# Patient Record
Sex: MALE | Race: WHITE | NOT HISPANIC OR LATINO
[De-identification: names, ages, dates, MRNs, and addresses within clinical notes are randomized per-mention and may not be internally consistent; named-entity substitution may affect disease eponyms.]

---

## 2017-01-17 ENCOUNTER — MEDICATION RENEWAL (OUTPATIENT)
Age: 57
End: 2017-01-17

## 2017-02-09 ENCOUNTER — APPOINTMENT (OUTPATIENT)
Dept: PULMONOLOGY | Facility: CLINIC | Age: 57
End: 2017-02-09

## 2017-02-13 ENCOUNTER — APPOINTMENT (OUTPATIENT)
Dept: PULMONOLOGY | Facility: CLINIC | Age: 57
End: 2017-02-13

## 2017-02-13 VITALS
WEIGHT: 209 LBS | HEART RATE: 101 BPM | BODY MASS INDEX: 30.96 KG/M2 | HEIGHT: 69 IN | DIASTOLIC BLOOD PRESSURE: 75 MMHG | TEMPERATURE: 98.5 F | OXYGEN SATURATION: 96 % | SYSTOLIC BLOOD PRESSURE: 169 MMHG | RESPIRATION RATE: 12 BRPM

## 2017-03-16 ENCOUNTER — APPOINTMENT (OUTPATIENT)
Dept: PULMONOLOGY | Facility: CLINIC | Age: 57
End: 2017-03-16

## 2017-03-16 ENCOUNTER — APPOINTMENT (OUTPATIENT)
Dept: CARDIOLOGY | Facility: CLINIC | Age: 57
End: 2017-03-16

## 2017-03-16 ENCOUNTER — NON-APPOINTMENT (OUTPATIENT)
Age: 57
End: 2017-03-16

## 2017-03-16 VITALS
RESPIRATION RATE: 12 BRPM | HEIGHT: 69 IN | OXYGEN SATURATION: 97 % | WEIGHT: 214 LBS | HEART RATE: 91 BPM | TEMPERATURE: 98.5 F | BODY MASS INDEX: 31.7 KG/M2 | DIASTOLIC BLOOD PRESSURE: 80 MMHG | SYSTOLIC BLOOD PRESSURE: 171 MMHG

## 2017-03-16 VITALS — SYSTOLIC BLOOD PRESSURE: 142 MMHG | DIASTOLIC BLOOD PRESSURE: 78 MMHG

## 2017-03-16 DIAGNOSIS — Z00.00 ENCOUNTER FOR GENERAL ADULT MEDICAL EXAMINATION W/OUT ABNORMAL FINDINGS: ICD-10-CM

## 2017-05-25 ENCOUNTER — APPOINTMENT (OUTPATIENT)
Dept: CARDIOLOGY | Facility: CLINIC | Age: 57
End: 2017-05-25

## 2017-05-25 ENCOUNTER — APPOINTMENT (OUTPATIENT)
Dept: PULMONOLOGY | Facility: CLINIC | Age: 57
End: 2017-05-25

## 2017-05-25 ENCOUNTER — NON-APPOINTMENT (OUTPATIENT)
Age: 57
End: 2017-05-25

## 2017-05-25 VITALS
SYSTOLIC BLOOD PRESSURE: 140 MMHG | RESPIRATION RATE: 14 BRPM | HEART RATE: 99 BPM | OXYGEN SATURATION: 96 % | DIASTOLIC BLOOD PRESSURE: 70 MMHG | TEMPERATURE: 98.6 F | WEIGHT: 207 LBS | HEIGHT: 69 IN | BODY MASS INDEX: 30.66 KG/M2

## 2017-05-25 VITALS
TEMPERATURE: 98.6 F | HEART RATE: 99 BPM | OXYGEN SATURATION: 95 % | DIASTOLIC BLOOD PRESSURE: 71 MMHG | SYSTOLIC BLOOD PRESSURE: 156 MMHG | WEIGHT: 207 LBS | RESPIRATION RATE: 12 BRPM | BODY MASS INDEX: 30.66 KG/M2 | HEIGHT: 69 IN

## 2017-05-25 VITALS — DIASTOLIC BLOOD PRESSURE: 72 MMHG | SYSTOLIC BLOOD PRESSURE: 144 MMHG

## 2017-06-08 NOTE — ASU PATIENT PROFILE, ADULT - PSH
S/P amputation  right hallux 5/13  S/P carpal tunnel release  b/l  S/P hernia repair  at age 4 H/O cardiac catheterization    Retinal hemorrhage, left eye    S/P amputation  right hallux 5/13  S/P carpal tunnel release  b/l  S/P hernia repair  at age 4

## 2017-06-08 NOTE — ASU PATIENT PROFILE, ADULT - PMH
CKD (chronic kidney disease)    Diabetes    Diabetic neuropathy    Diastolic dysfunction  mild. stage 1. EF 65%  DKA (diabetic ketoacidoses)    DM (diabetes mellitus)    High cholesterol    Hyperlipidemia    Hypertension    Hypertension    Insulin pump status    Lower extremity edema    Osteomyelitis of ankle or foot  x2  Pneumonia  4/2013

## 2017-06-09 ENCOUNTER — TRANSCRIPTION ENCOUNTER (OUTPATIENT)
Age: 57
End: 2017-06-09

## 2017-06-09 ENCOUNTER — OUTPATIENT (OUTPATIENT)
Dept: OUTPATIENT SERVICES | Facility: HOSPITAL | Age: 57
LOS: 1 days | End: 2017-06-09
Payer: COMMERCIAL

## 2017-06-09 VITALS
RESPIRATION RATE: 19 BRPM | OXYGEN SATURATION: 96 % | DIASTOLIC BLOOD PRESSURE: 72 MMHG | HEART RATE: 88 BPM | SYSTOLIC BLOOD PRESSURE: 141 MMHG

## 2017-06-09 VITALS
WEIGHT: 200.62 LBS | HEART RATE: 93 BPM | OXYGEN SATURATION: 98 % | HEIGHT: 68 IN | TEMPERATURE: 99 F | DIASTOLIC BLOOD PRESSURE: 86 MMHG | SYSTOLIC BLOOD PRESSURE: 161 MMHG | RESPIRATION RATE: 11 BRPM

## 2017-06-09 DIAGNOSIS — H43.11 VITREOUS HEMORRHAGE, RIGHT EYE: ICD-10-CM

## 2017-06-09 DIAGNOSIS — Z98.890 OTHER SPECIFIED POSTPROCEDURAL STATES: Chronic | ICD-10-CM

## 2017-06-09 DIAGNOSIS — E11.3519 TYPE 2 DIABETES MELLITUS WITH PROLIFERATIVE DIABETIC RETINOPATHY WITH MACULAR EDEMA, UNSPECIFIED EYE: ICD-10-CM

## 2017-06-09 DIAGNOSIS — H35.62 RETINAL HEMORRHAGE, LEFT EYE: Chronic | ICD-10-CM

## 2017-06-09 PROCEDURE — 67041 VIT FOR MACULAR PUCKER: CPT | Mod: RT

## 2017-06-09 PROCEDURE — C1889: CPT

## 2017-06-12 ENCOUNTER — MEDICATION RENEWAL (OUTPATIENT)
Age: 57
End: 2017-06-12

## 2017-07-20 ENCOUNTER — APPOINTMENT (OUTPATIENT)
Dept: ORTHOPEDIC SURGERY | Facility: CLINIC | Age: 57
End: 2017-07-20

## 2017-08-31 ENCOUNTER — APPOINTMENT (OUTPATIENT)
Dept: CARDIOLOGY | Facility: CLINIC | Age: 57
End: 2017-08-31
Payer: COMMERCIAL

## 2017-08-31 ENCOUNTER — NON-APPOINTMENT (OUTPATIENT)
Age: 57
End: 2017-08-31

## 2017-08-31 ENCOUNTER — APPOINTMENT (OUTPATIENT)
Dept: PULMONOLOGY | Facility: CLINIC | Age: 57
End: 2017-08-31
Payer: COMMERCIAL

## 2017-08-31 VITALS
HEART RATE: 97 BPM | BODY MASS INDEX: 29.92 KG/M2 | HEIGHT: 69 IN | OXYGEN SATURATION: 96 % | RESPIRATION RATE: 12 BRPM | SYSTOLIC BLOOD PRESSURE: 135 MMHG | TEMPERATURE: 99.3 F | DIASTOLIC BLOOD PRESSURE: 77 MMHG | WEIGHT: 202 LBS

## 2017-08-31 PROCEDURE — 99214 OFFICE O/P EST MOD 30 MIN: CPT

## 2017-08-31 PROCEDURE — 99215 OFFICE O/P EST HI 40 MIN: CPT

## 2017-09-21 ENCOUNTER — APPOINTMENT (OUTPATIENT)
Dept: ORTHOPEDIC SURGERY | Facility: CLINIC | Age: 57
End: 2017-09-21
Payer: COMMERCIAL

## 2017-09-21 VITALS — HEIGHT: 69 IN | WEIGHT: 202 LBS | RESPIRATION RATE: 14 BRPM | BODY MASS INDEX: 29.92 KG/M2

## 2017-09-21 DIAGNOSIS — M76.891 OTHER SPECIFIED ENTHESOPATHIES OF RIGHT LOWER LIMB, EXCLUDING FOOT: ICD-10-CM

## 2017-09-21 PROCEDURE — 99204 OFFICE O/P NEW MOD 45 MIN: CPT | Mod: 25

## 2017-09-21 PROCEDURE — 73502 X-RAY EXAM HIP UNI 2-3 VIEWS: CPT | Mod: RT

## 2017-09-21 PROCEDURE — 20610 DRAIN/INJ JOINT/BURSA W/O US: CPT | Mod: RT

## 2017-12-07 ENCOUNTER — APPOINTMENT (OUTPATIENT)
Dept: CARDIOLOGY | Facility: CLINIC | Age: 57
End: 2017-12-07
Payer: COMMERCIAL

## 2017-12-07 ENCOUNTER — APPOINTMENT (OUTPATIENT)
Dept: PULMONOLOGY | Facility: CLINIC | Age: 57
End: 2017-12-07
Payer: COMMERCIAL

## 2017-12-07 ENCOUNTER — NON-APPOINTMENT (OUTPATIENT)
Age: 57
End: 2017-12-07

## 2017-12-07 VITALS
HEIGHT: 69 IN | DIASTOLIC BLOOD PRESSURE: 93 MMHG | SYSTOLIC BLOOD PRESSURE: 187 MMHG | TEMPERATURE: 99 F | OXYGEN SATURATION: 95 % | HEART RATE: 91 BPM | RESPIRATION RATE: 12 BRPM

## 2017-12-07 VITALS
DIASTOLIC BLOOD PRESSURE: 70 MMHG | SYSTOLIC BLOOD PRESSURE: 140 MMHG | WEIGHT: 202 LBS | HEIGHT: 69 IN | BODY MASS INDEX: 29.92 KG/M2

## 2017-12-07 VITALS — SYSTOLIC BLOOD PRESSURE: 160 MMHG | DIASTOLIC BLOOD PRESSURE: 78 MMHG

## 2017-12-07 PROCEDURE — 99214 OFFICE O/P EST MOD 30 MIN: CPT

## 2017-12-07 PROCEDURE — 93000 ELECTROCARDIOGRAM COMPLETE: CPT

## 2017-12-07 PROCEDURE — 99215 OFFICE O/P EST HI 40 MIN: CPT | Mod: 25

## 2017-12-08 ENCOUNTER — MEDICATION RENEWAL (OUTPATIENT)
Age: 57
End: 2017-12-08

## 2017-12-08 LAB
ALBUMIN SERPL ELPH-MCNC: 4.4 G/DL
ALP BLD-CCNC: 92 U/L
ALT SERPL-CCNC: 14 U/L
ANION GAP SERPL CALC-SCNC: 17 MMOL/L
AST SERPL-CCNC: 23 U/L
BASOPHILS # BLD AUTO: 0.05 K/UL
BASOPHILS NFR BLD AUTO: 0.5 %
BILIRUB SERPL-MCNC: 0.2 MG/DL
BUN SERPL-MCNC: 53 MG/DL
CALCIUM SERPL-MCNC: 8.9 MG/DL
CHLORIDE SERPL-SCNC: 98 MMOL/L
CHOLEST SERPL-MCNC: 126 MG/DL
CHOLEST/HDLC SERPL: 5 RATIO
CO2 SERPL-SCNC: 24 MMOL/L
CREAT SERPL-MCNC: 3.16 MG/DL
EOSINOPHIL # BLD AUTO: 0.16 K/UL
EOSINOPHIL NFR BLD AUTO: 1.6
GLUCOSE SERPL-MCNC: 276 MG/DL
HBA1C MFR BLD HPLC: 9.8 %
HCT VFR BLD CALC: 35.6 %
HDLC SERPL-MCNC: 25 MG/DL
HGB BLD-MCNC: 11.8 G/DL
IMM GRANULOCYTES NFR BLD AUTO: 0.8 %
LDLC SERPL CALC-MCNC: 48 MG/DL
LYMPHOCYTES # BLD AUTO: 2.25 K/UL
LYMPHOCYTES NFR BLD AUTO: 22.6 %
MAN DIFF?: NORMAL
MCHC RBC-ENTMCNC: 29.9 PG
MCHC RBC-ENTMCNC: 33.1 GM/DL
MCV RBC AUTO: 90.4 FL
MONOCYTES # BLD AUTO: 0.49 K/UL
MONOCYTES NFR BLD AUTO: 4.9 %
NEUTROPHILS # BLD AUTO: 6.94 K/UL
NEUTROPHILS NFR BLD AUTO: 69.6 %
PLATELET # BLD AUTO: 287 K/UL
POTASSIUM SERPL-SCNC: 3.8 MMOL/L
PROT SERPL-MCNC: 7.9 G/DL
PSA SERPL-MCNC: 0.73 NG/ML
RBC # BLD: 3.94 M/UL
RBC # FLD: 13.1 %
SODIUM SERPL-SCNC: 139 MMOL/L
T4 SERPL-MCNC: 5.9 UG/DL
TRIGL SERPL-MCNC: 263 MG/DL
TSH SERPL-ACNC: 4.23 UIU/ML
WBC # FLD AUTO: 9.97 K/UL

## 2018-03-15 ENCOUNTER — APPOINTMENT (OUTPATIENT)
Dept: CARDIOLOGY | Facility: CLINIC | Age: 58
End: 2018-03-15
Payer: COMMERCIAL

## 2018-03-15 ENCOUNTER — NON-APPOINTMENT (OUTPATIENT)
Age: 58
End: 2018-03-15

## 2018-03-15 ENCOUNTER — APPOINTMENT (OUTPATIENT)
Dept: PULMONOLOGY | Facility: CLINIC | Age: 58
End: 2018-03-15
Payer: COMMERCIAL

## 2018-03-15 VITALS
RESPIRATION RATE: 12 BRPM | DIASTOLIC BLOOD PRESSURE: 70 MMHG | SYSTOLIC BLOOD PRESSURE: 130 MMHG | OXYGEN SATURATION: 96 % | HEART RATE: 88 BPM

## 2018-03-15 VITALS — SYSTOLIC BLOOD PRESSURE: 136 MMHG | DIASTOLIC BLOOD PRESSURE: 70 MMHG

## 2018-03-15 VITALS
HEART RATE: 86 BPM | HEIGHT: 69 IN | BODY MASS INDEX: 28.14 KG/M2 | OXYGEN SATURATION: 95 % | TEMPERATURE: 98.2 F | DIASTOLIC BLOOD PRESSURE: 69 MMHG | SYSTOLIC BLOOD PRESSURE: 161 MMHG | WEIGHT: 190 LBS | RESPIRATION RATE: 12 BRPM

## 2018-03-15 PROCEDURE — 99215 OFFICE O/P EST HI 40 MIN: CPT

## 2018-03-15 PROCEDURE — 99214 OFFICE O/P EST MOD 30 MIN: CPT

## 2018-03-16 LAB
25(OH)D3 SERPL-MCNC: 40.9 NG/ML
ALBUMIN SERPL ELPH-MCNC: 4.1 G/DL
ALP BLD-CCNC: 82 U/L
ALT SERPL-CCNC: 12 U/L
ANION GAP SERPL CALC-SCNC: 17 MMOL/L
AST SERPL-CCNC: 24 U/L
BASOPHILS # BLD AUTO: 0.04 K/UL
BASOPHILS NFR BLD AUTO: 0.4 %
BILIRUB SERPL-MCNC: 0.2 MG/DL
BUN SERPL-MCNC: 72 MG/DL
CALCIUM SERPL-MCNC: 9.2 MG/DL
CHLORIDE SERPL-SCNC: 97 MMOL/L
CHOLEST SERPL-MCNC: 127 MG/DL
CHOLEST/HDLC SERPL: 4.4 RATIO
CO2 SERPL-SCNC: 23 MMOL/L
CREAT SERPL-MCNC: 4.19 MG/DL
EOSINOPHIL # BLD AUTO: 0.17 K/UL
EOSINOPHIL NFR BLD AUTO: 1.9 %
GLUCOSE SERPL-MCNC: 218 MG/DL
HBA1C MFR BLD HPLC: 8.7 %
HCT VFR BLD CALC: 34.1 %
HDLC SERPL-MCNC: 29 MG/DL
HGB BLD-MCNC: 11.1 G/DL
IMM GRANULOCYTES NFR BLD AUTO: 0.1 %
LDLC SERPL CALC-MCNC: 64 MG/DL
LYMPHOCYTES # BLD AUTO: 2.02 K/UL
LYMPHOCYTES NFR BLD AUTO: 22.5 %
MAN DIFF?: NORMAL
MCHC RBC-ENTMCNC: 29.6 PG
MCHC RBC-ENTMCNC: 32.6 GM/DL
MCV RBC AUTO: 90.9 FL
MONOCYTES # BLD AUTO: 0.45 K/UL
MONOCYTES NFR BLD AUTO: 5 %
NEUTROPHILS # BLD AUTO: 6.27 K/UL
NEUTROPHILS NFR BLD AUTO: 70.1 %
PLATELET # BLD AUTO: 254 K/UL
POTASSIUM SERPL-SCNC: 3.9 MMOL/L
PROT SERPL-MCNC: 8.3 G/DL
RBC # BLD: 3.75 M/UL
RBC # FLD: 13.8 %
SODIUM SERPL-SCNC: 137 MMOL/L
TRIGL SERPL-MCNC: 169 MG/DL
WBC # FLD AUTO: 8.96 K/UL

## 2018-04-30 ENCOUNTER — OUTPATIENT (OUTPATIENT)
Dept: OUTPATIENT SERVICES | Facility: HOSPITAL | Age: 58
LOS: 1 days | End: 2018-04-30

## 2018-04-30 VITALS
HEART RATE: 88 BPM | TEMPERATURE: 99 F | DIASTOLIC BLOOD PRESSURE: 60 MMHG | WEIGHT: 190.04 LBS | OXYGEN SATURATION: 95 % | HEIGHT: 68 IN | RESPIRATION RATE: 16 BRPM | SYSTOLIC BLOOD PRESSURE: 142 MMHG

## 2018-04-30 DIAGNOSIS — I10 ESSENTIAL (PRIMARY) HYPERTENSION: ICD-10-CM

## 2018-04-30 DIAGNOSIS — H35.62 RETINAL HEMORRHAGE, LEFT EYE: Chronic | ICD-10-CM

## 2018-04-30 DIAGNOSIS — K43.9 VENTRAL HERNIA WITHOUT OBSTRUCTION OR GANGRENE: ICD-10-CM

## 2018-04-30 DIAGNOSIS — Z98.890 OTHER SPECIFIED POSTPROCEDURAL STATES: Chronic | ICD-10-CM

## 2018-04-30 DIAGNOSIS — E11.9 TYPE 2 DIABETES MELLITUS WITHOUT COMPLICATIONS: ICD-10-CM

## 2018-04-30 DIAGNOSIS — R06.83 SNORING: ICD-10-CM

## 2018-04-30 DIAGNOSIS — Z98.84 BARIATRIC SURGERY STATUS: Chronic | ICD-10-CM

## 2018-04-30 DIAGNOSIS — Z98.49 CATARACT EXTRACTION STATUS, UNSPECIFIED EYE: Chronic | ICD-10-CM

## 2018-04-30 DIAGNOSIS — N18.9 CHRONIC KIDNEY DISEASE, UNSPECIFIED: ICD-10-CM

## 2018-04-30 LAB
BUN SERPL-MCNC: 78 MG/DL — HIGH (ref 7–23)
CALCIUM SERPL-MCNC: 9.5 MG/DL — SIGNIFICANT CHANGE UP (ref 8.4–10.5)
CHLORIDE SERPL-SCNC: 95 MMOL/L — LOW (ref 98–107)
CO2 SERPL-SCNC: 23 MMOL/L — SIGNIFICANT CHANGE UP (ref 22–31)
CREAT SERPL-MCNC: 4.35 MG/DL — HIGH (ref 0.5–1.3)
GLUCOSE SERPL-MCNC: 146 MG/DL — HIGH (ref 70–99)
POTASSIUM SERPL-MCNC: 3.8 MMOL/L — SIGNIFICANT CHANGE UP (ref 3.5–5.3)
POTASSIUM SERPL-SCNC: 3.8 MMOL/L — SIGNIFICANT CHANGE UP (ref 3.5–5.3)
SODIUM SERPL-SCNC: 137 MMOL/L — SIGNIFICANT CHANGE UP (ref 135–145)

## 2018-04-30 NOTE — H&P PST ADULT - NEGATIVE MUSCULOSKELETAL SYMPTOMS
no muscle cramps/no muscle weakness/no arthritis/no joint swelling/no myalgia/no neck pain/no back pain

## 2018-04-30 NOTE — H&P PST ADULT - NEGATIVE GENERAL GENITOURINARY SYMPTOMS
no renal colic/no flank pain R/no dysuria/normal urinary frequency/no bladder infections/no flank pain L

## 2018-04-30 NOTE — H&P PST ADULT - FAMILY HISTORY
Father  Still living? Unknown  Family history of heart attack, Age at diagnosis: Age Unknown     Grandparent  Still living? Unknown  Family history of heart attack, Age at diagnosis: Age Unknown

## 2018-04-30 NOTE — H&P PST ADULT - PROBLEM SELECTOR PLAN 1
Pt scheduled for Incision Hernia Repair With Mesh on 05/03/18  Preop instructions provided. Pt verbalized understanding.   Pepcid for GI prophylaxis provided   Chlorhexidine wash with instructions provided.

## 2018-04-30 NOTE — H&P PST ADULT - PROBLEM SELECTOR PLAN 3
Pt instructed to take Amlodipine, Micardis, Labetalol, hydralazine on the morning of the procedure with a sip of water Pt instructed to take Amlodipine, Micardis, Labetalol, hydralazine on the morning of the procedure with a sip of water  last cardiac note and EKG in chart; no cardiac contraindications

## 2018-04-30 NOTE — H&P PST ADULT - HISTORY OF PRESENT ILLNESS
57 y.o .male with hx of HTN, HLD, DM type 2, gastric sleeve 2015, reports 100 lbs weight loss, hx of ventral hernia ~1 year, states bulging getting progressively larger, and reports discomfort in periumbilical region. Pt presents to PST for evaluation for Incisional Hernia Repair with Mesh on 05/03/18 57 y.o .male with hx of HTN, HLD, DM type 2, CKD, gastric sleeve 2015, reports 100 lbs weight loss, hx of ventral hernia ~1 year, states bulging getting progressively worse, andc/os discomfort in periumbilical region. Pt presents to PST for evaluation for Incisional Hernia Repair with Mesh on 05/03/18

## 2018-04-30 NOTE — H&P PST ADULT - PMH
CKD (chronic kidney disease)    Diabetes    Diabetic neuropathy    Diastolic dysfunction  mild. stage 1. EF 65%  DKA (diabetic ketoacidoses)    DM (diabetes mellitus)    High cholesterol    Hyperlipidemia    Hypertension    Hypertension    Insulin pump status  denies  Lower extremity edema    Osteomyelitis of ankle or foot  x2  Pneumonia  4/2013  Ventral hernia

## 2018-04-30 NOTE — H&P PST ADULT - OTHER CARE PROVIDERS
Dr. Vogt/nephrology [phone]; Tracey Delarosa/endo [phone]; Dr. Topete/cardio- [phone]

## 2018-04-30 NOTE — H&P PST ADULT - NEGATIVE ENMT SYMPTOMS
no ear pain/no throat pain/no hearing difficulty/no nasal congestion/no nasal discharge/no post-nasal discharge/no sinus symptoms/no dysphagia

## 2018-04-30 NOTE — H&P PST ADULT - PROBLEM SELECTOR PLAN 2
endocrine eval in chart;  pt to decrease dose of tresiba to 18 units night before surgery as per endo instructions  OR booking notified

## 2018-04-30 NOTE — H&P PST ADULT - PSH
H/O cardiac catheterization  no stents  H/O elbow surgery  left  Retinal hemorrhage, left eye  s/p laser surgery  hx of right eye retinal hemorrahge, tx with laser surgery  S/P amputation  right hallux 5/13  S/P carpal tunnel release  b/l  S/P hernia repair  at age 4  S/P laparoscopic sleeve gastrectomy  2015  Status post cataract extraction

## 2018-04-30 NOTE — H&P PST ADULT - MUSCULOSKELETAL
details… detailed exam no joint swelling/no joint erythema/no joint warmth/no calf tenderness/ROM intact/normal strength

## 2018-04-30 NOTE — H&P PST ADULT - NSANTHOSAYNRD_GEN_A_CORE
No. ARIADNA screening performed.  STOP BANG Legend: 0-2 = LOW Risk; 3-4 = INTERMEDIATE Risk; 5-8 = HIGH Risk

## 2018-05-01 ENCOUNTER — TRANSCRIPTION ENCOUNTER (OUTPATIENT)
Age: 58
End: 2018-05-01

## 2018-05-02 ENCOUNTER — APPOINTMENT (OUTPATIENT)
Dept: TRANSPLANT | Facility: CLINIC | Age: 58
End: 2018-05-02

## 2018-05-02 ENCOUNTER — TRANSCRIPTION ENCOUNTER (OUTPATIENT)
Age: 58
End: 2018-05-02

## 2018-05-02 ENCOUNTER — APPOINTMENT (OUTPATIENT)
Dept: TRANSPLANT | Facility: CLINIC | Age: 58
End: 2018-05-02
Payer: COMMERCIAL

## 2018-05-02 ENCOUNTER — APPOINTMENT (OUTPATIENT)
Dept: NEPHROLOGY | Facility: CLINIC | Age: 58
End: 2018-05-02
Payer: COMMERCIAL

## 2018-05-02 VITALS
SYSTOLIC BLOOD PRESSURE: 145 MMHG | HEART RATE: 83 BPM | WEIGHT: 192 LBS | BODY MASS INDEX: 28.44 KG/M2 | DIASTOLIC BLOOD PRESSURE: 62 MMHG | TEMPERATURE: 98.4 F | RESPIRATION RATE: 12 BRPM | HEIGHT: 69 IN

## 2018-05-02 PROCEDURE — 99205 OFFICE O/P NEW HI 60 MIN: CPT

## 2018-05-02 PROCEDURE — 99204 OFFICE O/P NEW MOD 45 MIN: CPT

## 2018-05-03 ENCOUNTER — OUTPATIENT (OUTPATIENT)
Dept: OUTPATIENT SERVICES | Facility: HOSPITAL | Age: 58
LOS: 1 days | Discharge: HOME | End: 2018-05-03
Payer: COMMERCIAL

## 2018-05-03 ENCOUNTER — RESULT REVIEW (OUTPATIENT)
Age: 58
End: 2018-05-03

## 2018-05-03 VITALS
HEART RATE: 80 BPM | OXYGEN SATURATION: 97 % | TEMPERATURE: 98 F | HEIGHT: 67.72 IN | SYSTOLIC BLOOD PRESSURE: 140 MMHG | DIASTOLIC BLOOD PRESSURE: 61 MMHG | WEIGHT: 190.04 LBS | RESPIRATION RATE: 16 BRPM

## 2018-05-03 VITALS — OXYGEN SATURATION: 99 % | TEMPERATURE: 98 F | HEART RATE: 82 BPM | RESPIRATION RATE: 20 BRPM

## 2018-05-03 DIAGNOSIS — Z98.890 OTHER SPECIFIED POSTPROCEDURAL STATES: Chronic | ICD-10-CM

## 2018-05-03 DIAGNOSIS — H35.62 RETINAL HEMORRHAGE, LEFT EYE: Chronic | ICD-10-CM

## 2018-05-03 DIAGNOSIS — Z98.84 BARIATRIC SURGERY STATUS: Chronic | ICD-10-CM

## 2018-05-03 DIAGNOSIS — Z98.49 CATARACT EXTRACTION STATUS, UNSPECIFIED EYE: Chronic | ICD-10-CM

## 2018-05-03 DIAGNOSIS — K43.9 VENTRAL HERNIA WITHOUT OBSTRUCTION OR GANGRENE: ICD-10-CM

## 2018-05-03 PROCEDURE — 88302 TISSUE EXAM BY PATHOLOGIST: CPT | Mod: 26

## 2018-05-03 NOTE — BRIEF OPERATIVE NOTE - OPERATION/FINDINGS
3 cm chronically incarcerated incisional hernia closed primarily with 3 #1 PDS figure of eight sutures

## 2018-05-03 NOTE — BRIEF OPERATIVE NOTE - PROCEDURE
<<-----Click on this checkbox to enter Procedure Incisional hernia repair  05/03/2018    Active  DIONYNEY

## 2018-05-03 NOTE — ASU DISCHARGE PLAN (ADULT/PEDIATRIC). - NOTIFY
Fever greater than 101/Bleeding that does not stop/Increased Irritability or Sluggishness/Persistent Nausea and Vomiting/Excessive Diarrhea/Inability to Tolerate Liquids or Foods/Unable to Urinate

## 2018-05-03 NOTE — ASU DISCHARGE PLAN (ADULT/PEDIATRIC). - MEDICATION SUMMARY - MEDICATIONS TO TAKE
I will START or STAY ON the medications listed below when I get home from the hospital:    see medication reconciliation  -- Indication: For Home meds    oxyCODONE-acetaminophen 5 mg-325 mg oral tablet  -- 1 - 2 tab(s) by mouth every 4 hours, As Needed -for moderate pain - for severe pain MDD:8 tablets   -- Caution federal law prohibits the transfer of this drug to any person other  than the person for whom it was prescribed.  May cause drowsiness.  Alcohol may intensify this effect.  Use care when operating dangerous machinery.  This prescription cannot be refilled.  This product contains acetaminophen.  Do not use  with any other product containing acetaminophen to prevent possible liver damage.  Using more of this medication than prescribed may cause serious breathing problems.    -- Indication: For Post op pain med

## 2018-05-08 LAB — SURGICAL PATHOLOGY STUDY: SIGNIFICANT CHANGE UP

## 2018-05-17 ENCOUNTER — APPOINTMENT (OUTPATIENT)
Dept: CARDIOLOGY | Facility: CLINIC | Age: 58
End: 2018-05-17
Payer: COMMERCIAL

## 2018-05-17 ENCOUNTER — NON-APPOINTMENT (OUTPATIENT)
Age: 58
End: 2018-05-17

## 2018-05-17 VITALS
BODY MASS INDEX: 27.99 KG/M2 | WEIGHT: 189 LBS | HEART RATE: 88 BPM | HEIGHT: 69 IN | DIASTOLIC BLOOD PRESSURE: 64 MMHG | OXYGEN SATURATION: 96 % | RESPIRATION RATE: 12 BRPM | SYSTOLIC BLOOD PRESSURE: 142 MMHG

## 2018-05-17 VITALS — DIASTOLIC BLOOD PRESSURE: 60 MMHG | SYSTOLIC BLOOD PRESSURE: 130 MMHG

## 2018-05-17 PROCEDURE — 99215 OFFICE O/P EST HI 40 MIN: CPT

## 2018-05-18 ENCOUNTER — FORM ENCOUNTER (OUTPATIENT)
Age: 58
End: 2018-05-18

## 2018-05-19 ENCOUNTER — APPOINTMENT (OUTPATIENT)
Dept: RADIOLOGY | Facility: CLINIC | Age: 58
End: 2018-05-19
Payer: COMMERCIAL

## 2018-05-19 ENCOUNTER — APPOINTMENT (OUTPATIENT)
Dept: CT IMAGING | Facility: CLINIC | Age: 58
End: 2018-05-19
Payer: COMMERCIAL

## 2018-05-19 ENCOUNTER — OUTPATIENT (OUTPATIENT)
Dept: OUTPATIENT SERVICES | Facility: HOSPITAL | Age: 58
LOS: 1 days | End: 2018-05-19
Payer: COMMERCIAL

## 2018-05-19 DIAGNOSIS — Z01.818 ENCOUNTER FOR OTHER PREPROCEDURAL EXAMINATION: ICD-10-CM

## 2018-05-19 DIAGNOSIS — H35.62 RETINAL HEMORRHAGE, LEFT EYE: Chronic | ICD-10-CM

## 2018-05-19 DIAGNOSIS — Z98.890 OTHER SPECIFIED POSTPROCEDURAL STATES: Chronic | ICD-10-CM

## 2018-05-19 DIAGNOSIS — Z98.84 BARIATRIC SURGERY STATUS: Chronic | ICD-10-CM

## 2018-05-19 DIAGNOSIS — Z98.49 CATARACT EXTRACTION STATUS, UNSPECIFIED EYE: Chronic | ICD-10-CM

## 2018-05-19 PROCEDURE — 74176 CT ABD & PELVIS W/O CONTRAST: CPT | Mod: 26

## 2018-05-19 PROCEDURE — 71046 X-RAY EXAM CHEST 2 VIEWS: CPT

## 2018-05-19 PROCEDURE — 71046 X-RAY EXAM CHEST 2 VIEWS: CPT | Mod: 26

## 2018-05-19 PROCEDURE — 74176 CT ABD & PELVIS W/O CONTRAST: CPT

## 2018-05-31 ENCOUNTER — APPOINTMENT (OUTPATIENT)
Dept: INTERNAL MEDICINE | Facility: CLINIC | Age: 58
End: 2018-05-31
Payer: COMMERCIAL

## 2018-05-31 VITALS
DIASTOLIC BLOOD PRESSURE: 61 MMHG | SYSTOLIC BLOOD PRESSURE: 127 MMHG | HEART RATE: 97 BPM | OXYGEN SATURATION: 95 % | RESPIRATION RATE: 12 BRPM | WEIGHT: 183 LBS | TEMPERATURE: 100.4 F | BODY MASS INDEX: 27.11 KG/M2 | HEIGHT: 69 IN

## 2018-05-31 DIAGNOSIS — Z87.891 PERSONAL HISTORY OF NICOTINE DEPENDENCE: ICD-10-CM

## 2018-05-31 DIAGNOSIS — J01.90 ACUTE SINUSITIS, UNSPECIFIED: ICD-10-CM

## 2018-05-31 PROCEDURE — 99214 OFFICE O/P EST MOD 30 MIN: CPT | Mod: 25

## 2018-05-31 PROCEDURE — 94640 AIRWAY INHALATION TREATMENT: CPT

## 2018-05-31 RX ORDER — CYCLOBENZAPRINE HYDROCHLORIDE 10 MG/1
10 TABLET, FILM COATED ORAL
Qty: 90 | Refills: 0 | Status: COMPLETED | COMMUNITY
Start: 2018-03-15 | End: 2018-05-31

## 2018-05-31 RX ORDER — MUPIROCIN 20 MG/G
2 OINTMENT TOPICAL
Qty: 22 | Refills: 0 | Status: COMPLETED | COMMUNITY
Start: 2017-12-01

## 2018-05-31 RX ORDER — LATANOPROST 50 UG/ML
0.01 SOLUTION OPHTHALMIC DAILY
Refills: 0 | Status: DISCONTINUED | COMMUNITY
Start: 2018-03-15 | End: 2018-05-31

## 2018-05-31 RX ORDER — CIPROFLOXACIN HYDROCHLORIDE 250 MG/1
250 TABLET, FILM COATED ORAL
Qty: 20 | Refills: 0 | Status: COMPLETED | COMMUNITY
Start: 2017-12-01

## 2018-05-31 RX ORDER — LABETALOL HYDROCHLORIDE 100 MG/1
100 TABLET, FILM COATED ORAL
Refills: 0 | Status: COMPLETED | COMMUNITY
Start: 2018-05-02 | End: 2018-05-31

## 2018-05-31 RX ADMIN — ALBUTEROL SULFATE 1 0.083%: 2.5 SOLUTION RESPIRATORY (INHALATION) at 00:00

## 2018-06-04 RX ORDER — ALBUTEROL SULFATE 2.5 MG/3ML
(2.5 MG/3ML) SOLUTION RESPIRATORY (INHALATION)
Qty: 0 | Refills: 0 | Status: COMPLETED | OUTPATIENT
Start: 2018-05-31

## 2018-06-06 ENCOUNTER — APPOINTMENT (OUTPATIENT)
Dept: CARDIOLOGY | Facility: CLINIC | Age: 58
End: 2018-06-06

## 2018-06-06 ENCOUNTER — APPOINTMENT (OUTPATIENT)
Dept: CV DIAGNOSTICS | Facility: HOSPITAL | Age: 58
End: 2018-06-06

## 2018-06-06 ENCOUNTER — OUTPATIENT (OUTPATIENT)
Dept: OUTPATIENT SERVICES | Facility: HOSPITAL | Age: 58
LOS: 1 days | End: 2018-06-06
Payer: COMMERCIAL

## 2018-06-06 DIAGNOSIS — Z98.890 OTHER SPECIFIED POSTPROCEDURAL STATES: Chronic | ICD-10-CM

## 2018-06-06 DIAGNOSIS — Z01.818 ENCOUNTER FOR OTHER PREPROCEDURAL EXAMINATION: ICD-10-CM

## 2018-06-06 DIAGNOSIS — H35.62 RETINAL HEMORRHAGE, LEFT EYE: Chronic | ICD-10-CM

## 2018-06-06 DIAGNOSIS — Z98.49 CATARACT EXTRACTION STATUS, UNSPECIFIED EYE: Chronic | ICD-10-CM

## 2018-06-06 DIAGNOSIS — Z98.84 BARIATRIC SURGERY STATUS: Chronic | ICD-10-CM

## 2018-06-06 PROCEDURE — 93017 CV STRESS TEST TRACING ONLY: CPT

## 2018-06-06 PROCEDURE — 78452 HT MUSCLE IMAGE SPECT MULT: CPT

## 2018-06-06 PROCEDURE — 78452 HT MUSCLE IMAGE SPECT MULT: CPT | Mod: 26

## 2018-06-06 PROCEDURE — 93018 CV STRESS TEST I&R ONLY: CPT

## 2018-06-06 PROCEDURE — 93016 CV STRESS TEST SUPVJ ONLY: CPT

## 2018-06-06 PROCEDURE — A9500: CPT

## 2018-06-15 ENCOUNTER — APPOINTMENT (OUTPATIENT)
Dept: CARDIOLOGY | Facility: CLINIC | Age: 58
End: 2018-06-15
Payer: COMMERCIAL

## 2018-06-15 PROCEDURE — 93306 TTE W/DOPPLER COMPLETE: CPT

## 2018-06-17 PROBLEM — Z87.891 FORMER SMOKER: Noted: 2018-05-02

## 2018-06-21 ENCOUNTER — NON-APPOINTMENT (OUTPATIENT)
Age: 58
End: 2018-06-21

## 2018-06-21 ENCOUNTER — APPOINTMENT (OUTPATIENT)
Dept: PULMONOLOGY | Facility: CLINIC | Age: 58
End: 2018-06-21
Payer: COMMERCIAL

## 2018-06-21 ENCOUNTER — APPOINTMENT (OUTPATIENT)
Dept: CARDIOLOGY | Facility: CLINIC | Age: 58
End: 2018-06-21
Payer: COMMERCIAL

## 2018-06-21 VITALS
DIASTOLIC BLOOD PRESSURE: 61 MMHG | HEIGHT: 69 IN | SYSTOLIC BLOOD PRESSURE: 113 MMHG | TEMPERATURE: 98.6 F | WEIGHT: 184 LBS | OXYGEN SATURATION: 96 % | BODY MASS INDEX: 27.25 KG/M2 | HEART RATE: 81 BPM | RESPIRATION RATE: 12 BRPM

## 2018-06-21 VITALS — DIASTOLIC BLOOD PRESSURE: 60 MMHG | SYSTOLIC BLOOD PRESSURE: 130 MMHG

## 2018-06-21 PROCEDURE — 99215 OFFICE O/P EST HI 40 MIN: CPT

## 2018-06-21 PROCEDURE — 99214 OFFICE O/P EST MOD 30 MIN: CPT

## 2018-06-22 NOTE — PHYSICAL EXAM
[No Acute Distress] : no acute distress [Well Nourished] : well nourished [Well Developed] : well developed [Well-Appearing] : well-appearing [Ill-Appearing] : ill-appearing [Normal Sclera/Conjunctiva] : normal sclera/conjunctiva [EOMI] : extraocular movements intact [Normal Outer Ear/Nose] : the outer ears and nose were normal in appearance [Normal TMs] : both tympanic membranes were normal [No JVD] : no jugular venous distention [Supple] : supple [No Lymphadenopathy] : no lymphadenopathy [Thyroid Normal, No Nodules] : the thyroid was normal and there were no nodules present [No Respiratory Distress] : no respiratory distress  [Clear to Auscultation] : lungs were clear to auscultation bilaterally [No Accessory Muscle Use] : no accessory muscle use [Normal Rate] : normal rate  [Regular Rhythm] : with a regular rhythm [Normal S1, S2] : normal S1 and S2 [No Murmur] : no murmur heard [Soft] : abdomen soft [Non Tender] : non-tender [Non-distended] : non-distended [No Masses] : no abdominal mass palpated [No HSM] : no HSM [Normal Bowel Sounds] : normal bowel sounds [Normal Supraclavicular Nodes] : no supraclavicular lymphadenopathy [Normal Posterior Cervical Nodes] : no posterior cervical lymphadenopathy [Normal Anterior Cervical Nodes] : no anterior cervical lymphadenopathy [No Joint Swelling] : no joint swelling [Grossly Normal Strength/Tone] : grossly normal strength/tone [No Rash] : no rash [No Skin Lesions] : no skin lesions [Normal Gait] : normal gait [Coordination Grossly Intact] : coordination grossly intact [No Focal Deficits] : no focal deficits [Speech Grossly Normal] : speech grossly normal [Memory Grossly Normal] : memory grossly normal [Normal Affect] : the affect was normal [Alert and Oriented x3] : oriented to person, place, and time [Normal Mood] : the mood was normal [Normal Insight/Judgement] : insight and judgment were intact [de-identified] : nasal mucosal congestion with clear discharge; trace PND on oropharynx

## 2018-06-22 NOTE — ASSESSMENT
[FreeTextEntry1] : ______________________________________________________________ \par \par Procedure:   Pt given nebulizer w/albuterol.  \par Patient's lung exam after treatment: Improved aeration, more audible wheezes diffusely. \par Patient stated chest felt clearer and breathing was easier.  Patient denies any side effects.\par ______________________________________________________________ \par \par Assessment & Plan \par \par 57 yr old male w/HTN, DM, CAD presenting today w/ acute bronchitis w/bronchospasm and upper respiratory tract dz, w/cough, nasal congestion, and rhinorrhea.  Supportive care d/w pt:  rest, fluids, fever/pain mgmt, sx mgmt w/OTC remedies, rx Augmentin, Ventolin, simply saline and Flonase usage and sfx.  RTO if sx persist or worsen. Counseled patient for greater than 50% of this visit, including diagnoses information, medication usage and side effects, therapeutic goals and options, and followup. Patient's questions were answered. Patient expressed understanding of, and agreement with, assessment and plan.

## 2018-06-22 NOTE — HISTORY OF PRESENT ILLNESS
[FreeTextEntry8] : 57 yr old male here for acute; wife present.\par \par Feeling ill for past 5 days - nasal congestion and mild cough (went to his son's wedding in [place] 5 days ago).  Then began felt run down.  3 days ago, coming home from weekend, began feeling chills.  Yesterday, sweats, chills, fevers (103F at home, responding at home). Cough producing phlegm and with wheezing, copious PND, with green nasal congestion/discharge.

## 2018-06-24 ENCOUNTER — OUTPATIENT (OUTPATIENT)
Dept: OUTPATIENT SERVICES | Facility: HOSPITAL | Age: 58
LOS: 1 days | End: 2018-06-24
Payer: COMMERCIAL

## 2018-06-24 ENCOUNTER — APPOINTMENT (OUTPATIENT)
Dept: CT IMAGING | Facility: IMAGING CENTER | Age: 58
End: 2018-06-24
Payer: COMMERCIAL

## 2018-06-24 DIAGNOSIS — Z00.8 ENCOUNTER FOR OTHER GENERAL EXAMINATION: ICD-10-CM

## 2018-06-24 DIAGNOSIS — Z98.49 CATARACT EXTRACTION STATUS, UNSPECIFIED EYE: Chronic | ICD-10-CM

## 2018-06-24 DIAGNOSIS — H35.62 RETINAL HEMORRHAGE, LEFT EYE: Chronic | ICD-10-CM

## 2018-06-24 DIAGNOSIS — Z98.890 OTHER SPECIFIED POSTPROCEDURAL STATES: Chronic | ICD-10-CM

## 2018-06-24 DIAGNOSIS — Z98.84 BARIATRIC SURGERY STATUS: Chronic | ICD-10-CM

## 2018-06-24 PROCEDURE — 74261 CT COLONOGRAPHY DX: CPT

## 2018-06-24 PROCEDURE — 74261 CT COLONOGRAPHY DX: CPT | Mod: 26

## 2018-06-29 RX ORDER — AMOXICILLIN AND CLAVULANATE POTASSIUM 875; 125 MG/1; MG/1
875-125 TABLET, COATED ORAL TWICE DAILY
Qty: 20 | Refills: 0 | Status: DISCONTINUED | COMMUNITY
Start: 2018-05-31 | End: 2018-06-29

## 2018-07-11 ENCOUNTER — LABORATORY RESULT (OUTPATIENT)
Age: 58
End: 2018-07-11

## 2018-07-26 ENCOUNTER — CHART COPY (OUTPATIENT)
Age: 58
End: 2018-07-26

## 2018-07-31 ENCOUNTER — APPOINTMENT (OUTPATIENT)
Dept: TRANSPLANT | Facility: CLINIC | Age: 58
End: 2018-07-31

## 2018-08-06 ENCOUNTER — MEDICATION RENEWAL (OUTPATIENT)
Age: 58
End: 2018-08-06

## 2018-08-07 ENCOUNTER — APPOINTMENT (OUTPATIENT)
Dept: NEPHROLOGY | Facility: CLINIC | Age: 58
End: 2018-08-07

## 2018-08-13 ENCOUNTER — MEDICATION RENEWAL (OUTPATIENT)
Age: 58
End: 2018-08-13

## 2018-08-20 ENCOUNTER — APPOINTMENT (OUTPATIENT)
Dept: PULMONOLOGY | Facility: CLINIC | Age: 58
End: 2018-08-20
Payer: COMMERCIAL

## 2018-08-20 VITALS
HEART RATE: 88 BPM | OXYGEN SATURATION: 95 % | HEIGHT: 69 IN | SYSTOLIC BLOOD PRESSURE: 167 MMHG | RESPIRATION RATE: 12 BRPM | DIASTOLIC BLOOD PRESSURE: 77 MMHG | TEMPERATURE: 98.9 F

## 2018-08-20 PROCEDURE — 99214 OFFICE O/P EST MOD 30 MIN: CPT

## 2018-08-21 LAB
ALBUMIN SERPL ELPH-MCNC: 4.5 G/DL
ALP BLD-CCNC: 71 U/L
ALT SERPL-CCNC: 8 U/L
ANION GAP SERPL CALC-SCNC: 21 MMOL/L
AST SERPL-CCNC: 20 U/L
BASOPHILS # BLD AUTO: 0.03 K/UL
BASOPHILS NFR BLD AUTO: 0.4 %
BILIRUB SERPL-MCNC: 0.4 MG/DL
BUN SERPL-MCNC: 79 MG/DL
CALCIUM SERPL-MCNC: 9.7 MG/DL
CHLORIDE SERPL-SCNC: 93 MMOL/L
CO2 SERPL-SCNC: 22 MMOL/L
CREAT SERPL-MCNC: 4.62 MG/DL
EOSINOPHIL # BLD AUTO: 0.06 K/UL
EOSINOPHIL NFR BLD AUTO: 0.9 %
GLUCOSE SERPL-MCNC: 134 MG/DL
HCT VFR BLD CALC: 32.1 %
HGB BLD-MCNC: 10.1 G/DL
IMM GRANULOCYTES NFR BLD AUTO: 0.3 %
LYMPHOCYTES # BLD AUTO: 1.26 K/UL
LYMPHOCYTES NFR BLD AUTO: 18.6 %
MAN DIFF?: NORMAL
MCHC RBC-ENTMCNC: 28.8 PG
MCHC RBC-ENTMCNC: 31.5 GM/DL
MCV RBC AUTO: 91.5 FL
MONOCYTES # BLD AUTO: 0.71 K/UL
MONOCYTES NFR BLD AUTO: 10.5 %
NEUTROPHILS # BLD AUTO: 4.7 K/UL
NEUTROPHILS NFR BLD AUTO: 69.3 %
PLATELET # BLD AUTO: 228 K/UL
POTASSIUM SERPL-SCNC: 4 MMOL/L
PROT SERPL-MCNC: 8.4 G/DL
RBC # BLD: 3.51 M/UL
RBC # FLD: 14.2 %
SODIUM SERPL-SCNC: 136 MMOL/L
WBC # FLD AUTO: 6.78 K/UL

## 2018-08-27 LAB — BACTERIA BLD CULT: NORMAL

## 2018-08-29 ENCOUNTER — RX RENEWAL (OUTPATIENT)
Age: 58
End: 2018-08-29

## 2018-09-11 ENCOUNTER — APPOINTMENT (OUTPATIENT)
Dept: TRANSPLANT | Facility: CLINIC | Age: 58
End: 2018-09-11

## 2018-09-27 ENCOUNTER — APPOINTMENT (OUTPATIENT)
Dept: CARDIOLOGY | Facility: CLINIC | Age: 58
End: 2018-09-27
Payer: COMMERCIAL

## 2018-09-27 ENCOUNTER — APPOINTMENT (OUTPATIENT)
Dept: PULMONOLOGY | Facility: CLINIC | Age: 58
End: 2018-09-27
Payer: COMMERCIAL

## 2018-09-27 ENCOUNTER — NON-APPOINTMENT (OUTPATIENT)
Age: 58
End: 2018-09-27

## 2018-09-27 VITALS
SYSTOLIC BLOOD PRESSURE: 135 MMHG | TEMPERATURE: 98 F | BODY MASS INDEX: 27.25 KG/M2 | DIASTOLIC BLOOD PRESSURE: 69 MMHG | HEIGHT: 69 IN | OXYGEN SATURATION: 95 % | HEART RATE: 77 BPM | RESPIRATION RATE: 12 BRPM | WEIGHT: 184 LBS

## 2018-09-27 VITALS — SYSTOLIC BLOOD PRESSURE: 130 MMHG | DIASTOLIC BLOOD PRESSURE: 64 MMHG

## 2018-09-27 PROCEDURE — 90686 IIV4 VACC NO PRSV 0.5 ML IM: CPT

## 2018-09-27 PROCEDURE — G0008: CPT

## 2018-09-27 PROCEDURE — 99214 OFFICE O/P EST MOD 30 MIN: CPT

## 2018-09-27 PROCEDURE — 90471 IMMUNIZATION ADMIN: CPT

## 2018-09-27 PROCEDURE — 99215 OFFICE O/P EST HI 40 MIN: CPT | Mod: 25

## 2018-10-01 LAB
ALBUMIN SERPL ELPH-MCNC: 4.3 G/DL
ALP BLD-CCNC: 69 U/L
ALT SERPL-CCNC: 9 U/L
ANION GAP SERPL CALC-SCNC: 18 MMOL/L
AST SERPL-CCNC: 22 U/L
BASOPHILS # BLD AUTO: 0.03 K/UL
BASOPHILS NFR BLD AUTO: 0.4 %
BILIRUB SERPL-MCNC: 0.2 MG/DL
BUN SERPL-MCNC: 83 MG/DL
CALCIUM SERPL-MCNC: 9.5 MG/DL
CHLORIDE SERPL-SCNC: 101 MMOL/L
CHOLEST SERPL-MCNC: 106 MG/DL
CHOLEST/HDLC SERPL: 3.2 RATIO
CO2 SERPL-SCNC: 22 MMOL/L
CREAT SERPL-MCNC: 4.18 MG/DL
EOSINOPHIL # BLD AUTO: 0.18 K/UL
EOSINOPHIL NFR BLD AUTO: 2.5 %
GLUCOSE SERPL-MCNC: 96 MG/DL
HBA1C MFR BLD HPLC: 7.2 %
HCT VFR BLD CALC: 33.9 %
HDLC SERPL-MCNC: 33 MG/DL
HGB BLD-MCNC: 11 G/DL
IMM GRANULOCYTES NFR BLD AUTO: 0.3 %
LDLC SERPL CALC-MCNC: 44 MG/DL
LYMPHOCYTES # BLD AUTO: 1.57 K/UL
LYMPHOCYTES NFR BLD AUTO: 21.5 %
MAN DIFF?: NORMAL
MCHC RBC-ENTMCNC: 29.6 PG
MCHC RBC-ENTMCNC: 32.4 GM/DL
MCV RBC AUTO: 91.1 FL
MONOCYTES # BLD AUTO: 0.34 K/UL
MONOCYTES NFR BLD AUTO: 4.7 %
NEUTROPHILS # BLD AUTO: 5.17 K/UL
NEUTROPHILS NFR BLD AUTO: 70.6 %
PLATELET # BLD AUTO: 265 K/UL
POTASSIUM SERPL-SCNC: 4.3 MMOL/L
PROT SERPL-MCNC: 8.1 G/DL
RBC # BLD: 3.72 M/UL
RBC # FLD: 14.2 %
SODIUM SERPL-SCNC: 141 MMOL/L
T4 SERPL-MCNC: 5.5 UG/DL
TRIGL SERPL-MCNC: 147 MG/DL
TSH SERPL-ACNC: 4.32 UIU/ML
WBC # FLD AUTO: 7.31 K/UL

## 2018-11-06 ENCOUNTER — APPOINTMENT (OUTPATIENT)
Dept: TRANSPLANT | Facility: CLINIC | Age: 58
End: 2018-11-06

## 2018-11-20 NOTE — ASU PREOP CHECKLIST - BLOOD AVAILABLE
unable to fully assess due to reduced command following and reduced effort reduced overall ROM, suspect due partially to reduced effort reduced strength and rate of motion n/a

## 2018-12-12 ENCOUNTER — APPOINTMENT (OUTPATIENT)
Dept: TRANSPLANT | Facility: CLINIC | Age: 58
End: 2018-12-12

## 2019-01-03 ENCOUNTER — APPOINTMENT (OUTPATIENT)
Dept: CARDIOLOGY | Facility: CLINIC | Age: 59
End: 2019-01-03
Payer: COMMERCIAL

## 2019-01-03 ENCOUNTER — LABORATORY RESULT (OUTPATIENT)
Age: 59
End: 2019-01-03

## 2019-01-03 ENCOUNTER — APPOINTMENT (OUTPATIENT)
Dept: PULMONOLOGY | Facility: CLINIC | Age: 59
End: 2019-01-03
Payer: COMMERCIAL

## 2019-01-03 ENCOUNTER — NON-APPOINTMENT (OUTPATIENT)
Age: 59
End: 2019-01-03

## 2019-01-03 VITALS
OXYGEN SATURATION: 95 % | SYSTOLIC BLOOD PRESSURE: 178 MMHG | HEART RATE: 80 BPM | DIASTOLIC BLOOD PRESSURE: 77 MMHG | RESPIRATION RATE: 12 BRPM | HEIGHT: 69 IN | BODY MASS INDEX: 28.58 KG/M2 | WEIGHT: 193 LBS | TEMPERATURE: 98.1 F

## 2019-01-03 VITALS — DIASTOLIC BLOOD PRESSURE: 74 MMHG | SYSTOLIC BLOOD PRESSURE: 172 MMHG

## 2019-01-03 PROCEDURE — 99214 OFFICE O/P EST MOD 30 MIN: CPT

## 2019-01-03 PROCEDURE — 99215 OFFICE O/P EST HI 40 MIN: CPT | Mod: 25

## 2019-01-03 NOTE — CARDIOLOGY SUMMARY
[Normal] : normal [___] : [unfilled] [No Ischemia] : no Ischemia [No Exercise Ind Arr] : no exercise induced arrhythmias [No Symptoms] : no Symptoms

## 2019-01-03 NOTE — PHYSICAL EXAM
[No Deformities] : no deformities [Normal Conjunctiva] : the conjunctiva exhibited no abnormalities [Eyelids - No Xanthelasma] : the eyelids demonstrated no xanthelasmas [Normal Oral Mucosa] : normal oral mucosa [No Oral Pallor] : no oral pallor [No Oral Cyanosis] : no oral cyanosis [Normal Jugular Venous A Waves Present] : normal jugular venous A waves present [Normal Jugular Venous V Waves Present] : normal jugular venous V waves present [No Jugular Venous Marin A Waves] : no jugular venous marin A waves [Respiration, Rhythm And Depth] : normal respiratory rhythm and effort [Exaggerated Use Of Accessory Muscles For Inspiration] : no accessory muscle use [Auscultation Breath Sounds / Voice Sounds] : lungs were clear to auscultation bilaterally [Heart Rate And Rhythm] : heart rate and rhythm were normal [Heart Sounds] : normal S1 and S2 [Murmurs] : no murmurs present [Abdomen Soft] : soft [Abdomen Tenderness] : non-tender [Abdomen Mass (___ Cm)] : no abdominal mass palpated [Abnormal Walk] : normal gait [Gait - Sufficient For Exercise Testing] : the gait was sufficient for exercise testing [Nail Clubbing] : no clubbing of the fingernails [Cyanosis, Localized] : no localized cyanosis [Petechial Hemorrhages (___cm)] : no petechial hemorrhages [FreeTextEntry1] : ; bilateral ankle edema [Skin Color & Pigmentation] : normal skin color and pigmentation [] : no rash [No Venous Stasis] : no venous stasis [Skin Lesions] : no skin lesions [No Skin Ulcers] : no skin ulcer [No Xanthoma] : no  xanthoma was observed [Oriented To Time, Place, And Person] : oriented to person, place, and time [Affect] : the affect was normal [Mood] : the mood was normal [No Anxiety] : not feeling anxious

## 2019-01-03 NOTE — DISCUSSION/SUMMARY
[___ Month(s)] : [unfilled] month(s) [FreeTextEntry1] : The patient is a 58-year-old ex-smoker family history of coronary artery disease, diabetes mellitus on insulin, hypertension, hyperlipidemia, renal insufficiency, s/p gastric sleeve whose blood pressure is elevated with LE edema.\par #1 CV- No angina or heart failure, stress test normal with normal LV function\par #2 DM- on insulin, better control, continues to improve\par #3 Htn- on labetalol, minoxidil, amlodipine, hydralazine, elevated today secondary to edema and salt intake\par #4 Renal - lasix and addition metolazone daily until edema resolves, there are no cardiac contraindication to renal transplant surgery, daughter is match, labs today\par #5 Lipids- atorvastatin, optimal\par #6 General- continue weight loss, increase walking for exercise\par \par 4/24/18 Addendum: No interval change. There are no cardiac contraindications to proceeding with hernia surgery as planned.

## 2019-01-03 NOTE — REVIEW OF SYSTEMS
[see HPI] : see HPI [Recent Weight Gain (___ Lbs)] : recent [unfilled] ~Ulb weight gain [Recent Weight Loss (___ Lbs)] : no recent weight loss [Shortness Of Breath] : no shortness of breath [Dyspnea on exertion] : not dyspnea during exertion [Chest Pain] : no chest pain [Lower Ext Edema] : no extremity edema [Palpitations] : no palpitations [Negative] : Heme/Lymph

## 2019-01-03 NOTE — HISTORY OF PRESENT ILLNESS
[FreeTextEntry1] : Orion is scheduled for renal transplant in May. He is monitoring his glucose closely to get the HbA1c less than 7. He continues to work full-time. He denies any chest pain, palpitations or shortness of breath. Recently legs swollen and some dietary indiscretions over the holidays. Started metolazone daily last week with some improvement.

## 2019-01-04 LAB
25(OH)D3 SERPL-MCNC: 40.1 NG/ML
ALBUMIN SERPL ELPH-MCNC: 4.5 G/DL
ALP BLD-CCNC: 78 U/L
ALT SERPL-CCNC: 17 U/L
ANION GAP SERPL CALC-SCNC: 15 MMOL/L
AST SERPL-CCNC: 25 U/L
BASOPHILS # BLD AUTO: 0.02 K/UL
BASOPHILS NFR BLD AUTO: 0.3 %
BILIRUB SERPL-MCNC: 0.3 MG/DL
BUN SERPL-MCNC: 76 MG/DL
CALCIUM SERPL-MCNC: 9 MG/DL
CHLORIDE SERPL-SCNC: 107 MMOL/L
CHOLEST SERPL-MCNC: 109 MG/DL
CHOLEST/HDLC SERPL: 3.2 RATIO
CO2 SERPL-SCNC: 18 MMOL/L
CREAT SERPL-MCNC: 5.28 MG/DL
EOSINOPHIL # BLD AUTO: 0.16 K/UL
EOSINOPHIL NFR BLD AUTO: 2.6 %
GLUCOSE SERPL-MCNC: 180 MG/DL
HBA1C MFR BLD HPLC: 7.2 %
HCT VFR BLD CALC: 31.6 %
HDLC SERPL-MCNC: 34 MG/DL
HGB BLD-MCNC: 10.1 G/DL
IMM GRANULOCYTES NFR BLD AUTO: 0.3 %
IRON SATN MFR SERPL: 23 %
IRON SERPL-MCNC: 65 UG/DL
LDLC SERPL CALC-MCNC: 53 MG/DL
LYMPHOCYTES # BLD AUTO: 1.69 K/UL
LYMPHOCYTES NFR BLD AUTO: 27.4 %
MAN DIFF?: NORMAL
MCHC RBC-ENTMCNC: 29.5 PG
MCHC RBC-ENTMCNC: 32 GM/DL
MCV RBC AUTO: 92.4 FL
MONOCYTES # BLD AUTO: 0.32 K/UL
MONOCYTES NFR BLD AUTO: 5.2 %
NEUTROPHILS # BLD AUTO: 3.95 K/UL
NEUTROPHILS NFR BLD AUTO: 64.2 %
PLATELET # BLD AUTO: 230 K/UL
POTASSIUM SERPL-SCNC: 4.9 MMOL/L
PROT SERPL-MCNC: 7.9 G/DL
RBC # BLD: 3.42 M/UL
RBC # FLD: 13.9 %
SODIUM SERPL-SCNC: 140 MMOL/L
TIBC SERPL-MCNC: 278 UG/DL
TRIGL SERPL-MCNC: 108 MG/DL
TSH SERPL-ACNC: 4.8 UIU/ML
UIBC SERPL-MCNC: 213 UG/DL
VIT B12 SERPL-MCNC: 762 PG/ML
WBC # FLD AUTO: 6.16 K/UL

## 2019-01-14 ENCOUNTER — APPOINTMENT (OUTPATIENT)
Dept: TRANSPLANT | Facility: CLINIC | Age: 59
End: 2019-01-14

## 2019-02-19 ENCOUNTER — OTHER (OUTPATIENT)
Age: 59
End: 2019-02-19

## 2019-02-19 ENCOUNTER — APPOINTMENT (OUTPATIENT)
Dept: TRANSPLANT | Facility: CLINIC | Age: 59
End: 2019-02-19

## 2019-03-15 ENCOUNTER — APPOINTMENT (OUTPATIENT)
Dept: TRANSPLANT | Facility: CLINIC | Age: 59
End: 2019-03-15

## 2019-04-11 ENCOUNTER — APPOINTMENT (OUTPATIENT)
Dept: PULMONOLOGY | Facility: CLINIC | Age: 59
End: 2019-04-11
Payer: COMMERCIAL

## 2019-04-11 ENCOUNTER — APPOINTMENT (OUTPATIENT)
Dept: CARDIOLOGY | Facility: CLINIC | Age: 59
End: 2019-04-11
Payer: COMMERCIAL

## 2019-04-11 ENCOUNTER — NON-APPOINTMENT (OUTPATIENT)
Age: 59
End: 2019-04-11

## 2019-04-11 VITALS
SYSTOLIC BLOOD PRESSURE: 179 MMHG | DIASTOLIC BLOOD PRESSURE: 74 MMHG | HEART RATE: 83 BPM | HEIGHT: 69 IN | BODY MASS INDEX: 28.58 KG/M2 | WEIGHT: 193 LBS | TEMPERATURE: 98.3 F | RESPIRATION RATE: 12 BRPM | OXYGEN SATURATION: 95 %

## 2019-04-11 VITALS — SYSTOLIC BLOOD PRESSURE: 130 MMHG | DIASTOLIC BLOOD PRESSURE: 74 MMHG

## 2019-04-11 PROCEDURE — 99214 OFFICE O/P EST MOD 30 MIN: CPT

## 2019-04-11 PROCEDURE — 99214 OFFICE O/P EST MOD 30 MIN: CPT | Mod: 25

## 2019-04-11 NOTE — CARDIOLOGY SUMMARY
[Normal] : normal [No Ischemia] : no Ischemia [No Exercise Ind Arr] : no exercise induced arrhythmias [No Symptoms] : no Symptoms [___] : [unfilled]

## 2019-04-11 NOTE — REVIEW OF SYSTEMS
[see HPI] : see HPI [Negative] : Endocrine [Recent Weight Gain (___ Lbs)] : recent [unfilled] ~Ulb weight gain [Recent Weight Loss (___ Lbs)] : no recent weight loss [Shortness Of Breath] : no shortness of breath [Dyspnea on exertion] : not dyspnea during exertion [Lower Ext Edema] : no extremity edema [Chest Pain] : no chest pain [Palpitations] : no palpitations

## 2019-04-11 NOTE — PHYSICAL EXAM
[No Deformities] : no deformities [Normal Conjunctiva] : the conjunctiva exhibited no abnormalities [Eyelids - No Xanthelasma] : the eyelids demonstrated no xanthelasmas [Normal Oral Mucosa] : normal oral mucosa [No Oral Pallor] : no oral pallor [No Oral Cyanosis] : no oral cyanosis [Normal Jugular Venous A Waves Present] : normal jugular venous A waves present [Normal Jugular Venous V Waves Present] : normal jugular venous V waves present [No Jugular Venous Marin A Waves] : no jugular venous marin A waves [Respiration, Rhythm And Depth] : normal respiratory rhythm and effort [Exaggerated Use Of Accessory Muscles For Inspiration] : no accessory muscle use [Auscultation Breath Sounds / Voice Sounds] : lungs were clear to auscultation bilaterally [Heart Rate And Rhythm] : heart rate and rhythm were normal [Heart Sounds] : normal S1 and S2 [Murmurs] : no murmurs present [Abdomen Soft] : soft [Abdomen Tenderness] : non-tender [Abdomen Mass (___ Cm)] : no abdominal mass palpated [Abnormal Walk] : normal gait [Gait - Sufficient For Exercise Testing] : the gait was sufficient for exercise testing [Nail Clubbing] : no clubbing of the fingernails [Cyanosis, Localized] : no localized cyanosis [Petechial Hemorrhages (___cm)] : no petechial hemorrhages [Skin Color & Pigmentation] : normal skin color and pigmentation [] : no rash [No Venous Stasis] : no venous stasis [Skin Lesions] : no skin lesions [No Skin Ulcers] : no skin ulcer [No Xanthoma] : no  xanthoma was observed [Oriented To Time, Place, And Person] : oriented to person, place, and time [Mood] : the mood was normal [Affect] : the affect was normal [No Anxiety] : not feeling anxious [FreeTextEntry1] : ; bilateral ankle edema

## 2019-04-11 NOTE — HISTORY OF PRESENT ILLNESS
[FreeTextEntry1] : Orion had a bleed in his left eye this morning. Transplant for May on hold looking for a better match than his daughter. LE edema despite taking it 3x day. Labetalol added secondary to elevated BP.  He continues to work full-time. He denies any chest pain, palpitations or shortness of breath.

## 2019-04-11 NOTE — DISCUSSION/SUMMARY
[___ Month(s)] : [unfilled] month(s) [FreeTextEntry1] : The patient is a 58-year-old ex-smoker family history of coronary artery disease, diabetes mellitus on insulin, hypertension, hyperlipidemia, renal insufficiency, s/p gastric sleeve whose blood pressure is elevated with LE edema.\par #1 CV- No angina or heart failure, stress test normal with normal LV function\par #2 DM- on insulin, better control, continues to improve\par #3 Htn- on labetalol at higher dose 200mg bid, minoxidil, amlodipine, hydralazine, elevated today secondary to edema and salt intake\par #4 Renal - lasix and addition metolazone daily until edema resolves, labs today, stop tumeric\par #5 Lipids- atorvastatin, optimal\par #6 General- continue weight loss, increase walking for exercise\par \par 4/24/18 Addendum: No interval change. There are no cardiac contraindications to proceeding with hernia surgery as planned.

## 2019-04-12 LAB
25(OH)D3 SERPL-MCNC: 35.9 NG/ML
ALBUMIN SERPL ELPH-MCNC: 4.6 G/DL
ALP BLD-CCNC: 96 U/L
ALT SERPL-CCNC: 13 U/L
ANION GAP SERPL CALC-SCNC: 15 MMOL/L
AST SERPL-CCNC: 15 U/L
BASOPHILS # BLD AUTO: 0.06 K/UL
BASOPHILS NFR BLD AUTO: 0.8 %
BILIRUB SERPL-MCNC: 0.2 MG/DL
BUN SERPL-MCNC: 80 MG/DL
CALCIUM SERPL-MCNC: 8.5 MG/DL
CALCIUM SERPL-MCNC: 8.5 MG/DL
CHLORIDE SERPL-SCNC: 106 MMOL/L
CO2 SERPL-SCNC: 22 MMOL/L
CREAT SERPL-MCNC: 4.85 MG/DL
EOSINOPHIL # BLD AUTO: 0.23 K/UL
EOSINOPHIL NFR BLD AUTO: 3 %
ESTIMATED AVERAGE GLUCOSE: 151 MG/DL
FERRITIN SERPL-MCNC: 247 NG/ML
GLUCOSE SERPL-MCNC: 372 MG/DL
HBA1C MFR BLD HPLC: 6.9 %
HCT VFR BLD CALC: 28.1 %
HGB BLD-MCNC: 8.5 G/DL
IMM GRANULOCYTES NFR BLD AUTO: 0.8 %
IRON SATN MFR SERPL: 15 %
IRON SERPL-MCNC: 41 UG/DL
LYMPHOCYTES # BLD AUTO: 1.29 K/UL
LYMPHOCYTES NFR BLD AUTO: 16.7 %
MAN DIFF?: NORMAL
MCHC RBC-ENTMCNC: 30.1 PG
MCHC RBC-ENTMCNC: 30.2 GM/DL
MCV RBC AUTO: 99.6 FL
MONOCYTES # BLD AUTO: 0.4 K/UL
MONOCYTES NFR BLD AUTO: 5.2 %
NEUTROPHILS # BLD AUTO: 5.67 K/UL
NEUTROPHILS NFR BLD AUTO: 73.5 %
NT-PROBNP SERPL-MCNC: 1802 PG/ML
PARATHYROID HORMONE INTACT: 192 PG/ML
PHOSPHATE SERPL-MCNC: 5.4 MG/DL
PLATELET # BLD AUTO: 231 K/UL
POTASSIUM SERPL-SCNC: 5.1 MMOL/L
PROT SERPL-MCNC: 7.2 G/DL
RBC # BLD: 2.82 M/UL
RBC # FLD: 13.2 %
SODIUM SERPL-SCNC: 143 MMOL/L
TIBC SERPL-MCNC: 269 UG/DL
UIBC SERPL-MCNC: 228 UG/DL
WBC # FLD AUTO: 7.71 K/UL

## 2019-04-19 ENCOUNTER — APPOINTMENT (OUTPATIENT)
Dept: TRANSPLANT | Facility: CLINIC | Age: 59
End: 2019-04-19

## 2019-05-07 ENCOUNTER — APPOINTMENT (OUTPATIENT)
Dept: TRANSPLANT | Facility: CLINIC | Age: 59
End: 2019-05-07

## 2019-05-07 ENCOUNTER — APPOINTMENT (OUTPATIENT)
Dept: NEPHROLOGY | Facility: CLINIC | Age: 59
End: 2019-05-07

## 2019-05-16 ENCOUNTER — APPOINTMENT (OUTPATIENT)
Dept: CARDIOLOGY | Facility: CLINIC | Age: 59
End: 2019-05-16
Payer: COMMERCIAL

## 2019-05-16 ENCOUNTER — NON-APPOINTMENT (OUTPATIENT)
Age: 59
End: 2019-05-16

## 2019-05-16 VITALS
HEART RATE: 92 BPM | SYSTOLIC BLOOD PRESSURE: 168 MMHG | RESPIRATION RATE: 12 BRPM | OXYGEN SATURATION: 95 % | BODY MASS INDEX: 31.7 KG/M2 | WEIGHT: 214 LBS | HEIGHT: 69 IN | DIASTOLIC BLOOD PRESSURE: 66 MMHG

## 2019-05-16 PROCEDURE — 99214 OFFICE O/P EST MOD 30 MIN: CPT | Mod: 25

## 2019-05-16 NOTE — DISCUSSION/SUMMARY
[___ Month(s)] : [unfilled] month(s) [FreeTextEntry1] : The patient is a 58-year-old ex-smoker family history of coronary artery disease, diabetes mellitus on insulin, hypertension, hyperlipidemia, renal insufficiency, s/p gastric sleeve whose blood pressure is elevated with LE edema.\par #1 CV- No angina or heart failure, stress test normal with normal LV function, appt with Dr. Menon\par #2 DM- on insulin, better control, continues to improve\par #3 Htn- on labetalol, minoxidil, amlodipine, hydralazine,\par #4 Renal - changed to torsemide 20mg bid, labs today and consider increase metolazone, 20 pound weight gain\par #5 Lipids- atorvastatin, optimal\par #6 General- continue weight loss, increase walking for exercise\par \par 4/24/18 Addendum: No interval change. There are no cardiac contraindications to proceeding with hernia surgery as planned.

## 2019-05-16 NOTE — HISTORY OF PRESENT ILLNESS
[FreeTextEntry1] : Orion has been getting more edematous. Initially thought to be secondary to labetalol but decrease led to no change. Then tried decreasing hydralazine but got even more edematous. Discussed yesterday changing from lasix to torsemide.

## 2019-05-16 NOTE — REVIEW OF SYSTEMS
[Recent Weight Gain (___ Lbs)] : recent [unfilled] ~Ulb weight gain [see HPI] : see HPI [Negative] : Psychiatric [Recent Weight Loss (___ Lbs)] : no recent weight loss [Shortness Of Breath] : no shortness of breath [Dyspnea on exertion] : not dyspnea during exertion [Chest Pain] : no chest pain [Lower Ext Edema] : no extremity edema [Palpitations] : no palpitations

## 2019-05-16 NOTE — PHYSICAL EXAM
[No Deformities] : no deformities [Normal Conjunctiva] : the conjunctiva exhibited no abnormalities [Normal Oral Mucosa] : normal oral mucosa [Eyelids - No Xanthelasma] : the eyelids demonstrated no xanthelasmas [No Oral Pallor] : no oral pallor [Normal Jugular Venous A Waves Present] : normal jugular venous A waves present [No Oral Cyanosis] : no oral cyanosis [No Jugular Venous Marin A Waves] : no jugular venous marin A waves [Normal Jugular Venous V Waves Present] : normal jugular venous V waves present [Respiration, Rhythm And Depth] : normal respiratory rhythm and effort [Exaggerated Use Of Accessory Muscles For Inspiration] : no accessory muscle use [Heart Sounds] : normal S1 and S2 [Heart Rate And Rhythm] : heart rate and rhythm were normal [Auscultation Breath Sounds / Voice Sounds] : lungs were clear to auscultation bilaterally [Murmurs] : no murmurs present [Abdomen Soft] : soft [Abdomen Tenderness] : non-tender [Gait - Sufficient For Exercise Testing] : the gait was sufficient for exercise testing [Abnormal Walk] : normal gait [Abdomen Mass (___ Cm)] : no abdominal mass palpated [Cyanosis, Localized] : no localized cyanosis [Nail Clubbing] : no clubbing of the fingernails [Petechial Hemorrhages (___cm)] : no petechial hemorrhages [] : no rash [Skin Color & Pigmentation] : normal skin color and pigmentation [No Venous Stasis] : no venous stasis [Skin Lesions] : no skin lesions [No Skin Ulcers] : no skin ulcer [No Xanthoma] : no  xanthoma was observed [Oriented To Time, Place, And Person] : oriented to person, place, and time [Mood] : the mood was normal [Affect] : the affect was normal [No Anxiety] : not feeling anxious [FreeTextEntry1] : ; bilateral ankle edema

## 2019-05-17 LAB
ANION GAP SERPL CALC-SCNC: 16 MMOL/L
BUN SERPL-MCNC: 78 MG/DL
CALCIUM SERPL-MCNC: 8.7 MG/DL
CHLORIDE SERPL-SCNC: 105 MMOL/L
CO2 SERPL-SCNC: 21 MMOL/L
CREAT SERPL-MCNC: 4.98 MG/DL
GLUCOSE SERPL-MCNC: 130 MG/DL
NT-PROBNP SERPL-MCNC: 2491 PG/ML
POTASSIUM SERPL-SCNC: 4.6 MMOL/L
SODIUM SERPL-SCNC: 142 MMOL/L

## 2019-05-24 ENCOUNTER — APPOINTMENT (OUTPATIENT)
Dept: CARDIOLOGY | Facility: CLINIC | Age: 59
End: 2019-05-24
Payer: COMMERCIAL

## 2019-05-24 VITALS
HEART RATE: 91 BPM | WEIGHT: 214 LBS | SYSTOLIC BLOOD PRESSURE: 165 MMHG | OXYGEN SATURATION: 95 % | RESPIRATION RATE: 12 BRPM | BODY MASS INDEX: 31.7 KG/M2 | HEIGHT: 69 IN | DIASTOLIC BLOOD PRESSURE: 72 MMHG

## 2019-05-24 PROCEDURE — 99215 OFFICE O/P EST HI 40 MIN: CPT

## 2019-05-24 NOTE — PHYSICAL EXAM
[Normal Appearance] : normal appearance [General Appearance - Well Developed] : well developed [Well Groomed] : well groomed [General Appearance - Well Nourished] : well nourished [Normal Conjunctiva] : the conjunctiva exhibited no abnormalities [No Deformities] : no deformities [General Appearance - In No Acute Distress] : no acute distress [Normal Oral Mucosa] : normal oral mucosa [Eyelids - No Xanthelasma] : the eyelids demonstrated no xanthelasmas [No Oral Cyanosis] : no oral cyanosis [No Oral Pallor] : no oral pallor [Normal Jugular Venous A Waves Present] : normal jugular venous A waves present [Normal Jugular Venous V Waves Present] : normal jugular venous V waves present [No Jugular Venous Marin A Waves] : no jugular venous marin A waves [Heart Rate And Rhythm] : heart rate and rhythm were normal [Heart Sounds] : normal S1 and S2 [Murmurs] : no murmurs present [Exaggerated Use Of Accessory Muscles For Inspiration] : no accessory muscle use [Respiration, Rhythm And Depth] : normal respiratory rhythm and effort [Auscultation Breath Sounds / Voice Sounds] : lungs were clear to auscultation bilaterally [Affect] : the affect was normal [Oriented To Time, Place, And Person] : oriented to person, place, and time [Mood] : the mood was normal [No Anxiety] : not feeling anxious [FreeTextEntry1] : pitting edema to presacral edema.  No VV.  No lymphedema.  [Skin Color & Pigmentation] : normal skin color and pigmentation [] : no rash [No Venous Stasis] : no venous stasis [Skin Lesions] : no skin lesions [No Skin Ulcers] : no skin ulcer [No Xanthoma] : no  xanthoma was observed

## 2019-05-24 NOTE — REASON FOR VISIT
[FreeTextEntry1] : 58M history of heart failure episode many years ago- during that time he felt short of breath, admitted to hospital, had R 1st digit toe amp at that time, treated with IV lasix at that time.  No coronary stents.\par \par Noticed that recently noticed leg swelling.  States that his abdomen is swollen.  He is limited in his walking currently due to shortness of breath.  He uses 1 pillow at night time. \par Was seen by Dr. Topete 1 week ago.  At that time lasix was switched to torsemide 20mg BID. \par He is also on Metolazone 5mg every other day. \par \par Since then his legs are slightly better. He was 214 lbs 1 week ago, now 208.  Before this occurred he was 193.  \par \par He remains on amlodipine. \par His BP is elevated today.

## 2019-05-24 NOTE — REVIEW OF SYSTEMS
[Shortness Of Breath] : shortness of breath [Lower Ext Edema] : lower extremity edema [Negative] : Heme/Lymph [Chest  Pressure] : no chest pressure [Chest Pain] : no chest pain [Leg Claudication] : no intermittent leg claudication [Palpitations] : no palpitations

## 2019-05-24 NOTE — ASSESSMENT
[FreeTextEntry1] : Assessment:\par 1.  LE edema\par - pitting edema to presacral area\par - JVD\par - abdominal edema\par 2.  CKD\par 3.  HTN\par \par Plan:\par 1.  Doubt primary vascular cause of the edema\par 2.  He has gained weight recently and is responding to diuretics. \par 3.  D/W Dr. Topete, will increase torsemide to 40mg BID and continue metolazone 5mg every other day\par 4.  Return to see Dr. Topete in 1 week for re-assessment and labwork\par 5.  No vascular testing at this time\par \par \par [place] [id number]

## 2019-05-29 ENCOUNTER — APPOINTMENT (OUTPATIENT)
Dept: TRANSPLANT | Facility: CLINIC | Age: 59
End: 2019-05-29

## 2019-05-30 ENCOUNTER — APPOINTMENT (OUTPATIENT)
Dept: CARDIOLOGY | Facility: CLINIC | Age: 59
End: 2019-05-30
Payer: COMMERCIAL

## 2019-05-30 VITALS
WEIGHT: 204 LBS | BODY MASS INDEX: 30.21 KG/M2 | RESPIRATION RATE: 12 BRPM | SYSTOLIC BLOOD PRESSURE: 173 MMHG | HEART RATE: 75 BPM | OXYGEN SATURATION: 96 % | DIASTOLIC BLOOD PRESSURE: 77 MMHG | HEIGHT: 69 IN

## 2019-05-30 VITALS — DIASTOLIC BLOOD PRESSURE: 72 MMHG | SYSTOLIC BLOOD PRESSURE: 140 MMHG

## 2019-05-30 PROCEDURE — 99214 OFFICE O/P EST MOD 30 MIN: CPT | Mod: 25

## 2019-05-30 NOTE — REVIEW OF SYSTEMS
[see HPI] : see HPI [Recent Weight Gain (___ Lbs)] : no recent weight gain [Recent Weight Loss (___ Lbs)] : recent [unfilled] ~Ulb weight loss [Shortness Of Breath] : no shortness of breath [Dyspnea on exertion] : not dyspnea during exertion [Chest Pain] : no chest pain [Lower Ext Edema] : no extremity edema [Palpitations] : no palpitations [Negative] : Heme/Lymph

## 2019-05-30 NOTE — DISCUSSION/SUMMARY
[___ Month(s)] : [unfilled] month(s) [FreeTextEntry1] : The patient is a 58-year-old ex-smoker family history of coronary artery disease, diabetes mellitus on insulin, hypertension, hyperlipidemia, renal insufficiency, s/p gastric sleeve with borderline BP and improved edema\par #1 CV- No angina, continue bid torsemide and metolazone every other day, labs today\par #2 DM- on insulin, better control, continues to improve\par #3 Htn- on labetalol, minoxidil, amlodipine, hydralazine,\par #4 Renal - changed to torsemide 40mg bid, labs today and consider increase metolazone, has at least another 10 pounds to lose of water\par #5 Lipids- atorvastatin, optimal\par #6 General- continue weight loss, increase walking for exercise\par \par 4/24/18 Addendum: No interval change. There are no cardiac contraindications to proceeding with hernia surgery as planned.

## 2019-05-30 NOTE — HISTORY OF PRESENT ILLNESS
[FreeTextEntry1] : Orion pulled a groin muscle today. He cut his coffee and liquid intake. Losing over a pound a day since increasing medication.

## 2019-05-31 LAB
ANION GAP SERPL CALC-SCNC: 16 MMOL/L
BUN SERPL-MCNC: 78 MG/DL
CALCIUM SERPL-MCNC: 8.9 MG/DL
CHLORIDE SERPL-SCNC: 104 MMOL/L
CO2 SERPL-SCNC: 20 MMOL/L
CREAT SERPL-MCNC: 5.17 MG/DL
GLUCOSE SERPL-MCNC: 119 MG/DL
NT-PROBNP SERPL-MCNC: 2658 PG/ML
POTASSIUM SERPL-SCNC: 4.8 MMOL/L
SODIUM SERPL-SCNC: 140 MMOL/L

## 2019-06-04 VITALS — SYSTOLIC BLOOD PRESSURE: 140 MMHG | DIASTOLIC BLOOD PRESSURE: 70 MMHG

## 2019-06-11 ENCOUNTER — INPATIENT (INPATIENT)
Facility: HOSPITAL | Age: 59
LOS: 3 days | Discharge: HOME | DRG: 291 | End: 2019-06-15
Attending: HOSPITALIST | Admitting: HOSPITALIST
Payer: COMMERCIAL

## 2019-06-11 VITALS
WEIGHT: 203.93 LBS | HEIGHT: 69 IN | SYSTOLIC BLOOD PRESSURE: 163 MMHG | DIASTOLIC BLOOD PRESSURE: 67 MMHG | TEMPERATURE: 98 F | OXYGEN SATURATION: 91 % | RESPIRATION RATE: 22 BRPM | HEART RATE: 81 BPM

## 2019-06-11 DIAGNOSIS — N18.9 CHRONIC KIDNEY DISEASE, UNSPECIFIED: ICD-10-CM

## 2019-06-11 DIAGNOSIS — Z98.49 CATARACT EXTRACTION STATUS, UNSPECIFIED EYE: Chronic | ICD-10-CM

## 2019-06-11 DIAGNOSIS — E11.8 TYPE 2 DIABETES MELLITUS WITH UNSPECIFIED COMPLICATIONS: ICD-10-CM

## 2019-06-11 DIAGNOSIS — H35.62 RETINAL HEMORRHAGE, LEFT EYE: Chronic | ICD-10-CM

## 2019-06-11 DIAGNOSIS — R60.0 LOCALIZED EDEMA: ICD-10-CM

## 2019-06-11 DIAGNOSIS — N18.5 CHRONIC KIDNEY DISEASE, STAGE 5: ICD-10-CM

## 2019-06-11 DIAGNOSIS — Z98.890 OTHER SPECIFIED POSTPROCEDURAL STATES: Chronic | ICD-10-CM

## 2019-06-11 DIAGNOSIS — D64.9 ANEMIA, UNSPECIFIED: ICD-10-CM

## 2019-06-11 DIAGNOSIS — I50.9 HEART FAILURE, UNSPECIFIED: ICD-10-CM

## 2019-06-11 DIAGNOSIS — Z29.9 ENCOUNTER FOR PROPHYLACTIC MEASURES, UNSPECIFIED: ICD-10-CM

## 2019-06-11 DIAGNOSIS — Z98.84 BARIATRIC SURGERY STATUS: Chronic | ICD-10-CM

## 2019-06-11 DIAGNOSIS — I50.33 ACUTE ON CHRONIC DIASTOLIC (CONGESTIVE) HEART FAILURE: ICD-10-CM

## 2019-06-11 LAB
ALBUMIN SERPL ELPH-MCNC: 4.5 G/DL — SIGNIFICANT CHANGE UP (ref 3.3–5)
ALP SERPL-CCNC: 93 U/L — SIGNIFICANT CHANGE UP (ref 40–120)
ALT FLD-CCNC: 21 U/L — SIGNIFICANT CHANGE UP (ref 10–45)
ANION GAP SERPL CALC-SCNC: 17 MMOL/L — SIGNIFICANT CHANGE UP (ref 5–17)
ANION GAP SERPL CALC-SCNC: 19 MMOL/L — HIGH (ref 5–17)
APTT BLD: 38.3 SEC — HIGH (ref 27.5–36.3)
AST SERPL-CCNC: 18 U/L — SIGNIFICANT CHANGE UP (ref 10–40)
BILIRUB SERPL-MCNC: 0.3 MG/DL — SIGNIFICANT CHANGE UP (ref 0.2–1.2)
BUN SERPL-MCNC: 101 MG/DL — HIGH (ref 7–23)
BUN SERPL-MCNC: 102 MG/DL — HIGH (ref 7–23)
CALCIUM SERPL-MCNC: 8.7 MG/DL — SIGNIFICANT CHANGE UP (ref 8.4–10.5)
CALCIUM SERPL-MCNC: 8.9 MG/DL — SIGNIFICANT CHANGE UP (ref 8.4–10.5)
CHLORIDE SERPL-SCNC: 107 MMOL/L — SIGNIFICANT CHANGE UP (ref 96–108)
CHLORIDE SERPL-SCNC: 108 MMOL/L — SIGNIFICANT CHANGE UP (ref 96–108)
CO2 SERPL-SCNC: 15 MMOL/L — LOW (ref 22–31)
CO2 SERPL-SCNC: 17 MMOL/L — LOW (ref 22–31)
CREAT SERPL-MCNC: 5.85 MG/DL — HIGH (ref 0.5–1.3)
CREAT SERPL-MCNC: 5.9 MG/DL — HIGH (ref 0.5–1.3)
GAS PNL BLDV: SIGNIFICANT CHANGE UP
GLUCOSE SERPL-MCNC: 230 MG/DL — HIGH (ref 70–99)
GLUCOSE SERPL-MCNC: 255 MG/DL — HIGH (ref 70–99)
HCT VFR BLD CALC: 26.2 % — LOW (ref 39–50)
HGB BLD-MCNC: 8.7 G/DL — LOW (ref 13–17)
INR BLD: 1 RATIO — SIGNIFICANT CHANGE UP (ref 0.88–1.16)
MCHC RBC-ENTMCNC: 31 PG — SIGNIFICANT CHANGE UP (ref 27–34)
MCHC RBC-ENTMCNC: 33.3 GM/DL — SIGNIFICANT CHANGE UP (ref 32–36)
MCV RBC AUTO: 93.3 FL — SIGNIFICANT CHANGE UP (ref 80–100)
NT-PROBNP SERPL-SCNC: 5239 PG/ML — HIGH (ref 0–300)
PLATELET # BLD AUTO: 209 K/UL — SIGNIFICANT CHANGE UP (ref 150–400)
POTASSIUM SERPL-MCNC: 4.9 MMOL/L — SIGNIFICANT CHANGE UP (ref 3.5–5.3)
POTASSIUM SERPL-MCNC: 4.9 MMOL/L — SIGNIFICANT CHANGE UP (ref 3.5–5.3)
POTASSIUM SERPL-SCNC: 4.9 MMOL/L — SIGNIFICANT CHANGE UP (ref 3.5–5.3)
POTASSIUM SERPL-SCNC: 4.9 MMOL/L — SIGNIFICANT CHANGE UP (ref 3.5–5.3)
PROT SERPL-MCNC: 7.8 G/DL — SIGNIFICANT CHANGE UP (ref 6–8.3)
PROTHROM AB SERPL-ACNC: 11.5 SEC — SIGNIFICANT CHANGE UP (ref 10–12.9)
RBC # BLD: 2.81 M/UL — LOW (ref 4.2–5.8)
RBC # FLD: 13.1 % — SIGNIFICANT CHANGE UP (ref 10.3–14.5)
SODIUM SERPL-SCNC: 141 MMOL/L — SIGNIFICANT CHANGE UP (ref 135–145)
SODIUM SERPL-SCNC: 142 MMOL/L — SIGNIFICANT CHANGE UP (ref 135–145)
TROPONIN T, HIGH SENSITIVITY RESULT: 198 NG/L — HIGH (ref 0–51)
TROPONIN T, HIGH SENSITIVITY RESULT: 203 NG/L — HIGH (ref 0–51)
WBC # BLD: 7.3 K/UL — SIGNIFICANT CHANGE UP (ref 3.8–10.5)
WBC # FLD AUTO: 7.3 K/UL — SIGNIFICANT CHANGE UP (ref 3.8–10.5)

## 2019-06-11 PROCEDURE — 99223 1ST HOSP IP/OBS HIGH 75: CPT | Mod: GC

## 2019-06-11 PROCEDURE — 93010 ELECTROCARDIOGRAM REPORT: CPT

## 2019-06-11 PROCEDURE — 71045 X-RAY EXAM CHEST 1 VIEW: CPT | Mod: 26

## 2019-06-11 PROCEDURE — 99285 EMERGENCY DEPT VISIT HI MDM: CPT | Mod: 25

## 2019-06-11 RX ORDER — GLUCAGON INJECTION, SOLUTION 0.5 MG/.1ML
1 INJECTION, SOLUTION SUBCUTANEOUS ONCE
Refills: 0 | Status: DISCONTINUED | OUTPATIENT
Start: 2019-06-11 | End: 2019-06-15

## 2019-06-11 RX ORDER — FUROSEMIDE 40 MG
80 TABLET ORAL ONCE
Refills: 0 | Status: COMPLETED | OUTPATIENT
Start: 2019-06-11 | End: 2019-06-11

## 2019-06-11 RX ORDER — INSULIN LISPRO 100/ML
VIAL (ML) SUBCUTANEOUS
Refills: 0 | Status: DISCONTINUED | OUTPATIENT
Start: 2019-06-11 | End: 2019-06-15

## 2019-06-11 RX ORDER — DEXTROSE 50 % IN WATER 50 %
15 SYRINGE (ML) INTRAVENOUS ONCE
Refills: 0 | Status: DISCONTINUED | OUTPATIENT
Start: 2019-06-11 | End: 2019-06-15

## 2019-06-11 RX ORDER — ASPIRIN/CALCIUM CARB/MAGNESIUM 324 MG
81 TABLET ORAL DAILY
Refills: 0 | Status: DISCONTINUED | OUTPATIENT
Start: 2019-06-11 | End: 2019-06-15

## 2019-06-11 RX ORDER — SODIUM CHLORIDE 9 MG/ML
1000 INJECTION, SOLUTION INTRAVENOUS
Refills: 0 | Status: DISCONTINUED | OUTPATIENT
Start: 2019-06-11 | End: 2019-06-15

## 2019-06-11 RX ORDER — GABAPENTIN 400 MG/1
300 CAPSULE ORAL
Refills: 0 | Status: DISCONTINUED | OUTPATIENT
Start: 2019-06-11 | End: 2019-06-15

## 2019-06-11 RX ORDER — LABETALOL HCL 100 MG
100 TABLET ORAL
Refills: 0 | Status: DISCONTINUED | OUTPATIENT
Start: 2019-06-11 | End: 2019-06-15

## 2019-06-11 RX ORDER — ATORVASTATIN CALCIUM 80 MG/1
20 TABLET, FILM COATED ORAL AT BEDTIME
Refills: 0 | Status: DISCONTINUED | OUTPATIENT
Start: 2019-06-11 | End: 2019-06-15

## 2019-06-11 RX ORDER — DEXTROSE 50 % IN WATER 50 %
25 SYRINGE (ML) INTRAVENOUS ONCE
Refills: 0 | Status: DISCONTINUED | OUTPATIENT
Start: 2019-06-11 | End: 2019-06-15

## 2019-06-11 RX ORDER — HYDRALAZINE HCL 50 MG
50 TABLET ORAL
Refills: 0 | Status: DISCONTINUED | OUTPATIENT
Start: 2019-06-11 | End: 2019-06-14

## 2019-06-11 RX ORDER — LATANOPROST 0.05 MG/ML
1 SOLUTION/ DROPS OPHTHALMIC; TOPICAL AT BEDTIME
Refills: 0 | Status: DISCONTINUED | OUTPATIENT
Start: 2019-06-11 | End: 2019-06-15

## 2019-06-11 RX ORDER — SEVELAMER CARBONATE 2400 MG/1
1600 POWDER, FOR SUSPENSION ORAL EVERY 8 HOURS
Refills: 0 | Status: DISCONTINUED | OUTPATIENT
Start: 2019-06-11 | End: 2019-06-12

## 2019-06-11 RX ORDER — FUROSEMIDE 40 MG
80 TABLET ORAL DAILY
Refills: 0 | Status: DISCONTINUED | OUTPATIENT
Start: 2019-06-12 | End: 2019-06-12

## 2019-06-11 RX ORDER — DEXTROSE 50 % IN WATER 50 %
12.5 SYRINGE (ML) INTRAVENOUS ONCE
Refills: 0 | Status: DISCONTINUED | OUTPATIENT
Start: 2019-06-11 | End: 2019-06-15

## 2019-06-11 RX ORDER — INSULIN LISPRO 100/ML
VIAL (ML) SUBCUTANEOUS AT BEDTIME
Refills: 0 | Status: DISCONTINUED | OUTPATIENT
Start: 2019-06-11 | End: 2019-06-15

## 2019-06-11 RX ORDER — HEPARIN SODIUM 5000 [USP'U]/ML
5000 INJECTION INTRAVENOUS; SUBCUTANEOUS EVERY 8 HOURS
Refills: 0 | Status: DISCONTINUED | OUTPATIENT
Start: 2019-06-11 | End: 2019-06-15

## 2019-06-11 RX ORDER — MINOXIDIL 10 MG
10 TABLET ORAL DAILY
Refills: 0 | Status: DISCONTINUED | OUTPATIENT
Start: 2019-06-11 | End: 2019-06-15

## 2019-06-11 RX ORDER — INSULIN GLARGINE 100 [IU]/ML
15 INJECTION, SOLUTION SUBCUTANEOUS EVERY MORNING
Refills: 0 | Status: DISCONTINUED | OUTPATIENT
Start: 2019-06-11 | End: 2019-06-15

## 2019-06-11 RX ORDER — AMLODIPINE BESYLATE 2.5 MG/1
10 TABLET ORAL DAILY
Refills: 0 | Status: DISCONTINUED | OUTPATIENT
Start: 2019-06-11 | End: 2019-06-15

## 2019-06-11 RX ADMIN — GABAPENTIN 300 MILLIGRAM(S): 400 CAPSULE ORAL at 18:05

## 2019-06-11 RX ADMIN — LATANOPROST 1 DROP(S): 0.05 SOLUTION/ DROPS OPHTHALMIC; TOPICAL at 22:19

## 2019-06-11 RX ADMIN — Medication 1: at 12:33

## 2019-06-11 RX ADMIN — SEVELAMER CARBONATE 1600 MILLIGRAM(S): 2400 POWDER, FOR SUSPENSION ORAL at 21:28

## 2019-06-11 RX ADMIN — Medication 100 MILLIGRAM(S): at 18:05

## 2019-06-11 RX ADMIN — Medication 50 MILLIGRAM(S): at 18:05

## 2019-06-11 RX ADMIN — ATORVASTATIN CALCIUM 20 MILLIGRAM(S): 80 TABLET, FILM COATED ORAL at 21:28

## 2019-06-11 RX ADMIN — Medication 2: at 08:54

## 2019-06-11 RX ADMIN — Medication 80 MILLIGRAM(S): at 06:23

## 2019-06-11 RX ADMIN — Medication 2: at 17:08

## 2019-06-11 RX ADMIN — HEPARIN SODIUM 5000 UNIT(S): 5000 INJECTION INTRAVENOUS; SUBCUTANEOUS at 21:28

## 2019-06-11 RX ADMIN — SEVELAMER CARBONATE 1600 MILLIGRAM(S): 2400 POWDER, FOR SUSPENSION ORAL at 13:19

## 2019-06-11 NOTE — H&P ADULT - PROBLEM SELECTOR PLAN 2
- AS per renal, no indications for HD as of now - Likely 2/2 diabetes mellitus. CKD 5   - AS per renal, no indications for HD as of now  - PTH, Phos level in the am. - TTE JUN 15th 2018 EF 65-70% Mild mitral regurgitation, diastolic LV dysfunction  - Patient with significant LE edema p/w sob, cxr pulm edema and cardiomegaly  - Serum pro-BNP 5.2K  - C/w torsemide and metolazone  - Strict I/Os, fluid restriction.

## 2019-06-11 NOTE — H&P ADULT - NSICDXPASTSURGICALHX_GEN_ALL_CORE_FT
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

## 2019-06-11 NOTE — ED PROVIDER NOTE - OBJECTIVE STATEMENT
Randa Little, DO: 58M with hx of HTN, HLD, IDDM, CKD (last Cr 5.1 on 05/30/19) here for worsening SOB associated with pleuritic CP that started at 1:30A. Patient with worsening PAEZ, orthopnea and pitting edema despite recent change from furosemide to Torsemide 40 mg BID. Denies fevers, nausea/vomiting, cough, abdominal pain, dark or bloody stools, urinary symptoms. Randa Little, DO: 58M with hx of HTN, HLD, IDDM, CKD (last Cr 5.1 on 05/30/19) here for worsening SOB associated with pleuritic CP that started at 1:30A. Patient with worsening PAEZ, orthopnea and pitting edema despite recent change from furosemide to Torsemide 40 mg BID. Denies fevers, nausea/vomiting, cough, abdominal pain, dark or bloody stools, urinary symptoms. +recent iron transfusion for anemia.

## 2019-06-11 NOTE — H&P ADULT - ATTENDING COMMENTS
Patient was seen and examined by me at bedside. I agree with resident's note, subjective, objective physical exam, assessment and plan with following modifications/additions.     Etiology of chest pain unclear ACS vs primary lung, patient appears volume overloaded can benefit from IV diuresis, patient clinically not fitting picture of tamponade physiology.  Will repeat echo appreciate cardiology recs regarding need for ischemic workup

## 2019-06-11 NOTE — H&P ADULT - PROBLEM SELECTOR PLAN 3
- C/w torsemide and metolazone - TTE JUN 15th 2018 EF 65-70% Mild mitral regurgitation, diastolic LV dysfunction  - Patient with significant LE edema p/w sob, cxr pulm edema  - Continue with diuresis as below. - TTE JUN 15th 2018 EF 65-70% Mild mitral regurgitation, diastolic LV dysfunction  - Patient with significant LE edema p/w sob, cxr pulm edema and cardiomegaly  - Serum pro-BNP 5.2K  - Continue with diuresis as below. - TTE JUN 15th 2018 EF 65-70% Mild mitral regurgitation, diastolic LV dysfunction  - Patient with significant LE edema p/w sob, cxr pulm edema and cardiomegaly  - Serum pro-BNP 5.2K  - C/w torsemide and metolazone  - Strict I/Os, fluid restriction. - Likely 2/2 diabetes mellitus. CKD 5   - AS per renal, no indications for HD as of now  - PTH, Phos level in the am.

## 2019-06-11 NOTE — ED PROVIDER NOTE - PHYSICAL EXAMINATION
Randa Little, DO:  Gen: tachypneic while speaking in full sentences but in no acute distress  Head: NCAT  HEENT: PERRL, MMM, normal conjunctiva, anicteric, neck supple  Lung: diminished breath sounds at b/l bases  CV: RRR, no murmurs  Abd: soft, NTND, no rebound or guarding, ecchymotic patch in LLQ consistent with insulin injection site  MSK: 3+ pitting edema b/l, no visible deformities  Neuro: Moving all extremities equally  Skin: Warm and dry, no evidence of rash  Psych: normal mood and affect

## 2019-06-11 NOTE — H&P ADULT - HISTORY OF PRESENT ILLNESS
57 yo former smoker with pmhx  HTN, HLD, IDDM, CKD (last Cr 5.1 on 05/30/19) here for worsening SOB associated with pleuritic CP that started at 1:30A. Patient with worsening PAEZ, orthopnea and pitting edema despite recent change from furosemide to Torsemide 40 mg BID. Denies fevers, nausea/vomiting, cough, abdominal pain, dark or bloody stools, urinary symptoms. +recent iron transfusion for anemia 57 yo former smoker with pmhx  HTN, HLD, IDDM, CKD (last Cr 5.1 on 05/30/19), renal insuficiency, hx of gastric sleeve here for worsening SOB associated with pleuritic CP that started at 1:30A. Patient with worsening PAEZ, orthopnea and pitting edema despite recent change from furosemide to Torsemide 40 mg BID with metolazone 5mg daily. Denies fevers, nausea/vomiting, cough, abdominal pain, dark or bloody stools, urinary symptoms. +recent iron transfusion for anemia.    Hernia repair?     Meds: Labetalol, monixidil, amlodipine, hydralazine, atorvastatin,     TTE JUN 15th 2018 EF 65-70% Mild mitral regurgitation, diastolic LV dysfunction 57 y/o M w/ PMH of smoking, HTN, HLD, IDDM, CKD (last Cr 5.1 on 05/30/19), renal insufficiency, hx of gastric sleeve here for worsening SOB associated with pleuritic CP that started at 1:30A. Describes the pain as being in the middle of the chest, sharp, 5/10 in severity, no radiation. Tried ventolin, didn't help with the pain. Patient recently with worsening PAEZ, orthopnea and pitting edema --> medications changed from Furosemide 80mg BID to Torsemide 40 mg BID. Metolazone changed from 5mg every other day to 2.5mg every other day. Reports similar incident 7-8 years ago, when he was diagnosed with CHF exacerbation, and was intubated in the MICU for a week. Currently endorses pleuritic chest pain and mild SOB. No HA, n/v, f/c, cough, abdominal pain, dysuria, diarrhea, sick contacts, recent travelling, tick bites/rashes. Reports sleeping flat or with 1 pillow at baseline.

## 2019-06-11 NOTE — CONSULT NOTE ADULT - SUBJECTIVE AND OBJECTIVE BOX
Date of Admission: 19    Patient is a 58y old  Male who presents with a chief complaint of Chest pain (2019 10:06)      HISTORY OF PRESENT ILLNESS:   59 y/o M w/ HTN, HLD, IDDM, CKD (last Cr 5.1 on 19), renal insufficiency, hx of gastric sleeve here for worsening SOB associated with pleuritic CP that started at 1:30A. Describes the pain as being in the middle of the chest, sharp, 5/10 in severity, no radiation. Tried ventolin, didn't help with the pain. Patient recently with worsening PAEZ, orthopnea and pitting edema --> medications changed from Furosemide 80mg BID to Torsemide 40 mg BID. Metolazone changed from 5mg every other day to 2.5mg every other day. Reports similar incident 7-8 years ago, when he was diagnosed with CHF exacerbation, and was intubated in the MICU for a week. Currently endorses pleuritic chest pain and mild SOB. No HA, n/v, f/c, cough, abdominal pain, dysuria, diarrhea, sick contacts, recent travelling, tick bites/rashes. Reports sleeping flat or with 1 pillow at baseline. (2019 10:06)      Allergies    clonidine (Other)    Intolerances    	    MEDICATIONS:  amLODIPine   Tablet 10 milliGRAM(s) Oral daily  aspirin enteric coated 81 milliGRAM(s) Oral daily  heparin  Injectable 5000 Unit(s) SubCutaneous every 8 hours  hydrALAZINE 50 milliGRAM(s) Oral two times a day  labetalol 100 milliGRAM(s) Oral two times a day  metolazone 2.5 milliGRAM(s) Oral <User Schedule>  minoxidil 10 milliGRAM(s) Oral daily  torsemide 20 milliGRAM(s) Oral two times a day        gabapentin 300 milliGRAM(s) Oral two times a day      atorvastatin 20 milliGRAM(s) Oral at bedtime  dextrose 40% Gel 15 Gram(s) Oral once PRN  dextrose 50% Injectable 12.5 Gram(s) IV Push once  dextrose 50% Injectable 25 Gram(s) IV Push once  dextrose 50% Injectable 25 Gram(s) IV Push once  glucagon  Injectable 1 milliGRAM(s) IntraMuscular once PRN  insulin lispro (HumaLOG) corrective regimen sliding scale   SubCutaneous three times a day before meals  insulin lispro (HumaLOG) corrective regimen sliding scale   SubCutaneous at bedtime    dextrose 5%. 1000 milliLiter(s) IV Continuous <Continuous>      PAST MEDICAL & SURGICAL HISTORY:  Ventral hernia  High cholesterol  Diabetes  Hypertension  DKA (diabetic ketoacidoses)  Diastolic dysfunction: mild. stage 1. EF 65%  CKD (chronic kidney disease)  Insulin pump status: denies  Pneumonia: 2013  Lower extremity edema  Osteomyelitis of ankle or foot: x2  Diabetic neuropathy  Hyperlipidemia  DM (diabetes mellitus)  Hypertension  Status post cataract extraction  S/P laparoscopic sleeve gastrectomy:   H/O elbow surgery: left  H/O cardiac catheterization: no stents  Retinal hemorrhage, left eye: s/p laser surgery  hx of right eye retinal hemorrahge, tx with laser surgery  S/P amputation: right hallux   S/P hernia repair  S/P carpal tunnel release: b/l      FAMILY HISTORY:  FH: aortic stenosis: Son  Family history of bone cancer: Grandfather  Family history of heart attack (Grandparent): father  @54 y.o.  grandfather  @ 52 y.o.      SOCIAL HISTORY:          REVIEW OF SYSTEMS:    CONSTITUTIONAL: No weakness, fevers or chills  EYES/ENT: No visual changes;  No dysphagia  RESPIRATORY: No cough, wheezing, hemoptysis; No shortness of breath  CARDIOVASCULAR: No chest pain or palpitations; No lower extremity edema  GASTROINTESTINAL: No abdominal or epigastric pain. No nausea, vomiting, or hematemesis  GENITOURINARY: No dysuria, frequency or hematuria  NEUROLOGICAL: No numbness or weakness  SKIN: No itching, burning, rashes, or lesions  HEME: No bleeding or bruising  MSK: No joint pains or muscle pains  All other review of systems is negative unless indicated above.    PHYSICAL EXAM:  T(C): 36.7 (19 @ 07:46), Max: 36.8 (19 @ 05:14)  HR: 69 (19 @ 07:46) (69 - 82)  BP: 163/68 (19 @ 07:46) (161/83 - 168/85)  RR: 16 (19 @ 07:46) (16 - 22)  SpO2: 95% (19 @ 07:46) (91% - 96%)  Wt(kg): --  I&O's Summary    2019 07:01  -  2019 14:11  --------------------------------------------------------  IN: 0 mL / OUT: 1200 mL / NET: -1200 mL        Appearance: Normal	  HEENT:   Normal oral mucosa  Cardiovascular: Normal S1 S2, No JVD, No murmurs, No edema  Respiratory: Lungs clear to auscultation	  Psychiatry: A & O x 3, Mood & affect appropriate  Gastrointestinal:  Soft, Non-tender, + BS	  Skin: No rashes, No ecchymoses, No cyanosis	  Neurologic: Non-focal  Extremities: Normal range of motion, No clubbing, cyanosis or edema        LABS:	 	    CBC Full  -  ( 2019 05:36 )  WBC Count : 7.3 K/uL  Hemoglobin : 8.7 g/dL  Hematocrit : 26.2 %  Platelet Count - Automated : 209 K/uL  Mean Cell Volume : 93.3 fl  Mean Cell Hemoglobin : 31.0 pg  Mean Cell Hemoglobin Concentration : 33.3 gm/dL  Auto Neutrophil # : x  Auto Lymphocyte # : x  Auto Monocyte # : x  Auto Eosinophil # : x  Auto Basophil # : x  Auto Neutrophil % : x  Auto Lymphocyte % : x  Auto Monocyte % : x  Auto Eosinophil % : x  Auto Basophil % : x        141  |  107  |  102<H>  ----------------------------<  230<H>  4.9   |  17<L>  |  5.85<H>      142  |  108  |  101<H>  ----------------------------<  255<H>  4.9   |  15<L>  |  5.90<H>    Ca    8.7      2019 06:38  Ca    8.9      2019 05:36    TPro  7.8  /  Alb  4.5  /  TBili  0.3  /  DBili  x   /  AST  18  /  ALT  21  /  AlkPhos  93  11      proBNP: Serum Pro-Brain Natriuretic Peptide: 5239 pg/mL ( @ 05:36)    Lipid Profile:   HgA1c:   TSH:       CARDIAC MARKERS:            TELEMETRY: 	    ECG:  	  RADIOLOGY:  OTHER: 	    PREVIOUS DIAGNOSTIC TESTING:    [ ] Echocardiogram:  [ ]  Catheterization:  [ ] Stress Test:  	  	  ASSESSMENT/PLAN: 	      Darlyn Farfan MD  Cardiology Fellow   [phone], M-F 7:30A-5P    All Cardiology service information can be found on [email or web address], password: [email or web address]. Date of Admission: 19    Patient is a 58y old  Male who presents with a chief complaint of Chest pain (2019 10:06)      HISTORY OF PRESENT ILLNESS:   57 y/o M w/ HTN, HLD, IDDM, CKD, hx of gastric sleeve presented w/ SOB and CP. Describes the pain as being in the middle of the chest, sharp, 5/10 in severity, no radiation, worse with deep breaths. Tried ventolin, didn't help with the pain. Patient recently with worsening PAEZ, orthopnea and pitting edema --> medications changed from Furosemide 80mg BID to Torsemide 40 mg BID. Metolazone changed from 5mg every other day to 2.5mg every other day. Denies palp, dizziness, syncope.      Allergies    clonidine (Other)    Intolerances    	    MEDICATIONS:  amLODIPine   Tablet 10 milliGRAM(s) Oral daily  aspirin enteric coated 81 milliGRAM(s) Oral daily  heparin  Injectable 5000 Unit(s) SubCutaneous every 8 hours  hydrALAZINE 50 milliGRAM(s) Oral two times a day  labetalol 100 milliGRAM(s) Oral two times a day  metolazone 2.5 milliGRAM(s) Oral <User Schedule>  minoxidil 10 milliGRAM(s) Oral daily  torsemide 20 milliGRAM(s) Oral two times a day        gabapentin 300 milliGRAM(s) Oral two times a day      atorvastatin 20 milliGRAM(s) Oral at bedtime  dextrose 40% Gel 15 Gram(s) Oral once PRN  dextrose 50% Injectable 12.5 Gram(s) IV Push once  dextrose 50% Injectable 25 Gram(s) IV Push once  dextrose 50% Injectable 25 Gram(s) IV Push once  glucagon  Injectable 1 milliGRAM(s) IntraMuscular once PRN  insulin lispro (HumaLOG) corrective regimen sliding scale   SubCutaneous three times a day before meals  insulin lispro (HumaLOG) corrective regimen sliding scale   SubCutaneous at bedtime    dextrose 5%. 1000 milliLiter(s) IV Continuous <Continuous>      PAST MEDICAL & SURGICAL HISTORY:  Ventral hernia  High cholesterol  Diabetes  Hypertension  DKA (diabetic ketoacidoses)  Diastolic dysfunction: mild. stage 1. EF 65%  CKD (chronic kidney disease)  Insulin pump status: denies  Pneumonia: 2013  Lower extremity edema  Osteomyelitis of ankle or foot: x2  Diabetic neuropathy  Hyperlipidemia  DM (diabetes mellitus)  Hypertension  Status post cataract extraction  S/P laparoscopic sleeve gastrectomy:   H/O elbow surgery: left  H/O cardiac catheterization: no stents  Retinal hemorrhage, left eye: s/p laser surgery  hx of right eye retinal hemorrahge, tx with laser surgery  S/P amputation: right hallux   S/P hernia repair  S/P carpal tunnel release: b/l      FAMILY HISTORY:  FH: aortic stenosis: Son  Family history of bone cancer: Grandfather  Family history of heart attack (Grandparent): father  @54 y.o.  grandfather  @ 52 y.o.        REVIEW OF SYSTEMS:    CONSTITUTIONAL: No weakness, fevers or chills  EYES/ENT: No visual changes;  No dysphagia  RESPIRATORY: No cough, wheezing, hemoptysis; No shortness of breath  CARDIOVASCULAR: No chest pain or palpitations; No lower extremity edema  GASTROINTESTINAL: No abdominal or epigastric pain. No nausea, vomiting, or hematemesis  GENITOURINARY: No dysuria, frequency or hematuria  NEUROLOGICAL: No numbness or weakness  SKIN: No itching, burning, rashes, or lesions  HEME: No bleeding or bruising  MSK: No joint pains or muscle pains  All other review of systems is negative unless indicated above.    PHYSICAL EXAM:  T(C): 36.7 (19 @ 07:46), Max: 36.8 (19 @ 05:14)  HR: 69 (19 @ 07:46) (69 - 82)  BP: 163/68 (19 @ 07:46) (161/83 - 168/85)  RR: 16 (19 @ 07:46) (16 - 22)  SpO2: 95% (19 @ 07:46) (91% - 96%)  Wt(kg): --  I&O's Summary    2019 07:01  -  2019 14:11  --------------------------------------------------------  IN: 0 mL / OUT: 1200 mL / NET: -1200 mL        Appearance: Normal	  HEENT:   Normal oral mucosa  Cardiovascular: Normal S1 S2, No JVD, No murmurs, No edema  Respiratory: Lungs clear to auscultation	  Psychiatry: A & O x 3, Mood & affect appropriate  Gastrointestinal:  Soft, Non-tender, + BS	  Skin: No rashes, No ecchymoses, No cyanosis	  Neurologic: Non-focal  Extremities: Normal range of motion, No clubbing, cyanosis or edema        LABS:	 	    CBC Full  -  ( 2019 05:36 )  WBC Count : 7.3 K/uL  Hemoglobin : 8.7 g/dL  Hematocrit : 26.2 %  Platelet Count - Automated : 209 K/uL  Mean Cell Volume : 93.3 fl  Mean Cell Hemoglobin : 31.0 pg  Mean Cell Hemoglobin Concentration : 33.3 gm/dL  Auto Neutrophil # : x  Auto Lymphocyte # : x  Auto Monocyte # : x  Auto Eosinophil # : x  Auto Basophil # : x  Auto Neutrophil % : x  Auto Lymphocyte % : x  Auto Monocyte % : x  Auto Eosinophil % : x  Auto Basophil % : x        141  |  107  |  102<H>  ----------------------------<  230<H>  4.9   |  17<L>  |  5.85<H>      142  |  108  |  101<H>  ----------------------------<  255<H>  4.9   |  15<L>  |  5.90<H>    Ca    8.7      2019 06:38  Ca    8.9      2019 05:36    TPro  7.8  /  Alb  4.5  /  TBili  0.3  /  DBili  x   /  AST  18  /  ALT  21  /  AlkPhos  93        proBNP: Serum Pro-Brain Natriuretic Peptide: 5239 pg/mL ( @ 05:36)    CARDIAC MARKERS:  hs trop 203 -> 198    ECst deg AVB, NSR; no evidence of MD dep/ST elevation    PREVIOUS DIAGNOSTIC TESTING:    Echo   Conclusions:  1. Mitral annular calcification, otherwise normal mitral  valve. Mild mitral regurgitation.  2. Increased relative wall thickness with normal left  ventricular mass index, consistent with concentric left  ventricular remodeling.  3. Normal left ventricular systolic function. No segmental  wall motion abnormalities.    Nuclear stress test 2018  IMPRESSIONS:Probably Normal Study  * Chest Pain: No chest pain with administration of  Regadenoson.  * Symptom: Shortness of breath.  * HR Response: Appropriate.  * BP Response: Appropriate.  * Heart Rhythm: Sinus Rhythm - 96 BPM.  * Baseline ECG: ST-T wave abnormality in leads II, III,  aVF, and V6 at baseline.  * ECG Changes: No significant ischemic ST segment changes  beyond baseline abnormalities.  *Arrhythmia: None.  * Review of raw data shows: The study is of good technical  quality.  * The left ventricle was enlarged. There is a large  moderate fixed defect in the inferior wall with preserved  wall motion in this region suggestive of attenuation  artifact.  * Post-stress gated wall motion analysis was performed  (LVEF = 65 %;LVEDV = 174 ml.), revealing normal LV  function.    	  ASSESSMENT/PLAN: 	  57 y/o M w/ HTN, HLD, IDDM, CKD, hx of gastric sleeve presented w/ SOB and CP.    **in progress    Chest pain  - unlikely to be ACS; possible pericarditis given pleuritic nature, but no evidence on EKG  - hstrop 203 -> 198  - TTE pending      Darlyn Farfan MD  Cardiology Fellow   [phone], M-F 7:30A-5P    All Cardiology service information can be found on [email or web address], password: [email or web address]. Date of Admission: 19    Patient is a 58y old  Male who presents with a chief complaint of Chest pain (2019 10:06)      HISTORY OF PRESENT ILLNESS:   59 y/o M w/ HTN, HLD, IDDM, CKD, hx of gastric sleeve presented w/ SOB and CP. Describes the pain as being in the middle of the chest, sharp, 5/10 in severity, no radiation, worse with deep breaths. Tried ventolin, didn't help with the pain. Patient recently with worsening PAEZ, orthopnea and pitting edema --> medications changed from Furosemide 80mg BID to Torsemide 40 mg BID. Metolazone changed from 5mg every other day to 2.5mg every other day. Continues to have worsening EDWIN, SOB, orthopnea. Denies palp, dizziness, syncope.      Allergies    clonidine (Other)    Intolerances    	    MEDICATIONS:  amLODIPine   Tablet 10 milliGRAM(s) Oral daily  aspirin enteric coated 81 milliGRAM(s) Oral daily  heparin  Injectable 5000 Unit(s) SubCutaneous every 8 hours  hydrALAZINE 50 milliGRAM(s) Oral two times a day  labetalol 100 milliGRAM(s) Oral two times a day  metolazone 2.5 milliGRAM(s) Oral <User Schedule>  minoxidil 10 milliGRAM(s) Oral daily  torsemide 20 milliGRAM(s) Oral two times a day        gabapentin 300 milliGRAM(s) Oral two times a day      atorvastatin 20 milliGRAM(s) Oral at bedtime  dextrose 40% Gel 15 Gram(s) Oral once PRN  dextrose 50% Injectable 12.5 Gram(s) IV Push once  dextrose 50% Injectable 25 Gram(s) IV Push once  dextrose 50% Injectable 25 Gram(s) IV Push once  glucagon  Injectable 1 milliGRAM(s) IntraMuscular once PRN  insulin lispro (HumaLOG) corrective regimen sliding scale   SubCutaneous three times a day before meals  insulin lispro (HumaLOG) corrective regimen sliding scale   SubCutaneous at bedtime    dextrose 5%. 1000 milliLiter(s) IV Continuous <Continuous>      PAST MEDICAL & SURGICAL HISTORY:  Ventral hernia  High cholesterol  Diabetes  Hypertension  DKA (diabetic ketoacidoses)  Diastolic dysfunction: mild. stage 1. EF 65%  CKD (chronic kidney disease)  Insulin pump status: denies  Pneumonia: 2013  Lower extremity edema  Osteomyelitis of ankle or foot: x2  Diabetic neuropathy  Hyperlipidemia  DM (diabetes mellitus)  Hypertension  Status post cataract extraction  S/P laparoscopic sleeve gastrectomy:   H/O elbow surgery: left  H/O cardiac catheterization: no stents  Retinal hemorrhage, left eye: s/p laser surgery  hx of right eye retinal hemorrahge, tx with laser surgery  S/P amputation: right hallux   S/P hernia repair  S/P carpal tunnel release: b/l      FAMILY HISTORY:  FH: aortic stenosis: Son  Family history of bone cancer: Grandfather  Family history of heart attack (Grandparent): father  @54 y.o.  grandfather  @ 52 y.o.        REVIEW OF SYSTEMS:    CONSTITUTIONAL: No weakness, fevers or chills  EYES/ENT: No visual changes;  No dysphagia  RESPIRATORY: No cough, wheezing, hemoptysis; +shortness of breath  CARDIOVASCULAR: No chest pain or palpitations; +lower extremity edema  GASTROINTESTINAL: No abdominal or epigastric pain. No nausea, vomiting, or hematemesis  GENITOURINARY: No dysuria, frequency or hematuria  NEUROLOGICAL: No numbness or weakness  SKIN: No itching, burning, rashes, or lesions  HEME: No bleeding or bruising  MSK: No joint pains or muscle pains  All other review of systems is negative unless indicated above.    PHYSICAL EXAM:  T(C): 36.7 (19 @ 07:46), Max: 36.8 (19 @ 05:14)  HR: 69 (19 @ 07:46) (69 - 82)  BP: 163/68 (19 @ 07:46) (161/83 - 168/85)  RR: 16 (19 @ 07:46) (16 - 22)  SpO2: 95% (19 @ 07:46) (91% - 96%)  Wt(kg): --  I&O's Summary    2019 07:01  -  2019 14:11  --------------------------------------------------------  IN: 0 mL / OUT: 1200 mL / NET: -1200 mL        Appearance: Normal	  HEENT:   Normal oral mucosa  Cardiovascular: Normal S1 S2, +JVP to jaw line, No murmurs, 3+ EDWIN  Respiratory: Crackles to mid lungs bilaterally  Psychiatry: A & O x 3, Mood & affect appropriate  Gastrointestinal:  Soft, Non-tender, + BS	  Skin: No rashes, No ecchymoses, No cyanosis	  Neurologic: Non-focal  Extremities: Normal range of motion, No clubbing, cyanosis or edema        LABS:	 	    CBC Full  -  ( 2019 05:36 )  WBC Count : 7.3 K/uL  Hemoglobin : 8.7 g/dL  Hematocrit : 26.2 %  Platelet Count - Automated : 209 K/uL  Mean Cell Volume : 93.3 fl  Mean Cell Hemoglobin : 31.0 pg  Mean Cell Hemoglobin Concentration : 33.3 gm/dL  Auto Neutrophil # : x  Auto Lymphocyte # : x  Auto Monocyte # : x  Auto Eosinophil # : x  Auto Basophil # : x  Auto Neutrophil % : x  Auto Lymphocyte % : x  Auto Monocyte % : x  Auto Eosinophil % : x  Auto Basophil % : x        141  |  107  |  102<H>  ----------------------------<  230<H>  4.9   |  17<L>  |  5.85<H>      142  |  108  |  101<H>  ----------------------------<  255<H>  4.9   |  15<L>  |  5.90<H>    Ca    8.7      2019 06:38  Ca    8.9      2019 05:36    TPro  7.8  /  Alb  4.5  /  TBili  0.3  /  DBili  x   /  AST  18  /  ALT  21  /  AlkPhos  93        proBNP: Serum Pro-Brain Natriuretic Peptide: 5239 pg/mL ( @ 05:36)    CARDIAC MARKERS:  hs trop 203 -> 198    ECst deg AVB, NSR; no evidence of VT dep/ST elevation    PREVIOUS DIAGNOSTIC TESTING:    Echo   Conclusions:  1. Mitral annular calcification, otherwise normal mitral  valve. Mild mitral regurgitation.  2. Increased relative wall thickness with normal left  ventricular mass index, consistent with concentric left  ventricular remodeling.  3. Normal left ventricular systolic function. No segmental  wall motion abnormalities.    Nuclear stress test 2018  IMPRESSIONS:Probably Normal Study  * Chest Pain: No chest pain with administration of  Regadenoson.  * Symptom: Shortness of breath.  * HR Response: Appropriate.  * BP Response: Appropriate.  * Heart Rhythm: Sinus Rhythm - 96 BPM.  * Baseline ECG: ST-T wave abnormality in leads II, III,  aVF, and V6 at baseline.  * ECG Changes: No significant ischemic ST segment changes  beyond baseline abnormalities.  *Arrhythmia: None.  * Review of raw data shows: The study is of good technical  quality.  * The left ventricle was enlarged. There is a large  moderate fixed defect in the inferior wall with preserved  wall motion in this region suggestive of attenuation  artifact.  * Post-stress gated wall motion analysis was performed  (LVEF = 65 %;LVEDV = 174 ml.), revealing normal LV  function.    	  ASSESSMENT/PLAN: 	  59 y/o M w/ HTN, HLD, IDDM, CKD, hx of gastric sleeve presented w/ SOB and CP.    Acute decompensated heart failure  - CP and SOB likely 2/2 CHF exacerbation; significant volume overload on exam  - unlikely to be ACS; possible pericarditis given pleuritic nature, but no evidence on EKG, no rub on exam  - hstrop 203 -> 198, elevated in setting of volume overload/CKD, no need to trend  - s/p IV lasix 80mg -- assess UOP, goal net neg 1-2L in 24; may need to switch to IV bumex  - TTE pending  - strict I/Os, daily weights, 1L fluid restriction      Darlyn Farfan MD  Cardiology Fellow   [phone], M-F 7:30A-5P    All Cardiology service information can be found on [email or web address], password: [email or web address]. Date of Admission: 19    Patient is a 58y old  Male who presents with a chief complaint of Chest pain (2019 10:06)      HISTORY OF PRESENT ILLNESS:   59 y/o M w/ HTN, HLD, IDDM, CKD, hx of gastric sleeve presented w/ SOB and CP. Describes the pain as being in the middle of the chest, sharp, 5/10 in severity, no radiation, worse with deep breaths. Tried ventolin, didn't help with the pain. Patient recently with worsening PAEZ, orthopnea and pitting edema --> medications changed from Furosemide 80mg BID to Torsemide 40 mg BID. Metolazone changed from 5mg every other day to 2.5mg every other day. Continues to have worsening EDWIN, SOB, orthopnea. Denies palp, dizziness, syncope.      Allergies    clonidine (Other)    Intolerances    	    MEDICATIONS:  amLODIPine   Tablet 10 milliGRAM(s) Oral daily  aspirin enteric coated 81 milliGRAM(s) Oral daily  heparin  Injectable 5000 Unit(s) SubCutaneous every 8 hours  hydrALAZINE 50 milliGRAM(s) Oral two times a day  labetalol 100 milliGRAM(s) Oral two times a day  metolazone 2.5 milliGRAM(s) Oral <User Schedule>  minoxidil 10 milliGRAM(s) Oral daily  torsemide 20 milliGRAM(s) Oral two times a day        gabapentin 300 milliGRAM(s) Oral two times a day      atorvastatin 20 milliGRAM(s) Oral at bedtime  dextrose 40% Gel 15 Gram(s) Oral once PRN  dextrose 50% Injectable 12.5 Gram(s) IV Push once  dextrose 50% Injectable 25 Gram(s) IV Push once  dextrose 50% Injectable 25 Gram(s) IV Push once  glucagon  Injectable 1 milliGRAM(s) IntraMuscular once PRN  insulin lispro (HumaLOG) corrective regimen sliding scale   SubCutaneous three times a day before meals  insulin lispro (HumaLOG) corrective regimen sliding scale   SubCutaneous at bedtime    dextrose 5%. 1000 milliLiter(s) IV Continuous <Continuous>      PAST MEDICAL & SURGICAL HISTORY:  Ventral hernia  High cholesterol  Diabetes  Hypertension  DKA (diabetic ketoacidoses)  Diastolic dysfunction: mild. stage 1. EF 65%  CKD (chronic kidney disease)  Insulin pump status: denies  Pneumonia: 2013  Lower extremity edema  Osteomyelitis of ankle or foot: x2  Diabetic neuropathy  Hyperlipidemia  DM (diabetes mellitus)  Hypertension  Status post cataract extraction  S/P laparoscopic sleeve gastrectomy:   H/O elbow surgery: left  H/O cardiac catheterization: no stents  Retinal hemorrhage, left eye: s/p laser surgery  hx of right eye retinal hemorrahge, tx with laser surgery  S/P amputation: right hallux   S/P hernia repair  S/P carpal tunnel release: b/l      FAMILY HISTORY:  FH: aortic stenosis: Son  Family history of bone cancer: Grandfather  Family history of heart attack (Grandparent): father  @54 y.o.  grandfather  @ 52 y.o.        REVIEW OF SYSTEMS:    CONSTITUTIONAL: No weakness, fevers or chills  EYES/ENT: No visual changes;  No dysphagia  RESPIRATORY: No cough, wheezing, hemoptysis; +shortness of breath  CARDIOVASCULAR: No chest pain or palpitations; +lower extremity edema  GASTROINTESTINAL: No abdominal or epigastric pain. No nausea, vomiting, or hematemesis  GENITOURINARY: No dysuria, frequency or hematuria  NEUROLOGICAL: No numbness or weakness  SKIN: No itching, burning, rashes, or lesions  HEME: No bleeding or bruising  MSK: No joint pains or muscle pains  All other review of systems is negative unless indicated above.    PHYSICAL EXAM:  T(C): 36.7 (19 @ 07:46), Max: 36.8 (19 @ 05:14)  HR: 69 (19 @ 07:46) (69 - 82)  BP: 163/68 (19 @ 07:46) (161/83 - 168/85)  RR: 16 (19 @ 07:46) (16 - 22)  SpO2: 95% (19 @ 07:46) (91% - 96%)  Wt(kg): --  I&O's Summary    2019 07:01  -  2019 14:11  --------------------------------------------------------  IN: 0 mL / OUT: 1200 mL / NET: -1200 mL        Appearance: Normal	  HEENT:   Normal oral mucosa  Cardiovascular: Normal S1 S2, +JVP to jaw line, No murmurs, 3+ EDWIN  Respiratory: Crackles to mid lungs bilaterally  Psychiatry: A & O x 3, Mood & affect appropriate  Gastrointestinal:  Soft, Non-tender, + BS	  Skin: No rashes, No ecchymoses, No cyanosis	  Neurologic: Non-focal  Extremities: Normal range of motion, No clubbing, cyanosis or edema        LABS:	 	Labs Personally Reviewed 2019      CBC Full  -  ( 2019 05:36 )  WBC Count : 7.3 K/uL  Hemoglobin : 8.7 g/dL  Hematocrit : 26.2 %  Platelet Count - Automated : 209 K/uL  Mean Cell Volume : 93.3 fl  Mean Cell Hemoglobin : 31.0 pg  Mean Cell Hemoglobin Concentration : 33.3 gm/dL  Auto Neutrophil # : x  Auto Lymphocyte # : x  Auto Monocyte # : x  Auto Eosinophil # : x  Auto Basophil # : x  Auto Neutrophil % : x  Auto Lymphocyte % : x  Auto Monocyte % : x  Auto Eosinophil % : x  Auto Basophil % : x        141  |  107  |  102<H>  ----------------------------<  230<H>  4.9   |  17<L>  |  5.85<H>      142  |  108  |  101<H>  ----------------------------<  255<H>  4.9   |  15<L>  |  5.90<H>    Ca    8.7      2019 06:38  Ca    8.9      2019 05:36    TPro  7.8  /  Alb  4.5  /  TBili  0.3  /  DBili  x   /  AST  18  /  ALT  21  /  AlkPhos  93        proBNP: Serum Pro-Brain Natriuretic Peptide: 5239 pg/mL ( @ 05:36)    CARDIAC MARKERS:  hs trop 203 -> 198    ECst deg AVB, NSR; no evidence of ID dep/ST elevation    PREVIOUS DIAGNOSTIC TESTING:    Echo   Conclusions:  1. Mitral annular calcification, otherwise normal mitral  valve. Mild mitral regurgitation.  2. Increased relative wall thickness with normal left  ventricular mass index, consistent with concentric left  ventricular remodeling.  3. Normal left ventricular systolic function. No segmental  wall motion abnormalities.    Nuclear stress test 2018  IMPRESSIONS:Probably Normal Study  * Chest Pain: No chest pain with administration of  Regadenoson.  * Symptom: Shortness of breath.  * HR Response: Appropriate.  * BP Response: Appropriate.  * Heart Rhythm: Sinus Rhythm - 96 BPM.  * Baseline ECG: ST-T wave abnormality in leads II, III,  aVF, and V6 at baseline.  * ECG Changes: No significant ischemic ST segment changes  beyond baseline abnormalities.  *Arrhythmia: None.  * Review of raw data shows: The study is of good technical  quality.  * The left ventricle was enlarged. There is a large  moderate fixed defect in the inferior wall with preserved  wall motion in this region suggestive of attenuation  artifact.  * Post-stress gated wall motion analysis was performed  (LVEF = 65 %;LVEDV = 174 ml.), revealing normal LV  function.    	  ASSESSMENT/PLAN: 	  59 y/o M w/ HTN, HLD, IDDM, CKD, hx of gastric sleeve presented w/ SOB and CP.    Acute decompensated heart failure  - CP and SOB likely 2/2 CHF exacerbation; significant volume overload on exam  - unlikely to be ACS; possible pericarditis given pleuritic nature, but no evidence on EKG, no rub on exam  - hstrop 203 -> 198, elevated in setting of volume overload/CKD, no need to trend  - s/p IV lasix 80mg -- assess UOP, goal net neg 1-2L in 24; may need to switch to IV bumex  - TTE pending  - strict I/Os, daily weights, 1L fluid restriction      Darlyn Farfan MD  Cardiology Fellow   [phone], M-F 7:30A-5P    All Cardiology service information can be found on [email or web address], password: [email or web address].

## 2019-06-11 NOTE — CONSULT NOTE ADULT - SUBJECTIVE AND OBJECTIVE BOX
HPI: Mr. Grider is a 58 year-old man with history of multiple medical issues including hypertension, diabetes mellitus, obesity s/p gastric sleeve surgery, coronary artery disease, diastolic congestive heart failure, and stage 5 chronic kidney disease. He presented overnight to the [facility] ER with acute-onset pleuritic chest and shortness of breath since 1:30am.     PAST MEDICAL & SURGICAL HISTORY:  Diabetes type 2  Hypertension  Diastolic dysfunction: mild. stage 1. EF 65%  CKD (chronic kidney disease) stage 5  Osteomyelitis of ankle or foot: x2  Diabetic neuropathy  Hyperlipidemia  Status post cataract extraction  S/P laparoscopic sleeve gastrectomy:   H/O elbow surgery: left  Retinal hemorrhage, left eye: s/p laser surgery  S/P amputation: right hallux   S/P hernia repair: at age 4  S/P carpal tunnel release: b/l    Allergies  clonidine (Other)    SOCIAL HISTORY:  Denies ETOh,Smoking,     FAMILY HISTORY:  Family history of heart attack (Grandparent): father  @54 y.o.  grandfather  @ 52 y.o.    REVIEW OF SYSTEMS:  CONSTITUTIONAL: No weakness, fevers or chills  EYES/ENT: No visual changes;  No vertigo or throat pain   NECK: No pain or stiffness  RESPIRATORY: No cough, wheezing, hemoptysis; (+)shortness of breath  CARDIOVASCULAR: (+)chest pain  GASTROINTESTINAL: No abdominal or epigastric pain. No nausea, vomiting, or hematemesis; No diarrhea or constipation. No melena or hematochezia.  GENITOURINARY: No dysuria, frequency or hematuria  NEUROLOGICAL: No numbness or weakness  SKIN: No itching, burning, rashes, or lesions   All other review of systems is negative unless indicated above.    VITAL:  T(C): , Max: 36.8 (19 @ 05:14)  T(F): , Max: 98.2 (19 @ 05:14)  HR: 69 (19 @ 07:46)  BP: 163/68 (19 @ 07:46)  RR: 16 (19 @ 07:46)  SpO2: 95% (19 @ 07:46)    PHYSICAL EXAM:  Constitutional: NAD, Alert  HEENT: NCAT, MMM  Neck: Supple, No JVD  Respiratory: CTA-b/l  Cardiovascular: RRR s1s2, no m/r/g  Gastrointestinal: BS+, soft, NT/ND  Extremities: No peripheral edema b/l  Neurological: no focal deficits; strength grossly intact  Back: no CVAT b/l  Skin: No rashes, no nevi    LABS:                        8.7    7.3   )-----------( 209      ( 2019 05:36 )             26.2     Na(141)/K(4.9)/Cl(107)/HCO3(17)/BUN(102)/Cr(5.85)Glu(230)/Ca(8.7)/Mg(--)/PO4(--)     @ 06:38  Na(142)/K(4.9)/Cl(108)/HCO3(15)/BUN(101)/Cr(5.90)Glu(255)/Ca(8.9)/Mg(--)/PO4(--)     @ 05:36    IMAGING:  < from: Xray Chest 1 View- PORTABLE-Urgent (19 @ 05:39) >  INTERPRETATION:  Pulmonary edema and cardiomegaly.      ASSESSMENT:  (1)Renal - CKD5 - baseline creatinine ~5mg/dL. Now with pleuritic chest pain and shortness of breath - could this be from uremic pericarditis? If so, then he would be highly indicated for initiation of chronic HD. Otherwise, I would try to hold off with HD initiation. Hopefully to get a kidney transplant soon.    (2)Metabolic acidosis - renally mediated - acceptable for now    (3)Anemia - likely at least in part from CKD    (4)CV - hypervolemia on admission    RECOMMEND:  (1)No HD for now  (2)TTE  (3)Check iron studies  (4)Diuretics/Is<Os provided that there is no evidence of tamponade on TTE  (5)Dose new meds for GFR<15ml/min  (6)Check PO4, PTH    Thank you for involving [facility] in this patient's care.    With warm regards,    Lei Vogt MD   [employer]  [phone]

## 2019-06-11 NOTE — H&P ADULT - ASSESSMENT
TTE JUN 15th 2018 EF 65-70% Mild mitral regurgitation, diastolic LV dysfunction 57 y/o M w/ PMH of smoking, HTN, HLD, IDDM, CKD (last Cr 5.1 on 05/30/19), renal insufficiency, hx of gastric sleeve here for worsening SOB associated with pleuritic CP. TTE JUN 15th 2018 EF 65-70% Mild mitral regurgitation, diastolic LV dysfunction 57 y/o M w/ PMH of smoking, HTN, HLD, IDDM, CKD (last Cr 5.1 on 05/30/19), renal insufficiency, hx of gastric sleeve here for worsening SOB associated with pleuritic CP.

## 2019-06-11 NOTE — H&P ADULT - NSICDXFAMILYHX_GEN_ALL_CORE_FT
FAMILY HISTORY:  Grandparent  Still living? Unknown  Family history of heart attack, Age at diagnosis: Age Unknown FAMILY HISTORY:  Family history of bone cancer, Grandfather  FH: aortic stenosis, Son    Grandparent  Still living? Unknown  Family history of heart attack, Age at diagnosis: Age Unknown

## 2019-06-11 NOTE — H&P ADULT - PROBLEM SELECTOR PROBLEM 3
Lower extremity edema Acute on chronic diastolic heart failure CKD (chronic kidney disease), stage V

## 2019-06-11 NOTE — H&P ADULT - NSHPOUTPATIENTPROVIDERS_GEN_ALL_CORE
Cards Dr Marylou Menon  PCP Dr. Carlo Hassan Cards: Dr Marylou Topete  Nephro: Dr. Lei Vogt  Vasc: Dr. Kofi Menon  PCP: Dr. Carlo Hassan

## 2019-06-11 NOTE — H&P ADULT - PROBLEM SELECTOR PLAN 1
- Pt. with pleuritic chest pain, mostly 2/2 uremic pericarditis 2/2 CKD  - Obtain TTE  - Diuretics/Is<Os provided that there is no evidence of tamponade on TTE - Pt. 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 No ST-changes.   - Monitoring on telemetry- currently sinus rhythm.   - Obtain TTE  - Diuretics/Is<Os provided that there is no evidence of tamponade on TTE - Pt. 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 No ST-changes.   - Monitoring on telemetry- currently sinus rhythm.   - Obtain TTE  - Diuretics/Is<Os provided that there is no evidence of tamponade on TTE  - F/u Cards reccs

## 2019-06-11 NOTE — ED PROVIDER NOTE - ATTENDING CONTRIBUTION TO CARE
58M, pmh htn, hld, DM, CKD, presents with sob, pleuritic chest pain, onset overnight. Pain mid-sternal, non-radiating. ALso with worsening kinsey recently, with associated orthopnea, pitting edema. Denies f/c, n/v/d, abd pain.    PE: NAD, NCAT, MMM, Trachea midline, Normal conjunctiva, lungs with diminished breath sounds at bases, S1/S2 RRR, Normal perfusion, 2+ radial pulses bilat, Abdomen Soft, NTND, No rebound/guarding, 3+ pitting bilat LE edema, No deformity of extremities, No rashes,  No focal motor or sensory deficits.    Presentation most c/w CHF exacerbation. Pt did recently have diuretic changed. No unilateral LE swelling, no calf/popliteal ttp, no hx pe/dvt, low suspicion of PE. Also consider ACS, though ekg unremarkable at this point. To obtain labs, cxr, diurese, and plan to admit for further management and workup. - Juan Sauceda MD

## 2019-06-11 NOTE — H&P ADULT - NSHPLABSRESULTS_GEN_ALL_CORE
8.7    7.3   )-----------( 209      ( 11 Jun 2019 05:36 )             26.2     Hgb Trend: 8.7<--  06-11    141  |  107  |  102<H>  ----------------------------<  230<H>  4.9   |  17<L>  |  5.85<H>    Ca    8.7      11 Jun 2019 06:38    TPro  7.8  /  Alb  4.5  /  TBili  0.3  /  DBili  x   /  AST  18  /  ALT  21  /  AlkPhos  93  06-11    Creatinine Trend: 5.85<--, 5.90<--  PT/INR - ( 11 Jun 2019 05:36 )   PT: 11.5 sec;   INR: 1.00 ratio         PTT - ( 11 Jun 2019 05:36 )  PTT:38.3 sec     Xray Chest 1 View- PORTABLE-Urgent (06.11.19 @ 05:39) >  FINDINGS:  Cardiomegaly.  Pulmonary edema.  Osseous degenerative change.    IMPRESSION:  Pulmonary edema and cardiomegaly. 8.7    7.3   )-----------( 209      ( 11 Jun 2019 05:36 )             26.2     Hgb Trend: 8.7<--  06-11    141  |  107  |  102<H>  ----------------------------<  230<H>  4.9   |  17<L>  |  5.85<H>    Ca    8.7      11 Jun 2019 06:38    TPro  7.8  /  Alb  4.5  /  TBili  0.3  /  DBili  x   /  AST  18  /  ALT  21  /  AlkPhos  93  06-11    Creatinine Trend: 5.85<--, 5.90<--  PT/INR - ( 11 Jun 2019 05:36 )   PT: 11.5 sec;   INR: 1.00 ratio    PTT - ( 11 Jun 2019 05:36 )  PTT:38.3 sec     Xray Chest 1 View- PORTABLE-Urgent (06.11.19 @ 05:39) >  FINDINGS:  Cardiomegaly.  Pulmonary edema.  Osseous degenerative change.    IMPRESSION:  Pulmonary edema and cardiomegaly.

## 2019-06-11 NOTE — H&P ADULT - NSICDXPASTMEDICALHX_GEN_ALL_CORE_FT
PAST MEDICAL HISTORY:  CKD (chronic kidney disease)     Diabetes     Diabetic neuropathy     Diastolic dysfunction mild. stage 1. EF 65%    DKA (diabetic ketoacidoses)     DM (diabetes mellitus)     High cholesterol     Hyperlipidemia     Hypertension     Hypertension     Insulin pump status denies    Lower extremity edema     Osteomyelitis of ankle or foot x2    Pneumonia 4/2013    Ventral hernia

## 2019-06-11 NOTE — ED ADULT NURSE NOTE - OBJECTIVE STATEMENT
57 y/o male PMH CHF on lasix, past admissions to ICU presents to ED c/o SOB, chest pain, +2 pitting edema. Pt states he has been having SOB and chest pain for months, worsens with exertion. He states he woke up at 01:30am with generalized chest discomfort and SOB. Reports pain with deep inspiration. Denies n/v/d, diaphoresis. Pt follows with cardiologist Dr. Topete, who suggested he switch HTN medications recently. Pt usually takes lasix. Gross motor and neuro intact. NSR on cardiac monitor. 18G IV placed in R wrist. 96% on room air. Initially tachypneic but improved once brought back to CC room. Safety and comfort provided. Family at bedside. 59 y/o male PMH CHF on lasix requiring past admissions to ICU, HTN, HLD, CKD presents to ED c/o SOB, chest pain, +3 pitting edema. Pt states he has been having SOB and chest pain for months, worsens with exertion. He states he woke up at 01:30am with generalized chest discomfort and SOB. Reports pain with deep inspiration. Denies n/v/d, diaphoresis. Pt follows with cardiologist Dr. Topete, who suggested he switch medications from lasix to torsemide 40mg TID recently. Denies fevers, chills, n/v/d, abdominal pain, urinary symptoms. 96% on room air. Initially tachypneic but improved once brought back to CC room. Breath sounds diminished in b/l lung bases. Gross motor and neuro intact. NSR on cardiac monitor. 18G IV placed in R wrist. Safety and comfort provided. Family at bedside.

## 2019-06-11 NOTE — H&P ADULT - PROBLEM SELECTOR PROBLEM 5
Chronic heart failure, unspecified heart failure type Anemia, unspecified type Type 2 diabetes mellitus with complication, with long-term current use of insulin

## 2019-06-11 NOTE — H&P ADULT - PROBLEM SELECTOR PLAN 5
- TTE JUN 15th 2018 EF 65-70% Mild mitral regurgitation, diastolic LV dysfunction - Hb of 8.7, likely 2/2 CKD  - F/u iron studies - C/w ISS  - Monitor FSG - Reports taking Tresiba 21U at 6AM qd  - Will start with Lantus 15U in the morning and uptitrate as needed  - C/w ISS  - Monitor FSG - Reports taking Tresiba 21U at 6AM qd  - Will start with Lantus 15U and uptitrate as needed  - C/w ISS  - Monitor FSG

## 2019-06-11 NOTE — H&P ADULT - PROBLEM SELECTOR PLAN 4
- Hb of 8.7, likely 2/2 CKD  - F/u iron studies - C/w torsemide and metolazone  - Strict I/Os, fluid restriction.

## 2019-06-12 LAB
ANION GAP SERPL CALC-SCNC: 17 MMOL/L — SIGNIFICANT CHANGE UP (ref 5–17)
BUN SERPL-MCNC: 99 MG/DL — HIGH (ref 7–23)
CALCIUM SERPL-MCNC: 8.5 MG/DL — SIGNIFICANT CHANGE UP (ref 8.4–10.5)
CALCIUM SERPL-MCNC: 8.9 MG/DL — SIGNIFICANT CHANGE UP (ref 8.4–10.5)
CHLORIDE SERPL-SCNC: 109 MMOL/L — HIGH (ref 96–108)
CO2 SERPL-SCNC: 16 MMOL/L — LOW (ref 22–31)
CREAT SERPL-MCNC: 6.02 MG/DL — HIGH (ref 0.5–1.3)
FERRITIN SERPL-MCNC: 635 NG/ML — HIGH (ref 30–400)
GLUCOSE SERPL-MCNC: 129 MG/DL — HIGH (ref 70–99)
HBA1C BLD-MCNC: 6.2 % — HIGH (ref 4–5.6)
HCT VFR BLD CALC: 25.9 % — LOW (ref 39–50)
HCV AB S/CO SERPL IA: 0.12 S/CO — SIGNIFICANT CHANGE UP (ref 0–0.99)
HCV AB SERPL-IMP: SIGNIFICANT CHANGE UP
HGB BLD-MCNC: 7.9 G/DL — LOW (ref 13–17)
IRON SATN MFR SERPL: 19 % — SIGNIFICANT CHANGE UP (ref 16–55)
IRON SATN MFR SERPL: 51 UG/DL — SIGNIFICANT CHANGE UP (ref 45–165)
MAGNESIUM SERPL-MCNC: 3.4 MG/DL — HIGH (ref 1.6–2.6)
MCHC RBC-ENTMCNC: 30.4 PG — SIGNIFICANT CHANGE UP (ref 27–34)
MCHC RBC-ENTMCNC: 30.5 GM/DL — LOW (ref 32–36)
MCV RBC AUTO: 99.6 FL — SIGNIFICANT CHANGE UP (ref 80–100)
PHOSPHATE SERPL-MCNC: 6.4 MG/DL — HIGH (ref 2.5–4.5)
PLATELET # BLD AUTO: 201 K/UL — SIGNIFICANT CHANGE UP (ref 150–400)
POTASSIUM SERPL-MCNC: 4.6 MMOL/L — SIGNIFICANT CHANGE UP (ref 3.5–5.3)
POTASSIUM SERPL-SCNC: 4.6 MMOL/L — SIGNIFICANT CHANGE UP (ref 3.5–5.3)
PTH-INTACT FLD-MCNC: 184 PG/ML — HIGH (ref 15–65)
RBC # BLD: 2.6 M/UL — LOW (ref 4.2–5.8)
RBC # FLD: 13.7 % — SIGNIFICANT CHANGE UP (ref 10.3–14.5)
SODIUM SERPL-SCNC: 142 MMOL/L — SIGNIFICANT CHANGE UP (ref 135–145)
TIBC SERPL-MCNC: 273 UG/DL — SIGNIFICANT CHANGE UP (ref 220–430)
UIBC SERPL-MCNC: 222 UG/DL — SIGNIFICANT CHANGE UP (ref 110–370)
WBC # BLD: 5.75 K/UL — SIGNIFICANT CHANGE UP (ref 3.8–10.5)
WBC # FLD AUTO: 5.75 K/UL — SIGNIFICANT CHANGE UP (ref 3.8–10.5)

## 2019-06-12 PROCEDURE — 99233 SBSQ HOSP IP/OBS HIGH 50: CPT | Mod: GC

## 2019-06-12 RX ORDER — SENNA PLUS 8.6 MG/1
2 TABLET ORAL AT BEDTIME
Refills: 0 | Status: DISCONTINUED | OUTPATIENT
Start: 2019-06-12 | End: 2019-06-15

## 2019-06-12 RX ORDER — DOCUSATE SODIUM 100 MG
100 CAPSULE ORAL THREE TIMES A DAY
Refills: 0 | Status: DISCONTINUED | OUTPATIENT
Start: 2019-06-12 | End: 2019-06-15

## 2019-06-12 RX ORDER — FUROSEMIDE 40 MG
80 TABLET ORAL
Refills: 0 | Status: DISCONTINUED | OUTPATIENT
Start: 2019-06-12 | End: 2019-06-15

## 2019-06-12 RX ORDER — FERROUS SULFATE 325(65) MG
325 TABLET ORAL DAILY
Refills: 0 | Status: DISCONTINUED | OUTPATIENT
Start: 2019-06-12 | End: 2019-06-15

## 2019-06-12 RX ORDER — SEVELAMER CARBONATE 2400 MG/1
2400 POWDER, FOR SUSPENSION ORAL
Refills: 0 | Status: DISCONTINUED | OUTPATIENT
Start: 2019-06-12 | End: 2019-06-15

## 2019-06-12 RX ADMIN — Medication 100 MILLIGRAM(S): at 16:57

## 2019-06-12 RX ADMIN — SEVELAMER CARBONATE 2400 MILLIGRAM(S): 2400 POWDER, FOR SUSPENSION ORAL at 16:54

## 2019-06-12 RX ADMIN — Medication 80 MILLIGRAM(S): at 16:55

## 2019-06-12 RX ADMIN — Medication 50 MILLIGRAM(S): at 16:56

## 2019-06-12 RX ADMIN — Medication 100 MILLIGRAM(S): at 05:11

## 2019-06-12 RX ADMIN — Medication 2: at 16:54

## 2019-06-12 RX ADMIN — HEPARIN SODIUM 5000 UNIT(S): 5000 INJECTION INTRAVENOUS; SUBCUTANEOUS at 05:11

## 2019-06-12 RX ADMIN — Medication 1: at 12:26

## 2019-06-12 RX ADMIN — ATORVASTATIN CALCIUM 20 MILLIGRAM(S): 80 TABLET, FILM COATED ORAL at 21:14

## 2019-06-12 RX ADMIN — Medication 100 MILLIGRAM(S): at 16:55

## 2019-06-12 RX ADMIN — Medication 10 MILLIGRAM(S): at 05:11

## 2019-06-12 RX ADMIN — AMLODIPINE BESYLATE 10 MILLIGRAM(S): 2.5 TABLET ORAL at 05:11

## 2019-06-12 RX ADMIN — Medication 81 MILLIGRAM(S): at 12:27

## 2019-06-12 RX ADMIN — Medication 50 MILLIGRAM(S): at 05:11

## 2019-06-12 RX ADMIN — Medication 325 MILLIGRAM(S): at 12:27

## 2019-06-12 RX ADMIN — INSULIN GLARGINE 15 UNIT(S): 100 INJECTION, SOLUTION SUBCUTANEOUS at 08:31

## 2019-06-12 RX ADMIN — LATANOPROST 1 DROP(S): 0.05 SOLUTION/ DROPS OPHTHALMIC; TOPICAL at 21:14

## 2019-06-12 RX ADMIN — SEVELAMER CARBONATE 1600 MILLIGRAM(S): 2400 POWDER, FOR SUSPENSION ORAL at 05:11

## 2019-06-12 RX ADMIN — HEPARIN SODIUM 5000 UNIT(S): 5000 INJECTION INTRAVENOUS; SUBCUTANEOUS at 14:22

## 2019-06-12 RX ADMIN — GABAPENTIN 300 MILLIGRAM(S): 400 CAPSULE ORAL at 16:56

## 2019-06-12 RX ADMIN — Medication 80 MILLIGRAM(S): at 05:11

## 2019-06-12 RX ADMIN — SEVELAMER CARBONATE 2400 MILLIGRAM(S): 2400 POWDER, FOR SUSPENSION ORAL at 12:27

## 2019-06-12 RX ADMIN — HEPARIN SODIUM 5000 UNIT(S): 5000 INJECTION INTRAVENOUS; SUBCUTANEOUS at 21:14

## 2019-06-12 RX ADMIN — GABAPENTIN 300 MILLIGRAM(S): 400 CAPSULE ORAL at 05:11

## 2019-06-12 NOTE — PROGRESS NOTE ADULT - SUBJECTIVE AND OBJECTIVE BOX
CHIEF COMPLAINT:    Interval Events: No acute event overnight. Patient is seen and examined at the bedside this morning. No HA, n/v, f/c, cough, CP/SOB, abdominal pain, dysuria, diarrhea.    OBJECTIVE:  Vital Signs Last 24 Hrs  T(C): 36.9 (12 Jun 2019 04:36), Max: 37.2 (12 Jun 2019 00:01)  T(F): 98.5 (12 Jun 2019 04:36), Max: 98.9 (12 Jun 2019 00:01)  HR: 80 (12 Jun 2019 04:45) (78 - 84)  BP: 159/74 (12 Jun 2019 04:45) (138/57 - 182/70)  BP(mean): --  RR: 18 (12 Jun 2019 04:36) (17 - 18)  SpO2: 96% (12 Jun 2019 04:36) (95% - 100%)    06-11 @ 07:01  -  06-12 @ 07:00  --------------------------------------------------------  IN: 240 mL / OUT: 3000 mL / NET: -2760 mL    06-12 @ 07:01  -  06-12 @ 08:26  --------------------------------------------------------  IN: 0 mL / OUT: 300 mL / NET: -300 mL      CAPILLARY BLOOD GLUCOSE      POCT Blood Glucose.: 134 mg/dL (12 Jun 2019 07:53)      PHYSICAL EXAM:  Gen: NAD  Head: NCAT  HEENT: PERRL, MMM, normal conjunctiva, anicteric, neck supple  Lung: Clear to auscultation and percussion b/l  CV: RRR, no murmurs  Abd: soft, NTND, no rebound or guarding, ecchymotic patch in LLQ consistent with insulin injection site  MSK: 2+ pitting edema b/l, no visible deformities  Neuro: Moving all extremities equally  Skin: Warm and dry, no evidence of rash  Psych: normal mood and affect    LINES:    HOSPITAL MEDICATIONS:  aspirin enteric coated 81 milliGRAM(s) Oral daily  heparin  Injectable 5000 Unit(s) SubCutaneous every 8 hours      amLODIPine   Tablet 10 milliGRAM(s) Oral daily  furosemide   Injectable 80 milliGRAM(s) IV Push daily  hydrALAZINE 50 milliGRAM(s) Oral two times a day  labetalol 100 milliGRAM(s) Oral two times a day  minoxidil 10 milliGRAM(s) Oral daily    atorvastatin 20 milliGRAM(s) Oral at bedtime  dextrose 40% Gel 15 Gram(s) Oral once PRN  dextrose 50% Injectable 12.5 Gram(s) IV Push once  dextrose 50% Injectable 25 Gram(s) IV Push once  dextrose 50% Injectable 25 Gram(s) IV Push once  glucagon  Injectable 1 milliGRAM(s) IntraMuscular once PRN  insulin glargine Injectable (LANTUS) 15 Unit(s) SubCutaneous every morning  insulin lispro (HumaLOG) corrective regimen sliding scale   SubCutaneous three times a day before meals  insulin lispro (HumaLOG) corrective regimen sliding scale   SubCutaneous at bedtime      gabapentin 300 milliGRAM(s) Oral two times a day          dextrose 5%. 1000 milliLiter(s) IV Continuous <Continuous>      latanoprost 0.005% Ophthalmic Solution 1 Drop(s) Left EYE at bedtime    sevelamer carbonate 1600 milliGRAM(s) Oral every 8 hours      LABS:                        8.7    7.3   )-----------( 209      ( 11 Jun 2019 05:36 )             26.2     Hgb Trend: 8.7<--  06-12    142  |  109<H>  |  99<H>  ----------------------------<  129<H>  4.6   |  16<L>  |  6.02<H>    Ca    8.9      12 Jun 2019 05:38  Phos  6.4     06-12  Mg     3.4     06-12    TPro  7.8  /  Alb  4.5  /  TBili  0.3  /  DBili  x   /  AST  18  /  ALT  21  /  AlkPhos  93  06-11    Creatinine Trend: 6.02<--, 5.85<--, 5.90<--  PT/INR - ( 11 Jun 2019 05:36 )   PT: 11.5 sec;   INR: 1.00 ratio         PTT - ( 11 Jun 2019 05:36 )  PTT:38.3 sec      Venous Blood Gas:  06-11 @ 05:36  7.32/39/54/19/86  VBG Lactate: 0.7      MICROBIOLOGY:     RADIOLOGY:  [ ] Reviewed and interpreted by me    EKG:

## 2019-06-12 NOTE — PROGRESS NOTE ADULT - SUBJECTIVE AND OBJECTIVE BOX
No pain, no shortness of breath      VITAL:  T(C): , Max: 37.2 (06-12-19 @ 00:01)  T(F): , Max: 98.9 (06-12-19 @ 00:01)  HR: 80 (06-12-19 @ 04:45)  BP: 159/74 (06-12-19 @ 04:45)  RR: 18 (06-12-19 @ 04:36)  SpO2: 96% (06-12-19 @ 04:36)      PHYSICAL EXAM:    Constitutional: NAD; Alert  HEENT:  NCAT; DMM  Neck: No JVD; supple  Respiratory: CTA-b/l  Cardiac: RRR s1s2  Gastrointestinal: BS+, soft, NT/ND  Urologic: No rivera  Extremities: No peripheral edema  Back: No CVAT b/l    LABS:                        8.7    7.3   )-----------( 209      ( 11 Jun 2019 05:36 )             26.2     Na(142)/K(4.6)/Cl(109)/HCO3(16)/BUN(99)/Cr(6.02)Glu(129)/Ca(8.9)/Mg(3.4)/PO4(6.4)    06-12 @ 05:38  Na(141)/K(4.9)/Cl(107)/HCO3(17)/BUN(102)/Cr(5.85)Glu(230)/Ca(8.7)/Mg(--)/PO4(--)    06-11 @ 06:38  Na(142)/K(4.9)/Cl(108)/HCO3(15)/BUN(101)/Cr(5.90)Glu(255)/Ca(8.9)/Mg(--)/PO4(--)    06-11 @ 05:36      ASSESSMENT: 58M w/ HTN, DM2, obesity s/p gastric sleeve, CAD, HFpEF, and CKD5, 6/10/19 a/w CP/SOB    (1)Renal - CKD5 - GFR <10ml/min. No urgent need for HD now, but likely to need within next few months, provided he does not get a renal transplant in the interim. Uremic pericarditis seems highly unlikely at this point based on presentation. TTE is pending. Of note, we have held off with AV access as it has seemed that he would be getting a living donor transplant quite soon. Unfortunately we have not made much progress here, it appears, over the past few months. At this point we should get an AV access placed.    (2)Metabolic acidosis - renally mediated - although adding NaHCO3 tabs could exacerbate pulmonary edema, I would favor adding them (650bid to start) if we cannot get the HCO3 above 16meq/L with diuresis over the next couple days    (3)Anemia - likely at least in part from CKD. Iron study results pending.    (4)Hyperphosphatemia - suboptimal control on Renvela 1600tid - we should increase to 2400tid, and we should make sure it is given with meals (rather than q8h)    (5)CV - hypervolemia on admission. We must be aggressive with his diuretics at present, especially given that his renal impairment will blunt his response to diuretics. I would try Lasix 80iv bid + Metolazone 5qd for now. If his renal function worsens/if he develops uremic symptoms as a result, then we will simply have to initiate HD this admisison.      RECOMMEND:  (1)No HD just yet  (2)F/U TTE  (3)F/U iron studies  (4)Increase diuretic regimen: Lasix 80mg iv bid + Metolazone 5qd  (5)Dose new meds for GFR<10ml/min  (6)Increase Renvela to 2400 tid, and give with meals rather than q8h  (7)Vascular evaluation for AV access/No needlesticks to nondominant arm          Lei Vogt MD  [facility]  [phone] No pain, no shortness of breath      VITAL:  T(C): , Max: 37.2 (06-12-19 @ 00:01)  T(F): , Max: 98.9 (06-12-19 @ 00:01)  HR: 80 (06-12-19 @ 04:45)  BP: 159/74 (06-12-19 @ 04:45)  RR: 18 (06-12-19 @ 04:36)  SpO2: 96% (06-12-19 @ 04:36)      PHYSICAL EXAM:    Constitutional: NAD; Alert  HEENT:  NCAT; DMM  Neck: No JVD; supple  Respiratory: CTA-b/l  Cardiac: RRR s1s2  Gastrointestinal: BS+, soft, NT/ND  Urologic: No rivera  Extremities: No peripheral edema  Back: No CVAT b/l    LABS:                        8.7    7.3   )-----------( 209      ( 11 Jun 2019 05:36 )             26.2     Na(142)/K(4.6)/Cl(109)/HCO3(16)/BUN(99)/Cr(6.02)Glu(129)/Ca(8.9)/Mg(3.4)/PO4(6.4)    06-12 @ 05:38  Na(141)/K(4.9)/Cl(107)/HCO3(17)/BUN(102)/Cr(5.85)Glu(230)/Ca(8.7)/Mg(--)/PO4(--)    06-11 @ 06:38  Na(142)/K(4.9)/Cl(108)/HCO3(15)/BUN(101)/Cr(5.90)Glu(255)/Ca(8.9)/Mg(--)/PO4(--)    06-11 @ 05:36      ASSESSMENT: 58M w/ HTN, DM2, obesity s/p gastric sleeve, CAD, HFpEF, and CKD5, 6/10/19 a/w CP/SOB    (1)Renal - CKD5 - GFR <10ml/min. No urgent need for HD now, but likely to need within next few months, provided he does not get a renal transplant in the interim. Uremic pericarditis seems highly unlikely at this point based on presentation. TTE is pending. Of note, we have held off with AV access as it has seemed that he would be getting a living donor transplant quite soon. Unfortunately we have not made much progress here, it appears, over the past few months. At this point we should get an AV access placed.    (2)Metabolic acidosis - renally mediated - although adding NaHCO3 tabs could exacerbate pulmonary edema, I would favor adding them (650bid to start) if we cannot get the HCO3 above 16meq/L with diuresis over the next couple days    (3)Anemia - likely at least in part from CKD. Iron study results pending.    (4)Hyperphosphatemia - suboptimal control on Renvela 1600tid - we should increase to 2400tid, and we should make sure it is given with meals (rather than q8h)    (5)CV - hypervolemia on admission. We must be aggressive with his diuretics at present, especially given that his renal impairment will blunt his response to diuretics. I would try Lasix 80iv bid + Metolazone 5qd for now. If his renal function worsens/if he develops uremic symptoms as a result, then we will simply have to initiate HD this admisison.      RECOMMEND:  (1)No HD just yet  (2)F/U TTE  (3)F/U iron studies  (4)Increase diuretic regimen: Lasix 80mg iv bid + Metolazone 5qd  (5)Dose new meds for GFR<10ml/min  (6)Increase Renvela to 2400 tid, and give with meals rather than q8h  (7)Vasc eval, inpatient versus outpatient, for AV access        Lei Vogt MD  [employer]  [phone]

## 2019-06-12 NOTE — PROGRESS NOTE ADULT - PROBLEM SELECTOR PLAN 1
- TTE JUN 15th 2018 EF 65-70% Mild mitral regurgitation, diastolic LV dysfunction  - Patient with significant LE edema p/w sob, cxr pulm edema and cardiomegaly  - Serum pro-BNP 5.2K  - EKG - 1st degree AV block. No ST-changes.  - Monitoring on telemetry- currently sinus rhythm.  - Unlikely to be ACS; possible pericarditis given pleuritic nature, but no evidence on EKG, no rub on exam  - hstrop 203 -> 198, elevated in setting of volume overload/CKD, no need to trend  - C/w furosemide IV and metolazone  - Strict I/Os, fluid restriction.  - F/u TTE  - F/u Cards reccs

## 2019-06-12 NOTE — PROGRESS NOTE ADULT - PROBLEM SELECTOR PLAN 2
- Likely 2/2 diabetes mellitus. CKD 5   - AS per renal, no indications for HD as of now  - PTH, Phos level in the am. - Likely 2/2 diabetes mellitus. CKD 5   - AS per renal, no indications for HD as of now  - Phosphorous and PTH elevated  - C/w Renvela to 2400 tid

## 2019-06-12 NOTE — PROGRESS NOTE ADULT - SUBJECTIVE AND OBJECTIVE BOX
No events overnight. Denies CP. SOB significantly improved.     MEDICATIONS:  amLODIPine   Tablet 10 milliGRAM(s) Oral daily  aspirin enteric coated 81 milliGRAM(s) Oral daily  furosemide   Injectable 80 milliGRAM(s) IV Push daily  heparin  Injectable 5000 Unit(s) SubCutaneous every 8 hours  hydrALAZINE 50 milliGRAM(s) Oral two times a day  labetalol 100 milliGRAM(s) Oral two times a day  minoxidil 10 milliGRAM(s) Oral daily        gabapentin 300 milliGRAM(s) Oral two times a day      atorvastatin 20 milliGRAM(s) Oral at bedtime  dextrose 40% Gel 15 Gram(s) Oral once PRN  dextrose 50% Injectable 12.5 Gram(s) IV Push once  dextrose 50% Injectable 25 Gram(s) IV Push once  dextrose 50% Injectable 25 Gram(s) IV Push once  glucagon  Injectable 1 milliGRAM(s) IntraMuscular once PRN  insulin glargine Injectable (LANTUS) 15 Unit(s) SubCutaneous every morning  insulin lispro (HumaLOG) corrective regimen sliding scale   SubCutaneous three times a day before meals  insulin lispro (HumaLOG) corrective regimen sliding scale   SubCutaneous at bedtime    dextrose 5%. 1000 milliLiter(s) IV Continuous <Continuous>  latanoprost 0.005% Ophthalmic Solution 1 Drop(s) Left EYE at bedtime      REVIEW OF SYSTEMS:    CONSTITUTIONAL: No weakness, fevers or chills  EYES/ENT: No visual changes;  No dysphagia  RESPIRATORY: No cough, wheezing, hemoptysis; +shortness of breath  CARDIOVASCULAR: No chest pain or palpitations; +lower extremity edema  GASTROINTESTINAL: No abdominal or epigastric pain. No nausea, vomiting, or hematemesis  GENITOURINARY: No dysuria, frequency or hematuria  NEUROLOGICAL: No numbness or weakness  SKIN: No itching, burning, rashes, or lesions   HEME: No bleeding or bruising  MSK: No joint pains or muscle pains  All other review of systems is negative unless indicated above.    PHYSICAL EXAM:  T(C): 36.9 (06-12-19 @ 04:36), Max: 37.2 (06-12-19 @ 00:01)  HR: 80 (06-12-19 @ 04:45) (78 - 84)  BP: 159/74 (06-12-19 @ 04:45) (138/57 - 182/70)  RR: 18 (06-12-19 @ 04:36) (17 - 18)  SpO2: 96% (06-12-19 @ 04:36) (95% - 100%)  Wt(kg): --  I&O's Summary    11 Jun 2019 07:01  -  12 Jun 2019 07:00  --------------------------------------------------------  IN: 240 mL / OUT: 3000 mL / NET: -2760 mL    12 Jun 2019 07:01  -  12 Jun 2019 08:28  --------------------------------------------------------  IN: 0 mL / OUT: 300 mL / NET: -300 mL        Appearance: Normal	  HEENT:   Normal oral mucosa  Cardiovascular: Normal S1 S2, +JVP to jaw, No murmurs, 2+edema  Respiratory: Bibasilar crackles  Psychiatry: A & O x 3, Mood & affect appropriate  Gastrointestinal:  Soft, Non-tender, + BS	  Skin: No rashes, No ecchymoses, No cyanosis	  Neurologic: Non-focal  Extremities: Normal range of motion, No clubbing, cyanosis        LABS:	 	    CBC Full  -  ( 11 Jun 2019 05:36 )  WBC Count : 7.3 K/uL  Hemoglobin : 8.7 g/dL  Hematocrit : 26.2 %  Platelet Count - Automated : 209 K/uL  Mean Cell Volume : 93.3 fl  Mean Cell Hemoglobin : 31.0 pg  Mean Cell Hemoglobin Concentration : 33.3 gm/dL  Auto Neutrophil # : x  Auto Lymphocyte # : x  Auto Monocyte # : x  Auto Eosinophil # : x  Auto Basophil # : x  Auto Neutrophil % : x  Auto Lymphocyte % : x  Auto Monocyte % : x  Auto Eosinophil % : x  Auto Basophil % : x    06-12    142  |  109<H>  |  99<H>  ----------------------------<  129<H>  4.6   |  16<L>  |  6.02<H>  06-11    141  |  107  |  102<H>  ----------------------------<  230<H>  4.9   |  17<L>  |  5.85<H>    Ca    8.9      12 Jun 2019 05:38  Ca    8.7      11 Jun 2019 06:38  Phos  6.4     06-12  Mg     3.4     06-12    TPro  7.8  /  Alb  4.5  /  TBili  0.3  /  DBili  x   /  AST  18  /  ALT  21  /  AlkPhos  93  06-11      proBNP: Serum Pro-Brain Natriuretic Peptide: 5239 pg/mL (06-11-19 @ 05:36)    ASSESSMENT/PLAN: 	  57 y/o M w/ HTN, HLD, IDDM, CKD, hx of gastric sleeve presented w/ SOB and CP.    Acute decompensated heart failure  - CP and SOB likely 2/2 CHF exacerbation; significant volume overload on exam  - unlikely to be ACS; possible pericarditis given pleuritic nature, but no evidence on EKG, no rub on exam  - hstrop 203 -> 198, elevated in setting of volume overload/CKD, no need to trend  - c/w IV lasix 80mg daily, goal at least net neg 1-2L in24h  - TTE pending  - strict I/Os, daily weights, 1L fluid restriction      Darlyn Farfan MD  Cardiology Fellow, M-F 7:30A-5P  [phone]    All Cardiology service information can be found on [email or web address], password: [email or web address].

## 2019-06-12 NOTE — PROGRESS NOTE ADULT - PROBLEM SELECTOR PLAN 4
- Reports taking Tresiba 21U at 6AM qd  - Will start with Lantus 15U and uptitrate as needed  - C/w ISS  - Monitor FSG

## 2019-06-12 NOTE — PROGRESS NOTE ADULT - PROBLEM SELECTOR PLAN 3
- Hb of 8.7, likely 2/2 CKD  - F/u iron studies - Hb of 7.9, likely 2/2 CKD  - Iron studies consistent with AOCD

## 2019-06-13 DIAGNOSIS — E87.2 ACIDOSIS: ICD-10-CM

## 2019-06-13 DIAGNOSIS — E83.39 OTHER DISORDERS OF PHOSPHORUS METABOLISM: ICD-10-CM

## 2019-06-13 DIAGNOSIS — I10 ESSENTIAL (PRIMARY) HYPERTENSION: ICD-10-CM

## 2019-06-13 LAB
ANION GAP SERPL CALC-SCNC: 19 MMOL/L — HIGH (ref 5–17)
BUN SERPL-MCNC: 98 MG/DL — HIGH (ref 7–23)
CALCIUM SERPL-MCNC: 9.1 MG/DL — SIGNIFICANT CHANGE UP (ref 8.4–10.5)
CHLORIDE SERPL-SCNC: 110 MMOL/L — HIGH (ref 96–108)
CO2 SERPL-SCNC: 17 MMOL/L — LOW (ref 22–31)
CREAT SERPL-MCNC: 5.77 MG/DL — HIGH (ref 0.5–1.3)
GLUCOSE BLDC GLUCOMTR-MCNC: 162 MG/DL — HIGH (ref 70–99)
GLUCOSE BLDC GLUCOMTR-MCNC: 175 MG/DL — HIGH (ref 70–99)
GLUCOSE BLDC GLUCOMTR-MCNC: 207 MG/DL — HIGH (ref 70–99)
GLUCOSE SERPL-MCNC: 93 MG/DL — SIGNIFICANT CHANGE UP (ref 70–99)
HCT VFR BLD CALC: 27.2 % — LOW (ref 39–50)
HGB BLD-MCNC: 8.3 G/DL — LOW (ref 13–17)
MAGNESIUM SERPL-MCNC: 3.5 MG/DL — HIGH (ref 1.6–2.6)
MCHC RBC-ENTMCNC: 30.2 PG — SIGNIFICANT CHANGE UP (ref 27–34)
MCHC RBC-ENTMCNC: 30.5 GM/DL — LOW (ref 32–36)
MCV RBC AUTO: 98.9 FL — SIGNIFICANT CHANGE UP (ref 80–100)
PHOSPHATE SERPL-MCNC: 6.2 MG/DL — HIGH (ref 2.5–4.5)
PLATELET # BLD AUTO: 206 K/UL — SIGNIFICANT CHANGE UP (ref 150–400)
POTASSIUM SERPL-MCNC: 4.7 MMOL/L — SIGNIFICANT CHANGE UP (ref 3.5–5.3)
POTASSIUM SERPL-SCNC: 4.7 MMOL/L — SIGNIFICANT CHANGE UP (ref 3.5–5.3)
RBC # BLD: 2.75 M/UL — LOW (ref 4.2–5.8)
RBC # FLD: 13.6 % — SIGNIFICANT CHANGE UP (ref 10.3–14.5)
SODIUM SERPL-SCNC: 146 MMOL/L — HIGH (ref 135–145)
WBC # BLD: 5.11 K/UL — SIGNIFICANT CHANGE UP (ref 3.8–10.5)
WBC # FLD AUTO: 5.11 K/UL — SIGNIFICANT CHANGE UP (ref 3.8–10.5)

## 2019-06-13 PROCEDURE — 99223 1ST HOSP IP/OBS HIGH 75: CPT | Mod: GC

## 2019-06-13 PROCEDURE — 99233 SBSQ HOSP IP/OBS HIGH 50: CPT | Mod: GC

## 2019-06-13 RX ORDER — POLYETHYLENE GLYCOL 3350 17 G/17G
17 POWDER, FOR SOLUTION ORAL DAILY
Refills: 0 | Status: DISCONTINUED | OUTPATIENT
Start: 2019-06-13 | End: 2019-06-15

## 2019-06-13 RX ORDER — ACETAMINOPHEN 500 MG
650 TABLET ORAL ONCE
Refills: 0 | Status: COMPLETED | OUTPATIENT
Start: 2019-06-13 | End: 2019-06-13

## 2019-06-13 RX ORDER — SODIUM BICARBONATE 1 MEQ/ML
650 SYRINGE (ML) INTRAVENOUS THREE TIMES A DAY
Refills: 0 | Status: DISCONTINUED | OUTPATIENT
Start: 2019-06-13 | End: 2019-06-15

## 2019-06-13 RX ADMIN — Medication 81 MILLIGRAM(S): at 12:24

## 2019-06-13 RX ADMIN — Medication 1: at 17:37

## 2019-06-13 RX ADMIN — Medication 100 MILLIGRAM(S): at 18:37

## 2019-06-13 RX ADMIN — Medication 325 MILLIGRAM(S): at 12:24

## 2019-06-13 RX ADMIN — Medication 1: at 12:24

## 2019-06-13 RX ADMIN — Medication 50 MILLIGRAM(S): at 05:37

## 2019-06-13 RX ADMIN — ATORVASTATIN CALCIUM 20 MILLIGRAM(S): 80 TABLET, FILM COATED ORAL at 21:54

## 2019-06-13 RX ADMIN — HEPARIN SODIUM 5000 UNIT(S): 5000 INJECTION INTRAVENOUS; SUBCUTANEOUS at 21:54

## 2019-06-13 RX ADMIN — LATANOPROST 1 DROP(S): 0.05 SOLUTION/ DROPS OPHTHALMIC; TOPICAL at 21:54

## 2019-06-13 RX ADMIN — Medication 650 MILLIGRAM(S): at 21:10

## 2019-06-13 RX ADMIN — HEPARIN SODIUM 5000 UNIT(S): 5000 INJECTION INTRAVENOUS; SUBCUTANEOUS at 15:06

## 2019-06-13 RX ADMIN — GABAPENTIN 300 MILLIGRAM(S): 400 CAPSULE ORAL at 17:40

## 2019-06-13 RX ADMIN — SEVELAMER CARBONATE 2400 MILLIGRAM(S): 2400 POWDER, FOR SUSPENSION ORAL at 12:23

## 2019-06-13 RX ADMIN — INSULIN GLARGINE 15 UNIT(S): 100 INJECTION, SOLUTION SUBCUTANEOUS at 08:18

## 2019-06-13 RX ADMIN — Medication 80 MILLIGRAM(S): at 17:39

## 2019-06-13 RX ADMIN — SEVELAMER CARBONATE 2400 MILLIGRAM(S): 2400 POWDER, FOR SUSPENSION ORAL at 17:39

## 2019-06-13 RX ADMIN — Medication 80 MILLIGRAM(S): at 05:37

## 2019-06-13 RX ADMIN — SEVELAMER CARBONATE 2400 MILLIGRAM(S): 2400 POWDER, FOR SUSPENSION ORAL at 08:18

## 2019-06-13 RX ADMIN — Medication 650 MILLIGRAM(S): at 20:13

## 2019-06-13 RX ADMIN — Medication 10 MILLIGRAM(S): at 05:38

## 2019-06-13 RX ADMIN — Medication 100 MILLIGRAM(S): at 05:37

## 2019-06-13 RX ADMIN — GABAPENTIN 300 MILLIGRAM(S): 400 CAPSULE ORAL at 05:37

## 2019-06-13 RX ADMIN — AMLODIPINE BESYLATE 10 MILLIGRAM(S): 2.5 TABLET ORAL at 05:37

## 2019-06-13 RX ADMIN — Medication 650 MILLIGRAM(S): at 21:54

## 2019-06-13 RX ADMIN — HEPARIN SODIUM 5000 UNIT(S): 5000 INJECTION INTRAVENOUS; SUBCUTANEOUS at 05:37

## 2019-06-13 RX ADMIN — Medication 50 MILLIGRAM(S): at 18:37

## 2019-06-13 NOTE — PROGRESS NOTE ADULT - PROBLEM SELECTOR PLAN 2
- Likely 2/2 diabetes mellitus. CKD 5   - AS per renal, no indications for HD as of now  - Phosphorous and PTH elevated  - C/w Renvela to 2400 tid Will cont diuresis as per card/renal  - Likely 2/2 diabetes mellitus. CKD 5   - AS per renal, no indications for HD as of now  - Phosphorous and PTH elevated  - C/w Renvela to 2400 tid

## 2019-06-13 NOTE — CONSULT NOTE ADULT - SUBJECTIVE AND OBJECTIVE BOX
[facility] DIVISION OF KIDNEY DISEASES AND HYPERTENSION -- INITIAL CONSULT NOTE  --------------------------------------------------------------------------------  HPI:        PAST HISTORY  --------------------------------------------------------------------------------  PAST MEDICAL & SURGICAL HISTORY:  Ventral hernia  High cholesterol  Diabetes  Hypertension  DKA (diabetic ketoacidoses)  Diastolic dysfunction: mild. stage 1. EF 65%  CKD (chronic kidney disease)  Insulin pump status: denies  Pneumonia: 2013  Lower extremity edema  Osteomyelitis of ankle or foot: x2  Diabetic neuropathy  Hyperlipidemia  DM (diabetes mellitus)  Hypertension  Status post cataract extraction  S/P laparoscopic sleeve gastrectomy:   H/O elbow surgery: left  H/O cardiac catheterization: no stents  Retinal hemorrhage, left eye: s/p laser surgery  hx of right eye retinal hemorrahge, tx with laser surgery  S/P amputation: right hallux   S/P hernia repair  S/P carpal tunnel release: b/l    FAMILY HISTORY:  FH: aortic stenosis: Son  Family history of bone cancer: Grandfather  Family history of heart attack (Grandparent): father  @54 y.o.  grandfather  @ 52 y.o.    PAST SOCIAL HISTORY:    ALLERGIES & MEDICATIONS  --------------------------------------------------------------------------------  Allergies    clonidine (Other)    Intolerances      Standing Inpatient Medications  amLODIPine   Tablet 10 milliGRAM(s) Oral daily  aspirin enteric coated 81 milliGRAM(s) Oral daily  atorvastatin 20 milliGRAM(s) Oral at bedtime  dextrose 5%. 1000 milliLiter(s) IV Continuous <Continuous>  dextrose 50% Injectable 12.5 Gram(s) IV Push once  dextrose 50% Injectable 25 Gram(s) IV Push once  dextrose 50% Injectable 25 Gram(s) IV Push once  docusate sodium 100 milliGRAM(s) Oral three times a day  ferrous    sulfate 325 milliGRAM(s) Oral daily  furosemide   Injectable 80 milliGRAM(s) IV Push two times a day  gabapentin 300 milliGRAM(s) Oral two times a day  heparin  Injectable 5000 Unit(s) SubCutaneous every 8 hours  hydrALAZINE 50 milliGRAM(s) Oral two times a day  insulin glargine Injectable (LANTUS) 15 Unit(s) SubCutaneous every morning  insulin lispro (HumaLOG) corrective regimen sliding scale   SubCutaneous three times a day before meals  insulin lispro (HumaLOG) corrective regimen sliding scale   SubCutaneous at bedtime  labetalol 100 milliGRAM(s) Oral two times a day  latanoprost 0.005% Ophthalmic Solution 1 Drop(s) Left EYE at bedtime  metolazone 5 milliGRAM(s) Oral daily  minoxidil 10 milliGRAM(s) Oral daily  polyethylene glycol 3350 17 Gram(s) Oral daily  senna 2 Tablet(s) Oral at bedtime  sevelamer carbonate 2400 milliGRAM(s) Oral three times a day with meals    PRN Inpatient Medications  dextrose 40% Gel 15 Gram(s) Oral once PRN  glucagon  Injectable 1 milliGRAM(s) IntraMuscular once PRN      REVIEW OF SYSTEMS  --------------------------------------------------------------------------------  Gen: No weight changes, fatigue, fevers/chills, weakness  Skin: No rashes  Respiratory: No dyspnea, cough, wheezing, hemoptysis  CV: No chest pain, PND, orthopnea  GI: No abdominal pain, diarrhea, constipation, nausea, vomiting, melena, hematochezia  : No increased frequency, dysuria, hematuria, nocturia  MSK: No joint pain/swelling; no back pain; no edema  Neuro: No dizziness/lightheadedness, weakness  Heme: No easy bruising or bleeding      All other systems were reviewed and are negative, except as noted.    VITALS/PHYSICAL EXAM  --------------------------------------------------------------------------------  T(C): 36.5 (19 @ 11:10), Max: 37 (19 @ 20:52)  HR: 76 (19 @ 11:10) (73 - 80)  BP: 162/70 (19 @ 11:10) (136/66 - 170/68)  RR: 17 (19 @ 11:10) (17 - 18)  SpO2: 95% (19 @ 11:10) (94% - 97%)  Wt(kg): --        19 @ 07:01  -  19 @ 07:00  --------------------------------------------------------  IN: 1330 mL / OUT: 3580 mL / NET: -2250 mL    19 @ 07:01  -  19 @ 17:11  --------------------------------------------------------  IN: 660 mL / OUT: 1600 mL / NET: -940 mL      Physical Exam:  	Gen: NAD, well-appearing  	HEENT: PERRL, supple neck, clear oropharynx  	Pulm: CTA B/L  	CV: RRR, S1S2; no rub  	Back: No spinal or CVA tenderness; no sacral edema  	Abd: +BS, soft, nontender/nondistended  	: No suprapubic tenderness  	LE: Warm, FROM, no clubbing, intact strength; no edema  	Skin: Warm, without rashes  	Vascular access:    LABS/STUDIES  --------------------------------------------------------------------------------              8.3    5.11  >-----------<  206      [19 @ 09:04]              27.2     146  |  110  |  98  ----------------------------<  93      [19 @ 05:30]  4.7   |  17  |  5.77        Ca     9.1     [19 05:30]      Mg     3.5     [19 05:30]      Phos  6.2     [19 05:30]            Creatinine Trend:  SCr 5.77 [ 05:30]  SCr 6.02 [ 05:38]  SCr 5.85 [ 06:38]  SCr 5.90 [ 05:36]    Urinalysis - [14 @ 18:11]      Color Colorless / Appearance Clear / SG 1.010 / pH 6.0      Gluc 250 / Ketone Negative  / Bili Negative / Urobili Normal       Blood Moderate / Protein 100 / Leuk Est Negative / Nitrite Negative      RBC 5-10 / WBC 2-5 / Hyaline 2-5 / Gran  / Sq Epi  / Non Sq Epi Occasional / Bacteria Few      Iron 51, TIBC 273, %sat 19      [19 @ 10:11]  Ferritin 635      [19 10:08]  PTH -- (Ca 8.5)      [19 @ 10:08]   184  HbA1c 6.2      [19 09:15]    HCV 0.12, Nonreact      [19 @ 10:16] [facility] DIVISION OF KIDNEY DISEASES AND HYPERTENSION -- INITIAL CONSULT NOTE  --------------------------------------------------------------------------------  HPI: 59 yo M with advanced CKD V, DM (complicated by amputation, neuropathy, and retinopathy), HTN, and diastolic CHF was admitted to [facility] with SOB for 1 week, along with increased LE edema. Pt. was started on Lasix IV with improvement of symptoms. Upon admission (19), pt. had elevated Scr of 5.90, and remains stable today at 5.77. Nephrology is consulted for advanced CKD V.    Pt. seen and examined at bedside this afternoon. Pt. states that he has known about his kidney disease for about 7 years, when he established with his first nephrologist, Dr. Vogt. Pt. states that in the past, he was told to be evaluated for AVF creation for potential need for dialysis in the near future. Pt. has decided to have a second opinion in his care. Pt. states that his baseline Scr prior to admission is ~ 3.7-4.1. Additionally states that his typical body weight is ~190 lbs. Since being admitted, his SOB has resolved, and his LE edema has improved. Pt. denies uremic symptoms. Denies CP, SOB, N/V/F/C.    PAST HISTORY  --------------------------------------------------------------------------------  PAST MEDICAL & SURGICAL HISTORY:  Ventral hernia  High cholesterol  Diabetes  Hypertension  DKA (diabetic ketoacidoses)  Diastolic dysfunction: mild. stage 1. EF 65%  CKD (chronic kidney disease)  Insulin pump status: denies  Pneumonia: 2013  Lower extremity edema  Osteomyelitis of ankle or foot: x2  Diabetic neuropathy  Hyperlipidemia  DM (diabetes mellitus)  Hypertension  Status post cataract extraction  S/P laparoscopic sleeve gastrectomy:   H/O elbow surgery: left  H/O cardiac catheterization: no stents  Retinal hemorrhage, left eye: s/p laser surgery  hx of right eye retinal hemorrahge, tx with laser surgery  S/P amputation: right hallux   S/P hernia repair  S/P carpal tunnel release: b/l    FAMILY HISTORY:  FH: aortic stenosis: Son  Family history of bone cancer: Grandfather  Family history of heart attack (Grandparent): father  @54 y.o.  grandfather  @ 52 y.o.  Denies family history of kidney disease    PAST SOCIAL HISTORY:  Denies current use of alcohol/tobacco    ALLERGIES & MEDICATIONS  --------------------------------------------------------------------------------  Allergies    clonidine (Other)    Intolerances    Standing Inpatient Medications  amLODIPine   Tablet 10 milliGRAM(s) Oral daily  aspirin enteric coated 81 milliGRAM(s) Oral daily  atorvastatin 20 milliGRAM(s) Oral at bedtime  dextrose 5%. 1000 milliLiter(s) IV Continuous <Continuous>  dextrose 50% Injectable 12.5 Gram(s) IV Push once  dextrose 50% Injectable 25 Gram(s) IV Push once  dextrose 50% Injectable 25 Gram(s) IV Push once  docusate sodium 100 milliGRAM(s) Oral three times a day  ferrous    sulfate 325 milliGRAM(s) Oral daily  furosemide   Injectable 80 milliGRAM(s) IV Push two times a day  gabapentin 300 milliGRAM(s) Oral two times a day  heparin  Injectable 5000 Unit(s) SubCutaneous every 8 hours  hydrALAZINE 50 milliGRAM(s) Oral two times a day  insulin glargine Injectable (LANTUS) 15 Unit(s) SubCutaneous every morning  insulin lispro (HumaLOG) corrective regimen sliding scale   SubCutaneous three times a day before meals  insulin lispro (HumaLOG) corrective regimen sliding scale   SubCutaneous at bedtime  labetalol 100 milliGRAM(s) Oral two times a day  latanoprost 0.005% Ophthalmic Solution 1 Drop(s) Left EYE at bedtime  metolazone 5 milliGRAM(s) Oral daily  minoxidil 10 milliGRAM(s) Oral daily  polyethylene glycol 3350 17 Gram(s) Oral daily  senna 2 Tablet(s) Oral at bedtime  sevelamer carbonate 2400 milliGRAM(s) Oral three times a day with meals    REVIEW OF SYSTEMS  --------------------------------------------------------------------------------  Gen:  No lethargy  Respiratory: No dyspnea  CV: No chest pain  GI: No abdominal pain  MSK: + LE edema  Neuro: No dizziness  Heme: No bleeding  Psych: No depression  Skin: No rashes    All other systems were reviewed and are negative, except as noted.    VITALS/PHYSICAL EXAM  --------------------------------------------------------------------------------  T(C): 36.5 (19 @ 11:10), Max: 37 (19 @ 20:52)  HR: 76 (19 @ 11:10) (73 - 80)  BP: 162/70 (19 @ 11:10) (136/66 - 170/68)  RR: 17 (19 @ 11:10) (17 - 18)  SpO2: 95% (19 @ 11:10) (94% - 97%)  Wt(kg): --    19 @ 07:01  -  19 @ 07:00  --------------------------------------------------------  IN: 1330 mL / OUT: 3580 mL / NET: -2250 mL    06-13-19 @ 07:01  -  19 @ 17:11  --------------------------------------------------------  IN: 660 mL / OUT: 1600 mL / NET: -940 mL    Physical Exam:  	Gen: NAD, well-appearing  	HEENT: Supple neck  	Pulm: few scattered crackles b/l  	CV: RRR, S1S2; no rub  	Abd: +BS, soft, nontender/nondistended  	: No suprapubic tenderness  	UE: No asterixis   	LE: 1+ pitting edema b/l  	Skin: Warm, without rashes  	Psych: Normal affect    LABS/STUDIES  --------------------------------------------------------------------------------              8.3    5.11  >-----------<  206      [19 @ 09:04]              27.2     146  |  110  |  98  ----------------------------<  93      [19 @ 05:30]  4.7   |  17  |  5.77        Ca     9.1     [19 @ 05:30]      Mg     3.5     [19 @ 05:30]      Phos  6.2     [19 @ 05:30]    Creatinine Trend:  SCr 5.77 [ 05:30]  SCr 6.02 [ 05:38]  SCr 5.85 [ 06:38]  SCr 5.90 [ 05:36]    Iron 51, TIBC 273, %sat 19      [19 @ 10:11]  Ferritin 635      [19 @ 10:08]  PTH -- (Ca 8.5)      [19 @ 10:08]   184  HbA1c 6.2      [19 @ 09:15]

## 2019-06-13 NOTE — CONSULT NOTE ADULT - PROBLEM SELECTOR RECOMMENDATION 9
Pt. with advanced CKD V likely in the setting of long-standing history of DM and HTN. Per pt. with baseline Scr of 3.7-4.1 before arrival to the hospital. Scr on admission (6/11/19) was elevated to 5.90, and remains stable at 5.77 on 6/13/19. Pt. without uremic symptoms and stable electrolytes. Pt. interested in creation of AVF for future HD treatments.    - Recommend consulting vascular surgical team for establishing care with patient, vein mapping, and possible AVF creation as inpatient.  - Pt. remains with significant LE edema, recommend continuation of IV diuretics.  - Obtain UA.  - Monitor labs and UOP.  - Avoid nephrotoxins

## 2019-06-13 NOTE — CONSULT NOTE ADULT - PROBLEM SELECTOR RECOMMENDATION 3
BP above acceptable range. Uptitrate medications as needed. Continue diuresis. Monitor BP on current BP medications. Low salt diet advised

## 2019-06-13 NOTE — PROGRESS NOTE ADULT - SUBJECTIVE AND OBJECTIVE BOX
No events overnight. Notes SOB and EDWIN is improving.     MEDICATIONS:  amLODIPine   Tablet 10 milliGRAM(s) Oral daily  aspirin enteric coated 81 milliGRAM(s) Oral daily  furosemide   Injectable 80 milliGRAM(s) IV Push two times a day  heparin  Injectable 5000 Unit(s) SubCutaneous every 8 hours  hydrALAZINE 50 milliGRAM(s) Oral two times a day  labetalol 100 milliGRAM(s) Oral two times a day  metolazone 5 milliGRAM(s) Oral daily  minoxidil 10 milliGRAM(s) Oral daily  gabapentin 300 milliGRAM(s) Oral two times a day    docusate sodium 100 milliGRAM(s) Oral three times a day  senna 2 Tablet(s) Oral at bedtime    atorvastatin 20 milliGRAM(s) Oral at bedtime  dextrose 40% Gel 15 Gram(s) Oral once PRN  dextrose 50% Injectable 12.5 Gram(s) IV Push once  dextrose 50% Injectable 25 Gram(s) IV Push once  dextrose 50% Injectable 25 Gram(s) IV Push once  glucagon  Injectable 1 milliGRAM(s) IntraMuscular once PRN  insulin glargine Injectable (LANTUS) 15 Unit(s) SubCutaneous every morning  insulin lispro (HumaLOG) corrective regimen sliding scale   SubCutaneous three times a day before meals  insulin lispro (HumaLOG) corrective regimen sliding scale   SubCutaneous at bedtime    dextrose 5%. 1000 milliLiter(s) IV Continuous <Continuous>  ferrous    sulfate 325 milliGRAM(s) Oral daily  latanoprost 0.005% Ophthalmic Solution 1 Drop(s) Left EYE at bedtime      REVIEW OF SYSTEMS:    CONSTITUTIONAL: No weakness, fevers or chills  EYES/ENT: No visual changes;  No dysphagia  RESPIRATORY: No cough, wheezing, hemoptysis; +shortness of breath  CARDIOVASCULAR: No chest pain or palpitations; +lower extremity edema  GASTROINTESTINAL: No abdominal or epigastric pain. No nausea, vomiting, or hematemesis  GENITOURINARY: No dysuria, frequency or hematuria  NEUROLOGICAL: No numbness or weakness  SKIN: No itching, burning, rashes, or lesions   HEME: No bleeding or bruising  MSK: No joint pains or muscle pains  All other review of systems is negative unless indicated above.    PHYSICAL EXAM:  T(C): 36.5 (06-13-19 @ 04:25), Max: 37 (06-12-19 @ 20:52)  HR: 75 (06-13-19 @ 04:25) (73 - 83)  BP: 156/69 (06-13-19 @ 04:25) (136/66 - 183/84)  RR: 18 (06-13-19 @ 04:25) (18 - 18)  SpO2: 97% (06-13-19 @ 04:25) (94% - 97%)  Wt(kg): --  I&O's Summary    12 Jun 2019 07:01  -  13 Jun 2019 07:00  --------------------------------------------------------  IN: 1330 mL / OUT: 3580 mL / NET: -2250 mL        Appearance: Normal	  HEENT:   Normal oral mucosa  Cardiovascular: Normal S1 S2, No JVD, No murmurs, 1+edema  Respiratory: Lungs clear to auscultation	  Psychiatry: A & O x 3, Mood & affect appropriate  Gastrointestinal:  Soft, Non-tender, + BS	  Skin: No rashes, No ecchymoses, No cyanosis	  Neurologic: Non-focal  Extremities: Normal range of motion, No clubbing, cyanosis        LABS:	 	    CBC Full  -  ( 12 Jun 2019 09:15 )  WBC Count : 5.75 K/uL  Hemoglobin : 7.9 g/dL  Hematocrit : 25.9 %  Platelet Count - Automated : 201 K/uL  Mean Cell Volume : 99.6 fl  Mean Cell Hemoglobin : 30.4 pg  Mean Cell Hemoglobin Concentration : 30.5 gm/dL  Auto Neutrophil # : x  Auto Lymphocyte # : x  Auto Monocyte # : x  Auto Eosinophil # : x  Auto Basophil # : x  Auto Neutrophil % : x  Auto Lymphocyte % : x  Auto Monocyte % : x  Auto Eosinophil % : x  Auto Basophil % : x    06-13    146<H>  |  110<H>  |  98<H>  ----------------------------<  93  4.7   |  17<L>  |  5.77<H>  06-12    142  |  109<H>  |  99<H>  ----------------------------<  129<H>  4.6   |  16<L>  |  6.02<H>    Ca    9.1      13 Jun 2019 05:30  Ca    8.9      12 Jun 2019 05:38  Phos  6.2     06-13  Phos  6.4     06-12  Mg     3.5     06-13  Mg     3.4     06-12        proBNP: Serum Pro-Brain Natriuretic Peptide: 5239 pg/mL (06-11-19 @ 05:36)  	  ASSESSMENT/PLAN: 	  57 y/o M w/ HTN, HLD, IDDM, CKD, hx of gastric sleeve presented w/ SOB and CP 2/2 HF.    Acute decompensated heart failure  - improving, EDWIN still present  - c/w IV lasix 80mg for today; consider switching to home torsemide 40mg BID PO tomorrow  - TTE pending  - strict I/Os, daily weights, 1L fluid restriction    Darlyn Farfan MD  Cardiology Fellow, M-F 7:30A-5P  [phone]    All Cardiology service information can be found on [email or web address], password: [email or web address]. No events overnight. Notes SOB and EDWIN is improving.     MEDICATIONS:  amLODIPine   Tablet 10 milliGRAM(s) Oral daily  aspirin enteric coated 81 milliGRAM(s) Oral daily  furosemide   Injectable 80 milliGRAM(s) IV Push two times a day  heparin  Injectable 5000 Unit(s) SubCutaneous every 8 hours  hydrALAZINE 50 milliGRAM(s) Oral two times a day  labetalol 100 milliGRAM(s) Oral two times a day  metolazone 5 milliGRAM(s) Oral daily  minoxidil 10 milliGRAM(s) Oral daily  gabapentin 300 milliGRAM(s) Oral two times a day    docusate sodium 100 milliGRAM(s) Oral three times a day  senna 2 Tablet(s) Oral at bedtime    atorvastatin 20 milliGRAM(s) Oral at bedtime  dextrose 40% Gel 15 Gram(s) Oral once PRN  dextrose 50% Injectable 12.5 Gram(s) IV Push once  dextrose 50% Injectable 25 Gram(s) IV Push once  dextrose 50% Injectable 25 Gram(s) IV Push once  glucagon  Injectable 1 milliGRAM(s) IntraMuscular once PRN  insulin glargine Injectable (LANTUS) 15 Unit(s) SubCutaneous every morning  insulin lispro (HumaLOG) corrective regimen sliding scale   SubCutaneous three times a day before meals  insulin lispro (HumaLOG) corrective regimen sliding scale   SubCutaneous at bedtime    dextrose 5%. 1000 milliLiter(s) IV Continuous <Continuous>  ferrous    sulfate 325 milliGRAM(s) Oral daily  latanoprost 0.005% Ophthalmic Solution 1 Drop(s) Left EYE at bedtime      REVIEW OF SYSTEMS:    CONSTITUTIONAL: No weakness, fevers or chills  EYES/ENT: No visual changes;  No dysphagia  RESPIRATORY: No cough, wheezing, hemoptysis; +shortness of breath  CARDIOVASCULAR: No chest pain or palpitations; +lower extremity edema  GASTROINTESTINAL: No abdominal or epigastric pain. No nausea, vomiting, or hematemesis  GENITOURINARY: No dysuria, frequency or hematuria  NEUROLOGICAL: No numbness or weakness  SKIN: No itching, burning, rashes, or lesions   HEME: No bleeding or bruising  MSK: No joint pains or muscle pains  All other review of systems is negative unless indicated above.    PHYSICAL EXAM:  T(C): 36.5 (06-13-19 @ 04:25), Max: 37 (06-12-19 @ 20:52)  HR: 75 (06-13-19 @ 04:25) (73 - 83)  BP: 156/69 (06-13-19 @ 04:25) (136/66 - 183/84)  RR: 18 (06-13-19 @ 04:25) (18 - 18)  SpO2: 97% (06-13-19 @ 04:25) (94% - 97%)  Wt(kg): --  I&O's Summary    12 Jun 2019 07:01  -  13 Jun 2019 07:00  --------------------------------------------------------  IN: 1330 mL / OUT: 3580 mL / NET: -2250 mL        Appearance: Normal	  HEENT:   Normal oral mucosa  Cardiovascular: Normal S1 S2, No JVD, No murmurs, 1+edema  Respiratory: Lungs clear to auscultation	  Psychiatry: A & O x 3, Mood & affect appropriate  Gastrointestinal:  Soft, Non-tender, + BS	  Skin: No rashes, No ecchymoses, No cyanosis	  Neurologic: Non-focal  Extremities: Normal range of motion, No clubbing, cyanosis        LABS:	 	    CBC Full  -  ( 12 Jun 2019 09:15 )  WBC Count : 5.75 K/uL  Hemoglobin : 7.9 g/dL  Hematocrit : 25.9 %  Platelet Count - Automated : 201 K/uL  Mean Cell Volume : 99.6 fl  Mean Cell Hemoglobin : 30.4 pg  Mean Cell Hemoglobin Concentration : 30.5 gm/dL  Auto Neutrophil # : x  Auto Lymphocyte # : x  Auto Monocyte # : x  Auto Eosinophil # : x  Auto Basophil # : x  Auto Neutrophil % : x  Auto Lymphocyte % : x  Auto Monocyte % : x  Auto Eosinophil % : x  Auto Basophil % : x    06-13    146<H>  |  110<H>  |  98<H>  ----------------------------<  93  4.7   |  17<L>  |  5.77<H>  06-12    142  |  109<H>  |  99<H>  ----------------------------<  129<H>  4.6   |  16<L>  |  6.02<H>    Ca    9.1      13 Jun 2019 05:30  Ca    8.9      12 Jun 2019 05:38  Phos  6.2     06-13  Phos  6.4     06-12  Mg     3.5     06-13  Mg     3.4     06-12        proBNP: Serum Pro-Brain Natriuretic Peptide: 5239 pg/mL (06-11-19 @ 05:36)  	  ASSESSMENT/PLAN: 	  57 y/o M w/ HTN, HLD, IDDM, CKD, hx of gastric sleeve presented w/ SOB and CP 2/2 HF.    Acute decompensated heart failure  - improving, EDWIN still present  - c/w IV lasix 80mg BID + metolazone, diuresing well  - TTE pending  - strict I/Os, daily weights, 1L fluid restriction    Darlyn Farfan MD  Cardiology Fellow, M-F 7:30A-5P  [phone]    All Cardiology service information can be found on [email or web address], password: [email or web address].

## 2019-06-13 NOTE — PROGRESS NOTE ADULT - SUBJECTIVE AND OBJECTIVE BOX
CHIEF COMPLAINT:    Interval Events: No acute event overnight. Patient is seen and examined at the bedside this morning. No HA, n/v, f/c, cough, CP/SOB, abdominal pain, dysuria, diarrhea.    OBJECTIVE:  Vital Signs Last 24 Hrs  T(C): 36.5 (13 Jun 2019 04:25), Max: 37 (12 Jun 2019 20:52)  T(F): 97.7 (13 Jun 2019 04:25), Max: 98.6 (12 Jun 2019 20:52)  HR: 75 (13 Jun 2019 04:25) (73 - 83)  BP: 156/69 (13 Jun 2019 04:25) (136/66 - 183/84)  BP(mean): --  RR: 18 (13 Jun 2019 04:25) (18 - 18)  SpO2: 97% (13 Jun 2019 04:25) (94% - 97%)    06-12 @ 07:01  -  06-13 @ 07:00  --------------------------------------------------------  IN: 1330 mL / OUT: 3580 mL / NET: -2250 mL    06-13 @ 07:01  -  06-13 @ 08:09  --------------------------------------------------------  IN: 0 mL / OUT: 350 mL / NET: -350 mL      CAPILLARY BLOOD GLUCOSE      POCT Blood Glucose.: 94 mg/dL (13 Jun 2019 08:01)      PHYSICAL EXAM:  Gen: NAD  Head: NCAT  HEENT: PERRL, MMM, normal conjunctiva, anicteric, neck supple  Lung: Clear to auscultation and percussion b/l  CV: RRR, no murmurs  Abd: soft, NTND, no rebound or guarding, ecchymotic patch in LLQ consistent with insulin injection site  MSK: 2+ pitting edema b/l, no visible deformities  Neuro: Moving all extremities equally  Skin: Warm and dry, no evidence of rash  Psych: normal mood and affect    LINES:    HOSPITAL MEDICATIONS:  aspirin enteric coated 81 milliGRAM(s) Oral daily  heparin  Injectable 5000 Unit(s) SubCutaneous every 8 hours      amLODIPine   Tablet 10 milliGRAM(s) Oral daily  furosemide   Injectable 80 milliGRAM(s) IV Push two times a day  hydrALAZINE 50 milliGRAM(s) Oral two times a day  labetalol 100 milliGRAM(s) Oral two times a day  metolazone 5 milliGRAM(s) Oral daily  minoxidil 10 milliGRAM(s) Oral daily    atorvastatin 20 milliGRAM(s) Oral at bedtime  dextrose 40% Gel 15 Gram(s) Oral once PRN  dextrose 50% Injectable 12.5 Gram(s) IV Push once  dextrose 50% Injectable 25 Gram(s) IV Push once  dextrose 50% Injectable 25 Gram(s) IV Push once  glucagon  Injectable 1 milliGRAM(s) IntraMuscular once PRN  insulin glargine Injectable (LANTUS) 15 Unit(s) SubCutaneous every morning  insulin lispro (HumaLOG) corrective regimen sliding scale   SubCutaneous three times a day before meals  insulin lispro (HumaLOG) corrective regimen sliding scale   SubCutaneous at bedtime      gabapentin 300 milliGRAM(s) Oral two times a day    docusate sodium 100 milliGRAM(s) Oral three times a day  senna 2 Tablet(s) Oral at bedtime        dextrose 5%. 1000 milliLiter(s) IV Continuous <Continuous>  ferrous    sulfate 325 milliGRAM(s) Oral daily      latanoprost 0.005% Ophthalmic Solution 1 Drop(s) Left EYE at bedtime    sevelamer carbonate 2400 milliGRAM(s) Oral three times a day with meals      LABS:                        7.9    5.75  )-----------( 201      ( 12 Jun 2019 09:15 )             25.9     Hgb Trend: 7.9<--, 8.7<--  06-13    146<H>  |  110<H>  |  98<H>  ----------------------------<  93  4.7   |  17<L>  |  5.77<H>    Ca    9.1      13 Jun 2019 05:30  Phos  6.2     06-13  Mg     3.5     06-13      Creatinine Trend: 5.77<--, 6.02<--, 5.85<--, 5.90<--            MICROBIOLOGY:     RADIOLOGY:  [ ] Reviewed and interpreted by me    EKG: CHIEF COMPLAINT:    Interval Events: No acute event overnight. Patient is seen and examined at the bedside this morning. No HA, n/v, f/c, cough, CP/SOB, abdominal pain, dysuria, diarrhea.    OBJECTIVE:  Vital Signs Last 24 Hrs  T(C): 36.5 (13 Jun 2019 04:25), Max: 37 (12 Jun 2019 20:52)  T(F): 97.7 (13 Jun 2019 04:25), Max: 98.6 (12 Jun 2019 20:52)  HR: 75 (13 Jun 2019 04:25) (73 - 83)  BP: 156/69 (13 Jun 2019 04:25) (136/66 - 183/84)  BP(mean): --  RR: 18 (13 Jun 2019 04:25) (18 - 18)  SpO2: 97% (13 Jun 2019 04:25) (94% - 97%)    06-12 @ 07:01  -  06-13 @ 07:00  --------------------------------------------------------  IN: 1330 mL / OUT: 3580 mL / NET: -2250 mL    06-13 @ 07:01  -  06-13 @ 08:09  --------------------------------------------------------  IN: 0 mL / OUT: 350 mL / NET: -350 mL      CAPILLARY BLOOD GLUCOSE      POCT Blood Glucose.: 94 mg/dL (13 Jun 2019 08:01)      PHYSICAL EXAM:  Gen: NAD  Head: NCAT  HEENT: MMM, normal conjunctiva, anicteric, neck supple  Lung: Clear to auscultation and percussion b/l  CV: RRR, no murmurs  Abd: soft, NTND, no rebound or guarding, ecchymotic patch in LLQ consistent with insulin injection site  MSK: 2+ pitting edema b/l, no visible deformities  Neuro: Moving all extremities equally  Skin: Warm and dry, no evidence of rash  Psych: normal mood and affect    LINES:    HOSPITAL MEDICATIONS:  aspirin enteric coated 81 milliGRAM(s) Oral daily  heparin  Injectable 5000 Unit(s) SubCutaneous every 8 hours      amLODIPine   Tablet 10 milliGRAM(s) Oral daily  furosemide   Injectable 80 milliGRAM(s) IV Push two times a day  hydrALAZINE 50 milliGRAM(s) Oral two times a day  labetalol 100 milliGRAM(s) Oral two times a day  metolazone 5 milliGRAM(s) Oral daily  minoxidil 10 milliGRAM(s) Oral daily    atorvastatin 20 milliGRAM(s) Oral at bedtime  dextrose 40% Gel 15 Gram(s) Oral once PRN  dextrose 50% Injectable 12.5 Gram(s) IV Push once  dextrose 50% Injectable 25 Gram(s) IV Push once  dextrose 50% Injectable 25 Gram(s) IV Push once  glucagon  Injectable 1 milliGRAM(s) IntraMuscular once PRN  insulin glargine Injectable (LANTUS) 15 Unit(s) SubCutaneous every morning  insulin lispro (HumaLOG) corrective regimen sliding scale   SubCutaneous three times a day before meals  insulin lispro (HumaLOG) corrective regimen sliding scale   SubCutaneous at bedtime      gabapentin 300 milliGRAM(s) Oral two times a day    docusate sodium 100 milliGRAM(s) Oral three times a day  senna 2 Tablet(s) Oral at bedtime        dextrose 5%. 1000 milliLiter(s) IV Continuous <Continuous>  ferrous    sulfate 325 milliGRAM(s) Oral daily      latanoprost 0.005% Ophthalmic Solution 1 Drop(s) Left EYE at bedtime    sevelamer carbonate 2400 milliGRAM(s) Oral three times a day with meals      LABS:                        7.9    5.75  )-----------( 201      ( 12 Jun 2019 09:15 )             25.9     Hgb Trend: 7.9<--, 8.7<--  06-13    146<H>  |  110<H>  |  98<H>  ----------------------------<  93  4.7   |  17<L>  |  5.77<H>    Ca    9.1      13 Jun 2019 05:30  Phos  6.2     06-13  Mg     3.5     06-13      Creatinine Trend: 5.77<--, 6.02<--, 5.85<--, 5.90<--            MICROBIOLOGY:     RADIOLOGY:  [ ] Reviewed and interpreted by me    EKG:

## 2019-06-13 NOTE — CONSULT NOTE ADULT - PROBLEM SELECTOR RECOMMENDATION 2
Pt. with metabolic acidosis in the setting of advanced CKD. Serum CO2 noted to be low at 17 today (6/13/19).    - Recommend starting sodium bicarbonate 650 mg PO TID.  - Monitor serum CO2

## 2019-06-13 NOTE — PROGRESS NOTE ADULT - PROBLEM SELECTOR PLAN 3
- Hb of 7.9, likely 2/2 CKD  - Iron studies consistent with AOCD - Hb of 7.9, likely 2/2 CKD  - Iron studies consistent with AOCD  epogen as per renal

## 2019-06-14 DIAGNOSIS — I10 ESSENTIAL (PRIMARY) HYPERTENSION: ICD-10-CM

## 2019-06-14 LAB
ANION GAP SERPL CALC-SCNC: 15 MMOL/L — SIGNIFICANT CHANGE UP (ref 5–17)
APPEARANCE UR: CLEAR — SIGNIFICANT CHANGE UP
BILIRUB UR-MCNC: NEGATIVE — SIGNIFICANT CHANGE UP
BUN SERPL-MCNC: 100 MG/DL — HIGH (ref 7–23)
CALCIUM SERPL-MCNC: 8.7 MG/DL — SIGNIFICANT CHANGE UP (ref 8.4–10.5)
CHLORIDE SERPL-SCNC: 110 MMOL/L — HIGH (ref 96–108)
CO2 SERPL-SCNC: 20 MMOL/L — LOW (ref 22–31)
COLOR SPEC: COLORLESS — SIGNIFICANT CHANGE UP
CREAT SERPL-MCNC: 5.82 MG/DL — HIGH (ref 0.5–1.3)
DIFF PNL FLD: SIGNIFICANT CHANGE UP
GLUCOSE BLDC GLUCOMTR-MCNC: 103 MG/DL — HIGH (ref 70–99)
GLUCOSE BLDC GLUCOMTR-MCNC: 121 MG/DL — HIGH (ref 70–99)
GLUCOSE BLDC GLUCOMTR-MCNC: 151 MG/DL — HIGH (ref 70–99)
GLUCOSE BLDC GLUCOMTR-MCNC: 160 MG/DL — HIGH (ref 70–99)
GLUCOSE BLDC GLUCOMTR-MCNC: 186 MG/DL — HIGH (ref 70–99)
GLUCOSE SERPL-MCNC: 107 MG/DL — HIGH (ref 70–99)
GLUCOSE UR QL: ABNORMAL
HCT VFR BLD CALC: 24.4 % — LOW (ref 39–50)
HGB BLD-MCNC: 7.4 G/DL — LOW (ref 13–17)
KETONES UR-MCNC: NEGATIVE — SIGNIFICANT CHANGE UP
LEUKOCYTE ESTERASE UR-ACNC: NEGATIVE — SIGNIFICANT CHANGE UP
MAGNESIUM SERPL-MCNC: 3.2 MG/DL — HIGH (ref 1.6–2.6)
MCHC RBC-ENTMCNC: 29.5 PG — SIGNIFICANT CHANGE UP (ref 27–34)
MCHC RBC-ENTMCNC: 30.3 GM/DL — LOW (ref 32–36)
MCV RBC AUTO: 97.2 FL — SIGNIFICANT CHANGE UP (ref 80–100)
NITRITE UR-MCNC: NEGATIVE — SIGNIFICANT CHANGE UP
PH UR: 6 — SIGNIFICANT CHANGE UP (ref 5–8)
PHOSPHATE SERPL-MCNC: 5.8 MG/DL — HIGH (ref 2.5–4.5)
PLATELET # BLD AUTO: 209 K/UL — SIGNIFICANT CHANGE UP (ref 150–400)
POTASSIUM SERPL-MCNC: 4.5 MMOL/L — SIGNIFICANT CHANGE UP (ref 3.5–5.3)
POTASSIUM SERPL-SCNC: 4.5 MMOL/L — SIGNIFICANT CHANGE UP (ref 3.5–5.3)
PROT UR-MCNC: ABNORMAL
RBC # BLD: 2.51 M/UL — LOW (ref 4.2–5.8)
RBC # FLD: 13.4 % — SIGNIFICANT CHANGE UP (ref 10.3–14.5)
SODIUM SERPL-SCNC: 145 MMOL/L — SIGNIFICANT CHANGE UP (ref 135–145)
SP GR SPEC: 1.01 — SIGNIFICANT CHANGE UP (ref 1.01–1.02)
UROBILINOGEN FLD QL: SIGNIFICANT CHANGE UP
WBC # BLD: 5.24 K/UL — SIGNIFICANT CHANGE UP (ref 3.8–10.5)
WBC # FLD AUTO: 5.24 K/UL — SIGNIFICANT CHANGE UP (ref 3.8–10.5)

## 2019-06-14 PROCEDURE — 99233 SBSQ HOSP IP/OBS HIGH 50: CPT | Mod: GC

## 2019-06-14 PROCEDURE — 93970 EXTREMITY STUDY: CPT | Mod: 26

## 2019-06-14 PROCEDURE — 93306 TTE W/DOPPLER COMPLETE: CPT | Mod: 26

## 2019-06-14 RX ORDER — HYDRALAZINE HCL 50 MG
50 TABLET ORAL ONCE
Refills: 0 | Status: COMPLETED | OUTPATIENT
Start: 2019-06-14 | End: 2019-06-14

## 2019-06-14 RX ORDER — HYDRALAZINE HCL 50 MG
100 TABLET ORAL
Refills: 0 | Status: DISCONTINUED | OUTPATIENT
Start: 2019-06-14 | End: 2019-06-15

## 2019-06-14 RX ADMIN — Medication 1: at 12:25

## 2019-06-14 RX ADMIN — Medication 80 MILLIGRAM(S): at 18:24

## 2019-06-14 RX ADMIN — Medication 50 MILLIGRAM(S): at 06:08

## 2019-06-14 RX ADMIN — INSULIN GLARGINE 15 UNIT(S): 100 INJECTION, SOLUTION SUBCUTANEOUS at 10:43

## 2019-06-14 RX ADMIN — HEPARIN SODIUM 5000 UNIT(S): 5000 INJECTION INTRAVENOUS; SUBCUTANEOUS at 21:44

## 2019-06-14 RX ADMIN — Medication 100 MILLIGRAM(S): at 06:08

## 2019-06-14 RX ADMIN — SEVELAMER CARBONATE 2400 MILLIGRAM(S): 2400 POWDER, FOR SUSPENSION ORAL at 18:24

## 2019-06-14 RX ADMIN — HEPARIN SODIUM 5000 UNIT(S): 5000 INJECTION INTRAVENOUS; SUBCUTANEOUS at 06:09

## 2019-06-14 RX ADMIN — Medication 650 MILLIGRAM(S): at 06:08

## 2019-06-14 RX ADMIN — Medication 650 MILLIGRAM(S): at 21:44

## 2019-06-14 RX ADMIN — AMLODIPINE BESYLATE 10 MILLIGRAM(S): 2.5 TABLET ORAL at 06:08

## 2019-06-14 RX ADMIN — Medication 81 MILLIGRAM(S): at 11:42

## 2019-06-14 RX ADMIN — Medication 50 MILLIGRAM(S): at 18:40

## 2019-06-14 RX ADMIN — Medication 80 MILLIGRAM(S): at 06:09

## 2019-06-14 RX ADMIN — GABAPENTIN 300 MILLIGRAM(S): 400 CAPSULE ORAL at 06:09

## 2019-06-14 RX ADMIN — ATORVASTATIN CALCIUM 20 MILLIGRAM(S): 80 TABLET, FILM COATED ORAL at 21:43

## 2019-06-14 RX ADMIN — Medication 100 MILLIGRAM(S): at 18:25

## 2019-06-14 RX ADMIN — GABAPENTIN 300 MILLIGRAM(S): 400 CAPSULE ORAL at 18:24

## 2019-06-14 RX ADMIN — Medication 325 MILLIGRAM(S): at 11:42

## 2019-06-14 RX ADMIN — Medication 50 MILLIGRAM(S): at 19:56

## 2019-06-14 RX ADMIN — Medication 10 MILLIGRAM(S): at 06:08

## 2019-06-14 RX ADMIN — LATANOPROST 1 DROP(S): 0.05 SOLUTION/ DROPS OPHTHALMIC; TOPICAL at 21:43

## 2019-06-14 RX ADMIN — SEVELAMER CARBONATE 2400 MILLIGRAM(S): 2400 POWDER, FOR SUSPENSION ORAL at 11:42

## 2019-06-14 NOTE — DIETITIAN INITIAL EVALUATION ADULT. - PERTINENT LABORATORY DATA
HbA1c: 6.2% (6/12) POCT glucose: 6/14: 136mg/dL, 6/13: 94-207mg/dL, 6/12: 134-245mg/dL phosphorus 5.8 (6/14)
Fall Risk

## 2019-06-14 NOTE — DIETITIAN INITIAL EVALUATION ADULT. - PROBLEM SELECTOR PLAN 3
- Likely 2/2 diabetes mellitus. CKD 5   - AS per renal, no indications for HD as of now  - PTH, Phos level in the am.

## 2019-06-14 NOTE — DIETITIAN INITIAL EVALUATION ADULT. - ENERGY NEEDS
Ht: 68 inches , Wt: 205lbs, BMI: 31.2kg/m2, IBW: 154lbs +/- 10%, %IBW: 123%  Edema: +2 left/right foot, Skin: free of pressure injuries per nursing flow sheets   Pertinent Information: This is a " 59 y/o M w/ PMH of smoking, HTN, HLD, IDDM, CKD (last Cr 5.1 on 05/30/19), renal insufficiency, hx of gastric sleeve here for worsening SOB associated with pleuritic CP, now resolved" Discharge planning

## 2019-06-14 NOTE — PROGRESS NOTE ADULT - SUBJECTIVE AND OBJECTIVE BOX
[facility] DIVISION OF KIDNEY DISEASES AND HYPERTENSION -- FOLLOW UP NOTE  --------------------------------------------------------------------------------  HPI: 57 yo M with advanced CKD V, DM (complicated by amputation, neuropathy, and retinopathy), HTN, and diastolic CHF was admitted to [facility] with SOB for 1 week, along with increased LE edema. Pt. was started on Lasix IV with improvement of symptoms. Upon admission (6/11/19), pt. had elevated Scr of 5.90, and remains stable today at 5.8. Pt. states that his baseline Scr prior to admission is ~ 3.7-4.1. Additionally states that his typical body weight is ~190 lbs. Nephrology is following for advanced CKD V.    Pt. seen and examined at bedside this AM. Since being admitted, his SOB has resolved, and his LE edema has improved. Pt. denies uremic symptoms. Denies CP, SOB, N/V/F/C.    PAST HISTORY  --------------------------------------------------------------------------------  No significant changes to PMH, PSH, FHx, SHx, unless otherwise noted    ALLERGIES & MEDICATIONS  --------------------------------------------------------------------------------  Allergies    clonidine (Other)    Intolerances    Standing Inpatient Medications  amLODIPine   Tablet 10 milliGRAM(s) Oral daily  aspirin enteric coated 81 milliGRAM(s) Oral daily  atorvastatin 20 milliGRAM(s) Oral at bedtime  dextrose 5%. 1000 milliLiter(s) IV Continuous <Continuous>  dextrose 50% Injectable 12.5 Gram(s) IV Push once  dextrose 50% Injectable 25 Gram(s) IV Push once  dextrose 50% Injectable 25 Gram(s) IV Push once  docusate sodium 100 milliGRAM(s) Oral three times a day  ferrous    sulfate 325 milliGRAM(s) Oral daily  furosemide   Injectable 80 milliGRAM(s) IV Push two times a day  gabapentin 300 milliGRAM(s) Oral two times a day  heparin  Injectable 5000 Unit(s) SubCutaneous every 8 hours  hydrALAZINE 50 milliGRAM(s) Oral two times a day  insulin glargine Injectable (LANTUS) 15 Unit(s) SubCutaneous every morning  insulin lispro (HumaLOG) corrective regimen sliding scale   SubCutaneous three times a day before meals  insulin lispro (HumaLOG) corrective regimen sliding scale   SubCutaneous at bedtime  labetalol 100 milliGRAM(s) Oral two times a day  latanoprost 0.005% Ophthalmic Solution 1 Drop(s) Left EYE at bedtime  metolazone 5 milliGRAM(s) Oral daily  minoxidil 10 milliGRAM(s) Oral daily  polyethylene glycol 3350 17 Gram(s) Oral daily  senna 2 Tablet(s) Oral at bedtime  sevelamer carbonate 2400 milliGRAM(s) Oral three times a day with meals  sodium bicarbonate 650 milliGRAM(s) Oral three times a day    REVIEW OF SYSTEMS  --------------------------------------------------------------------------------  Gen:  No lethargy  Respiratory: No dyspnea  CV: No chest pain  GI: No abdominal pain  MSK: + LE edema    All other systems were reviewed and are negative, except as noted.    VITALS/PHYSICAL EXAM  --------------------------------------------------------------------------------  T(C): 36.7 (06-14-19 @ 04:10), Max: 36.8 (06-13-19 @ 21:15)  HR: 78 (06-14-19 @ 04:10) (76 - 85)  BP: 162/64 (06-14-19 @ 06:28) (162/64 - 171/77)  RR: 18 (06-14-19 @ 04:10) (17 - 18)  SpO2: 96% (06-14-19 @ 04:10) (85% - 97%)  Wt(kg): --    06-13-19 @ 07:01  -  06-14-19 @ 07:00  --------------------------------------------------------  IN: 1070 mL / OUT: 3550 mL / NET: -2480 mL    06-14-19 @ 07:01  -  06-14-19 @ 08:24  --------------------------------------------------------  IN: 0 mL / OUT: 200 mL / NET: -200 mL    Physical Exam:  	Gen: NAD, well-appearing  	HEENT: Supple neck  	Pulm: few scattered crackles b/l  	CV: RRR, S1S2; no rub  	Abd: +BS, soft, nontender/nondistended  	: No suprapubic tenderness  	UE: No asterixis   	LE: 1+ pitting edema b/l  	Skin: Warm, without rashes  	Psych: Normal affect    LABS/STUDIES  --------------------------------------------------------------------------------              8.3    5.11  >-----------<  206      [06-13-19 @ 09:04]              27.2     145  |  110  |  100  ----------------------------<  107      [06-14-19 @ 05:47]  4.5   |  20  |  5.82        Ca     8.7     [06-14-19 @ 05:47]      Mg     3.2     [06-14-19 @ 05:47]      Phos  5.8     [06-14-19 @ 05:47]    Creatinine Trend:  SCr 5.82 [06-14 @ 05:47]  SCr 5.77 [06-13 @ 05:30]  SCr 6.02 [06-12 @ 05:38]  SCr 5.85 [06-11 @ 06:38]  SCr 5.90 [06-11 @ 05:36]

## 2019-06-14 NOTE — PROGRESS NOTE ADULT - PROBLEM SELECTOR PLAN 4
iPTH done on 6/12/19 mildly elevated at 158, acceptable range for patient with advanced CKD. Pt. however with elevated serum phosphorus.    - Recommend low phosphorus diet  - Continue with phosphate binders with meals and snacks  - Monitor serum phosphorus.

## 2019-06-14 NOTE — PROGRESS NOTE ADULT - PROBLEM SELECTOR PLAN 2
- Will cont diuresis as per card/renal  - Likely 2/2 diabetes mellitus. CKD 5   - AS per renal, no indications for HD as of now  - Phosphorous and PTH elevated  - C/w Renvela to 2400 tid - Will cont diuresis as per card/renal  - Likely 2/2 diabetes mellitus. CKD 5   - Phosphorous and PTH elevated  - C/w Renvela to 2400 tid  - F/u vascular reccs on AV Fistula

## 2019-06-14 NOTE — PROGRESS NOTE ADULT - SUBJECTIVE AND OBJECTIVE BOX
CHIEF COMPLAINT:    Interval Events: No acute event overnight. Patient is seen and examined at the bedside this morning. No HA, n/v, f/c, cough, CP/SOB, abdominal pain, dysuria, diarrhea.    OBJECTIVE:  Vital Signs Last 24 Hrs  T(C): 36.7 (2019 04:10), Max: 36.8 (2019 21:15)  T(F): 98 (2019 04:10), Max: 98.3 (2019 21:15)  HR: 78 (2019 04:10) (76 - 85)  BP: 162/64 (2019 06:28) (162/64 - 171/77)  BP(mean): --  RR: 18 (2019 04:10) (17 - 18)  SpO2: 96% (2019 04:10) (85% - 97%)    13 @ 07:01  -   @ 07:00  --------------------------------------------------------  IN: 1070 mL / OUT: 3550 mL / NET: -2480 mL     @ 07:01  -   @ 08:00  --------------------------------------------------------  IN: 0 mL / OUT: 200 mL / NET: -200 mL      CAPILLARY BLOOD GLUCOSE      POCT Blood Glucose.: 103 mg/dL (2019 07:54)      PHYSICAL EXAM:  Gen: NAD  Head: NCAT  HEENT: MMM, normal conjunctiva, anicteric, neck supple  Lung: Clear to auscultation and percussion b/l  CV: RRR, no murmurs  Abd: soft, NTND, no rebound or guarding, ecchymotic patch in LLQ consistent with insulin injection site  MSK: 2+ pitting edema b/l, no visible deformities  Neuro: Moving all extremities equally  Skin: Warm and dry, no evidence of rash  Psych: normal mood and affect      LINES:    HOSPITAL MEDICATIONS:  aspirin enteric coated 81 milliGRAM(s) Oral daily  heparin  Injectable 5000 Unit(s) SubCutaneous every 8 hours      amLODIPine   Tablet 10 milliGRAM(s) Oral daily  furosemide   Injectable 80 milliGRAM(s) IV Push two times a day  hydrALAZINE 50 milliGRAM(s) Oral two times a day  labetalol 100 milliGRAM(s) Oral two times a day  metolazone 5 milliGRAM(s) Oral daily  minoxidil 10 milliGRAM(s) Oral daily    atorvastatin 20 milliGRAM(s) Oral at bedtime  dextrose 40% Gel 15 Gram(s) Oral once PRN  dextrose 50% Injectable 12.5 Gram(s) IV Push once  dextrose 50% Injectable 25 Gram(s) IV Push once  dextrose 50% Injectable 25 Gram(s) IV Push once  glucagon  Injectable 1 milliGRAM(s) IntraMuscular once PRN  insulin glargine Injectable (LANTUS) 15 Unit(s) SubCutaneous every morning  insulin lispro (HumaLOG) corrective regimen sliding scale   SubCutaneous three times a day before meals  insulin lispro (HumaLOG) corrective regimen sliding scale   SubCutaneous at bedtime      gabapentin 300 milliGRAM(s) Oral two times a day    docusate sodium 100 milliGRAM(s) Oral three times a day  polyethylene glycol 3350 17 Gram(s) Oral daily  senna 2 Tablet(s) Oral at bedtime        dextrose 5%. 1000 milliLiter(s) IV Continuous <Continuous>  ferrous    sulfate 325 milliGRAM(s) Oral daily  sodium bicarbonate 650 milliGRAM(s) Oral three times a day      latanoprost 0.005% Ophthalmic Solution 1 Drop(s) Left EYE at bedtime    sevelamer carbonate 2400 milliGRAM(s) Oral three times a day with meals      LABS:                        8.3    5.11  )-----------( 206      ( 2019 09:04 )             27.2     Hgb Trend: 8.3<--, 7.9<--, 8.7<--  06-14    145  |  110<H>  |  100<H>  ----------------------------<  107<H>  4.5   |  20<L>  |  5.82<H>    Ca    8.7      2019 05:47  Phos  5.8     06-14  Mg     3.2     06-14      Creatinine Trend: 5.82<--, 5.77<--, 6.02<--, 5.85<--, 5.90<--    Urinalysis Basic - ( 2019 02:55 )    Color: Colorless / Appearance: Clear / S.010 / pH: x  Gluc: x / Ketone: Negative  / Bili: Negative / Urobili: <2 mg/dL   Blood: x / Protein: 100 mg/dL / Nitrite: Negative   Leuk Esterase: Negative / RBC: 3 /HPF / WBC 1 /HPF   Sq Epi: x / Non Sq Epi: 0 /HPF / Bacteria: Negative            MICROBIOLOGY:     RADIOLOGY:  [ ] Reviewed and interpreted by me    EKG: CHIEF COMPLAINT:    Interval Events: No acute event overnight. Patient is seen and examined at the bedside this morning. No HA, n/v, f/c, cough, CP/SOB, abdominal pain, dysuria, diarrhea. improved SOB and LE edema     OBJECTIVE:  Vital Signs Last 24 Hrs  T(C): 36.7 (2019 04:10), Max: 36.8 (2019 21:15)  T(F): 98 (2019 04:10), Max: 98.3 (2019 21:15)  HR: 78 (2019 04:10) (76 - 85)  BP: 162/64 (2019 06:28) (162/64 - 171/77)  BP(mean): --  RR: 18 (2019 04:10) (17 - 18)  SpO2: 96% (2019 04:10) (85% - 97%)    13 @ 07:01  -  14 @ 07:00  --------------------------------------------------------  IN: 1070 mL / OUT: 3550 mL / NET: -2480 mL     @ 07:01   @ 08:00  --------------------------------------------------------  IN: 0 mL / OUT: 200 mL / NET: -200 mL      CAPILLARY BLOOD GLUCOSE      POCT Blood Glucose.: 103 mg/dL (2019 07:54)      PHYSICAL EXAM:  Gen: NAD  Head: NCAT  HEENT: MMM, normal conjunctiva, anicteric, neck supple  Lung: Clear to auscultation and percussion b/l  CV: RRR, no murmurs  Abd: soft, NTND, no rebound or guarding, ecchymotic patch in LLQ consistent with insulin injection site  MSK: 2+ pitting edema b/l, no visible deformities  Neuro: Moving all extremities equally  Skin: Warm and dry, no evidence of rash  Psych: normal mood and affect      LINES:    HOSPITAL MEDICATIONS:  aspirin enteric coated 81 milliGRAM(s) Oral daily  heparin  Injectable 5000 Unit(s) SubCutaneous every 8 hours      amLODIPine   Tablet 10 milliGRAM(s) Oral daily  furosemide   Injectable 80 milliGRAM(s) IV Push two times a day  hydrALAZINE 50 milliGRAM(s) Oral two times a day  labetalol 100 milliGRAM(s) Oral two times a day  metolazone 5 milliGRAM(s) Oral daily  minoxidil 10 milliGRAM(s) Oral daily    atorvastatin 20 milliGRAM(s) Oral at bedtime  dextrose 40% Gel 15 Gram(s) Oral once PRN  dextrose 50% Injectable 12.5 Gram(s) IV Push once  dextrose 50% Injectable 25 Gram(s) IV Push once  dextrose 50% Injectable 25 Gram(s) IV Push once  glucagon  Injectable 1 milliGRAM(s) IntraMuscular once PRN  insulin glargine Injectable (LANTUS) 15 Unit(s) SubCutaneous every morning  insulin lispro (HumaLOG) corrective regimen sliding scale   SubCutaneous three times a day before meals  insulin lispro (HumaLOG) corrective regimen sliding scale   SubCutaneous at bedtime      gabapentin 300 milliGRAM(s) Oral two times a day    docusate sodium 100 milliGRAM(s) Oral three times a day  polyethylene glycol 3350 17 Gram(s) Oral daily  senna 2 Tablet(s) Oral at bedtime        dextrose 5%. 1000 milliLiter(s) IV Continuous <Continuous>  ferrous    sulfate 325 milliGRAM(s) Oral daily  sodium bicarbonate 650 milliGRAM(s) Oral three times a day      latanoprost 0.005% Ophthalmic Solution 1 Drop(s) Left EYE at bedtime    sevelamer carbonate 2400 milliGRAM(s) Oral three times a day with meals      LABS:                        8.3    5.11  )-----------( 206      ( 2019 09:04 )             27.2     Hgb Trend: 8.3<--, 7.9<--, 8.7<--  06-14    145  |  110<H>  |  100<H>  ----------------------------<  107<H>  4.5   |  20<L>  |  5.82<H>    Ca    8.7      2019 05:47  Phos  5.8     06-14  Mg     3.2     06-14      Creatinine Trend: 5.82<--, 5.77<--, 6.02<--, 5.85<--, 5.90<--    Urinalysis Basic - ( 2019 02:55 )    Color: Colorless / Appearance: Clear / S.010 / pH: x  Gluc: x / Ketone: Negative  / Bili: Negative / Urobili: <2 mg/dL   Blood: x / Protein: 100 mg/dL / Nitrite: Negative   Leuk Esterase: Negative / RBC: 3 /HPF / WBC 1 /HPF   Sq Epi: x / Non Sq Epi: 0 /HPF / Bacteria: Negative            MICROBIOLOGY:     RADIOLOGY:  [ ] Reviewed and interpreted by me    EKG:

## 2019-06-14 NOTE — PROGRESS NOTE ADULT - PROBLEM SELECTOR PLAN 4
- Reports taking Tresiba 21U at 6AM qd  - Will start with Lantus 15U and uptitrate as needed  - C/w ISS  - Monitor FSG increased hydralazine to 100mg BID  cont with rest of meds'  monitor and titrate further.

## 2019-06-14 NOTE — DIETITIAN INITIAL EVALUATION ADULT. - NS AS NUTRI INTERV ED CONTENT
Purpose of the nutrition education/provided diet education for heart failure nutrition therapy. discussed importance of avoiding salt and high sodium containing foods. patient made aware of need to monitor and restrict fluid intake, and importance/purpose of daily weights. Provided education on Carbohydrate Consistent diet including sources of carbohydrates, portion sizes, pairing protein with carbohydrates, limiting sugar sweetened beverages in diet and the importance of consistent eating pattern to help optimize glycemic control. Provided list of phosphorus content in foods, discussed need to  reduce intake of high phosphorous containing foods.

## 2019-06-14 NOTE — PROGRESS NOTE ADULT - PROBLEM SELECTOR PLAN 5
- DVT ppx: HSQ - Reports taking Tresiba 21U at 6AM qd  - Will start with Lantus 15U and uptitrate as needed  - C/w ISS  - Monitor FSG

## 2019-06-14 NOTE — DIETITIAN INITIAL EVALUATION ADULT. - ADHERENCE
pt noted with T2DM, has continuous glucose monitor, per chart pt take Tresiba. HbA1c: 6.2% (6/12). Reports BG well controlled usually <200mg/dL

## 2019-06-14 NOTE — DIETITIAN INITIAL EVALUATION ADULT. - DIET TYPE
DASH/TLC (sodium and cholesterol restricted diet)/no concentrated phosphorus/consistent carbohydrate (no snacks)/1500ml

## 2019-06-14 NOTE — DIETITIAN INITIAL EVALUATION ADULT. - PROBLEM SELECTOR PLAN 1
- Pt. 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 No ST-changes.   - Monitoring on telemetry- currently sinus rhythm.   - Obtain TTE  - Diuretics/Is<Os provided that there is no evidence of tamponade on TTE  - F/u Cards reccs

## 2019-06-14 NOTE — DIETITIAN INITIAL EVALUATION ADULT. - ORAL INTAKE PTA
good/Pt reports good PO intake and appetite PTA, consumes 3 meals per day consisting of a variety of foods. Pt with history of gastric sleeve, (2015), reports trying to follow a Heart-Healthy diet, avoids high fat foods, carbonated beverages, sugar sweetened leverages. Typical diet recall: Breakfast: cottage cheese or yogurt with berries, Lunch: sandwich (turkey) or salad with mixed greens, Dinner: varies usually chicken or fish. Confirms NKFA. Pt denies chewing/swallowing difficulty, nausea, vomiting, diarrhea, constipation. pt unable to recall micornutrient supplementation, per chart pt takes sevelamer.

## 2019-06-14 NOTE — PROVIDER CONTACT NOTE (MEDICATION) - SITUATION
There are 2 hydralazine orders in the [facility] for a total of 150mg. The 100mg is a new order. Want to confirm dose.

## 2019-06-14 NOTE — PROGRESS NOTE ADULT - SUBJECTIVE AND OBJECTIVE BOX
No events overnight. Denies CP, SOB, palp. EDWIN improving.    MEDICATIONS:  amLODIPine   Tablet 10 milliGRAM(s) Oral daily  aspirin enteric coated 81 milliGRAM(s) Oral daily  furosemide   Injectable 80 milliGRAM(s) IV Push two times a day  heparin  Injectable 5000 Unit(s) SubCutaneous every 8 hours  hydrALAZINE 50 milliGRAM(s) Oral two times a day  labetalol 100 milliGRAM(s) Oral two times a day  metolazone 5 milliGRAM(s) Oral daily  minoxidil 10 milliGRAM(s) Oral daily  gabapentin 300 milliGRAM(s) Oral two times a day  docusate sodium 100 milliGRAM(s) Oral three times a day  polyethylene glycol 3350 17 Gram(s) Oral daily  senna 2 Tablet(s) Oral at bedtime  atorvastatin 20 milliGRAM(s) Oral at bedtime  dextrose 40% Gel 15 Gram(s) Oral once PRN  dextrose 50% Injectable 12.5 Gram(s) IV Push once  dextrose 50% Injectable 25 Gram(s) IV Push once  dextrose 50% Injectable 25 Gram(s) IV Push once  glucagon  Injectable 1 milliGRAM(s) IntraMuscular once PRN  insulin glargine Injectable (LANTUS) 15 Unit(s) SubCutaneous every morning  insulin lispro (HumaLOG) corrective regimen sliding scale   SubCutaneous three times a day before meals  insulin lispro (HumaLOG) corrective regimen sliding scale   SubCutaneous at bedtime  dextrose 5%. 1000 milliLiter(s) IV Continuous <Continuous>  ferrous    sulfate 325 milliGRAM(s) Oral daily  latanoprost 0.005% Ophthalmic Solution 1 Drop(s) Left EYE at bedtime  sodium bicarbonate 650 milliGRAM(s) Oral three times a day      REVIEW OF SYSTEMS:    CONSTITUTIONAL: No weakness, fevers or chills  EYES/ENT: No visual changes;  No dysphagia  RESPIRATORY: No cough, wheezing, hemoptysis; No shortness of breath  CARDIOVASCULAR: No chest pain or palpitations; +lower extremity edema  GASTROINTESTINAL: No abdominal or epigastric pain. No nausea, vomiting, or hematemesis  GENITOURINARY: No dysuria, frequency or hematuria  NEUROLOGICAL: No numbness or weakness  SKIN: No itching, burning, rashes, or lesions   HEME: No bleeding or bruising  MSK: No joint pains or muscle pains  All other review of systems is negative unless indicated above.    PHYSICAL EXAM:  T(C): 36.7 (06-14-19 @ 04:10), Max: 36.8 (06-13-19 @ 21:15)  HR: 78 (06-14-19 @ 04:10) (76 - 85)  BP: 162/64 (06-14-19 @ 06:28) (162/64 - 171/77)  RR: 18 (06-14-19 @ 04:10) (17 - 18)  SpO2: 96% (06-14-19 @ 04:10) (85% - 97%)  Wt(kg): --  I&O's Summary    13 Jun 2019 07:01  -  14 Jun 2019 07:00  --------------------------------------------------------  IN: 1070 mL / OUT: 3550 mL / NET: -2480 mL    14 Jun 2019 07:01  -  14 Jun 2019 08:13  --------------------------------------------------------  IN: 0 mL / OUT: 200 mL / NET: -200 mL        Appearance: Normal	  HEENT:   Normal oral mucosa  Cardiovascular: Normal S1 S2, +JVD, No murmurs, trace EDWIN  Respiratory: Lungs clear to auscultation	  Psychiatry: A & O x 3, Mood & affect appropriate  Gastrointestinal:  Soft, Non-tender, + BS	  Skin: No rashes, No ecchymoses, No cyanosis	  Neurologic: Non-focal  Extremities: Normal range of motion, No clubbing, cyanosis        LABS:	 	    CBC Full  -  ( 13 Jun 2019 09:04 )  WBC Count : 5.11 K/uL  Hemoglobin : 8.3 g/dL  Hematocrit : 27.2 %  Platelet Count - Automated : 206 K/uL  Mean Cell Volume : 98.9 fl  Mean Cell Hemoglobin : 30.2 pg  Mean Cell Hemoglobin Concentration : 30.5 gm/dL  Auto Neutrophil # : x  Auto Lymphocyte # : x  Auto Monocyte # : x  Auto Eosinophil # : x  Auto Basophil # : x  Auto Neutrophil % : x  Auto Lymphocyte % : x  Auto Monocyte % : x  Auto Eosinophil % : x  Auto Basophil % : x    06-14    145  |  110<H>  |  100<H>  ----------------------------<  107<H>  4.5   |  20<L>  |  5.82<H>  06-13    146<H>  |  110<H>  |  98<H>  ----------------------------<  93  4.7   |  17<L>  |  5.77<H>    Ca    8.7      14 Jun 2019 05:47  Ca    9.1      13 Jun 2019 05:30  Phos  5.8     06-14  Phos  6.2     06-13  Mg     3.2     06-14  Mg     3.5     06-13    proBNP: Serum Pro-Brain Natriuretic Peptide: 5239 pg/mL (06-11-19 @ 05:36)  	  ASSESSMENT/PLAN: 	  57 y/o M w/ HTN, HLD, IDDM, CKD, hx of gastric sleeve presented w/ SOB and CP 2/2 HF.    Acute decompensated heart failure  - improving, EDWIN still present  - c/w IV lasix 80mg BID + metolazone, diuresing well  - TTE pending  - strict I/Os, daily weights, 1L fluid restriction      Darlyn Farfan MD  Cardiology Fellow, M-F 7:30A-5P  [phone]    All Cardiology service information can be found on [email or web address], password: [email or web address].

## 2019-06-14 NOTE — DIETITIAN INITIAL EVALUATION ADULT. - OTHER INFO
Patient seen for verbal nutrition consult for diet education. Pt with history of gastric sleeve (2015), reports wt prior to bariatric surgery was ~275lbs. States following surgery he initially had a large wt loss, reports lowest wt was 158lbs x ~ 4 years ago. Reports gaining some wt back with weight ~ 175lbs for the last few years  however notes ~20lb weight gain over the last year due to increased PO intake as well as fluid retention. States UBW now is ~190lbs. Dosing weight this admission noted as 205.2 lbs, wt down trending with diuresis. Additional wts include 199.8lbs (6/13), 197.3lbs (6/14). Since admission pt reports good PO intake,, completing 100% of meals. Denies any acute GI distress. Pt receptive to diet education.

## 2019-06-14 NOTE — DIETITIAN INITIAL EVALUATION ADULT. - PROBLEM SELECTOR PLAN 2
- TTE JUN 15th 2018 EF 65-70% Mild mitral regurgitation, diastolic LV dysfunction  - Patient with significant LE edema p/w sob, cxr pulm edema and cardiomegaly  - Serum pro-BNP 5.2K  - C/w torsemide and metolazone  - Strict I/Os, fluid restriction.

## 2019-06-14 NOTE — PROVIDER CONTACT NOTE (MEDICATION) - ACTION/TREATMENT ORDERED:
Per provider, the dose is correct and to proceed to give the 150mg because it is his home dose. Confirmed with pt and pt stated he takes 150mg @home. So far, only 50 mg given due to med unavailable in [facility]. Night nurse Lavern ramirez.

## 2019-06-14 NOTE — PROGRESS NOTE ADULT - PROBLEM SELECTOR PLAN 1
Pt. with advanced CKD V likely in the setting of long-standing history of DM and HTN. Per pt. with baseline Scr of 3.7-4.1 before arrival to the hospital. Scr on admission (6/11/19) was elevated to 5.90, and remains stable at 5.77 on 6/13/19. Pt. without uremic symptoms and stable electrolytes. Pt. interested in creation of AVF for future HD treatments.    - Recommend consulting vascular surgical team for establishing care with patient, vein mapping, and possible AVF creation as inpatient.  - Pt. remains with significant LE edema, recommend continuation of IV diuretics.  - UA with 100 protein. Obtain spot urine TP/CR.  - Monitor labs and UOP.  - Avoid nephrotoxins. Pt. with advanced CKD V likely in the setting of long-standing history of DM and HTN. Per pt. with baseline Scr of 3.7-4.1 before arrival to the hospital. Scr on admission (6/11/19) was elevated to 5.90, and remains stable at 5.77 on 6/13/19.     Renal function overall stable  Pt. without uremic symptoms and stable electrolytes. Pt. interested in creation of AVF for future HD treatments.    - Consulted vascular surgical team ( Dr Vallejo) for establishing care with patient, vein mapping, and possible AVF creation appointment  - Pt. remains with significant LE edema, recommend continuation of IV diuretics. Hold metalozone  - UA with 100 protein. Obtain spot urine TP/CR.  - Monitor labs and UOP.  - Avoid nephrotoxins.  -Once stable for dischrage, Can be dc home on torsemide 40 BID and metolazone 2.5 3/week

## 2019-06-14 NOTE — PROGRESS NOTE ADULT - PROBLEM SELECTOR PLAN 2
Pt. with metabolic acidosis in the setting of advanced CKD. Serum CO2 noted to be low at 17 on 6/13/19.    - Started sodium bicarbonate 650 mg PO TID, serum CO2 improved to 20  - Monitor serum CO2.

## 2019-06-15 ENCOUNTER — TRANSCRIPTION ENCOUNTER (OUTPATIENT)
Age: 59
End: 2019-06-15

## 2019-06-15 VITALS
TEMPERATURE: 98 F | SYSTOLIC BLOOD PRESSURE: 158 MMHG | RESPIRATION RATE: 18 BRPM | HEART RATE: 71 BPM | DIASTOLIC BLOOD PRESSURE: 68 MMHG | OXYGEN SATURATION: 96 %

## 2019-06-15 LAB
ANION GAP SERPL CALC-SCNC: 14 MMOL/L — SIGNIFICANT CHANGE UP (ref 5–17)
BUN SERPL-MCNC: 100 MG/DL — HIGH (ref 7–23)
CALCIUM SERPL-MCNC: 8.9 MG/DL — SIGNIFICANT CHANGE UP (ref 8.4–10.5)
CHLORIDE SERPL-SCNC: 106 MMOL/L — SIGNIFICANT CHANGE UP (ref 96–108)
CO2 SERPL-SCNC: 22 MMOL/L — SIGNIFICANT CHANGE UP (ref 22–31)
CREAT SERPL-MCNC: 5.87 MG/DL — HIGH (ref 0.5–1.3)
GLUCOSE BLDC GLUCOMTR-MCNC: 147 MG/DL — HIGH (ref 70–99)
GLUCOSE BLDC GLUCOMTR-MCNC: 89 MG/DL — SIGNIFICANT CHANGE UP (ref 70–99)
GLUCOSE SERPL-MCNC: 109 MG/DL — HIGH (ref 70–99)
HCT VFR BLD CALC: 26.1 % — LOW (ref 39–50)
HGB BLD-MCNC: 8.2 G/DL — LOW (ref 13–17)
MAGNESIUM SERPL-MCNC: 3.1 MG/DL — HIGH (ref 1.6–2.6)
MCHC RBC-ENTMCNC: 30 PG — SIGNIFICANT CHANGE UP (ref 27–34)
MCHC RBC-ENTMCNC: 31.4 GM/DL — LOW (ref 32–36)
MCV RBC AUTO: 95.6 FL — SIGNIFICANT CHANGE UP (ref 80–100)
PHOSPHATE SERPL-MCNC: 5.8 MG/DL — HIGH (ref 2.5–4.5)
PLATELET # BLD AUTO: 206 K/UL — SIGNIFICANT CHANGE UP (ref 150–400)
POTASSIUM SERPL-MCNC: 4.4 MMOL/L — SIGNIFICANT CHANGE UP (ref 3.5–5.3)
POTASSIUM SERPL-SCNC: 4.4 MMOL/L — SIGNIFICANT CHANGE UP (ref 3.5–5.3)
RBC # BLD: 2.73 M/UL — LOW (ref 4.2–5.8)
RBC # FLD: 13.5 % — SIGNIFICANT CHANGE UP (ref 10.3–14.5)
SODIUM SERPL-SCNC: 142 MMOL/L — SIGNIFICANT CHANGE UP (ref 135–145)
WBC # BLD: 5.27 K/UL — SIGNIFICANT CHANGE UP (ref 3.8–10.5)
WBC # FLD AUTO: 5.27 K/UL — SIGNIFICANT CHANGE UP (ref 3.8–10.5)

## 2019-06-15 PROCEDURE — 82947 ASSAY GLUCOSE BLOOD QUANT: CPT

## 2019-06-15 PROCEDURE — 83880 ASSAY OF NATRIURETIC PEPTIDE: CPT

## 2019-06-15 PROCEDURE — 82803 BLOOD GASES ANY COMBINATION: CPT

## 2019-06-15 PROCEDURE — 93005 ELECTROCARDIOGRAM TRACING: CPT

## 2019-06-15 PROCEDURE — 82962 GLUCOSE BLOOD TEST: CPT

## 2019-06-15 PROCEDURE — 99285 EMERGENCY DEPT VISIT HI MDM: CPT | Mod: 25

## 2019-06-15 PROCEDURE — 81001 URINALYSIS AUTO W/SCOPE: CPT

## 2019-06-15 PROCEDURE — 82728 ASSAY OF FERRITIN: CPT

## 2019-06-15 PROCEDURE — 83540 ASSAY OF IRON: CPT

## 2019-06-15 PROCEDURE — 80053 COMPREHEN METABOLIC PANEL: CPT

## 2019-06-15 PROCEDURE — 84295 ASSAY OF SERUM SODIUM: CPT

## 2019-06-15 PROCEDURE — 83550 IRON BINDING TEST: CPT

## 2019-06-15 PROCEDURE — 80048 BASIC METABOLIC PNL TOTAL CA: CPT

## 2019-06-15 PROCEDURE — 85014 HEMATOCRIT: CPT

## 2019-06-15 PROCEDURE — 83970 ASSAY OF PARATHORMONE: CPT

## 2019-06-15 PROCEDURE — 84100 ASSAY OF PHOSPHORUS: CPT

## 2019-06-15 PROCEDURE — 82435 ASSAY OF BLOOD CHLORIDE: CPT

## 2019-06-15 PROCEDURE — 93970 EXTREMITY STUDY: CPT

## 2019-06-15 PROCEDURE — 96374 THER/PROPH/DIAG INJ IV PUSH: CPT

## 2019-06-15 PROCEDURE — 85730 THROMBOPLASTIN TIME PARTIAL: CPT

## 2019-06-15 PROCEDURE — 84484 ASSAY OF TROPONIN QUANT: CPT

## 2019-06-15 PROCEDURE — 85027 COMPLETE CBC AUTOMATED: CPT

## 2019-06-15 PROCEDURE — 84132 ASSAY OF SERUM POTASSIUM: CPT

## 2019-06-15 PROCEDURE — 93306 TTE W/DOPPLER COMPLETE: CPT

## 2019-06-15 PROCEDURE — 83735 ASSAY OF MAGNESIUM: CPT

## 2019-06-15 PROCEDURE — 83036 HEMOGLOBIN GLYCOSYLATED A1C: CPT

## 2019-06-15 PROCEDURE — 82310 ASSAY OF CALCIUM: CPT

## 2019-06-15 PROCEDURE — 86803 HEPATITIS C AB TEST: CPT

## 2019-06-15 PROCEDURE — 85610 PROTHROMBIN TIME: CPT

## 2019-06-15 PROCEDURE — 82330 ASSAY OF CALCIUM: CPT

## 2019-06-15 PROCEDURE — 71045 X-RAY EXAM CHEST 1 VIEW: CPT

## 2019-06-15 PROCEDURE — 83605 ASSAY OF LACTIC ACID: CPT

## 2019-06-15 PROCEDURE — 99239 HOSP IP/OBS DSCHRG MGMT >30: CPT | Mod: GC

## 2019-06-15 RX ORDER — SODIUM BICARBONATE 1 MEQ/ML
1 SYRINGE (ML) INTRAVENOUS
Qty: 90 | Refills: 0
Start: 2019-06-15 | End: 2019-07-14

## 2019-06-15 RX ORDER — HYDRALAZINE HCL 50 MG
1 TABLET ORAL
Qty: 0 | Refills: 0 | DISCHARGE

## 2019-06-15 RX ORDER — POLYETHYLENE GLYCOL 3350 17 G/17G
17 POWDER, FOR SOLUTION ORAL
Qty: 0 | Refills: 0 | DISCHARGE
Start: 2019-06-15

## 2019-06-15 RX ORDER — SEVELAMER CARBONATE 2400 MG/1
2 POWDER, FOR SUSPENSION ORAL
Qty: 0 | Refills: 0 | DISCHARGE
Start: 2019-06-15 | End: 2019-07-14

## 2019-06-15 RX ORDER — SEVELAMER CARBONATE 2400 MG/1
2 POWDER, FOR SUSPENSION ORAL
Qty: 0 | Refills: 0 | DISCHARGE

## 2019-06-15 RX ORDER — SENNA PLUS 8.6 MG/1
2 TABLET ORAL
Qty: 0 | Refills: 0 | DISCHARGE
Start: 2019-06-15

## 2019-06-15 RX ORDER — SODIUM BICARBONATE 1 MEQ/ML
1 SYRINGE (ML) INTRAVENOUS
Qty: 0 | Refills: 0 | DISCHARGE
Start: 2019-06-15

## 2019-06-15 RX ORDER — SENNA PLUS 8.6 MG/1
2 TABLET ORAL
Qty: 60 | Refills: 0
Start: 2019-06-15 | End: 2019-07-14

## 2019-06-15 RX ORDER — DOCUSATE SODIUM 100 MG
1 CAPSULE ORAL
Qty: 90 | Refills: 0
Start: 2019-06-15 | End: 2019-07-14

## 2019-06-15 RX ORDER — FERROUS SULFATE 325(65) MG
1 TABLET ORAL
Qty: 90 | Refills: 0
Start: 2019-06-15 | End: 2019-07-14

## 2019-06-15 RX ORDER — SEVELAMER CARBONATE 2400 MG/1
3 POWDER, FOR SUSPENSION ORAL
Qty: 270 | Refills: 0
Start: 2019-06-15 | End: 2019-07-14

## 2019-06-15 RX ORDER — DOCUSATE SODIUM 100 MG
1 CAPSULE ORAL
Qty: 0 | Refills: 0 | DISCHARGE
Start: 2019-06-15

## 2019-06-15 RX ADMIN — HEPARIN SODIUM 5000 UNIT(S): 5000 INJECTION INTRAVENOUS; SUBCUTANEOUS at 06:36

## 2019-06-15 RX ADMIN — Medication 325 MILLIGRAM(S): at 11:53

## 2019-06-15 RX ADMIN — AMLODIPINE BESYLATE 10 MILLIGRAM(S): 2.5 TABLET ORAL at 06:35

## 2019-06-15 RX ADMIN — Medication 81 MILLIGRAM(S): at 11:52

## 2019-06-15 RX ADMIN — Medication 10 MILLIGRAM(S): at 06:34

## 2019-06-15 RX ADMIN — Medication 650 MILLIGRAM(S): at 06:35

## 2019-06-15 RX ADMIN — SEVELAMER CARBONATE 2400 MILLIGRAM(S): 2400 POWDER, FOR SUSPENSION ORAL at 11:53

## 2019-06-15 RX ADMIN — Medication 100 MILLIGRAM(S): at 06:35

## 2019-06-15 RX ADMIN — GABAPENTIN 300 MILLIGRAM(S): 400 CAPSULE ORAL at 06:35

## 2019-06-15 RX ADMIN — Medication 100 MILLIGRAM(S): at 06:34

## 2019-06-15 RX ADMIN — SEVELAMER CARBONATE 2400 MILLIGRAM(S): 2400 POWDER, FOR SUSPENSION ORAL at 08:15

## 2019-06-15 RX ADMIN — INSULIN GLARGINE 15 UNIT(S): 100 INJECTION, SOLUTION SUBCUTANEOUS at 08:14

## 2019-06-15 RX ADMIN — Medication 80 MILLIGRAM(S): at 06:35

## 2019-06-15 NOTE — PROGRESS NOTE ADULT - PROBLEM SELECTOR PROBLEM 3
Anemia, unspecified type
HTN (hypertension)

## 2019-06-15 NOTE — PROGRESS NOTE ADULT - SUBJECTIVE AND OBJECTIVE BOX
CHIEF COMPLAINT:    Interval Events: No acute event overnight. Patient is seen and examined at the bedside this morning. Reports improved SOB and LE edema. No HA, n/v, f/c, cough, CP/SOB, abdominal pain, dysuria, diarrhea.    OBJECTIVE:  Vital Signs Last 24 Hrs  T(C): 36.7 (15 Lance 2019 03:54), Max: 36.8 (2019 11:37)  T(F): 98 (15 Lance 2019 03:54), Max: 98.2 (2019 11:37)  HR: 75 (15 Lance 2019 03:54) (74 - 77)  BP: 175/76 (15 Lance 2019 03:54) (162/72 - 176/83)  BP(mean): --  RR: 18 (15 Lance 2019 03:54) (17 - 18)  SpO2: 96% (15 Lnace 2019 03:54) (95% - 97%)    06-14 @ 07:01  -  06-15 @ 07:00  --------------------------------------------------------  IN: 540 mL / OUT: 3050 mL / NET: -2510 mL      CAPILLARY BLOOD GLUCOSE      POCT Blood Glucose.: 89 mg/dL (15 Lance 2019 08:02)    PHYSICAL EXAM:  Gen: NAD  Head: NCAT  HEENT: MMM, normal conjunctiva, anicteric, neck supple  Lung: Clear to auscultation and percussion b/l  CV: RRR, no murmurs  Abd: soft, NTND, no rebound or guarding, ecchymotic patch in LLQ consistent with insulin injection site  MSK: 1+ pitting edema b/l, no visible deformities  Neuro: Moving all extremities equally  Skin: Warm and dry, no evidence of rash  Psych: normal mood and affect    LINES:    HOSPITAL MEDICATIONS:  aspirin enteric coated 81 milliGRAM(s) Oral daily  heparin  Injectable 5000 Unit(s) SubCutaneous every 8 hours      amLODIPine   Tablet 10 milliGRAM(s) Oral daily  furosemide   Injectable 80 milliGRAM(s) IV Push two times a day  hydrALAZINE 100 milliGRAM(s) Oral two times a day  labetalol 100 milliGRAM(s) Oral two times a day  metolazone 5 milliGRAM(s) Oral daily  minoxidil 10 milliGRAM(s) Oral daily    atorvastatin 20 milliGRAM(s) Oral at bedtime  dextrose 40% Gel 15 Gram(s) Oral once PRN  dextrose 50% Injectable 12.5 Gram(s) IV Push once  dextrose 50% Injectable 25 Gram(s) IV Push once  dextrose 50% Injectable 25 Gram(s) IV Push once  glucagon  Injectable 1 milliGRAM(s) IntraMuscular once PRN  insulin glargine Injectable (LANTUS) 15 Unit(s) SubCutaneous every morning  insulin lispro (HumaLOG) corrective regimen sliding scale   SubCutaneous three times a day before meals  insulin lispro (HumaLOG) corrective regimen sliding scale   SubCutaneous at bedtime      gabapentin 300 milliGRAM(s) Oral two times a day    docusate sodium 100 milliGRAM(s) Oral three times a day  polyethylene glycol 3350 17 Gram(s) Oral daily  senna 2 Tablet(s) Oral at bedtime        dextrose 5%. 1000 milliLiter(s) IV Continuous <Continuous>  ferrous    sulfate 325 milliGRAM(s) Oral daily  sodium bicarbonate 650 milliGRAM(s) Oral three times a day      latanoprost 0.005% Ophthalmic Solution 1 Drop(s) Left EYE at bedtime    sevelamer carbonate 2400 milliGRAM(s) Oral three times a day with meals      LABS:                        7.4    5.24  )-----------( 209      ( 2019 09:13 )             24.4     Hgb Trend: 7.4<--, 8.3<--, 7.9<--, 8.7<--  06-15    142  |  106  |  100<H>  ----------------------------<  109<H>  4.4   |  22  |  5.87<H>    Ca    8.9      15 Lance 2019 04:55  Phos  5.8     06-15  Mg     3.1     06-15      Creatinine Trend: 5.87<--, 5.82<--, 5.77<--, 6.02<--, 5.85<--, 5.90<--    Urinalysis Basic - ( 2019 02:55 )    Color: Colorless / Appearance: Clear / S.010 / pH: x  Gluc: x / Ketone: Negative  / Bili: Negative / Urobili: <2 mg/dL   Blood: x / Protein: 100 mg/dL / Nitrite: Negative   Leuk Esterase: Negative / RBC: 3 /HPF / WBC 1 /HPF   Sq Epi: x / Non Sq Epi: 0 /HPF / Bacteria: Negative            MICROBIOLOGY:     RADIOLOGY:  [ ] Reviewed and interpreted by me    EKG:

## 2019-06-15 NOTE — PROGRESS NOTE ADULT - PROBLEM SELECTOR PROBLEM 2
CKD (chronic kidney disease), stage V
CKD (chronic kidney disease), stage V
Acidosis
CKD (chronic kidney disease), stage V
CKD (chronic kidney disease), stage V

## 2019-06-15 NOTE — DISCHARGE NOTE PROVIDER - CARE PROVIDER_API CALL
Donna Melvin)  Internal Medicine; Nephrology  [address], [address] [place] [address]  Phone: [phone]  Fax: [phone]  Follow Up Time:     Eagle Vallejo)  Vascular Surgery  [address], [address]  [place] [address]  Phone: [phone]  Fax: [phone]  Follow Up Time:     Carlo Hassan)  Critical Care Medicine  [address] [place] [address], [address] [place] [address]  Phone: [phone]  Fax: [phone]  Follow Up Time:

## 2019-06-15 NOTE — DISCHARGE NOTE PROVIDER - NSDCCPCAREPLAN_GEN_ALL_CORE_FT
PRINCIPAL DISCHARGE DIAGNOSIS  Diagnosis: CHF exacerbation  Assessment and Plan of Treatment: You came in with concerns of chest pain and shortness of breath. Your were found to be in acute heart failure exacerbation and fluid overload. We treated you with diuretics and you reported improved symptoms (chest pain, respiratory distress, and leg edema all improved). Please continue taking your medications upon discharge.      SECONDARY DISCHARGE DIAGNOSES  Diagnosis: Chronic kidney disease (CKD)  Assessment and Plan of Treatment: We contacted the kidney doctors for your chronic kidney disease and they recommended creation of an AV fistula in anticipation of dialysis. You agreed to this plan and expressed that you would like to undergo this procedure outpatient. Please follow-up with outpatient Nephrology and Vascular surgery for further management. PRINCIPAL DISCHARGE DIAGNOSIS  Diagnosis: CHF exacerbation  Assessment and Plan of Treatment: You came in with concerns of chest pain and shortness of breath. You were found to be in acute heart failure exacerbation and fluid overload. We treated you with diuretics and you reported improved symptoms (chest pain, respiratory distress, and leg edema all improved). Please continue taking your medications upon discharge.      SECONDARY DISCHARGE DIAGNOSES  Diagnosis: Chronic kidney disease (CKD)  Assessment and Plan of Treatment: We contacted the kidney doctors for your chronic kidney disease and they recommended creation of an AV fistula in anticipation of dialysis. You agreed with this plan and expressed that you would like to undergo this procedure outpatient. Please follow-up with outpatient Nephrology and Vascular surgery for further management.

## 2019-06-15 NOTE — DISCHARGE NOTE PROVIDER - CARE PROVIDERS DIRECT ADDRESSES
[email or web address]

## 2019-06-15 NOTE — DISCHARGE NOTE NURSING/CASE MANAGEMENT/SOCIAL WORK - NSDCDPATPORTLINK_GEN_ALL_CORE
You can access the [facility] Patient Portal, offered by [facility], by registering with the following website: [email or web address]

## 2019-06-15 NOTE — PROGRESS NOTE ADULT - PROBLEM SELECTOR PROBLEM 1
Acute on chronic diastolic heart failure
CKD (chronic kidney disease), stage V
Acute on chronic diastolic heart failure

## 2019-06-15 NOTE — DISCHARGE NOTE PROVIDER - HOSPITAL COURSE
57 y/o M w/ PMH of smoking, HTN, HLD, IDDM, CKD (last Cr 5.1 on 05/30/19), renal insufficiency, hx of gastric sleeve admitted for worsening SOB associated with pleuritic CP that started at 1:30A. Described the pain as being in the middle of the chest, sharp, 5/10 in severity, no radiation. Tried ventolin, didn't help with the pain. Patient recently with worsening PAEZ, orthopnea and pitting edema --> medications recently changed from Furosemide 80mg BID to Torsemide 40 mg BID. Metolazone changed from 5mg every other day to 2.5mg every other day. Reported similar incident 7-8 years ago, when he was diagnosed with CHF exacerbation, and was intubated in the MICU for a week. Endorsed pleuritic chest pain and mild SOB. No HA, n/v, f/c, cough, abdominal pain, dysuria, diarrhea, sick contacts, recent travelling, tick bites/rashes.        Unlikely to be ACS given the normal EKG and non-concerning EKG. Concern for pericarditis given pleuritic nature, however, no evidence on EKG, no rub on exam. Pt. was found to have significant LE edema. Additionally, CXR was concerning for pulmonary edema and cardiomegaly. Serum pro-BNP 5.2K. Pt. was treated for acute CHF exacerbation with furosemide IV and metolazone. Pt. reported improvement in the bilateral edema and the sxs of CP/SOB with this regimen. He was discharged on 6/15/19 with instructions to continue taking the home regimen of diuretics and follow-up with outpatient PCP.        Of note: Pt. was seen by house nephrology for the CKD (Stage 5). As per nephro and the pt., plan was made for outpatient AV Fistula creation for dialysis in the near future. Pt. underwent vein mapping in the hospital (normal results). Pt. to follow-up with outpatient nephrology and vascular surgery for further management.

## 2019-06-15 NOTE — PROGRESS NOTE ADULT - PROBLEM SELECTOR PLAN 1
- TTE JUN 15th 2018 EF 65-70% Mild mitral regurgitation, diastolic LV dysfunction  - Patient with significant LE edema p/w sob, cxr pulm edema and cardiomegaly, now improving  - Serum pro-BNP 5.2K  - EKG - 1st degree AV block. No ST-changes.  - Monitoring on telemetry- currently sinus rhythm.  - hstrop 203 -> 198, elevated in setting of volume overload/CKD, no need to trend  - C/w furosemide IV and metolazone  - Strict I/Os, fluid restriction.  - TTE: EF 70%. Normal left ventricular systolic function. No segmental wall motion abnormalities. Moderate pulmonary hypertension  - F/u Cards reccs

## 2019-06-15 NOTE — PROGRESS NOTE ADULT - PROBLEM SELECTOR PLAN 5
- Reports taking Tresiba 21U at 6AM qd  - Will c/w Lantus 15U and uptitrate as needed  - C/w ISS  - Monitor FSG

## 2019-06-15 NOTE — PROGRESS NOTE ADULT - PROBLEM SELECTOR PLAN 2
- Will cont diuresis as per card/renal  - Likely 2/2 diabetes mellitus. CKD 5   - Phosphorous and PTH elevated  - C/w Renvela to 2400 tid  - F/u vascular reccs on AV Fistula

## 2019-06-15 NOTE — PROGRESS NOTE ADULT - ASSESSMENT
57 y/o M w/ PMH of smoking, HTN, HLD, IDDM, CKD (last Cr 5.1 on 05/30/19), renal insufficiency, hx of gastric sleeve here for worsening SOB associated with pleuritic CP, now resolved.
59 y/o M w/ PMH of smoking, HTN, HLD, IDDM, CKD (last Cr 5.1 on 05/30/19), renal insufficiency, hx of gastric sleeve here for worsening SOB associated with pleuritic CP, now resolved.
59 y/o M w/ PMH of smoking, HTN, HLD, IDDM, CKD (last Cr 5.1 on 05/30/19), renal insufficiency, hx of gastric sleeve here for worsening SOB associated with pleuritic CP.
Pt. with advanced CKD V without uremic symptoms or urgent need to start HD.
59 y/o M w/ PMH of smoking, HTN, HLD, IDDM, CKD (last Cr 5.1 on 05/30/19), renal insufficiency, hx of gastric sleeve here for worsening SOB associated with pleuritic CP, now resolved.

## 2019-06-15 NOTE — PROGRESS NOTE ADULT - PROVIDER SPECIALTY LIST ADULT
Cardiology
Cardiology
Internal Medicine
Internal Medicine
Nephrology
Nephrology
Cardiology
Internal Medicine
Internal Medicine

## 2019-06-15 NOTE — PROGRESS NOTE ADULT - ATTENDING COMMENTS
Patient was seen and examined by me at bedside. I agree with resident's note, subjective, objective physical exam, assessment and plan with following modifications/additions.
d/c home today  D/c time 40 mins

## 2019-06-15 NOTE — DISCHARGE NOTE NURSING/CASE MANAGEMENT/SOCIAL WORK - NSDCPEPT PROEDHF_GEN_ALL_CORE
Monitor weight daily/Activities as tolerated/Low salt diet/Call primary care provider for follow up after discharge/Report signs and symptoms to primary care provider

## 2019-06-17 ENCOUNTER — APPOINTMENT (OUTPATIENT)
Dept: VASCULAR SURGERY | Facility: CLINIC | Age: 59
End: 2019-06-17
Payer: COMMERCIAL

## 2019-06-17 VITALS
SYSTOLIC BLOOD PRESSURE: 167 MMHG | TEMPERATURE: 98.1 F | DIASTOLIC BLOOD PRESSURE: 73 MMHG | WEIGHT: 189 LBS | BODY MASS INDEX: 27.99 KG/M2 | HEART RATE: 75 BPM | HEIGHT: 69 IN

## 2019-06-17 PROCEDURE — 99204 OFFICE O/P NEW MOD 45 MIN: CPT

## 2019-06-17 PROCEDURE — 93923 UPR/LXTR ART STDY 3+ LVLS: CPT

## 2019-06-17 RX ORDER — AMOXICILLIN AND CLAVULANATE POTASSIUM 500; 125 MG/1; MG/1
500-125 TABLET, FILM COATED ORAL
Qty: 14 | Refills: 0 | Status: COMPLETED | COMMUNITY
Start: 2018-08-20 | End: 2019-06-17

## 2019-06-19 ENCOUNTER — NON-APPOINTMENT (OUTPATIENT)
Age: 59
End: 2019-06-19

## 2019-06-19 ENCOUNTER — APPOINTMENT (OUTPATIENT)
Dept: CARDIOLOGY | Facility: CLINIC | Age: 59
End: 2019-06-19
Payer: COMMERCIAL

## 2019-06-19 VITALS — DIASTOLIC BLOOD PRESSURE: 68 MMHG | SYSTOLIC BLOOD PRESSURE: 140 MMHG

## 2019-06-19 VITALS
HEIGHT: 69 IN | DIASTOLIC BLOOD PRESSURE: 67 MMHG | OXYGEN SATURATION: 97 % | SYSTOLIC BLOOD PRESSURE: 165 MMHG | BODY MASS INDEX: 28.58 KG/M2 | HEART RATE: 75 BPM | TEMPERATURE: 98.1 F | WEIGHT: 193 LBS | RESPIRATION RATE: 12 BRPM

## 2019-06-19 PROCEDURE — 99215 OFFICE O/P EST HI 40 MIN: CPT | Mod: 25

## 2019-06-19 NOTE — CARDIOLOGY SUMMARY
[___] : [unfilled] [Normal] : normal [No Symptoms] : no Symptoms [No Ischemia] : no Ischemia [No Exercise Ind Arr] : no exercise induced arrhythmias

## 2019-06-19 NOTE — REASON FOR VISIT
[Heart Failure] : congestive heart failure [Follow-Up - From Hospitalization] : follow-up of a recent hospitalization for

## 2019-06-19 NOTE — HISTORY OF PRESENT ILLNESS
[FreeTextEntry1] : Orion is s/p hospitalization for acute heart failure. He diuresed well on IV lasix. ECHO unchanged. SInce discharge his weight has been within a pound. He feels markedly better and is ready for AV fistula after July 19, 2019.

## 2019-06-19 NOTE — DISCUSSION/SUMMARY
[FreeTextEntry1] : The patient is a 58-year-old ex-smoker family history of coronary artery disease, diabetes mellitus on insulin, hypertension, hyperlipidemia, renal insufficiency, s/p gastric sleeve s/p hospitalization for heart failure exacerbation who is euvolemic\par #1 CV- No angina, LV EF normal, continue bid torsemide and metolazone every other day\par #2 DM- on insulin, better control, continues to improve\par #3 Htn- on labetalol, minoxidil, amlodipine, hydralazine,\par #4 Renal -  torsemide 40mg bid and metolazone, CKD progressing\par #5 Lipids- atorvastatin, optimal\par #6 Vascular- THere are no cardiac contraindications to AV fistula as planned July 19, 2019. \par \par 4/24/18 Addendum: No interval change. There are no cardiac contraindications to proceeding with hernia surgery as planned.

## 2019-06-19 NOTE — PHYSICAL EXAM
[Eyelids - No Xanthelasma] : the eyelids demonstrated no xanthelasmas [No Deformities] : no deformities [Normal Conjunctiva] : the conjunctiva exhibited no abnormalities [Normal Oral Mucosa] : normal oral mucosa [No Oral Pallor] : no oral pallor [No Oral Cyanosis] : no oral cyanosis [Normal Jugular Venous A Waves Present] : normal jugular venous A waves present [Normal Jugular Venous V Waves Present] : normal jugular venous V waves present [No Jugular Venous Marin A Waves] : no jugular venous marin A waves [Respiration, Rhythm And Depth] : normal respiratory rhythm and effort [Exaggerated Use Of Accessory Muscles For Inspiration] : no accessory muscle use [Auscultation Breath Sounds / Voice Sounds] : lungs were clear to auscultation bilaterally [Heart Rate And Rhythm] : heart rate and rhythm were normal [Abdomen Soft] : soft [Heart Sounds] : normal S1 and S2 [Murmurs] : no murmurs present [Abdomen Tenderness] : non-tender [Abdomen Mass (___ Cm)] : no abdominal mass palpated [Abnormal Walk] : normal gait [Nail Clubbing] : no clubbing of the fingernails [Gait - Sufficient For Exercise Testing] : the gait was sufficient for exercise testing [Cyanosis, Localized] : no localized cyanosis [Petechial Hemorrhages (___cm)] : no petechial hemorrhages [Skin Color & Pigmentation] : normal skin color and pigmentation [FreeTextEntry1] : ; bilateral ankle edema [] : no rash [Skin Lesions] : no skin lesions [No Venous Stasis] : no venous stasis [No Xanthoma] : no  xanthoma was observed [No Skin Ulcers] : no skin ulcer [Mood] : the mood was normal [Affect] : the affect was normal [Oriented To Time, Place, And Person] : oriented to person, place, and time [No Anxiety] : not feeling anxious

## 2019-06-24 NOTE — CONSULT LETTER
[Dear  ___] : Dear  [unfilled], [Consult Letter:] : I had the pleasure of evaluating your patient, [unfilled]. [Please see my note below.] : Please see my note below. [Consult Closing:] : Thank you very much for allowing me to participate in the care of this patient.  If you have any questions, please do not hesitate to contact me. [Sincerely,] : Sincerely, [FreeTextEntry3] : Eagle Vallejo M.D., F.AD.S., R.P.V.I.\par  of Vascular Surgery\par Chief, Vascular Surgery at [facility]\par Chief, Endovascular Surgery at [facility]\par Medical Director of Endovascular Program\par [facility]\par

## 2019-06-24 NOTE — PHYSICAL EXAM
[JVD] : no jugular venous distention  [Normal Heart Sounds] : normal heart sounds [Normal Breath Sounds] : Normal breath sounds [2+] : left 2+ [Ankle Swelling (On Exam)] : not present [Varicose Veins Of Lower Extremities] : not present [] : not present [Abdomen Masses] : No abdominal masses [Skin Ulcer] : no ulcer [Oriented to Place] : oriented to place

## 2019-06-24 NOTE — HISTORY OF PRESENT ILLNESS
[FreeTextEntry1] : Patient is a 58-year-old male with past medical history significant for hypertension, diabetes, coronary artery disease with worsening renal function requiring hemodialysis. Patient is right-handed. No complaints of claudication.

## 2019-06-26 ENCOUNTER — APPOINTMENT (OUTPATIENT)
Dept: TRANSPLANT | Facility: CLINIC | Age: 59
End: 2019-06-26

## 2019-06-26 ENCOUNTER — APPOINTMENT (OUTPATIENT)
Dept: NEPHROLOGY | Facility: CLINIC | Age: 59
End: 2019-06-26
Payer: COMMERCIAL

## 2019-06-26 ENCOUNTER — OUTPATIENT (OUTPATIENT)
Dept: OUTPATIENT SERVICES | Facility: HOSPITAL | Age: 59
LOS: 1 days | End: 2019-06-26
Payer: COMMERCIAL

## 2019-06-26 VITALS
SYSTOLIC BLOOD PRESSURE: 174 MMHG | HEIGHT: 69 IN | HEART RATE: 78 BPM | TEMPERATURE: 97 F | DIASTOLIC BLOOD PRESSURE: 71 MMHG | RESPIRATION RATE: 16 BRPM | OXYGEN SATURATION: 97 % | WEIGHT: 194.01 LBS

## 2019-06-26 VITALS
HEART RATE: 73 BPM | SYSTOLIC BLOOD PRESSURE: 158 MMHG | BODY MASS INDEX: 29.06 KG/M2 | OXYGEN SATURATION: 96 % | WEIGHT: 196.21 LBS | HEIGHT: 69 IN | DIASTOLIC BLOOD PRESSURE: 68 MMHG

## 2019-06-26 VITALS — SYSTOLIC BLOOD PRESSURE: 146 MMHG | DIASTOLIC BLOOD PRESSURE: 76 MMHG

## 2019-06-26 DIAGNOSIS — Z01.818 ENCOUNTER FOR OTHER PREPROCEDURAL EXAMINATION: ICD-10-CM

## 2019-06-26 DIAGNOSIS — H35.62 RETINAL HEMORRHAGE, LEFT EYE: Chronic | ICD-10-CM

## 2019-06-26 DIAGNOSIS — Z98.890 OTHER SPECIFIED POSTPROCEDURAL STATES: Chronic | ICD-10-CM

## 2019-06-26 DIAGNOSIS — Z98.84 BARIATRIC SURGERY STATUS: Chronic | ICD-10-CM

## 2019-06-26 DIAGNOSIS — N18.6 END STAGE RENAL DISEASE: ICD-10-CM

## 2019-06-26 DIAGNOSIS — Z87.438 PERSONAL HISTORY OF OTHER DISEASES OF MALE GENITAL ORGANS: ICD-10-CM

## 2019-06-26 DIAGNOSIS — I10 ESSENTIAL (PRIMARY) HYPERTENSION: ICD-10-CM

## 2019-06-26 DIAGNOSIS — Z98.49 CATARACT EXTRACTION STATUS, UNSPECIFIED EYE: Chronic | ICD-10-CM

## 2019-06-26 LAB
HCT VFR BLD CALC: 26.2 % — LOW (ref 39–50)
HGB BLD-MCNC: 8.5 G/DL — LOW (ref 13–17)
MCHC RBC-ENTMCNC: 31.4 PG — SIGNIFICANT CHANGE UP (ref 27–34)
MCHC RBC-ENTMCNC: 32.4 GM/DL — SIGNIFICANT CHANGE UP (ref 32–36)
MCV RBC AUTO: 96.7 FL — SIGNIFICANT CHANGE UP (ref 80–100)
NRBC # BLD: 0 /100 WBCS — SIGNIFICANT CHANGE UP (ref 0–0)
PLATELET # BLD AUTO: 190 K/UL — SIGNIFICANT CHANGE UP (ref 150–400)
RBC # BLD: 2.71 M/UL — LOW (ref 4.2–5.8)
RBC # FLD: 13.4 % — SIGNIFICANT CHANGE UP (ref 10.3–14.5)
WBC # BLD: 7.15 K/UL — SIGNIFICANT CHANGE UP (ref 3.8–10.5)
WBC # FLD AUTO: 7.15 K/UL — SIGNIFICANT CHANGE UP (ref 3.8–10.5)

## 2019-06-26 PROCEDURE — 99215 OFFICE O/P EST HI 40 MIN: CPT

## 2019-06-26 PROCEDURE — 85027 COMPLETE CBC AUTOMATED: CPT

## 2019-06-26 PROCEDURE — G0463: CPT

## 2019-06-26 PROCEDURE — 36415 COLL VENOUS BLD VENIPUNCTURE: CPT

## 2019-06-26 RX ORDER — SODIUM CHLORIDE 9 MG/ML
3 INJECTION INTRAMUSCULAR; INTRAVENOUS; SUBCUTANEOUS EVERY 8 HOURS
Refills: 0 | Status: DISCONTINUED | OUTPATIENT
Start: 2019-07-18 | End: 2019-07-26

## 2019-06-26 NOTE — H&P PST ADULT - NEGATIVE GENERAL SYMPTOMS
no chills/no sweating/no anorexia/no polyuria/no polydipsia/no fever/no malaise/no fatigue/no polyphagia/no weight loss

## 2019-06-26 NOTE — H&P PST ADULT - RS GEN PE MLT RESP DETAILS PC
clear to auscultation bilaterally/good air movement/normal/breath sounds equal/no rales/no intercostal retractions/no subcutaneous emphysema/airway patent/respirations non-labored/no chest wall tenderness/no rhonchi/no wheezes

## 2019-06-26 NOTE — H&P PST ADULT - NSICDXFAMILYHX_GEN_ALL_CORE_FT
FAMILY HISTORY:  Family history of bone cancer, Grandfather  FH: aortic stenosis, Son    Grandparent  Still living? Unknown  Family history of heart attack, Age at diagnosis: Age Unknown

## 2019-06-26 NOTE — H&P PST ADULT - IS PATIENT PREGNANT?
Having an Evoked Potential Test  Evoked potential tests measure the electrical activity in the brain as nerve pathways are stimulated.  Testing is done in a quiet room. The technologist will explain how your test or tests will be done.  For your safety and for the success of your test, tell the technologist:  · If you are allergic to any medicines  · What medicines you take  You may also be asked questions about your overall health.   Before your test  Prepare for your test as instructed. Dont use lotion, oil, powder, or hairstyling products. Wear loose clothes. You may be asked to change into a hospital gown. Your testing may last from 30 minutes to 4 hours depending on how many tests youre having. Be sure to allow extra time to check in.  Visual evoked potential (VEP)  You will sit a few feet from a screen. The technologist will attach small metal disks (electrodes) to your scalp. You will be asked to gaze at a dot at the center of the screen. With a patch over one eye, you will watch a checkerboard pattern reversing on the screen. This test will then be repeated with your other eye.  Brainstem auditory evoked potential (BAEP)  You will be seated for this test. The technologist will attach electrodes to your head and on or behind your ears with gel or paste. You will then wear headphones. You will hear clicking sounds or tones first in one ear, then in the other. You will also hear a constant noise through the headphones.  Somatosensory evoked potential (SSEP)  You will lie back in a reclining chair or lie down on an exam table. Electrodes will be placed on parts of your body, such as your head, spine, arms, or legs. One test is of the arms, and one is of the legs. The technologist will apply mild electrical currents to the nerves in your wrists or ankles. You may feel your muscles twitch. But know that the test wont harm you.  After your test  Before you leave, all electrodes will be removed. You can then  return to your normal routine. If you took medication to help you relax for any test, arrange to have someone drive you home. Your doctor will let you know when your test results are ready.  Date Last Reviewed: 2015 © 2000-2017 [employer]. [address] [place] [id number]. All rights reserved. This information is not intended as a substitute for professional medical care. Always follow your healthcare professional's instructions.      Recovery After Procedural Sedation (Child)  Your child was given medicine to get ready for a procedure. This may have included both a pain medicine and a sleeping medicine. Most of the effects will wear off before your child goes home. But drowsiness may continue for the first 6 to 8 hours after the procedure.  Home care  Follow these guidelines after your child returns home:  · Watch your child closely for the first 12 to 24 hours after the procedure. Dont leave your child alone in the bath or near water. Don't let your child skateboard, skate, or ride a bicycle until he or she is fully alert and has normal balance. This is to help prevent injuries.  · Its OK to let your child sleep. But always ask your child's healthcare provider how often you should wake your child. When you wake your child, check for the signs in When to seek medical advice (below).  · Dont give your child any medicine during the first 4 hours after the procedure unless your child's healthcare provider tells you to. Certain medicines such as those for pain or cold relief might react with the medicines your child was given in the hospital. This can cause a much stronger response than usual.  · If your child is old enough to drive, don't allow him or her to drive for at least 24 hours. Your child should also not make any important business or personal decisions during this time.  Follow-up care  Follow up with your child's healthcare provider, or as advised. Call your child's  healthcare provider if you have any concerns about how your child is breathing. Also call your child's healthcare provider if you are concerned about your child's reaction to the procedure or medicine.  When to seek medical advice  Call your child's healthcare provider right away if any of these occur:  · Drowsiness that gets worse  · Unable to wake your child as usual  · Weakness or dizziness  · Cough  · Fast breathing. One breath is counted each time your child breathes in and out.  ¨ For  to 6 weeks old, more than 60 breaths per minute  ¨ For a child 6 weeks to 2 years, more than 45 breaths per minute  ¨ For a child 3 to 6 years old, more than 35 breaths per minute  ¨ For a child 7 to 10 years old, more than 30 breaths per minute  ¨ For a child older than 10, more than 25 breaths per minute  · Slow breathing:  ¨ For  to 6 weeks old, fewer than 25 breaths per minute  ¨ For a child 6 weeks to 1 year, fewer than 20 breaths per minute  ¨ For a child 1 to 3 years old, fewer than 18 breaths per minute  ¨ For a child 4 to 6 years old, fewer than 16 breaths per minute  ¨ For a child 7 to 9 years old, fewer than 14 breaths per minute  ¨ For a child 10 to 14 years old, fewer than 12 breaths per minute  ¨ For a child older than 14, fewer than 10 breaths per minute  Date Last Reviewed: 10/1/2016  © 8046-9735 [employer]. [address] [place] [address], [place] [address]. All rights reserved. This information is not intended as a substitute for professional medical care. Always follow your healthcare professional's instructions.           not applicable (Male)

## 2019-06-26 NOTE — H&P PST ADULT - HISTORY OF PRESENT ILLNESS
59 y/o male with PMH of HTN, HLD, T2DM, sleeve gastrectomy (2015), CAD w/CHF, seasonal allergic rhinitis, mild persistent asthma, last exacerbation 5/2019, anemia of chronic diseases, is here for presurgical evaluation. He was diagnosed with ESRD and is scheduled for left radiocephalic arteriovenous fistula creation in preparation for hemodialysis, 7/18/2019

## 2019-06-26 NOTE — H&P PST ADULT - ASSESSMENT
57 y/o male with PMH of HTN, HLD, T2DM, sleeve gastrectomy (2015), CAD w/CHF, seasonal allergic rhinitis, mild persistent asthma, last exacerbation 5/2019, anemia of chronic diseases, is here for presurgical evaluation. He was diagnosed with ESRD and is scheduled for left radiocephalic arteriovenous fistula creation in preparation for hemodialysis, 7/18/2019 59 y/o male with PMH of HTN, HLD, T2DM w/retinopathy, neuropathy, renal complications, s/p sleeve gastrectomy (2015), CAD w/CHF, seasonal allergic rhinitis, mild persistent asthma, last exacerbation 5/2019, anemia of chronic diseases, is here for presurgical evaluation. He was diagnosed with ESRD and is scheduled for left radiocephalic arteriovenous fistula creation in preparation for hemodialysis, 7/18/2019 57 y/o male with PMH of HTN, HLD, T2DM w/retinopathy, neuropathy, renal complications, s/p sleeve gastrectomy (2015), CAD w/CHF, seasonal allergic rhinitis, mild persistent asthma, last exacerbation 5/2019, anemia of chronic diseases, diagnosed with ESRD scheduled for left radiocephalic arteriovenous fistula creation 7/18/2019

## 2019-06-26 NOTE — H&P PST ADULT - NSICDXPROBLEM_GEN_ALL_CORE_FT
PROBLEM DIAGNOSES  Problem: Hypertension  Assessment and Plan: Take antihypertensive medications as prescribed with a sip of water  the day of surgery    Problem: ESRD (end stage renal disease)  Assessment and Plan: Scheduled for left radiocephalic arteriovenous fistula creation 7/18/2019

## 2019-06-26 NOTE — H&P PST ADULT - NSICDXPASTSURGICALHX_GEN_ALL_CORE_FT
PAST SURGICAL HISTORY:  H/O cardiac catheterization no stents    H/O elbow surgery left    Retinal hemorrhage, left eye s/p laser surgery  hx of right eye retinal hemorrahge, tx with laser surgery    S/P amputation right hallux 5/13    S/P carpal tunnel release b/l    S/P hernia repair     S/P laparoscopic sleeve gastrectomy 2015    Status post cataract extraction

## 2019-06-26 NOTE — H&P PST ADULT - NSICDXPASTMEDICALHX_GEN_ALL_CORE_FT
PAST MEDICAL HISTORY:  CKD (chronic kidney disease)     Diabetes     Diabetic neuropathy     Diastolic dysfunction mild. stage 1. EF 65%    DKA (diabetic ketoacidoses)     DM (diabetes mellitus)     High cholesterol     History of glaucoma left eye    Hyperlipidemia     Hypertension     Hypertension     Insulin pump status denies    Lower extremity edema     Osteomyelitis of ankle or foot x2    Pneumonia 4/2013    Ventral hernia

## 2019-06-26 NOTE — H&P PST ADULT - NEGATIVE GASTROINTESTINAL SYMPTOMS
no diarrhea/no constipation/no vomiting/no change in bowel habits/no abdominal pain/no nausea/no melena

## 2019-06-27 LAB
25(OH)D3 SERPL-MCNC: 42.5 NG/ML
ALBUMIN SERPL ELPH-MCNC: 4.8 G/DL
ANION GAP SERPL CALC-SCNC: 17 MMOL/L
APPEARANCE: CLEAR
BACTERIA: NEGATIVE
BASOPHILS # BLD AUTO: 0.06 K/UL
BASOPHILS NFR BLD AUTO: 0.9 %
BILIRUBIN URINE: NEGATIVE
BLOOD URINE: ABNORMAL
BUN SERPL-MCNC: 114 MG/DL
CALCIUM SERPL-MCNC: 8.9 MG/DL
CALCIUM SERPL-MCNC: 8.9 MG/DL
CHLORIDE SERPL-SCNC: 104 MMOL/L
CO2 SERPL-SCNC: 19 MMOL/L
COLOR: NORMAL
CREAT SERPL-MCNC: 6.18 MG/DL
CREAT SPEC-SCNC: 59 MG/DL
CREAT/PROT UR: 4.7 RATIO
EOSINOPHIL # BLD AUTO: 0.2 K/UL
EOSINOPHIL NFR BLD AUTO: 3 %
ESTIMATED AVERAGE GLUCOSE: 128 MG/DL
FERRITIN SERPL-MCNC: 597 NG/ML
GLUCOSE QUALITATIVE U: ABNORMAL
GLUCOSE SERPL-MCNC: 287 MG/DL
HBA1C MFR BLD HPLC: 6.1 %
HCT VFR BLD CALC: 27.9 %
HGB BLD-MCNC: 8.6 G/DL
HYALINE CASTS: 2 /LPF
IMM GRANULOCYTES NFR BLD AUTO: 0.4 %
IRON SATN MFR SERPL: 22 %
IRON SERPL-MCNC: 58 UG/DL
KETONES URINE: NEGATIVE
LEUKOCYTE ESTERASE URINE: NEGATIVE
LYMPHOCYTES # BLD AUTO: 0.9 K/UL
LYMPHOCYTES NFR BLD AUTO: 13.4 %
MAGNESIUM SERPL-MCNC: 3.1 MG/DL
MAN DIFF?: NORMAL
MCHC RBC-ENTMCNC: 30.5 PG
MCHC RBC-ENTMCNC: 30.8 GM/DL
MCV RBC AUTO: 98.9 FL
MICROSCOPIC-UA: NORMAL
MONOCYTES # BLD AUTO: 0.43 K/UL
MONOCYTES NFR BLD AUTO: 6.4 %
NEUTROPHILS # BLD AUTO: 5.11 K/UL
NEUTROPHILS NFR BLD AUTO: 75.9 %
NITRITE URINE: NEGATIVE
NT-PROBNP SERPL-MCNC: 2312 PG/ML
PARATHYROID HORMONE INTACT: 219 PG/ML
PH URINE: 6
PHOSPHATE SERPL-MCNC: 6.4 MG/DL
PLATELET # BLD AUTO: 192 K/UL
POTASSIUM SERPL-SCNC: 5.4 MMOL/L
PROT UR-MCNC: 280 MG/DL
PROTEIN URINE: ABNORMAL
RBC # BLD: 2.82 M/UL
RBC # FLD: 13.2 %
RED BLOOD CELLS URINE: 5 /HPF
SODIUM SERPL-SCNC: 140 MMOL/L
SPECIFIC GRAVITY URINE: 1.01
SQUAMOUS EPITHELIAL CELLS: 1 /HPF
TIBC SERPL-MCNC: 264 UG/DL
UIBC SERPL-MCNC: 206 UG/DL
UROBILINOGEN URINE: NORMAL
WBC # FLD AUTO: 6.73 K/UL
WHITE BLOOD CELLS URINE: 2 /HPF

## 2019-06-27 NOTE — REVIEW OF SYSTEMS
[Chills] : chills [Eyesight Problems] : eyesight problems [Feeling Tired] : feeling tired [Lower Ext Edema] : lower extremity edema [Abdominal Pain] : abdominal pain [Joint Stiffness] : joint stiffness [Negative] : Psychiatric [Recent Weight Gain (___ Lbs)] : recent [unfilled] ~Ulb weight gain [Nocturia] : nocturia [Chest Pain] : no chest pain [Palpitations] : no palpitations [Cough] : no cough [SOB on Exertion] : no shortness of breath during exertion [Orthopnea] : no orthopnea [PND] : no PND [Constipation] : no constipation [Vomiting] : no vomiting [Diarrhea] : no diarrhea [Heartburn] : no heartburn [Melena] : no melena [Incontinence] : no incontinence [Dysuria] : no dysuria [Hesitancy] : no urinary hesitancy [Joint Pain] : no joint pain [Arthralgias] : no arthralgias [Easy Bleeding] : no tendency for easy bleeding [Joint Swelling] : no joint swelling [Easy Bruising] : no tendency for easy bruising [FreeTextEntry7] : +hernia  [FreeTextEntry3] : wears glasses

## 2019-06-27 NOTE — ASSESSMENT
[FreeTextEntry1] : 59 yo M with advanced CKD V, DM (complicated by amputation, neuropathy, and retinopathy), HTN, and diastolic CHF here for a follow up visit\par \par CKD stage 5 ( likely  secondary to progressive DM nephropathy) \par Currently with no e/o uremia and no urgent indication for dialysis\par Discharge labs: BUN/Cr: 100/5\par For access placement next month\par Also active on the transplant list , with no potential  donor at this time\par Monitor him closely for dialysis requirements\par \par HTN/Volume overload:\par BP slightly over goal and with edema ( improved since his recent hospitalization)\par Increase labetalol to 200 BID ( FROM 100) \par Maintain on the current dose of diuretics\par Low sodium diet \par Maintain other antihypertensive meds\par \par Anemia: \par Check CBC/Iron stores\par \par MBD: \par Check Ca/Phos/PTH/Vitamin D levels\par Maintain on binders\par \par ADDENDUM: \par Renal function slightly worse\par Will keep the same dose of diuretics as he remains volume overloaded\par Increase labetalol to 200 BID\par High potassium noted: reinforce low K diet. He admits to eating a high K diet as he had been hypokalemic in the past \par To take binders with meals, he has not been \par Metabolic acidosis: start low dose sodium bicarbonate 650 BID\par Iron stores are replete: keep on iron pills\par Schedule for [facility], needs authorization\par Told him that if at anytime he does not feel well, he should come to the hospital. He understands\par Discussed in detail with him\par

## 2019-06-27 NOTE — HISTORY OF PRESENT ILLNESS
[FreeTextEntry1] : 57 yo M with advanced CKD V, DM (complicated by amputation, neuropathy, and retinopathy), HTN, and diastolic CHF was admitted to [facility] in early June with SOB of 1 week, along with increased LE edema. Pt. was started on Lasix IV with improvement of symptoms. Upon admission (6/11/19), pt. had elevated Scr of 5.90 which   remained stable. Per patient his baseline creatinine prior to admission was  ~ 3.7-4.1.mg/dl, although records indicate it to be in the 5 range since early this year. Good weight for him is 190 lbs. He underwent a 2D echo which revealed normal systolic function,  No segmental wall motion abnormalities and  Moderate pulmonary hypertension. He was being managed by Dr Vogt for several years but decided to change nephrologists during his most recent admission . His discharge BUN and creatinine were 100/5.8 mg/dl \par  \par \par  He comes for a follow up visit today. Feels pretty good. Has been watching  his salt and fluid intake\par On discharge his weight was close to 189 lbs, today around 196 lbs. His breathing is good, Urinating well\par appetite is fair, eating is same, Denies  nausea or vomiting, taste is same , sleeping is good although he wakes up multiple times to urinate, still   snores at night. Was on cpap at night while in the hospital, not at home. Saw Dr Vallejo and is due for avf placement next month\par

## 2019-06-27 NOTE — PHYSICAL EXAM
[General Appearance - Alert] : alert [General Appearance - In No Acute Distress] : in no acute distress [Sclera] : the sclera and conjunctiva were normal [PERRL With Normal Accommodation] : pupils were equal in size, round, and reactive to light [Examination Of The Oral Cavity] : the lips and gums were normal [Oropharynx] : the oropharynx was normal [Neck Appearance] : the appearance of the neck was normal [Neck Cervical Mass (___cm)] : no neck mass was observed [Jugular Venous Distention Increased] : there was no jugular-venous distention [Respiration, Rhythm And Depth] : normal respiratory rhythm and effort [Exaggerated Use Of Accessory Muscles For Inspiration] : no accessory muscle use [Apical Impulse] : the apical impulse was normal [Heart Sounds] : normal S1 and S2 [Bowel Sounds] : normal bowel sounds [Abdomen Soft] : soft [Involuntary Movements] : no involuntary movements were seen [] : no rash [Oriented To Time, Place, And Person] : oriented to person, place, and time [Impaired Insight] : insight and judgment were intact [Affect] : the affect was normal [FreeTextEntry1] : +erythema /scabs over the right leg

## 2019-07-01 PROBLEM — Z86.69 PERSONAL HISTORY OF OTHER DISEASES OF THE NERVOUS SYSTEM AND SENSE ORGANS: Chronic | Status: ACTIVE | Noted: 2019-06-26

## 2019-07-08 ENCOUNTER — APPOINTMENT (OUTPATIENT)
Dept: PULMONOLOGY | Facility: CLINIC | Age: 59
End: 2019-07-08

## 2019-07-11 ENCOUNTER — APPOINTMENT (OUTPATIENT)
Dept: CARDIOLOGY | Facility: CLINIC | Age: 59
End: 2019-07-11
Payer: COMMERCIAL

## 2019-07-11 ENCOUNTER — NON-APPOINTMENT (OUTPATIENT)
Age: 59
End: 2019-07-11

## 2019-07-11 ENCOUNTER — TRANSCRIPTION ENCOUNTER (OUTPATIENT)
Age: 59
End: 2019-07-11

## 2019-07-11 ENCOUNTER — APPOINTMENT (OUTPATIENT)
Dept: PULMONOLOGY | Facility: CLINIC | Age: 59
End: 2019-07-11
Payer: COMMERCIAL

## 2019-07-11 VITALS
DIASTOLIC BLOOD PRESSURE: 56 MMHG | WEIGHT: 211 LBS | BODY MASS INDEX: 31.25 KG/M2 | HEART RATE: 69 BPM | SYSTOLIC BLOOD PRESSURE: 120 MMHG | OXYGEN SATURATION: 94 % | HEIGHT: 69 IN | RESPIRATION RATE: 12 BRPM

## 2019-07-11 DIAGNOSIS — Z86.79 PERSONAL HISTORY OF OTHER DISEASES OF THE CIRCULATORY SYSTEM: ICD-10-CM

## 2019-07-11 PROCEDURE — 99215 OFFICE O/P EST HI 40 MIN: CPT

## 2019-07-11 PROCEDURE — 99214 OFFICE O/P EST MOD 30 MIN: CPT

## 2019-07-11 NOTE — REVIEW OF SYSTEMS
[see HPI] : see HPI [Recent Weight Gain (___ Lbs)] : recent [unfilled] ~Ulb weight gain [Shortness Of Breath] : no shortness of breath [Recent Weight Loss (___ Lbs)] : no recent weight loss [Chest Pain] : no chest pain [Dyspnea on exertion] : not dyspnea during exertion [Palpitations] : no palpitations [Lower Ext Edema] : no extremity edema [Negative] : Heme/Lymph

## 2019-07-11 NOTE — PHYSICAL EXAM
[No Deformities] : no deformities [Normal Oral Mucosa] : normal oral mucosa [Eyelids - No Xanthelasma] : the eyelids demonstrated no xanthelasmas [Normal Conjunctiva] : the conjunctiva exhibited no abnormalities [No Oral Pallor] : no oral pallor [No Oral Cyanosis] : no oral cyanosis [Normal Jugular Venous A Waves Present] : normal jugular venous A waves present [Normal Jugular Venous V Waves Present] : normal jugular venous V waves present [Respiration, Rhythm And Depth] : normal respiratory rhythm and effort [Exaggerated Use Of Accessory Muscles For Inspiration] : no accessory muscle use [No Jugular Venous Marin A Waves] : no jugular venous marin A waves [Heart Rate And Rhythm] : heart rate and rhythm were normal [Auscultation Breath Sounds / Voice Sounds] : lungs were clear to auscultation bilaterally [Murmurs] : no murmurs present [Heart Sounds] : normal S1 and S2 [Abdomen Tenderness] : non-tender [Abdomen Soft] : soft [Abdomen Mass (___ Cm)] : no abdominal mass palpated [Abnormal Walk] : normal gait [Gait - Sufficient For Exercise Testing] : the gait was sufficient for exercise testing [Cyanosis, Localized] : no localized cyanosis [Nail Clubbing] : no clubbing of the fingernails [Petechial Hemorrhages (___cm)] : no petechial hemorrhages [Skin Color & Pigmentation] : normal skin color and pigmentation [FreeTextEntry1] : ; bilateral ankle edema [] : no rash [No Venous Stasis] : no venous stasis [Skin Lesions] : no skin lesions [No Skin Ulcers] : no skin ulcer [No Xanthoma] : no  xanthoma was observed [Affect] : the affect was normal [Mood] : the mood was normal [Oriented To Time, Place, And Person] : oriented to person, place, and time [No Anxiety] : not feeling anxious

## 2019-07-11 NOTE — HISTORY OF PRESENT ILLNESS
[FreeTextEntry1] : Orion is s/p hospitalization for acute heart failure. He diuresed well on IV lasix. ECHO unchanged. His weight has increased and he is very edematous. Labetalol was increased and bicarb added.

## 2019-07-11 NOTE — DISCUSSION/SUMMARY
[___ Week(s)] : [unfilled] week(s) [FreeTextEntry1] : The patient is a 58-year-old ex-smoker family history of coronary artery disease, diabetes mellitus on insulin, hypertension, hyperlipidemia, renal insufficiency, s/p gastric sleeve s/p hospitalization for heart failure exacerbation who is very fluid overloaded\par #1 CV- No angina, LV EF normal, increase metolazone to 5mg daily and increase torsemide to 80/40 for one week and reevaluate\par #2 DM- on insulin, better control, continues to improve\par #3 Htn- on labetalol increased to 400mg bid, minoxidil, amlodipine, hydralazine,\par #4 Renal -  fistula scheduled for 7/19/19\par #5 Lipids- atorvastatin, optimal\par #6 Vascular- THere are no cardiac contraindications to AV fistula as planned July 19, 2019. \par \par 4/24/18 Addendum: No interval change. There are no cardiac contraindications to proceeding with hernia surgery as planned.

## 2019-07-15 ENCOUNTER — APPOINTMENT (OUTPATIENT)
Dept: TRANSPLANT | Facility: CLINIC | Age: 59
End: 2019-07-15

## 2019-07-15 ENCOUNTER — APPOINTMENT (OUTPATIENT)
Dept: NEPHROLOGY | Facility: CLINIC | Age: 59
End: 2019-07-15
Payer: COMMERCIAL

## 2019-07-15 ENCOUNTER — MEDICATION RENEWAL (OUTPATIENT)
Age: 59
End: 2019-07-15

## 2019-07-15 VITALS
HEIGHT: 69 IN | SYSTOLIC BLOOD PRESSURE: 177 MMHG | HEART RATE: 76 BPM | OXYGEN SATURATION: 93 % | DIASTOLIC BLOOD PRESSURE: 78 MMHG

## 2019-07-15 PROCEDURE — 96372 THER/PROPH/DIAG INJ SC/IM: CPT

## 2019-07-15 RX ORDER — CHLORHEXIDINE GLUCONATE 213 G/1000ML
1 SOLUTION TOPICAL DAILY
Refills: 0 | Status: DISCONTINUED | OUTPATIENT
Start: 2019-07-18 | End: 2019-07-26

## 2019-07-17 ENCOUNTER — TRANSCRIPTION ENCOUNTER (OUTPATIENT)
Age: 59
End: 2019-07-17

## 2019-07-17 NOTE — ASSESSMENT
[FreeTextEntry1] : Mr. Grider is here for a procrit injection for anemia of chronic kidney disease \par His repeat BP in the office was 152/84 mmHg. \par \par received 20,000 Units of EPO s/c in RIGHT upper arm. \par Lot # [id number]\par Expiry 9/2019 \par \par He tolerated the procedure well with no complications. he has a follow up appointment with Dr. Melvin at the end of July. \par

## 2019-07-18 ENCOUNTER — OUTPATIENT (OUTPATIENT)
Dept: OUTPATIENT SERVICES | Facility: HOSPITAL | Age: 59
LOS: 1 days | End: 2019-07-18
Payer: COMMERCIAL

## 2019-07-18 ENCOUNTER — NON-APPOINTMENT (OUTPATIENT)
Age: 59
End: 2019-07-18

## 2019-07-18 ENCOUNTER — APPOINTMENT (OUTPATIENT)
Dept: CARDIOLOGY | Facility: CLINIC | Age: 59
End: 2019-07-18
Payer: COMMERCIAL

## 2019-07-18 ENCOUNTER — APPOINTMENT (OUTPATIENT)
Dept: VASCULAR SURGERY | Facility: HOSPITAL | Age: 59
End: 2019-07-18
Payer: COMMERCIAL

## 2019-07-18 VITALS — WEIGHT: 213 LBS | BODY MASS INDEX: 31.45 KG/M2

## 2019-07-18 VITALS
HEIGHT: 69 IN | WEIGHT: 194.01 LBS | TEMPERATURE: 97 F | HEART RATE: 68 BPM | DIASTOLIC BLOOD PRESSURE: 71 MMHG | SYSTOLIC BLOOD PRESSURE: 153 MMHG | OXYGEN SATURATION: 97 % | RESPIRATION RATE: 16 BRPM

## 2019-07-18 VITALS
WEIGHT: 211 LBS | HEART RATE: 78 BPM | OXYGEN SATURATION: 94 % | SYSTOLIC BLOOD PRESSURE: 161 MMHG | DIASTOLIC BLOOD PRESSURE: 62 MMHG | RESPIRATION RATE: 12 BRPM | TEMPERATURE: 98.5 F | BODY MASS INDEX: 31.25 KG/M2 | HEIGHT: 69 IN

## 2019-07-18 VITALS
SYSTOLIC BLOOD PRESSURE: 151 MMHG | OXYGEN SATURATION: 96 % | TEMPERATURE: 98 F | HEART RATE: 66 BPM | RESPIRATION RATE: 14 BRPM | DIASTOLIC BLOOD PRESSURE: 59 MMHG

## 2019-07-18 DIAGNOSIS — Z98.890 OTHER SPECIFIED POSTPROCEDURAL STATES: Chronic | ICD-10-CM

## 2019-07-18 DIAGNOSIS — Z98.84 BARIATRIC SURGERY STATUS: Chronic | ICD-10-CM

## 2019-07-18 DIAGNOSIS — Z98.49 CATARACT EXTRACTION STATUS, UNSPECIFIED EYE: Chronic | ICD-10-CM

## 2019-07-18 DIAGNOSIS — H35.62 RETINAL HEMORRHAGE, LEFT EYE: Chronic | ICD-10-CM

## 2019-07-18 DIAGNOSIS — N18.6 END STAGE RENAL DISEASE: ICD-10-CM

## 2019-07-18 LAB
ANION GAP SERPL CALC-SCNC: 11 MMOL/L — SIGNIFICANT CHANGE UP (ref 5–17)
BUN SERPL-MCNC: 134 MG/DL — HIGH (ref 7–18)
CALCIUM SERPL-MCNC: 8.1 MG/DL — LOW (ref 8.4–10.5)
CHLORIDE SERPL-SCNC: 107 MMOL/L — SIGNIFICANT CHANGE UP (ref 96–108)
CO2 SERPL-SCNC: 24 MMOL/L — SIGNIFICANT CHANGE UP (ref 22–31)
CREAT SERPL-MCNC: 7.43 MG/DL — HIGH (ref 0.5–1.3)
GLUCOSE BLDC GLUCOMTR-MCNC: 57 MG/DL — LOW (ref 70–99)
GLUCOSE BLDC GLUCOMTR-MCNC: 61 MG/DL — LOW (ref 70–99)
GLUCOSE BLDC GLUCOMTR-MCNC: 66 MG/DL — LOW (ref 70–99)
GLUCOSE BLDC GLUCOMTR-MCNC: 70 MG/DL — SIGNIFICANT CHANGE UP (ref 70–99)
GLUCOSE BLDC GLUCOMTR-MCNC: 76 MG/DL — SIGNIFICANT CHANGE UP (ref 70–99)
GLUCOSE SERPL-MCNC: 58 MG/DL — LOW (ref 70–99)
POTASSIUM SERPL-MCNC: 4.2 MMOL/L — SIGNIFICANT CHANGE UP (ref 3.5–5.3)
POTASSIUM SERPL-SCNC: 4.2 MMOL/L — SIGNIFICANT CHANGE UP (ref 3.5–5.3)
SODIUM SERPL-SCNC: 142 MMOL/L — SIGNIFICANT CHANGE UP (ref 135–145)

## 2019-07-18 PROCEDURE — 35321 RECHANNELING OF ARTERY: CPT | Mod: LT

## 2019-07-18 PROCEDURE — 36821 AV FUSION DIRECT ANY SITE: CPT | Mod: LT

## 2019-07-18 PROCEDURE — 36820 AV FUSION/FOREARM VEIN: CPT | Mod: LT

## 2019-07-18 PROCEDURE — 80048 BASIC METABOLIC PNL TOTAL CA: CPT

## 2019-07-18 PROCEDURE — 99215 OFFICE O/P EST HI 40 MIN: CPT

## 2019-07-18 PROCEDURE — 36415 COLL VENOUS BLD VENIPUNCTURE: CPT

## 2019-07-18 PROCEDURE — 82962 GLUCOSE BLOOD TEST: CPT

## 2019-07-18 PROCEDURE — C1889: CPT

## 2019-07-18 RX ORDER — ATORVASTATIN CALCIUM 80 MG/1
1 TABLET, FILM COATED ORAL
Qty: 0 | Refills: 0 | DISCHARGE

## 2019-07-18 RX ORDER — HYDROMORPHONE HYDROCHLORIDE 2 MG/ML
0.5 INJECTION INTRAMUSCULAR; INTRAVENOUS; SUBCUTANEOUS
Refills: 0 | Status: DISCONTINUED | OUTPATIENT
Start: 2019-07-18 | End: 2019-07-18

## 2019-07-18 RX ORDER — ACETAMINOPHEN 500 MG
2 TABLET ORAL
Qty: 32 | Refills: 0
Start: 2019-07-18 | End: 2019-07-21

## 2019-07-18 RX ORDER — ATORVASTATIN CALCIUM 80 MG/1
1 TABLET, FILM COATED ORAL
Qty: 0 | Refills: 0 | DISCHARGE
Start: 2019-07-18

## 2019-07-18 RX ORDER — SODIUM CHLORIDE 9 MG/ML
1000 INJECTION INTRAMUSCULAR; INTRAVENOUS; SUBCUTANEOUS
Refills: 0 | Status: DISCONTINUED | OUTPATIENT
Start: 2019-07-18 | End: 2019-07-18

## 2019-07-18 RX ORDER — ACETAMINOPHEN 500 MG
1000 TABLET ORAL ONCE
Refills: 0 | Status: DISCONTINUED | OUTPATIENT
Start: 2019-07-18 | End: 2019-07-18

## 2019-07-18 RX ORDER — ONDANSETRON 8 MG/1
4 TABLET, FILM COATED ORAL ONCE
Refills: 0 | Status: DISCONTINUED | OUTPATIENT
Start: 2019-07-18 | End: 2019-07-18

## 2019-07-18 RX ADMIN — CHLORHEXIDINE GLUCONATE 1 APPLICATION(S): 213 SOLUTION TOPICAL at 08:18

## 2019-07-18 RX ADMIN — SODIUM CHLORIDE 3 MILLILITER(S): 9 INJECTION INTRAMUSCULAR; INTRAVENOUS; SUBCUTANEOUS at 09:18

## 2019-07-18 NOTE — ASU PREOP CHECKLIST - SELECT TESTS ORDERED
PT/PTT/CMP/CXR/finger stick 66 Dr. Cerda notified.; no action to be taken/BMP/POCT Blood Glucose/EKG

## 2019-07-18 NOTE — ASU PATIENT PROFILE, ADULT - PRESSURE ULCER(S)
Anesthesia Evaluation     . Pt has had prior anesthetic.     No history of anesthetic complications          ROS/MED HX    ENT/Pulmonary:     (+)asthma (exercise induced. No recent inhaler use. ) , . .    Neurologic:  - neg neurologic ROS     Cardiovascular:  - neg cardiovascular ROS       METS/Exercise Tolerance:     Hematologic:  - neg hematologic  ROS       Musculoskeletal:  - neg musculoskeletal ROS       GI/Hepatic:     (+) GERD       Renal/Genitourinary:  - ROS Renal section negative       Endo:  - neg endo ROS       Psychiatric:  - neg psychiatric ROS       Infectious Disease:  - neg infectious disease ROS       Malignancy:         Other: Comment: Urine hcg neg                    Physical Exam  Normal systems: dental    Airway   Mallampati: II  TM distance: >3 FB  Neck ROM: full    Dental     Cardiovascular       Pulmonary                     Anesthesia Plan      History & Physical Review  History and physical reviewed and following examination; no interval change.    ASA Status:  2 .    NPO Status:  > 2 hours and > 6 hours    Plan for General and LMA with Intravenous induction. Maintenance will be TIVA.    PONV prophylaxis:  Ondansetron (or other 5HT-3) and Dexamethasone or Solumedrol  Discussed risks, benefits, and alternatives of general anesthesia with the patient. Risks discussed included nausea/vomiting, sore throat, dental damage, soft tissue damage, problems with heart, brain, lungs, and rare allergic reactions. Patient was given a chance to ask questions. Patient consents to procedure.     LMA vs. ETT depending on position of legs.       Postoperative Care      Consents  Anesthetic plan, risks, benefits and alternatives discussed with:  Patient..                          .  
no

## 2019-07-18 NOTE — ASU PATIENT PROFILE, ADULT - ANESTHESIA, PREVIOUS REACTION, PROFILE
[Conjunctiva Injected] : conjunctiva injected  [Increased Tearing] : increased tearing [Discharge] : discharge [Bilateral] : (bilateral) [Clear Rhinorrhea] : clear rhinorrhea [NL] : warm [FreeTextEntry5] : venous stasis under BILAT eyes, upper eyelid swelling mild none/nausea/vomiting

## 2019-07-18 NOTE — ASU DISCHARGE PLAN (ADULT/PEDIATRIC) - CARE PROVIDER_API CALL
[facility])  Vascular Surgery  [address] [facility], [place], [address]  [place] [address]  Phone: [phone]  Fax: [phone]  Follow Up Time: 2 weeks

## 2019-07-18 NOTE — ASU DISCHARGE PLAN (ADULT/PEDIATRIC) - CALL YOUR DOCTOR IF YOU HAVE ANY OF THE FOLLOWING:
Pain not relieved by Medications/Fever greater than (need to indicate Fahrenheit or Celsius)/Inability to tolerate liquids or foods/Numbness, tingling, color or temperature change to extremity/Swelling that gets worse/Bleeding that does not stop/Nausea and vomiting that does not stop/Wound/Surgical Site with redness, or foul smelling discharge or pus/Excessive diarrhea/Increased irritability or sluggishness

## 2019-07-18 NOTE — ASU PATIENT PROFILE, ADULT - NS PRO AD INFO GIVEN Y
I will SWITCH the dose or number of times a day I take the medications listed below when I get home from the hospital:  None yes

## 2019-07-18 NOTE — DISCUSSION/SUMMARY
[___ Week(s)] : [unfilled] week(s) [FreeTextEntry1] : The patient is a 58-year-old ex-smoker family history of coronary artery disease, diabetes mellitus on insulin, hypertension, hyperlipidemia, renal insufficiency, s/p gastric sleeve , HFpEF, s/p AV fistula who is very fluid overloaded\par #1 CV- No angina, LV EF normal\par #2 HFpEF- on metolazone 5mg and bumex, contact Dr. Melvin re: treatment/dialysis\par #2 DM- on insulin, better control, continues to improve\par #3 Htn- on labetalol increased to 400mg bid, minoxidil, amlodipine, hydralazine,\par #4 Renal -  fistula today\par #5 Lipids- atorvastatin, optimal\par \par \par 4/24/18 Addendum: No interval change. There are no cardiac contraindications to proceeding with hernia surgery as planned.

## 2019-07-18 NOTE — PHYSICAL EXAM
[No Deformities] : no deformities [Normal Conjunctiva] : the conjunctiva exhibited no abnormalities [Eyelids - No Xanthelasma] : the eyelids demonstrated no xanthelasmas [Normal Oral Mucosa] : normal oral mucosa [No Oral Pallor] : no oral pallor [No Oral Cyanosis] : no oral cyanosis [Normal Jugular Venous A Waves Present] : normal jugular venous A waves present [Normal Jugular Venous V Waves Present] : normal jugular venous V waves present [No Jugular Venous Marin A Waves] : no jugular venous marin A waves [Respiration, Rhythm And Depth] : normal respiratory rhythm and effort [Exaggerated Use Of Accessory Muscles For Inspiration] : no accessory muscle use [Auscultation Breath Sounds / Voice Sounds] : lungs were clear to auscultation bilaterally [Heart Rate And Rhythm] : heart rate and rhythm were normal [Heart Sounds] : normal S1 and S2 [Murmurs] : no murmurs present [Abdomen Soft] : soft [Abdomen Tenderness] : non-tender [Abdomen Mass (___ Cm)] : no abdominal mass palpated [Abnormal Walk] : normal gait [Gait - Sufficient For Exercise Testing] : the gait was sufficient for exercise testing [Nail Clubbing] : no clubbing of the fingernails [Cyanosis, Localized] : no localized cyanosis [Petechial Hemorrhages (___cm)] : no petechial hemorrhages [Skin Color & Pigmentation] : normal skin color and pigmentation [] : no rash [No Venous Stasis] : no venous stasis [Skin Lesions] : no skin lesions [No Skin Ulcers] : no skin ulcer [No Xanthoma] : no  xanthoma was observed [Oriented To Time, Place, And Person] : oriented to person, place, and time [Affect] : the affect was normal [Mood] : the mood was normal [No Anxiety] : not feeling anxious [FreeTextEntry1] : ; bilateral ankle edema

## 2019-07-18 NOTE — HISTORY OF PRESENT ILLNESS
[FreeTextEntry1] : Orion is s/p fistula today. He is in pain from it. BP was fine at hospital but received a lot of IV fluids secondary to low glucose. Responded better to bumex over the last two days.

## 2019-07-19 ENCOUNTER — INPATIENT (INPATIENT)
Facility: HOSPITAL | Age: 59
LOS: 4 days | Discharge: HOME | DRG: 291 | End: 2019-07-24
Attending: HOSPITALIST | Admitting: HOSPITALIST
Payer: COMMERCIAL

## 2019-07-19 VITALS
TEMPERATURE: 98 F | SYSTOLIC BLOOD PRESSURE: 173 MMHG | WEIGHT: 216.93 LBS | HEART RATE: 76 BPM | DIASTOLIC BLOOD PRESSURE: 68 MMHG | OXYGEN SATURATION: 98 % | RESPIRATION RATE: 18 BRPM | HEIGHT: 68.5 IN

## 2019-07-19 DIAGNOSIS — I10 ESSENTIAL (PRIMARY) HYPERTENSION: ICD-10-CM

## 2019-07-19 DIAGNOSIS — R33.9 RETENTION OF URINE, UNSPECIFIED: ICD-10-CM

## 2019-07-19 DIAGNOSIS — N25.0 RENAL OSTEODYSTROPHY: ICD-10-CM

## 2019-07-19 DIAGNOSIS — Z98.890 OTHER SPECIFIED POSTPROCEDURAL STATES: Chronic | ICD-10-CM

## 2019-07-19 DIAGNOSIS — E87.70 FLUID OVERLOAD, UNSPECIFIED: ICD-10-CM

## 2019-07-19 DIAGNOSIS — N18.5 CHRONIC KIDNEY DISEASE, STAGE 5: ICD-10-CM

## 2019-07-19 DIAGNOSIS — E11.9 TYPE 2 DIABETES MELLITUS WITHOUT COMPLICATIONS: ICD-10-CM

## 2019-07-19 DIAGNOSIS — D63.8 ANEMIA IN OTHER CHRONIC DISEASES CLASSIFIED ELSEWHERE: ICD-10-CM

## 2019-07-19 DIAGNOSIS — Z29.9 ENCOUNTER FOR PROPHYLACTIC MEASURES, UNSPECIFIED: ICD-10-CM

## 2019-07-19 DIAGNOSIS — E78.5 HYPERLIPIDEMIA, UNSPECIFIED: ICD-10-CM

## 2019-07-19 DIAGNOSIS — I50.9 HEART FAILURE, UNSPECIFIED: ICD-10-CM

## 2019-07-19 DIAGNOSIS — H35.62 RETINAL HEMORRHAGE, LEFT EYE: Chronic | ICD-10-CM

## 2019-07-19 DIAGNOSIS — N18.6 END STAGE RENAL DISEASE: ICD-10-CM

## 2019-07-19 DIAGNOSIS — Z98.49 CATARACT EXTRACTION STATUS, UNSPECIFIED EYE: Chronic | ICD-10-CM

## 2019-07-19 DIAGNOSIS — Z98.84 BARIATRIC SURGERY STATUS: Chronic | ICD-10-CM

## 2019-07-19 LAB
ALBUMIN SERPL ELPH-MCNC: 4.5 G/DL — SIGNIFICANT CHANGE UP (ref 3.3–5)
ALP SERPL-CCNC: 114 U/L — SIGNIFICANT CHANGE UP (ref 40–120)
ALT FLD-CCNC: 12 U/L — SIGNIFICANT CHANGE UP (ref 10–45)
ANION GAP SERPL CALC-SCNC: 18 MMOL/L — HIGH (ref 5–17)
APTT BLD: 38.1 SEC — HIGH (ref 27.5–36.3)
AST SERPL-CCNC: 16 U/L — SIGNIFICANT CHANGE UP (ref 10–40)
BILIRUB SERPL-MCNC: 0.3 MG/DL — SIGNIFICANT CHANGE UP (ref 0.2–1.2)
BLD GP AB SCN SERPL QL: NEGATIVE — SIGNIFICANT CHANGE UP
BUN SERPL-MCNC: 127 MG/DL — HIGH (ref 7–23)
CALCIUM SERPL-MCNC: 8 MG/DL — LOW (ref 8.4–10.5)
CHLORIDE SERPL-SCNC: 100 MMOL/L — SIGNIFICANT CHANGE UP (ref 96–108)
CO2 SERPL-SCNC: 22 MMOL/L — SIGNIFICANT CHANGE UP (ref 22–31)
CREAT SERPL-MCNC: 7.31 MG/DL — HIGH (ref 0.5–1.3)
GLUCOSE SERPL-MCNC: 260 MG/DL — HIGH (ref 70–99)
INR BLD: 1.14 RATIO — SIGNIFICANT CHANGE UP (ref 0.88–1.16)
POTASSIUM SERPL-MCNC: 4.7 MMOL/L — SIGNIFICANT CHANGE UP (ref 3.5–5.3)
POTASSIUM SERPL-SCNC: 4.7 MMOL/L — SIGNIFICANT CHANGE UP (ref 3.5–5.3)
PROT SERPL-MCNC: 7.7 G/DL — SIGNIFICANT CHANGE UP (ref 6–8.3)
PROTHROM AB SERPL-ACNC: 13 SEC — HIGH (ref 10–12.9)
RH IG SCN BLD-IMP: POSITIVE — SIGNIFICANT CHANGE UP
SODIUM SERPL-SCNC: 140 MMOL/L — SIGNIFICANT CHANGE UP (ref 135–145)

## 2019-07-19 PROCEDURE — 99223 1ST HOSP IP/OBS HIGH 75: CPT | Mod: GC

## 2019-07-19 PROCEDURE — 71046 X-RAY EXAM CHEST 2 VIEWS: CPT | Mod: 26,59

## 2019-07-19 PROCEDURE — 76937 US GUIDE VASCULAR ACCESS: CPT | Mod: 26

## 2019-07-19 PROCEDURE — 99285 EMERGENCY DEPT VISIT HI MDM: CPT

## 2019-07-19 PROCEDURE — 77001 FLUOROGUIDE FOR VEIN DEVICE: CPT | Mod: 26

## 2019-07-19 PROCEDURE — 36556 INSERT NON-TUNNEL CV CATH: CPT

## 2019-07-19 RX ORDER — SODIUM CHLORIDE 9 MG/ML
1000 INJECTION, SOLUTION INTRAVENOUS
Refills: 0 | Status: DISCONTINUED | OUTPATIENT
Start: 2019-07-19 | End: 2019-07-24

## 2019-07-19 RX ORDER — DESMOPRESSIN ACETATE 0.1 MG/1
20 TABLET ORAL ONCE
Refills: 0 | Status: DISCONTINUED | OUTPATIENT
Start: 2019-07-19 | End: 2019-07-19

## 2019-07-19 RX ORDER — CHOLECALCIFEROL (VITAMIN D3) 125 MCG
2000 CAPSULE ORAL DAILY
Refills: 0 | Status: DISCONTINUED | OUTPATIENT
Start: 2019-07-19 | End: 2019-07-24

## 2019-07-19 RX ORDER — ATORVASTATIN CALCIUM 80 MG/1
20 TABLET, FILM COATED ORAL AT BEDTIME
Refills: 0 | Status: DISCONTINUED | OUTPATIENT
Start: 2019-07-19 | End: 2019-07-24

## 2019-07-19 RX ORDER — DESMOPRESSIN ACETATE 0.1 MG/1
30 TABLET ORAL ONCE
Refills: 0 | Status: DISCONTINUED | OUTPATIENT
Start: 2019-07-19 | End: 2019-07-19

## 2019-07-19 RX ORDER — DEXTROSE 50 % IN WATER 50 %
25 SYRINGE (ML) INTRAVENOUS ONCE
Refills: 0 | Status: DISCONTINUED | OUTPATIENT
Start: 2019-07-19 | End: 2019-07-24

## 2019-07-19 RX ORDER — IRON SUCROSE 20 MG/ML
200 INJECTION, SOLUTION INTRAVENOUS ONCE
Refills: 0 | Status: COMPLETED | OUTPATIENT
Start: 2019-07-20 | End: 2019-07-20

## 2019-07-19 RX ORDER — AMLODIPINE BESYLATE 2.5 MG/1
10 TABLET ORAL DAILY
Refills: 0 | Status: DISCONTINUED | OUTPATIENT
Start: 2019-07-19 | End: 2019-07-24

## 2019-07-19 RX ORDER — CHLORHEXIDINE GLUCONATE 213 G/1000ML
1 SOLUTION TOPICAL
Refills: 0 | Status: DISCONTINUED | OUTPATIENT
Start: 2019-07-19 | End: 2019-07-21

## 2019-07-19 RX ORDER — BUMETANIDE 0.25 MG/ML
4 INJECTION INTRAMUSCULAR; INTRAVENOUS
Refills: 0 | Status: DISCONTINUED | OUTPATIENT
Start: 2019-07-19 | End: 2019-07-20

## 2019-07-19 RX ORDER — GABAPENTIN 400 MG/1
300 CAPSULE ORAL
Refills: 0 | Status: DISCONTINUED | OUTPATIENT
Start: 2019-07-19 | End: 2019-07-24

## 2019-07-19 RX ORDER — GLUCAGON INJECTION, SOLUTION 0.5 MG/.1ML
1 INJECTION, SOLUTION SUBCUTANEOUS ONCE
Refills: 0 | Status: DISCONTINUED | OUTPATIENT
Start: 2019-07-19 | End: 2019-07-24

## 2019-07-19 RX ORDER — ASPIRIN/CALCIUM CARB/MAGNESIUM 324 MG
81 TABLET ORAL DAILY
Refills: 0 | Status: DISCONTINUED | OUTPATIENT
Start: 2019-07-19 | End: 2019-07-24

## 2019-07-19 RX ORDER — DESMOPRESSIN ACETATE 0.1 MG/1
20 TABLET ORAL ONCE
Refills: 0 | Status: COMPLETED | OUTPATIENT
Start: 2019-07-19 | End: 2019-07-19

## 2019-07-19 RX ORDER — SODIUM CHLORIDE 9 MG/ML
10 INJECTION INTRAMUSCULAR; INTRAVENOUS; SUBCUTANEOUS
Refills: 0 | Status: DISCONTINUED | OUTPATIENT
Start: 2019-07-19 | End: 2019-07-24

## 2019-07-19 RX ORDER — HYDRALAZINE HCL 50 MG
150 TABLET ORAL
Refills: 0 | Status: DISCONTINUED | OUTPATIENT
Start: 2019-07-19 | End: 2019-07-24

## 2019-07-19 RX ORDER — HEPARIN SODIUM 5000 [USP'U]/ML
5000 INJECTION INTRAVENOUS; SUBCUTANEOUS EVERY 8 HOURS
Refills: 0 | Status: DISCONTINUED | OUTPATIENT
Start: 2019-07-19 | End: 2019-07-23

## 2019-07-19 RX ORDER — LATANOPROST 0.05 MG/ML
1 SOLUTION/ DROPS OPHTHALMIC; TOPICAL AT BEDTIME
Refills: 0 | Status: DISCONTINUED | OUTPATIENT
Start: 2019-07-19 | End: 2019-07-24

## 2019-07-19 RX ORDER — LABETALOL HCL 100 MG
1 TABLET ORAL
Qty: 0 | Refills: 0 | DISCHARGE

## 2019-07-19 RX ORDER — INSULIN GLARGINE 100 [IU]/ML
15 INJECTION, SOLUTION SUBCUTANEOUS EVERY MORNING
Refills: 0 | Status: DISCONTINUED | OUTPATIENT
Start: 2019-07-20 | End: 2019-07-20

## 2019-07-19 RX ORDER — MINOXIDIL 10 MG
10 TABLET ORAL DAILY
Refills: 0 | Status: DISCONTINUED | OUTPATIENT
Start: 2019-07-19 | End: 2019-07-23

## 2019-07-19 RX ORDER — INSULIN LISPRO 100/ML
VIAL (ML) SUBCUTANEOUS
Refills: 0 | Status: DISCONTINUED | OUTPATIENT
Start: 2019-07-19 | End: 2019-07-24

## 2019-07-19 RX ORDER — FUROSEMIDE 40 MG
80 TABLET ORAL ONCE
Refills: 0 | Status: COMPLETED | OUTPATIENT
Start: 2019-07-19 | End: 2019-07-19

## 2019-07-19 RX ORDER — LABETALOL HCL 100 MG
200 TABLET ORAL
Refills: 0 | Status: DISCONTINUED | OUTPATIENT
Start: 2019-07-19 | End: 2019-07-24

## 2019-07-19 RX ORDER — DEXTROSE 50 % IN WATER 50 %
12.5 SYRINGE (ML) INTRAVENOUS ONCE
Refills: 0 | Status: DISCONTINUED | OUTPATIENT
Start: 2019-07-19 | End: 2019-07-24

## 2019-07-19 RX ORDER — DEXTROSE 50 % IN WATER 50 %
15 SYRINGE (ML) INTRAVENOUS ONCE
Refills: 0 | Status: DISCONTINUED | OUTPATIENT
Start: 2019-07-19 | End: 2019-07-24

## 2019-07-19 RX ORDER — SEVELAMER CARBONATE 2400 MG/1
1600 POWDER, FOR SUSPENSION ORAL EVERY 8 HOURS
Refills: 0 | Status: DISCONTINUED | OUTPATIENT
Start: 2019-07-19 | End: 2019-07-24

## 2019-07-19 RX ADMIN — Medication 80 MILLIGRAM(S): at 17:55

## 2019-07-19 RX ADMIN — HEPARIN SODIUM 5000 UNIT(S): 5000 INJECTION INTRAVENOUS; SUBCUTANEOUS at 23:10

## 2019-07-19 RX ADMIN — AMLODIPINE BESYLATE 10 MILLIGRAM(S): 2.5 TABLET ORAL at 23:09

## 2019-07-19 RX ADMIN — GABAPENTIN 300 MILLIGRAM(S): 400 CAPSULE ORAL at 23:13

## 2019-07-19 RX ADMIN — ATORVASTATIN CALCIUM 20 MILLIGRAM(S): 80 TABLET, FILM COATED ORAL at 23:10

## 2019-07-19 RX ADMIN — DESMOPRESSIN ACETATE 220 MICROGRAM(S): 0.1 TABLET ORAL at 17:55

## 2019-07-19 NOTE — H&P ADULT - NSHPREVIEWOFSYSTEMS_GEN_ALL_CORE
REVIEW OF SYSTEMS:    CONSTITUTIONAL: No weakness, fevers or chills  EYES: No vertigo or throat pain  ENT: No visual changes, eye pain  MOUTH: moist elena mucosal, no mouth ulcers  NECK: No pain or stiffness  RESPIRATORY: No cough, wheezing, hemoptysis; No shortness of breath  CARDIOVASCULAR: No chest pain or palpitations  GASTROINTESTINAL: No abdominal or epigastric pain. No nausea, vomiting, or hematemesis; No diarrhea or constipation. No melena or hematochezia.  GENITOURINARY: No dysuria, frequency or hematuria  NEUROLOGICAL: No numbness or weakness  SKIN: No itching, rashes REVIEW OF SYSTEMS:    CONSTITUTIONAL: No weakness, fevers or chills. + lethargy and 20lb weight gain  EYES: No vertigo or throat pain  ENT: No visual changes, eye pain  MOUTH: moist elena mucosal, no mouth ulcers  NECK: No pain or stiffness  RESPIRATORY: No cough, wheezing, hemoptysis; + shortness of breath  CARDIOVASCULAR: No chest pain or palpitations. + LE edema  GASTROINTESTINAL: No abdominal or epigastric pain. No nausea, vomiting, or hematemesis; No diarrhea or constipation. No melena or hematochezia.  GENITOURINARY: No dysuria, frequency or hematuria  NEUROLOGICAL: + chronic LE numbness. No weakness  SKIN: No itching, rashes

## 2019-07-19 NOTE — H&P ADULT - ASSESSMENT
58M with PMHs of ESRDs, insulin dependent DM, HTN, CHF presents for need for urgent HD, s/p Shiley placement by IR. 58M with PMHs of ESRDs, insulin dependent DM, HTN, CHF presents for elevated BUN and worsening Cr-, admitted for need for urgent HD, s/p Shiley placement by IR.

## 2019-07-19 NOTE — H&P ADULT - NSHPLABSRESULTS_GEN_ALL_CORE
Labs and images reviewed:          07-19    140  |  100  |  127<H>  ----------------------------<  260<H>  4.7   |  22  |  7.31<H>    Ca    8.0<L>      19 Jul 2019 17:16    TPro  7.7  /  Alb  4.5  /  TBili  0.3  /  DBili  x   /  AST  16  /  ALT  12  /  AlkPhos  114  07-19          PT/INR - ( 19 Jul 2019 17:16 )   PT: 13.0 sec;   INR: 1.14 ratio         PTT - ( 19 Jul 2019 17:16 )  PTT:38.1 sec    Lactate Trend      CAPILLARY BLOOD GLUCOSE      POCT Blood Glucose.: 76 mg/dL (18 Jul 2019 10:44) Labs and images reviewed:    07-19    140  |  100  |  127<H>  ----------------------------<  260<H>  4.7   |  22  |  7.31<H>    Ca    8.0<L>      19 Jul 2019 17:16    TPro  7.7  /  Alb  4.5  /  TBili  0.3  /  DBili  x   /  AST  16  /  ALT  12  /  AlkPhos  114  07-19          PT/INR - ( 19 Jul 2019 17:16 )   PT: 13.0 sec;   INR: 1.14 ratio         PTT - ( 19 Jul 2019 17:16 )  PTT:38.1 sec    Lactate Trend      CAPILLARY BLOOD GLUCOSE      POCT Blood Glucose.: 76 mg/dL (18 Jul 2019 10:44) I personally reviewed all Labs, imaging and ekg    07-19    140  |  100  |  127<H>  ----------------------------<  260<H>  4.7   |  22  |  7.31<H>    Ca    8.0<L>      19 Jul 2019 17:16    TPro  7.7  /  Alb  4.5  /  TBili  0.3  /  DBili  x   /  AST  16  /  ALT  12  /  AlkPhos  114  07-19          PT/INR - ( 19 Jul 2019 17:16 )   PT: 13.0 sec;   INR: 1.14 ratio    PTT - ( 19 Jul 2019 17:16 )  PTT:38.1 sec      POCT Blood Glucose.: 76 mg/dL (18 Jul 2019 10:44)    TW chest xray pa/la: on my interpretation there appears to be mild b/l intersitial markings c/w pulmonary edema  EKG: sinus rhythm 80bpm with 1st degree av block , QTc 502 and no ST elevations consistent with STEMI

## 2019-07-19 NOTE — ED PROVIDER NOTE - CARE PLAN
Principal Discharge DX:	Urinary retention  Secondary Diagnosis:	Dyspnea on exertion  Secondary Diagnosis:	Renal failure

## 2019-07-19 NOTE — CONSULT NOTE ADULT - SUBJECTIVE AND OBJECTIVE BOX
[facility] DIVISION OF KIDNEY DISEASES AND HYPERTENSION -- INITIAL CONSULT NOTE  --------------------------------------------------------------------------------  HPI:        PAST HISTORY  --------------------------------------------------------------------------------  PAST MEDICAL & SURGICAL HISTORY:  History of glaucoma: left eye  Ventral hernia  High cholesterol  Diabetes  Hypertension  DKA (diabetic ketoacidoses)  Diastolic dysfunction: mild. stage 1. EF 65%  CKD (chronic kidney disease)  Insulin pump status: denies  Pneumonia: 2013  Lower extremity edema  Osteomyelitis of ankle or foot: x2  Diabetic neuropathy  Hyperlipidemia  DM (diabetes mellitus)  Hypertension  Status post cataract extraction  S/P laparoscopic sleeve gastrectomy:   H/O elbow surgery: left  H/O cardiac catheterization: no stents  Retinal hemorrhage, left eye: s/p laser surgery  hx of right eye retinal hemorrahge, tx with laser surgery  S/P amputation: right hallux   S/P hernia repair  S/P carpal tunnel release: b/l    FAMILY HISTORY:  FH: aortic stenosis: Son  Family history of bone cancer: Grandfather  Family history of heart attack (Grandparent): father  @54 y.o.  grandfather  @ 52 y.o.    PAST SOCIAL HISTORY:    ALLERGIES & MEDICATIONS  --------------------------------------------------------------------------------  Allergies    clonidine (Other)    Intolerances      Standing Inpatient Medications  desmopressin IVPB 20 MICROGram(s) IV Intermittent Once  desmopressin IVPB 30 MICROGram(s) IV Intermittent Once    PRN Inpatient Medications      REVIEW OF SYSTEMS  --------------------------------------------------------------------------------  Gen: No weight changes, fatigue, fevers/chills, weakness  Skin: No rashes  Head/Eyes/Ears/Mouth: No headache; Normal hearing; Normal vision w/o blurriness; No sinus pain/discomfort, sore throat  Respiratory: No dyspnea, cough, wheezing, hemoptysis  CV: No chest pain, PND, orthopnea  GI: No abdominal pain, diarrhea, constipation, nausea, vomiting, melena, hematochezia  : No increased frequency, dysuria, hematuria, nocturia  MSK: No joint pain/swelling; no back pain; no edema  Neuro: No dizziness/lightheadedness, weakness, seizures, numbness, tingling  Heme: No easy bruising or bleeding  Endo: No heat/cold intolerance  Psych: No significant nervousness, anxiety, stress, depression    All other systems were reviewed and are negative, except as noted.    VITALS/PHYSICAL EXAM  --------------------------------------------------------------------------------  T(C): 36.7 (19 @ 15:00), Max: 36.7 (19 @ 15:00)  HR: 76 (19 15:00) (76 - 76)  BP: 173/68 (19 @ 15:00) (173/68 - 173/68)  RR: 18 (19 @ 15:00) (18 - 18)  SpO2: 98% (19 15:00) (98% - 98%)  Wt(kg): --  Height (cm): 173.99 (19 15:00)  Weight (kg): 98.784350816 (19 @ 15:00)  BMI (kg/m2): 32.5 (19 @ 15:00)  BSA (m2): 2.13 (19 @ 15:00)      Physical Exam:  	Gen: NAD, well-appearing  	HEENT: PERRL, supple neck, clear oropharynx  	Pulm: CTA B/L  	CV: RRR, S1S2; no rub  	Back: No spinal or CVA tenderness; no sacral edema  	Abd: +BS, soft, nontender/nondistended  	: No suprapubic tenderness  	UE: Warm, FROM, no clubbing, intact strength; no edema; no asterixis  	LE: Warm, FROM, no clubbing, intact strength; no edema  	Neuro: No focal deficits, intact gait  	Psych: Normal affect and mood  	Skin: Warm, without rashes  	Vascular access:    LABS/STUDIES  --------------------------------------------------------------------------------    142  |  107  |  134  ----------------------------<  58      [19 @ 07:37]  4.2   |  24  |  7.43        Ca     8.1     [19 @ 07:37]            Creatinine Trend:  SCr 7.43 [ @ 07:37]    Urinalysis - [19 @ 02:55]      Color Colorless / Appearance Clear / SG 1.010 / pH 6.0      Gluc 100 mg/dL / Ketone Negative  / Bili Negative / Urobili <2 mg/dL       Blood Trace / Protein 100 mg/dL / Leuk Est Negative / Nitrite Negative      RBC 3 / WBC 1 / Hyaline 2 / Gran  / Sq Epi  / Non Sq Epi 0 / Bacteria Negative      Iron 51, TIBC 273, %sat 19      [19 @ 10:11]  Ferritin 635      [19 @ 10:08]  PTH -- (Ca 8.5)      [19 @ 10:08]   184  HbA1c 6.2      [19 @ 09:15]    HCV 0.12, Nonreact      [19 @ 10:16] [facility] DIVISION OF KIDNEY DISEASES AND HYPERTENSION -- INITIAL CONSULT NOTE  --------------------------------------------------------------------------------  HPI:    59 yo M with advanced CKD V, DM (complicated by amputation, neuropathy, and retinopathy), HTN, and diastolic CHF was admitted to [facility] in early  with SOB of 1 week, along with increased LE edema. He underwent a 2D echo which revealed normal systolic function, No segmental wall motion abnormalities and Moderate pulmonary hypertension. He was being managed by Dr Vogt for several years but decided to change nephrologists during his most recent admission . His discharge BUN and creatinine were 100/5.8 mg/dl       Pt. was started on Lasix IV with improvement of symptoms. Upon admission (19), pt. had elevated Scr of 5.90 which remained stable. Per patient his baseline creatinine prior to admission was ~ 3.7-4.1.mg/dl, although records indicate it to be in the 5 range since early this year. Good weight for him is 190 lbs. He underwent a 2D echo which revealed normal systolic function, No segmental wall motion abnormalities and Moderate pulmonary hypertension. He was being managed by Dr Vogt for several years but decided to change nephrologists during his most recent admission . His discharge BUN and creatinine were 100/5.8 mg/dl          PAST HISTORY  --------------------------------------------------------------------------------  PAST MEDICAL & SURGICAL HISTORY:  History of glaucoma: left eye  Ventral hernia  High cholesterol  Diabetes  Hypertension  DKA (diabetic ketoacidoses)  Diastolic dysfunction: mild. stage 1. EF 65%  CKD (chronic kidney disease)  Insulin pump status: denies  Pneumonia: 2013  Lower extremity edema  Osteomyelitis of ankle or foot: x2  Diabetic neuropathy  Hyperlipidemia  DM (diabetes mellitus)  Hypertension  Status post cataract extraction  S/P laparoscopic sleeve gastrectomy:   H/O elbow surgery: left  H/O cardiac catheterization: no stents  Retinal hemorrhage, left eye: s/p laser surgery  hx of right eye retinal hemorrahge, tx with laser surgery  S/P amputation: right hallux   S/P hernia repair  S/P carpal tunnel release: b/l    FAMILY HISTORY:  FH: aortic stenosis: Son  Family history of bone cancer: Grandfather  Family history of heart attack (Grandparent): father  @54 y.o.  grandfather  @ 52 y.o.    PAST SOCIAL HISTORY:    ALLERGIES & MEDICATIONS  --------------------------------------------------------------------------------  Allergies    clonidine (Other)    Intolerances      Standing Inpatient Medications  desmopressin IVPB 20 MICROGram(s) IV Intermittent Once  desmopressin IVPB 30 MICROGram(s) IV Intermittent Once    PRN Inpatient Medications      REVIEW OF SYSTEMS  --------------------------------------------------------------------------------  Gen: No weight changes, fatigue, fevers/chills, weakness  Skin: No rashes  Head/Eyes/Ears/Mouth: No headache; Normal hearing; Normal vision w/o blurriness; No sinus pain/discomfort, sore throat  Respiratory: No dyspnea, cough, wheezing, hemoptysis  CV: No chest pain, PND, orthopnea  GI: No abdominal pain, diarrhea, constipation, nausea, vomiting, melena, hematochezia  : No increased frequency, dysuria, hematuria, nocturia  MSK: No joint pain/swelling; no back pain; no edema  Neuro: No dizziness/lightheadedness, weakness, seizures, numbness, tingling  Heme: No easy bruising or bleeding  Endo: No heat/cold intolerance  Psych: No significant nervousness, anxiety, stress, depression    All other systems were reviewed and are negative, except as noted.    VITALS/PHYSICAL EXAM  --------------------------------------------------------------------------------  T(C): 36.7 (07-19-19 @ 15:00), Max: 36.7 (07-19-19 @ 15:)  HR: 76 (07-19-19 @ 15:) (76 - 76)  BP: 173/68 (19 15:00) (173/68 - 173/68)  RR: 18 (07-19-19 @ 15:) (18 - 18)  SpO2: 98% (07-19-19 @ 15:) (98% - 98%)  Wt(kg): --  Height (cm): 173.99 (07-19-19 @ 15:)  Weight (kg): 98.825562482 (07-19-19 @ 15:)  BMI (kg/m2): 32.5 (07-19-19 @ 15:)  BSA (m2): 2.13 (07-19-19 @ 15:)      Physical Exam:  	Gen: NAD, well-appearing  	HEENT: PERRL, supple neck, clear oropharynx  	Pulm: CTA B/L  	CV: RRR, S1S2; no rub  	Back: No spinal or CVA tenderness; no sacral edema  	Abd: +BS, soft, nontender/nondistended  	: No suprapubic tenderness  	UE: Warm, FROM, no clubbing, intact strength; no edema; no asterixis  	LE: Warm, FROM, no clubbing, intact strength; no edema  	Neuro: No focal deficits, intact gait  	Psych: Normal affect and mood  	Skin: Warm, without rashes  	Vascular access:    LABS/STUDIES  --------------------------------------------------------------------------------    142  |  107  |  134  ----------------------------<  58      [19 @ 07:37]  4.2   |  24  |  7.43        Ca     8.1     [19 @ 07:37]            Creatinine Trend:  SCr 7.43 [ @ 07:37]    Urinalysis - [19 @ 02:55]      Color Colorless / Appearance Clear / SG 1.010 / pH 6.0      Gluc 100 mg/dL / Ketone Negative  / Bili Negative / Urobili <2 mg/dL       Blood Trace / Protein 100 mg/dL / Leuk Est Negative / Nitrite Negative      RBC 3 / WBC 1 / Hyaline 2 / Gran  / Sq Epi  / Non Sq Epi 0 / Bacteria Negative      Iron 51, TIBC 273, %sat 19      [19 @ 10:11]  Ferritin 635      [19 @ 10:08]  PTH -- (Ca 8.5)      [19 @ 10:08]   184  HbA1c 6.2      [19 @ 09:15]    HCV 0.12, Nonreact      [19 @ 10:16] [facility] DIVISION OF KIDNEY DISEASES AND HYPERTENSION -- INITIAL CONSULT NOTE  --------------------------------------------------------------------------------  HPI:    57 yo M with advanced CKD V, DM (complicated by amputation, neuropathy, and retinopathy), HTN, and diastolic CHF. Presented to ED today, due to worsening symptoms of nausea, fatigue, edema and sob. Pt follows with Dr. Melvin, was advised to come to ED because will need to start HD. Pt had avf placed 2 days ago, he was taking metolazone 2.5 mg and torsemide 40 mg daily BID, however has being experiencing worsening edema associated with PAEZ. Pt was admitted to [facility] in early  with SOB of 1 week, along with increased LE edema. He underwent a 2D echo which revealed normal systolic function, No segmental wall motion abnormalities and Moderate pulmonary hypertension. He was being managed by Dr Vogt for several years but decided to change nephrologists during his most recent admission . His discharge BUN and creatinine were 100/5.8 mg/dl, pt was last evaluated by Dr. Patel 19 labs at that time showed scr of 6.8 and bun 114, and repeated oon 19 had BUN/Scr at 134/7.34, given worsening in scr, bun and uremic symptoms pt was instructed to come to ED.     Pt was awake, alert oriented x3, has edema, feeling tired, nauseous, and having PAEZ, he claims having metallic taste and weight gaining.        PAST HISTORY  --------------------------------------------------------------------------------  PAST MEDICAL & SURGICAL HISTORY:  History of glaucoma: left eye  Ventral hernia  High cholesterol  Diabetes  Hypertension  DKA (diabetic ketoacidoses)  Diastolic dysfunction: mild. stage 1. EF 65%  CKD (chronic kidney disease)  Insulin pump status: denies  Pneumonia: 2013  Lower extremity edema  Osteomyelitis of ankle or foot: x2  Diabetic neuropathy  Hyperlipidemia  DM (diabetes mellitus)  Hypertension  Status post cataract extraction  S/P laparoscopic sleeve gastrectomy:   H/O elbow surgery: left  H/O cardiac catheterization: no stents  Retinal hemorrhage, left eye: s/p laser surgery  hx of right eye retinal hemorrahge, tx with laser surgery  S/P amputation: right hallux   S/P hernia repair  S/P carpal tunnel release: b/l    FAMILY HISTORY:  FH: aortic stenosis: Son  Family history of bone cancer: Grandfather  Family history of heart attack (Grandparent): father  @54 y.o.  grandfather  @ 52 y.o.    PAST SOCIAL HISTORY:    ALLERGIES & MEDICATIONS  --------------------------------------------------------------------------------  Allergies    clonidine (Other)    Intolerances      Standing Inpatient Medications  desmopressin IVPB 20 MICROGram(s) IV Intermittent Once  desmopressin IVPB 30 MICROGram(s) IV Intermittent Once    PRN Inpatient Medications      REVIEW OF SYSTEMS  --------------------------------------------------------------------------------  Gen: No weight changes, fatigue, fevers/chills, weakness  Respiratory: +dyspnea, cough, wheezing, hemoptysis  CV: No chest pain, PND, orthopnea  GI: No abdominal pain, diarrhea, constipation, ++nausea, vomiting, melena, hematochezia  MSK+++ edema  Neuro: No dizziness/lightheadedness, weakness, seizures, numbness, tingling  Heme: No easy bruising or bleeding    All other systems were reviewed and are negative, except as noted.    VITALS/PHYSICAL EXAM  --------------------------------------------------------------------------------  T(C): 36.7 (19 15:00), Max: 36.7 (19 15:00)  HR: 76 (07-19-19 @ 15:00) (76 - 76)  BP: 173/68 (19 15:00) (173/68 - 173/68)  RR: 18 (07-19-19 @ 15:00) (18 - 18)  SpO2: 98% (07-19-19 @ 15:) (98% - 98%)  Wt(kg): --  Height (cm): 173.99 (07-19-19 @ 15:00)  Weight (kg): 98.964391614 (07-19-19 @ 15:00)  BMI (kg/m2): 32.5 (07-19-19 @ 15:00)  BSA (m2): 2.13 (07-19-19 @ 15:00)      Physical Exam:  	Gen: NAD,   	HEENT:supple neck, clear oropharynx  	Pulm: bibasilar crackles.   	CV: RRR, S1S2; no rub  	Abd: +BS, soft, nontender/nondistended  	UE: no edema; no asterixis  	LE:  no edema  	Neuro: No focal deficits,  	Vascular access: No access.     LABS/STUDIES  --------------------------------------------------------------------------------    142  |  107  |  134  ----------------------------<  58      [19 @ 07:37]  4.2   |  24  |  7.43        Ca     8.1     [19 @ 07:37]            Creatinine Trend:  SCr 7.43 [ @ 07:37]    Urinalysis - [19 @ 02:55]      Color Colorless / Appearance Clear / SG 1.010 / pH 6.0      Gluc 100 mg/dL / Ketone Negative  / Bili Negative / Urobili <2 mg/dL       Blood Trace / Protein 100 mg/dL / Leuk Est Negative / Nitrite Negative      RBC 3 / WBC 1 / Hyaline 2 / Gran  / Sq Epi  / Non Sq Epi 0 / Bacteria Negative      Iron 51, TIBC 273, %sat 19      [19 @ 10:11]  Ferritin 635      [19 @ 10:08]  PTH -- (Ca 8.5)      [19 @ 10:08]   184  HbA1c 6.2      [19 @ 09:15]    HCV 0.12, Nonreact      [19 @ 10:16]

## 2019-07-19 NOTE — ED PROVIDER NOTE - OBJECTIVE STATEMENT
PMD Dr. Carlo Hassan MD  Cards: Marylou Topete MD    Attending MD Collier: 58M with PMH/PSH including DM on insulin, HTN, CHF, renal failure presents to the ED with here for dialysis.  Reports difficulty urinating, decreased urination.  Reports urge but no urine when he goes to urinate.  Reports increased bilateral lower extremity edema.  Reports 20 lb of water weight.  Reports was discharged from [facility] two weeks ago after admission for diuresis in June.  Denies chest pain, reports shortness of breath.  Denies abdominal pain, nausea, vomiting, diarrhea, blood in stools. Denies hematuria.  Denies fevers, chills.  On exam, NAD, head NCAT, PERRL, FROM at neck, no tenderness to midline palpation, no stepoffs along length of spine, lungs scattered crackles, with good inspiratory effort, +S1S2, +murmur, no r/g, abdomen soft with +BS, umbilical hernia, reduces, NT, ND, no CVAT, moving all extremities with 5/5 strength bilateral upper and lower extremities, good and equal  strength bilaterally, +partial R hallux amputation; A/P: 58M here for IR placement of shiley for emergent dialysis. PMD Dr. Carlo Hassan MD  Cards: Marylou Topete MD    Attending MD Collier: 58M with PMH/PSH including DM on insulin, HTN, CHF, renal failure presents to the ED with here for dialysis.  Reports difficulty urinating, decreased urination.  Reports urge but no urine when he goes to urinate.  Reports increased bilateral lower extremity edema.  Reports 20 lb of water weight.  Reports was discharged from [facility] two weeks ago after admission for diuresis in June.  Denies chest pain, reports shortness of breath.  Denies abdominal pain, nausea, vomiting, diarrhea, blood in stools. Denies hematuria.  Denies fevers, chills.  Reports has had retention in the past. On exam, NAD, head NCAT, PERRL, FROM at neck, no tenderness to midline palpation, no stepoffs along length of spine, lungs scattered crackles, with good inspiratory effort, +S1S2, +murmur, no r/g, abdomen soft with +BS, umbilical hernia, reduces, NT, ND, no CVAT, moving all extremities with 5/5 strength bilateral upper and lower extremities, good and equal  strength bilaterally, +partial R hallux amputation; A/P: 58M here for IR placement of shiley for emergent dialysis, bedside US >300cc in bladder, will place rivera, labs as per nephro, going to IR, admit to hospitalist

## 2019-07-19 NOTE — ED ADULT NURSE REASSESSMENT NOTE - NS ED NURSE REASSESS COMMENT FT1
2040-pAtient came back from IR. Alert & oriented x4. Shiley cath in placed to right IJ with dressing dry & intact. No bleeding noted. Foleycatheter in placed draining clear med urine -400 ml. Dressing to left wrist intact. No active bleeding noted.

## 2019-07-19 NOTE — ED ADULT NURSE REASSESSMENT NOTE - NS ED NURSE REASSESS COMMENT FT1
1720: received report from break INGRID Pelaez, states that pharmacy was called for medication. pending delivery of meds prior to IR procedure.   1730: pt updated on plan of care, resting in stretcher, appears comfortable. family at bedside.

## 2019-07-19 NOTE — ED ADULT NURSE NOTE - FAMILY HISTORY
Family history of bone cancer, Grandfather     FH: aortic stenosis, Son     Grandparent  Still living? Unknown  Family history of heart attack, Age at diagnosis: Age Unknown

## 2019-07-19 NOTE — ED PROVIDER NOTE - PSH
H/O cardiac catheterization  no stents  H/O elbow surgery  left  Retinal hemorrhage, left eye  s/p laser surgery  hx of right eye retinal hemorrahge, tx with laser surgery  S/P amputation  right hallux 5/13  S/P carpal tunnel release  b/l  S/P hernia repair    S/P laparoscopic sleeve gastrectomy  2015  Status post cataract extraction

## 2019-07-19 NOTE — H&P ADULT - ATTENDING COMMENTS
patient evaluated at bedside full attending attestation to follow Patient assigned to me by night hospitalist in charge for management and care for patient for this evening only. Care to be resumed by day hospitalist in the morning and thereafter.

## 2019-07-19 NOTE — H&P ADULT - PROBLEM SELECTOR PLAN 5
- hypertensive in the ED + component of volume overload  - c/w home labetalol, minoxidil, amlodipine, hydralazine with hold parameters

## 2019-07-19 NOTE — H&P ADULT - HISTORY OF PRESENT ILLNESS
58M with PMH/PSH including DM on insulin, HTN, CHF, renal failure presents to the ED with here for dialysis.  Reports difficulty urinating, decreased urination.  Reports urge but no urine when he goes to urinate.  Reports increased bilateral lower extremity edema.  Reports 20 lb of water weight.  Reports was discharged from [facility] two weeks ago after admission for diuresis in June.  Denies chest pain, reports shortness of breath.  Denies abdominal pain, nausea, vomiting, diarrhea, blood in stools. Denies hematuria.  Denies fevers, chills.  Reports has had retention in the past. On exam, NAD, head NCAT, PERRL, FROM at neck, no tenderness to midline palpation, no stepoffs along length of spine, lungs scattered crackles, with good inspiratory effort, +S1S2, +murmur, no r/g, abdomen soft with +BS, umbilical hernia, reduces, NT, ND, no CVAT, moving all extremities with 5/5 strength bilateral upper and lower extremities, good and equal  strength bilaterally, +partial R hallux amputation; A/P: 58M here for IR placement of shiley for emergent dialysis, bedside US >300cc in bladder, will place rivera, labs as per nephro, going to IR, admit to hospitalist 58M with PMHs of ESRDs, insulin dependent DM, HTN, CHF presents for need for urgent HD.  Per patient, reports difficulty urinating, decreased urination.  Reports urge but no urine when he goes to urinate.  Reports increased bilateral lower extremity edema.  Reports 20 lb of water weight.  Reports was discharged from [facility] two weeks ago after admission for diuresis in June.  Denies chest pain, reports shortness of breath.  Denies abdominal pain, nausea, vomiting, diarrhea, blood in stools. Denies hematuria.  Denies fevers, chills.  Reports has had retention in the past. On exam, NAD, head NCAT, PERRL, FROM at neck, no tenderness to midline palpation, no stepoffs along length of spine, lungs scattered crackles, with good inspiratory effort, +S1S2, +murmur, no r/g, abdomen soft with +BS, umbilical hernia, reduces, NT, ND, no CVAT, moving all extremities with 5/5 strength bilateral upper and lower extremities, good and equal  strength bilaterally, +partial R hallux amputation; A/P: 58M here for IR placement of shiley for emergent dialysis, bedside US >300cc in bladder, will place rivera, labs as per nephro, going to IR, admit to hospitalist 58M with PMHs of ESRDs, insulin dependent DM, HTN, CHF presents for need for urgent HD.  Per patient, he was previously admitted in June for overload s/p 1 week of Lasix IV. Since then, Patient reports again increased in LE and abdomen edema since discharge, as well as decreased urination. Over the last week or so, reports increased dyspnea not related to position, and at this point, gets dyspneic just walking from chair to bathroom. Was contacted by his nephrologist today for worsening kidney function and uremia, prompted him to go to the ED for urgent dialysis. ROS positive for mild bifrontal headache, lethargy, chronic back pain and L numbness/tingling. Also gained about 20lbs since last admission. Denies fever, chill, CP, n/v/c/d, abd discomfort or dysuria, sick contact/recent travels    Of note, patient had fistula placed yesterday 7/18.     ED vitals significant for hypertension up to 173/68, on RA saturating 94%. Labs with  and Cr- 7.31 (5.8 in June), s/p Lasix 80mg IVP and Shiley placement in right IJ. 58M with PMHs of ESRDs, insulin dependent DM, HTN, CHF presents for need for urgent HD per outpatient nephrologist for fluid overload. Per patient, he was previously admitted in June for overload s/p 1 week of Lasix IV. Since then, Patient reports again increased in LE and abdomen edema since discharge, as well as decreased urination. Over the last week or so, reports increased dyspnea not related to position, and at this point, gets dyspneic just walking from chair to bathroom. Was contacted by his nephrologist today for worsening kidney function and uremia, prompted him to go to the ED for urgent dialysis. ROS positive for mild bifrontal headache, lethargy, chronic back pain and L numbness/tingling. Also gained about 20lbs since last admission. Denies fever, chill, CP, n/v/c/d, abd discomfort or dysuria, sick contact/recent travels    Of note, patient had fistula placed yesterday 7/18.     ED vitals significant for hypertension up to 173/68, on RA saturating 94%. Labs with  and Cr- 7.31 (5.8 in June), s/p Lasix 80mg IVP and Shiley placement in right IJ.

## 2019-07-19 NOTE — CONSULT NOTE ADULT - PROBLEM SELECTOR RECOMMENDATION 9
Pt with hx of CKD stage V from diabetic nephropathy, presented with worsening sob and uremic symptoms. Outpatient labs showing worsening scr of ~7 range and BUN 130s. Case discussed with IR will place non tunneled catheter.   Obtain CBC, CMP, Coags. CXR.   Hepatitis panel.   Give 30 mcg of DDAVP before catheter placement.   Monitor labs, daily weight, renal diet.  will start HD today. Pt with hx of CKD stage V from diabetic nephropathy, presented with worsening sob and uremic symptoms. Outpatient labs showing worsening scr of ~7 range and BUN 130s. Case discussed with IR will place non tunneled catheter.   Obtain CBC, CMP, Coags. CXR.   Hepatitis panel.   Give 20 mcg of DDAVP before catheter placement.   Monitor labs, daily weight, renal diet.  will start HD today.

## 2019-07-19 NOTE — ED ADULT NURSE REASSESSMENT NOTE - NS ED NURSE REASSESS COMMENT FT1
Hairston catheter inserted using sterile technique. Second RN present to confirm sterility. Bedside drainage to gravity. Stat lock in place. Hairston catheter inserted using sterile technique. Second RN present to confirm sterility. Bedside drainage to gravity. Stat lock in place. 350ml drained.

## 2019-07-19 NOTE — H&P ADULT - NSHPPHYSICALEXAM_GEN_ALL_CORE
Vital Signs Last 24 Hrs  T(C): 36.7 (19 Jul 2019 17:15), Max: 36.7 (19 Jul 2019 15:00)  T(F): 98 (19 Jul 2019 17:15), Max: 98 (19 Jul 2019 15:00)  HR: 75 (19 Jul 2019 17:56) (75 - 80)  BP: 167/71 (19 Jul 2019 17:56) (167/71 - 176/82)  BP(mean): --  RR: 18 (19 Jul 2019 17:56) (18 - 18)  SpO2: 94% (19 Jul 2019 17:56) (94% - 98%)    PHYSICAL EXAM:  GENERAL: NAD, well-developed  HEAD:  Atraumatic, Normocephalic  EYES: EOMI, PERRLA, conjunctiva and sclera clear  NECK: Supple, No JVD  CHEST/LUNG: Clear to auscultation bilaterally; No wheeze  HEART: Regular rate and rhythm; No murmurs, rubs, or gallops  ABDOMEN: Soft, Nontender, Nondistended; Bowel sounds present  EXTREMITIES:  2+ Peripheral Pulses, No clubbing, cyanosis, or edema  PSYCH: AAOx3  NEUROLOGY: non-focal  SKIN: No rashes or lesions Vital Signs Last 24 Hrs  T(C): 36.7 (19 Jul 2019 17:15), Max: 36.7 (19 Jul 2019 15:00)  T(F): 98 (19 Jul 2019 17:15), Max: 98 (19 Jul 2019 15:00)  HR: 75 (19 Jul 2019 17:56) (75 - 80)  BP: 167/71 (19 Jul 2019 17:56) (167/71 - 176/82)  BP(mean): --  RR: 18 (19 Jul 2019 17:56) (18 - 18)  SpO2: 94% (19 Jul 2019 17:56) (94% - 98%)    PHYSICAL EXAM:  GENERAL: NAD, well-developed  HEAD:  Atraumatic, Normocephalic  EYES: EOMI, PERRLA, conjunctiva and sclera clear  NECK: Supple, No JVD  CHEST/LUNG: Diffuse crackles, more in bibasilar bases; No wheeze  HEART: Regular rate and rhythm; 3/6 Systolic murmurs, rubs, or gallops  ABDOMEN: Soft, Nontender, Bowel sounds present. Distended, and dull to percussion. Reproducible umbilical hernia  EXTREMITIES:  2+ edema up to knee with dependent edema of the thighs and buttock. right toe amputated  PSYCH: AAOx4  NEUROLOGY: non-focal. Strength 5/5  SKIN: No rashes or lesions Vital Signs Last 24 Hrs  T(C): 36.7 (19 Jul 2019 17:15), Max: 36.7 (19 Jul 2019 15:00)  T(F): 98 (19 Jul 2019 17:15), Max: 98 (19 Jul 2019 15:00)  HR: 75 (19 Jul 2019 17:56) (75 - 80)  BP: 167/71 (19 Jul 2019 17:56) (167/71 - 176/82)  BP(mean): --  RR: 18 (19 Jul 2019 17:56) (18 - 18)  SpO2: 94% (19 Jul 2019 17:56) (94% - 98%) on room air    PHYSICAL EXAM:  GENERAL: NAD, well-developed  HEAD:  Atraumatic, Normocephalic  EYES: EOMI, PERRLA, conjunctiva and sclera clear  NECK: Supple, No JVD appreciated  CHEST/LUNG: Diffuse crackles, more in bibasilar bases; No wheeze  HEART: Regular rate and rhythm; 3/6 Systolic murmurs, rubs, or gallops  ABDOMEN: Soft, Nontender, Bowel sounds present. Distended, and dull to percussion. Reproducible umbilical hernia  EXTREMITIES:  2+ edema up to knee with dependent edema of the thighs and buttock. right toe amputated  NEUROLOGY: alert and appropriate to exam, no focal weakness in extremities  SKIN: No rashes or lesions Vital Signs Last 24 Hrs  T(C): 36.7 (19 Jul 2019 17:15), Max: 36.7 (19 Jul 2019 15:00)  T(F): 98 (19 Jul 2019 17:15), Max: 98 (19 Jul 2019 15:00)  HR: 75 (19 Jul 2019 17:56) (75 - 80)  BP: 167/71 (19 Jul 2019 17:56) (167/71 - 176/82)  BP(mean): --  RR: 18 (19 Jul 2019 17:56) (18 - 18)  SpO2: 94% (19 Jul 2019 17:56) (94% - 98%) on room air    PHYSICAL EXAM:  GENERAL: NAD, well-developed  HEAD:  Atraumatic, Normocephalic  EYES: EOMI, PERRLA, conjunctiva and sclera clear  NECK: Supple, No JVD appreciated, Shiley of [facility] c/d/i  CHEST/LUNG: Diffuse crackles, more in bibasilar bases; No wheeze  HEART: Regular rate and rhythm; 3/6 Systolic murmurs, rubs, or gallops  ABDOMEN: Soft, Nontender, Bowel sounds present. Distended, and dull to percussion. Reproducible umbilical hernia  EXTREMITIES:  2+ edema up to knee with dependent edema of the thighs and buttock. right toe amputated  NEUROLOGY: alert and appropriate to exam, no focal weakness in extremities  SKIN: No rashes or lesions Vital Signs Last 24 Hrs  T(C): 36.7 (19 Jul 2019 17:15), Max: 36.7 (19 Jul 2019 15:00)  T(F): 98 (19 Jul 2019 17:15), Max: 98 (19 Jul 2019 15:00)  HR: 75 (19 Jul 2019 17:56) (75 - 80)  BP: 167/71 (19 Jul 2019 17:56) (167/71 - 176/82)  BP(mean): --  RR: 18 (19 Jul 2019 17:56) (18 - 18)  SpO2: 94% (19 Jul 2019 17:56) (94% - 98%) on room air    PHYSICAL EXAM:  GENERAL: NAD, well-developed  HEAD:  Atraumatic, Normocephalic  EYES: EOMI, PERRLA, conjunctiva and sclera clear  NECK: Supple, No JVD appreciated, Shiley of [facility] c/d/i  CHEST/LUNG: Diffuse crackles, more in bibasilar bases; No wheeze  HEART: Regular rate and rhythm; 3/6 Systolic murmurs, rubs, or gallops  ABDOMEN: Soft, Nontender, Bowel sounds present. Distended, and dull to percussion. Reproducible umbilical hernia  EXTREMITIES:  2+ edema up to knee with dependent edema of the thighs and buttock. right toe amputated. Left arm immature fistula  NEUROLOGY: alert and appropriate to exam, no focal weakness in extremities  SKIN: No rashes or lesions

## 2019-07-19 NOTE — H&P ADULT - PROBLEM SELECTOR PLAN 3
volume overloaded in the setting of both ESRD and underlying HFpEF, last EF 70%  - management as above  - check chest x-ray per outpatient record, h/o HFpEF and recent admission for acute CHF  - Echo: 6/14/19: EF 70%, normal systolic function  - s/p IV Lasix in the ED  - s/p bertrand, pending HD in AM first session  - daily weights and strict i/o  - given low urine output, c/w bumex 4mg IV BID  - cardiology eval in AM - fluid overload syndrome suspected primarily renal at risk for HF exacerbation

## 2019-07-19 NOTE — H&P ADULT - PROBLEM SELECTOR PLAN 1
ESRD now with volume overload and uremia.   - s/p Sam by IR  - pend HD in AM   - nephro consulted, huber recs  - c/w home sevelamer  - start bumex 4mg IV BID  - f/u hepatitis panel  - f/u EKG ESRD now with volume overload and uremia.   - s/p Sam by IR  - pend HD in AM   - nephro consulted, huber recs  - c/w home sevelamer  - start Bumex 4mg IV BID  - f/u hepatitis panel  - f/u EKG volume overloaded in the setting of both ESRD and underlying HFpEF, last EF 70%  - management as below  - CXR ordered, appears to marnie pulmonary edema without large effusions volume overloaded in the setting  ESRD with hx of underlying HFpEF, last EF 70%  - management as below  - CXR ordered, appears to marnie pulmonary edema without large effusions

## 2019-07-19 NOTE — H&P ADULT - PROBLEM SELECTOR PLAN 4
likely of chronic disease  - c/w EPO 5000 TIW  - f/u CBC today and start IV iron and aranesp per nephro recs

## 2019-07-19 NOTE — ED PROVIDER NOTE - PROGRESS NOTE DETAILS
Attending MD Collier: Discussed with Dr Beltre of nephrology, patient is in the ED for admission for worsening cardio renal syndrome, expected to be taken by IR in the next hour, recommended labs, hepatitis labs, DDAVP 20 mcg 30 min prior to procedure, will expedite

## 2019-07-19 NOTE — CONSULT NOTE ADULT - PROBLEM SELECTOR RECOMMENDATION 4
In the setting of CKD stage V and HF.   Start Bumex 4 mg IV BID.   Daily weightl  fluid restriction to 1 lit  Low salt diet. In the setting of CKD stage V and CHF.   Start Bumex 4 mg IV BID.   Daily weight  fluid restriction to 1 lit  Low salt diet.

## 2019-07-19 NOTE — CONSULT NOTE ADULT - PROBLEM SELECTOR RECOMMENDATION 3
Check phos, pth and vit d  If phos >5.5 start binders. Check phos, PTH and vit d  If phos >5.5 start binders.

## 2019-07-19 NOTE — ED ADULT NURSE NOTE - OBJECTIVE STATEMENT
58 year old male with Hx of CHF, ESRD, HTN, DM2 presents to the ED complaining of SOB presents to the ED from [facility] for emergent dialysis. As per Pt he has a new AV fistula that was placed in the left arm last week at [facility] for eventual dialysis. As per Pt he had huis second episode of SOB ans was told to come into [facility] for IR for emergent dialysis today after his creatinine showed 7. Pt is A&O x 4, VSS, afebrile, ambulating independently. Pt denies fever, chills, NVD, or chest pain. IV placed, labs drawn, bed in low position, safety measures in place. 20G in right forearm, family at bedside. 58 year old male with Hx of CHF, ESRD, HTN, DM2 presents to the ED complaining of SOB presents to the ED from [facility] for emergent dialysis. As per Pt he has a new AV fistula that was placed in the left arm last week at [facility] for eventual dialysis. As per Pt he had huis second episode of SOB ans was told to come into [facility] for IR for emergent dialysis today after his creatinine showed 7. Pt is A&O x 4, VSS, afebrile, ambulating independently. Pt denies fever, chills, NVD, or chest pain. IV placed, labs drawn, bed in low position, safety measures in place. 20G in right forearm, family at bedside. DO NOT USE LEFT EXTREMITY.

## 2019-07-19 NOTE — H&P ADULT - PROBLEM SELECTOR PLAN 6
- on home insuline long acting 21 units AM and 1-2 units premeal  - AM A1C and 15 units Lantus AM + ISS while inpatient - on home insuline long acting 21 units AM and 1-2 units premeal  - AM A1C and 15 units Lantus AM + ISS while inpatient  - c/w home gabapentin - on home insuline long acting 21 units AM and 1-2 units premeal  - AM A1C and 15 units Lantus AM + ISS while inpatient and titrate and necessary  - c/w home gabapentin

## 2019-07-19 NOTE — H&P ADULT - PROBLEM SELECTOR PLAN 2
per outpatient record, h/o HFpEF and recent admission for acute CHF, now pending urgent HD, likely component of cardiorenal syndrome  - Echo: 6/14/19: EF 70%, normal systolic function  - s/p 80 IV Lasix in the ED  - s/p bertrand, pending HD in AM first session  - daily weights and strict i/o  - given low urine output, c/w bumex 4mg IV BID  - cardiology eval in AM - likely HF ESRD now with worsening volume overload and uremia.   - s/p Sam by IR  - pend HD in AM per nephrology  - nephro consulted, huber recs  - c/w home sevelamer  - start Bumex 4mg IV BID  - f/u hepatitis panel

## 2019-07-19 NOTE — ED PROVIDER NOTE - PMH
CKD (chronic kidney disease)    Diabetes    Diabetic neuropathy    Diastolic dysfunction  mild. stage 1. EF 65%  DKA (diabetic ketoacidoses)    DM (diabetes mellitus)    High cholesterol    History of glaucoma  left eye  Hyperlipidemia    Hypertension    Hypertension    Insulin pump status  denies  Lower extremity edema    Osteomyelitis of ankle or foot  x2  Pneumonia  4/2013  Ventral hernia

## 2019-07-19 NOTE — H&P ADULT - NSHPSOCIALHISTORY_GEN_ALL_CORE
former smoker, 2/5ppd for 30 years, quit in 2005. Disabled, lives with wife and child. former smoker, 2/5ppd for 30 years, quit in 2005. Disabled, lives with wife and child. Social alcohol. Used marijuana in teenage years, non since.

## 2019-07-19 NOTE — PROGRESS NOTE ADULT - SUBJECTIVE AND OBJECTIVE BOX
59y/o M with hx of CKD admitted with PAEZ and uremia presented to IR for central venous access for HD.   s/p DDAVP.     Allergies: clonidine (Other)      PAST MEDICAL & SURGICAL HISTORY:  History of glaucoma: left eye  Ventral hernia  High cholesterol  Diabetes  Hypertension  DKA (diabetic ketoacidoses)  Diastolic dysfunction: mild. stage 1. EF 65%  CKD (chronic kidney disease)  Insulin pump status: denies  Pneumonia: 4/2013  Lower extremity edema  Osteomyelitis of ankle or foot: x2  Diabetic neuropathy  Hyperlipidemia  DM (diabetes mellitus)  Hypertension  Status post cataract extraction  S/P laparoscopic sleeve gastrectomy: 2015  H/O elbow surgery: left  H/O cardiac catheterization: no stents  Retinal hemorrhage, left eye: s/p laser surgery  hx of right eye retinal hemorrahge, tx with laser surgery  S/P amputation: right hallux 5/13  S/P hernia repair  S/P carpal tunnel release: b/l        Pertinent labs:07-19    140  |  100  |  127<H>  ----------------------------<  260<H>  4.7   |  22  |  7.31<H>    Ca    8.0<L>      19 Jul 2019 17:16    TPro  7.7  /  Alb  4.5  /  TBili  0.3  /  DBili  x   /  AST  16  /  ALT  12  /  AlkPhos  114  07-19  PT/INR - ( 19 Jul 2019 17:16 )   PT: 13.0 sec;   INR: 1.14 ratio         PTT - ( 19 Jul 2019 17:16 )  PTT:38.1 sec    Consent: Procedure/risks/ Benefits explained. Informed consent obtained. Pt verbalizes understanding.

## 2019-07-19 NOTE — CONSULT NOTE ADULT - ATTENDING COMMENTS
I have seen this patient with the fellow and agree with their assessment and plan. In addition, NICKIE on CKD with diabetic nephropathy and worsening CHF here with worsening uremia and fluid overload.  Can start diuresis now with bumex 4mg or lasix 100mg IV now till dialysis catheter placed today  D/w with IR- plan for temp non tunnelled cath for today and then first HD session- 2 hours 1kg UF, heparin free  Check hep panel and coags now- d/w with ER team  cards eval- pt sees Dr Marylou Topete  AVF is new, but cannot be used as relatively not mature yet.  needs IV iron and aranesp    Sheryl Beltre MD  Cell   Pager   Office

## 2019-07-20 ENCOUNTER — TRANSCRIPTION ENCOUNTER (OUTPATIENT)
Age: 59
End: 2019-07-20

## 2019-07-20 LAB
ALBUMIN SERPL ELPH-MCNC: 4.3 G/DL — SIGNIFICANT CHANGE UP (ref 3.3–5)
ALP SERPL-CCNC: 95 U/L — SIGNIFICANT CHANGE UP (ref 40–120)
ALT FLD-CCNC: 6 U/L — LOW (ref 10–45)
ANION GAP SERPL CALC-SCNC: 18 MMOL/L — HIGH (ref 5–17)
APTT BLD: 41 SEC — HIGH (ref 27.5–36.3)
AST SERPL-CCNC: 13 U/L — SIGNIFICANT CHANGE UP (ref 10–40)
BASOPHILS # BLD AUTO: 0 K/UL — SIGNIFICANT CHANGE UP (ref 0–0.2)
BASOPHILS NFR BLD AUTO: 0.5 % — SIGNIFICANT CHANGE UP (ref 0–2)
BILIRUB SERPL-MCNC: 0.3 MG/DL — SIGNIFICANT CHANGE UP (ref 0.2–1.2)
BUN SERPL-MCNC: 126 MG/DL — HIGH (ref 7–23)
CALCIUM SERPL-MCNC: 8.4 MG/DL — SIGNIFICANT CHANGE UP (ref 8.4–10.5)
CHLORIDE SERPL-SCNC: 103 MMOL/L — SIGNIFICANT CHANGE UP (ref 96–108)
CO2 SERPL-SCNC: 21 MMOL/L — LOW (ref 22–31)
CREAT SERPL-MCNC: 7.54 MG/DL — HIGH (ref 0.5–1.3)
EOSINOPHIL # BLD AUTO: 0.2 K/UL — SIGNIFICANT CHANGE UP (ref 0–0.5)
EOSINOPHIL NFR BLD AUTO: 2.7 % — SIGNIFICANT CHANGE UP (ref 0–6)
GLUCOSE BLDC GLUCOMTR-MCNC: 160 MG/DL — HIGH (ref 70–99)
GLUCOSE BLDC GLUCOMTR-MCNC: 189 MG/DL — HIGH (ref 70–99)
GLUCOSE BLDC GLUCOMTR-MCNC: 202 MG/DL — HIGH (ref 70–99)
GLUCOSE BLDC GLUCOMTR-MCNC: 290 MG/DL — HIGH (ref 70–99)
GLUCOSE BLDC GLUCOMTR-MCNC: 306 MG/DL — HIGH (ref 70–99)
GLUCOSE SERPL-MCNC: 173 MG/DL — HIGH (ref 70–99)
HBA1C BLD-MCNC: 6.4 % — HIGH (ref 4–5.6)
HBV CORE AB SER-ACNC: SIGNIFICANT CHANGE UP
HBV SURFACE AB SER-ACNC: <3 MIU/ML — LOW
HBV SURFACE AB SER-ACNC: SIGNIFICANT CHANGE UP
HBV SURFACE AG SER-ACNC: SIGNIFICANT CHANGE UP
HCT VFR BLD CALC: 22 % — LOW (ref 39–50)
HCV AB S/CO SERPL IA: 0.11 S/CO — SIGNIFICANT CHANGE UP (ref 0–0.99)
HCV AB SERPL-IMP: SIGNIFICANT CHANGE UP
HGB BLD-MCNC: 7.6 G/DL — LOW (ref 13–17)
INR BLD: 1.09 RATIO — SIGNIFICANT CHANGE UP (ref 0.88–1.16)
LYMPHOCYTES # BLD AUTO: 0.8 K/UL — LOW (ref 1–3.3)
LYMPHOCYTES # BLD AUTO: 11.3 % — LOW (ref 13–44)
MAGNESIUM SERPL-MCNC: 3.2 MG/DL — HIGH (ref 1.6–2.6)
MCHC RBC-ENTMCNC: 32.7 PG — SIGNIFICANT CHANGE UP (ref 27–34)
MCHC RBC-ENTMCNC: 34.5 GM/DL — SIGNIFICANT CHANGE UP (ref 32–36)
MCV RBC AUTO: 94.7 FL — SIGNIFICANT CHANGE UP (ref 80–100)
MONOCYTES # BLD AUTO: 0.3 K/UL — SIGNIFICANT CHANGE UP (ref 0–0.9)
MONOCYTES NFR BLD AUTO: 4.9 % — SIGNIFICANT CHANGE UP (ref 2–14)
NEUTROPHILS # BLD AUTO: 5.7 K/UL — SIGNIFICANT CHANGE UP (ref 1.8–7.4)
NEUTROPHILS NFR BLD AUTO: 80.6 % — HIGH (ref 43–77)
NT-PROBNP SERPL-SCNC: 4819 PG/ML — HIGH (ref 0–300)
PHOSPHATE SERPL-MCNC: 5.8 MG/DL — HIGH (ref 2.5–4.5)
PLATELET # BLD AUTO: 191 K/UL — SIGNIFICANT CHANGE UP (ref 150–400)
POTASSIUM SERPL-MCNC: 4.2 MMOL/L — SIGNIFICANT CHANGE UP (ref 3.5–5.3)
POTASSIUM SERPL-SCNC: 4.2 MMOL/L — SIGNIFICANT CHANGE UP (ref 3.5–5.3)
PROT SERPL-MCNC: 7.3 G/DL — SIGNIFICANT CHANGE UP (ref 6–8.3)
PROTHROM AB SERPL-ACNC: 12.6 SEC — SIGNIFICANT CHANGE UP (ref 10–12.9)
RBC # BLD: 2.32 M/UL — LOW (ref 4.2–5.8)
RBC # FLD: 13.4 % — SIGNIFICANT CHANGE UP (ref 10.3–14.5)
SODIUM SERPL-SCNC: 142 MMOL/L — SIGNIFICANT CHANGE UP (ref 135–145)
WBC # BLD: 7.1 K/UL — SIGNIFICANT CHANGE UP (ref 3.8–10.5)
WBC # FLD AUTO: 7.1 K/UL — SIGNIFICANT CHANGE UP (ref 3.8–10.5)

## 2019-07-20 PROCEDURE — 99233 SBSQ HOSP IP/OBS HIGH 50: CPT | Mod: GC

## 2019-07-20 PROCEDURE — 90935 HEMODIALYSIS ONE EVALUATION: CPT | Mod: GC

## 2019-07-20 RX ORDER — BUMETANIDE 0.25 MG/ML
4 INJECTION INTRAMUSCULAR; INTRAVENOUS
Refills: 0 | Status: DISCONTINUED | OUTPATIENT
Start: 2019-07-20 | End: 2019-07-23

## 2019-07-20 RX ORDER — ERYTHROPOIETIN 10000 [IU]/ML
4000 INJECTION, SOLUTION INTRAVENOUS; SUBCUTANEOUS
Refills: 0 | Status: DISCONTINUED | OUTPATIENT
Start: 2019-07-20 | End: 2019-07-24

## 2019-07-20 RX ORDER — IRON SUCROSE 20 MG/ML
200 INJECTION, SOLUTION INTRAVENOUS ONCE
Refills: 0 | Status: COMPLETED | OUTPATIENT
Start: 2019-07-20 | End: 2019-07-20

## 2019-07-20 RX ORDER — IRON SUCROSE 20 MG/ML
200 INJECTION, SOLUTION INTRAVENOUS EVERY 24 HOURS
Refills: 0 | Status: DISCONTINUED | OUTPATIENT
Start: 2019-07-20 | End: 2019-07-24

## 2019-07-20 RX ORDER — INSULIN LISPRO 100/ML
3 VIAL (ML) SUBCUTANEOUS
Refills: 0 | Status: DISCONTINUED | OUTPATIENT
Start: 2019-07-20 | End: 2019-07-24

## 2019-07-20 RX ORDER — CHLORHEXIDINE GLUCONATE 213 G/1000ML
1 SOLUTION TOPICAL DAILY
Refills: 0 | Status: DISCONTINUED | OUTPATIENT
Start: 2019-07-20 | End: 2019-07-24

## 2019-07-20 RX ORDER — INSULIN GLARGINE 100 [IU]/ML
18 INJECTION, SOLUTION SUBCUTANEOUS AT BEDTIME
Refills: 0 | Status: DISCONTINUED | OUTPATIENT
Start: 2019-07-20 | End: 2019-07-21

## 2019-07-20 RX ADMIN — Medication 10 MILLIGRAM(S): at 12:27

## 2019-07-20 RX ADMIN — INSULIN GLARGINE 15 UNIT(S): 100 INJECTION, SOLUTION SUBCUTANEOUS at 07:04

## 2019-07-20 RX ADMIN — Medication 4: at 13:51

## 2019-07-20 RX ADMIN — CHLORHEXIDINE GLUCONATE 1 APPLICATION(S): 213 SOLUTION TOPICAL at 16:08

## 2019-07-20 RX ADMIN — LATANOPROST 1 DROP(S): 0.05 SOLUTION/ DROPS OPHTHALMIC; TOPICAL at 21:12

## 2019-07-20 RX ADMIN — Medication 81 MILLIGRAM(S): at 12:25

## 2019-07-20 RX ADMIN — Medication 1 TABLET(S): at 12:25

## 2019-07-20 RX ADMIN — Medication 2000 UNIT(S): at 12:24

## 2019-07-20 RX ADMIN — LATANOPROST 1 DROP(S): 0.05 SOLUTION/ DROPS OPHTHALMIC; TOPICAL at 00:11

## 2019-07-20 RX ADMIN — HEPARIN SODIUM 5000 UNIT(S): 5000 INJECTION INTRAVENOUS; SUBCUTANEOUS at 16:02

## 2019-07-20 RX ADMIN — IRON SUCROSE 220 MILLIGRAM(S): 20 INJECTION, SOLUTION INTRAVENOUS at 00:10

## 2019-07-20 RX ADMIN — BUMETANIDE 132 MILLIGRAM(S): 0.25 INJECTION INTRAMUSCULAR; INTRAVENOUS at 17:14

## 2019-07-20 RX ADMIN — BUMETANIDE 132 MILLIGRAM(S): 0.25 INJECTION INTRAMUSCULAR; INTRAVENOUS at 18:52

## 2019-07-20 RX ADMIN — SEVELAMER CARBONATE 1600 MILLIGRAM(S): 2400 POWDER, FOR SUSPENSION ORAL at 12:28

## 2019-07-20 RX ADMIN — Medication 200 MILLIGRAM(S): at 00:03

## 2019-07-20 RX ADMIN — GABAPENTIN 300 MILLIGRAM(S): 400 CAPSULE ORAL at 17:14

## 2019-07-20 RX ADMIN — ATORVASTATIN CALCIUM 20 MILLIGRAM(S): 80 TABLET, FILM COATED ORAL at 21:11

## 2019-07-20 RX ADMIN — HEPARIN SODIUM 5000 UNIT(S): 5000 INJECTION INTRAVENOUS; SUBCUTANEOUS at 21:11

## 2019-07-20 RX ADMIN — SEVELAMER CARBONATE 1600 MILLIGRAM(S): 2400 POWDER, FOR SUSPENSION ORAL at 21:11

## 2019-07-20 RX ADMIN — BUMETANIDE 132 MILLIGRAM(S): 0.25 INJECTION INTRAMUSCULAR; INTRAVENOUS at 00:10

## 2019-07-20 RX ADMIN — INSULIN GLARGINE 18 UNIT(S): 100 INJECTION, SOLUTION SUBCUTANEOUS at 22:15

## 2019-07-20 RX ADMIN — BUMETANIDE 132 MILLIGRAM(S): 0.25 INJECTION INTRAMUSCULAR; INTRAVENOUS at 07:08

## 2019-07-20 RX ADMIN — IRON SUCROSE 110 MILLIGRAM(S): 20 INJECTION, SOLUTION INTRAVENOUS at 21:11

## 2019-07-20 RX ADMIN — CHLORHEXIDINE GLUCONATE 1 APPLICATION(S): 213 SOLUTION TOPICAL at 07:02

## 2019-07-20 RX ADMIN — SEVELAMER CARBONATE 1600 MILLIGRAM(S): 2400 POWDER, FOR SUSPENSION ORAL at 06:58

## 2019-07-20 RX ADMIN — HEPARIN SODIUM 5000 UNIT(S): 5000 INJECTION INTRAVENOUS; SUBCUTANEOUS at 07:00

## 2019-07-20 RX ADMIN — Medication 200 MILLIGRAM(S): at 12:27

## 2019-07-20 RX ADMIN — Medication 150 MILLIGRAM(S): at 12:28

## 2019-07-20 RX ADMIN — Medication 150 MILLIGRAM(S): at 00:04

## 2019-07-20 RX ADMIN — AMLODIPINE BESYLATE 10 MILLIGRAM(S): 2.5 TABLET ORAL at 12:28

## 2019-07-20 RX ADMIN — GABAPENTIN 300 MILLIGRAM(S): 400 CAPSULE ORAL at 07:08

## 2019-07-20 RX ADMIN — Medication 1: at 07:09

## 2019-07-20 RX ADMIN — IRON SUCROSE 110 MILLIGRAM(S): 20 INJECTION, SOLUTION INTRAVENOUS at 12:40

## 2019-07-20 RX ADMIN — Medication 3: at 17:01

## 2019-07-20 RX ADMIN — Medication 200 MILLIGRAM(S): at 21:11

## 2019-07-20 RX ADMIN — ERYTHROPOIETIN 4000 UNIT(S): 10000 INJECTION, SOLUTION INTRAVENOUS; SUBCUTANEOUS at 11:10

## 2019-07-20 RX ADMIN — Medication 150 MILLIGRAM(S): at 21:11

## 2019-07-20 NOTE — PROGRESS NOTE ADULT - PROBLEM SELECTOR PLAN 1
volume overloaded in the setting  ESRD with hx of underlying HFpEF, last EF 70%  - management as below  - CXR ordered, appears to marnie pulmonary edema without large effusions ESRD now with worsening volume overload and uremia.   - s/p Shiley and HD x 1  - As per nephro, c/w Bumex 4mg, fluid restriction 1L, and HD on Monday  - c/w home sevelamer, phos 5.8 today  - hepatitis panel neg  - f/u PTH and Vd for renal bone disease as per nephro rec

## 2019-07-20 NOTE — DISCHARGE NOTE PROVIDER - PROVIDER TOKENS
PROVIDER:[TOKEN:[[id number]],FOLLOWUP:[2 weeks]],PROVIDER:[TOKEN:[[id number]],FOLLOWUP:[2 weeks]],PROVIDER:[TOKEN:[[id number]],FOLLOWUP:[1 week]],PROVIDER:[TOKEN:[[id number]],FOLLOWUP:[1 week]],PROVIDER:[TOKEN:[[id number]],FOLLOWUP:[1 month]]

## 2019-07-20 NOTE — PROGRESS NOTE ADULT - SUBJECTIVE AND OBJECTIVE BOX
Dr. Nayely Contreras  Internal Medicine PGY-1  Pager: [phone]      Patient is a 58y old  Male who presents with a chief complaint of 58M sent in by nephrologist for fluid overload and for urgent HD (19 Jul 2019 18:55)      SUBJECTIVE / OVERNIGHT EVENTS:  No acute events overnight.    Patient seen and examined in AM. Reported    Patient denied fevers, CP, SOB, lower extremity pain.     MEDICATIONS  (STANDING):  amLODIPine   Tablet 10 milliGRAM(s) Oral daily  aspirin enteric coated 81 milliGRAM(s) Oral daily  atorvastatin 20 milliGRAM(s) Oral at bedtime  buMETAnide IVPB 4 milliGRAM(s) IV Intermittent two times a day  chlorhexidine 4% Liquid 1 Application(s) Topical <User Schedule>  cholecalciferol 2000 Unit(s) Oral daily  dextrose 5%. 1000 milliLiter(s) (50 mL/Hr) IV Continuous <Continuous>  dextrose 50% Injectable 12.5 Gram(s) IV Push once  dextrose 50% Injectable 25 Gram(s) IV Push once  dextrose 50% Injectable 25 Gram(s) IV Push once  gabapentin 300 milliGRAM(s) Oral two times a day  heparin  Injectable 5000 Unit(s) SubCutaneous every 8 hours  hydrALAZINE 150 milliGRAM(s) Oral two times a day  insulin glargine Injectable (LANTUS) 15 Unit(s) SubCutaneous every morning  insulin lispro (HumaLOG) corrective regimen sliding scale   SubCutaneous three times a day before meals  labetalol 200 milliGRAM(s) Oral two times a day  latanoprost 0.005% Ophthalmic Solution 1 Drop(s) Left EYE at bedtime  minoxidil 10 milliGRAM(s) Oral daily  multivitamin 1 Tablet(s) Oral daily  sevelamer carbonate 1600 milliGRAM(s) Oral every 8 hours    MEDICATIONS  (PRN):  dextrose 40% Gel 15 Gram(s) Oral once PRN Blood Glucose LESS THAN 70 milliGRAM(s)/deciliter  glucagon  Injectable 1 milliGRAM(s) IntraMuscular once PRN Glucose LESS THAN 70 milligrams/deciliter  sodium chloride 0.9% lock flush 10 milliLiter(s) IV Push every 1 hour PRN Pre/post blood products, medications, blood draw, and to maintain line patency      I&O's Summary    19 Jul 2019 07:01  -  20 Jul 2019 07:00  --------------------------------------------------------  IN: 0 mL / OUT: 1250 mL / NET: -1250 mL        Vital Signs Last 24 Hrs  T(C): 37.1 (20 Jul 2019 04:05), Max: 37.2 (19 Jul 2019 22:30)  T(F): 98.7 (20 Jul 2019 04:05), Max: 98.9 (19 Jul 2019 22:30)  HR: 79 (20 Jul 2019 04:05) (75 - 80)  BP: 145/68 (20 Jul 2019 04:05) (145/68 - 176/82)  BP(mean): --  RR: 18 (20 Jul 2019 04:05) (18 - 20)  SpO2: 96% (20 Jul 2019 04:05) (90% - 98%)    PHYSICAL EXAM:  General: No acute distress, well-nourished  HEENT: EOMI, clear conjunctiva, PERRL; normal oropharynx  Neck: Supple, no JVD  Lungs: Normal respiratory effort on room air; bilateral CTA; no wheezes or crackles.  Heart: RRR; S1/S2 present; No murmurs, rubs or gallops appreciated; Capillary refill < 2 seconds  Abdomen: Soft, non-distended, non-tender, no masses; bowel sound present  Extremities: No BLE edema; no joint swelling; full ROM  Neurology: A + O x4. No focal deficits; strength and sensation grossly intact. Spontaneous movements of all 4 extremities.  Skin: Warm, dry and appropriate color for skin tone; no new rashes or lesions.       LABS:      07-19    140  |  100  |  127<H>  ----------------------------<  260<H>  4.7   |  22  |  7.31<H>    Ca    8.0<L>      19 Jul 2019 17:16    TPro  7.7  /  Alb  4.5  /  TBili  0.3  /  DBili  x   /  AST  16  /  ALT  12  /  AlkPhos  114  07-19      PT/INR - ( 19 Jul 2019 17:16 )   PT: 13.0 sec;   INR: 1.14 ratio         PTT - ( 19 Jul 2019 17:16 )  PTT:38.1 sec                  EKG:   CXR:       CAPILLARY BLOOD GLUCOSE      POCT Blood Glucose.: 189 mg/dL (20 Jul 2019 06:58)  POCT Blood Glucose.: 202 mg/dL (20 Jul 2019 00:12)      RADIOLOGY & ADDITIONAL TESTS: Dr. Nayely Contreras  Internal Medicine PGY-1  Pager: [phone]      Patient is a 58y old  Male who presents with a chief complaint of 58M sent in by nephrologist for fluid overload and for urgent HD (19 Jul 2019 18:55)      SUBJECTIVE / OVERNIGHT EVENTS:  No acute events overnight.    Patient seen and examined in AM. Reported having SOB, but no CP, no fevers; Weight gain > 20 lbs since last admission, and increased abdominal pressure, but not pain. Able to tolerate PO, no n/v. (+) BLE swelling x 2-3 weeks. 8:30 AM HD session.       MEDICATIONS  (STANDING):  amLODIPine   Tablet 10 milliGRAM(s) Oral daily  aspirin enteric coated 81 milliGRAM(s) Oral daily  atorvastatin 20 milliGRAM(s) Oral at bedtime  buMETAnide IVPB 4 milliGRAM(s) IV Intermittent two times a day  chlorhexidine 4% Liquid 1 Application(s) Topical <User Schedule>  cholecalciferol 2000 Unit(s) Oral daily  dextrose 5%. 1000 milliLiter(s) (50 mL/Hr) IV Continuous <Continuous>  dextrose 50% Injectable 12.5 Gram(s) IV Push once  dextrose 50% Injectable 25 Gram(s) IV Push once  dextrose 50% Injectable 25 Gram(s) IV Push once  gabapentin 300 milliGRAM(s) Oral two times a day  heparin  Injectable 5000 Unit(s) SubCutaneous every 8 hours  hydrALAZINE 150 milliGRAM(s) Oral two times a day  insulin glargine Injectable (LANTUS) 15 Unit(s) SubCutaneous every morning  insulin lispro (HumaLOG) corrective regimen sliding scale   SubCutaneous three times a day before meals  labetalol 200 milliGRAM(s) Oral two times a day  latanoprost 0.005% Ophthalmic Solution 1 Drop(s) Left EYE at bedtime  minoxidil 10 milliGRAM(s) Oral daily  multivitamin 1 Tablet(s) Oral daily  sevelamer carbonate 1600 milliGRAM(s) Oral every 8 hours    MEDICATIONS  (PRN):  dextrose 40% Gel 15 Gram(s) Oral once PRN Blood Glucose LESS THAN 70 milliGRAM(s)/deciliter  glucagon  Injectable 1 milliGRAM(s) IntraMuscular once PRN Glucose LESS THAN 70 milligrams/deciliter  sodium chloride 0.9% lock flush 10 milliLiter(s) IV Push every 1 hour PRN Pre/post blood products, medications, blood draw, and to maintain line patency      I&O's Summary    19 Jul 2019 07:01  -  20 Jul 2019 07:00  --------------------------------------------------------  IN: 0 mL / OUT: 1250 mL / NET: -1250 mL        Vital Signs Last 24 Hrs  T(C): 37.1 (20 Jul 2019 04:05), Max: 37.2 (19 Jul 2019 22:30)  T(F): 98.7 (20 Jul 2019 04:05), Max: 98.9 (19 Jul 2019 22:30)  HR: 79 (20 Jul 2019 04:05) (75 - 80)  BP: 145/68 (20 Jul 2019 04:05) (145/68 - 176/82)  BP(mean): --  RR: 18 (20 Jul 2019 04:05) (18 - 20)  SpO2: 96% (20 Jul 2019 04:05) (90% - 98%)    PHYSICAL EXAM:  General: No acute distress, well-nourished  HEENT: EOMI, clear conjunctiva, PERRL; normal oropharynx  Neck: Supple  Lungs: Full conversation on room air, slightly dyspneic; no wheezes, but crackles bibasilar (L>R) and mid-lung field left  Heart: RRR; S1/S2 present; No murmurs, rubs or gallops appreciated  Abdomen: Soft, moderately distended, non-tender, no masses; bowel sound present  Extremities: (+) pitting BLE edema; full ROM; new AVF on left wrist with dressing (minimal serous-sanguinous drainage), (+) radial pulse; missing DIP for right toe (past amputation)  Neurology: A + O x 4. No focal deficits; strength and sensation grossly intact. Spontaneous movements of all 4 extremities.  Skin: Warm, dry and appropriate color for skin tone; no new rashes or lesions. RIJ shiley in place, dressing intact, tender around it. Extensive tattoos BUE, back and upper chest.        LABS:                          7.6    7.1   )-----------( 191      ( 20 Jul 2019 09:58 )             22.0   07-20    142  |  103  |  126<H>  ----------------------------<  173<H>  4.2   |  21<L>  |  7.54<H>    Ca    8.4      20 Jul 2019 09:58  Phos  5.8     07-20  Mg     3.2     07-20    TPro  7.3  /  Alb  4.3  /  TBili  0.3  /  DBili  x   /  AST  13  /  ALT  6<L>  /  AlkPhos  95  07-20    07-19    140  |  100  |  127<H>  ----------------------------<  260<H>  4.7   |  22  |  7.31<H>    Ca    8.0<L>      19 Jul 2019 17:16    TPro  7.7  /  Alb  4.5  /  TBili  0.3  /  DBili  x   /  AST  16  /  ALT  12  /  AlkPhos  114  07-19      PT/INR - ( 19 Jul 2019 17:16 )   PT: 13.0 sec;   INR: 1.14 ratio         PTT - ( 19 Jul 2019 17:16 )  PTT:38.1 sec      CAPILLARY BLOOD GLUCOSE      POCT Blood Glucose.: 189 mg/dL (20 Jul 2019 06:58)  POCT Blood Glucose.: 202 mg/dL (20 Jul 2019 00:12)      RADIOLOGY & ADDITIONAL TESTS:    < from: Xray Chest 2 Views PA/Lat (07.19.19 @ 21:45) >    Impression: Mild pulmonary edema.    < end of copied text >

## 2019-07-20 NOTE — PROGRESS NOTE ADULT - PROBLEM SELECTOR PLAN 6
- on home insuline long acting 21 units AM and 1-2 units premeal  - AM A1C and 15 units Lantus AM + ISS while inpatient and titrate and necessary  - c/w home gabapentin - c/w home atorvastatin

## 2019-07-20 NOTE — DISCHARGE NOTE PROVIDER - CARE PROVIDER_API CALL
Marylou Topete)  Cardiovascular Disease; Interventional Cardiology  [address] [place] [address]  Phone: [phone]  Fax: [phone]  Follow Up Time: 2 weeks    Tracey Delarosa (DO)  EndocrinologyMetabDiabetes  [address] [facility] [address], [address]  [place] [address]  Phone: [phone]  Fax: [phone]  Follow Up Time: 2 weeks    Carlo Hassan)  Critical Care Medicine  [id number] [place] [address], [address]nd [place] [address]  Phone: [phone]  Fax: [phone]  Follow Up Time: 1 week    Donna Melvin)  Internal Medicine; Nephrology  [address], [address]nd [place] [address]  Phone: [phone]  Fax: [phone]  Follow Up Time: 1 week    Eagle Vallejo)  Vascular Surgery  [address] [facility], [address]  [place] [address]  Phone: [phone]  Fax: [phone]  Follow Up Time: 1 month

## 2019-07-20 NOTE — PROGRESS NOTE ADULT - PROBLEM SELECTOR PLAN 1
-Pt with hx of CKD stage V from diabetic nephropathy, presented with worsening sob and uremic symptoms. Outpatient labs showing worsening scr of ~7 range and BUN 130s.  - S/P non-tunneled HD catheter placed on 7/19 by IR in [facility]   -Monitor labs, daily weight, renal diet.  - will start HD today  - C/w bumex at this time  - Please reach out to vascular to assess AVF as it was recently placed and has first dressing still.

## 2019-07-20 NOTE — PROGRESS NOTE ADULT - PROBLEM SELECTOR PLAN 2
ESRD now with worsening volume overload and uremia.   - s/p Sam by IR  - pend HD in AM per nephrology  - nephro consulted, huber recs  - c/w home sevelamer  - start Bumex 4mg IV BID  - f/u hepatitis panel per outpatient record, h/o HFpEF and recent admission for acute CHF  - Echo: 6/14/19: EF 70%, normal systolic function  - s/p IV Lasix in the ED; daily weights and strict i/o  - given low urine output, c/w bumex 4mg IV BID  - Will consult cardiology- fluid overload syndrome suspected primarily renal at risk for HF exacerbation

## 2019-07-20 NOTE — PROGRESS NOTE ADULT - PROBLEM SELECTOR PLAN 7
- c/w home atorvastatin - DVT: heparin QD  - diet: renal, carb consistent and DASH/TLC diet  - Dispo - pending due to clinical status

## 2019-07-20 NOTE — DISCHARGE NOTE PROVIDER - NSDCCPCAREPLAN_GEN_ALL_CORE_FT
PRINCIPAL DISCHARGE DIAGNOSIS  Diagnosis: ESRD (end stage renal disease)  Assessment and Plan of Treatment: You have end stage renal disease, and had significant fluid overload due to decreased kidney excretion. You had shortness of breath, lower leg swelling and distention of the belly due to the fluid backup into your lungs, belly and legs. Your fistula was not matured to use, and a line was placed in your neck, then switched to be the one on your chest to faciliate urgent dialysis. Your symptoms significantly improved. Please follow up with your PCP, nephrologist and dialysis center to ensure continuation of care and dialysis.      SECONDARY DISCHARGE DIAGNOSES  Diagnosis: Renal failure  Assessment and Plan of Treatment:     Diagnosis: Dyspnea on exertion  Assessment and Plan of Treatment: PRINCIPAL DISCHARGE DIAGNOSIS  Diagnosis: ESRD (end stage renal disease)  Assessment and Plan of Treatment: You have end stage renal disease, and had significant fluid overload due to decreased kidney excretion. You had shortness of breath, lower leg swelling and distention of the belly due to the fluid backup into your lungs, belly and legs. Your fistula was not matured to use, and a line was placed in your neck, then switched to be the one on your chest to faciliate urgent dialysis. Your symptoms significantly improved. Please follow up with your PCP, nephrologist and dialysis center to ensure continuation of care and dialysis.      SECONDARY DISCHARGE DIAGNOSES  Diagnosis: CHF (congestive heart failure)  Assessment and Plan of Treatment: You have a history of congestive heart failure. Diuretics were given along side the dialysis to decrease the fluid overload. Your symptoms resolved and your heart tolerated well. Please follow up with your cardiologist outpatient to further management you heart failure.    Diagnosis: Diabetes  Assessment and Plan of Treatment: You have diabetes and is on home insulin. The dose of the insulin was initially decreased due to decreased food intake when first admitted to the hospital. Your insulin dose was increased according to your finger sticks, and re-adjusted back to your home schedule. Please follow up with your endocrinologist outpatient to adjust your insulin regimen. PRINCIPAL DISCHARGE DIAGNOSIS  Diagnosis: ESRD (end stage renal disease)  Assessment and Plan of Treatment: You have end stage renal disease, and had significant fluid overload due to decreased kidney excretion. You had shortness of breath, lower leg swelling and distention of the belly due to the fluid backup into your lungs, belly and legs. Your fistula was not matured to use, and a line was placed in your neck, then switched to be the one on your chest to faciliate urgent dialysis. Your symptoms significantly improved. You are being prescribed bumex 4 mg twice daily to be taken on non-dialysis days. Please follow up with your PCP, nephrologist and dialysis center to ensure continuation of care and dialysis.      SECONDARY DISCHARGE DIAGNOSES  Diagnosis: Diabetes  Assessment and Plan of Treatment: You have diabetes and is on home insulin. The dose of the insulin was initially decreased due to decreased food intake when first admitted to the hospital. Your insulin dose was increased according to your finger sticks, and re-adjusted back to your home schedule. Please follow up with your endocrinologist outpatient to adjust your insulin regimen.    Diagnosis: CHF (congestive heart failure)  Assessment and Plan of Treatment: You have a history of congestive heart failure. Diuretics were given along side the dialysis to decrease the fluid overload. Your symptoms resolved and your heart tolerated well. Please follow up with your cardiologist outpatient to further management you heart failure.

## 2019-07-20 NOTE — DISCHARGE NOTE PROVIDER - NSDCFUADDAPPT_GEN_ALL_CORE_FT
Please follow up with your nephrologist in 1 week and set up for outpatient dialysis.  Please follow up with your endocrinologist in 1-2 weeks to adjust your insulin regimen.  Please follow up with your cardiologist in 2-4 weeks to assess your heart function.  Please follow up with your vascular surgeon in 1 month to assess the fistula.

## 2019-07-20 NOTE — DISCHARGE NOTE PROVIDER - CARE PROVIDERS DIRECT ADDRESSES
[email or web address]

## 2019-07-20 NOTE — PROGRESS NOTE ADULT - PROBLEM SELECTOR PLAN 2
Anemic in the setting of CKD, check iron profile, iron sat and ferritin.   Will start EPO 5000 TIW. Anemic in the setting of CKD, started IV iron    Will start EPO 5000 TIW.

## 2019-07-20 NOTE — PROGRESS NOTE ADULT - ATTENDING COMMENTS
pt volume overloaded.  new to HD, has a left fistula but not matured yet.  start on Iron for anemia  HD per renal.

## 2019-07-20 NOTE — PROGRESS NOTE ADULT - SUBJECTIVE AND OBJECTIVE BOX
[facility] DIVISION OF KIDNEY DISEASES AND HYPERTENSION -- FOLLOW UP NOTE  --------------------------------------------------------------------------------  Chief Complaint: LE swelling/SOB    24 hour events/subjective: Patient received HEIDI thurston yesterday, diuresed 1.25L overnight on bumex. Seen and examined today at dialysis unit. Denies CP/abdominal pain. Endorses LE swelling and SOB.        PAST HISTORY  --------------------------------------------------------------------------------  No significant changes to PMH, PSH, FHx, SHx, unless otherwise noted    ALLERGIES & MEDICATIONS  --------------------------------------------------------------------------------  Allergies    clonidine (Other)    Intolerances      Standing Inpatient Medications  amLODIPine   Tablet 10 milliGRAM(s) Oral daily  aspirin enteric coated 81 milliGRAM(s) Oral daily  atorvastatin 20 milliGRAM(s) Oral at bedtime  buMETAnide IVPB 4 milliGRAM(s) IV Intermittent two times a day  chlorhexidine 4% Liquid 1 Application(s) Topical <User Schedule>  cholecalciferol 2000 Unit(s) Oral daily  dextrose 5%. 1000 milliLiter(s) IV Continuous <Continuous>  dextrose 50% Injectable 12.5 Gram(s) IV Push once  dextrose 50% Injectable 25 Gram(s) IV Push once  dextrose 50% Injectable 25 Gram(s) IV Push once  gabapentin 300 milliGRAM(s) Oral two times a day  heparin  Injectable 5000 Unit(s) SubCutaneous every 8 hours  hydrALAZINE 150 milliGRAM(s) Oral two times a day  insulin glargine Injectable (LANTUS) 15 Unit(s) SubCutaneous every morning  insulin lispro (HumaLOG) corrective regimen sliding scale   SubCutaneous three times a day before meals  labetalol 200 milliGRAM(s) Oral two times a day  latanoprost 0.005% Ophthalmic Solution 1 Drop(s) Left EYE at bedtime  minoxidil 10 milliGRAM(s) Oral daily  multivitamin 1 Tablet(s) Oral daily  sevelamer carbonate 1600 milliGRAM(s) Oral every 8 hours    PRN Inpatient Medications  dextrose 40% Gel 15 Gram(s) Oral once PRN  glucagon  Injectable 1 milliGRAM(s) IntraMuscular once PRN  sodium chloride 0.9% lock flush 10 milliLiter(s) IV Push every 1 hour PRN      REVIEW OF SYSTEMS  --------------------------------------------------------------------------------  Gen: No fever/chills  Skin: No rashes  Head/Eyes/Ears/Mouth: No headache; Normal hearing;   Respiratory: + Dyspnea  CV: No chest pain, PND, orthopnea  GI: No abdominal pain, diarrhea, constipation, nausea, vomiting  MSK: + edema  Neuro: No dizziness/lightheadedness,    All other systems were reviewed and are negative, except as noted.    VITALS/PHYSICAL EXAM  --------------------------------------------------------------------------------  T(C): 37.1 (07-20-19 @ 04:05), Max: 37.2 (07-19-19 @ 22:30)  HR: 79 (07-20-19 @ 04:05) (75 - 80)  BP: 145/68 (07-20-19 @ 04:05) (145/68 - 176/82)  RR: 18 (07-20-19 @ 04:05) (18 - 20)  SpO2: 96% (07-20-19 @ 04:05) (90% - 98%)  Wt(kg): --  Height (cm): 173.99 (07-19-19 @ 15:00)  Weight (kg): 98.856150118 (07-19-19 @ 15:00)  BMI (kg/m2): 32.5 (07-19-19 @ 15:00)  BSA (m2): 2.13 (07-19-19 @ 15:00)      07-19-19 @ 07:01  -  07-20-19 @ 07:00  --------------------------------------------------------  IN: 150 mL / OUT: 1250 mL / NET: -1100 mL      Physical Exam:  	Gen: NAD, well-appearing  	HEENT:  STEFANIA Monahan  	Pulm: CTA B/L  	CV: RRR, S1S2; 4/6 Aortic stenosis murmur appreciated  	Back: No spinal or CVA tenderness; no sacral edema  	Abd: +BS, soft, nontender. + distended  	: No suprapubic tenderness  	UE: Warm, FROM, +2 edema  	LE: Warm, FROM,+2 edema  	Neuro: No focal deficits  	Psych: Normal affect and mood  	Skin: Warm, without rashes  	Vascular access: ZACH Monahan site    LABS/STUDIES  --------------------------------------------------------------------------------    140  |  100  |  127  ----------------------------<  260      [07-19-19 @ 17:16]  4.7   |  22  |  7.31        Ca     8.0     [07-19-19 @ 17:16]    TPro  7.7  /  Alb  4.5  /  TBili  0.3  /  DBili  x   /  AST  16  /  ALT  12  /  AlkPhos  114  [07-19-19 @ 17:16]    PT/INR: PT 13.0 , INR 1.14       [07-19-19 @ 17:16]  PTT: 38.1       [07-19-19 @ 17:16]      Creatinine Trend:  SCr 7.31 [07-19 @ 17:16]  SCr 7.43 [07-18 @ 07:37]        Iron 51, TIBC 273, %sat 19      [06-12-19 @ 10:11]  Ferritin 635      [06-12-19 @ 10:08]  PTH -- (Ca 8.5)      [06-12-19 @ 10:08]   184  HbA1c 6.2      [06-12-19 @ 09:15]    HBsAb <3.0      [07-19-19 @ 20:34]  HBsAb Nonreact      [07-19-19 @ 20:34]  HBsAg Nonreact      [07-19-19 @ 20:34]  HBcAb Nonreact      [07-19-19 @ 20:34]  HCV 0.11, Nonreact      [07-19-19 @ 20:34]

## 2019-07-20 NOTE — PROGRESS NOTE ADULT - ATTENDING COMMENTS
I have seen this patient with the fellow and agree with their assessment and plan. In addition, NICKIE on CKD now ESRD for uremic and fluid overloaded  First HD done today, couldn't get it done on friday  Seen on HD, tolerating well, 2hours 2k bath  Next HD monday  UF 1kg  Cont bumex for now to keep UO and diuresis ongoing  Anemia, start iron sucrose 200mg IV for 5 days total  Start epogen as well with HD  AVF not mature yet    Sheryl Beltre MD  Cell   Pager   Office

## 2019-07-20 NOTE — PROGRESS NOTE ADULT - PROBLEM SELECTOR PLAN 3
per outpatient record, h/o HFpEF and recent admission for acute CHF  - Echo: 6/14/19: EF 70%, normal systolic function  - s/p IV Lasix in the ED  - s/p bertrand, pending HD in AM first session  - daily weights and strict i/o  - given low urine output, c/w bumex 4mg IV BID  - cardiology eval in AM - fluid overload syndrome suspected primarily renal at risk for HF exacerbation likely of chronic disease  - c/w EPO 5000 TIW, IV iron sucrose 200mg x 5 days as per nephro rec

## 2019-07-20 NOTE — PROGRESS NOTE ADULT - PROBLEM SELECTOR PLAN 4
likely of chronic disease  - c/w EPO 5000 TIW  - f/u CBC today and start IV iron and aranesp per nephro recs - hypertensive in the ED + component of volume overload  - c/w home labetalol, minoxidil, amlodipine, hydralazine with hold parameters

## 2019-07-20 NOTE — PROGRESS NOTE ADULT - ASSESSMENT
58M with PMHs of ESRDs, insulin dependent DM, HTN, CHF presents for elevated BUN and worsening Cr-, admitted for need for urgent HD, s/p Shiley placement by IR. 58M with PMHs of ESRDs, insulin dependent DM, HTN, CHF presents for elevated BUN and worsening Cr-, admitted for urgent HD, s/p HD x 1.

## 2019-07-20 NOTE — PROGRESS NOTE ADULT - PROBLEM SELECTOR PLAN 4
Still overleaded  c/w Bumex 4 mg IV BID.   Daily weight  fluid restriction to 1 lit  Low salt diet. Still overloaded  c/w Bumex 4 mg IV BID.   Daily weight  fluid restriction to 1 lit  Low salt diet.

## 2019-07-20 NOTE — DISCHARGE NOTE PROVIDER - HOSPITAL COURSE
58M with PMHs of ESRDs, insulin dependent DM, HTN, CHF presents for elevated BUN and worsening Cr-, admitted for urgent HD 2/2 fluid overload. Patient had a similar episode in 06/2019 s/p aggressive diuresis and resolved symptoms. SOB and BLE edema progressively worsened after last admission. Patient had [facility] place for HD x 2. Shiley was switched to Permacath because patient's new AVF was done on 7/18, and not mature to use. Significant symptom improvement was achieved with _____ rounds of HD, and patient is now afebrile, VSS, H&H and lytes stable, with improved BUN and Cr, and medically ready for discharge. Patient will return home without skilled needs, and continue HD outpatient. 58M with PMHs of ESRDs, insulin dependent DM, HTN, CHF presents for elevated BUN and worsening Cr-, admitted for urgent HD 2/2 fluid overload. Patient had a similar episode in 06/2019 s/p aggressive diuresis and resolved symptoms. SOB and BLE edema progressively worsened after last admission. Patient had [facility] place for HD x 2. Shiley was switched to Permacath because patient's new AVF was done on 7/18, and not mature to use. Patient is now afebrile, VSS, H&H and lytes stable, with improved BUN and Cr, and medically ready for discharge. Patient will return home without skilled needs, and continue HD outpatient. 58M with PMHs of ESRDs, insulin dependent DM, HTN, CHF presents for elevated BUN and worsening Cr-, admitted for urgent HD 2/2 fluid overload. Patient had a similar episode in 06/2019 s/p aggressive diuresis and resolved symptoms. SOB and BLE edema progressively worsened after last admission. Patient had [facility] place for HD x 2 with significant improvement in SOB. Shiley was switched to Permacath because patient's new AVF was done on 7/18, and not mature to use. Patient is now afebrile, VSS, H&H and lytes stable, with improved BUN and Cr, and medically ready for discharge. Patient will return home without skilled needs, and continue HD outpatient. 58M with PMHs of ESRDs, insulin dependent DM, HTN, CHF presents for elevated BUN and worsening Cr-, admitted for urgent HD 2/2 fluid overload. Patient had a similar episode in 06/2019 s/p aggressive diuresis and resolved symptoms. SOB and BLE edema progressively worsened after last admission. Patient had [facility] place for HD x 2 with significant improvement in SOB. Shiley was switched to Permacath because patient's new AVF was done on 7/18, and not mature to use. Patient is now afebrile, VSS, H&H and lytes stable, with improved BUN and Cr, and medically ready for discharge. Patient will return home without skilled needs, and continue HD outpatient on TThSa schedule. 58M with PMHs of ESRDs, insulin dependent DM, HTN, CHF presents for elevated BUN and worsening Cr-, admitted for urgent HD 2/2 fluid overload. Patient had a similar episode in 06/2019 s/p aggressive diuresis and resolved symptoms. SOB and BLE edema progressively worsened after last admission. Patient had [facility] place for HD x 2 with significant improvement in SOB. Shiley was switched to Permacath because patient's new AVF was done on 7/18, and not mature to use. Patient is now afebrile, VSS, H&H and lytes stable, with improved BUN and Cr, and medically ready for discharge. Patient will return home without skilled needs, and continue HD outpatient on TThSa schedule with 4mg of Bumex BID on non-HD days. 58M with PMHs of ESRDs, insulin dependent DM, HTN, CHF presents for elevated BUN and worsening Cr-, admitted for urgent HD 2/2 fluid overload. Patient had a similar episode in 06/2019 s/p aggressive diuresis and resolved symptoms. SOB and BLE edema progressively worsened after last admission. Patient had [facility] place for HD x 2 with significant improvement in SOB. Shiley was switched to Permacath because patient's new AVF was done on 7/18, and not mature to use. Patient is now afebrile, VSS, H&H and lytes stable, with improved BUN and Cr, and medically ready for discharge. Patient will return home without skilled needs, and continue HD outpatient on TThSa schedule with 4mg of Bumex  4 mg BID on non-HD days.

## 2019-07-20 NOTE — PROGRESS NOTE ADULT - PROBLEM SELECTOR PLAN 5
- hypertensive in the ED + component of volume overload  - c/w home labetalol, minoxidil, amlodipine, hydralazine with hold parameters - on home insuline long acting 21 units AM and 1-2 units premeal  - AM A1C and 15 units Lantus AM + ISS while inpatient and titrate and necessary; currently  - 200's, with 1U required over 24 hours.   - c/w home gabapentin

## 2019-07-21 DIAGNOSIS — R11.0 NAUSEA: ICD-10-CM

## 2019-07-21 LAB
ANION GAP SERPL CALC-SCNC: 18 MMOL/L — HIGH (ref 5–17)
BUN SERPL-MCNC: 102 MG/DL — HIGH (ref 7–23)
CALCIUM SERPL-MCNC: 8 MG/DL — LOW (ref 8.4–10.5)
CALCIUM SERPL-MCNC: 8.6 MG/DL — SIGNIFICANT CHANGE UP (ref 8.4–10.5)
CHLORIDE SERPL-SCNC: 99 MMOL/L — SIGNIFICANT CHANGE UP (ref 96–108)
CO2 SERPL-SCNC: 23 MMOL/L — SIGNIFICANT CHANGE UP (ref 22–31)
CREAT SERPL-MCNC: 6.4 MG/DL — HIGH (ref 0.5–1.3)
GLUCOSE BLDC GLUCOMTR-MCNC: 191 MG/DL — HIGH (ref 70–99)
GLUCOSE BLDC GLUCOMTR-MCNC: 214 MG/DL — HIGH (ref 70–99)
GLUCOSE BLDC GLUCOMTR-MCNC: 235 MG/DL — HIGH (ref 70–99)
GLUCOSE BLDC GLUCOMTR-MCNC: 85 MG/DL — SIGNIFICANT CHANGE UP (ref 70–99)
GLUCOSE SERPL-MCNC: 69 MG/DL — LOW (ref 70–99)
HCT VFR BLD CALC: 23 % — LOW (ref 39–50)
HGB BLD-MCNC: 7.5 G/DL — LOW (ref 13–17)
MAGNESIUM SERPL-MCNC: 3 MG/DL — HIGH (ref 1.6–2.6)
MCHC RBC-ENTMCNC: 31.2 PG — SIGNIFICANT CHANGE UP (ref 27–34)
MCHC RBC-ENTMCNC: 32.6 GM/DL — SIGNIFICANT CHANGE UP (ref 32–36)
MCV RBC AUTO: 95.7 FL — SIGNIFICANT CHANGE UP (ref 80–100)
PHOSPHATE SERPL-MCNC: 5.5 MG/DL — HIGH (ref 2.5–4.5)
PLATELET # BLD AUTO: 189 K/UL — SIGNIFICANT CHANGE UP (ref 150–400)
POTASSIUM SERPL-MCNC: 3.9 MMOL/L — SIGNIFICANT CHANGE UP (ref 3.5–5.3)
POTASSIUM SERPL-SCNC: 3.9 MMOL/L — SIGNIFICANT CHANGE UP (ref 3.5–5.3)
PTH-INTACT FLD-MCNC: 227 PG/ML — HIGH (ref 15–65)
RBC # BLD: 2.4 M/UL — LOW (ref 4.2–5.8)
RBC # FLD: 13.5 % — SIGNIFICANT CHANGE UP (ref 10.3–14.5)
SODIUM SERPL-SCNC: 140 MMOL/L — SIGNIFICANT CHANGE UP (ref 135–145)
WBC # BLD: 6.1 K/UL — SIGNIFICANT CHANGE UP (ref 3.8–10.5)
WBC # FLD AUTO: 6.1 K/UL — SIGNIFICANT CHANGE UP (ref 3.8–10.5)

## 2019-07-21 PROCEDURE — 99232 SBSQ HOSP IP/OBS MODERATE 35: CPT | Mod: GC

## 2019-07-21 PROCEDURE — 99233 SBSQ HOSP IP/OBS HIGH 50: CPT | Mod: GC

## 2019-07-21 RX ORDER — INSULIN GLARGINE 100 [IU]/ML
16 INJECTION, SOLUTION SUBCUTANEOUS AT BEDTIME
Refills: 0 | Status: DISCONTINUED | OUTPATIENT
Start: 2019-07-21 | End: 2019-07-21

## 2019-07-21 RX ORDER — INSULIN GLARGINE 100 [IU]/ML
16 INJECTION, SOLUTION SUBCUTANEOUS EVERY MORNING
Refills: 0 | Status: DISCONTINUED | OUTPATIENT
Start: 2019-07-21 | End: 2019-07-22

## 2019-07-21 RX ORDER — ONDANSETRON 8 MG/1
4 TABLET, FILM COATED ORAL ONCE
Refills: 0 | Status: COMPLETED | OUTPATIENT
Start: 2019-07-21 | End: 2019-07-21

## 2019-07-21 RX ADMIN — HEPARIN SODIUM 5000 UNIT(S): 5000 INJECTION INTRAVENOUS; SUBCUTANEOUS at 05:25

## 2019-07-21 RX ADMIN — Medication 81 MILLIGRAM(S): at 11:49

## 2019-07-21 RX ADMIN — BUMETANIDE 132 MILLIGRAM(S): 0.25 INJECTION INTRAMUSCULAR; INTRAVENOUS at 17:25

## 2019-07-21 RX ADMIN — HEPARIN SODIUM 5000 UNIT(S): 5000 INJECTION INTRAVENOUS; SUBCUTANEOUS at 13:03

## 2019-07-21 RX ADMIN — SEVELAMER CARBONATE 1600 MILLIGRAM(S): 2400 POWDER, FOR SUSPENSION ORAL at 05:32

## 2019-07-21 RX ADMIN — Medication 2: at 17:34

## 2019-07-21 RX ADMIN — Medication 10 MILLIGRAM(S): at 05:32

## 2019-07-21 RX ADMIN — SEVELAMER CARBONATE 1600 MILLIGRAM(S): 2400 POWDER, FOR SUSPENSION ORAL at 12:19

## 2019-07-21 RX ADMIN — CHLORHEXIDINE GLUCONATE 1 APPLICATION(S): 213 SOLUTION TOPICAL at 12:19

## 2019-07-21 RX ADMIN — IRON SUCROSE 110 MILLIGRAM(S): 20 INJECTION, SOLUTION INTRAVENOUS at 21:25

## 2019-07-21 RX ADMIN — Medication 150 MILLIGRAM(S): at 05:32

## 2019-07-21 RX ADMIN — Medication 200 MILLIGRAM(S): at 05:32

## 2019-07-21 RX ADMIN — ONDANSETRON 4 MILLIGRAM(S): 8 TABLET, FILM COATED ORAL at 05:32

## 2019-07-21 RX ADMIN — Medication 1 TABLET(S): at 11:49

## 2019-07-21 RX ADMIN — AMLODIPINE BESYLATE 10 MILLIGRAM(S): 2.5 TABLET ORAL at 05:25

## 2019-07-21 RX ADMIN — GABAPENTIN 300 MILLIGRAM(S): 400 CAPSULE ORAL at 17:25

## 2019-07-21 RX ADMIN — BUMETANIDE 132 MILLIGRAM(S): 0.25 INJECTION INTRAMUSCULAR; INTRAVENOUS at 05:25

## 2019-07-21 RX ADMIN — Medication 2000 UNIT(S): at 11:49

## 2019-07-21 RX ADMIN — CHLORHEXIDINE GLUCONATE 1 APPLICATION(S): 213 SOLUTION TOPICAL at 05:33

## 2019-07-21 RX ADMIN — Medication 3 UNIT(S): at 12:19

## 2019-07-21 RX ADMIN — Medication 200 MILLIGRAM(S): at 17:24

## 2019-07-21 RX ADMIN — LATANOPROST 1 DROP(S): 0.05 SOLUTION/ DROPS OPHTHALMIC; TOPICAL at 21:25

## 2019-07-21 RX ADMIN — Medication 2: at 12:19

## 2019-07-21 RX ADMIN — ATORVASTATIN CALCIUM 20 MILLIGRAM(S): 80 TABLET, FILM COATED ORAL at 21:24

## 2019-07-21 RX ADMIN — SEVELAMER CARBONATE 1600 MILLIGRAM(S): 2400 POWDER, FOR SUSPENSION ORAL at 21:25

## 2019-07-21 RX ADMIN — Medication 3 UNIT(S): at 17:33

## 2019-07-21 RX ADMIN — GABAPENTIN 300 MILLIGRAM(S): 400 CAPSULE ORAL at 05:25

## 2019-07-21 RX ADMIN — Medication 150 MILLIGRAM(S): at 17:25

## 2019-07-21 RX ADMIN — HEPARIN SODIUM 5000 UNIT(S): 5000 INJECTION INTRAVENOUS; SUBCUTANEOUS at 21:24

## 2019-07-21 NOTE — PROGRESS NOTE ADULT - PROBLEM SELECTOR PLAN 2
per outpatient record, h/o HFpEF and recent admission for acute CHF  - Echo: 6/14/19: EF 70%, normal systolic function  - s/p IV Lasix in the ED; daily weights and strict i/o  - c/w bumex 4mg IV BID

## 2019-07-21 NOTE — PROGRESS NOTE ADULT - PROBLEM SELECTOR PLAN 1
-Pt with hx of CKD stage V from diabetic nephropathy, presented with worsening sob and uremic symptoms. Outpatient labs showing worsening scr of ~7 range and BUN 130s.  - S/P non-tunneled HD catheter placed on 7/19 by IR in [facility]   -Monitor labs, daily weight, renal diet.  - s/p first session of HD yesterday, no urgent need for UF or HD today  - C/w bumex at this time  - Please reach out to vascular to assess AVF as it was recently placed and has first dressing still.  - next Hd tomorrow

## 2019-07-21 NOTE — PROVIDER CONTACT NOTE (MEDICATION) - ASSESSMENT
A/O4; VS WDL. No lethargy or dizziness noted. PT requested to hold premeal insulin at this time as pt becomes hypoglycemic at times. PT up in bed; eating breakfast.

## 2019-07-21 NOTE — PROGRESS NOTE ADULT - ATTENDING COMMENTS
Pt needs dialysis placement  he prefers [facility] Dialysis unit under Dr Melvin's care    Sheryl Beltre MD  Cell   Pager   Office

## 2019-07-21 NOTE — PROVIDER CONTACT NOTE (MEDICATION) - ACTION/TREATMENT ORDERED:
MD aware. MD stated to hold premeal insulin for breakfast. Will monitor blood sugar closely. Will monitor pt closely.

## 2019-07-21 NOTE — PROGRESS NOTE ADULT - SUBJECTIVE AND OBJECTIVE BOX
Dr. Nayely Contreras  Internal Medicine PGY-1  Pager: [phone]      Patient is a 58y old  Male who presents with a chief complaint of 58M sent in by nephrologist for fluid overload and for urgent HD (20 Jul 2019 22:46)      SUBJECTIVE / OVERNIGHT EVENTS:  Overnight, patient had 1 episode of nausea and was given Zofran.    Patient seen and examined in AM. Reported    Patient denied fevers, CP, SOB, lower extremity pain.     MEDICATIONS  (STANDING):  amLODIPine   Tablet 10 milliGRAM(s) Oral daily  aspirin enteric coated 81 milliGRAM(s) Oral daily  atorvastatin 20 milliGRAM(s) Oral at bedtime  buMETAnide IVPB 4 milliGRAM(s) IV Intermittent two times a day  chlorhexidine 2% Cloths 1 Application(s) Topical daily  chlorhexidine 4% Liquid 1 Application(s) Topical <User Schedule>  cholecalciferol 2000 Unit(s) Oral daily  dextrose 5%. 1000 milliLiter(s) (50 mL/Hr) IV Continuous <Continuous>  dextrose 50% Injectable 12.5 Gram(s) IV Push once  dextrose 50% Injectable 25 Gram(s) IV Push once  dextrose 50% Injectable 25 Gram(s) IV Push once  epoetin prema Injectable 4000 Unit(s) IV Push <User Schedule>  gabapentin 300 milliGRAM(s) Oral two times a day  heparin  Injectable 5000 Unit(s) SubCutaneous every 8 hours  hydrALAZINE 150 milliGRAM(s) Oral two times a day  insulin glargine Injectable (LANTUS) 18 Unit(s) SubCutaneous at bedtime  insulin lispro (HumaLOG) corrective regimen sliding scale   SubCutaneous three times a day before meals  insulin lispro Injectable (HumaLOG) 3 Unit(s) SubCutaneous three times a day before meals  iron sucrose IVPB 200 milliGRAM(s) IV Intermittent every 24 hours  labetalol 200 milliGRAM(s) Oral two times a day  latanoprost 0.005% Ophthalmic Solution 1 Drop(s) Left EYE at bedtime  minoxidil 10 milliGRAM(s) Oral daily  multivitamin 1 Tablet(s) Oral daily  sevelamer carbonate 1600 milliGRAM(s) Oral every 8 hours    MEDICATIONS  (PRN):  dextrose 40% Gel 15 Gram(s) Oral once PRN Blood Glucose LESS THAN 70 milliGRAM(s)/deciliter  glucagon  Injectable 1 milliGRAM(s) IntraMuscular once PRN Glucose LESS THAN 70 milligrams/deciliter  sodium chloride 0.9% lock flush 10 milliLiter(s) IV Push every 1 hour PRN Pre/post blood products, medications, blood draw, and to maintain line patency      I&O's Summary    19 Jul 2019 07:01  -  20 Jul 2019 07:00  --------------------------------------------------------  IN: 150 mL / OUT: 1250 mL / NET: -1100 mL    20 Jul 2019 07:01  -  21 Jul 2019 06:24  --------------------------------------------------------  IN: 600 mL / OUT: 2500 mL / NET: -1900 mL        Vital Signs Last 24 Hrs  T(C): 36.5 (21 Jul 2019 04:45), Max: 37.1 (20 Jul 2019 08:45)  T(F): 97.7 (21 Jul 2019 04:45), Max: 98.7 (20 Jul 2019 08:45)  HR: 76 (21 Jul 2019 04:45) (76 - 84)  BP: 166/69 (21 Jul 2019 04:45) (149/64 - 168/72)  BP(mean): --  RR: 18 (21 Jul 2019 04:45) (18 - 18)  SpO2: 90% (21 Jul 2019 04:45) (90% - 95%)    PHYSICAL EXAM:  General: No acute distress, well-nourished  HEENT: EOMI, clear conjunctiva, PERRL; normal oropharynx  Neck: Supple  Lungs: Full conversation on room air, slightly dyspneic; no wheezes, but crackles bibasilar (L>R) and mid-lung field left  Heart: RRR; S1/S2 present; No murmurs, rubs or gallops appreciated  Abdomen: Soft, moderately distended, non-tender, no masses; bowel sound present  Extremities: (+) pitting BLE edema; full ROM; new AVF on left wrist with dressing (minimal serous-sanguinous drainage), (+) radial pulse; missing DIP for right toe (past amputation)  Neurology: A + O x 4. No focal deficits; strength and sensation grossly intact. Spontaneous movements of all 4 extremities.  Skin: Warm, dry and appropriate color for skin tone; no new rashes or lesions. [facility] in place, c/d/i      LABS:                        7.6    7.1   )-----------( 191      ( 20 Jul 2019 09:58 )             22.0       07-20    142  |  103  |  126<H>  ----------------------------<  173<H>  4.2   |  21<L>  |  7.54<H>    Ca    8.4      20 Jul 2019 09:58  Phos  5.8     07-20  Mg     3.2     07-20    TPro  7.3  /  Alb  4.3  /  TBili  0.3  /  DBili  x   /  AST  13  /  ALT  6<L>  /  AlkPhos  95  07-20      PT/INR - ( 20 Jul 2019 09:58 )   PT: 12.6 sec;   INR: 1.09 ratio         PTT - ( 20 Jul 2019 09:58 )  PTT:41.0 sec                  EKG:   CXR:       CAPILLARY BLOOD GLUCOSE      POCT Blood Glucose.: 160 mg/dL (20 Jul 2019 22:12)  POCT Blood Glucose.: 290 mg/dL (20 Jul 2019 17:00)  POCT Blood Glucose.: 306 mg/dL (20 Jul 2019 13:47)  POCT Blood Glucose.: 189 mg/dL (20 Jul 2019 06:58)      RADIOLOGY & ADDITIONAL TESTS: Dr. Nayely Contreras  Internal Medicine PGY-1  Pager: [phone]      Patient is a 58y old  Male who presents with a chief complaint of 58M sent in by nephrologist for fluid overload and for urgent HD (20 Jul 2019 22:46)      SUBJECTIVE / OVERNIGHT EVENTS:  Overnight, patient had 1 episode of nausea and was given Zofran.    Patient seen and examined in AM. Reported passed a lot of gas after getting Zofran, and been feeling better and went back to sleep. Had SOB during the episode, but overall SOB is much improved compared to before HD.     Patient denied fevers, CP, abdominal or lower extremity pain.     MEDICATIONS  (STANDING):  amLODIPine   Tablet 10 milliGRAM(s) Oral daily  aspirin enteric coated 81 milliGRAM(s) Oral daily  atorvastatin 20 milliGRAM(s) Oral at bedtime  buMETAnide IVPB 4 milliGRAM(s) IV Intermittent two times a day  chlorhexidine 2% Cloths 1 Application(s) Topical daily  chlorhexidine 4% Liquid 1 Application(s) Topical <User Schedule>  cholecalciferol 2000 Unit(s) Oral daily  dextrose 5%. 1000 milliLiter(s) (50 mL/Hr) IV Continuous <Continuous>  dextrose 50% Injectable 12.5 Gram(s) IV Push once  dextrose 50% Injectable 25 Gram(s) IV Push once  dextrose 50% Injectable 25 Gram(s) IV Push once  epoetin prema Injectable 4000 Unit(s) IV Push <User Schedule>  gabapentin 300 milliGRAM(s) Oral two times a day  heparin  Injectable 5000 Unit(s) SubCutaneous every 8 hours  hydrALAZINE 150 milliGRAM(s) Oral two times a day  insulin glargine Injectable (LANTUS) 18 Unit(s) SubCutaneous at bedtime  insulin lispro (HumaLOG) corrective regimen sliding scale   SubCutaneous three times a day before meals  insulin lispro Injectable (HumaLOG) 3 Unit(s) SubCutaneous three times a day before meals  iron sucrose IVPB 200 milliGRAM(s) IV Intermittent every 24 hours  labetalol 200 milliGRAM(s) Oral two times a day  latanoprost 0.005% Ophthalmic Solution 1 Drop(s) Left EYE at bedtime  minoxidil 10 milliGRAM(s) Oral daily  multivitamin 1 Tablet(s) Oral daily  sevelamer carbonate 1600 milliGRAM(s) Oral every 8 hours    MEDICATIONS  (PRN):  dextrose 40% Gel 15 Gram(s) Oral once PRN Blood Glucose LESS THAN 70 milliGRAM(s)/deciliter  glucagon  Injectable 1 milliGRAM(s) IntraMuscular once PRN Glucose LESS THAN 70 milligrams/deciliter  sodium chloride 0.9% lock flush 10 milliLiter(s) IV Push every 1 hour PRN Pre/post blood products, medications, blood draw, and to maintain line patency      I&O's Summary    19 Jul 2019 07:01  -  20 Jul 2019 07:00  --------------------------------------------------------  IN: 150 mL / OUT: 1250 mL / NET: -1100 mL    20 Jul 2019 07:01  -  21 Jul 2019 06:24  --------------------------------------------------------  IN: 600 mL / OUT: 2500 mL / NET: -1900 mL        Vital Signs Last 24 Hrs  T(C): 36.5 (21 Jul 2019 04:45), Max: 37.1 (20 Jul 2019 08:45)  T(F): 97.7 (21 Jul 2019 04:45), Max: 98.7 (20 Jul 2019 08:45)  HR: 76 (21 Jul 2019 04:45) (76 - 84)  BP: 166/69 (21 Jul 2019 04:45) (149/64 - 168/72)  BP(mean): --  RR: 18 (21 Jul 2019 04:45) (18 - 18)  SpO2: 90% (21 Jul 2019 04:45) (90% - 95%)    PHYSICAL EXAM:  General: No acute distress, well-nourished  HEENT: EOMI, clear conjunctiva, PERRL; normal oropharynx  Neck: Supple  Lungs: Full conversation on room air; no wheezes, side-lying left with crackles on left lung fields in dependent position. Clear right lung fields.   Heart: RRR; S1/S2 present; No murmurs, rubs or gallops appreciated  Abdomen: Moderately distended, non-tender, no masses; bowel sound present  Extremities: (+) pitting BLE edema; full ROM; new AVF on left wrist with dressing (minimal serous-sanguinous drainage), (+) radial pulse; missing DIP for right toe (past amputation)  Neurology: A + O x 4. No focal deficits; strength and sensation grossly intact. Spontaneous movements of all 4 extremities.  Skin: Warm, dry and appropriate color for skin tone; no new rashes or lesions. [facility] [place] in place, c/d/i      LABS:                        7.5    6.1   )-----------( 189      ( 21 Jul 2019 07:23 )             23.0     07-21    140  |  99  |  102<H>  ----------------------------<  69<L>  3.9   |  23  |  6.40<H>    Ca    8.6      21 Jul 2019 07:23  Phos  5.5     07-21  Mg     3.0     07-21    TPro  7.3  /  Alb  4.3  /  TBili  0.3  /  DBili  x   /  AST  13  /  ALT  6<L>  /  AlkPhos  95  07-20                          7.6    7.1   )-----------( 191      ( 20 Jul 2019 09:58 )             22.0       07-20    142  |  103  |  126<H>  ----------------------------<  173<H>  4.2   |  21<L>  |  7.54<H>    Ca    8.4      20 Jul 2019 09:58  Phos  5.8     07-20  Mg     3.2     07-20    TPro  7.3  /  Alb  4.3  /  TBili  0.3  /  DBili  x   /  AST  13  /  ALT  6<L>  /  AlkPhos  95  07-20      PT/INR - ( 20 Jul 2019 09:58 )   PT: 12.6 sec;   INR: 1.09 ratio         PTT - ( 20 Jul 2019 09:58 )  PTT:41.0 sec        CAPILLARY BLOOD GLUCOSE      POCT Blood Glucose.: 160 mg/dL (20 Jul 2019 22:12)  POCT Blood Glucose.: 290 mg/dL (20 Jul 2019 17:00)  POCT Blood Glucose.: 306 mg/dL (20 Jul 2019 13:47)  POCT Blood Glucose.: 189 mg/dL (20 Jul 2019 06:58)      RADIOLOGY & ADDITIONAL TESTS:

## 2019-07-21 NOTE — PROGRESS NOTE ADULT - PROBLEM SELECTOR PLAN 4
- SBP in 150 - 160's  - c/w home labetalol, minoxidil, amlodipine, hydralazine with hold parameters - on home insuline long acting 21 units AM and 1-2 units premeal  - A1C 6.4;  - Increased Lantus to 18U and added 3U premeal + ISS for FS in 300's; PM 's  - AM FS 85, premeal held;  - c/w home gabapentin - on home insuline long acting 21 units AM and 1-2 units premeal  - A1C 6.4;  - Decrease Lantus to 16U and keep 3U premeal + ISS for AM FS 85  - AM FS 85, premeal held;  - c/w home gabapentin

## 2019-07-21 NOTE — PROGRESS NOTE ADULT - SUBJECTIVE AND OBJECTIVE BOX
[facility] DIVISION OF KIDNEY DISEASES AND HYPERTENSION -- FOLLOW UP NOTE  --------------------------------------------------------------------------------  Chief Complaint:  NICKIE  24 hour events/subjective:  s/p first hd treatment on sat      PAST HISTORY  --------------------------------------------------------------------------------  No significant changes to PMH, PSH, FHx, SHx, unless otherwise noted    ALLERGIES & MEDICATIONS  --------------------------------------------------------------------------------  Allergies    clonidine (Other)    Intolerances      Standing Inpatient Medications  amLODIPine   Tablet 10 milliGRAM(s) Oral daily  aspirin enteric coated 81 milliGRAM(s) Oral daily  atorvastatin 20 milliGRAM(s) Oral at bedtime  buMETAnide IVPB 4 milliGRAM(s) IV Intermittent two times a day  chlorhexidine 2% Cloths 1 Application(s) Topical daily  chlorhexidine 4% Liquid 1 Application(s) Topical <User Schedule>  cholecalciferol 2000 Unit(s) Oral daily  dextrose 5%. 1000 milliLiter(s) IV Continuous <Continuous>  dextrose 50% Injectable 12.5 Gram(s) IV Push once  dextrose 50% Injectable 25 Gram(s) IV Push once  dextrose 50% Injectable 25 Gram(s) IV Push once  epoetin prema Injectable 4000 Unit(s) IV Push <User Schedule>  gabapentin 300 milliGRAM(s) Oral two times a day  heparin  Injectable 5000 Unit(s) SubCutaneous every 8 hours  hydrALAZINE 150 milliGRAM(s) Oral two times a day  insulin glargine Injectable (LANTUS) 18 Unit(s) SubCutaneous at bedtime  insulin lispro (HumaLOG) corrective regimen sliding scale   SubCutaneous three times a day before meals  insulin lispro Injectable (HumaLOG) 3 Unit(s) SubCutaneous three times a day before meals  iron sucrose IVPB 200 milliGRAM(s) IV Intermittent every 24 hours  labetalol 200 milliGRAM(s) Oral two times a day  latanoprost 0.005% Ophthalmic Solution 1 Drop(s) Left EYE at bedtime  minoxidil 10 milliGRAM(s) Oral daily  multivitamin 1 Tablet(s) Oral daily  sevelamer carbonate 1600 milliGRAM(s) Oral every 8 hours    PRN Inpatient Medications  dextrose 40% Gel 15 Gram(s) Oral once PRN  glucagon  Injectable 1 milliGRAM(s) IntraMuscular once PRN  sodium chloride 0.9% lock flush 10 milliLiter(s) IV Push every 1 hour PRN      REVIEW OF SYSTEMS  --------------------------------------------------------------------------------  Gen: No weight changes, fatigue, fevers/chills, weakness  Skin: No rashes  Head/Eyes/Ears/Mouth: No headache; Normal hearing; Normal vision w/o blurriness; No sinus pain/discomfort, sore throat  Respiratory: No dyspnea, cough, wheezing, hemoptysis  CV: No chest pain, PND, orthopnea  GI: No abdominal pain, diarrhea, constipation, nausea, vomiting, melena, hematochezia  : No increased frequency, dysuria, hematuria, nocturia  MSK: No joint pain/swelling; no back pain; no edema  Neuro: No dizziness/lightheadedness, weakness, seizures, numbness, tingling  Heme: No easy bruising or bleeding  Endo: No heat/cold intolerance  Psych: No significant nervousness, anxiety, stress, depression    All other systems were reviewed and are negative, except as noted.    VITALS/PHYSICAL EXAM  --------------------------------------------------------------------------------  T(C): 36.5 (07-21-19 @ 04:45), Max: 37 (07-20-19 @ 13:41)  HR: 76 (07-21-19 @ 04:45) (76 - 82)  BP: 166/69 (07-21-19 @ 04:45) (149/64 - 166/69)  RR: 18 (07-21-19 @ 04:45) (18 - 18)  SpO2: 90% (07-21-19 @ 04:45) (90% - 95%)  Wt(kg): --  Height (cm): 173.99 (07-19-19 @ 15:00)  Weight (kg): 98.474607941 (07-19-19 @ 15:00)  BMI (kg/m2): 32.5 (07-19-19 @ 15:00)  BSA (m2): 2.13 (07-19-19 @ 15:00)      07-20-19 @ 07:01  -  07-21-19 @ 07:00  --------------------------------------------------------  IN: 600 mL / OUT: 2500 mL / NET: -1900 mL    07-21-19 @ 07:01  -  07-21-19 @ 13:18  --------------------------------------------------------  IN: 0 mL / OUT: 225 mL / NET: -225 mL      Physical Exam:  	Gen: NAD, well-appearing  	HEENT: PERRL, supple neck, clear oropharynx  	Pulm: CTA B/L  	CV: RRR, S1S2; no rub  	Back: No spinal or CVA tenderness; no sacral edema  	Abd: +BS, soft, nontender/nondistended  	LE: 2+ edema bl  	Neuro: No focal deficits, intact gait  	Psych: Normal affect and mood  	Skin: Warm, without rashes  	Vascular access: R ij non tunnelled cath    LABS/STUDIES  --------------------------------------------------------------------------------              7.5    6.1   >-----------<  189      [07-21-19 @ 07:23]              23.0     140  |  99  |  102  ----------------------------<  69      [07-21-19 @ 07:23]  3.9   |  23  |  6.40        Ca     8.6     [07-21-19 @ 07:23]      Mg     3.0     [07-21-19 @ 07:23]      Phos  5.5     [07-21-19 @ 07:23]    TPro  7.3  /  Alb  4.3  /  TBili  0.3  /  DBili  x   /  AST  13  /  ALT  6   /  AlkPhos  95  [07-20-19 @ 09:58]    PT/INR: PT 12.6 , INR 1.09       [07-20-19 @ 09:58]  PTT: 41.0       [07-20-19 @ 09:58]      Creatinine Trend:  SCr 6.40 [07-21 @ 07:23]  SCr 7.54 [07-20 @ 09:58]  SCr 7.31 [07-19 @ 17:16]  SCr 7.43 [07-18 @ 07:37]        Iron 51, TIBC 273, %sat 19      [06-12-19 @ 10:11]  Ferritin 635      [06-12-19 @ 10:08]  PTH -- (Ca 8.0)      [07-21-19 @ 10:21]   227  PTH -- (Ca 8.5)      [06-12-19 @ 10:08]   184  HbA1c 6.4      [07-20-19 @ 14:15]    HBsAb <3.0      [07-19-19 @ 20:34]  HBsAb Nonreact      [07-19-19 @ 20:34]  HBsAg Nonreact      [07-19-19 @ 20:34]  HBcAb Nonreact      [07-19-19 @ 20:34]  HCV 0.11, Nonreact      [07-19-19 @ 20:34]

## 2019-07-21 NOTE — PROGRESS NOTE ADULT - ASSESSMENT
58M with PMHs of ESRDs, insulin dependent DM, HTN, CHF presents for elevated BUN and worsening Cr-, admitted for urgent HD 2/2 fluid overload, s/p Shiley and HD x 1.

## 2019-07-21 NOTE — PROGRESS NOTE ADULT - PROBLEM SELECTOR PLAN 3
likely of chronic disease  - c/w EPO 5000 TIW, IV iron sucrose 200mg x 5 days as per nephro rec - SBP in 150 - 160's  - c/w home labetalol, minoxidil, amlodipine, hydralazine with hold parameters

## 2019-07-21 NOTE — PROGRESS NOTE ADULT - PROBLEM SELECTOR PLAN 5
- on home insuline long acting 21 units AM and 1-2 units premeal  - A1C 6.4;  - Increased Lantus to 18U and added 3U premeal + ISS for FS in 300's  - c/w home gabapentin - on home insuline long acting 21 units AM and 1-2 units premeal  - A1C 6.4;  - Increased Lantus to 18U and added 3U premeal + ISS for FS in 300's; PM 's  - c/w home gabapentin Given Zofran x 1 overnight  - AM EKG QTc 490's, will continue to monitor  - May give Ativan prn for nausea

## 2019-07-21 NOTE — PROGRESS NOTE ADULT - PROBLEM SELECTOR PLAN 1
ESRD now with worsening volume overload and uremia.   - s/p Shiley and HD x 1  - As per nephro, c/w Bumex 4mg, fluid restriction 1L  - HD on Monday, and consult IR Monday for Permacath on Tuesday  - c/w home sevelamer, phos 5.8 today  - f/u PTH and Vd for renal bone disease as per nephro rec ESRD now with worsening volume overload and uremia.   - s/p Shiley and HD x 1  - As per nephro, c/w Bumex 4mg, fluid restriction 1L  - HD on Monday, and consult IR Monday for Permacath on Tuesday  - c/w home sevelamer, phos 5.8 today  - f/u PTH and Vd for renal bone disease as per nephro rec  - Anemia: - c/w EPO 5000 TIW, IV iron sucrose 200mg x 5 days as per nephro rec ESRD now with worsening volume overload and uremia.   - s/p Shiley and HD x 1  - As per nephro, c/w Bumex 4mg, fluid restriction 1L  - HD on Monday  - Consulted IR for Permacath Monday PM, tentatively; NPO p MN and hold AM heparin; repeat labs after HD.  - c/w home sevelamer, phos 5.8 today  - f/u PTH and Vd for renal bone disease as per nephro rec  - Anemia: - c/w EPO 5000 TIW, IV iron sucrose 200mg x 5 days as per nephro rec

## 2019-07-21 NOTE — PROVIDER CONTACT NOTE (MEDICATION) - SITUATION
Pt blood sugar before breakfast is 85. No extra coverage needed according to sliding scale. PT ordered to get 3 units of premeal insulin.

## 2019-07-21 NOTE — PROGRESS NOTE ADULT - PROBLEM SELECTOR PLAN 7
- DVT: heparin QD  - diet: renal, carb consistent and DASH/TLC diet  - Dispo - pending due to clinical status; will need outpatient HD setup

## 2019-07-22 DIAGNOSIS — R05 COUGH: ICD-10-CM

## 2019-07-22 LAB
24R-OH-CALCIDIOL SERPL-MCNC: 41.5 NG/ML — SIGNIFICANT CHANGE UP (ref 30–80)
ALBUMIN SERPL ELPH-MCNC: 4.9 G/DL — SIGNIFICANT CHANGE UP (ref 3.3–5)
ALP SERPL-CCNC: 87 U/L — SIGNIFICANT CHANGE UP (ref 40–120)
ALT FLD-CCNC: <5 U/L — LOW (ref 10–45)
ANION GAP SERPL CALC-SCNC: 16 MMOL/L — SIGNIFICANT CHANGE UP (ref 5–17)
APTT BLD: 40.1 SEC — HIGH (ref 27.5–36.3)
AST SERPL-CCNC: 11 U/L — SIGNIFICANT CHANGE UP (ref 10–40)
BILIRUB SERPL-MCNC: 0.3 MG/DL — SIGNIFICANT CHANGE UP (ref 0.2–1.2)
BUN SERPL-MCNC: 55 MG/DL — HIGH (ref 7–23)
CALCIUM SERPL-MCNC: 8.2 MG/DL — LOW (ref 8.4–10.5)
CHLORIDE SERPL-SCNC: 100 MMOL/L — SIGNIFICANT CHANGE UP (ref 96–108)
CO2 SERPL-SCNC: 24 MMOL/L — SIGNIFICANT CHANGE UP (ref 22–31)
CREAT SERPL-MCNC: 3.84 MG/DL — HIGH (ref 0.5–1.3)
GLUCOSE BLDC GLUCOMTR-MCNC: 111 MG/DL — HIGH (ref 70–99)
GLUCOSE BLDC GLUCOMTR-MCNC: 115 MG/DL — HIGH (ref 70–99)
GLUCOSE BLDC GLUCOMTR-MCNC: 123 MG/DL — HIGH (ref 70–99)
GLUCOSE BLDC GLUCOMTR-MCNC: 131 MG/DL — HIGH (ref 70–99)
GLUCOSE BLDC GLUCOMTR-MCNC: 137 MG/DL — HIGH (ref 70–99)
GLUCOSE BLDC GLUCOMTR-MCNC: 244 MG/DL — HIGH (ref 70–99)
GLUCOSE SERPL-MCNC: 130 MG/DL — HIGH (ref 70–99)
HCT VFR BLD CALC: 22.2 % — LOW (ref 39–50)
HGB BLD-MCNC: 7.4 G/DL — LOW (ref 13–17)
INR BLD: 1.13 RATIO — SIGNIFICANT CHANGE UP (ref 0.88–1.16)
MAGNESIUM SERPL-MCNC: 3.1 MG/DL — HIGH (ref 1.6–2.6)
MCHC RBC-ENTMCNC: 32.2 PG — SIGNIFICANT CHANGE UP (ref 27–34)
MCHC RBC-ENTMCNC: 33.5 GM/DL — SIGNIFICANT CHANGE UP (ref 32–36)
MCV RBC AUTO: 96.2 FL — SIGNIFICANT CHANGE UP (ref 80–100)
PHOSPHATE SERPL-MCNC: 6.4 MG/DL — HIGH (ref 2.5–4.5)
PLATELET # BLD AUTO: 187 K/UL — SIGNIFICANT CHANGE UP (ref 150–400)
POTASSIUM SERPL-MCNC: 4.6 MMOL/L — SIGNIFICANT CHANGE UP (ref 3.5–5.3)
POTASSIUM SERPL-SCNC: 4.6 MMOL/L — SIGNIFICANT CHANGE UP (ref 3.5–5.3)
PROT SERPL-MCNC: 6.9 G/DL — SIGNIFICANT CHANGE UP (ref 6–8.3)
PROTHROM AB SERPL-ACNC: 12.9 SEC — SIGNIFICANT CHANGE UP (ref 10–12.9)
RBC # BLD: 2.3 M/UL — LOW (ref 4.2–5.8)
RBC # FLD: 13.9 % — SIGNIFICANT CHANGE UP (ref 10.3–14.5)
SODIUM SERPL-SCNC: 140 MMOL/L — SIGNIFICANT CHANGE UP (ref 135–145)
WBC # BLD: 7.2 K/UL — SIGNIFICANT CHANGE UP (ref 3.8–10.5)
WBC # FLD AUTO: 7.2 K/UL — SIGNIFICANT CHANGE UP (ref 3.8–10.5)

## 2019-07-22 PROCEDURE — 99233 SBSQ HOSP IP/OBS HIGH 50: CPT | Mod: GC

## 2019-07-22 PROCEDURE — 99232 SBSQ HOSP IP/OBS MODERATE 35: CPT | Mod: GC

## 2019-07-22 RX ORDER — INSULIN LISPRO 100/ML
VIAL (ML) SUBCUTANEOUS AT BEDTIME
Refills: 0 | Status: DISCONTINUED | OUTPATIENT
Start: 2019-07-22 | End: 2019-07-24

## 2019-07-22 RX ORDER — SODIUM CHLORIDE 0.65 %
1 AEROSOL, SPRAY (ML) NASAL AT BEDTIME
Refills: 0 | Status: DISCONTINUED | OUTPATIENT
Start: 2019-07-22 | End: 2019-07-24

## 2019-07-22 RX ORDER — ALBUTEROL 90 UG/1
2 AEROSOL, METERED ORAL EVERY 6 HOURS
Refills: 0 | Status: DISCONTINUED | OUTPATIENT
Start: 2019-07-22 | End: 2019-07-24

## 2019-07-22 RX ORDER — IPRATROPIUM/ALBUTEROL SULFATE 18-103MCG
3 AEROSOL WITH ADAPTER (GRAM) INHALATION ONCE
Refills: 0 | Status: COMPLETED | OUTPATIENT
Start: 2019-07-22 | End: 2019-07-22

## 2019-07-22 RX ORDER — FLUTICASONE PROPIONATE 50 MCG
1 SPRAY, SUSPENSION NASAL
Refills: 0 | Status: DISCONTINUED | OUTPATIENT
Start: 2019-07-22 | End: 2019-07-24

## 2019-07-22 RX ORDER — INSULIN GLARGINE 100 [IU]/ML
8 INJECTION, SOLUTION SUBCUTANEOUS EVERY MORNING
Refills: 0 | Status: DISCONTINUED | OUTPATIENT
Start: 2019-07-22 | End: 2019-07-23

## 2019-07-22 RX ADMIN — Medication 3 UNIT(S): at 14:46

## 2019-07-22 RX ADMIN — Medication 3 MILLILITER(S): at 00:12

## 2019-07-22 RX ADMIN — ERYTHROPOIETIN 4000 UNIT(S): 10000 INJECTION, SOLUTION INTRAVENOUS; SUBCUTANEOUS at 14:52

## 2019-07-22 RX ADMIN — Medication 2000 UNIT(S): at 17:53

## 2019-07-22 RX ADMIN — BUMETANIDE 132 MILLIGRAM(S): 0.25 INJECTION INTRAMUSCULAR; INTRAVENOUS at 17:53

## 2019-07-22 RX ADMIN — ATORVASTATIN CALCIUM 20 MILLIGRAM(S): 80 TABLET, FILM COATED ORAL at 22:02

## 2019-07-22 RX ADMIN — HEPARIN SODIUM 5000 UNIT(S): 5000 INJECTION INTRAVENOUS; SUBCUTANEOUS at 22:02

## 2019-07-22 RX ADMIN — CHLORHEXIDINE GLUCONATE 1 APPLICATION(S): 213 SOLUTION TOPICAL at 08:00

## 2019-07-22 RX ADMIN — ALBUTEROL 2 PUFF(S): 90 AEROSOL, METERED ORAL at 07:08

## 2019-07-22 RX ADMIN — IRON SUCROSE 110 MILLIGRAM(S): 20 INJECTION, SOLUTION INTRAVENOUS at 20:49

## 2019-07-22 RX ADMIN — Medication 150 MILLIGRAM(S): at 17:49

## 2019-07-22 RX ADMIN — BUMETANIDE 132 MILLIGRAM(S): 0.25 INJECTION INTRAMUSCULAR; INTRAVENOUS at 08:56

## 2019-07-22 RX ADMIN — GABAPENTIN 300 MILLIGRAM(S): 400 CAPSULE ORAL at 05:01

## 2019-07-22 RX ADMIN — Medication 150 MILLIGRAM(S): at 08:56

## 2019-07-22 RX ADMIN — Medication 10 MILLIGRAM(S): at 08:56

## 2019-07-22 RX ADMIN — Medication 1 TABLET(S): at 17:53

## 2019-07-22 RX ADMIN — Medication 200 MILLIGRAM(S): at 17:53

## 2019-07-22 RX ADMIN — LATANOPROST 1 DROP(S): 0.05 SOLUTION/ DROPS OPHTHALMIC; TOPICAL at 22:02

## 2019-07-22 RX ADMIN — AMLODIPINE BESYLATE 10 MILLIGRAM(S): 2.5 TABLET ORAL at 08:56

## 2019-07-22 RX ADMIN — Medication 200 MILLIGRAM(S): at 08:56

## 2019-07-22 RX ADMIN — Medication 3 UNIT(S): at 17:52

## 2019-07-22 RX ADMIN — Medication 1 SPRAY(S): at 22:02

## 2019-07-22 RX ADMIN — Medication 81 MILLIGRAM(S): at 17:52

## 2019-07-22 RX ADMIN — GABAPENTIN 300 MILLIGRAM(S): 400 CAPSULE ORAL at 17:53

## 2019-07-22 RX ADMIN — SEVELAMER CARBONATE 1600 MILLIGRAM(S): 2400 POWDER, FOR SUSPENSION ORAL at 22:01

## 2019-07-22 RX ADMIN — SEVELAMER CARBONATE 1600 MILLIGRAM(S): 2400 POWDER, FOR SUSPENSION ORAL at 17:49

## 2019-07-22 RX ADMIN — INSULIN GLARGINE 16 UNIT(S): 100 INJECTION, SOLUTION SUBCUTANEOUS at 12:59

## 2019-07-22 NOTE — DIETITIAN INITIAL EVALUATION ADULT. - OTHER INFO
Pt reports good appetite and po intakes, no GI distress +BM yesterday. Pt denies chewing/swallowing difficulties. NKFA. Pt with T2DM on insulin at home and CGM. Pt new to HD this admission, had questions regarding renal diet restriction.

## 2019-07-22 NOTE — PROVIDER CONTACT NOTE (MEDICATION) - SITUATION
PT was NPO for IR procedure. Pt was suppose to receive 16 units of Lantus at 8am. MD consulted with attending to decrease dose to half. MD notified pt is not on scheduled for IR today. NPO discontinued. Pt is to receive Full dose of Lantus at noon & continue with DASH diet.

## 2019-07-22 NOTE — PROGRESS NOTE ADULT - PROBLEM SELECTOR PLAN 7
- DVT: heparin QD  - diet: renal, carb consistent and DASH/TLC diet  - Dispo - pending due to clinical status; will need outpatient HD setup - DVT PPx: heparin QD  - diet: renal, carb consistent and DASH/TLC diet, fluid restricted  - Dispo - pending due to clinical status; will need outpatient HD setup

## 2019-07-22 NOTE — PROGRESS NOTE ADULT - PROBLEM SELECTOR PLAN 5
Given Zofran x 1 overnight  - AM EKG QTc 490's, will continue to monitor  - May give Ativan prn for nausea Given Zofran x 1 overnight  - EKG QTc 490's, will continue to monitor  - May give Ativan prn for nausea Has gotten zofran.   - EKG QTc 490's, will continue to monitor  - May give Ativan prn for nausea

## 2019-07-22 NOTE — DIETITIAN INITIAL EVALUATION ADULT. - PROBLEM SELECTOR PLAN 1
volume overloaded in the setting  ESRD with hx of underlying HFpEF, last EF 70%  - management as below  - CXR ordered, appears to marnie pulmonary edema without large effusions

## 2019-07-22 NOTE — PROGRESS NOTE ADULT - PROBLEM SELECTOR PLAN 1
ESRD now with worsening volume overload and uremia.   - s/p Shiley and HD x 1  - As per nephro, c/w Bumex 4mg, fluid restriction 1L  - HD on Monday  - Consulted IR for Permacath Monday PM, tentatively; NPO p MN and hold AM heparin; repeat labs after HD.  - c/w home sevelamer, phos 5.8 today  - f/u PTH and Vd for renal bone disease as per nephro rec  - Anemia: - c/w EPO 5000 TIW, IV iron sucrose 200mg x 5 days as per nephro rec ESRD now with worsening volume overload and uremia.   - s/p Shiley and HD x 1  - As per nephro, c/w Bumex 4mg, fluid restriction 1L  - HD on 07/22  - To IR for Permacath 07/23, tentatively; NPO at midnight and hold AM heparin; repeat labs after HD.  - c/w home sevelamer, wnl  - PTH elevated but at goal with renal bone disease per nephrology  - Anemia: - c/w EPO 5000 TIW, IV iron sucrose 200mg x 5 days as per nephro rec ESRD now with worsening volume overload and uremia.   - s/p Shiley and HD x 1  - As per nephro, c/w Bumex 4mg, fluid restriction 1L  - HD on 07/22  - To IR for Permacath 07/23; NPO at midnight and hold AM heparin; repeat labs after HD.  -Will touch abse with nephrology about transitioning to [facility] HD outpatient schedule.  - c/w home sevelamer, wnl  - PTH elevated but at goal with renal bone disease per nephrology  - Anemia: - c/w EPO 5000 TIW, IV iron sucrose 200mg x 5 days as per nephro rec ESRD now with worsening volume overload and uremia.   - s/p Shiley and HD x 1  - As per nephro, c/w Bumex 4mg IV BID transition to oral soon, fluid restriction 1L  - HD on 07/22  - To IR for Permacath 07/23; NPO at midnight and hold AM heparin; repeat labs after HD.  -Will touch base with nephrology about transitioning to [facility] HD outpatient schedule.  - c/w home sevelamer, wnl  - PTH elevated but at goal with renal bone disease per nephrology  - Anemia: - c/w EPO 5000 TIW, IV iron sucrose 200mg x 5 days as per nephro rec

## 2019-07-22 NOTE — PROGRESS NOTE ADULT - SUBJECTIVE AND OBJECTIVE BOX
Patient is a 58y old  Male who presents with a chief complaint of 58M sent in by nephrologist for fluid overload and for urgent HD (21 Jul 2019 13:17)      SUBJECTIVE / OVERNIGHT EVENTS:  Patient seen and examined at bedside.    Other Review of Systems Negative.    MEDICATIONS  (STANDING):  amLODIPine   Tablet 10 milliGRAM(s) Oral daily  aspirin enteric coated 81 milliGRAM(s) Oral daily  atorvastatin 20 milliGRAM(s) Oral at bedtime  buMETAnide IVPB 4 milliGRAM(s) IV Intermittent two times a day  chlorhexidine 2% Cloths 1 Application(s) Topical daily  cholecalciferol 2000 Unit(s) Oral daily  dextrose 5%. 1000 milliLiter(s) (50 mL/Hr) IV Continuous <Continuous>  dextrose 50% Injectable 12.5 Gram(s) IV Push once  dextrose 50% Injectable 25 Gram(s) IV Push once  dextrose 50% Injectable 25 Gram(s) IV Push once  epoetin prema Injectable 4000 Unit(s) IV Push <User Schedule>  gabapentin 300 milliGRAM(s) Oral two times a day  heparin  Injectable 5000 Unit(s) SubCutaneous every 8 hours  hydrALAZINE 150 milliGRAM(s) Oral two times a day  insulin glargine Injectable (LANTUS) 16 Unit(s) SubCutaneous every morning  insulin lispro (HumaLOG) corrective regimen sliding scale   SubCutaneous three times a day before meals  insulin lispro Injectable (HumaLOG) 3 Unit(s) SubCutaneous three times a day before meals  iron sucrose IVPB 200 milliGRAM(s) IV Intermittent every 24 hours  labetalol 200 milliGRAM(s) Oral two times a day  latanoprost 0.005% Ophthalmic Solution 1 Drop(s) Left EYE at bedtime  minoxidil 10 milliGRAM(s) Oral daily  multivitamin 1 Tablet(s) Oral daily  sevelamer carbonate 1600 milliGRAM(s) Oral every 8 hours    MEDICATIONS  (PRN):  ALBUTerol    90 MICROgram(s) HFA Inhaler 2 Puff(s) Inhalation every 6 hours PRN Shortness of Breath and/or Wheezing  dextrose 40% Gel 15 Gram(s) Oral once PRN Blood Glucose LESS THAN 70 milliGRAM(s)/deciliter  glucagon  Injectable 1 milliGRAM(s) IntraMuscular once PRN Glucose LESS THAN 70 milligrams/deciliter  sodium chloride 0.9% lock flush 10 milliLiter(s) IV Push every 1 hour PRN Pre/post blood products, medications, blood draw, and to maintain line patency      OBJECTIVE:    Vital Signs Last 24 Hrs  T(C): 36.9 (22 Jul 2019 05:06), Max: 36.9 (22 Jul 2019 05:06)  T(F): 98.4 (22 Jul 2019 05:06), Max: 98.4 (22 Jul 2019 05:06)  HR: 86 (22 Jul 2019 08:45) (78 - 86)  BP: 157/68 (22 Jul 2019 08:45) (152/66 - 157/68)  BP(mean): --  RR: 18 (22 Jul 2019 05:30) (18 - 18)  SpO2: 92% (22 Jul 2019 05:30) (88% - 99%)    CAPILLARY BLOOD GLUCOSE      POCT Blood Glucose.: 115 mg/dL (22 Jul 2019 09:04)  POCT Blood Glucose.: 191 mg/dL (21 Jul 2019 22:24)  POCT Blood Glucose.: 214 mg/dL (21 Jul 2019 17:29)  POCT Blood Glucose.: 235 mg/dL (21 Jul 2019 12:14)    I&O's Summary    21 Jul 2019 07:01  -  22 Jul 2019 07:00  --------------------------------------------------------  IN: 240 mL / OUT: 375 mL / NET: -135 mL        PHYSICAL EXAM:  GENERAL: NAD, well-developed  HEAD:  Atraumatic, Normocephalic  EYES: EOMI, PERRLA, conjunctiva and sclera clear  NECK: Supple, No JVD  CHEST/LUNG: Clear to auscultation bilaterally; No wheeze  HEART: Regular rate and rhythm; No murmurs, rubs, or gallops  ABDOMEN: Soft, Nontender, Nondistended; Bowel sounds present  EXTREMITIES:  2+ Peripheral Pulses, No clubbing, cyanosis, or edema  PSYCH: AAOx3  NEUROLOGY: non-focal  SKIN: No rashes or lesions    LABS:                        7.4    7.2   )-----------( 187      ( 22 Jul 2019 07:21 )             22.2     Auto Eosinophil # x     / Auto Eosinophil % x     / Auto Neutrophil # x     / Auto Neutrophil % x     / BANDS % x                            7.5    6.1   )-----------( 189      ( 21 Jul 2019 07:23 )             23.0     Auto Eosinophil # x     / Auto Eosinophil % x     / Auto Neutrophil # x     / Auto Neutrophil % x     / BANDS % x                            7.6    7.1   )-----------( 191      ( 20 Jul 2019 09:58 )             22.0     Auto Eosinophil # 0.2   / Auto Eosinophil % 2.7   / Auto Neutrophil # 5.7   / Auto Neutrophil % 80.6  / BANDS % x        07-22    140  |  100  |  55<H>  ----------------------------<  130<H>  4.6   |  24  |  3.84<H>  07-21    140  |  99  |  102<H>  ----------------------------<  69<L>  3.9   |  23  |  6.40<H>  07-20    142  |  103  |  126<H>  ----------------------------<  173<H>  4.2   |  21<L>  |  7.54<H>    Ca    8.2<L>      22 Jul 2019 07:21  Mg     3.1     07-22  Phos  6.4     07-22  TPro  6.9  /  Alb  4.9  /  TBili  0.3  /  DBili  x   /  AST  11  /  ALT  <5<L>  /  AlkPhos  87  07-22  TPro  7.3  /  Alb  4.3  /  TBili  0.3  /  DBili  x   /  AST  13  /  ALT  6<L>  /  AlkPhos  95  07-20    PT/INR - ( 22 Jul 2019 07:21 )   PT: 12.9 sec;   INR: 1.13 ratio         PTT - ( 22 Jul 2019 07:21 )  PTT:40.1 sec              ABG:     VBG:       Care Discussed with Consultants/Other Providers:    RADIOLOGY & ADDITIONAL TESTS:  (Imaging Personally Reviewed) Patient is a 58y old  Male sent in by nephrologist for fluid overload and for urgent HD (21 Jul 2019 13:17)    SUBJECTIVE / OVERNIGHT EVENTS:  Patient seen and examined at bedside. Required breathing treatment overnight, but doing well this AM. Still volume overloaded. No fevers or chills    Other Review of Systems Negative.    MEDICATIONS  (STANDING):  amLODIPine   Tablet 10 milliGRAM(s) Oral daily  aspirin enteric coated 81 milliGRAM(s) Oral daily  atorvastatin 20 milliGRAM(s) Oral at bedtime  buMETAnide IVPB 4 milliGRAM(s) IV Intermittent two times a day  chlorhexidine 2% Cloths 1 Application(s) Topical daily  cholecalciferol 2000 Unit(s) Oral daily  dextrose 5%. 1000 milliLiter(s) (50 mL/Hr) IV Continuous <Continuous>  dextrose 50% Injectable 12.5 Gram(s) IV Push once  dextrose 50% Injectable 25 Gram(s) IV Push once  dextrose 50% Injectable 25 Gram(s) IV Push once  epoetin prema Injectable 4000 Unit(s) IV Push <User Schedule>  gabapentin 300 milliGRAM(s) Oral two times a day  heparin  Injectable 5000 Unit(s) SubCutaneous every 8 hours  hydrALAZINE 150 milliGRAM(s) Oral two times a day  insulin glargine Injectable (LANTUS) 16 Unit(s) SubCutaneous every morning  insulin lispro (HumaLOG) corrective regimen sliding scale   SubCutaneous three times a day before meals  insulin lispro Injectable (HumaLOG) 3 Unit(s) SubCutaneous three times a day before meals  iron sucrose IVPB 200 milliGRAM(s) IV Intermittent every 24 hours  labetalol 200 milliGRAM(s) Oral two times a day  latanoprost 0.005% Ophthalmic Solution 1 Drop(s) Left EYE at bedtime  minoxidil 10 milliGRAM(s) Oral daily  multivitamin 1 Tablet(s) Oral daily  sevelamer carbonate 1600 milliGRAM(s) Oral every 8 hours    MEDICATIONS  (PRN):  ALBUTerol    90 MICROgram(s) HFA Inhaler 2 Puff(s) Inhalation every 6 hours PRN Shortness of Breath and/or Wheezing  dextrose 40% Gel 15 Gram(s) Oral once PRN Blood Glucose LESS THAN 70 milliGRAM(s)/deciliter  glucagon  Injectable 1 milliGRAM(s) IntraMuscular once PRN Glucose LESS THAN 70 milligrams/deciliter  sodium chloride 0.9% lock flush 10 milliLiter(s) IV Push every 1 hour PRN Pre/post blood products, medications, blood draw, and to maintain line patency    OBJECTIVE:  Vital Signs Last 24 Hrs  T(C): 36.9 (22 Jul 2019 05:06), Max: 36.9 (22 Jul 2019 05:06)  T(F): 98.4 (22 Jul 2019 05:06), Max: 98.4 (22 Jul 2019 05:06)  HR: 86 (22 Jul 2019 08:45) (78 - 86)  BP: 157/68 (22 Jul 2019 08:45) (152/66 - 157/68)  BP(mean): --  RR: 18 (22 Jul 2019 05:30) (18 - 18)  SpO2: 92% (22 Jul 2019 05:30) (88% - 99%)    CAPILLARY BLOOD GLUCOSE  POCT Blood Glucose.: 115 mg/dL (22 Jul 2019 09:04)  POCT Blood Glucose.: 191 mg/dL (21 Jul 2019 22:24)  POCT Blood Glucose.: 214 mg/dL (21 Jul 2019 17:29)  POCT Blood Glucose.: 235 mg/dL (21 Jul 2019 12:14)    I&O's Summary  21 Jul 2019 07:01  -  22 Jul 2019 07:00  --------------------------------------------------------  IN: 240 mL / OUT: 375 mL / NET: -135 mL    General: No acute distress, well-nourished  HEENT: EOMI, clear conjunctiva, PERRL; normal oropharynx  Neck: Supple  Lungs: Full conversation on room air; no wheezes, side-lying left with crackles on left lung fields in dependent position. Clear right lung fields.   Heart: RRR; S1/S2 present; No murmurs, rubs or gallops appreciated  Abdomen: Moderately distended, non-tender, no masses; bowel sound present  Extremities: (+) pitting BLE edema; full ROM; new AVF on left wrist with dressing (minimal serous-sanguinous drainage), (+) radial pulse; missing DIP for right toe (past amputation)  Neurology: A + O x 4. No focal deficits; strength and sensation grossly intact. Spontaneous movements of all 4 extremities.  Skin: Warm, dry and appropriate color for skin tone; no new rashes or lesions. HEIDI thurston in place, c/d/i  LABS:                        7.4    7.2   )-----------( 187      ( 22 Jul 2019 07:21 )             22.2     Auto Eosinophil # x     / Auto Eosinophil % x     / Auto Neutrophil # x     / Auto Neutrophil % x     / BANDS % x                            7.5    6.1   )-----------( 189      ( 21 Jul 2019 07:23 )             23.0     Auto Eosinophil # x     / Auto Eosinophil % x     / Auto Neutrophil # x     / Auto Neutrophil % x     / BANDS % x                            7.6    7.1   )-----------( 191      ( 20 Jul 2019 09:58 )             22.0     Auto Eosinophil # 0.2   / Auto Eosinophil % 2.7   / Auto Neutrophil # 5.7   / Auto Neutrophil % 80.6  / BANDS % x        07-22    140  |  100  |  55<H>  ----------------------------<  130<H>  4.6   |  24  |  3.84<H>  07-21    140  |  99  |  102<H>  ----------------------------<  69<L>  3.9   |  23  |  6.40<H>  07-20    142  |  103  |  126<H>  ----------------------------<  173<H>  4.2   |  21<L>  |  7.54<H>    Ca    8.2<L>      22 Jul 2019 07:21  Mg     3.1     07-22  Phos  6.4     07-22  TPro  6.9  /  Alb  4.9  /  TBili  0.3  /  DBili  x   /  AST  11  /  ALT  <5<L>  /  AlkPhos  87  07-22  TPro  7.3  /  Alb  4.3  /  TBili  0.3  /  DBili  x   /  AST  13  /  ALT  6<L>  /  AlkPhos  95  07-20    PT/INR - ( 22 Jul 2019 07:21 )   PT: 12.9 sec;   INR: 1.13 ratio         PTT - ( 22 Jul 2019 07:21 )  PTT:40.1 sec              ABG:     VBG:       Care Discussed with Consultants/Other Providers:    RADIOLOGY & ADDITIONAL TESTS:  (Imaging Personally Reviewed) Patient is a 58y old  Male sent in by nephrologist for fluid overload and for urgent HD (21 Jul 2019 13:17)    SUBJECTIVE / OVERNIGHT EVENTS:  Patient seen and examined at bedside. Required breathing treatment overnight, but doing well this AM. Still volume overloaded. No fevers or chills    Other Review of Systems Negative.    MEDICATIONS  (STANDING):  amLODIPine   Tablet 10 milliGRAM(s) Oral daily  aspirin enteric coated 81 milliGRAM(s) Oral daily  atorvastatin 20 milliGRAM(s) Oral at bedtime  buMETAnide IVPB 4 milliGRAM(s) IV Intermittent two times a day  chlorhexidine 2% Cloths 1 Application(s) Topical daily  cholecalciferol 2000 Unit(s) Oral daily  dextrose 5%. 1000 milliLiter(s) (50 mL/Hr) IV Continuous <Continuous>  dextrose 50% Injectable 12.5 Gram(s) IV Push once  dextrose 50% Injectable 25 Gram(s) IV Push once  dextrose 50% Injectable 25 Gram(s) IV Push once  epoetin prema Injectable 4000 Unit(s) IV Push <User Schedule>  gabapentin 300 milliGRAM(s) Oral two times a day  heparin  Injectable 5000 Unit(s) SubCutaneous every 8 hours  hydrALAZINE 150 milliGRAM(s) Oral two times a day  insulin glargine Injectable (LANTUS) 16 Unit(s) SubCutaneous every morning  insulin lispro (HumaLOG) corrective regimen sliding scale   SubCutaneous three times a day before meals  insulin lispro Injectable (HumaLOG) 3 Unit(s) SubCutaneous three times a day before meals  iron sucrose IVPB 200 milliGRAM(s) IV Intermittent every 24 hours  labetalol 200 milliGRAM(s) Oral two times a day  latanoprost 0.005% Ophthalmic Solution 1 Drop(s) Left EYE at bedtime  minoxidil 10 milliGRAM(s) Oral daily  multivitamin 1 Tablet(s) Oral daily  sevelamer carbonate 1600 milliGRAM(s) Oral every 8 hours    MEDICATIONS  (PRN):  ALBUTerol    90 MICROgram(s) HFA Inhaler 2 Puff(s) Inhalation every 6 hours PRN Shortness of Breath and/or Wheezing  dextrose 40% Gel 15 Gram(s) Oral once PRN Blood Glucose LESS THAN 70 milliGRAM(s)/deciliter  glucagon  Injectable 1 milliGRAM(s) IntraMuscular once PRN Glucose LESS THAN 70 milligrams/deciliter  sodium chloride 0.9% lock flush 10 milliLiter(s) IV Push every 1 hour PRN Pre/post blood products, medications, blood draw, and to maintain line patency    OBJECTIVE:  Vital Signs Last 24 Hrs  T(C): 36.9 (22 Jul 2019 05:06), Max: 36.9 (22 Jul 2019 05:06)  T(F): 98.4 (22 Jul 2019 05:06), Max: 98.4 (22 Jul 2019 05:06)  HR: 86 (22 Jul 2019 08:45) (78 - 86)  BP: 157/68 (22 Jul 2019 08:45) (152/66 - 157/68)  BP(mean): --  RR: 18 (22 Jul 2019 05:30) (18 - 18)  SpO2: 92% (22 Jul 2019 05:30) (88% - 99%)    CAPILLARY BLOOD GLUCOSE  POCT Blood Glucose.: 115 mg/dL (22 Jul 2019 09:04)  POCT Blood Glucose.: 191 mg/dL (21 Jul 2019 22:24)  POCT Blood Glucose.: 214 mg/dL (21 Jul 2019 17:29)  POCT Blood Glucose.: 235 mg/dL (21 Jul 2019 12:14)    I&O's Summary  21 Jul 2019 07:01  -  22 Jul 2019 07:00  --------------------------------------------------------  IN: 240 mL / OUT: 375 mL / NET: -135 mL    General: No acute distress, well-nourished  HEENT: EOMI, clear conjunctiva, PERRL; normal oropharynx  Neck: Supple  Lungs: Full conversation on room air; no wheezes, side-lying left with crackles on left lung fields in dependent position. Clear right lung fields.   Heart: RRR; S1/S2 present; No murmurs, rubs or gallops appreciated  Abdomen: Moderately distended, non-tender, no masses; bowel sound present  Extremities: (+) pitting BLE edema; full ROM; new AVF on left wrist with dressing (minimal serous-sanguinous drainage), (+) radial pulse; missing DIP for right toe (past amputation)  Neurology: A + O x 4. No focal deficits; strength and sensation grossly intact. Spontaneous movements of all 4 extremities.  Skin: Warm, dry and appropriate color for skin tone; no new rashes or lesions. HEIDI thurston in place, c/d/i  LABS:                        7.4    7.2   )-----------( 187      ( 22 Jul 2019 07:21 )             22.2     Auto Eosinophil # x     / Auto Eosinophil % x     / Auto Neutrophil # x     / Auto Neutrophil % x     / BANDS % x                            7.5    6.1   )-----------( 189      ( 21 Jul 2019 07:23 )             23.0     Auto Eosinophil # x     / Auto Eosinophil % x     / Auto Neutrophil # x     / Auto Neutrophil % x     / BANDS % x                            7.6    7.1   )-----------( 191      ( 20 Jul 2019 09:58 )             22.0     Auto Eosinophil # 0.2   / Auto Eosinophil % 2.7   / Auto Neutrophil # 5.7   / Auto Neutrophil % 80.6  / BANDS % x        07-22    140  |  100  |  55<H>  ----------------------------<  130<H>  4.6   |  24  |  3.84<H>  07-21    140  |  99  |  102<H>  ----------------------------<  69<L>  3.9   |  23  |  6.40<H>  07-20    142  |  103  |  126<H>  ----------------------------<  173<H>  4.2   |  21<L>  |  7.54<H>    Ca    8.2<L>      22 Jul 2019 07:21  Mg     3.1     07-22  Phos  6.4     07-22  TPro  6.9  /  Alb  4.9  /  TBili  0.3  /  DBili  x   /  AST  11  /  ALT  <5<L>  /  AlkPhos  87  07-22  TPro  7.3  /  Alb  4.3  /  TBili  0.3  /  DBili  x   /  AST  13  /  ALT  6<L>  /  AlkPhos  95  07-20    PT/INR - ( 22 Jul 2019 07:21 )   PT: 12.9 sec;   INR: 1.13 ratio    PTT - ( 22 Jul 2019 07:21 )  PTT:40.1 sec

## 2019-07-22 NOTE — PROVIDER CONTACT NOTE (MEDICATION) - ACTION/TREATMENT ORDERED:
MD aware, MD assessed patient at bedside. Will administer lantus at noon time. WIll monitor pt closely.

## 2019-07-22 NOTE — DIETITIAN INITIAL EVALUATION ADULT. - PERTINENT LABORATORY DATA
Na 140 [135 - 145], K+ 4.6 [3.5 - 5.3], BUN 55 [7 - 23], Cr 3.84 [0.50 - 1.30],  [70 - 99], Phos 6.4 [2.5 - 4.5], Alk Phos 87 [40 - 120], AST 11 [10 - 40], ALT <5 [10 - 45], Mg 3.1 [1.6 - 2.6], Ca 8.2 [8.4 - 10.5], HbA1c --; POCT blood glucose (7/20-22)

## 2019-07-22 NOTE — PROGRESS NOTE ADULT - ASSESSMENT
58M with PMHs of ESRDs, insulin dependent DM, HTN, CHF presents for elevated BUN and worsening Cr-, admitted for urgent HD 2/2 fluid overload, s/p Shiley and HD x 1. 58M with PMHs of ESRDs, insulin dependent DM, HTN, CHF presents for elevated BUN and worsening Cr-, admitted for urgent HD 2/2 fluid overload, s/p Dianeley and HD x 1.     Cuco Ruby MD  Internal Medicine  [phone]

## 2019-07-22 NOTE — PROGRESS NOTE ADULT - PROBLEM SELECTOR PLAN 4
- on home insuline long acting 21 units AM and 1-2 units premeal  - A1C 6.4;  - Decrease Lantus to 16U and keep 3U premeal + ISS for AM FS 85  - AM FS 85, premeal held;  - c/w home gabapentin - on home insulin long acting 21 units AM and 1-2 units premeal  - A1C 6.4;  - Lantus 16U and keep 3U premeal + ISS for AM FS 85  - AM FS 85, premeal held;  - c/w home gabapentin - on home insulin long acting 21 units AM and 1-2 units premeal  - A1C 6.4;  - Lantus 16U and keep 3U premeal + ISS for AM FS 85  - AM FS 85, premeal held;  - c/w home gabapentin for neuropathy.

## 2019-07-22 NOTE — DIETITIAN INITIAL EVALUATION ADULT. - PROBLEM SELECTOR PLAN 2
ESRD now with worsening volume overload and uremia.   - s/p Sam by IR  - pend HD in AM per nephrology  - nephro consulted, huber recs  - c/w home sevelamer  - start Bumex 4mg IV BID  - f/u hepatitis panel

## 2019-07-22 NOTE — DIETITIAN INITIAL EVALUATION ADULT. - ADD RECOMMEND
1) Continue to monitor weight, lab values, and diet tolerance. 2) Renal HD diet education provided with written material. 3) Recommend diet liberalized to Consistent Carbohydrate Renal and remove DASH restriction. Discussed with Team A

## 2019-07-22 NOTE — PROVIDER CONTACT NOTE (OTHER) - SITUATION
pt AM heparin held pending ir procedure, does bedtime heparin still have to be given and pt 02 sat when sleeping 84%, after being told to take deep breaths 02 sat went to 90%

## 2019-07-22 NOTE — DIETITIAN INITIAL EVALUATION ADULT. - ENERGY NEEDS
Height: 68 inches, Weight: 208.9 pounds (standing today)  BMI: 31.8 kg/m2 IBW: 154 pounds (+/-10%), %IBW: 136%  Pertinent Info: Per chart, 57 y/o Male with PMH of ESRD, insulin dependent DM, gastric sleeve 2015, HTN, CHF presents for elevated BUN and worsening Cr, admitted for need for urgent HD, s/p HEIDI Vargas and HD x 1. +2 bilateral feet and legs edema, no pressure ulcers noted at this time.

## 2019-07-22 NOTE — PROGRESS NOTE ADULT - PROBLEM SELECTOR PLAN 3
Check phos, PTH and vit d  Phos elevated start phoslo 667 mg TID with meals phos elevated, PTH at goal for ESRD and vit d replete  Maintain on sevalmer TID with meals

## 2019-07-22 NOTE — PROGRESS NOTE ADULT - PROBLEM SELECTOR PLAN 4
Still overloaded  c/w Bumex 4 mg IV BID, may change to bumex 2 mg PO bid by tomorrow.   Daily weight  fluid restriction to 1 lit  Low salt diet.

## 2019-07-22 NOTE — DIETITIAN INITIAL EVALUATION ADULT. - PROBLEM SELECTOR PLAN 6
- on home insuline long acting 21 units AM and 1-2 units premeal  - AM A1C and 15 units Lantus AM + ISS while inpatient and titrate and necessary  - c/w home gabapentin

## 2019-07-22 NOTE — PROVIDER CONTACT NOTE (OTHER) - REASON
pt AM heparin held pending ir procedure, does bedtime heparin still have to be given and pt 02 sat when sleeping 84%

## 2019-07-22 NOTE — PROGRESS NOTE ADULT - SUBJECTIVE AND OBJECTIVE BOX
[facility] DIVISION OF KIDNEY DISEASES AND HYPERTENSION -- FOLLOW UP NOTE  --------------------------------------------------------------------------------  Chief Complaint:  sob, edema, nausea, fatigue    24 hour events/subjective:  pt examined at bed side, feeling better, denies cp/sob/n/v/abdominal pain, better appetite.       PAST HISTORY  --------------------------------------------------------------------------------  No significant changes to PMH, PSH, FHx, SHx, unless otherwise noted    ALLERGIES & MEDICATIONS  --------------------------------------------------------------------------------  Allergies    clonidine (Other)    Intolerances      Standing Inpatient Medications  amLODIPine   Tablet 10 milliGRAM(s) Oral daily  aspirin enteric coated 81 milliGRAM(s) Oral daily  atorvastatin 20 milliGRAM(s) Oral at bedtime  buMETAnide IVPB 4 milliGRAM(s) IV Intermittent two times a day  chlorhexidine 2% Cloths 1 Application(s) Topical daily  cholecalciferol 2000 Unit(s) Oral daily  dextrose 5%. 1000 milliLiter(s) IV Continuous <Continuous>  dextrose 50% Injectable 12.5 Gram(s) IV Push once  dextrose 50% Injectable 25 Gram(s) IV Push once  dextrose 50% Injectable 25 Gram(s) IV Push once  epoetin prema Injectable 4000 Unit(s) IV Push <User Schedule>  gabapentin 300 milliGRAM(s) Oral two times a day  heparin  Injectable 5000 Unit(s) SubCutaneous every 8 hours  hydrALAZINE 150 milliGRAM(s) Oral two times a day  insulin glargine Injectable (LANTUS) 16 Unit(s) SubCutaneous every morning  insulin lispro (HumaLOG) corrective regimen sliding scale   SubCutaneous three times a day before meals  insulin lispro Injectable (HumaLOG) 3 Unit(s) SubCutaneous three times a day before meals  iron sucrose IVPB 200 milliGRAM(s) IV Intermittent every 24 hours  labetalol 200 milliGRAM(s) Oral two times a day  latanoprost 0.005% Ophthalmic Solution 1 Drop(s) Left EYE at bedtime  minoxidil 10 milliGRAM(s) Oral daily  multivitamin 1 Tablet(s) Oral daily  sevelamer carbonate 1600 milliGRAM(s) Oral every 8 hours    PRN Inpatient Medications  ALBUTerol    90 MICROgram(s) HFA Inhaler 2 Puff(s) Inhalation every 6 hours PRN  dextrose 40% Gel 15 Gram(s) Oral once PRN  glucagon  Injectable 1 milliGRAM(s) IntraMuscular once PRN  sodium chloride 0.9% lock flush 10 milliLiter(s) IV Push every 1 hour PRN      REVIEW OF SYSTEMS  --------------------------------------------------------------------------------  Gen: No  fatigue, fevers/chills, weakness  Respiratory: No dyspnea, cough, wheezing, hemoptysis  CV: No chest pain, PND, orthopnea  GI: No abdominal pain, diarrhea, constipation, nausea, vomiting, melena, hematochezia  MSK: No joint pain/swelling; no back pain; no edema  Neuro: No dizziness/lightheadedness,     All other systems were reviewed and are negative, except as noted.    VITALS/PHYSICAL EXAM  --------------------------------------------------------------------------------  T(C): 36.9 (07-22-19 @ 05:06), Max: 36.9 (07-22-19 @ 05:06)  HR: 86 (07-22-19 @ 08:45) (78 - 86)  BP: 157/68 (07-22-19 @ 08:45) (152/66 - 157/68)  RR: 18 (07-22-19 @ 05:30) (18 - 18)  SpO2: 92% (07-22-19 @ 05:30) (88% - 99%)  Wt(kg): --        07-21-19 @ 07:01  -  07-22-19 @ 07:00  --------------------------------------------------------  IN: 240 mL / OUT: 375 mL / NET: -135 mL      Physical Exam:	    	Gen: NAD,  	HEENT:, clear oropharynx  	Pulm: CTA B/L  	CV: RRR, S1S2; no rub  	Abd: +BS, soft, nontender/nondistended  	LE: 2+ edema bl  	Neuro: No focal deficits, intact gait  	Skin: Warm, without rashes  	Vascular access: R ij non tunnelled cath    LABS/STUDIES  --------------------------------------------------------------------------------              7.4    7.2   >-----------<  187      [07-22-19 @ 07:21]              22.2     140  |  100  |  55  ----------------------------<  130      [07-22-19 @ 07:21]  4.6   |  24  |  3.84        Ca     8.2     [07-22-19 @ 07:21]      Mg     3.1     [07-22-19 @ 07:21]      Phos  6.4     [07-22-19 @ 07:21]    TPro  6.9  /  Alb  4.9  /  TBili  0.3  /  DBili  x   /  AST  11  /  ALT  <5  /  AlkPhos  87  [07-22-19 @ 07:21]    PT/INR: PT 12.9 , INR 1.13       [07-22-19 @ 07:21]  PTT: 40.1       [07-22-19 @ 07:21]      Creatinine Trend:  SCr 3.84 [07-22 @ 07:21]  SCr 6.40 [07-21 @ 07:23]  SCr 7.54 [07-20 @ 09:58]  SCr 7.31 [07-19 @ 17:16]  SCr 7.43 [07-18 @ 07:37]        Iron 51, TIBC 273, %sat 19      [06-12-19 @ 10:11]  Ferritin 635      [06-12-19 @ 10:08]  PTH -- (Ca 8.0)      [07-21-19 @ 10:21]   227  PTH -- (Ca 8.5)      [06-12-19 @ 10:08]   184  Vitamin D (25OH) 41.5      [07-21-19 @ 09:54]  HbA1c 6.4      [07-20-19 @ 14:15]    HBsAb <3.0      [07-19-19 @ 20:34]  HBsAb Nonreact      [07-19-19 @ 20:34]  HBsAg Nonreact      [07-19-19 @ 20:34]  HBcAb Nonreact      [07-19-19 @ 20:34]  HCV 0.11, Nonreact      [07-19-19 @ 20:34]

## 2019-07-22 NOTE — PROGRESS NOTE ADULT - PROBLEM SELECTOR PLAN 1
-Pt with hx of CKD stage V from diabetic nephropathy, presented with worsening sob and uremic symptoms. Outpatient labs showing worsening scr of ~7 range and BUN 130s.  - S/P non-tunneled HD catheter placed on 7/19 by IR in [facility]   -Monitor labs, daily weight, renal diet.  - HD today and tomorrow.   - C/w bumex at this time  -Pending tunneled catheter.   -Will follow at [facility]  TTS schedule. Please arrange HD services. -Pt with hx of CKD stage V from diabetic nephropathy, presented with worsening sob and uremic symptoms. Outpatient labs showing worsening scr of ~7 range and BUN 130s.  -Now ESRD  - S/P non-tunneled HD catheter placed on 7/19 by IR in [facility]   -Monitor labs, daily weight, renal diet. Today's labs are likely an error  - HD today and tomorrow.   - C/w bumex at this time  -Pending tunneled catheter.   -Will follow at [facility]  TTS schedule. Please arrange HD services.

## 2019-07-22 NOTE — PROGRESS NOTE ADULT - ATTENDING COMMENTS
-Patient seen/examined on 7/22/19. Case/plan discussed with the intern and resident as reviewed/edited by me above and in any comments below.

## 2019-07-22 NOTE — DIETITIAN INITIAL EVALUATION ADULT. - PROBLEM SELECTOR PLAN 3
per outpatient record, h/o HFpEF and recent admission for acute CHF  - Echo: 6/14/19: EF 70%, normal systolic function  - s/p IV Lasix in the ED  - s/p bertrand, pending HD in AM first session  - daily weights and strict i/o  - given low urine output, c/w bumex 4mg IV BID  - cardiology eval in AM - fluid overload syndrome suspected primarily renal at risk for HF exacerbation

## 2019-07-22 NOTE — PROVIDER CONTACT NOTE (MEDICATION) - ASSESSMENT
A/O4; no lethargy noted. No dizziness. VS WDL. Up in bed. PT last FS was 115. Pt aware & educated about diabetes.

## 2019-07-22 NOTE — DIETITIAN INITIAL EVALUATION ADULT. - PERTINENT MEDS FT
MEDICATIONS  (STANDING):  amLODIPine   Tablet 10 milliGRAM(s) Oral daily  aspirin enteric coated 81 milliGRAM(s) Oral daily  atorvastatin 20 milliGRAM(s) Oral at bedtime  buMETAnide IVPB 4 milliGRAM(s) IV Intermittent two times a day  chlorhexidine 2% Cloths 1 Application(s) Topical daily  cholecalciferol 2000 Unit(s) Oral daily  dextrose 5%. 1000 milliLiter(s) (50 mL/Hr) IV Continuous <Continuous>  dextrose 50% Injectable 12.5 Gram(s) IV Push once  dextrose 50% Injectable 25 Gram(s) IV Push once  dextrose 50% Injectable 25 Gram(s) IV Push once  epoetin prema Injectable 4000 Unit(s) IV Push <User Schedule>  gabapentin 300 milliGRAM(s) Oral two times a day  heparin  Injectable 5000 Unit(s) SubCutaneous every 8 hours  hydrALAZINE 150 milliGRAM(s) Oral two times a day  insulin glargine Injectable (LANTUS) 16 Unit(s) SubCutaneous every morning  insulin lispro (HumaLOG) corrective regimen sliding scale   SubCutaneous three times a day before meals  insulin lispro Injectable (HumaLOG) 3 Unit(s) SubCutaneous three times a day before meals  iron sucrose IVPB 200 milliGRAM(s) IV Intermittent every 24 hours  labetalol 200 milliGRAM(s) Oral two times a day  latanoprost 0.005% Ophthalmic Solution 1 Drop(s) Left EYE at bedtime  minoxidil 10 milliGRAM(s) Oral daily  multivitamin 1 Tablet(s) Oral daily  sevelamer carbonate 1600 milliGRAM(s) Oral every 8 hours    MEDICATIONS  (PRN):  ALBUTerol    90 MICROgram(s) HFA Inhaler 2 Puff(s) Inhalation every 6 hours PRN Shortness of Breath and/or Wheezing  dextrose 40% Gel 15 Gram(s) Oral once PRN Blood Glucose LESS THAN 70 milliGRAM(s)/deciliter  glucagon  Injectable 1 milliGRAM(s) IntraMuscular once PRN Glucose LESS THAN 70 milligrams/deciliter  sodium chloride 0.9% lock flush 10 milliLiter(s) IV Push every 1 hour PRN Pre/post blood products, medications, blood draw, and to maintain line patency

## 2019-07-22 NOTE — PROVIDER CONTACT NOTE (OTHER) - SITUATION
Pt c/o feeling tight in the chest, verbalized that he has a history of asthma and is on an inhaler at home.

## 2019-07-23 LAB
ANION GAP SERPL CALC-SCNC: 17 MMOL/L — SIGNIFICANT CHANGE UP (ref 5–17)
APTT BLD: 39.9 SEC — HIGH (ref 27.5–36.3)
BUN SERPL-MCNC: 77 MG/DL — HIGH (ref 7–23)
CALCIUM SERPL-MCNC: 8.6 MG/DL — SIGNIFICANT CHANGE UP (ref 8.4–10.5)
CHLORIDE SERPL-SCNC: 100 MMOL/L — SIGNIFICANT CHANGE UP (ref 96–108)
CO2 SERPL-SCNC: 23 MMOL/L — SIGNIFICANT CHANGE UP (ref 22–31)
CREAT SERPL-MCNC: 5.95 MG/DL — HIGH (ref 0.5–1.3)
GLUCOSE BLDC GLUCOMTR-MCNC: 104 MG/DL — HIGH (ref 70–99)
GLUCOSE BLDC GLUCOMTR-MCNC: 104 MG/DL — HIGH (ref 70–99)
GLUCOSE BLDC GLUCOMTR-MCNC: 130 MG/DL — HIGH (ref 70–99)
GLUCOSE BLDC GLUCOMTR-MCNC: 97 MG/DL — SIGNIFICANT CHANGE UP (ref 70–99)
GLUCOSE BLDC GLUCOMTR-MCNC: 98 MG/DL — SIGNIFICANT CHANGE UP (ref 70–99)
GLUCOSE SERPL-MCNC: 97 MG/DL — SIGNIFICANT CHANGE UP (ref 70–99)
HCT VFR BLD CALC: 22.9 % — LOW (ref 39–50)
HGB BLD-MCNC: 7.8 G/DL — LOW (ref 13–17)
INR BLD: 1.08 RATIO — SIGNIFICANT CHANGE UP (ref 0.88–1.16)
MAGNESIUM SERPL-MCNC: 2.8 MG/DL — HIGH (ref 1.6–2.6)
MCHC RBC-ENTMCNC: 32.5 PG — SIGNIFICANT CHANGE UP (ref 27–34)
MCHC RBC-ENTMCNC: 33.9 GM/DL — SIGNIFICANT CHANGE UP (ref 32–36)
MCV RBC AUTO: 95.6 FL — SIGNIFICANT CHANGE UP (ref 80–100)
PHOSPHATE SERPL-MCNC: 5.7 MG/DL — HIGH (ref 2.5–4.5)
PLATELET # BLD AUTO: 193 K/UL — SIGNIFICANT CHANGE UP (ref 150–400)
POTASSIUM SERPL-MCNC: 4.3 MMOL/L — SIGNIFICANT CHANGE UP (ref 3.5–5.3)
POTASSIUM SERPL-SCNC: 4.3 MMOL/L — SIGNIFICANT CHANGE UP (ref 3.5–5.3)
PROTHROM AB SERPL-ACNC: 12.5 SEC — SIGNIFICANT CHANGE UP (ref 10–12.9)
RBC # BLD: 2.4 M/UL — LOW (ref 4.2–5.8)
RBC # FLD: 14.1 % — SIGNIFICANT CHANGE UP (ref 10.3–14.5)
SODIUM SERPL-SCNC: 140 MMOL/L — SIGNIFICANT CHANGE UP (ref 135–145)
WBC # BLD: 6.1 K/UL — SIGNIFICANT CHANGE UP (ref 3.8–10.5)
WBC # FLD AUTO: 6.1 K/UL — SIGNIFICANT CHANGE UP (ref 3.8–10.5)

## 2019-07-23 PROCEDURE — 99233 SBSQ HOSP IP/OBS HIGH 50: CPT | Mod: GC

## 2019-07-23 PROCEDURE — 77001 FLUOROGUIDE FOR VEIN DEVICE: CPT | Mod: 26

## 2019-07-23 PROCEDURE — 99232 SBSQ HOSP IP/OBS MODERATE 35: CPT | Mod: GC

## 2019-07-23 PROCEDURE — 36558 INSERT TUNNELED CV CATH: CPT | Mod: RT

## 2019-07-23 RX ORDER — BUMETANIDE 0.25 MG/ML
4 INJECTION INTRAMUSCULAR; INTRAVENOUS
Refills: 0 | Status: DISCONTINUED | OUTPATIENT
Start: 2019-07-23 | End: 2019-07-24

## 2019-07-23 RX ORDER — INSULIN GLARGINE 100 [IU]/ML
16 INJECTION, SOLUTION SUBCUTANEOUS EVERY MORNING
Refills: 0 | Status: DISCONTINUED | OUTPATIENT
Start: 2019-07-23 | End: 2019-07-24

## 2019-07-23 RX ORDER — HEPARIN SODIUM 5000 [USP'U]/ML
5000 INJECTION INTRAVENOUS; SUBCUTANEOUS EVERY 8 HOURS
Refills: 0 | Status: DISCONTINUED | OUTPATIENT
Start: 2019-07-23 | End: 2019-07-24

## 2019-07-23 RX ORDER — MINOXIDIL 10 MG
5 TABLET ORAL DAILY
Refills: 0 | Status: DISCONTINUED | OUTPATIENT
Start: 2019-07-23 | End: 2019-07-24

## 2019-07-23 RX ADMIN — AMLODIPINE BESYLATE 10 MILLIGRAM(S): 2.5 TABLET ORAL at 06:29

## 2019-07-23 RX ADMIN — LATANOPROST 1 DROP(S): 0.05 SOLUTION/ DROPS OPHTHALMIC; TOPICAL at 23:26

## 2019-07-23 RX ADMIN — ATORVASTATIN CALCIUM 20 MILLIGRAM(S): 80 TABLET, FILM COATED ORAL at 23:25

## 2019-07-23 RX ADMIN — INSULIN GLARGINE 8 UNIT(S): 100 INJECTION, SOLUTION SUBCUTANEOUS at 08:49

## 2019-07-23 RX ADMIN — Medication 3 UNIT(S): at 19:01

## 2019-07-23 RX ADMIN — IRON SUCROSE 110 MILLIGRAM(S): 20 INJECTION, SOLUTION INTRAVENOUS at 20:01

## 2019-07-23 RX ADMIN — Medication 150 MILLIGRAM(S): at 06:29

## 2019-07-23 RX ADMIN — Medication 81 MILLIGRAM(S): at 12:52

## 2019-07-23 RX ADMIN — CHLORHEXIDINE GLUCONATE 1 APPLICATION(S): 213 SOLUTION TOPICAL at 12:52

## 2019-07-23 RX ADMIN — Medication 200 MILLIGRAM(S): at 06:29

## 2019-07-23 RX ADMIN — Medication 1 TABLET(S): at 12:52

## 2019-07-23 RX ADMIN — BUMETANIDE 132 MILLIGRAM(S): 0.25 INJECTION INTRAMUSCULAR; INTRAVENOUS at 06:29

## 2019-07-23 RX ADMIN — HEPARIN SODIUM 5000 UNIT(S): 5000 INJECTION INTRAVENOUS; SUBCUTANEOUS at 23:26

## 2019-07-23 RX ADMIN — SEVELAMER CARBONATE 1600 MILLIGRAM(S): 2400 POWDER, FOR SUSPENSION ORAL at 06:31

## 2019-07-23 RX ADMIN — Medication 10 MILLIGRAM(S): at 06:29

## 2019-07-23 RX ADMIN — GABAPENTIN 300 MILLIGRAM(S): 400 CAPSULE ORAL at 23:25

## 2019-07-23 RX ADMIN — GABAPENTIN 300 MILLIGRAM(S): 400 CAPSULE ORAL at 06:30

## 2019-07-23 RX ADMIN — Medication 2000 UNIT(S): at 12:52

## 2019-07-23 RX ADMIN — SEVELAMER CARBONATE 1600 MILLIGRAM(S): 2400 POWDER, FOR SUSPENSION ORAL at 23:25

## 2019-07-23 NOTE — CHART NOTE - NSCHARTNOTEFT_GEN_A_CORE
Vascular & Interventional Radiology Post-Procedure Note    Pre-Procedure Diagnosis: ESRD  Post-Procedure Diagnosis: Same as pre.  Indications for Procedure: ESRD    : Cortney  Assistant(s): n/a    Procedure Details/Findings: right ij tunneled hd catheter tip at SVC  Access (if applicable): right ij    Complications: none  Estimated Blood Loss: Minimal  Specimen: none  Contrast: none  Sedation: Anesthesia  Patient Condition/Disposition: stable, 1 hour recovery, then floor    Plan: OK to use hd catheter

## 2019-07-23 NOTE — PROVIDER CONTACT NOTE (OTHER) - ACTION/TREATMENT ORDERED:
Duoneb ordered and given. Pt verbalized that he feels better.
 to put in bedtime sliding scale, will double check if am meds can be given
continue to monitor pt, notify  if 02 sat drops below 90%, no further interventions ordered
recheck 02 sat at 00:00, give bedtime dose of heparin

## 2019-07-23 NOTE — PROGRESS NOTE ADULT - PROBLEM SELECTOR PLAN 1
-Pt with hx of CKD stage V from diabetic nephropathy, presented with worsening sob and uremic symptoms. Outpatient labs showing worsening scr of ~7 range and BUN 130s.  -Now deemed ESRD  - S/P non-tunneled HD catheter placed on 7/19 by IR in [facility], for tunneled catheter today  -Monitor labs, daily weight, renal diet. Today's labs are likely an error  - HD today   - C/w bumex at this time  -Will follow at [facility] schedule. Please arrange HD services. -Pt with hx of CKD stage V from diabetic nephropathy, presented with worsening sob and uremic symptoms. Outpatient labs showing worsening scr of ~7 range and BUN 130s.  -Now deemed ESRD  - S/P non-tunneled HD catheter placed on 7/19 by IR in [facility], for tunneled catheter today  -Monitor labs, daily weight, renal diet.    - HD today   - C/w bumex at this time. Can change to PO on non HD days  -Will follow at [facility] schedule. Please arrange HD services.

## 2019-07-23 NOTE — PROGRESS NOTE ADULT - PROBLEM SELECTOR PLAN 1
ESRD with volume overload and uremia.   - s/p Shiley  - As per nephro, c/w Bumex 4mg IV BID transition to oral Bumex 7/23, fluid restriction 1L  - HD on 07/22 removed 1.5L  - To IR for Permacath 07/23; hemodialysis 7/23 in PM to transition to TThSa schedule outpatient  - c/w home sevelamer, wnl  - PTH elevated but at goal with renal bone disease per nephrology  - Anemia: - c/w EPO 5000 TIW, IV iron sucrose 200mg x 5 days as per nephro rec ESRD with volume overload and uremia.   - s/p Shiley  - As per nephro, c/w Bumex 4mg IV BID transition to oral Bumex 7/23, fluid restriction 1L. Oral Bumex on non-HD days.   - HD on 07/22 removed 1.5L  - To IR for Permacath 07/23; hemodialysis 7/23 in PM to transition to T/Th/Sat schedule outpatient  - c/w home sevelamer, wnl  - PTH elevated but at goal with renal bone disease per nephrology  - Anemia: - c/w EPO 5000 TIW, IV iron sucrose 200mg x 5 days as per nephro rec

## 2019-07-23 NOTE — PROGRESS NOTE ADULT - PROBLEM SELECTOR PLAN 7
- DVT PPx: heparin QD  - diet: renal, carb consistent and DASH/TLC diet, fluid restricted  - Dispo - pending due to clinical status; will need outpatient HD setup - DVT PPx: heparin TID  - diet: renal, carb consistent and low Na diet, fluid restricted  - Dispo - pending due to clinical status; will need outpatient HD setup

## 2019-07-23 NOTE — PROVIDER CONTACT NOTE (OTHER) - ASSESSMENT
No s/s of cardiac or respiratory distress noted.
denies any SOB, no s/s of distress, pt endorses shallow breathing while sleeping
no s/s of distress
pt sleeping, no s/s of distress, pt endorses he's a shallow breather when sleeping

## 2019-07-23 NOTE — PROGRESS NOTE ADULT - PROBLEM SELECTOR PLAN 4
- on home insulin long acting 21 units AM and 1-2 units premeal  - A1C 6.4;  - Lantus 16U and keep 3U premeal + ISS for AM FS 85  - AM FS 85, premeal held;  - c/w home gabapentin for neuropathy. - on home insulin long acting 21 units AM and 1-2 units premeal  - A1C 6.4;  - Lantus 16U and keep 3U premeal + ISS  - c/w home gabapentin for neuropathy.

## 2019-07-23 NOTE — PROGRESS NOTE ADULT - ATTENDING COMMENTS
-Patient seen/examined on 7/23/19. Case/plan discussed with the intern and resident as reviewed/edited by me above and in any comments below.  -Discussed with renal.   -DCP tomorrow with outpatient HD, next session Thursday.  -Updated patient and his wife at bedside.

## 2019-07-23 NOTE — PROGRESS NOTE ADULT - PROBLEM SELECTOR PLAN 4
Still overloaded but improving HD/UF today.   Change to bumex po 2 mg BID  fluid restriction to 1 lit  Low salt diet.

## 2019-07-23 NOTE — PROGRESS NOTE ADULT - PROBLEM SELECTOR PLAN 2
per outpatient record, h/o HFpEF and recent admission for acute CHF  - Echo: 6/14/19: EF 70%, normal systolic function  - s/p IV Lasix in the ED; daily weights and strict i/o  - Bumex 4 BID oral on non HD days i.e. [facility] per outpatient record, h/o HFpEF and recent admission for acute CHF  - Echo: 6/14/19: EF 70%, normal systolic function  - s/p IV Lasix in the ED; daily weights and strict i/o  - Bumex 4mg BID oral on non HD days i.e. [facility]

## 2019-07-23 NOTE — PROGRESS NOTE ADULT - ASSESSMENT
58M with PMHs of ESRDs, insulin dependent DM, HTN, CHF presents for elevated BUN and worsening Cr-, admitted for urgent HD 2/2 fluid overload, s/p Dianeley and HD x 1.     Cuco Ruby MD  Internal Medicine  [phone]

## 2019-07-23 NOTE — PROGRESS NOTE ADULT - PROBLEM SELECTOR PLAN 3
- SBP in 150 - 160's  - c/w home labetalol, minoxidil, amlodipine, hydralazine with hold parameters - SBP in 150 - 160's  - c/w home labetalol, minoxidil, amlodipine, hydralazine with hold parameters  -Will reduce minoxidil with goal to wean off.

## 2019-07-23 NOTE — PROGRESS NOTE ADULT - SUBJECTIVE AND OBJECTIVE BOX
Interventional Radiology     58y Male with PMH as below with ESRD on HD referred to IR for tunnelled HD catheter placement.  Pt previously had a right IJ temporary HD catheter placed by IR on 7/19/19.      PAST MEDICAL & SURGICAL HISTORY:  History of glaucoma: left eye  Ventral hernia  High cholesterol  Diabetes  Hypertension  DKA (diabetic ketoacidoses)  Diastolic dysfunction: mild. stage 1. EF 65%  CKD (chronic kidney disease)  Insulin pump status: denies  Pneumonia: 4/2013  Lower extremity edema  Osteomyelitis of ankle or foot: x2  Diabetic neuropathy  Hyperlipidemia  DM (diabetes mellitus)  Hypertension  Status post cataract extraction  S/P laparoscopic sleeve gastrectomy: 2015  H/O elbow surgery: left  H/O cardiac catheterization: no stents  Retinal hemorrhage, left eye: s/p laser surgery  hx of right eye retinal hemorrahge, tx with laser surgery  S/P amputation: right hallux 5/13  S/P hernia repair  S/P carpal tunnel release: b/l    Allergies  clonidine (Other)    Labs:                        7.8    6.1   )-----------( 193      ( 23 Jul 2019 07:07 )             22.9     07-23    140  |  100  |  77<H>  ----------------------------<  97  4.3   |  23  |  5.95<H>    Ca    8.6      23 Jul 2019 07:07  Phos  5.7     07-23  Mg     2.8     07-23    TPro  6.9  /  Alb  4.9  /  TBili  0.3  /  DBili  x   /  AST  11  /  ALT  <5<L>  /  AlkPhos  87  07-22    PT/INR - ( 23 Jul 2019 07:07 )   PT: 12.5 sec;   INR: 1.08 ratio    PTT - ( 23 Jul 2019 07:07 )  PTT:39.9 sec    After risks, benefits, alternatives discussion pt verbalized understanding and signed informed consent.  Will plan for tunnelled HD catheter placement.    HELEN Prince  [facility] [id number]  Ext [phone]

## 2019-07-23 NOTE — PROGRESS NOTE ADULT - SUBJECTIVE AND OBJECTIVE BOX
[facility] DIVISION OF KIDNEY DISEASES AND HYPERTENSION -- FOLLOW UP NOTE  --------------------------------------------------------------------------------  Chief Complaint:  sob, edema, nausea, fatigue    24 hour events/subjective:  pt examined at bed side, feeling better, denies cp/sob/n/v/abdominal pain, better appetite. Overnight had episodes of low sat but no sob, noted while pt sleeping.     PAST HISTORY  --------------------------------------------------------------------------------  No significant changes to PMH, PSH, FHx, SHx, unless otherwise noted    ALLERGIES & MEDICATIONS  --------------------------------------------------------------------------------  Allergies    clonidine (Other)    Intolerances      Standing Inpatient Medications  amLODIPine   Tablet 10 milliGRAM(s) Oral daily  aspirin enteric coated 81 milliGRAM(s) Oral daily  atorvastatin 20 milliGRAM(s) Oral at bedtime  buMETAnide IVPB 4 milliGRAM(s) IV Intermittent two times a day  chlorhexidine 2% Cloths 1 Application(s) Topical daily  cholecalciferol 2000 Unit(s) Oral daily  dextrose 5%. 1000 milliLiter(s) IV Continuous <Continuous>  dextrose 50% Injectable 12.5 Gram(s) IV Push once  dextrose 50% Injectable 25 Gram(s) IV Push once  dextrose 50% Injectable 25 Gram(s) IV Push once  epoetin prema Injectable 4000 Unit(s) IV Push <User Schedule>  fluticasone propionate 50 MICROgram(s)/spray Nasal Spray 1 Spray(s) Both Nostrils two times a day  gabapentin 300 milliGRAM(s) Oral two times a day  hydrALAZINE 150 milliGRAM(s) Oral two times a day  insulin glargine Injectable (LANTUS) 8 Unit(s) SubCutaneous every morning  insulin lispro (HumaLOG) corrective regimen sliding scale   SubCutaneous at bedtime  insulin lispro (HumaLOG) corrective regimen sliding scale   SubCutaneous three times a day before meals  insulin lispro Injectable (HumaLOG) 3 Unit(s) SubCutaneous three times a day before meals  iron sucrose IVPB 200 milliGRAM(s) IV Intermittent every 24 hours  labetalol 200 milliGRAM(s) Oral two times a day  latanoprost 0.005% Ophthalmic Solution 1 Drop(s) Left EYE at bedtime  minoxidil 10 milliGRAM(s) Oral daily  multivitamin 1 Tablet(s) Oral daily  sevelamer carbonate 1600 milliGRAM(s) Oral every 8 hours  sodium chloride 0.65% Nasal 1 Spray(s) Both Nostrils at bedtime    PRN Inpatient Medications  ALBUTerol    90 MICROgram(s) HFA Inhaler 2 Puff(s) Inhalation every 6 hours PRN  dextrose 40% Gel 15 Gram(s) Oral once PRN  glucagon  Injectable 1 milliGRAM(s) IntraMuscular once PRN  sodium chloride 0.9% lock flush 10 milliLiter(s) IV Push every 1 hour PRN      REVIEW OF SYSTEMS  --------------------------------------------------------------------------------  Gen: No  fatigue, fevers/chills, weakness  Respiratory: No dyspnea, cough, wheezing, hemoptysis  CV: No chest pain, PND, orthopnea  GI: No abdominal pain, diarrhea, constipation, nausea, vomiting, melena, hematochezia  MSK: No joint pain/swelling; no back pain; no edema  Neuro: No dizziness/lightheadedness,     All other systems were reviewed and are negative, except as noted.      VITALS/PHYSICAL EXAM  --------------------------------------------------------------------------------  T(C): 36.7 (07-23-19 @ 04:50), Max: 36.9 (07-22-19 @ 13:25)  HR: 79 (07-23-19 @ 06:24) (78 - 81)  BP: 147/68 (07-23-19 @ 06:24) (123/61 - 149/52)  RR: 18 (07-23-19 @ 04:50) (17 - 18)  SpO2: 90% (07-23-19 @ 04:50) (90% - 94%)  Wt(kg): --        07-22-19 @ 07:01  -  07-23-19 @ 07:00  --------------------------------------------------------  IN: 0 mL / OUT: 2150 mL / NET: -2150 mL    07-23-19 @ 07:01  -  07-23-19 @ 09:33  --------------------------------------------------------  IN: 0 mL / OUT: 425 mL / NET: -425 mL      Physical Exam:    	Gen: NAD,  	HEENT:, clear oropharynx  	Pulm: CTA B/L  	CV: RRR, S1S2; no rub  	Abd: +BS, soft, nontender/nondistended  	LE: 2+ edema bl  	Neuro: No focal deficits, intact gait  	Skin: Warm, without rashes  	Vascular access: R ij non tunnelled cath      LABS/STUDIES  --------------------------------------------------------------------------------              7.8    6.1   >-----------<  193      [07-23-19 @ 07:07]              22.9     140  |  100  |  77  ----------------------------<  97      [07-23-19 @ 07:07]  4.3   |  23  |  5.95        Ca     8.6     [07-23-19 @ 07:07]      Mg     2.8     [07-23-19 @ 07:07]      Phos  5.7     [07-23-19 @ 07:07]    TPro  6.9  /  Alb  4.9  /  TBili  0.3  /  DBili  x   /  AST  11  /  ALT  <5  /  AlkPhos  87  [07-22-19 @ 07:21]    PT/INR: PT 12.5 , INR 1.08       [07-23-19 @ 07:07]  PTT: 39.9       [07-23-19 @ 07:07]      Creatinine Trend:  SCr 5.95 [07-23 @ 07:07]  SCr 3.84 [07-22 @ 07:21]  SCr 6.40 [07-21 @ 07:23]  SCr 7.54 [07-20 @ 09:58]  SCr 7.31 [07-19 @ 17:16]        Iron 51, TIBC 273, %sat 19      [06-12-19 @ 10:11]  Ferritin 635      [06-12-19 @ 10:08]  PTH -- (Ca 8.0)      [07-21-19 @ 10:21]   227  PTH -- (Ca 8.5)      [06-12-19 @ 10:08]   184  Vitamin D (25OH) 41.5      [07-21-19 @ 09:54]  HbA1c 6.4      [07-20-19 @ 14:15]    HBsAb <3.0      [07-19-19 @ 20:34]  HBsAb Nonreact      [07-19-19 @ 20:34]  HBsAg Nonreact      [07-19-19 @ 20:34]  HBcAb Nonreact      [07-19-19 @ 20:34]  HCV 0.11, Nonreact      [07-19-19 @ 20:34]

## 2019-07-23 NOTE — PROGRESS NOTE ADULT - SUBJECTIVE AND OBJECTIVE BOX
Cuco Ruby MD  Internal Medicine Resident  [phone]    PATIENT:  JITENDRA FRENCH  [id number]    CHIEF COMPLAINT:  Patient is a 58y old  Male who presents with a chief complaint of 58M sent in by nephrologist for fluid overload and for urgent HD (2019 09:33)      INTERVAL HISTORY/OVERNIGHT EVENTS:      REVIEW OF SYSTEMS:    Constitutional:     [ ] negative [ ] fevers [ ] chills [ ] weight loss [ ] weight gain  HEENT:                  [ ] negative [ ] dry eyes [ ] eye irritation [ ] postnasal drip [ ] nasal congestion  CV:                         [ ] negative  [ ] chest pain [ ] orthopnea [ ] palpitations [ ] murmur  Resp:                     [ ] negative [ ] cough [ ] shortness of breath [ ] dyspnea [ ] wheezing [ ] sputum [ ] hemoptysis  GI:                          [ ] negative [ ] nausea [ ] vomiting [ ] diarrhea [ ] constipation [ ] abd pain [ ] dysphagia   :                        [ ] negative [ ] dysuria [ ] nocturia [ ] hematuria [ ] increased urinary frequency  Musculoskeletal: [ ] negative [ ] back pain [ ] myalgias [ ] arthralgias [ ] fracture  Skin:                       [ ] negative [ ] rash [ ] itch  Neurological:        [ ] negative [ ] headache [ ] dizziness [ ] syncope [ ] weakness [ ] numbness  Psychiatric:           [ ] negative [ ] anxiety [ ] depression  Endocrine:            [ ] negative [ ] diabetes [ ] thyroid problem  Heme/Lymph:      [ ] negative [ ] anemia [ ] bleeding problem  Allergic/Immune: [ ] negative [ ] itchy eyes [ ] nasal discharge [ ] hives [ ] angioedema    [ ] All other systems negative  [ ] Unable to assess ROS because ________.    MEDICATIONS:  MEDICATIONS  (STANDING):  amLODIPine   Tablet 10 milliGRAM(s) Oral daily  aspirin enteric coated 81 milliGRAM(s) Oral daily  atorvastatin 20 milliGRAM(s) Oral at bedtime  buMETAnide 4 milliGRAM(s) Oral <User Schedule>  chlorhexidine 2% Cloths 1 Application(s) Topical daily  cholecalciferol 2000 Unit(s) Oral daily  dextrose 5%. 1000 milliLiter(s) (50 mL/Hr) IV Continuous <Continuous>  dextrose 50% Injectable 12.5 Gram(s) IV Push once  dextrose 50% Injectable 25 Gram(s) IV Push once  dextrose 50% Injectable 25 Gram(s) IV Push once  epoetin prema Injectable 4000 Unit(s) IV Push <User Schedule>  fluticasone propionate 50 MICROgram(s)/spray Nasal Spray 1 Spray(s) Both Nostrils two times a day  gabapentin 300 milliGRAM(s) Oral two times a day  hydrALAZINE 150 milliGRAM(s) Oral two times a day  insulin glargine Injectable (LANTUS) 8 Unit(s) SubCutaneous every morning  insulin lispro (HumaLOG) corrective regimen sliding scale   SubCutaneous at bedtime  insulin lispro (HumaLOG) corrective regimen sliding scale   SubCutaneous three times a day before meals  insulin lispro Injectable (HumaLOG) 3 Unit(s) SubCutaneous three times a day before meals  iron sucrose IVPB 200 milliGRAM(s) IV Intermittent every 24 hours  labetalol 200 milliGRAM(s) Oral two times a day  latanoprost 0.005% Ophthalmic Solution 1 Drop(s) Left EYE at bedtime  minoxidil 10 milliGRAM(s) Oral daily  multivitamin 1 Tablet(s) Oral daily  sevelamer carbonate 1600 milliGRAM(s) Oral every 8 hours  sodium chloride 0.65% Nasal 1 Spray(s) Both Nostrils at bedtime    MEDICATIONS  (PRN):  ALBUTerol    90 MICROgram(s) HFA Inhaler 2 Puff(s) Inhalation every 6 hours PRN Shortness of Breath and/or Wheezing  dextrose 40% Gel 15 Gram(s) Oral once PRN Blood Glucose LESS THAN 70 milliGRAM(s)/deciliter  glucagon  Injectable 1 milliGRAM(s) IntraMuscular once PRN Glucose LESS THAN 70 milligrams/deciliter  sodium chloride 0.9% lock flush 10 milliLiter(s) IV Push every 1 hour PRN Pre/post blood products, medications, blood draw, and to maintain line patency      ALLERGIES:  Allergies    clonidine (Other)    Intolerances        OBJECTIVE:  ICU Vital Signs Last 24 Hrs  T(C): 36.7 (2019 04:50), Max: 36.9 (2019 13:25)  T(F): 98.1 (2019 04:50), Max: 98.4 (2019 13:25)  HR: 79 (2019 06:24) (78 - 81)  BP: 147/68 (2019 06:24) (123/61 - 149/52)  BP(mean): --  ABP: --  ABP(mean): --  RR: 18 (2019 04:50) (17 - 18)  SpO2: 90% (2019 04:50) (90% - 94%)      Adult Advanced Hemodynamics Last 24 Hrs  CVP(mm Hg): --  CVP(cm H2O): --  CO: --  CI: --  PA: --  PA(mean): --  PCWP: --  SVR: --  SVRI: --  PVR: --  PVRI: --  CAPILLARY BLOOD GLUCOSE      POCT Blood Glucose.: 97 mg/dL (2019 11:51)  POCT Blood Glucose.: 104 mg/dL (2019 08:22)  POCT Blood Glucose.: 123 mg/dL (2019 21:16)  POCT Blood Glucose.: 137 mg/dL (2019 17:40)  POCT Blood Glucose.: 111 mg/dL (2019 14:22)    CAPILLARY BLOOD GLUCOSE      POCT Blood Glucose.: 97 mg/dL (2019 11:51)    I&O's Summary    2019 07:01  -  2019 07:00  --------------------------------------------------------  IN: 0 mL / OUT: 2150 mL / NET: -2150 mL    2019 07:01  -  2019 13:16  --------------------------------------------------------  IN: 0 mL / OUT: 425 mL / NET: -425 mL      Daily     Daily Weight in k.7 (2019 08:43)    PHYSICAL EXAMINATION:  General: WN/WD NAD  HEENT: PERRLA, EOMI, moist mucous membranes  Neurology: A&Ox3, nonfocal, HAMM x 4  Respiratory: CTA B/L, normal respiratory effort, no wheezes, crackles, rales  CV: RRR, S1S2, no murmurs, rubs or gallops  Abdominal: Soft, NT, ND +BS, Last BM  Extremities: No edema, + peripheral pulses  Incisions:   Tubes:    LABS:                          7.8    6.1   )-----------( 193      ( 2019 07:07 )             22.9     07    140  |  100  |  77<H>  ----------------------------<  97  4.3   |  23  |  5.95<H>    Ca    8.6      2019 07:07  Phos  5.7     07  Mg     2.8         TPro  6.9  /  Alb  4.9  /  TBili  0.3  /  DBili  x   /  AST  11  /  ALT  <5<L>  /  AlkPhos  87      LIVER FUNCTIONS - ( 2019 07:21 )  Alb: 4.9 g/dL / Pro: 6.9 g/dL / ALK PHOS: 87 U/L / ALT: <5 U/L / AST: 11 U/L / GGT: x           PT/INR - ( 2019 07:07 )   PT: 12.5 sec;   INR: 1.08 ratio         PTT - ( 2019 07:07 )  PTT:39.9 sec            TELEMETRY:     EKG:     IMAGING: Cuco Ruby MD  Internal Medicine Resident  [phone]    PATIENT:  JITENDRA FRENCH  [id number]    CHIEF COMPLAINT:  Patient is a 58y old  Male who presents with a chief complaint of 58M sent in by nephrologist for fluid overload and for urgent HD (2019 09:33)    INTERVAL HISTORY/OVERNIGHT EVENTS:  No issues overnight. Floanse helped cough. 1.5L removed on  HD. Will go for permcath today.    REVIEW OF SYSTEMS:    Constitutional:     [ x] negative [ ] fevers [ ] chills [ ] weight loss [ ] weight gain  HEENT:                  [ x] negative [ ] dry eyes [ ] eye irritation [ ] postnasal drip [ ] nasal congestion  CV:                         [x ] negative  [ ] chest pain [ ] orthopnea [ ] palpitations [ ] murmur  Resp:                     [x ] negative [ ] cough [ ] shortness of breath [ ] dyspnea [ ] wheezing [ ] sputum [ ] hemoptysis  GI:                          [ x] negative [ ] nausea [ ] vomiting [ ] diarrhea [ ] constipation [ ] abd pain [ ] dysphagia   :                        [ x] negative [ ] dysuria [ ] nocturia [ ] hematuria [ ] increased urinary frequency  Musculoskeletal: [ x] negative [ ] back pain [ ] myalgias [ ] arthralgias [ ] fracture  Skin:                       [ x] negative [ ] rash [ ] itch  Neurological:        [ x] negative [ ] headache [ ] dizziness [ ] syncope [ ] weakness [ ] numbness    MEDICATIONS:  MEDICATIONS  (STANDING):  amLODIPine   Tablet 10 milliGRAM(s) Oral daily  aspirin enteric coated 81 milliGRAM(s) Oral daily  atorvastatin 20 milliGRAM(s) Oral at bedtime  buMETAnide 4 milliGRAM(s) Oral <User Schedule>  chlorhexidine 2% Cloths 1 Application(s) Topical daily  cholecalciferol 2000 Unit(s) Oral daily  dextrose 5%. 1000 milliLiter(s) (50 mL/Hr) IV Continuous <Continuous>  dextrose 50% Injectable 12.5 Gram(s) IV Push once  dextrose 50% Injectable 25 Gram(s) IV Push once  dextrose 50% Injectable 25 Gram(s) IV Push once  epoetin prema Injectable 4000 Unit(s) IV Push <User Schedule>  fluticasone propionate 50 MICROgram(s)/spray Nasal Spray 1 Spray(s) Both Nostrils two times a day  gabapentin 300 milliGRAM(s) Oral two times a day  hydrALAZINE 150 milliGRAM(s) Oral two times a day  insulin glargine Injectable (LANTUS) 8 Unit(s) SubCutaneous every morning  insulin lispro (HumaLOG) corrective regimen sliding scale   SubCutaneous at bedtime  insulin lispro (HumaLOG) corrective regimen sliding scale   SubCutaneous three times a day before meals  insulin lispro Injectable (HumaLOG) 3 Unit(s) SubCutaneous three times a day before meals  iron sucrose IVPB 200 milliGRAM(s) IV Intermittent every 24 hours  labetalol 200 milliGRAM(s) Oral two times a day  latanoprost 0.005% Ophthalmic Solution 1 Drop(s) Left EYE at bedtime  minoxidil 10 milliGRAM(s) Oral daily  multivitamin 1 Tablet(s) Oral daily  sevelamer carbonate 1600 milliGRAM(s) Oral every 8 hours  sodium chloride 0.65% Nasal 1 Spray(s) Both Nostrils at bedtime    MEDICATIONS  (PRN):  ALBUTerol    90 MICROgram(s) HFA Inhaler 2 Puff(s) Inhalation every 6 hours PRN Shortness of Breath and/or Wheezing  dextrose 40% Gel 15 Gram(s) Oral once PRN Blood Glucose LESS THAN 70 milliGRAM(s)/deciliter  glucagon  Injectable 1 milliGRAM(s) IntraMuscular once PRN Glucose LESS THAN 70 milligrams/deciliter  sodium chloride 0.9% lock flush 10 milliLiter(s) IV Push every 1 hour PRN Pre/post blood products, medications, blood draw, and to maintain line patency      ALLERGIES:  Allergies    clonidine (Other)    Intolerances        OBJECTIVE:  ICU Vital Signs Last 24 Hrs  T(C): 36.7 (2019 04:50), Max: 36.9 (2019 13:25)  T(F): 98.1 (2019 04:50), Max: 98.4 (2019 13:25)  HR: 79 (2019 06:24) (78 - 81)  BP: 147/68 (2019 06:24) (123/61 - 149/52)  BP(mean): --  ABP: --  ABP(mean): --  RR: 18 (2019 04:50) (17 - 18)  SpO2: 90% (2019 04:50) (90% - 94%)      Adult Advanced Hemodynamics Last 24 Hrs  CVP(mm Hg): --  CVP(cm H2O): --  CO: --  CI: --  PA: --  PA(mean): --  PCWP: --  SVR: --  SVRI: --  PVR: --  PVRI: --  CAPILLARY BLOOD GLUCOSE      POCT Blood Glucose.: 97 mg/dL (2019 11:51)  POCT Blood Glucose.: 104 mg/dL (2019 08:22)  POCT Blood Glucose.: 123 mg/dL (2019 21:16)  POCT Blood Glucose.: 137 mg/dL (2019 17:40)  POCT Blood Glucose.: 111 mg/dL (2019 14:22)    CAPILLARY BLOOD GLUCOSE      POCT Blood Glucose.: 97 mg/dL (2019 11:51)    I&O's Summary    2019 07:01  -  2019 07:00  --------------------------------------------------------  IN: 0 mL / OUT: 2150 mL / NET: -2150 mL    2019 07:01  -  2019 13:16  --------------------------------------------------------  IN: 0 mL / OUT: 425 mL / NET: -425 mL      Daily     Daily Weight in k.7 (2019 08:43)    PHYSICAL EXAMINATION:  General: WN/WD NAD  HEENT: PERRLA, EOMI, moist mucous membranes  Neurology: A&Ox3, nonfocal, HAMM x 4  Respiratory: CTA B/L, normal respiratory effort, no wheezes, crackles, rales  CV: RRR, S1S2, no murmurs, rubs or gallops  Abdominal: Soft, NT, ND +BS, Last BM  Extremities: No edema, + peripheral pulses  Incisions:   Tubes:    LABS:                          7.8    6.1   )-----------( 193      ( 2019 07:07 )             22.9     07-    140  |  100  |  77<H>  ----------------------------<  97  4.3   |  23  |  5.95<H>    Ca    8.6      2019 07:07  Phos  5.7     07  Mg     2.8         TPro  6.9  /  Alb  4.9  /  TBili  0.3  /  DBili  x   /  AST  11  /  ALT  <5<L>  /  AlkPhos  87      LIVER FUNCTIONS - ( 2019 07:21 )  Alb: 4.9 g/dL / Pro: 6.9 g/dL / ALK PHOS: 87 U/L / ALT: <5 U/L / AST: 11 U/L / GGT: x           PT/INR - ( 2019 07:07 )   PT: 12.5 sec;   INR: 1.08 ratio         PTT - ( 2019 07:07 )  PTT:39.9 sec            TELEMETRY:     EKG:     IMAGING: Cuco Ruby MD  Internal Medicine Resident  [phone]    PATIENT:  JITENDRA FRENCH  [id number]    CHIEF COMPLAINT:  Patient is a 58y old  Male who presents with a chief complaint of 58M sent in by nephrologist for fluid overload and for urgent HD (2019 09:33)    INTERVAL HISTORY/OVERNIGHT EVENTS:  No issues overnight. Floanse helped cough. 1.5L removed on  HD. Will go for permcath today.    REVIEW OF SYSTEMS:    Constitutional:     [ x] negative [ ] fevers [ ] chills [ ] weight loss [ ] weight gain  HEENT:                  [ x] negative [ ] dry eyes [ ] eye irritation [ ] postnasal drip [ ] nasal congestion  CV:                         [x ] negative  [ ] chest pain [ ] orthopnea [ ] palpitations [ ] murmur  Resp:                     [x ] negative [ ] cough [ ] shortness of breath [ ] dyspnea [ ] wheezing [ ] sputum [ ] hemoptysis  GI:                          [ x] negative [ ] nausea [ ] vomiting [ ] diarrhea [ ] constipation [ ] abd pain [ ] dysphagia   :                        [ x] negative [ ] dysuria [ ] nocturia [ ] hematuria [ ] increased urinary frequency  Musculoskeletal: [ x] negative [ ] back pain [ ] myalgias [ ] arthralgias [ ] fracture  Skin:                       [ x] negative [ ] rash [ ] itch  Neurological:        [ x] negative [ ] headache [ ] dizziness [ ] syncope [ ] weakness [ ] numbness    MEDICATIONS:  MEDICATIONS  (STANDING):  amLODIPine   Tablet 10 milliGRAM(s) Oral daily  aspirin enteric coated 81 milliGRAM(s) Oral daily  atorvastatin 20 milliGRAM(s) Oral at bedtime  buMETAnide 4 milliGRAM(s) Oral <User Schedule>  chlorhexidine 2% Cloths 1 Application(s) Topical daily  cholecalciferol 2000 Unit(s) Oral daily  dextrose 5%. 1000 milliLiter(s) (50 mL/Hr) IV Continuous <Continuous>  dextrose 50% Injectable 12.5 Gram(s) IV Push once  dextrose 50% Injectable 25 Gram(s) IV Push once  dextrose 50% Injectable 25 Gram(s) IV Push once  epoetin prema Injectable 4000 Unit(s) IV Push <User Schedule>  fluticasone propionate 50 MICROgram(s)/spray Nasal Spray 1 Spray(s) Both Nostrils two times a day  gabapentin 300 milliGRAM(s) Oral two times a day  hydrALAZINE 150 milliGRAM(s) Oral two times a day  insulin glargine Injectable (LANTUS) 8 Unit(s) SubCutaneous every morning  insulin lispro (HumaLOG) corrective regimen sliding scale   SubCutaneous at bedtime  insulin lispro (HumaLOG) corrective regimen sliding scale   SubCutaneous three times a day before meals  insulin lispro Injectable (HumaLOG) 3 Unit(s) SubCutaneous three times a day before meals  iron sucrose IVPB 200 milliGRAM(s) IV Intermittent every 24 hours  labetalol 200 milliGRAM(s) Oral two times a day  latanoprost 0.005% Ophthalmic Solution 1 Drop(s) Left EYE at bedtime  minoxidil 10 milliGRAM(s) Oral daily  multivitamin 1 Tablet(s) Oral daily  sevelamer carbonate 1600 milliGRAM(s) Oral every 8 hours  sodium chloride 0.65% Nasal 1 Spray(s) Both Nostrils at bedtime    MEDICATIONS  (PRN):  ALBUTerol    90 MICROgram(s) HFA Inhaler 2 Puff(s) Inhalation every 6 hours PRN Shortness of Breath and/or Wheezing  dextrose 40% Gel 15 Gram(s) Oral once PRN Blood Glucose LESS THAN 70 milliGRAM(s)/deciliter  glucagon  Injectable 1 milliGRAM(s) IntraMuscular once PRN Glucose LESS THAN 70 milligrams/deciliter  sodium chloride 0.9% lock flush 10 milliLiter(s) IV Push every 1 hour PRN Pre/post blood products, medications, blood draw, and to maintain line patency      ALLERGIES:  Allergies    clonidine (Other)    Intolerances        OBJECTIVE:  ICU Vital Signs Last 24 Hrs  T(C): 36.7 (2019 04:50), Max: 36.9 (2019 13:25)  T(F): 98.1 (2019 04:50), Max: 98.4 (2019 13:25)  HR: 79 (2019 06:24) (78 - 81)  BP: 147/68 (2019 06:24) (123/61 - 149/52)  RR: 18 (2019 04:50) (17 - 18)  SpO2: 90% (2019 04:50) (90% - 94%)    CAPILLARY BLOOD GLUCOSE  POCT Blood Glucose.: 97 mg/dL (2019 11:51)  POCT Blood Glucose.: 104 mg/dL (2019 08:22)  POCT Blood Glucose.: 123 mg/dL (2019 21:16)  POCT Blood Glucose.: 137 mg/dL (2019 17:40)  POCT Blood Glucose.: 111 mg/dL (2019 14:22)  POCT Blood Glucose.: 97 mg/dL (2019 11:51)    I&O's Summary  2019 07:01  -  2019 07:00  --------------------------------------------------------  IN: 0 mL / OUT: 2150 mL / NET: -2150 mL    2019 07:01  -  2019 13:16  --------------------------------------------------------  IN: 0 mL / OUT: 425 mL / NET: -425 mL    Daily     Daily Weight in k.7 (2019 08:43)    PHYSICAL EXAMINATION:  General: WN/WD NAD  HEENT: PERRLA, EOMI, moist mucous membranes  Neurology: A&Ox3, nonfocal, HAMM x 4  Respiratory: CTA B/L, normal respiratory effort, no wheezes, crackles, rales  CV: RRR, S1S2, no murmurs, rubs or gallops  Abdominal: Soft, NT, ND +BS  Extremities: +edema, + peripheral pulses  Right neck Shiley in place nonerythematous, no bleeding.    LABS:                          7.8    6.1   )-----------( 193      ( 2019 07:07 )             22.9     07-23    140  |  100  |  77<H>  ----------------------------<  97  4.3   |  23  |  5.95<H>    Ca    8.6      2019 07:07  Phos  5.7     07-23  Mg     2.8     07-23    TPro  6.9  /  Alb  4.9  /  TBili  0.3  /  DBili  x   /  AST  11  /  ALT  <5<L>  /  AlkPhos  87  07-    LIVER FUNCTIONS - ( 2019 07:21 )  Alb: 4.9 g/dL / Pro: 6.9 g/dL / ALK PHOS: 87 U/L / ALT: <5 U/L / AST: 11 U/L / GGT: x           PT/INR - ( 2019 07:07 )   PT: 12.5 sec;   INR: 1.08 ratio    PTT - ( 2019 07:07 )  PTT:39.9 sec

## 2019-07-24 ENCOUNTER — TRANSCRIPTION ENCOUNTER (OUTPATIENT)
Age: 59
End: 2019-07-24

## 2019-07-24 VITALS
RESPIRATION RATE: 18 BRPM | SYSTOLIC BLOOD PRESSURE: 153 MMHG | TEMPERATURE: 98 F | DIASTOLIC BLOOD PRESSURE: 66 MMHG | OXYGEN SATURATION: 98 % | HEART RATE: 79 BPM

## 2019-07-24 LAB
ALBUMIN SERPL ELPH-MCNC: 4.1 G/DL — SIGNIFICANT CHANGE UP (ref 3.3–5)
ALP SERPL-CCNC: 89 U/L — SIGNIFICANT CHANGE UP (ref 40–120)
ALT FLD-CCNC: <5 U/L — LOW (ref 10–45)
ANION GAP SERPL CALC-SCNC: 13 MMOL/L — SIGNIFICANT CHANGE UP (ref 5–17)
AST SERPL-CCNC: 17 U/L — SIGNIFICANT CHANGE UP (ref 10–40)
BILIRUB SERPL-MCNC: 0.4 MG/DL — SIGNIFICANT CHANGE UP (ref 0.2–1.2)
BUN SERPL-MCNC: 48 MG/DL — HIGH (ref 7–23)
CALCIUM SERPL-MCNC: 8.7 MG/DL — SIGNIFICANT CHANGE UP (ref 8.4–10.5)
CHLORIDE SERPL-SCNC: 96 MMOL/L — SIGNIFICANT CHANGE UP (ref 96–108)
CO2 SERPL-SCNC: 29 MMOL/L — SIGNIFICANT CHANGE UP (ref 22–31)
CREAT SERPL-MCNC: 4.37 MG/DL — HIGH (ref 0.5–1.3)
GLUCOSE BLDC GLUCOMTR-MCNC: 104 MG/DL — HIGH (ref 70–99)
GLUCOSE BLDC GLUCOMTR-MCNC: 114 MG/DL — HIGH (ref 70–99)
GLUCOSE BLDC GLUCOMTR-MCNC: 146 MG/DL — HIGH (ref 70–99)
GLUCOSE SERPL-MCNC: 115 MG/DL — HIGH (ref 70–99)
HCT VFR BLD CALC: 23.5 % — LOW (ref 39–50)
HGB BLD-MCNC: 8.2 G/DL — LOW (ref 13–17)
MAGNESIUM SERPL-MCNC: 2.4 MG/DL — SIGNIFICANT CHANGE UP (ref 1.6–2.6)
MCHC RBC-ENTMCNC: 32.7 PG — SIGNIFICANT CHANGE UP (ref 27–34)
MCHC RBC-ENTMCNC: 34.7 GM/DL — SIGNIFICANT CHANGE UP (ref 32–36)
MCV RBC AUTO: 94 FL — SIGNIFICANT CHANGE UP (ref 80–100)
PHOSPHATE SERPL-MCNC: 4.4 MG/DL — SIGNIFICANT CHANGE UP (ref 2.5–4.5)
PLATELET # BLD AUTO: 216 K/UL — SIGNIFICANT CHANGE UP (ref 150–400)
POTASSIUM SERPL-MCNC: 3.9 MMOL/L — SIGNIFICANT CHANGE UP (ref 3.5–5.3)
POTASSIUM SERPL-SCNC: 3.9 MMOL/L — SIGNIFICANT CHANGE UP (ref 3.5–5.3)
PROT SERPL-MCNC: 7.1 G/DL — SIGNIFICANT CHANGE UP (ref 6–8.3)
RBC # BLD: 2.5 M/UL — LOW (ref 4.2–5.8)
RBC # FLD: 13.7 % — SIGNIFICANT CHANGE UP (ref 10.3–14.5)
SODIUM SERPL-SCNC: 138 MMOL/L — SIGNIFICANT CHANGE UP (ref 135–145)
WBC # BLD: 5.6 K/UL — SIGNIFICANT CHANGE UP (ref 3.8–10.5)
WBC # FLD AUTO: 5.6 K/UL — SIGNIFICANT CHANGE UP (ref 3.8–10.5)

## 2019-07-24 PROCEDURE — 99261: CPT

## 2019-07-24 PROCEDURE — 80048 BASIC METABOLIC PNL TOTAL CA: CPT

## 2019-07-24 PROCEDURE — 86706 HEP B SURFACE ANTIBODY: CPT

## 2019-07-24 PROCEDURE — 76937 US GUIDE VASCULAR ACCESS: CPT

## 2019-07-24 PROCEDURE — 85027 COMPLETE CBC AUTOMATED: CPT

## 2019-07-24 PROCEDURE — 99232 SBSQ HOSP IP/OBS MODERATE 35: CPT | Mod: GC

## 2019-07-24 PROCEDURE — 94640 AIRWAY INHALATION TREATMENT: CPT

## 2019-07-24 PROCEDURE — 71046 X-RAY EXAM CHEST 2 VIEWS: CPT

## 2019-07-24 PROCEDURE — C1752: CPT

## 2019-07-24 PROCEDURE — 77001 FLUOROGUIDE FOR VEIN DEVICE: CPT

## 2019-07-24 PROCEDURE — 82306 VITAMIN D 25 HYDROXY: CPT

## 2019-07-24 PROCEDURE — 86900 BLOOD TYPING SEROLOGIC ABO: CPT

## 2019-07-24 PROCEDURE — 99239 HOSP IP/OBS DSCHRG MGMT >30: CPT | Mod: GC

## 2019-07-24 PROCEDURE — 83970 ASSAY OF PARATHORMONE: CPT

## 2019-07-24 PROCEDURE — 85730 THROMBOPLASTIN TIME PARTIAL: CPT

## 2019-07-24 PROCEDURE — C1894: CPT

## 2019-07-24 PROCEDURE — 86901 BLOOD TYPING SEROLOGIC RH(D): CPT

## 2019-07-24 PROCEDURE — 85610 PROTHROMBIN TIME: CPT

## 2019-07-24 PROCEDURE — 96375 TX/PRO/DX INJ NEW DRUG ADDON: CPT | Mod: XU

## 2019-07-24 PROCEDURE — 82310 ASSAY OF CALCIUM: CPT

## 2019-07-24 PROCEDURE — 86705 HEP B CORE ANTIBODY IGM: CPT

## 2019-07-24 PROCEDURE — 83735 ASSAY OF MAGNESIUM: CPT

## 2019-07-24 PROCEDURE — 51702 INSERT TEMP BLADDER CATH: CPT

## 2019-07-24 PROCEDURE — 36558 INSERT TUNNELED CV CATH: CPT

## 2019-07-24 PROCEDURE — 84100 ASSAY OF PHOSPHORUS: CPT

## 2019-07-24 PROCEDURE — 86850 RBC ANTIBODY SCREEN: CPT

## 2019-07-24 PROCEDURE — 96374 THER/PROPH/DIAG INJ IV PUSH: CPT | Mod: XU

## 2019-07-24 PROCEDURE — C1769: CPT

## 2019-07-24 PROCEDURE — C1750: CPT

## 2019-07-24 PROCEDURE — 86704 HEP B CORE ANTIBODY TOTAL: CPT

## 2019-07-24 PROCEDURE — 80053 COMPREHEN METABOLIC PANEL: CPT

## 2019-07-24 PROCEDURE — 36556 INSERT NON-TUNNEL CV CATH: CPT

## 2019-07-24 PROCEDURE — 83036 HEMOGLOBIN GLYCOSYLATED A1C: CPT

## 2019-07-24 PROCEDURE — 82962 GLUCOSE BLOOD TEST: CPT

## 2019-07-24 PROCEDURE — 87340 HEPATITIS B SURFACE AG IA: CPT

## 2019-07-24 PROCEDURE — 99285 EMERGENCY DEPT VISIT HI MDM: CPT | Mod: 25

## 2019-07-24 PROCEDURE — 86803 HEPATITIS C AB TEST: CPT

## 2019-07-24 PROCEDURE — 83880 ASSAY OF NATRIURETIC PEPTIDE: CPT

## 2019-07-24 RX ORDER — BUMETANIDE 0.25 MG/ML
1 INJECTION INTRAMUSCULAR; INTRAVENOUS
Qty: 0 | Refills: 0 | DISCHARGE

## 2019-07-24 RX ORDER — ACETAMINOPHEN 500 MG
650 TABLET ORAL ONCE
Refills: 0 | Status: COMPLETED | OUTPATIENT
Start: 2019-07-24 | End: 2019-07-24

## 2019-07-24 RX ORDER — MINOXIDIL 10 MG
1 TABLET ORAL
Qty: 0 | Refills: 0 | DISCHARGE

## 2019-07-24 RX ORDER — ERYTHROPOIETIN 10000 [IU]/ML
4000 INJECTION, SOLUTION INTRAVENOUS; SUBCUTANEOUS
Qty: 0 | Refills: 0 | DISCHARGE
Start: 2019-07-24

## 2019-07-24 RX ORDER — FLUTICASONE PROPIONATE 50 MCG
1 SPRAY, SUSPENSION NASAL
Qty: 0 | Refills: 0 | DISCHARGE
Start: 2019-07-24

## 2019-07-24 RX ORDER — MINOXIDIL 10 MG
2 TABLET ORAL
Qty: 0 | Refills: 0 | DISCHARGE
Start: 2019-07-24

## 2019-07-24 RX ORDER — CHOLECALCIFEROL (VITAMIN D3) 125 MCG
1 CAPSULE ORAL
Qty: 0 | Refills: 0 | DISCHARGE

## 2019-07-24 RX ORDER — CHOLECALCIFEROL (VITAMIN D3) 125 MCG
2000 CAPSULE ORAL
Qty: 0 | Refills: 0 | DISCHARGE
Start: 2019-07-24

## 2019-07-24 RX ORDER — BUMETANIDE 0.25 MG/ML
4 INJECTION INTRAMUSCULAR; INTRAVENOUS
Qty: 120 | Refills: 3
Start: 2019-07-24 | End: 2019-11-20

## 2019-07-24 RX ORDER — MINOXIDIL 10 MG
2 TABLET ORAL
Qty: 60 | Refills: 0
Start: 2019-07-24 | End: 2019-08-22

## 2019-07-24 RX ADMIN — SEVELAMER CARBONATE 1600 MILLIGRAM(S): 2400 POWDER, FOR SUSPENSION ORAL at 05:23

## 2019-07-24 RX ADMIN — Medication 150 MILLIGRAM(S): at 05:22

## 2019-07-24 RX ADMIN — Medication 1 SPRAY(S): at 17:05

## 2019-07-24 RX ADMIN — Medication 3 UNIT(S): at 09:35

## 2019-07-24 RX ADMIN — Medication 150 MILLIGRAM(S): at 17:05

## 2019-07-24 RX ADMIN — Medication 200 MILLIGRAM(S): at 17:05

## 2019-07-24 RX ADMIN — Medication 5 MILLIGRAM(S): at 05:23

## 2019-07-24 RX ADMIN — Medication 81 MILLIGRAM(S): at 12:03

## 2019-07-24 RX ADMIN — Medication 650 MILLIGRAM(S): at 05:44

## 2019-07-24 RX ADMIN — HEPARIN SODIUM 5000 UNIT(S): 5000 INJECTION INTRAVENOUS; SUBCUTANEOUS at 05:21

## 2019-07-24 RX ADMIN — Medication 1 TABLET(S): at 12:03

## 2019-07-24 RX ADMIN — Medication 3 UNIT(S): at 17:05

## 2019-07-24 RX ADMIN — BUMETANIDE 4 MILLIGRAM(S): 0.25 INJECTION INTRAMUSCULAR; INTRAVENOUS at 17:06

## 2019-07-24 RX ADMIN — Medication 1 SPRAY(S): at 05:24

## 2019-07-24 RX ADMIN — HEPARIN SODIUM 5000 UNIT(S): 5000 INJECTION INTRAVENOUS; SUBCUTANEOUS at 16:00

## 2019-07-24 RX ADMIN — BUMETANIDE 4 MILLIGRAM(S): 0.25 INJECTION INTRAMUSCULAR; INTRAVENOUS at 05:23

## 2019-07-24 RX ADMIN — SEVELAMER CARBONATE 1600 MILLIGRAM(S): 2400 POWDER, FOR SUSPENSION ORAL at 15:29

## 2019-07-24 RX ADMIN — Medication 200 MILLIGRAM(S): at 05:22

## 2019-07-24 RX ADMIN — Medication 2000 UNIT(S): at 12:03

## 2019-07-24 RX ADMIN — AMLODIPINE BESYLATE 10 MILLIGRAM(S): 2.5 TABLET ORAL at 05:22

## 2019-07-24 RX ADMIN — GABAPENTIN 300 MILLIGRAM(S): 400 CAPSULE ORAL at 17:06

## 2019-07-24 RX ADMIN — CHLORHEXIDINE GLUCONATE 1 APPLICATION(S): 213 SOLUTION TOPICAL at 12:03

## 2019-07-24 RX ADMIN — INSULIN GLARGINE 16 UNIT(S): 100 INJECTION, SOLUTION SUBCUTANEOUS at 09:36

## 2019-07-24 RX ADMIN — GABAPENTIN 300 MILLIGRAM(S): 400 CAPSULE ORAL at 05:22

## 2019-07-24 RX ADMIN — Medication 3 UNIT(S): at 12:05

## 2019-07-24 NOTE — PROGRESS NOTE ADULT - PROBLEM SELECTOR PLAN 1
-Pt with hx of CKD stage V from diabetic nephropathy, presented with worsening sob and uremic symptoms. Outpatient labs showing worsening scr of ~7 range and BUN 130s.  -Now deemed ESRD  - S/P tunneled catheter placement 07/23/19, had HD yesterday no events.   -Monitor labs, daily weight, renal diet.    - C/w bumex po  -No need for HD today. -Pt with hx of CKD stage V from diabetic nephropathy, presented with worsening sob and uremic symptoms. Outpatient labs showing worsening scr of ~7 range and BUN 130s.  -Now deemed ESRD  - S/P tunneled catheter placement 07/23/19, had HD yesterday no events.   -Monitor labs, daily weight, renal diet.    - C/w bumex po on non HD days  -No need for HD today.

## 2019-07-24 NOTE — PROGRESS NOTE ADULT - ATTENDING COMMENTS
-Patient seen/examined on 7/24/19. Case/plan discussed with the intern and resident as reviewed/edited by me above and in any comments below. -Patient seen/examined on 7/24/19. Case/plan discussed with the intern and resident as reviewed/edited by me above and in any comments below.  -Patient feels well. -Tolerated HD via Permacath.   -Discussed with renal.  -Patient stable for DC to home today with outpatient HD and follow up with PMD and renal.   -Gave patient insurance paperwork for his hospital stay, per his request. Copy in chart.   -40 minutes spent on the discharge process.

## 2019-07-24 NOTE — PROGRESS NOTE ADULT - PROBLEM SELECTOR PLAN 3
- SBP in 150 - 160's  - c/w home labetalol, minoxidil, amlodipine, hydralazine with hold parameters  -Will reduce minoxidil with goal to wean off.

## 2019-07-24 NOTE — PROGRESS NOTE ADULT - ASSESSMENT
58M with PMHs of ESRDs, insulin dependent DM, HTN, CHF presents for elevated BUN and worsening Cr-, admitted for urgent HD 2/2 fluid overload, s/p Dianeley and HD x 1.     Cuco Ruby MD  Internal Medicine  [phone] 58M with PMHs of ESRDs, insulin dependent DM, HTN, CHF presents for elevated BUN and worsening Cr-, admitted for urgent HD 2/2 fluid overload, now with permcath and on TThSa HD, stable for discharge.    Cuco Ruby MD  Internal Medicine  [phone]

## 2019-07-24 NOTE — PROGRESS NOTE ADULT - PROBLEM SELECTOR PLAN 4
- on home insulin long acting 21 units AM and 1-2 units premeal  - A1C 6.4;  - Lantus 16U and keep 3U premeal + ISS  - c/w home gabapentin for neuropathy.

## 2019-07-24 NOTE — PROGRESS NOTE ADULT - PROBLEM SELECTOR PLAN 1
ESRD with volume overload and uremia.   - s/p Shiley  - As per nephro, c/w Bumex 4mg IV BID transition to oral Bumex 7/23, fluid restriction 1L. Oral Bumex on non-HD days.   - HD on 07/22 removed 1.5L  - To IR for Permacath 07/23; hemodialysis 7/23 in PM to transition to T/Th/Sat schedule outpatient  - c/w home sevelamer, wnl  - PTH elevated but at goal with renal bone disease per nephrology  - Anemia: - c/w EPO 5000 TIW, IV iron sucrose 200mg x 5 days as per nephro rec ESRD with volume overload and uremia.   - s/p Shiley to Permcath conversion 7/23  - As per nephro, c/w Bumex 4mg IV BID transition to oral Bumex 7/23, fluid restriction 1L. Oral Bumex on non-HD days.   - HD on 07/22 removed 1.5L  - To IR for Permacath 07/23; hemodialysis 7/23 in PM to transition to T/Th/Sat schedule outpatient  - c/w home sevelamer, wnl  - PTH elevated but at goal with renal bone disease per nephrology  - Anemia: - c/w EPO 5000 TIW, IV iron sucrose 200mg x 5 days as per nephro rec

## 2019-07-24 NOTE — DISCHARGE NOTE NURSING/CASE MANAGEMENT/SOCIAL WORK - NSDCDPATPORTLINK_GEN_ALL_CORE
You can access the [facility], offered by [facility], by registering with the following website: [email or web address]

## 2019-07-24 NOTE — PROGRESS NOTE ADULT - PROVIDER SPECIALTY LIST ADULT
Internal Medicine
Intervent Radiology
Intervent Radiology
Nephrology

## 2019-07-24 NOTE — PROGRESS NOTE ADULT - REASON FOR ADMISSION
58M sent in by nephrologist for fluid overload and for urgent HD

## 2019-07-24 NOTE — PROGRESS NOTE ADULT - SUBJECTIVE AND OBJECTIVE BOX
Cuco Ruby MD  Internal Medicine Resident  [phone]    PATIENT:  JITENDRA FRENCH  [id number]    CHIEF COMPLAINT:  Patient is a 58y old  Male who presents with a chief complaint of 58M sent in by nephrologist for fluid overload and for urgent HD (2019 08:10)      INTERVAL HISTORY/OVERNIGHT EVENTS:  No issues overnight. Cough improved with Flonase. S/p permcath7/23. 2L removed on  HD     REVIEW OF SYSTEMS:  Constitutional:     [ x] negative [ ] fevers [ ] chills [ ] weight loss [ ] weight gain  HEENT:                  [ x] negative [ ] dry eyes [ ] eye irritation [ ] postnasal drip [ ] nasal congestion  CV:                         [x ] negative  [ ] chest pain [ ] orthopnea [ ] palpitations [ ] murmur  Resp:                     [ ] negative [x ] cough [ ] shortness of breath [ ] dyspnea [ ] wheezing [ ] sputum [ ] hemoptysis  GI:                          [ x] negative [ ] nausea [ ] vomiting [ ] diarrhea [ ] constipation [ ] abd pain [ ] dysphagia   :                        [ x] negative [ ] dysuria [ ] nocturia [ ] hematuria [ ] increased urinary frequency  Musculoskeletal: [ x] negative [ ] back pain [ ] myalgias [ ] arthralgias [ ] fracture  Skin:                       [ x] negative [ ] rash [ ] itch  Neurological:        [ x] negative [ ] headache [ ] dizziness [ ] syncope [ ] weakness [ ] numbness    MEDICATIONS:  MEDICATIONS  (STANDING):  amLODIPine   Tablet 10 milliGRAM(s) Oral daily  aspirin enteric coated 81 milliGRAM(s) Oral daily  atorvastatin 20 milliGRAM(s) Oral at bedtime  buMETAnide 4 milliGRAM(s) Oral <User Schedule>  chlorhexidine 2% Cloths 1 Application(s) Topical daily  cholecalciferol 2000 Unit(s) Oral daily  dextrose 5%. 1000 milliLiter(s) (50 mL/Hr) IV Continuous <Continuous>  dextrose 50% Injectable 12.5 Gram(s) IV Push once  dextrose 50% Injectable 25 Gram(s) IV Push once  dextrose 50% Injectable 25 Gram(s) IV Push once  epoetin prema Injectable 4000 Unit(s) IV Push <User Schedule>  fluticasone propionate 50 MICROgram(s)/spray Nasal Spray 1 Spray(s) Both Nostrils two times a day  gabapentin 300 milliGRAM(s) Oral two times a day  heparin  Injectable 5000 Unit(s) SubCutaneous every 8 hours  hydrALAZINE 150 milliGRAM(s) Oral two times a day  insulin glargine Injectable (LANTUS) 16 Unit(s) SubCutaneous every morning  insulin lispro (HumaLOG) corrective regimen sliding scale   SubCutaneous at bedtime  insulin lispro (HumaLOG) corrective regimen sliding scale   SubCutaneous three times a day before meals  insulin lispro Injectable (HumaLOG) 3 Unit(s) SubCutaneous three times a day before meals  iron sucrose IVPB 200 milliGRAM(s) IV Intermittent every 24 hours  labetalol 200 milliGRAM(s) Oral two times a day  latanoprost 0.005% Ophthalmic Solution 1 Drop(s) Left EYE at bedtime  minoxidil 5 milliGRAM(s) Oral daily  multivitamin 1 Tablet(s) Oral daily  sevelamer carbonate 1600 milliGRAM(s) Oral every 8 hours  sodium chloride 0.65% Nasal 1 Spray(s) Both Nostrils at bedtime    MEDICATIONS  (PRN):  ALBUTerol    90 MICROgram(s) HFA Inhaler 2 Puff(s) Inhalation every 6 hours PRN Shortness of Breath and/or Wheezing  dextrose 40% Gel 15 Gram(s) Oral once PRN Blood Glucose LESS THAN 70 milliGRAM(s)/deciliter  glucagon  Injectable 1 milliGRAM(s) IntraMuscular once PRN Glucose LESS THAN 70 milligrams/deciliter  sodium chloride 0.9% lock flush 10 milliLiter(s) IV Push every 1 hour PRN Pre/post blood products, medications, blood draw, and to maintain line patency    ALLERGIES:  Allergies  clonidine (Other)    OBJECTIVE:  ICU Vital Signs Last 24 Hrs  T(C): 36.7 (2019 05:07), Max: 36.9 (2019 21:52)  T(F): 98.1 (2019 05:07), Max: 98.5 (2019 21:52)  HR: 80 (2019 05:07) (77 - 81)  BP: 158/70 (2019 05:07) (153/70 - 167/81)  RR: 19 (2019 05:07) (18 - 20)  SpO2: 90% (2019 05:07) (90% - 94%)    CAPILLARY BLOOD GLUCOSE  POCT Blood Glucose.: 114 mg/dL (2019 08:54)  POCT Blood Glucose.: 130 mg/dL (2019 21:39)  POCT Blood Glucose.: 98 mg/dL (2019 18:59)  POCT Blood Glucose.: 104 mg/dL (2019 17:43)  POCT Blood Glucose.: 97 mg/dL (2019 11:51)  POCT Blood Glucose.: 114 mg/dL (2019 08:54)    I&O's Summary  2019 07:01  -  2019 07:00  --------------------------------------------------------  IN: 900 mL / OUT: 3925 mL / NET: -3025 mL    Daily     Daily Weight in k.4 (2019 08:49)    PHYSICAL EXAMINATION:  General: WN/WD NAD  HEENT: PERRLA, EOMI, moist mucous membranes  Neurology: A&Ox3, nonfocal, HAMM x 4  Respiratory: CTA B/L, normal respiratory effort, no wheezes, crackles, rales  CV: RRR, S1S2, no murmurs, rubs or gallops  Abdominal: Soft, NT, ND +BS, Last BM  Extremities: 1+ pitting edema    LABS:                  8.2    5.6   )-----------( 216      ( 2019 07:20 )             23.5     07-24    138  |  96  |  48<H>  ----------------------------<  115<H>  3.9   |  29  |  4.37<H>    Ca    8.7      2019 07:20  Phos  4.4     07-24  Mg     2.4     07-24    TPro  7.1  /  Alb  4.1  /  TBili  0.4  /  DBili  x   /  AST  17  /  ALT  <5<L>  /  AlkPhos  89  07-24    LIVER FUNCTIONS - ( 2019 07:20 )  Alb: 4.1 g/dL / Pro: 7.1 g/dL / ALK PHOS: 89 U/L / ALT: <5 U/L / AST: 17 U/L / GGT: x           PT/INR - ( 2019 07:07 )   PT: 12.5 sec;   INR: 1.08 ratio    PTT - ( 2019 07:07 )  PTT:39.9 sec

## 2019-07-24 NOTE — PROGRESS NOTE ADULT - PROBLEM SELECTOR PLAN 7
- DVT PPx: heparin TID  - diet: renal, carb consistent and low Na diet, fluid restricted  - Dispo - pending due to clinical status; will need outpatient HD setup - DVT PPx: heparin TID  - diet: renal, carb consistent and low Na diet, fluid restricted  - Dispo - stable for discharge

## 2019-07-24 NOTE — PROGRESS NOTE ADULT - PROBLEM SELECTOR PLAN 2
per outpatient record, h/o HFpEF and recent admission for acute CHF  - Echo: 6/14/19: EF 70%, normal systolic function  - s/p IV Lasix in the ED; daily weights and strict i/o  - Bumex 4mg BID oral on non HD days i.e. [facility]

## 2019-07-24 NOTE — PROGRESS NOTE ADULT - PROBLEM SELECTOR PLAN 8
- DVT: heparin QD  - diet: renal, carb consistent and DASH/TLC diet
patient with nighttime cough requiring breathing tx, he attributes it to post-nasal drip 2/2 seasonal allergies. Flonase has helped.  Flonase, saline irrigation; continue with albuterol PRN.
patient with nighttime cough requiring breathing tx, he attributes it to post-nasal drip 2/2 seasonal allergies. Flonase has helped.  Flonase, saline irrigation; continue with albuterol PRN.
patient with nighttime cough requiring breathing tx, he attributes it to post-nasal drip 2/2 seasonal allergies.  Will start Flonase, saline irrigation; continue with albuterol PRN.

## 2019-07-24 NOTE — PROGRESS NOTE ADULT - SUBJECTIVE AND OBJECTIVE BOX
[facility] DIVISION OF KIDNEY DISEASES AND HYPERTENSION -- FOLLOW UP NOTE  --------------------------------------------------------------------------------  Chief Complaint:  sob, edema, nausea, fatigue    24 hour events/subjective:  pt examined at bed side, feeling better, denies cp/sob/n/v/abdominal pain, better appetite. Overnight had episodes of low sat but no sob, noted while pt sleeping.       PAST HISTORY  --------------------------------------------------------------------------------  No significant changes to PMH, PSH, FHx, SHx, unless otherwise noted    ALLERGIES & MEDICATIONS  --------------------------------------------------------------------------------  Allergies    clonidine (Other)    Intolerances      Standing Inpatient Medications  amLODIPine   Tablet 10 milliGRAM(s) Oral daily  aspirin enteric coated 81 milliGRAM(s) Oral daily  atorvastatin 20 milliGRAM(s) Oral at bedtime  buMETAnide 4 milliGRAM(s) Oral <User Schedule>  chlorhexidine 2% Cloths 1 Application(s) Topical daily  cholecalciferol 2000 Unit(s) Oral daily  dextrose 5%. 1000 milliLiter(s) IV Continuous <Continuous>  dextrose 50% Injectable 12.5 Gram(s) IV Push once  dextrose 50% Injectable 25 Gram(s) IV Push once  dextrose 50% Injectable 25 Gram(s) IV Push once  epoetin prema Injectable 4000 Unit(s) IV Push <User Schedule>  fluticasone propionate 50 MICROgram(s)/spray Nasal Spray 1 Spray(s) Both Nostrils two times a day  gabapentin 300 milliGRAM(s) Oral two times a day  heparin  Injectable 5000 Unit(s) SubCutaneous every 8 hours  hydrALAZINE 150 milliGRAM(s) Oral two times a day  insulin glargine Injectable (LANTUS) 16 Unit(s) SubCutaneous every morning  insulin lispro (HumaLOG) corrective regimen sliding scale   SubCutaneous at bedtime  insulin lispro (HumaLOG) corrective regimen sliding scale   SubCutaneous three times a day before meals  insulin lispro Injectable (HumaLOG) 3 Unit(s) SubCutaneous three times a day before meals  iron sucrose IVPB 200 milliGRAM(s) IV Intermittent every 24 hours  labetalol 200 milliGRAM(s) Oral two times a day  latanoprost 0.005% Ophthalmic Solution 1 Drop(s) Left EYE at bedtime  minoxidil 5 milliGRAM(s) Oral daily  multivitamin 1 Tablet(s) Oral daily  sevelamer carbonate 1600 milliGRAM(s) Oral every 8 hours  sodium chloride 0.65% Nasal 1 Spray(s) Both Nostrils at bedtime    PRN Inpatient Medications  ALBUTerol    90 MICROgram(s) HFA Inhaler 2 Puff(s) Inhalation every 6 hours PRN  dextrose 40% Gel 15 Gram(s) Oral once PRN  glucagon  Injectable 1 milliGRAM(s) IntraMuscular once PRN  sodium chloride 0.9% lock flush 10 milliLiter(s) IV Push every 1 hour PRN      REVIEW OF SYSTEMS  --------------------------------------------------------------------------------  Gen: No  fatigue, fevers/chills, weakness  Respiratory: No dyspnea, cough, wheezing, hemoptysis  CV: No chest pain, PND, orthopnea  GI: No abdominal pain, diarrhea, constipation, nausea, vomiting, melena, hematochezia  MSK: No joint pain/swelling; no back pain; no edema  Neuro: No dizziness/lightheadedness,     All other systems were reviewed and are negative, except as noted.      VITALS/PHYSICAL EXAM  --------------------------------------------------------------------------------  T(C): 36.7 (07-24-19 @ 05:07), Max: 36.9 (07-23-19 @ 21:52)  HR: 80 (07-24-19 @ 05:07) (77 - 81)  BP: 158/70 (07-24-19 @ 05:07) (153/70 - 167/81)  RR: 19 (07-24-19 @ 05:07) (18 - 20)  SpO2: 90% (07-24-19 @ 05:07) (90% - 94%)  Wt(kg): --        07-23-19 @ 07:01  -  07-24-19 @ 07:00  --------------------------------------------------------  IN: 900 mL / OUT: 3925 mL / NET: -3025 mL      Physical Exam:  	Gen: NAD,  	HEENT:, clear oropharynx  	Pulm: CTA B/L  	CV: RRR, S1S2; no rub  	Abd: +BS, soft, nontender/nondistended  	LE: 2+ edema bl  	Neuro: No focal deficits, intact gait  	Skin: Warm, without rashes  	Vascular access: R ij non tunnelled cath      LABS/STUDIES  --------------------------------------------------------------------------------              7.8    6.1   >-----------<  193      [07-23-19 @ 07:07]              22.9     140  |  100  |  77  ----------------------------<  97      [07-23-19 @ 07:07]  4.3   |  23  |  5.95        Ca     8.6     [07-23-19 @ 07:07]      Mg     2.8     [07-23-19 @ 07:07]      Phos  5.7     [07-23-19 @ 07:07]      PT/INR: PT 12.5 , INR 1.08       [07-23-19 @ 07:07]  PTT: 39.9       [07-23-19 @ 07:07]      Creatinine Trend:  SCr 5.95 [07-23 @ 07:07]  SCr 3.84 [07-22 @ 07:21]  SCr 6.40 [07-21 @ 07:23]  SCr 7.54 [07-20 @ 09:58]  SCr 7.31 [07-19 @ 17:16] [facility] DIVISION OF KIDNEY DISEASES AND HYPERTENSION -- FOLLOW UP NOTE  --------------------------------------------------------------------------------  Chief Complaint:  sob, edema, nausea, fatigue    24 hour events/subjective:  pt examined at bed side, had tunneled catheter placed, no events, Had HD with good UF, no events.       PAST HISTORY  --------------------------------------------------------------------------------  No significant changes to PMH, PSH, FHx, SHx, unless otherwise noted    ALLERGIES & MEDICATIONS  --------------------------------------------------------------------------------  Allergies    clonidine (Other)    Intolerances      Standing Inpatient Medications  amLODIPine   Tablet 10 milliGRAM(s) Oral daily  aspirin enteric coated 81 milliGRAM(s) Oral daily  atorvastatin 20 milliGRAM(s) Oral at bedtime  buMETAnide 4 milliGRAM(s) Oral <User Schedule>  chlorhexidine 2% Cloths 1 Application(s) Topical daily  cholecalciferol 2000 Unit(s) Oral daily  dextrose 5%. 1000 milliLiter(s) IV Continuous <Continuous>  dextrose 50% Injectable 12.5 Gram(s) IV Push once  dextrose 50% Injectable 25 Gram(s) IV Push once  dextrose 50% Injectable 25 Gram(s) IV Push once  epoetin prema Injectable 4000 Unit(s) IV Push <User Schedule>  fluticasone propionate 50 MICROgram(s)/spray Nasal Spray 1 Spray(s) Both Nostrils two times a day  gabapentin 300 milliGRAM(s) Oral two times a day  heparin  Injectable 5000 Unit(s) SubCutaneous every 8 hours  hydrALAZINE 150 milliGRAM(s) Oral two times a day  insulin glargine Injectable (LANTUS) 16 Unit(s) SubCutaneous every morning  insulin lispro (HumaLOG) corrective regimen sliding scale   SubCutaneous at bedtime  insulin lispro (HumaLOG) corrective regimen sliding scale   SubCutaneous three times a day before meals  insulin lispro Injectable (HumaLOG) 3 Unit(s) SubCutaneous three times a day before meals  iron sucrose IVPB 200 milliGRAM(s) IV Intermittent every 24 hours  labetalol 200 milliGRAM(s) Oral two times a day  latanoprost 0.005% Ophthalmic Solution 1 Drop(s) Left EYE at bedtime  minoxidil 5 milliGRAM(s) Oral daily  multivitamin 1 Tablet(s) Oral daily  sevelamer carbonate 1600 milliGRAM(s) Oral every 8 hours  sodium chloride 0.65% Nasal 1 Spray(s) Both Nostrils at bedtime    PRN Inpatient Medications  ALBUTerol    90 MICROgram(s) HFA Inhaler 2 Puff(s) Inhalation every 6 hours PRN  dextrose 40% Gel 15 Gram(s) Oral once PRN  glucagon  Injectable 1 milliGRAM(s) IntraMuscular once PRN  sodium chloride 0.9% lock flush 10 milliLiter(s) IV Push every 1 hour PRN      REVIEW OF SYSTEMS  --------------------------------------------------------------------------------  Gen: No  fatigue, fevers/chills, weakness  Respiratory: No dyspnea, cough, wheezing, hemoptysis  CV: No chest pain, PND, orthopnea  GI: No abdominal pain, diarrhea, constipation, nausea, vomiting, melena, hematochezia  MSK: No joint pain/swelling; no back pain; no edema  Neuro: No dizziness/lightheadedness,     All other systems were reviewed and are negative, except as noted.      VITALS/PHYSICAL EXAM  --------------------------------------------------------------------------------  T(C): 36.7 (07-24-19 @ 05:07), Max: 36.9 (07-23-19 @ 21:52)  HR: 80 (07-24-19 @ 05:07) (77 - 81)  BP: 158/70 (07-24-19 @ 05:07) (153/70 - 167/81)  RR: 19 (07-24-19 @ 05:07) (18 - 20)  SpO2: 90% (07-24-19 @ 05:07) (90% - 94%)  Wt(kg): --        07-23-19 @ 07:01  -  07-24-19 @ 07:00  --------------------------------------------------------  IN: 900 mL / OUT: 3925 mL / NET: -3025 mL      Physical Exam:  	Gen: NAD,  	HEENT:, clear oropharynx  	Pulm: CTA B/L  	CV: RRR, S1S2; no rub  	Abd: +BS, soft, nontender/nondistended  	LE: 2+ edema bl  	Neuro: No focal deficits, intact gait  	Skin: Warm, without rashes  	Vascular access: R ij non tunnelled cath      LABS/STUDIES  --------------------------------------------------------------------------------              7.8    6.1   >-----------<  193      [07-23-19 @ 07:07]              22.9     140  |  100  |  77  ----------------------------<  97      [07-23-19 @ 07:07]  4.3   |  23  |  5.95        Ca     8.6     [07-23-19 @ 07:07]      Mg     2.8     [07-23-19 @ 07:07]      Phos  5.7     [07-23-19 @ 07:07]      PT/INR: PT 12.5 , INR 1.08       [07-23-19 @ 07:07]  PTT: 39.9       [07-23-19 @ 07:07]      Creatinine Trend:  SCr 5.95 [07-23 @ 07:07]  SCr 3.84 [07-22 @ 07:21]  SCr 6.40 [07-21 @ 07:23]  SCr 7.54 [07-20 @ 09:58]  SCr 7.31 [07-19 @ 17:16]

## 2019-07-25 ENCOUNTER — MEDICATION RENEWAL (OUTPATIENT)
Age: 59
End: 2019-07-25

## 2019-07-29 ENCOUNTER — MEDICATION RENEWAL (OUTPATIENT)
Age: 59
End: 2019-07-29

## 2019-07-31 ENCOUNTER — APPOINTMENT (OUTPATIENT)
Dept: NEPHROLOGY | Facility: CLINIC | Age: 59
End: 2019-07-31

## 2019-08-05 ENCOUNTER — APPOINTMENT (OUTPATIENT)
Dept: VASCULAR SURGERY | Facility: CLINIC | Age: 59
End: 2019-08-05
Payer: COMMERCIAL

## 2019-08-05 ENCOUNTER — APPOINTMENT (OUTPATIENT)
Dept: PULMONOLOGY | Facility: CLINIC | Age: 59
End: 2019-08-05
Payer: COMMERCIAL

## 2019-08-05 ENCOUNTER — NON-APPOINTMENT (OUTPATIENT)
Age: 59
End: 2019-08-05

## 2019-08-05 ENCOUNTER — APPOINTMENT (OUTPATIENT)
Dept: TRANSPLANT | Facility: CLINIC | Age: 59
End: 2019-08-05

## 2019-08-05 ENCOUNTER — APPOINTMENT (OUTPATIENT)
Dept: CARDIOLOGY | Facility: CLINIC | Age: 59
End: 2019-08-05
Payer: COMMERCIAL

## 2019-08-05 VITALS
BODY MASS INDEX: 26.22 KG/M2 | RESPIRATION RATE: 12 BRPM | WEIGHT: 177 LBS | HEART RATE: 80 BPM | OXYGEN SATURATION: 95 % | TEMPERATURE: 98.7 F | SYSTOLIC BLOOD PRESSURE: 151 MMHG | DIASTOLIC BLOOD PRESSURE: 62 MMHG | HEIGHT: 69 IN

## 2019-08-05 VITALS
HEIGHT: 69 IN | SYSTOLIC BLOOD PRESSURE: 139 MMHG | DIASTOLIC BLOOD PRESSURE: 68 MMHG | WEIGHT: 213 LBS | HEART RATE: 68 BPM | BODY MASS INDEX: 31.55 KG/M2

## 2019-08-05 VITALS — DIASTOLIC BLOOD PRESSURE: 60 MMHG | SYSTOLIC BLOOD PRESSURE: 120 MMHG

## 2019-08-05 DIAGNOSIS — Z87.09 PERSONAL HISTORY OF OTHER DISEASES OF THE RESPIRATORY SYSTEM: ICD-10-CM

## 2019-08-05 PROCEDURE — 93990 DOPPLER FLOW TESTING: CPT

## 2019-08-05 PROCEDURE — 99214 OFFICE O/P EST MOD 30 MIN: CPT

## 2019-08-05 PROCEDURE — 99024 POSTOP FOLLOW-UP VISIT: CPT

## 2019-08-05 RX ORDER — MULTIVIT-MIN/FOLIC/VIT K/LYCOP 400-300MCG
25 MCG TABLET ORAL
Refills: 0 | Status: DISCONTINUED | COMMUNITY
Start: 2018-03-15 | End: 2019-08-05

## 2019-08-05 RX ORDER — SODIUM BICARBONATE 650 MG/1
650 TABLET ORAL 3 TIMES DAILY
Qty: 270 | Refills: 3 | Status: COMPLETED | COMMUNITY
Start: 2019-06-27 | End: 2019-08-05

## 2019-08-05 RX ORDER — METOLAZONE 5 MG/1
5 TABLET ORAL DAILY
Refills: 0 | Status: DISCONTINUED | COMMUNITY
Start: 2018-03-15 | End: 2019-08-05

## 2019-08-05 RX ORDER — TORSEMIDE 20 MG/1
20 TABLET ORAL
Qty: 180 | Refills: 1 | Status: COMPLETED | COMMUNITY
Start: 2019-05-15 | End: 2019-08-05

## 2019-08-05 RX ORDER — METOLAZONE 5 MG/1
5 TABLET ORAL
Qty: 90 | Refills: 0 | Status: COMPLETED | COMMUNITY
Start: 2019-04-11 | End: 2019-08-05

## 2019-08-05 NOTE — HISTORY OF PRESENT ILLNESS
[FreeTextEntry1] : Orion is s/p fistula.His blood pressure has been very low in the morning. He is not taking any second dose of medication. Minoxidil decreased to one tablet a day. He gets very lightheaded when he climbs up a flight of stairs. Denies any chest pain or palpitations.

## 2019-08-05 NOTE — REASON FOR VISIT
[de-identified] : Status post left radiocephalic fistula. Incision is clean. Sutures were removed. There is a good thrill. Duplex shows a patent fistula. Plan for fistulogram and maturation.

## 2019-08-05 NOTE — CARDIOLOGY SUMMARY
[Normal] : normal [___] : [unfilled] [No Ischemia] : no Ischemia [No Symptoms] : no Symptoms [No Exercise Ind Arr] : no exercise induced arrhythmias

## 2019-08-05 NOTE — DISCUSSION/SUMMARY
[___ Month(s)] : [unfilled] month(s) [FreeTextEntry1] : The patient is a 59-year-old ex-smoker family history of coronary artery disease, diabetes mellitus on insulin, hypertension, hyperlipidemia, renal insufficiency, s/p gastric sleeve , HFpEF, s/p AV fistula who is hypotensive\par #1 CV- No angina, LV EF normal\par #2 HFpEF- on bumex\par #2 DM- on insulin, better control, continues to improve\par #3 Htn- on labetalol decrease to 200mg, bid hydralazine, stop minoxidil\par #4 Renal - on dialysis, AV fistula not mature yet\par #5 Lipids- atorvastatin optimal\par \par \par 4/24/18 Addendum: No interval change. There are no cardiac contraindications to proceeding with hernia surgery as planned.

## 2019-08-20 ENCOUNTER — APPOINTMENT (OUTPATIENT)
Dept: TRANSPLANT | Facility: CLINIC | Age: 59
End: 2019-08-20
Payer: COMMERCIAL

## 2019-08-20 ENCOUNTER — LABORATORY RESULT (OUTPATIENT)
Age: 59
End: 2019-08-20

## 2019-08-20 VITALS
WEIGHT: 164 LBS | BODY MASS INDEX: 24.29 KG/M2 | HEART RATE: 76 BPM | DIASTOLIC BLOOD PRESSURE: 77 MMHG | RESPIRATION RATE: 12 BRPM | HEIGHT: 69 IN | TEMPERATURE: 98.5 F | OXYGEN SATURATION: 96 % | SYSTOLIC BLOOD PRESSURE: 121 MMHG

## 2019-08-20 PROCEDURE — 99215 OFFICE O/P EST HI 40 MIN: CPT

## 2019-08-21 ENCOUNTER — APPOINTMENT (OUTPATIENT)
Dept: ENDOVASCULAR SURGERY | Facility: CLINIC | Age: 59
End: 2019-08-21
Payer: COMMERCIAL

## 2019-08-21 LAB
ALBUMIN SERPL ELPH-MCNC: 5.1 G/DL
ALP BLD-CCNC: 104 U/L
ALT SERPL-CCNC: 14 U/L
ANION GAP SERPL CALC-SCNC: 23 MMOL/L
APPEARANCE: CLEAR
AST SERPL-CCNC: 18 U/L
BASOPHILS # BLD AUTO: 0.07 K/UL
BASOPHILS NFR BLD AUTO: 0.8 %
BILIRUB SERPL-MCNC: 0.3 MG/DL
BILIRUBIN URINE: NEGATIVE
BLOOD URINE: ABNORMAL
BUN SERPL-MCNC: 74 MG/DL
C PEPTIDE SERPL-MCNC: 4.7 NG/ML
CALCIUM SERPL-MCNC: 9.8 MG/DL
CHLORIDE SERPL-SCNC: 95 MMOL/L
CHOLEST SERPL-MCNC: 86 MG/DL
CHOLEST/HDLC SERPL: 3.2 RATIO
CK SERPL-CCNC: 160 U/L
CMV IGG SERPL QL: <0.2 U/ML
CMV IGG SERPL-IMP: NEGATIVE
CMV IGM SERPL QL: <8 AU/ML
CMV IGM SERPL QL: NEGATIVE
CO2 SERPL-SCNC: 26 MMOL/L
COLOR: YELLOW
CREAT SERPL-MCNC: 7.93 MG/DL
CREAT SPEC-SCNC: 137 MG/DL
CREAT/PROT UR: 5 RATIO
CRP SERPL-MCNC: 0.17 MG/DL
EBV EA AB SER IA-ACNC: >150 U/ML
EBV EA AB TITR SER IF: POSITIVE
EBV EA IGG SER QL IA: 284 U/ML
EBV EA IGG SER-ACNC: POSITIVE
EBV EA IGM SER IA-ACNC: NEGATIVE
EBV PATRN SPEC IB-IMP: NORMAL
EBV VCA IGG SER IA-ACNC: 582 U/ML
EBV VCA IGM SER QL IA: <10 U/ML
EOSINOPHIL # BLD AUTO: 0.51 K/UL
EOSINOPHIL NFR BLD AUTO: 5.8 %
EPSTEIN-BARR VIRUS CAPSID ANTIGEN IGG: POSITIVE
ERYTHROCYTE [SEDIMENTATION RATE] IN BLOOD BY WESTERGREN METHOD: 56 MM/HR
ESTIMATED AVERAGE GLUCOSE: 128 MG/DL
GLUCOSE QUALITATIVE U: ABNORMAL
GLUCOSE SERPL-MCNC: 78 MG/DL
HAV IGM SER QL: NONREACTIVE
HBA1C MFR BLD HPLC: 6.1 %
HBV CORE IGG+IGM SER QL: NONREACTIVE
HBV SURFACE AB SER QL: NONREACTIVE
HBV SURFACE AB SERPL IA-ACNC: <3 MIU/ML
HBV SURFACE AG SER QL: NONREACTIVE
HCT VFR BLD CALC: 45.4 %
HCV AB SER QL: NONREACTIVE
HCV S/CO RATIO: 0.11 S/CO
HDLC SERPL-MCNC: 27 MG/DL
HEPATITIS A IGG ANTIBODY: REACTIVE
HGB BLD-MCNC: 14.1 G/DL
IMM GRANULOCYTES NFR BLD AUTO: 0.3 %
KETONES URINE: NEGATIVE
LDLC SERPL CALC-MCNC: 20 MG/DL
LEUKOCYTE ESTERASE URINE: NEGATIVE
LYMPHOCYTES # BLD AUTO: 1.66 K/UL
LYMPHOCYTES NFR BLD AUTO: 18.9 %
MAGNESIUM SERPL-MCNC: 2.9 MG/DL
MAN DIFF?: NORMAL
MCHC RBC-ENTMCNC: 30.7 PG
MCHC RBC-ENTMCNC: 31.1 GM/DL
MCV RBC AUTO: 98.7 FL
MONOCYTES # BLD AUTO: 0.58 K/UL
MONOCYTES NFR BLD AUTO: 6.6 %
NEUTROPHILS # BLD AUTO: 5.92 K/UL
NEUTROPHILS NFR BLD AUTO: 67.6 %
NITRITE URINE: NEGATIVE
PH URINE: 6.5
PHOSPHATE SERPL-MCNC: 5.9 MG/DL
PLATELET # BLD AUTO: 247 K/UL
POTASSIUM SERPL-SCNC: 4.5 MMOL/L
PROT SERPL-MCNC: 8.2 G/DL
PROT UR-MCNC: 677 MG/DL
PROTEIN URINE: ABNORMAL
PSA SERPL-MCNC: 1.44 NG/ML
RBC # BLD: 4.6 M/UL
RBC # FLD: 13.5 %
RUBV IGG FLD-ACNC: 24.1 INDEX
RUBV IGG SER-IMP: POSITIVE
SODIUM SERPL-SCNC: 144 MMOL/L
SPECIFIC GRAVITY URINE: 1.02
T GONDII AB SER-IMP: NEGATIVE
T GONDII IGG SER QL: <3 IU/ML
T PALLIDUM AB SER QL IA: NEGATIVE
T3 SERPL-MCNC: 70 NG/DL
T4 FREE SERPL-MCNC: 1 NG/DL
TRIGL SERPL-MCNC: 197 MG/DL
TSH SERPL-ACNC: 1.88 UIU/ML
URATE SERPL-MCNC: 7 MG/DL
UROBILINOGEN URINE: NORMAL
VZV AB TITR SER: POSITIVE
VZV IGG SER IF-ACNC: 2528 INDEX
WBC # FLD AUTO: 8.77 K/UL

## 2019-08-21 PROCEDURE — 36832 AV FISTULA REVISION OPEN: CPT | Mod: 59,58

## 2019-08-21 PROCEDURE — 36902Z: CUSTOM | Mod: 58

## 2019-08-21 PROCEDURE — 76937 US GUIDE VASCULAR ACCESS: CPT

## 2019-08-21 PROCEDURE — 36215Z: CUSTOM | Mod: 59,58

## 2019-08-21 NOTE — HISTORY OF PRESENT ILLNESS
[] : left radiocephalic fistula [FreeTextEntry1] : Dr Vallejo 7/18/2019 [FreeTextEntry4] : yesterday  [FreeTextEntry5] : yesterday at 8 pm [FreeTextEntry6] : Dr Beck

## 2019-08-21 NOTE — PAST MEDICAL HISTORY
[No therapy indicated for cases scheduled for less than one hour] : No therapy indicated for cases scheduled for less than one hour. [Increasing age ( >40 years old)] : Increasing age ( >40 years old) [FreeTextEntry1] : Malignant Hyperthermia Screening Tool and Risk of Bleeding Assessment \par \par Mr. JITENDRA FRENCH denies family of unexpected death following Anesthesia or Exercise.\par Denies Family history of Malignant Hyperthermia, Muscle or Neuromuscular disorder and High Temperature  following exercise.\par \par Mr. JITENDRA FRENCH denies history of Muscle Spasm, Dark or Chocolate- Colored and Unanticipated fever immediately following anaesthesia or serious exercise.\par Mr. JITENDRA FRENCH also denies bleeding tendencies/Risks of Bleeding.\par

## 2019-08-21 NOTE — PROCEDURE
[Site check for bleeding/hematoma/thrill/bruit] : Site check for bleeding/hematoma/thrill/bruit [Resume diet] : resume diet [Vital signs on admission the q 15 mins x2] : Vital signs on admission the q 15 mins x2 [FreeTextEntry1] : left arm fistula/fistulogram/angioplasty/ ligation of accessory vein

## 2019-08-22 LAB
CMV DNA SPEC QL NAA+PROBE: NOT DETECTED
CMVPCR LOG: NOT DETECTED LOGIU/ML
HSV 1+2 IGG SER IA-IMP: NEGATIVE
HSV 1+2 IGG SER IA-IMP: POSITIVE
HSV 1+2 IGG SER IA-IMP: POSITIVE
HSV1 IGG SER QL: 25.6 INDEX
HSV1 IGG SER QL: 25.6 INDEX
HSV1 IGM SER QL: NORMAL TITER
HSV2 AB FLD-ACNC: NORMAL TITER
HSV2 IGG SER QL: 0.14 INDEX

## 2019-09-03 ENCOUNTER — APPOINTMENT (OUTPATIENT)
Dept: ENDOVASCULAR SURGERY | Facility: CLINIC | Age: 59
End: 2019-09-03
Payer: COMMERCIAL

## 2019-09-03 VITALS
OXYGEN SATURATION: 100 % | HEART RATE: 72 BPM | DIASTOLIC BLOOD PRESSURE: 80 MMHG | TEMPERATURE: 97.6 F | BODY MASS INDEX: 26.7 KG/M2 | WEIGHT: 180.78 LBS | RESPIRATION RATE: 18 BRPM | SYSTOLIC BLOOD PRESSURE: 151 MMHG

## 2019-09-03 PROCEDURE — 36909Z: CUSTOM

## 2019-09-03 PROCEDURE — 36215Z: CUSTOM | Mod: 58

## 2019-09-03 PROCEDURE — 36902Z: CUSTOM | Mod: 58

## 2019-09-03 PROCEDURE — 36011Z: CUSTOM | Mod: LT,59

## 2019-09-10 ENCOUNTER — INBOUND DOCUMENT (OUTPATIENT)
Age: 59
End: 2019-09-10

## 2019-09-10 NOTE — PROCEDURE
[Resume diet] : resume diet [Site check for bleeding/hematoma/thrill/bruit] : Site check for bleeding/hematoma/thrill/bruit [Vital signs on admission the q 15 mins x2] : Vital signs on admission the q 15 mins x2 [FreeTextEntry1] : left arm fistula/fistulogram/angioplasty/coil

## 2019-09-10 NOTE — PAST MEDICAL HISTORY
[Increasing age ( >40 years old)] : Increasing age ( >40 years old) [No therapy indicated for cases scheduled for less than one hour] : No therapy indicated for cases scheduled for less than one hour. [FreeTextEntry1] : Malignant Hyperthermia Screening Tool and Risk of Bleeding Assessment \par \par Mr. JITENDRA FRENCH denies family of unexpected death following Anesthesia or Exercise.\par Denies Family history of Malignant Hyperthermia, Muscle or Neuromuscular disorder and High Temperature  following exercise.\par \par Mr. JITENDRA FRENCH denies history of Muscle Spasm, Dark or Chocolate- Colored and Unanticipated fever immediately following anaesthesia or serious exercise.\par Mr. JITENDRA FRENCH also denies bleeding tendencies/Risks of Bleeding.\par

## 2019-09-10 NOTE — HISTORY OF PRESENT ILLNESS
[] : left radiocephalic fistula [FreeTextEntry1] : Dr Vallejo 7/18/2019 [FreeTextEntry4] : saturday [FreeTextEntry6] : Dr Beck  [FreeTextEntry5] : yesterday at 7pm

## 2019-09-11 ENCOUNTER — OTHER (OUTPATIENT)
Age: 59
End: 2019-09-11

## 2019-09-16 ENCOUNTER — APPOINTMENT (OUTPATIENT)
Dept: VASCULAR SURGERY | Facility: CLINIC | Age: 59
End: 2019-09-16
Payer: COMMERCIAL

## 2019-09-16 PROCEDURE — 93990 DOPPLER FLOW TESTING: CPT

## 2019-09-16 PROCEDURE — 99024 POSTOP FOLLOW-UP VISIT: CPT

## 2019-09-18 NOTE — HISTORY OF PRESENT ILLNESS
[] : left radiocephalic fistula [FreeTextEntry1] : 58 yo male with history of esrd on hd presents for follow up of left upper extremity avf.  pt without any complaints at this time current access is right ij permcath

## 2019-09-18 NOTE — PHYSICAL EXAM
[Thrill] : thrill [Hand well perfused] : hand well perfused [2+] : left 2+ [Pulsatile Thrill] : no pulsatile thrill [Aneurysm] : no aneurysm [Bleeding] : no bleeding [Ulcer] : no ulcer [Normal] : normoactive bowel sounds, soft and nontender, no hepatosplenomegaly or masses appreciated [de-identified] : small incision site with overlying eschar from side branch ligation

## 2019-09-18 NOTE — DISCUSSION/SUMMARY
[FreeTextEntry1] : 60 yo male with history of esrd on hd presents for follow up of left upper extremity avf\par \par duplex shows patent avf measuring about 0.6 with flow rate of 881 and superficial \par \par will start to access avf for hd tomorrow and will plan for permcath removal in 2 weeks

## 2019-09-21 ENCOUNTER — APPOINTMENT (OUTPATIENT)
Dept: CT IMAGING | Facility: IMAGING CENTER | Age: 59
End: 2019-09-21

## 2019-09-21 ENCOUNTER — APPOINTMENT (OUTPATIENT)
Dept: RADIOLOGY | Facility: IMAGING CENTER | Age: 59
End: 2019-09-21

## 2019-09-24 ENCOUNTER — OUTPATIENT (OUTPATIENT)
Dept: OUTPATIENT SERVICES | Facility: HOSPITAL | Age: 59
LOS: 1 days | End: 2019-09-24
Payer: COMMERCIAL

## 2019-09-24 ENCOUNTER — APPOINTMENT (OUTPATIENT)
Dept: CV DIAGNOSTICS | Facility: HOSPITAL | Age: 59
End: 2019-09-24

## 2019-09-24 ENCOUNTER — APPOINTMENT (OUTPATIENT)
Dept: CV DIAGNOSITCS | Facility: HOSPITAL | Age: 59
End: 2019-09-24

## 2019-09-24 DIAGNOSIS — Z98.84 BARIATRIC SURGERY STATUS: Chronic | ICD-10-CM

## 2019-09-24 DIAGNOSIS — Z98.890 OTHER SPECIFIED POSTPROCEDURAL STATES: Chronic | ICD-10-CM

## 2019-09-24 DIAGNOSIS — Z98.49 CATARACT EXTRACTION STATUS, UNSPECIFIED EYE: Chronic | ICD-10-CM

## 2019-09-24 DIAGNOSIS — H35.62 RETINAL HEMORRHAGE, LEFT EYE: Chronic | ICD-10-CM

## 2019-09-24 DIAGNOSIS — I25.10 ATHEROSCLEROTIC HEART DISEASE OF NATIVE CORONARY ARTERY WITHOUT ANGINA PECTORIS: ICD-10-CM

## 2019-09-24 PROCEDURE — 78452 HT MUSCLE IMAGE SPECT MULT: CPT

## 2019-09-24 PROCEDURE — 93018 CV STRESS TEST I&R ONLY: CPT

## 2019-09-24 PROCEDURE — A9500: CPT

## 2019-09-24 PROCEDURE — 78452 HT MUSCLE IMAGE SPECT MULT: CPT | Mod: 26

## 2019-09-24 PROCEDURE — 93017 CV STRESS TEST TRACING ONLY: CPT

## 2019-09-24 PROCEDURE — 93016 CV STRESS TEST SUPVJ ONLY: CPT

## 2019-09-28 ENCOUNTER — OUTPATIENT (OUTPATIENT)
Dept: OUTPATIENT SERVICES | Facility: HOSPITAL | Age: 59
LOS: 1 days | End: 2019-09-28
Payer: COMMERCIAL

## 2019-09-28 ENCOUNTER — APPOINTMENT (OUTPATIENT)
Dept: CT IMAGING | Facility: IMAGING CENTER | Age: 59
End: 2019-09-28
Payer: COMMERCIAL

## 2019-09-28 DIAGNOSIS — Z98.49 CATARACT EXTRACTION STATUS, UNSPECIFIED EYE: Chronic | ICD-10-CM

## 2019-09-28 DIAGNOSIS — Z01.818 ENCOUNTER FOR OTHER PREPROCEDURAL EXAMINATION: ICD-10-CM

## 2019-09-28 DIAGNOSIS — H35.62 RETINAL HEMORRHAGE, LEFT EYE: Chronic | ICD-10-CM

## 2019-09-28 DIAGNOSIS — Z98.84 BARIATRIC SURGERY STATUS: Chronic | ICD-10-CM

## 2019-09-28 DIAGNOSIS — Z98.890 OTHER SPECIFIED POSTPROCEDURAL STATES: Chronic | ICD-10-CM

## 2019-09-28 PROCEDURE — 74178 CT ABD&PLV WO CNTR FLWD CNTR: CPT | Mod: 26

## 2019-09-28 PROCEDURE — 74178 CT ABD&PLV WO CNTR FLWD CNTR: CPT

## 2019-10-01 ENCOUNTER — APPOINTMENT (OUTPATIENT)
Dept: ENDOVASCULAR SURGERY | Facility: CLINIC | Age: 59
End: 2019-10-01
Payer: COMMERCIAL

## 2019-10-01 PROCEDURE — 36589 REMOVAL TUNNELED CV CATH: CPT | Mod: 58

## 2019-10-14 ENCOUNTER — NON-APPOINTMENT (OUTPATIENT)
Age: 59
End: 2019-10-14

## 2019-10-14 ENCOUNTER — APPOINTMENT (OUTPATIENT)
Dept: CARDIOLOGY | Facility: CLINIC | Age: 59
End: 2019-10-14
Payer: COMMERCIAL

## 2019-10-14 VITALS
WEIGHT: 160 LBS | SYSTOLIC BLOOD PRESSURE: 132 MMHG | HEIGHT: 69 IN | BODY MASS INDEX: 23.7 KG/M2 | OXYGEN SATURATION: 96 % | HEART RATE: 77 BPM | DIASTOLIC BLOOD PRESSURE: 72 MMHG

## 2019-10-14 DIAGNOSIS — I73.9 PERIPHERAL VASCULAR DISEASE, UNSPECIFIED: ICD-10-CM

## 2019-10-14 PROCEDURE — 93000 ELECTROCARDIOGRAM COMPLETE: CPT

## 2019-10-14 PROCEDURE — 99215 OFFICE O/P EST HI 40 MIN: CPT

## 2019-10-14 NOTE — HISTORY OF PRESENT ILLNESS
[FreeTextEntry1] : Patient is a 59 year-old gentleman with known cardiovascular risk factors of hypertension (resolving since starting HD), insulin dependent type II diabetes, obesity status post gastric sleeve (weight was 270 lbs, now 160 lbs), ESRD on HD since 7/2019 (was Tues-Thurs-Sat, now starting Fxmaxm-[id number]-Demfmi), with recent negative ischemic evaluation with Dr. Topete.\par Patient remains very active, but he does not formally exercise.\par His blood pressure medications have been dramatically reduced since starting HD. He now only takes labetalol on non-dialysis days.\par He gets occasional dizziness when standing up too quickly after dialysis. He had one recent episode of syncope that was likely orthostatic. \par \par PMD: Carlo Hassan MD [phone]\par Cardiologist: Marylou Topete MD [phone] or [phone]\par Endocrinologist: \par Nephrologist: Donna Melvin MD [phone] and Tea Romeo MD

## 2019-10-14 NOTE — PHYSICAL EXAM
[General Appearance - Well Developed] : well developed [Normal Appearance] : normal appearance [General Appearance - Well Nourished] : well nourished [Well Groomed] : well groomed [No Deformities] : no deformities [Conjunctiva] : the conjunctiva were normal in both eyes [General Appearance - In No Acute Distress] : no acute distress [PERRL] : pupils were equal in size, round, and reactive to light [Normal Oral Mucosa] : normal oral mucosa [No Oral Cyanosis] : no oral cyanosis [No Oral Pallor] : no oral pallor [Normal Jugular Venous A Waves Present] : normal jugular venous A waves present [Normal Oropharynx] : normal oropharynx [Normal Jugular Venous V Waves Present] : normal jugular venous V waves present [No Jugular Venous Marin A Waves] : no jugular venous marin A waves [Normal] : normal [5th Left ICS - MCL] : palpated at the 5th LICS in the midclavicular line [No Precordial Heave] : no precordial heave was noted [Rhythm Regular] : regular [Normal Rate] : normal [Normal S1] : normal S1 [Normal S2] : normal S2 [No Gallop] : no gallop heard [No Murmur] : no murmurs heard [2+] : right 2+ [No Pitting Edema] : no pitting edema present [] : no respiratory distress [Respiration, Rhythm And Depth] : normal respiratory rhythm and effort [Exaggerated Use Of Accessory Muscles For Inspiration] : no accessory muscle use [Auscultation Breath Sounds / Voice Sounds] : lungs were clear to auscultation bilaterally [Abdomen Soft] : soft [Abdomen Tenderness] : non-tender [Bowel Sounds] : normal bowel sounds [Abnormal Walk] : normal gait [Cyanosis, Localized] : no localized cyanosis [Nail Clubbing] : no clubbing of the fingernails [Gait - Sufficient For Exercise Testing] : the gait was sufficient for exercise testing [Skin Color & Pigmentation] : normal skin color and pigmentation [No Venous Stasis] : no venous stasis [No Xanthoma] : no  xanthoma was observed [Oriented To Time, Place, And Person] : oriented to person, place, and time [Impaired Insight] : insight and judgment were intact [Affect] : the affect was normal [No Anxiety] : not feeling anxious [Mood] : the mood was normal [Yellow Sclera (Icteric)] : no scleral icterus was seen [Right Carotid Bruit] : no bruit heard over the right carotid [Left Carotid Bruit] : no bruit heard over the left carotid [FreeTextEntry1] : multiple tattoos on arms and back - all [employer]

## 2019-10-14 NOTE — DISCUSSION/SUMMARY
[FreeTextEntry1] : Patient is a 59 year-old gentleman with cardiovascular risk factors as above who presents today for cardiac evaluation prior to possible renal transplant.\par He has had an echocardiogram and nuclear stress test.\par The nuclear stress test, performed 9/24/2019, was negative for ischemia.\par \par No further cardiovascular work-up is necessary prior to his being considered for renal transplant.\par Encouraged regular activity and exercise as this has been shown to improve patient outcomes.\par \par Would repeat ischemic evaluation in one year or earlier if clinical status changes.

## 2019-10-14 NOTE — CARDIOLOGY SUMMARY
[No Ischemia] : no Ischemia [___] : [unfilled] [LVEF ___%] : LVEF [unfilled]% [None] : normal LV function [Moderate] : moderate pulmonary hypertension [Enlarged] : enlarged LA size

## 2019-10-15 LAB
HAV IGM SER QL: NONREACTIVE
HBV CORE IGM SER QL: NONREACTIVE
HBV SURFACE AB SER QL: NONREACTIVE
HBV SURFACE AG SER QL: NONREACTIVE
HCV AB SER QL: NONREACTIVE
HCV S/CO RATIO: 0.15 S/CO

## 2019-10-24 ENCOUNTER — APPOINTMENT (OUTPATIENT)
Dept: PULMONOLOGY | Facility: CLINIC | Age: 59
End: 2019-10-24
Payer: COMMERCIAL

## 2019-10-24 ENCOUNTER — APPOINTMENT (OUTPATIENT)
Dept: CARDIOLOGY | Facility: CLINIC | Age: 59
End: 2019-10-24
Payer: COMMERCIAL

## 2019-10-24 ENCOUNTER — NON-APPOINTMENT (OUTPATIENT)
Age: 59
End: 2019-10-24

## 2019-10-24 VITALS — SYSTOLIC BLOOD PRESSURE: 118 MMHG | DIASTOLIC BLOOD PRESSURE: 68 MMHG

## 2019-10-24 VITALS
TEMPERATURE: 97.6 F | OXYGEN SATURATION: 96 % | RESPIRATION RATE: 12 BRPM | HEART RATE: 81 BPM | HEIGHT: 69 IN | SYSTOLIC BLOOD PRESSURE: 147 MMHG | BODY MASS INDEX: 24.29 KG/M2 | WEIGHT: 164 LBS | DIASTOLIC BLOOD PRESSURE: 75 MMHG

## 2019-10-24 PROCEDURE — 99214 OFFICE O/P EST MOD 30 MIN: CPT

## 2019-10-24 RX ORDER — FLUTICASONE PROPIONATE 50 UG/1
50 SPRAY, METERED NASAL
Qty: 1 | Refills: 3 | Status: DISCONTINUED | COMMUNITY
Start: 2018-05-31 | End: 2019-10-24

## 2019-10-24 NOTE — HISTORY OF PRESENT ILLNESS
[FreeTextEntry1] : Orion is tolerating dialysis but his blood pressures have been low. We have been communicating via email and decreasing. Last week collapsed on the floor. Walks with cane now for stability and doing better.

## 2019-10-24 NOTE — REASON FOR VISIT
[Heart Failure] : congestive heart failure [Acute Exacerbation] : an acute exacerbation of [Dizziness] : dizziness

## 2019-10-24 NOTE — DISCUSSION/SUMMARY
[___ Month(s)] : [unfilled] month(s) [FreeTextEntry1] : The patient is a 59-year-old ex-smoker family history of coronary artery disease, diabetes mellitus on insulin, hypertension, hyperlipidemia, renal insufficiency, s/p gastric sleeve , HFpEF, s/p AV fistula who has been running low BP.\par #1 CV- No angina, LV EF normal\par #2 HFpEF- on bumex\par #2 DM- on insulin, better control, continues to improve\par #3 Htn- only take labetalol 100mg nightly but not tonight\par #4 Renal - on dialysis, AV fistula, on transplant list.\par #5 Lipids- atorvastatin optimal\par \par \par 4/24/18 Addendum: No interval change. There are no cardiac contraindications to proceeding with hernia surgery as planned.

## 2019-10-24 NOTE — PHYSICAL EXAM
[No Deformities] : no deformities [Eyelids - No Xanthelasma] : the eyelids demonstrated no xanthelasmas [Normal Conjunctiva] : the conjunctiva exhibited no abnormalities [Normal Oral Mucosa] : normal oral mucosa [No Oral Pallor] : no oral pallor [No Oral Cyanosis] : no oral cyanosis [Normal Jugular Venous V Waves Present] : normal jugular venous V waves present [Normal Jugular Venous A Waves Present] : normal jugular venous A waves present [No Jugular Venous Marin A Waves] : no jugular venous marin A waves [Respiration, Rhythm And Depth] : normal respiratory rhythm and effort [Exaggerated Use Of Accessory Muscles For Inspiration] : no accessory muscle use [Auscultation Breath Sounds / Voice Sounds] : lungs were clear to auscultation bilaterally [Heart Rate And Rhythm] : heart rate and rhythm were normal [Heart Sounds] : normal S1 and S2 [Murmurs] : no murmurs present [Abdomen Soft] : soft [Abdomen Tenderness] : non-tender [Abdomen Mass (___ Cm)] : no abdominal mass palpated [Abnormal Walk] : normal gait [Gait - Sufficient For Exercise Testing] : the gait was sufficient for exercise testing [Nail Clubbing] : no clubbing of the fingernails [Cyanosis, Localized] : no localized cyanosis [Petechial Hemorrhages (___cm)] : no petechial hemorrhages [Skin Color & Pigmentation] : normal skin color and pigmentation [] : no rash [No Venous Stasis] : no venous stasis [Skin Lesions] : no skin lesions [No Skin Ulcers] : no skin ulcer [No Xanthoma] : no  xanthoma was observed [Oriented To Time, Place, And Person] : oriented to person, place, and time [Affect] : the affect was normal [Mood] : the mood was normal [No Anxiety] : not feeling anxious [FreeTextEntry1] : ; bilateral ankle edema

## 2019-11-06 ENCOUNTER — FORM ENCOUNTER (OUTPATIENT)
Age: 59
End: 2019-11-06

## 2019-11-07 ENCOUNTER — APPOINTMENT (OUTPATIENT)
Dept: ENDOVASCULAR SURGERY | Facility: CLINIC | Age: 59
End: 2019-11-07
Payer: COMMERCIAL

## 2019-11-07 VITALS
RESPIRATION RATE: 16 BRPM | OXYGEN SATURATION: 99 % | SYSTOLIC BLOOD PRESSURE: 115 MMHG | HEART RATE: 79 BPM | TEMPERATURE: 98.6 F | BODY MASS INDEX: 24.29 KG/M2 | HEIGHT: 69 IN | DIASTOLIC BLOOD PRESSURE: 75 MMHG | WEIGHT: 164 LBS

## 2019-11-07 PROCEDURE — 36902Z: CUSTOM

## 2019-11-07 NOTE — REASON FOR VISIT
[Other ___] : a [unfilled] visit for [Pulling Clots] : pulling clots [FreeTextEntry2] : referred from dialysis

## 2019-11-07 NOTE — HISTORY OF PRESENT ILLNESS
[] : left radiocephalic fistula [FreeTextEntry6] : Dr Beck  [FreeTextEntry5] : yesterday at 8 pm [FreeTextEntry4] : yesterday  [FreeTextEntry1] : Dr Vallejo 7/18/2019

## 2019-11-07 NOTE — ASSESSMENT
[FreeTextEntry1] : ESRD on HD with clotting at HD of left upper extremity access for fistulogram.  Consent obtained.\par \par Fistulogram demonstrates moderate juxtaanastamotic stenosis PTA to 5mm with good result.  EBL = minimal.  FUll report to follow.

## 2019-11-25 ENCOUNTER — RX RENEWAL (OUTPATIENT)
Age: 59
End: 2019-11-25

## 2019-12-30 ENCOUNTER — APPOINTMENT (OUTPATIENT)
Dept: VASCULAR SURGERY | Facility: CLINIC | Age: 59
End: 2019-12-30

## 2020-01-02 ENCOUNTER — APPOINTMENT (OUTPATIENT)
Dept: CARDIOLOGY | Facility: CLINIC | Age: 60
End: 2020-01-02
Payer: COMMERCIAL

## 2020-01-02 ENCOUNTER — NON-APPOINTMENT (OUTPATIENT)
Age: 60
End: 2020-01-02

## 2020-01-02 VITALS
RESPIRATION RATE: 12 BRPM | HEART RATE: 82 BPM | TEMPERATURE: 98.6 F | DIASTOLIC BLOOD PRESSURE: 76 MMHG | OXYGEN SATURATION: 98 % | SYSTOLIC BLOOD PRESSURE: 136 MMHG

## 2020-01-02 PROCEDURE — 99215 OFFICE O/P EST HI 40 MIN: CPT | Mod: 25

## 2020-01-02 PROCEDURE — 93000 ELECTROCARDIOGRAM COMPLETE: CPT

## 2020-01-02 NOTE — PHYSICAL EXAM
[No Deformities] : no deformities [Normal Conjunctiva] : the conjunctiva exhibited no abnormalities [Eyelids - No Xanthelasma] : the eyelids demonstrated no xanthelasmas [Normal Oral Mucosa] : normal oral mucosa [No Oral Pallor] : no oral pallor [No Oral Cyanosis] : no oral cyanosis [Normal Jugular Venous A Waves Present] : normal jugular venous A waves present [Normal Jugular Venous V Waves Present] : normal jugular venous V waves present [No Jugular Venous Marin A Waves] : no jugular venous marin A waves [Respiration, Rhythm And Depth] : normal respiratory rhythm and effort [Auscultation Breath Sounds / Voice Sounds] : lungs were clear to auscultation bilaterally [Exaggerated Use Of Accessory Muscles For Inspiration] : no accessory muscle use [Heart Sounds] : normal S1 and S2 [Heart Rate And Rhythm] : heart rate and rhythm were normal [Murmurs] : no murmurs present [Abdomen Tenderness] : non-tender [Abdomen Soft] : soft [Abnormal Walk] : normal gait [Abdomen Mass (___ Cm)] : no abdominal mass palpated [Gait - Sufficient For Exercise Testing] : the gait was sufficient for exercise testing [Nail Clubbing] : no clubbing of the fingernails [Cyanosis, Localized] : no localized cyanosis [Petechial Hemorrhages (___cm)] : no petechial hemorrhages [Skin Color & Pigmentation] : normal skin color and pigmentation [] : no rash [No Skin Ulcers] : no skin ulcer [No Venous Stasis] : no venous stasis [Skin Lesions] : no skin lesions [No Xanthoma] : no  xanthoma was observed [Oriented To Time, Place, And Person] : oriented to person, place, and time [Affect] : the affect was normal [Mood] : the mood was normal [No Anxiety] : not feeling anxious [FreeTextEntry1] : ; bilateral ankle edema

## 2020-01-02 NOTE — REVIEW OF SYSTEMS
[see HPI] : see HPI [Negative] : Heme/Lymph [Recent Weight Gain (___ Lbs)] : no recent weight gain [Recent Weight Loss (___ Lbs)] : no recent weight loss [Shortness Of Breath] : no shortness of breath [Lower Ext Edema] : no extremity edema [Dyspnea on exertion] : not dyspnea during exertion [Chest Pain] : no chest pain [Palpitations] : no palpitations

## 2020-01-02 NOTE — HISTORY OF PRESENT ILLNESS
[Preoperative Visit] : for a medical evaluation prior to surgery [Surgeon Name ___] : surgeon: [unfilled] [Scheduled Procedure ___] : a [unfilled] [Date of Surgery ___] : on [unfilled] [FreeTextEntry1] : Orion is tolerating dialysis and now off medication secondary to dizziness. Daughter was accepted as donor. Surgery scheduled for next month. No chest pain, palpitations or shortness of breath.

## 2020-01-09 ENCOUNTER — APPOINTMENT (OUTPATIENT)
Dept: CARDIOLOGY | Facility: CLINIC | Age: 60
End: 2020-01-09

## 2020-01-14 ENCOUNTER — APPOINTMENT (OUTPATIENT)
Dept: VASCULAR SURGERY | Facility: CLINIC | Age: 60
End: 2020-01-14
Payer: COMMERCIAL

## 2020-01-14 ENCOUNTER — APPOINTMENT (OUTPATIENT)
Dept: NEPHROLOGY | Facility: CLINIC | Age: 60
End: 2020-01-14
Payer: COMMERCIAL

## 2020-01-14 VITALS
HEART RATE: 97 BPM | WEIGHT: 168 LBS | BODY MASS INDEX: 24.88 KG/M2 | DIASTOLIC BLOOD PRESSURE: 52 MMHG | HEIGHT: 69 IN | SYSTOLIC BLOOD PRESSURE: 136 MMHG | TEMPERATURE: 98.3 F | OXYGEN SATURATION: 96 % | RESPIRATION RATE: 12 BRPM

## 2020-01-14 PROCEDURE — 99213 OFFICE O/P EST LOW 20 MIN: CPT

## 2020-01-14 PROCEDURE — 99214 OFFICE O/P EST MOD 30 MIN: CPT

## 2020-01-14 PROCEDURE — 93990 DOPPLER FLOW TESTING: CPT

## 2020-01-14 NOTE — PHYSICAL EXAM
[Thrill] : thrill [Hand well perfused] : hand well perfused [2+] : left 2+ [Normal] : coordination grossly intact, no focal deficits [Ulcer] : no ulcer [de-identified] : intact

## 2020-01-14 NOTE — CONSULT LETTER
[Dear  ___] : Dear  [unfilled], [Please see my note below.] : Please see my note below. [Consult Letter:] : I had the pleasure of evaluating your patient, [unfilled]. [Consult Closing:] : Thank you very much for allowing me to participate in the care of this patient.  If you have any questions, please do not hesitate to contact me. [Sincerely,] : Sincerely, [FreeTextEntry3] : Zak Puente, DO

## 2020-01-14 NOTE — DISCUSSION/SUMMARY
[FreeTextEntry1] : 60 yo male with history of esrd on hd via left upper extremity avf presents for follow up of left upper extremity avf. \par duplex shows patent avf with 50% stenosis of the juxta anastomosis with flow rate of 811 \par pt to follow up in 3 months

## 2020-01-14 NOTE — HISTORY OF PRESENT ILLNESS
[FreeTextEntry1] : Patient is a 59 y.o male with ESRD on dialysis here for f/u before living donor kidney transplant in February.  Started dialysis 2019 via AVF.  Still urinates but a small amount.  Follows with Dr. Brittny Romeo.  Pt still working.  \par he is accompanied by his wife of 35 years, Rebeca today.\par He has known DM (age 32) On insulin (); HTN ().  Pt has retinopathy, and neuropathy.  had right foot first toe amputation in .  Uses 24 units of levemir and 2-4 units of short acting insulin.  \par Has h/o Transfusions in \par Pt has orthostatic blood pressure so uses a cane.  He can walk several blocks and working full time.  \par Follows with Dr. Guillen. Had cardiac cath ()\par \par Past surgeries: Gastric sleeve surgery 2014 ( at [facility])\par Right big toe amputation for infection ()\par Left rotator cuff surgery ()\par Carpal tunnel surgery ( years ago)\par Umbilical hernia repair scheduled for May 3rd 2 years ago\par \par Functional/employment status: Works full time for [place] [employer] of [employer] facilities. \par Pt currently feels well.  compliant with dialysis and \par Colonoscopy () \par Cardiac cath ()\par EK20, normal \par Stress Test: , no Ischemia, no exercise induced arrhythmias, no Symptoms \par Echo 2019: mod pulm HTN, normal EF\par \par \par \par \par

## 2020-01-14 NOTE — PHYSICAL EXAM
[General Appearance - Alert] : alert [General Appearance - In No Acute Distress] : in no acute distress [Outer Ear] : the ears and nose were normal in appearance [Sclera] : the sclera and conjunctiva were normal [Jugular Venous Distention Increased] : there was no jugular-venous distention [Neck Appearance] : the appearance of the neck was normal [Apical Impulse] : the apical impulse was normal [Heart Sounds Gallop] : no gallops [Auscultation Breath Sounds / Voice Sounds] : lungs were clear to auscultation bilaterally [Bowel Sounds] : normal bowel sounds [Heart Sounds Pericardial Friction Rub] : no pericardial rub [Abdomen Soft] : soft [Edema] : there was no peripheral edema [Abdomen Tenderness] : non-tender [Cervical Lymph Nodes Enlarged Posterior Bilaterally] : posterior cervical [Supraclavicular Lymph Nodes Enlarged Bilaterally] : supraclavicular [Axillary Lymph Nodes Enlarged Bilaterally] : axillary [Cervical Lymph Nodes Enlarged Anterior Bilaterally] : anterior cervical [Inguinal Lymph Nodes Enlarged Bilaterally] : inguinal [Involuntary Movements] : no involuntary movements were seen [] : no rash [Oriented To Time, Place, And Person] : oriented to person, place, and time [No Focal Deficits] : no focal deficits [Impaired Insight] : insight and judgment were intact [Affect] : the affect was normal [FreeTextEntry1] : tattoes+++ body and limbs.

## 2020-01-14 NOTE — ASSESSMENT
[FreeTextEntry1] : 1.  ESRD on dialysis - pt is a good candidate for living donor transplant.  He is scheduled for the transplant early February.  \par 2.  CV - Stress test ok and pt has been seen by Dr. Church and Yoselyn.  Pulmonary pressure on echo was elevated but at the time he was very fluid overloaded.  \par 3.  Vascular - diminished pules, pt has had CT of a/p - will have surgery review.  \par 4.  DM2 on insulin - usually controlled. discussed that blood sugars will rise after transplantation.  \par \par I have personally discussed the risks and benefits of transplantation and patient attended transplant education class where the following was disclosed:\par  \par Reviewed factors affecting survival and morbidity while on dialysis, the transplant wait list and reviewed sultana-operative and long-term risk factors affecting outcome in kidney transplantation.  \par  \par One year SRTR outcomes for national and [facility] were discussed in regards to patient survival and graft survival after transplantation.  \par  \par Details of transplant surgery, including complications were discussed.\par Immunosuppression and complications including infection including life threatening sepsis and opportunistic infections, malignancy and new onset diabetes were discussed.  \par  \par Benefits of live donor transplantation as well as variability in wait times across regions and multiple listing were discussed. \par KDPI >85% and PHS high risk criteria donors were discussed. \par HCV kidney transplantation was discussed.\par  \par Will proceed with completing/ updating work up and listing for transplant/ live donor transplant once work up is reviewed and found to be acceptable by multidisciplinary listing committee.\par \par \par

## 2020-01-14 NOTE — HISTORY OF PRESENT ILLNESS
[] : left radiocephalic fistula [FreeTextEntry1] : 58 yo male with history of esrd on hd via left upper extremity avf presents for follow up of left upper extremity avf.  pt without any complaints at this time \par pt reports plan for transplant next month

## 2020-01-22 ENCOUNTER — FORM ENCOUNTER (OUTPATIENT)
Age: 60
End: 2020-01-22

## 2020-01-23 ENCOUNTER — APPOINTMENT (OUTPATIENT)
Dept: ENDOVASCULAR SURGERY | Facility: CLINIC | Age: 60
End: 2020-01-23
Payer: COMMERCIAL

## 2020-01-23 VITALS
HEIGHT: 69 IN | BODY MASS INDEX: 24.88 KG/M2 | OXYGEN SATURATION: 96 % | RESPIRATION RATE: 16 BRPM | WEIGHT: 168 LBS | TEMPERATURE: 97.9 F | SYSTOLIC BLOOD PRESSURE: 109 MMHG | DIASTOLIC BLOOD PRESSURE: 62 MMHG | HEART RATE: 87 BPM

## 2020-01-23 PROCEDURE — 36902Z: CUSTOM

## 2020-01-23 NOTE — HISTORY OF PRESENT ILLNESS
[] : left radiocephalic fistula [FreeTextEntry4] : yesterday  [FreeTextEntry1] : Dr Vallejo 7/18/2019 [FreeTextEntry5] : 5am [FreeTextEntry6] : Dr Beck

## 2020-01-23 NOTE — ASSESSMENT
[FreeTextEntry1] : ESRD on HD with pulling clots at HD for fistulogram.  Consent obtained.\par \par Fisulogram demonstrates recurrent moderate to severe stenosis in juxtaanastamotic region PTA to 5mm with a good result and no complication.  EBL=minimal.  Full report to follow.

## 2020-01-23 NOTE — PROCEDURE
Staph Infection (non-MRSA)  Staphylococcus aureus bacteria are often called staph. They are common germs that can cause a variety of problems. These range from mild skin infections to severe infections of your skin, deep tissues, lungs, bones, and blood. Most healthy adults normally carry staph on their nose and skin. Typically, they do not cause disease. But if your skin is broken or opened, staph can enter your body and cause infection. Staph infections often get better on their own or are easily treated with antibiotics. However, it is becoming more common to see bacteria that are resistant to antibiotics, or hard to kill with them. This sheet tells you more about staph infections and what you can do to avoid them.  How does staph spread?     Because staph is carried in the nose, skin infections often occur near the nose or mouth or both.   Staph spreads through direct contact with an infected person through skin-to-skin contact. It also spreads through contact with contaminated objects, such as shared towels or athletic equipment.  What are the risk factors for a staph infection?  Anyone can get a staph infection. Certain risk factors make it more likely, including:  · Living or having close contact with someone who has staph  · Having an open wound or sore  · Playing contact sports or sharing towels or athletic equipment  · A current or recent stay in a hospital or long-term care facility  · A recent operation or wound treatment  · Having a feeding tube or catheter (a tube placed in your body)  · Receiving kidney dialysis  · Having a weak immune system or serious illness  · Injecting illegal drugs  What conditions can be caused by a staph infection?  Staph infections usually start in your skin. They sometimes appear as small red bumps that look like pimples or spider bites. These sores can turn into abscesses (pus-filled areas of infection). Staph infections can also spread deeper into your body, causing  one or more of the following:  · Infections in bones (osteomyelitis), muscles, and other tissues  · Pneumonia (a serious lung infection)  · Infection in a wound from an operation  · Bacteremia (infection in the bloodstream)  · Endocarditis (infection of the lining of your heart and your heart valves)  · Toxic shock syndrome (an illness caused by the toxins staph produces)  · Scalded skin syndrome (a staph skin infection causing blisters and raw skin)  How is a staph infection diagnosed?  Your healthcare provider can often diagnose staph infection based on its appearance. With a more serious infection, testing may be done. Often, a sample of blood or urine is taken. A sample of drainage from a wound, sputum (mucus from the respiratory system), or infected tissue can also be used. The sample is sent to a lab and tested for staph.  How is a staph infection treated?  A minor skin infection is typically treated with warm soaks and basic  wound care, including applying a bandage. If more serious, an antibiotic may be prescribed, either as a pill or an ointment. For an even more severe infection your provider may prescribe a more powerful antibiotic given intravenously. If you have a pocket of pus (abscess), your provider may drain it.  How can I prevent staph infections?  To reduce the spread of staph infections, keep cuts and scrapes clean and covered until they heal. Avoid contact with the wounds or bandages of others. Avoid sharing personal items such as towels, razors, clothing, and athletic equipment. And be sure to keep your hands clean. Your best option is washing your hands with warm water and soap. If thats not possible, or if your hands arent visibly dirty, use a hand gel that contains at least 60% alcohol.   · Tips for good handwashing:  ¨ Use warm water and plenty of soap. Work up a good lather.  ¨ Clean your whole hand, under your nails, between your fingers, and up your wrists.  ¨ Wash for at least 15 to  30 seconds. Dont just wipe. Scrub well.  ¨ Rinse, letting the water run down your fingers, not up your wrists.  ¨ Dry your hands well. Use a paper towel to turn off the faucet and open the door.  · Using alcohol-based hand gels:  ¨ Use enough gel to get your hands completely wet.  ¨ Rub your hands together briskly. Be sure to clean the backs of your hands, the palms, between your fingers, and up your wrists.  ¨ Rub until the gel is gone and your hands are completely dry.  Taking antibiotics correctly  You may have heard of MRSA (methicillin-resistant Staphylococcus aureus). This is a type of staph bacteria that is resistant, or hard-to-kill, with many antibiotics that used to be effective against it. This means the bacteria can't be treated with many antibiotics (such as methicillin) that work on other types of staph. But, many alternative effective antibiotics remain available. Resistant bacteria develop when antibiotics are not prescribed or taken properly. This includes when they are taken longer than necessary, not long enough, or when theyre not needed. This is why your healthcare provider may not want to prescribe antibiotics unless he or she is certain they are needed. Its also why any time you are prescribed antibiotics, you must take them exactly as your healthcare provider tells you. This means not skipping doses, and taking the medicine until its finished, even if youre feeling better.   Date Last Reviewed: 12/1/2016  © 0801-9524 [employer]. [address] [place] [address], [place] [address]. All rights reserved. This information is not intended as a substitute for professional medical care. Always follow your healthcare professional's instructions.         [Resume diet] : resume diet [Site check for bleeding/hematoma/thrill/bruit] : Site check for bleeding/hematoma/thrill/bruit [Vital signs on admission the q 15 mins x2] : Vital signs on admission the q 15 mins x2 [D/C IV on discharge] : D/C IV on discharge [FreeTextEntry1] : left arm fistula/fistulogram/angioplasty

## 2020-01-30 ENCOUNTER — APPOINTMENT (OUTPATIENT)
Dept: PULMONOLOGY | Facility: CLINIC | Age: 60
End: 2020-01-30
Payer: COMMERCIAL

## 2020-01-30 VITALS
DIASTOLIC BLOOD PRESSURE: 60 MMHG | OXYGEN SATURATION: 94 % | HEIGHT: 69 IN | TEMPERATURE: 98.3 F | WEIGHT: 162 LBS | BODY MASS INDEX: 23.99 KG/M2 | HEART RATE: 64 BPM | RESPIRATION RATE: 12 BRPM | SYSTOLIC BLOOD PRESSURE: 106 MMHG

## 2020-01-30 PROCEDURE — 99214 OFFICE O/P EST MOD 30 MIN: CPT

## 2020-02-05 ENCOUNTER — APPOINTMENT (OUTPATIENT)
Dept: TRANSPLANT | Facility: CLINIC | Age: 60
End: 2020-02-05

## 2020-02-05 ENCOUNTER — OUTPATIENT (OUTPATIENT)
Dept: OUTPATIENT SERVICES | Facility: HOSPITAL | Age: 60
LOS: 1 days | End: 2020-02-05
Payer: COMMERCIAL

## 2020-02-05 ENCOUNTER — LABORATORY RESULT (OUTPATIENT)
Age: 60
End: 2020-02-05

## 2020-02-05 VITALS
TEMPERATURE: 99 F | SYSTOLIC BLOOD PRESSURE: 115 MMHG | HEART RATE: 84 BPM | RESPIRATION RATE: 20 BRPM | OXYGEN SATURATION: 97 % | DIASTOLIC BLOOD PRESSURE: 72 MMHG | HEIGHT: 68 IN | WEIGHT: 164.91 LBS

## 2020-02-05 DIAGNOSIS — I10 ESSENTIAL (PRIMARY) HYPERTENSION: ICD-10-CM

## 2020-02-05 DIAGNOSIS — Z01.818 ENCOUNTER FOR OTHER PREPROCEDURAL EXAMINATION: ICD-10-CM

## 2020-02-05 DIAGNOSIS — Z29.9 ENCOUNTER FOR PROPHYLACTIC MEASURES, UNSPECIFIED: ICD-10-CM

## 2020-02-05 DIAGNOSIS — N18.6 END STAGE RENAL DISEASE: ICD-10-CM

## 2020-02-05 DIAGNOSIS — Z98.49 CATARACT EXTRACTION STATUS, UNSPECIFIED EYE: Chronic | ICD-10-CM

## 2020-02-05 DIAGNOSIS — N18.9 CHRONIC KIDNEY DISEASE, UNSPECIFIED: ICD-10-CM

## 2020-02-05 DIAGNOSIS — Z98.890 OTHER SPECIFIED POSTPROCEDURAL STATES: Chronic | ICD-10-CM

## 2020-02-05 DIAGNOSIS — I50.9 HEART FAILURE, UNSPECIFIED: ICD-10-CM

## 2020-02-05 DIAGNOSIS — Z98.84 BARIATRIC SURGERY STATUS: Chronic | ICD-10-CM

## 2020-02-05 DIAGNOSIS — E11.9 TYPE 2 DIABETES MELLITUS WITHOUT COMPLICATIONS: ICD-10-CM

## 2020-02-05 DIAGNOSIS — H35.62 RETINAL HEMORRHAGE, LEFT EYE: Chronic | ICD-10-CM

## 2020-02-05 DIAGNOSIS — J45.909 UNSPECIFIED ASTHMA, UNCOMPLICATED: ICD-10-CM

## 2020-02-05 LAB
ABO + RH PNL BLD: NORMAL
ALBUMIN SERPL ELPH-MCNC: 4.5 G/DL
ALP BLD-CCNC: 99 U/L
ALT SERPL-CCNC: 9 U/L
ANION GAP SERPL CALC-SCNC: 25 MMOL/L
APPEARANCE: ABNORMAL
APTT BLD: 38.9 SEC
AST SERPL-CCNC: 10 U/L
BACTERIA: ABNORMAL
BASOPHILS # BLD AUTO: 0.07 K/UL
BASOPHILS NFR BLD AUTO: 0.8 %
BILIRUB SERPL-MCNC: 0.2 MG/DL
BILIRUBIN URINE: NEGATIVE
BLD GP AB SCN SERPL QL: NEGATIVE — SIGNIFICANT CHANGE UP
BLOOD URINE: ABNORMAL
BUN SERPL-MCNC: 74 MG/DL
CALCIUM SERPL-MCNC: 9 MG/DL
CHLORIDE SERPL-SCNC: 92 MMOL/L
CO2 SERPL-SCNC: 21 MMOL/L
COLOR: YELLOW
CREAT SERPL-MCNC: 8.91 MG/DL
EOSINOPHIL # BLD AUTO: 0.17 K/UL
EOSINOPHIL NFR BLD AUTO: 2.1 %
GLUCOSE QUALITATIVE U: ABNORMAL
GLUCOSE SERPL-MCNC: 160 MG/DL
HBV CORE IGM SER QL: NONREACTIVE
HBV SURFACE AB SERPL IA-ACNC: 5 MIU/ML
HBV SURFACE AG SER QL: NONREACTIVE
HCT VFR BLD CALC: 33.9 %
HCV AB SER QL: NONREACTIVE
HCV S/CO RATIO: 0.2 S/CO
HGB BLD-MCNC: 10.2 G/DL
HYALINE CASTS: 0 /LPF
IMM GRANULOCYTES NFR BLD AUTO: 0.4 %
INR PPP: 1.09 RATIO
KETONES URINE: NEGATIVE
LEUKOCYTE ESTERASE URINE: ABNORMAL
LYMPHOCYTES # BLD AUTO: 1.99 K/UL
LYMPHOCYTES NFR BLD AUTO: 24 %
MAN DIFF?: NORMAL
MCHC RBC-ENTMCNC: 29.3 PG
MCHC RBC-ENTMCNC: 30.1 GM/DL
MCV RBC AUTO: 97.4 FL
MICROSCOPIC-UA: NORMAL
MONOCYTES # BLD AUTO: 0.41 K/UL
MONOCYTES NFR BLD AUTO: 4.9 %
NEUTROPHILS # BLD AUTO: 5.62 K/UL
NEUTROPHILS NFR BLD AUTO: 67.8 %
NITRITE URINE: NEGATIVE
PH URINE: 6
PLATELET # BLD AUTO: 307 K/UL
POTASSIUM SERPL-SCNC: 5.7 MMOL/L
PROT SERPL-MCNC: 7.8 G/DL
PROTEIN URINE: ABNORMAL
PT BLD: 12.3 SEC
RBC # BLD: 3.48 M/UL
RBC # FLD: 15.8 %
RED BLOOD CELLS URINE: 500 /HPF
RH IG SCN BLD-IMP: POSITIVE — SIGNIFICANT CHANGE UP
SODIUM SERPL-SCNC: 138 MMOL/L
SPECIFIC GRAVITY URINE: >=1.03
SQUAMOUS EPITHELIAL CELLS: 5 /HPF
UROBILINOGEN URINE: NORMAL
WBC # FLD AUTO: 8.29 K/UL
WHITE BLOOD CELLS URINE: 150 /HPF

## 2020-02-05 PROCEDURE — 86901 BLOOD TYPING SEROLOGIC RH(D): CPT

## 2020-02-05 PROCEDURE — G0463: CPT

## 2020-02-05 PROCEDURE — 86850 RBC ANTIBODY SCREEN: CPT

## 2020-02-05 PROCEDURE — 86900 BLOOD TYPING SEROLOGIC ABO: CPT

## 2020-02-05 RX ORDER — LABETALOL HCL 100 MG
2 TABLET ORAL
Qty: 0 | Refills: 0 | DISCHARGE

## 2020-02-05 RX ORDER — AMLODIPINE BESYLATE 2.5 MG/1
1 TABLET ORAL
Qty: 0 | Refills: 0 | DISCHARGE

## 2020-02-05 RX ORDER — FERROUS SULFATE 325(65) MG
1 TABLET ORAL
Qty: 0 | Refills: 0 | DISCHARGE

## 2020-02-05 RX ORDER — CEFAZOLIN SODIUM 1 G
2000 VIAL (EA) INJECTION ONCE
Refills: 0 | Status: DISCONTINUED | OUTPATIENT
Start: 2020-02-18 | End: 2020-02-19

## 2020-02-05 RX ORDER — HYDRALAZINE HCL 50 MG
1 TABLET ORAL
Qty: 0 | Refills: 0 | DISCHARGE

## 2020-02-05 NOTE — H&P PST ADULT - ASSESSMENT
CAPRINI SCORE [CLOT updated 18]    AGE RELATED RISK FACTORS                                                       MOBILITY RELATED FACTORS  [1 ] Age 41-60 years                                            (1 Point)                    [ ] Bed rest                                                        (1 Point)  [ ] Age: 61-74 years                                           (2 Points)                  [ ] Plaster cast                                                   (2 Points)  [ ] Age= 75 years                                              (3 Points)                    [ ] Bed bound for more than 72 hours                 (2 Points)    DISEASE RELATED RISK FACTORS                                               GENDER SPECIFIC FACTORS  [ ] Edema in the lower extremities                       (1 Point)              [ ] Pregnancy                                                     (1 Point)  [ ] Varicose veins                                               (1 Point)                     [ ] Post-partum < 6 weeks                                   (1 Point)             [ ] BMI > 25 Kg/m2                                            (1 Point)                     [ ] Hormonal therapy  or oral contraception          (1 Point)                 [ ] Sepsis (in the previous month)                        (1 Point)               [ ] History of pregnancy complications                 (1 point)  [ ] Pneumonia or serious lung disease                                               [ ] Unexplained or recurrent                     (1 Point)           (in the previous month)                               (1 Point)  [ ] Abnormal pulmonary function test                     (1 Point)                 SURGERY RELATED RISK FACTORS  [ ] Acute myocardial infarction                              (1 Point)               [ ]  Section                                             (1 Point)  [ ] Congestive heart failure (in the previous month)  (1 Point)      [ ] Minor surgery                                                  (1 Point)   [ ] Inflammatory bowel disease                             (1 Point)               [ ] Arthroscopic surgery                                        (2 Points)  [ ] Central venous access                                      (2 Points)                [2 ] General surgery lasting more than 45 minutes (2 points)  [ ] Present or previous malignancy                     (2 Points)                [ ] Elective arthroplasty                                         (5 points)    [ ] Stroke (in the previous month)                          (5 Points)                                                                                                                                                           HEMATOLOGY RELATED FACTORS                                                 TRAUMA RELATED RISK FACTORS  [ ] Prior episodes of VTE                                     (3 Points)                [ ] Fracture of the hip, pelvis, or leg                       (5 Points)  [ ] Positive family history for VTE                         (3 Points)             [ ] Acute spinal cord injury (in the previous month)  (5 Points)  [ ] Prothrombin 90691 A                                     (3 Points)               [ ] Paralysis  (less than 1 month)                             (5 Points)  [ ] Factor V Leiden                                             (3 Points)                  [ ] Multiple Trauma within 1 month                        (5 Points)  [ ] Lupus anticoagulants                                     (3 Points)                                                           [ ] Anticardiolipin antibodies                               (3 Points)                                                       [ ] High homocysteine in the blood                      (3 Points)                                             [ ] Other congenital or acquired thrombophilia      (3 Points)                                                [ ] Heparin induced thrombocytopenia                  (3 Points)                                     Total Score [ 3 ]

## 2020-02-05 NOTE — H&P PST ADULT - NSICDXPASTMEDICALHX_GEN_ALL_CORE_FT
PAST MEDICAL HISTORY:  Bilateral dry eyes     CKD (chronic kidney disease)     Diabetes T2DM  last A1C (8%)    Diabetic neuropathy     Diastolic dysfunction mild. stage 1. EF 65%    DKA (diabetic ketoacidoses)     DM (diabetes mellitus)     High cholesterol     History of glaucoma left eye    Hyperlipidemia     Hypertension     Hypertension     Insulin pump status denies    Lower extremity edema     Osteomyelitis of ankle or foot x2    Pneumonia 4/2013    Ventral hernia PAST MEDICAL HISTORY:  Asthma never been intubated for asthma   last inhaler used 7/2019    Bilateral dry eyes     CKD (chronic kidney disease) hemodialysis on Mon, Wed and Fri    Diabetes T2DM  last A1C (8%)    Diabetic neuropathy     Diastolic dysfunction mild. stage 1. EF 65%    DKA (diabetic ketoacidoses)     Dyslipidemia     Heart murmur     History of glaucoma left eye    Hypertension     Hypertension     Insulin pump status denies    Osteomyelitis of ankle or foot x2    Pneumonia 4/2013    Ventral hernia repair

## 2020-02-05 NOTE — H&P PST ADULT - ATTENDING COMMENTS
59M with DM, HTN, ESRD on HD for living donor kidney transplant from his daughter.  Plan is for kidney to be transplanted on the left side due to PVD and right toe chronic ulcer and prior amputation.  Patient has palpable femoral pulses b/l, but only dopplerable signals b/l DP.  Details of surgery discussed, including risks of vascular compromise to leg, bleeding, infection, thrombosis, graft failure or nonfunction, rejection, anastomotic leak.    [facility] # [id number]  Donor ABO O  Recipient ABO O  Donor CMV (-), EBV (+)  Recipient CMV (-), EBV (+)  HLA mismatch 1,1,1, crossmatch negative

## 2020-02-05 NOTE — H&P PST ADULT - EXTREMITIES
Pt informed of on-call MD response and is thankful for same.  Rx is called in to [facility] per request of patient, v/o provided to Pharmacist Randall.   detailed exam

## 2020-02-05 NOTE — H&P PST ADULT - HISTORY OF PRESENT ILLNESS
59 male with h/o CHF (last admission 8-10 years ago as per patient, EF 56%), HTN, dyslipidemia, heart murmur, T2DM (last A1c 8%), controlled asthma (last inhaler used 7/2019), hiatal hernia, left glaucoma, CKD on hemodialysis (M,W,F), left arm fistula, presents to preadmission testing for scheduled right side living donor kidney transplant recipient on 2/18/2020. 59 male with h/o CHF (last admission 8-10 years ago as per patient, EF 56%), HTN, dyslipidemia, heart murmur, T2DM (last A1c 8%), controlled asthma (last inhaler used 7/2019), hiatal hernia, left glaucoma, CKD on hemodialysis (M,W,F), left arm fistula, presents to preadmission testing for scheduled left side living donor kidney transplant recipient on 2/18/2020.

## 2020-02-05 NOTE — H&P PST ADULT - NSICDXPROBLEM_GEN_ALL_CORE_FT
PROBLEM DIAGNOSES  Problem: Chronic kidney disease (CKD)  Assessment and Plan: scheduled for right side living donor kidney transplant recipient on 2/18/2020.  patient is scheduled for dialysis on 2/16/2020 instead of 2/17/2020 prior to surgery on 2/18/2020.  all blood work and urine test to be done at dialysis center on 2/5/2020  check serum potassium level morning of surgery     Problem: Asthma  Assessment and Plan: instructed to use inhaler as needed     Problem: T2DM (type 2 diabetes mellitus)  Assessment and Plan: monitor blood sugar  patient will take 20 units of tresiba morning of surgeryCKD    Problem: Chronic CHF  Assessment and Plan: will continue medication sultana-op  cardiologist evaluation report on file, including echo and stress report and recent EKG    Problem: Hypertension  Assessment and Plan: will continue antihypertensive medication sultana-op    Problem: Need for prophylactic measure  Assessment and Plan: The Caprini score indicates this patient is at risk for a VTE event (score 3-5).  Most surgical patients in this group would benefit from pharmacologic prophylaxis.  The surgical team will determine the balance between VTE risk and bleeding risk

## 2020-02-06 LAB
HBV DNA # SERPL NAA+PROBE: NOT DETECTED
HEPB DNA PCR LOG: NOT DETECTED LOG10IU/ML

## 2020-02-07 LAB
HBV E AB SER QL: NEGATIVE
HBV E AG SER QL: NEGATIVE

## 2020-02-10 ENCOUNTER — APPOINTMENT (OUTPATIENT)
Dept: TRANSPLANT | Facility: CLINIC | Age: 60
End: 2020-02-10
Payer: COMMERCIAL

## 2020-02-10 VITALS
HEIGHT: 69 IN | TEMPERATURE: 97.9 F | HEART RATE: 75 BPM | BODY MASS INDEX: 25.24 KG/M2 | RESPIRATION RATE: 12 BRPM | OXYGEN SATURATION: 97 % | WEIGHT: 170.4 LBS | SYSTOLIC BLOOD PRESSURE: 132 MMHG | DIASTOLIC BLOOD PRESSURE: 76 MMHG

## 2020-02-10 LAB — BACTERIA UR CULT: ABNORMAL

## 2020-02-10 PROCEDURE — 99215 OFFICE O/P EST HI 40 MIN: CPT

## 2020-02-10 NOTE — HISTORY OF PRESENT ILLNESS
[Diabetes Mellitus] : Diabetes Mellitus [Hypertension] : Hypertension [Blood Transfusion] : prior blood transfusion [Diabetes] : diabetes [Retinopathy] : retinopathy [Neuropathy] : neuropathy [Lower Extremity Ulcers] : lower extremity ulcers [Previous Kidney Transplant] : no previous kidney transplant [Claudication/Angina] : no claudication/angina [Hx of DVT/Thrombosis/Miscarriage] : no history of dvt/thrombosis/miscarriage [TextBox_16] : 07/2019 [TextBox_28] : age 32 [de-identified] : TRANSPLANT SURGERY H&P FOR LIVING DONOR RENAL TRANSPLANT\par PATIENT: Orion Grider\par MR# [id number]\par  1960\par Age 59M\par Nephrologist Dr. Brittny Romeo [phone]\par 59M with ESRD on HD since 2019, DM (since age 32), HTN here for pre-op evaluation and consent signing for living donor kidney transplant from his daughter. Patient currently has HD via [facility].\par Patient has longstanding DM (age 32), and has been on insulin since .\par  He denies any pain, fevers, sob, nausea, vomiting, or diarrhea.\par He produces a very small amount of urine.\par He has received a blood transfusion 6 years ago after lap sleeve gastrectomy (2 units)\par PMH: ESRD due to DM, HTN, hyperlipidemia, HFpEF\par PSH: Sleeve gastrectomy ; Right 1st toe amputation for ulcer/infection ; left rotator cuff surgery; carpal tunnel release, umbilical hernia repair\par MEDICATIONS: Aspirin TABS; TAKE 1 TABLET DAILY\par Atorvastatin Calcium 20 MG Oral Tablet; TAKE 1 TABLET AT BEDTIME\par Bumetanide 2 MG Oral Tablet; 1 tab twice a day on Tues, Thurs, Sat, Sun\par Easy Touch Pen Needles 31G X 5 MM\par Gabapentin 300 MG Oral Capsule; TAKE 1 CAPSULE AT BEDTIME\par Labetalol HCl - 100 MG Oral Tablet; 0.5 tab bedtime on Tues, Thurs, Sat, Sun\par Lidocaine-Prilocaine 2.5-2.5 % External Cream; apply 30 min before dialysis treatment\par Nephro-Olivia Rx 1 MG Oral Tablet; TAKE 1 TABLET DAILY AS DIRECTED\par NovoLOG FlexPen 100 UNIT/ML Subcutaneous Solution Pen-injector\par Procrit 86054 UNIT/ML Injection Solution; INJECT 1  ML Subcutaneous\par Santa-Olivia Rx 1 MG Oral Tablet; TAKE ONE TABLET BY MOUTH ONE TIME DAILY AS\par DIRECTED\par Sevelamer Carbonate 800 MG Oral Tablet; TAKE 3 TABLET 3 times daily with meals\par Simply Saline 0.9 % Nasal Aerosol Solution; USE AS DIRECTED ON PACKAGE\par Travatan Z 0.004 % Ophthalmic Solution\par Tresiba FlexTouch 100 UNIT/ML Subcutaneous Solution Pen-injector\par Ventolin  (90 Base) MCG/ACT Inhalation Aerosol Solution; INHALE 2 PUFFS\par Twice daily and every 4-6 hours as needed for wheezing, cough & shortness of\par Breath\par ALLERGIES: clonidine\par \par SOCIAL: Denies tobacco, etoh, drugs. Quit tobacco in \par He is  with children. Employed by [employer]par Ambulates and climbs stairs without difficulty.\par Cardiac history: None\par Cardiac cath ()\par EK20, normal \par Stress Test: , no Ischemia, no exercise induced arrhythmias, no Symptoms \par Echo 2019: mod pulm HTN, normal EF\par \par \par REVIEW OF SYSTEMS:\par Gen: None\par HEENT: None\par Skin: None\par Pulmonary: No cough or wheezing\par Cardiac: No SOB, Chest pain\par GI: No nausea, vomiting, diarrhea\par : minimal urine. No recent UTI or hematuria\par NEURO: No headaches, seizures\par PSYCH: No hallucinations\par Endocrine: + diabetes\par Hematology: No adenopathy\par \par PHYSICAL EXAMINATION:\par Temp: 97.9	BP: 132/76	P: 75\par Height:	5’ 9”	Weight: 170 lbs 	BMI: 25.16\par Gen: NAD, AAOx3. Well-developed, well-nourished. Multiple tattoos over arms, chest and back\par HEENT: Anicteric\par Neck: No carotid bruits\par Chest: normal s1,s2; No obvious murmurs. \par Lungs: Clear to auscultation b/l\par Abdomen: Soft, nontender, nondistended. No masses or hepatomegaly. No skin changes. \par +recurrent umbilical hernia containing fat. No skin changes\par Extremities: No edema b/l LE. No clubbing or cyanosis. FROM\par Right 1st digit stump with healed ulcer. No signs of infection\par Palpable 2+ femoral, diminshed DP pulses b/l. \par Hematological: No lymphadenopathy in cervical, axillary or b/l inguinal regions\par \par Assessment / Impression:\par \par \par 59M with ESRD on HD due to DM, HTN, for living donor renal transplant from his daughter.\par \par Plan:\par I discussed with the patient at length the risks and benefits of transplantation. Patient is aware that transplantation is a process that requires a life-long commitment, and that it is a personal choice to proceed with it rather than to remain with alternative treatments such as dialysis.  Patient understands that the two major benefits of transplantation include prolonged survival and improved quality of life.  I have mentioned to the patient that organs from live donors in general do better than those of  donors.  We will attempt to place the kidney on the right side, but if that is not possible may need to go to the contralateral side.   \par \par Patient is aware that he will need to take immunosuppression medications in order to preserve the kidney for as long as the organ is functioning in order to prevent rejection.  Patient is aware that immunosuppression is required in addition to his baseline medications and in addition to other medications such as antimicrobials.  The immunosuppression levels will probably be decreased with time.  Patient is aware that immunosuppression levels are usually at their highest during the first six months, which is coincident with the period of the greatest incidence of rejection and complications.  Patient is aware as well that immunosuppression side effects include but are not limited to the onset or worsening of diabetes, allograft damage, increased incidence of all types of infections, increased incidence of all types of malignancies but most prominently of the skin and hematological, neurological illness and pathologies.\par \par I discussed the risks and benefits of kidney transplantation in depth.  Surgical risks included but were not limited to bleeding, infection, graft thrombosis, delayed graft function, urine leak, and potential need for re operation postoperatively.  We also discussed the risks of postoperative immunosuppression including but not limited to increased risk of infection, cancer, weight gain, new onset or worsening of diabetes or hypertension on a temporary or permanent basis, water retention, back pain, constipation, diarrhea, dizziness, headache, joint pain, loss of appetite, nausea, stomach pain or upset, trouble sleeping, vomiting, and/or mental or mood changes were fully disclosed. Mr. Coreas understands these risks and is willing to proceed with kidney transplantation. We discussed the differences in quality of life and patient survival between remaining on dialysis versus receiving a kidney transplant.\par Patient is aware that he may require transfusion of blood products.  Patient is aware that such transfusion of blood products as well as the transplant organ itself may be associated with transmission of infections (such as hepatitis B, hepatitis C, HIV, West Nile virus), malignancies, and/or other pathologies.  Although we test for some of these, the tests are not perfect.\par \par Patient is aware that the incidence of malignancies in non-functioning kidneys is higher than in those with normal function.  Patient is also aware that immunosuppression will make this incidence even higher.  Patient instructed to have screening imaging exams on a yearly or biyearly basis in order to detect such tumors at an early stage.\par Patient is aware that the average survival of transplanted kidneys is approximately 13 years, and that a second kidney transplant will probably be required especially in younger patients.\par \par I have answered all of the patient's questions as well as all questions of those present with the patient.  The patient understands we can offer no guarantees and agrees to proceed. Consent for transplantation was signed in the office.\par

## 2020-02-10 NOTE — REASON FOR VISIT
[Follow-Up] : a follow-up visit for [End-Stage Renal Disease] : end-stage renal disease [Spouse] : spouse

## 2020-02-11 LAB
HCG SERPL QL: NEGATIVE
PAPP-A SERPL-ACNC: 1 MIU/ML

## 2020-02-18 ENCOUNTER — INPATIENT (INPATIENT)
Facility: HOSPITAL | Age: 60
LOS: 2 days | Discharge: HOME | DRG: 981 | End: 2020-02-21
Attending: SURGERY
Payer: COMMERCIAL

## 2020-02-18 ENCOUNTER — APPOINTMENT (OUTPATIENT)
Dept: TRANSPLANT | Facility: HOSPITAL | Age: 60
End: 2020-02-18

## 2020-02-18 VITALS
RESPIRATION RATE: 18 BRPM | SYSTOLIC BLOOD PRESSURE: 101 MMHG | HEIGHT: 68 IN | TEMPERATURE: 98 F | WEIGHT: 164.91 LBS | HEART RATE: 87 BPM | DIASTOLIC BLOOD PRESSURE: 67 MMHG | OXYGEN SATURATION: 99 %

## 2020-02-18 DIAGNOSIS — Z98.84 BARIATRIC SURGERY STATUS: Chronic | ICD-10-CM

## 2020-02-18 DIAGNOSIS — Z98.49 CATARACT EXTRACTION STATUS, UNSPECIFIED EYE: Chronic | ICD-10-CM

## 2020-02-18 DIAGNOSIS — N18.6 END STAGE RENAL DISEASE: ICD-10-CM

## 2020-02-18 DIAGNOSIS — D89.9 DISORDER INVOLVING THE IMMUNE MECHANISM, UNSPECIFIED: ICD-10-CM

## 2020-02-18 DIAGNOSIS — E11.9 TYPE 2 DIABETES MELLITUS WITHOUT COMPLICATIONS: ICD-10-CM

## 2020-02-18 DIAGNOSIS — Z98.890 OTHER SPECIFIED POSTPROCEDURAL STATES: Chronic | ICD-10-CM

## 2020-02-18 DIAGNOSIS — H35.62 RETINAL HEMORRHAGE, LEFT EYE: Chronic | ICD-10-CM

## 2020-02-18 DIAGNOSIS — Z94.0 KIDNEY TRANSPLANT STATUS: ICD-10-CM

## 2020-02-18 LAB
ALBUMIN SERPL ELPH-MCNC: 3.7 G/DL — SIGNIFICANT CHANGE UP (ref 3.3–5)
ALBUMIN SERPL ELPH-MCNC: 3.8 G/DL — SIGNIFICANT CHANGE UP (ref 3.3–5)
ALP SERPL-CCNC: 81 U/L — SIGNIFICANT CHANGE UP (ref 40–120)
ALP SERPL-CCNC: 84 U/L — SIGNIFICANT CHANGE UP (ref 40–120)
ALT FLD-CCNC: 13 U/L — SIGNIFICANT CHANGE UP (ref 10–45)
ALT FLD-CCNC: 14 U/L — SIGNIFICANT CHANGE UP (ref 10–45)
ANION GAP SERPL CALC-SCNC: 19 MMOL/L — HIGH (ref 5–17)
ANION GAP SERPL CALC-SCNC: 22 MMOL/L — HIGH (ref 5–17)
ANION GAP SERPL CALC-SCNC: 23 MMOL/L — HIGH (ref 5–17)
APTT BLD: 32.8 SEC — SIGNIFICANT CHANGE UP (ref 27.5–36.3)
AST SERPL-CCNC: 15 U/L — SIGNIFICANT CHANGE UP (ref 10–40)
AST SERPL-CCNC: 19 U/L — SIGNIFICANT CHANGE UP (ref 10–40)
BASOPHILS # BLD AUTO: 0.02 K/UL — SIGNIFICANT CHANGE UP (ref 0–0.2)
BASOPHILS # BLD AUTO: 0.03 K/UL — SIGNIFICANT CHANGE UP (ref 0–0.2)
BASOPHILS NFR BLD AUTO: 0.1 % — SIGNIFICANT CHANGE UP (ref 0–2)
BASOPHILS NFR BLD AUTO: 0.3 % — SIGNIFICANT CHANGE UP (ref 0–2)
BILIRUB SERPL-MCNC: 0.3 MG/DL — SIGNIFICANT CHANGE UP (ref 0.2–1.2)
BILIRUB SERPL-MCNC: 0.3 MG/DL — SIGNIFICANT CHANGE UP (ref 0.2–1.2)
BUN SERPL-MCNC: 52 MG/DL — HIGH (ref 7–23)
BUN SERPL-MCNC: 53 MG/DL — HIGH (ref 7–23)
BUN SERPL-MCNC: 56 MG/DL — HIGH (ref 7–23)
CALCIUM SERPL-MCNC: 8.1 MG/DL — LOW (ref 8.4–10.5)
CALCIUM SERPL-MCNC: 8.3 MG/DL — LOW (ref 8.4–10.5)
CALCIUM SERPL-MCNC: 8.6 MG/DL — SIGNIFICANT CHANGE UP (ref 8.4–10.5)
CHLORIDE SERPL-SCNC: 93 MMOL/L — LOW (ref 96–108)
CHLORIDE SERPL-SCNC: 94 MMOL/L — LOW (ref 96–108)
CHLORIDE SERPL-SCNC: 95 MMOL/L — LOW (ref 96–108)
CO2 SERPL-SCNC: 20 MMOL/L — LOW (ref 22–31)
CO2 SERPL-SCNC: 21 MMOL/L — LOW (ref 22–31)
CO2 SERPL-SCNC: 26 MMOL/L — SIGNIFICANT CHANGE UP (ref 22–31)
CREAT SERPL-MCNC: 6.04 MG/DL — HIGH (ref 0.5–1.3)
CREAT SERPL-MCNC: 6.65 MG/DL — HIGH (ref 0.5–1.3)
CREAT SERPL-MCNC: 7.07 MG/DL — HIGH (ref 0.5–1.3)
EOSINOPHIL # BLD AUTO: 0.01 K/UL — SIGNIFICANT CHANGE UP (ref 0–0.5)
EOSINOPHIL # BLD AUTO: 0.13 K/UL — SIGNIFICANT CHANGE UP (ref 0–0.5)
EOSINOPHIL NFR BLD AUTO: 0.1 % — SIGNIFICANT CHANGE UP (ref 0–6)
EOSINOPHIL NFR BLD AUTO: 0.9 % — SIGNIFICANT CHANGE UP (ref 0–6)
GAS PNL BLDA: SIGNIFICANT CHANGE UP
GAS PNL BLDA: SIGNIFICANT CHANGE UP
GLUCOSE BLDC GLUCOMTR-MCNC: 136 MG/DL — HIGH (ref 70–99)
GLUCOSE BLDC GLUCOMTR-MCNC: 192 MG/DL — HIGH (ref 70–99)
GLUCOSE SERPL-MCNC: 143 MG/DL — HIGH (ref 70–99)
GLUCOSE SERPL-MCNC: 190 MG/DL — HIGH (ref 70–99)
GLUCOSE SERPL-MCNC: 73 MG/DL — SIGNIFICANT CHANGE UP (ref 70–99)
HCT VFR BLD CALC: 33.2 % — LOW (ref 39–50)
HCT VFR BLD CALC: 33.7 % — LOW (ref 39–50)
HCT VFR BLD CALC: 33.9 % — LOW (ref 39–50)
HGB BLD-MCNC: 10 G/DL — LOW (ref 13–17)
HGB BLD-MCNC: 10.3 G/DL — LOW (ref 13–17)
HGB BLD-MCNC: 10.4 G/DL — LOW (ref 13–17)
IMM GRANULOCYTES NFR BLD AUTO: 0.3 % — SIGNIFICANT CHANGE UP (ref 0–1.5)
IMM GRANULOCYTES NFR BLD AUTO: 0.4 % — SIGNIFICANT CHANGE UP (ref 0–1.5)
INR BLD: 1.03 RATIO — SIGNIFICANT CHANGE UP (ref 0.88–1.16)
INR BLD: 1.08 RATIO — SIGNIFICANT CHANGE UP (ref 0.88–1.16)
LYMPHOCYTES # BLD AUTO: 0.47 K/UL — LOW (ref 1–3.3)
LYMPHOCYTES # BLD AUTO: 0.62 K/UL — LOW (ref 1–3.3)
LYMPHOCYTES # BLD AUTO: 4 % — LOW (ref 13–44)
LYMPHOCYTES # BLD AUTO: 4.5 % — LOW (ref 13–44)
MAGNESIUM SERPL-MCNC: 2.8 MG/DL — HIGH (ref 1.6–2.6)
MAGNESIUM SERPL-MCNC: 2.9 MG/DL — HIGH (ref 1.6–2.6)
MCHC RBC-ENTMCNC: 28.9 PG — SIGNIFICANT CHANGE UP (ref 27–34)
MCHC RBC-ENTMCNC: 29.4 PG — SIGNIFICANT CHANGE UP (ref 27–34)
MCHC RBC-ENTMCNC: 29.5 PG — SIGNIFICANT CHANGE UP (ref 27–34)
MCHC RBC-ENTMCNC: 30.1 GM/DL — LOW (ref 32–36)
MCHC RBC-ENTMCNC: 30.6 GM/DL — LOW (ref 32–36)
MCHC RBC-ENTMCNC: 30.7 GM/DL — LOW (ref 32–36)
MCV RBC AUTO: 96 FL — SIGNIFICANT CHANGE UP (ref 80–100)
MCV RBC AUTO: 96 FL — SIGNIFICANT CHANGE UP (ref 80–100)
MCV RBC AUTO: 96.3 FL — SIGNIFICANT CHANGE UP (ref 80–100)
MONOCYTES # BLD AUTO: 0.1 K/UL — SIGNIFICANT CHANGE UP (ref 0–0.9)
MONOCYTES # BLD AUTO: 0.34 K/UL — SIGNIFICANT CHANGE UP (ref 0–0.9)
MONOCYTES NFR BLD AUTO: 0.9 % — LOW (ref 2–14)
MONOCYTES NFR BLD AUTO: 2.5 % — SIGNIFICANT CHANGE UP (ref 2–14)
NEUTROPHILS # BLD AUTO: 11.08 K/UL — HIGH (ref 1.8–7.4)
NEUTROPHILS # BLD AUTO: 12.67 K/UL — HIGH (ref 1.8–7.4)
NEUTROPHILS NFR BLD AUTO: 91.6 % — HIGH (ref 43–77)
NEUTROPHILS NFR BLD AUTO: 94.4 % — HIGH (ref 43–77)
NRBC # BLD: 0 /100 WBCS — SIGNIFICANT CHANGE UP (ref 0–0)
PHOSPHATE SERPL-MCNC: 5.7 MG/DL — HIGH (ref 2.5–4.5)
PHOSPHATE SERPL-MCNC: 6.5 MG/DL — HIGH (ref 2.5–4.5)
PLATELET # BLD AUTO: 205 K/UL — SIGNIFICANT CHANGE UP (ref 150–400)
PLATELET # BLD AUTO: 228 K/UL — SIGNIFICANT CHANGE UP (ref 150–400)
PLATELET # BLD AUTO: 231 K/UL — SIGNIFICANT CHANGE UP (ref 150–400)
POTASSIUM SERPL-MCNC: 4.5 MMOL/L — SIGNIFICANT CHANGE UP (ref 3.5–5.3)
POTASSIUM SERPL-MCNC: 4.8 MMOL/L — SIGNIFICANT CHANGE UP (ref 3.5–5.3)
POTASSIUM SERPL-MCNC: 4.9 MMOL/L — SIGNIFICANT CHANGE UP (ref 3.5–5.3)
POTASSIUM SERPL-MCNC: 5.1 MMOL/L — SIGNIFICANT CHANGE UP (ref 3.5–5.3)
POTASSIUM SERPL-SCNC: 4.5 MMOL/L — SIGNIFICANT CHANGE UP (ref 3.5–5.3)
POTASSIUM SERPL-SCNC: 4.8 MMOL/L — SIGNIFICANT CHANGE UP (ref 3.5–5.3)
POTASSIUM SERPL-SCNC: 4.9 MMOL/L — SIGNIFICANT CHANGE UP (ref 3.5–5.3)
POTASSIUM SERPL-SCNC: 5.1 MMOL/L — SIGNIFICANT CHANGE UP (ref 3.5–5.3)
PROT SERPL-MCNC: 7.1 G/DL — SIGNIFICANT CHANGE UP (ref 6–8.3)
PROT SERPL-MCNC: 7.2 G/DL — SIGNIFICANT CHANGE UP (ref 6–8.3)
PROTHROM AB SERPL-ACNC: 11.8 SEC — SIGNIFICANT CHANGE UP (ref 10–12.9)
PROTHROM AB SERPL-ACNC: 12.5 SEC — SIGNIFICANT CHANGE UP (ref 10–12.9)
RBC # BLD: 3.46 M/UL — LOW (ref 4.2–5.8)
RBC # BLD: 3.5 M/UL — LOW (ref 4.2–5.8)
RBC # BLD: 3.53 M/UL — LOW (ref 4.2–5.8)
RBC # FLD: 16.2 % — HIGH (ref 10.3–14.5)
RBC # FLD: 16.3 % — HIGH (ref 10.3–14.5)
RBC # FLD: 16.5 % — HIGH (ref 10.3–14.5)
SODIUM SERPL-SCNC: 135 MMOL/L — SIGNIFICANT CHANGE UP (ref 135–145)
SODIUM SERPL-SCNC: 138 MMOL/L — SIGNIFICANT CHANGE UP (ref 135–145)
SODIUM SERPL-SCNC: 140 MMOL/L — SIGNIFICANT CHANGE UP (ref 135–145)
WBC # BLD: 11.73 K/UL — HIGH (ref 3.8–10.5)
WBC # BLD: 13.83 K/UL — HIGH (ref 3.8–10.5)
WBC # BLD: 5.57 K/UL — SIGNIFICANT CHANGE UP (ref 3.8–10.5)
WBC # FLD AUTO: 11.73 K/UL — HIGH (ref 3.8–10.5)
WBC # FLD AUTO: 13.83 K/UL — HIGH (ref 3.8–10.5)
WBC # FLD AUTO: 5.57 K/UL — SIGNIFICANT CHANGE UP (ref 3.8–10.5)

## 2020-02-18 PROCEDURE — 71045 X-RAY EXAM CHEST 1 VIEW: CPT | Mod: 26

## 2020-02-18 PROCEDURE — 50605 INSERT URETERAL SUPPORT: CPT | Mod: LT

## 2020-02-18 PROCEDURE — 99254 IP/OBS CNSLTJ NEW/EST MOD 60: CPT

## 2020-02-18 PROCEDURE — 50325 PREP DONOR RENAL GRAFT: CPT

## 2020-02-18 PROCEDURE — 50360 RNL ALTRNSPLJ W/O RCP NFRCT: CPT

## 2020-02-18 PROCEDURE — 76776 US EXAM K TRANSPL W/DOPPLER: CPT | Mod: 26,LT

## 2020-02-18 RX ORDER — CHLORHEXIDINE GLUCONATE 213 G/1000ML
1 SOLUTION TOPICAL
Refills: 0 | Status: DISCONTINUED | OUTPATIENT
Start: 2020-02-18 | End: 2020-02-21

## 2020-02-18 RX ORDER — FENTANYL CITRATE 50 UG/ML
25 INJECTION INTRAVENOUS
Refills: 0 | Status: DISCONTINUED | OUTPATIENT
Start: 2020-02-18 | End: 2020-02-18

## 2020-02-18 RX ORDER — HYDROMORPHONE HYDROCHLORIDE 2 MG/ML
1 INJECTION INTRAMUSCULAR; INTRAVENOUS; SUBCUTANEOUS
Refills: 0 | Status: DISCONTINUED | OUTPATIENT
Start: 2020-02-18 | End: 2020-02-18

## 2020-02-18 RX ORDER — NALOXONE HYDROCHLORIDE 4 MG/.1ML
0.1 SPRAY NASAL
Refills: 0 | Status: DISCONTINUED | OUTPATIENT
Start: 2020-02-18 | End: 2020-02-19

## 2020-02-18 RX ORDER — DEXTROSE 50 % IN WATER 50 %
12.5 SYRINGE (ML) INTRAVENOUS ONCE
Refills: 0 | Status: DISCONTINUED | OUTPATIENT
Start: 2020-02-18 | End: 2020-02-21

## 2020-02-18 RX ORDER — SODIUM CHLORIDE 9 MG/ML
500 INJECTION, SOLUTION INTRAVENOUS
Refills: 0 | Status: DISCONTINUED | OUTPATIENT
Start: 2020-02-18 | End: 2020-02-19

## 2020-02-18 RX ORDER — VALGANCICLOVIR 450 MG/1
450 TABLET, FILM COATED ORAL DAILY
Refills: 0 | Status: DISCONTINUED | OUTPATIENT
Start: 2020-02-19 | End: 2020-02-21

## 2020-02-18 RX ORDER — MYCOPHENOLATE MOFETIL 250 MG/1
1 CAPSULE ORAL EVERY 12 HOURS
Refills: 0 | Status: DISCONTINUED | OUTPATIENT
Start: 2020-02-19 | End: 2020-02-21

## 2020-02-18 RX ORDER — DEXTROSE 50 % IN WATER 50 %
15 SYRINGE (ML) INTRAVENOUS ONCE
Refills: 0 | Status: DISCONTINUED | OUTPATIENT
Start: 2020-02-18 | End: 2020-02-21

## 2020-02-18 RX ORDER — FAMOTIDINE 10 MG/ML
20 INJECTION INTRAVENOUS DAILY
Refills: 0 | Status: DISCONTINUED | OUTPATIENT
Start: 2020-02-19 | End: 2020-02-21

## 2020-02-18 RX ORDER — GLUCAGON INJECTION, SOLUTION 0.5 MG/.1ML
1 INJECTION, SOLUTION SUBCUTANEOUS ONCE
Refills: 0 | Status: DISCONTINUED | OUTPATIENT
Start: 2020-02-18 | End: 2020-02-21

## 2020-02-18 RX ORDER — ONDANSETRON 8 MG/1
4 TABLET, FILM COATED ORAL ONCE
Refills: 0 | Status: DISCONTINUED | OUTPATIENT
Start: 2020-02-18 | End: 2020-02-18

## 2020-02-18 RX ORDER — SODIUM CHLORIDE 9 MG/ML
3 INJECTION INTRAMUSCULAR; INTRAVENOUS; SUBCUTANEOUS EVERY 8 HOURS
Refills: 0 | Status: DISCONTINUED | OUTPATIENT
Start: 2020-02-18 | End: 2020-02-18

## 2020-02-18 RX ORDER — ONDANSETRON 8 MG/1
4 TABLET, FILM COATED ORAL EVERY 6 HOURS
Refills: 0 | Status: DISCONTINUED | OUTPATIENT
Start: 2020-02-18 | End: 2020-02-21

## 2020-02-18 RX ORDER — SODIUM CHLORIDE 9 MG/ML
1000 INJECTION INTRAMUSCULAR; INTRAVENOUS; SUBCUTANEOUS
Refills: 0 | Status: DISCONTINUED | OUTPATIENT
Start: 2020-02-18 | End: 2020-02-19

## 2020-02-18 RX ORDER — ONDANSETRON 8 MG/1
4 TABLET, FILM COATED ORAL EVERY 6 HOURS
Refills: 0 | Status: DISCONTINUED | OUTPATIENT
Start: 2020-02-18 | End: 2020-02-19

## 2020-02-18 RX ORDER — SODIUM CHLORIDE 9 MG/ML
1000 INJECTION, SOLUTION INTRAVENOUS
Refills: 0 | Status: DISCONTINUED | OUTPATIENT
Start: 2020-02-18 | End: 2020-02-21

## 2020-02-18 RX ORDER — HYDROMORPHONE HYDROCHLORIDE 2 MG/ML
30 INJECTION INTRAMUSCULAR; INTRAVENOUS; SUBCUTANEOUS
Refills: 0 | Status: DISCONTINUED | OUTPATIENT
Start: 2020-02-18 | End: 2020-02-19

## 2020-02-18 RX ORDER — BASILIXIMAB 20 MG/5ML
20 INJECTION, POWDER, FOR SOLUTION INTRAVENOUS ONCE
Refills: 0 | Status: DISCONTINUED | OUTPATIENT
Start: 2020-02-18 | End: 2020-02-18

## 2020-02-18 RX ORDER — CHLORHEXIDINE GLUCONATE 213 G/1000ML
1 SOLUTION TOPICAL ONCE
Refills: 0 | Status: DISCONTINUED | OUTPATIENT
Start: 2020-02-18 | End: 2020-02-18

## 2020-02-18 RX ORDER — INSULIN LISPRO 100/ML
VIAL (ML) SUBCUTANEOUS EVERY 6 HOURS
Refills: 0 | Status: DISCONTINUED | OUTPATIENT
Start: 2020-02-18 | End: 2020-02-19

## 2020-02-18 RX ORDER — HYDROMORPHONE HYDROCHLORIDE 2 MG/ML
0.5 INJECTION INTRAMUSCULAR; INTRAVENOUS; SUBCUTANEOUS
Refills: 0 | Status: DISCONTINUED | OUTPATIENT
Start: 2020-02-18 | End: 2020-02-18

## 2020-02-18 RX ORDER — SODIUM CHLORIDE 9 MG/ML
1000 INJECTION, SOLUTION INTRAVENOUS
Refills: 0 | Status: DISCONTINUED | OUTPATIENT
Start: 2020-02-18 | End: 2020-02-18

## 2020-02-18 RX ORDER — FAMOTIDINE 10 MG/ML
20 INJECTION INTRAVENOUS ONCE
Refills: 0 | Status: COMPLETED | OUTPATIENT
Start: 2020-02-18 | End: 2020-02-18

## 2020-02-18 RX ORDER — TACROLIMUS 5 MG/1
6 CAPSULE ORAL
Refills: 0 | Status: DISCONTINUED | OUTPATIENT
Start: 2020-02-19 | End: 2020-02-19

## 2020-02-18 RX ORDER — LIDOCAINE HCL 20 MG/ML
0.2 VIAL (ML) INJECTION ONCE
Refills: 0 | Status: DISCONTINUED | OUTPATIENT
Start: 2020-02-18 | End: 2020-02-18

## 2020-02-18 RX ORDER — NYSTATIN 500MM UNIT
500000 POWDER (EA) MISCELLANEOUS
Refills: 0 | Status: DISCONTINUED | OUTPATIENT
Start: 2020-02-19 | End: 2020-02-21

## 2020-02-18 RX ADMIN — FAMOTIDINE 20 MILLIGRAM(S): 10 INJECTION INTRAVENOUS at 10:11

## 2020-02-18 RX ADMIN — HYDROMORPHONE HYDROCHLORIDE 30 MILLILITER(S): 2 INJECTION INTRAMUSCULAR; INTRAVENOUS; SUBCUTANEOUS at 23:17

## 2020-02-18 RX ADMIN — HYDROMORPHONE HYDROCHLORIDE 30 MILLILITER(S): 2 INJECTION INTRAMUSCULAR; INTRAVENOUS; SUBCUTANEOUS at 18:50

## 2020-02-18 NOTE — CONSULT NOTE ADULT - ATTENDING COMMENTS
Kidney Transplant recipient with functioning allograft, non oliguric post op day 0  DM, HTN  Comorbidities reviewed.  Patient seen, examined and reviewed available clinical and lab data including history,  progress notes and consult notes.  Reviewed immunosuppression and allograft function including urine out put, creatinine trend, urine studies and any allograft and bladder imaging.  Reviewed medication regimen for glycemic control and blood pressure control  Suggestions:  1. Monitor blood pressure, I/O, blood chemistry  2. Immunosuppression protocol reviewed, Simulect induction, Std steroid taper and Tac/MMF  3. Monitor blood glucose, short acting insulin low dose regimen coverage  I was present during and reviewed clinical and lab data as well as assessment and plan as documented by the house staff as noted. Please contact if any additional questions with any change in clinical condition or on availability of any additional information or reports.  Will follow

## 2020-02-18 NOTE — BRIEF OPERATIVE NOTE - COMMENTS
[facility] # [id number]  Donor ABO O  Recipient ABO O  Donor CMV (-), EBV (+)  Recipient CMV (-), EBV (+)  HLA mismatch 1,1,1, crossmatch negative .   Donor kidney weight 145 grams  Anatomy: single artery, single vein, single ureter. Standard anastomoses  Left kidney to left external iliac vessels.   Total ischemia time: 2 hr 50 min  Anastomosis time:  60 min   Induction: Solumedrol and SImulect

## 2020-02-18 NOTE — PROGRESS NOTE ADULT - ASSESSMENT
59M  with h/o CHF (last admission 8-10 years ago as per patient, EF 56%), HTN, HLD, heart murmur, T2DM (last A1c 8%), controlled asthma (last inhaler used 7/2019), hiatal hernia, left glaucoma, CKD on HD (MWF via L AVF, anuric pre-op)  now s/p LDRT from daughter on 2/18. Simulect induction. JJ ureteral stent    POD#0 s/p LDRT  -graft functioning well post-op. repeat labs pending. post-op u/s stable  -continue strict I&Os (ALDA rivera)  -continue IVF as ordered  -dPCA for pain  -ok for sips of clears  -SCDs while in bed, encourage incentive spirometry  -Immuno: Envarsus per level, MMF 1/1, Pred taper, Simulect POD#4  -PPx: Bactrim/Valcyte/Nystatin/Pepcid    HTN  -stable for now, will add agents as required  -hold ASA for now    DM  -continue PAYTON, with close fingerstick checks. will add agents as required    Dispo  -if repeat labs stable, ok to transfer to [facility]

## 2020-02-18 NOTE — CONSULT NOTE ADULT - SUBJECTIVE AND OBJECTIVE BOX
CONSULT NOTE  --------------------------------------------------------------------------------  HPI:    59  male with ESRD, on hemodialysis, (M/W/F) h/o CHF (last admission 8-10 years ago as per patient, EF 56%), HTN, dyslipidemia, heart murmur, T2DM (last A1c 8%), toe amputation, controlled asthma (last inhaler used 2019), hiatal hernia, left glaucoma, left arm fistula, received live donor kidney from daughter today.  Donor and op note details as noted below	  [facility] # [id number] Donor ABO O Recipient ABO O Donor CMV (-), EBV (+) Recipient CMV (-), EBV (+) HLA mismatch 1,1,1, crossmatch negative .  Donor kidney weight 145 grams Anatomy: single artery, single vein, single ureter. Standard anastomoses Left kidney to left external iliac vessels.  Total ischemia time: 2 hr 50 min Anastomosis time:  60 min  Induction: Solumedrol and SImulect	      PAST HISTORY  --------------------------------------------------------------------------------  PAST MEDICAL & SURGICAL HISTORY:  Asthma: never been intubated for asthma   last inhaler used 2019  Heart murmur  Dyslipidemia  Bilateral dry eyes  History of glaucoma: left eye  Ventral hernia: repair  Diabetes: T2DM  last A1C (8%)  Hypertension  DKA (diabetic ketoacidoses)  Diastolic dysfunction: mild. stage 1. EF 65%  CKD (chronic kidney disease): hemodialysis on Mon, Wed and Fri  Insulin pump status: denies  Pneumonia: 2013  Osteomyelitis of ankle or foot: x2  Diabetic neuropathy  Hypertension  Status post cataract extraction  S/P laparoscopic sleeve gastrectomy:   H/O elbow surgery: left  H/O cardiac catheterization: no stents  Retinal hemorrhage, left eye: s/p laser surgery  hx of right eye retinal hemorrahge, tx with laser surgery  S/P amputation: right hallux   S/P hernia repair  S/P carpal tunnel release: b/l    FAMILY HISTORY:  FH: aortic stenosis: Son  Family history of bone cancer: Grandfather  Family history of heart attack (Grandparent): father  @54 y.o.  grandfather  @ 52 y.o.    PAST SOCIAL HISTORY:    ALLERGIES & MEDICATIONS  --------------------------------------------------------------------------------  Allergies    clonidine (Other)  seasonal allergy (Other)    Intolerances      Standing Inpatient Medications  ceFAZolin   IVPB 2000 milliGRAM(s) IV Intermittent once  chlorhexidine 4% Liquid 1 Application(s) Topical <User Schedule>  dextrose 5%. 1000 milliLiter(s) IV Continuous <Continuous>  dextrose 50% Injectable 12.5 Gram(s) IV Push once  HYDROmorphone PCA (1 mG/mL) 30 milliLiter(s) PCA Continuous PCA Continuous  insulin lispro (HumaLOG) corrective regimen sliding scale   SubCutaneous every 6 hours  lactated ringers. 1000 milliLiter(s) IV Continuous <Continuous>  sodium chloride 0.45%. 500 milliLiter(s) IV Continuous <Continuous>  sodium chloride 0.9%. 1000 milliLiter(s) IV Continuous <Continuous>    PRN Inpatient Medications  dextrose 40% Gel 15 Gram(s) Oral once PRN  fentaNYL    Injectable 25 MICROGram(s) IV Push every 5 minutes PRN  glucagon  Injectable 1 milliGRAM(s) IntraMuscular once PRN  HYDROmorphone  Injectable 0.5 milliGRAM(s) IV Push every 10 minutes PRN  HYDROmorphone  Injectable 1 milliGRAM(s) IV Push every 10 minutes PRN  naloxone Injectable 0.1 milliGRAM(s) IV Push every 3 minutes PRN  ondansetron Injectable 4 milliGRAM(s) IV Push every 6 hours PRN  ondansetron Injectable 4 milliGRAM(s) IV Push every 6 hours PRN  ondansetron Injectable 4 milliGRAM(s) IV Push once PRN      REVIEW OF SYSTEMS  --------------------------------------------------------------------------------  Controlled pain  Gen: No, fevers/chills, weakness  Skin: No rashes  Head/Eyes/Ears/Mouth: No headache; Normal hearing; Normal vision w/o blurriness; No sinus pain/discomfort, sore throat  Respiratory: No dyspnea, cough, wheezing, hemoptysis  CV: No chest pain, PND, orthopnea  GI: post op controlled abdominal soreness, diarrhea, constipation, nausea, vomiting, melena, hematochezia  : No increased frequency, dysuria, hematuria, nocturia  MSK: No joint pain/swelling; no back pain; no edema  Neuro: No dizziness/lightheadedness, weakness, seizures, numbness, tingling  Heme: No easy bruising or bleeding  Endo: No heat/cold intolerance  Psych: No significant nervousness, anxiety, stress, depression    All other systems were reviewed and are negative, except as noted.    VITALS/PHYSICAL EXAM  --------------------------------------------------------------------------------  T(C): 36 (20 @ 16:32), Max: 36.7 (20 @ 06:40)  HR: 81 (20 @ 18:15) (80 - 87)  BP: 123/65 (20 @ 18:15) (101/67 - 127/60)  RR: 10 (20 @ 18:15) (10 - 18)  SpO2: 100% (20 @ 18:15) (94% - 100%)     Height (cm): 172.72 (20 @ 08:04)  Weight (kg): 74.8 (20 @ 08:04)  BMI (kg/m2): 25.1 (20 @ 08:04)  BSA (m2): 1.88 (20 @ 08:04)      20 @ 07:01  -  20 @ 18:36  --------------------------------------------------------  IN: 265 mL / OUT: 275 mL / NET: -10 mL      Physical Exam:  	Gen: No acute distress  	HEENT: PERRL, supple neck, clear oropharynx  	Pulm: CTA B/L  	CV: RRR, S1S2; no rub  	   	Abd: +BS, soft, nontender/nondistended                      Transplant: LLQ, no staples, noted drain and Foleys catheter  	: No suprapubic tenderness  	 	LE: Warm, FROM, no clubbing, intact strength; no edema  	Neuro: No acute focal deficits  	Psych: Normal affect and mood  	Skin: Warm, without rashes  	Vascular access: Left UE AVF    LABS/STUDIES  --------------------------------------------------------------------------------              10.3   11.73 >-----------<  228      [20 @ 16:47]              33.7     140  |  95  |  52  ----------------------------<  73      [20 @ 12:10]  4.5   |  26  |  7.07        Ca     8.6     [20 @ 12:10]      Mg     2.9     [20 @ 16:47]      Phos  5.7     [20 @ 16:47]      PT/INR: PT 12.5 , INR 1.08       [20 @ 16:47]      Creatinine Trend:  SCr 7.07 [ @ 12:10]    Urinalysis - [19 @ 02:55]      Color Colorless / Appearance Clear / SG 1.010 / pH 6.0      Gluc 100 mg/dL / Ketone Negative  / Bili Negative / Urobili <2 mg/dL       Blood Trace / Protein 100 mg/dL / Leuk Est Negative / Nitrite Negative      RBC 3 / WBC 1 / Hyaline 2 / Gran  / Sq Epi  / Non Sq Epi 0 / Bacteria Negative      Iron 51, TIBC 273, %sat 19      [19 @ 10:11]  Ferritin 635      [19 @ 10:08]  PTH -- (Ca 8.0)      [19 @ 10:21]   227  PTH -- (Ca 8.5)      [19 @ 10:08]   184  Vitamin D (25OH) 41.5      [19 @ 09:54]  HbA1c 6.4      [19 @ 14:15]    HBsAb <3.0      [19 @ 20:34]  HBsAb Nonreact      [19 20:34]  HBsAg Nonreact      [19 20:34]  HBcAb Nonreact      [19 @ 20:34]  HCV 0.11, Nonreact      [19 @ 20:34]      Tacrolimus  Cyclosporine  Sirolimus  Mycophenolate  BK PCR  CMV PCR  Parvo PCR  EBV PCR

## 2020-02-18 NOTE — PROGRESS NOTE ADULT - SUBJECTIVE AND OBJECTIVE BOX
Transplant Surgery - POC  --------------------------------------------------------------  LDRT 2/18/2020 POD#0    59M  with h/o CHF (last admission 8-10 years ago as per patient, EF 56%), HTN, HLD, heart murmur, T2DM (last A1c 8%), controlled asthma (last inhaler used 7/2019), hiatal hernia, left glaucoma, CKD on HD (MWF via L AVF, anuric pre-op)  now s/p LDRT from daughter on 2/18.    [facility] # [id number]  Donor ABO O  Recipient ABO O  Donor CMV (-), EBV (+)  Recipient CMV (-), EBV (+)  HLA mismatch 1,1,1, crossmatch negative .   Donor kidney weight 145 grams  Anatomy: single artery, single vein, single ureter. Standard anastomoses  Left kidney to left external iliac vessels.   Total ischemia time: 2 hr 50 min  Anastomosis time:  60 min   Induction: Solumedrol and SImulect      Interval Events:  POD#0  Afebrile, VS stable in PACU  initially with low UO post-op, now ~200cc/h. Cr 6. ALDA 10 ss  pain well controlled, now on dPCA  no other complaints at this time    Potential Discharge date: pending clinical stability    Education:  Medications    Plan of care:  See Below    MEDICATIONS  (STANDING):  ceFAZolin   IVPB 2000 milliGRAM(s) IV Intermittent once  chlorhexidine 4% Liquid 1 Application(s) Topical <User Schedule>  dextrose 5%. 1000 milliLiter(s) (50 mL/Hr) IV Continuous <Continuous>  dextrose 50% Injectable 12.5 Gram(s) IV Push once  HYDROmorphone PCA (1 mG/mL) 30 milliLiter(s) PCA Continuous PCA Continuous  insulin lispro (HumaLOG) corrective regimen sliding scale   SubCutaneous every 6 hours  lactated ringers. 1000 milliLiter(s) (50 mL/Hr) IV Continuous <Continuous>  sodium chloride 0.45%. 500 milliLiter(s) (50 mL/Hr) IV Continuous <Continuous>  sodium chloride 0.9%. 1000 milliLiter(s) (70 mL/Hr) IV Continuous <Continuous>    MEDICATIONS  (PRN):  dextrose 40% Gel 15 Gram(s) Oral once PRN Blood Glucose LESS THAN 70 milliGRAM(s)/deciliter  fentaNYL    Injectable 25 MICROGram(s) IV Push every 5 minutes PRN Mild Pain (1 - 3)  glucagon  Injectable 1 milliGRAM(s) IntraMuscular once PRN Glucose LESS THAN 70 milligrams/deciliter  HYDROmorphone  Injectable 0.5 milliGRAM(s) IV Push every 10 minutes PRN Moderate Pain (4 - 6)  HYDROmorphone  Injectable 1 milliGRAM(s) IV Push every 10 minutes PRN Severe Pain (7 - 10)  naloxone Injectable 0.1 milliGRAM(s) IV Push every 3 minutes PRN For ANY of the following changes in patient status:  A. RR LESS THAN 10 breaths per minute, B. Oxygen saturation LESS THAN 90%, C. Sedation score of 6  ondansetron Injectable 4 milliGRAM(s) IV Push every 6 hours PRN Nausea and/or Vomiting  ondansetron Injectable 4 milliGRAM(s) IV Push every 6 hours PRN Nausea  ondansetron Injectable 4 milliGRAM(s) IV Push once PRN Nausea and/or Vomiting      PAST MEDICAL & SURGICAL HISTORY:  Asthma: never been intubated for asthma   last inhaler used 7/2019  Heart murmur  Dyslipidemia  Bilateral dry eyes  History of glaucoma: left eye  Ventral hernia: repair  Diabetes: T2DM  last A1C (8%)  Hypertension  DKA (diabetic ketoacidoses)  Diastolic dysfunction: mild. stage 1. EF 65%  CKD (chronic kidney disease): hemodialysis on Mon, Wed and Fri  Insulin pump status: denies  Pneumonia: 4/2013  Osteomyelitis of ankle or foot: x2  Diabetic neuropathy  Hypertension  Status post cataract extraction  S/P laparoscopic sleeve gastrectomy: 2015  H/O elbow surgery: left  H/O cardiac catheterization: no stents  Retinal hemorrhage, left eye: s/p laser surgery  hx of right eye retinal hemorrahge, tx with laser surgery  S/P amputation: right hallux 5/13  S/P hernia repair  S/P carpal tunnel release: b/l      Vital Signs Last 24 Hrs  T(C): 36.7 (18 Feb 2020 20:00), Max: 36.7 (18 Feb 2020 06:40)  T(F): 98.1 (18 Feb 2020 20:00), Max: 98.1 (18 Feb 2020 06:40)  HR: 83 (18 Feb 2020 21:00) (80 - 87)  BP: 109/57 (18 Feb 2020 20:30) (101/67 - 128/69)  BP(mean): 77 (18 Feb 2020 20:30) (77 - 93)  RR: 14 (18 Feb 2020 21:00) (10 - 18)  SpO2: 97% (18 Feb 2020 21:00) (94% - 100%)    I&O's Summary    18 Feb 2020 07:01  -  18 Feb 2020 21:23  --------------------------------------------------------  IN: 900 mL / OUT: 910 mL / NET: -10 mL                              10.3   11.73 )-----------( 228      ( 18 Feb 2020 16:47 )             33.7     02-18    138  |  94<L>  |  53<H>  ----------------------------<  143<H>  4.9   |  21<L>  |  6.65<H>    Ca    8.1<L>      18 Feb 2020 16:47  Phos  5.7     02-18  Mg     2.9     02-18    TPro  7.1  /  Alb  3.7  /  TBili  0.3  /  DBili  x   /  AST  15  /  ALT  14  /  AlkPhos  81  02-18        Review of systems  Gen: No weight changes, fatigue, fevers/chills, weakness  Skin: No rashes  Head/Eyes/Ears/Mouth: No headache; Normal hearing; Normal vision w/o blurriness; No sinus pain/discomfort, sore throat  Respiratory: No dyspnea, cough, wheezing, hemoptysis  CV: No chest pain, PND, orthopnea  GI: C/O mild abdominal pain at surgical site. no diarrhea, constipation, nausea, vomiting, melena, hematochezia  : No increased frequency, dysuria, hematuria, nocturia  MSK: No joint pain/swelling; no back pain; no edema  Neuro: No dizziness/lightheadedness, weakness, seizures, numbness, tingling  Heme: No easy bruising or bleeding  Endo: No heat/cold intolerance  Psych: No significant nervousness, anxiety, stress, depression  All other systems were reviewed and are negative, except as noted.      PHYSICAL EXAM:  Constitutional: Well developed / well nourished  Eyes: Anicteric, PERRLA  ENMT: nc/at  Neck: R TLC c/d/i  Respiratory: CTA B/L  Cardiovascular: RRR  Gastrointestinal: Soft abdomen, ND, appropriate incisional TTP. LEFT dermabond incision c/d/i, no signs of infection. JPx1 ss  Genitourinary: Urinary catheter in place with good UO  Extremities: SCD's in place and working bilaterally, no edema  Vascular: Palpable dp pulses bilaterally. L FISTULA palpable  Neurological: A&O x3  Skin: no rashes, lesions, ulcerations  Musculoskeletal: Moving all extremities  Psychiatric: Responsive

## 2020-02-19 LAB
ALBUMIN SERPL ELPH-MCNC: 3.5 G/DL — SIGNIFICANT CHANGE UP (ref 3.3–5)
ALP SERPL-CCNC: 74 U/L — SIGNIFICANT CHANGE UP (ref 40–120)
ALT FLD-CCNC: 8 U/L — LOW (ref 10–45)
ANION GAP SERPL CALC-SCNC: 21 MMOL/L — HIGH (ref 5–17)
AST SERPL-CCNC: 17 U/L — SIGNIFICANT CHANGE UP (ref 10–40)
BASOPHILS # BLD AUTO: 0.01 K/UL — SIGNIFICANT CHANGE UP (ref 0–0.2)
BASOPHILS NFR BLD AUTO: 0.1 % — SIGNIFICANT CHANGE UP (ref 0–2)
BILIRUB SERPL-MCNC: 0.3 MG/DL — SIGNIFICANT CHANGE UP (ref 0.2–1.2)
BLD GP AB SCN SERPL QL: NEGATIVE — SIGNIFICANT CHANGE UP
BUN SERPL-MCNC: 56 MG/DL — HIGH (ref 7–23)
CALCIUM SERPL-MCNC: 8.4 MG/DL — SIGNIFICANT CHANGE UP (ref 8.4–10.5)
CHLORIDE SERPL-SCNC: 94 MMOL/L — LOW (ref 96–108)
CO2 SERPL-SCNC: 20 MMOL/L — LOW (ref 22–31)
CREAT SERPL-MCNC: 5.2 MG/DL — HIGH (ref 0.5–1.3)
EOSINOPHIL # BLD AUTO: 0 K/UL — SIGNIFICANT CHANGE UP (ref 0–0.5)
EOSINOPHIL NFR BLD AUTO: 0 % — SIGNIFICANT CHANGE UP (ref 0–6)
GLUCOSE BLDC GLUCOMTR-MCNC: 187 MG/DL — HIGH (ref 70–99)
GLUCOSE BLDC GLUCOMTR-MCNC: 215 MG/DL — HIGH (ref 70–99)
GLUCOSE BLDC GLUCOMTR-MCNC: 230 MG/DL — HIGH (ref 70–99)
GLUCOSE BLDC GLUCOMTR-MCNC: 267 MG/DL — HIGH (ref 70–99)
GLUCOSE BLDC GLUCOMTR-MCNC: 315 MG/DL — HIGH (ref 70–99)
GLUCOSE BLDC GLUCOMTR-MCNC: 323 MG/DL — HIGH (ref 70–99)
GLUCOSE SERPL-MCNC: 205 MG/DL — HIGH (ref 70–99)
HBA1C BLD-MCNC: 7.6 % — HIGH (ref 4–5.6)
HCT VFR BLD CALC: 31.6 % — LOW (ref 39–50)
HGB BLD-MCNC: 9.5 G/DL — LOW (ref 13–17)
IMM GRANULOCYTES NFR BLD AUTO: 0.6 % — SIGNIFICANT CHANGE UP (ref 0–1.5)
LYMPHOCYTES # BLD AUTO: 0.94 K/UL — LOW (ref 1–3.3)
LYMPHOCYTES # BLD AUTO: 8 % — LOW (ref 13–44)
MAGNESIUM SERPL-MCNC: 2.7 MG/DL — HIGH (ref 1.6–2.6)
MCHC RBC-ENTMCNC: 29.1 PG — SIGNIFICANT CHANGE UP (ref 27–34)
MCHC RBC-ENTMCNC: 30.1 GM/DL — LOW (ref 32–36)
MCV RBC AUTO: 96.9 FL — SIGNIFICANT CHANGE UP (ref 80–100)
MONOCYTES # BLD AUTO: 0.77 K/UL — SIGNIFICANT CHANGE UP (ref 0–0.9)
MONOCYTES NFR BLD AUTO: 6.6 % — SIGNIFICANT CHANGE UP (ref 2–14)
NEUTROPHILS # BLD AUTO: 9.93 K/UL — HIGH (ref 1.8–7.4)
NEUTROPHILS NFR BLD AUTO: 84.7 % — HIGH (ref 43–77)
NRBC # BLD: 0 /100 WBCS — SIGNIFICANT CHANGE UP (ref 0–0)
PHOSPHATE SERPL-MCNC: 7.1 MG/DL — HIGH (ref 2.5–4.5)
PLATELET # BLD AUTO: 195 K/UL — SIGNIFICANT CHANGE UP (ref 150–400)
POTASSIUM SERPL-MCNC: 5.2 MMOL/L — SIGNIFICANT CHANGE UP (ref 3.5–5.3)
POTASSIUM SERPL-SCNC: 5.2 MMOL/L — SIGNIFICANT CHANGE UP (ref 3.5–5.3)
PROT SERPL-MCNC: 6.9 G/DL — SIGNIFICANT CHANGE UP (ref 6–8.3)
RBC # BLD: 3.26 M/UL — LOW (ref 4.2–5.8)
RBC # FLD: 16.5 % — HIGH (ref 10.3–14.5)
RH IG SCN BLD-IMP: POSITIVE — SIGNIFICANT CHANGE UP
SODIUM SERPL-SCNC: 135 MMOL/L — SIGNIFICANT CHANGE UP (ref 135–145)
WBC # BLD: 11.72 K/UL — HIGH (ref 3.8–10.5)
WBC # FLD AUTO: 11.72 K/UL — HIGH (ref 3.8–10.5)

## 2020-02-19 PROCEDURE — 99232 SBSQ HOSP IP/OBS MODERATE 35: CPT | Mod: GC,24

## 2020-02-19 PROCEDURE — 99232 SBSQ HOSP IP/OBS MODERATE 35: CPT

## 2020-02-19 RX ORDER — CARVEDILOL PHOSPHATE 80 MG/1
12.5 CAPSULE, EXTENDED RELEASE ORAL EVERY 12 HOURS
Refills: 0 | Status: DISCONTINUED | OUTPATIENT
Start: 2020-02-19 | End: 2020-02-20

## 2020-02-19 RX ORDER — TRAMADOL HYDROCHLORIDE 50 MG/1
50 TABLET ORAL EVERY 4 HOURS
Refills: 0 | Status: DISCONTINUED | OUTPATIENT
Start: 2020-02-19 | End: 2020-02-21

## 2020-02-19 RX ORDER — INSULIN LISPRO 100/ML
8 VIAL (ML) SUBCUTANEOUS
Refills: 0 | Status: DISCONTINUED | OUTPATIENT
Start: 2020-02-19 | End: 2020-02-20

## 2020-02-19 RX ORDER — LANOLIN ALCOHOL/MO/W.PET/CERES
3 CREAM (GRAM) TOPICAL AT BEDTIME
Refills: 0 | Status: DISCONTINUED | OUTPATIENT
Start: 2020-02-19 | End: 2020-02-21

## 2020-02-19 RX ORDER — INSULIN LISPRO 100/ML
5 VIAL (ML) SUBCUTANEOUS
Refills: 0 | Status: DISCONTINUED | OUTPATIENT
Start: 2020-02-19 | End: 2020-02-19

## 2020-02-19 RX ORDER — INSULIN LISPRO 100/ML
VIAL (ML) SUBCUTANEOUS AT BEDTIME
Refills: 0 | Status: DISCONTINUED | OUTPATIENT
Start: 2020-02-19 | End: 2020-02-21

## 2020-02-19 RX ORDER — TACROLIMUS 5 MG/1
3 CAPSULE ORAL
Refills: 0 | Status: DISCONTINUED | OUTPATIENT
Start: 2020-02-20 | End: 2020-02-20

## 2020-02-19 RX ORDER — TAMSULOSIN HYDROCHLORIDE 0.4 MG/1
0.4 CAPSULE ORAL AT BEDTIME
Refills: 0 | Status: DISCONTINUED | OUTPATIENT
Start: 2020-02-19 | End: 2020-02-21

## 2020-02-19 RX ORDER — INSULIN LISPRO 100/ML
8 VIAL (ML) SUBCUTANEOUS ONCE
Refills: 0 | Status: COMPLETED | OUTPATIENT
Start: 2020-02-19 | End: 2020-02-19

## 2020-02-19 RX ORDER — SENNA PLUS 8.6 MG/1
2 TABLET ORAL AT BEDTIME
Refills: 0 | Status: DISCONTINUED | OUTPATIENT
Start: 2020-02-19 | End: 2020-02-21

## 2020-02-19 RX ORDER — SODIUM CHLORIDE 9 MG/ML
1000 INJECTION INTRAMUSCULAR; INTRAVENOUS; SUBCUTANEOUS
Refills: 0 | Status: DISCONTINUED | OUTPATIENT
Start: 2020-02-19 | End: 2020-02-20

## 2020-02-19 RX ORDER — TRAMADOL HYDROCHLORIDE 50 MG/1
25 TABLET ORAL EVERY 4 HOURS
Refills: 0 | Status: DISCONTINUED | OUTPATIENT
Start: 2020-02-19 | End: 2020-02-21

## 2020-02-19 RX ORDER — INSULIN GLARGINE 100 [IU]/ML
20 INJECTION, SOLUTION SUBCUTANEOUS AT BEDTIME
Refills: 0 | Status: DISCONTINUED | OUTPATIENT
Start: 2020-02-19 | End: 2020-02-21

## 2020-02-19 RX ORDER — INSULIN LISPRO 100/ML
VIAL (ML) SUBCUTANEOUS
Refills: 0 | Status: DISCONTINUED | OUTPATIENT
Start: 2020-02-19 | End: 2020-02-21

## 2020-02-19 RX ORDER — ACETAMINOPHEN 500 MG
650 TABLET ORAL EVERY 6 HOURS
Refills: 0 | Status: DISCONTINUED | OUTPATIENT
Start: 2020-02-19 | End: 2020-02-21

## 2020-02-19 RX ADMIN — SENNA PLUS 2 TABLET(S): 8.6 TABLET ORAL at 22:18

## 2020-02-19 RX ADMIN — Medication 2: at 05:50

## 2020-02-19 RX ADMIN — TRAMADOL HYDROCHLORIDE 50 MILLIGRAM(S): 50 TABLET ORAL at 18:16

## 2020-02-19 RX ADMIN — CARVEDILOL PHOSPHATE 12.5 MILLIGRAM(S): 80 CAPSULE, EXTENDED RELEASE ORAL at 17:55

## 2020-02-19 RX ADMIN — TRAMADOL HYDROCHLORIDE 50 MILLIGRAM(S): 50 TABLET ORAL at 17:46

## 2020-02-19 RX ADMIN — MYCOPHENOLATE MOFETIL 1 GRAM(S): 250 CAPSULE ORAL at 19:33

## 2020-02-19 RX ADMIN — Medication 500000 UNIT(S): at 12:45

## 2020-02-19 RX ADMIN — Medication 8 UNIT(S): at 12:43

## 2020-02-19 RX ADMIN — TACROLIMUS 6 MILLIGRAM(S): 5 CAPSULE ORAL at 08:52

## 2020-02-19 RX ADMIN — Medication 8: at 19:06

## 2020-02-19 RX ADMIN — VALGANCICLOVIR 450 MILLIGRAM(S): 450 TABLET, FILM COATED ORAL at 12:46

## 2020-02-19 RX ADMIN — Medication 1 TABLET(S): at 12:46

## 2020-02-19 RX ADMIN — Medication 2: at 00:12

## 2020-02-19 RX ADMIN — TAMSULOSIN HYDROCHLORIDE 0.4 MILLIGRAM(S): 0.4 CAPSULE ORAL at 22:18

## 2020-02-19 RX ADMIN — Medication 500000 UNIT(S): at 00:14

## 2020-02-19 RX ADMIN — HYDROMORPHONE HYDROCHLORIDE 30 MILLILITER(S): 2 INJECTION INTRAMUSCULAR; INTRAVENOUS; SUBCUTANEOUS at 07:19

## 2020-02-19 RX ADMIN — CHLORHEXIDINE GLUCONATE 1 APPLICATION(S): 213 SOLUTION TOPICAL at 08:52

## 2020-02-19 RX ADMIN — SODIUM CHLORIDE 70 MILLILITER(S): 9 INJECTION INTRAMUSCULAR; INTRAVENOUS; SUBCUTANEOUS at 07:18

## 2020-02-19 RX ADMIN — Medication 500000 UNIT(S): at 05:48

## 2020-02-19 RX ADMIN — Medication 500000 UNIT(S): at 22:18

## 2020-02-19 RX ADMIN — MYCOPHENOLATE MOFETIL 1 GRAM(S): 250 CAPSULE ORAL at 05:48

## 2020-02-19 RX ADMIN — Medication 500000 UNIT(S): at 17:46

## 2020-02-19 RX ADMIN — INSULIN GLARGINE 20 UNIT(S): 100 INJECTION, SOLUTION SUBCUTANEOUS at 22:18

## 2020-02-19 RX ADMIN — FAMOTIDINE 20 MILLIGRAM(S): 10 INJECTION INTRAVENOUS at 12:46

## 2020-02-19 RX ADMIN — Medication 125 MILLIGRAM(S): at 05:49

## 2020-02-19 RX ADMIN — Medication 4: at 08:49

## 2020-02-19 RX ADMIN — SODIUM CHLORIDE 50 MILLILITER(S): 9 INJECTION, SOLUTION INTRAVENOUS at 07:18

## 2020-02-19 RX ADMIN — Medication 125 MILLIGRAM(S): at 17:46

## 2020-02-19 RX ADMIN — Medication 3 MILLIGRAM(S): at 22:18

## 2020-02-19 RX ADMIN — Medication 8 UNIT(S): at 17:45

## 2020-02-19 NOTE — PROGRESS NOTE ADULT - SUBJECTIVE AND OBJECTIVE BOX
Day __1_ of Anesthesia Pain Management Service    SUBJECTIVE:    Pain Scale Score	At rest: ___1 	With Activity: _3__ 	[ ] Refer to charted pain scores    THERAPY:    [ ] IV PCA Morphine		[ ] 5 mg/mL	[ ] 1 mg/mL  [x ] IV PCA Hydromorphone	[ ] 5 mg/mL	[ ] 1 mg/mL  [ ] IV PCA Fentanyl		[ ] 50 micrograms/mL    Demand dose  .2  lockout 6   (minutes) Continuous Rate 0   Total:     Daily      MEDICATIONS  (STANDING):  chlorhexidine 4% Liquid 1 Application(s) Topical <User Schedule>  dextrose 5%. 1000 milliLiter(s) (50 mL/Hr) IV Continuous <Continuous>  dextrose 50% Injectable 12.5 Gram(s) IV Push once  famotidine    Tablet 20 milliGRAM(s) Oral daily  insulin lispro (HumaLOG) corrective regimen sliding scale   SubCutaneous every 6 hours  methylPREDNISolone sodium succinate Injectable 125 milliGRAM(s) IV Push two times a day  mycophenolate mofetil 1 Gram(s) Oral every 12 hours  nystatin    Suspension 614144 Unit(s) Swish and Swallow four times a day  senna 2 Tablet(s) Oral at bedtime  sodium chloride 0.9%. 1000 milliLiter(s) (100 mL/Hr) IV Continuous <Continuous>  tacrolimus ER Tablet (ENVARSUS XR) 6 milliGRAM(s) Oral <User Schedule>  tamsulosin 0.4 milliGRAM(s) Oral at bedtime  trimethoprim   80 mG/sulfamethoxazole 400 mG 1 Tablet(s) Oral daily  valGANciclovir 450 milliGRAM(s) Oral daily    MEDICATIONS  (PRN):  acetaminophen   Tablet .. 650 milliGRAM(s) Oral every 6 hours PRN Mild Pain (1 - 3)  dextrose 40% Gel 15 Gram(s) Oral once PRN Blood Glucose LESS THAN 70 milliGRAM(s)/deciliter  glucagon  Injectable 1 milliGRAM(s) IntraMuscular once PRN Glucose LESS THAN 70 milligrams/deciliter  ondansetron Injectable 4 milliGRAM(s) IV Push every 6 hours PRN Nausea and/or Vomiting  traMADol 25 milliGRAM(s) Oral every 4 hours PRN Moderate Pain (4 - 6)  traMADol 50 milliGRAM(s) Oral every 4 hours PRN Severe Pain (7 - 10)      OBJECTIVE:    Sedation Score:	[x ] Alert	[ ] Drowsy 	[ ] Arousable	[ ] Asleep	[ ] Unresponsive    Side Effects:	[x ] None	[ ] Nausea	[ ] Vomiting	[ ] Pruritus  		[ ] Other:    Vital Signs Last 24 Hrs  T(C): 37.3 (19 Feb 2020 08:00), Max: 37.3 (19 Feb 2020 08:00)  T(F): 99.2 (19 Feb 2020 08:00), Max: 99.2 (19 Feb 2020 08:00)  HR: 88 (19 Feb 2020 08:00) (80 - 100)  BP: 146/70 (19 Feb 2020 08:00) (103/53 - 146/70)  BP(mean): 100 (19 Feb 2020 08:00) (77 - 100)  RR: 18 (19 Feb 2020 08:00) (10 - 18)  SpO2: 95% (19 Feb 2020 08:00) (94% - 100%)    ASSESSMENT/ PLAN    Therapy to  be:	[ ] Continue   [x ] Discontinued   [ ] Change to prn Analgesics    Documentation and Verification of current medications:   [X] Done	[ ] Not done, not elligible    Comments:

## 2020-02-19 NOTE — PROGRESS NOTE ADULT - SUBJECTIVE AND OBJECTIVE BOX
JITENDRA FRENCH is a 59y Male s/p *LRRT on 2/18/20. PMH is significant for diabetes and orthostatic hypotension    Allergies: clonidine   CMV -/-    Transplant Medications  Induction  -Basiliximab 20 mg POD 0 (given in OR) and POD 4  -Methyprednisolone taper (switch to PO prednisone on POD 4)            POD 0: 500 mg IV in OR            POD 1: 125 mg IV Q12H            POD 2: 60 mg IV Q12H            POD 3: 30 mg IV Q12H        Maintenance Immunosuppression  -Tacrolimus 0.14 mg/kg/dose daily (Adjust for goal trough: 8-10)  -Mycophenolate 1,000 mg PO Q12H  -Prednisone             POD 4: 20 mg PO Q12H            POD 5: 10 mg PO Q12H            POD 6: 5 mg PO Q12H            POD 7-: 5 mg daily     Anti-infection   -Bactrim SS tablet (frequency based on renal function)  -Valganciclovir (dose based on CMV serostatus and frequency based on renal function)  -Nystatin swish and swallow 5 mL four times daily      Surgical prophylaxis pre- and intra-operative dosing  -Cefazolin    Prophylaxis  -GI ppx: famotidine 20 mg daily  -Bowel ppx: senna/colace  -DVT: sequential compression device  -Pain:            Mild: Acetaminophen 650 mg every 6 hours PRN           Moderate: Tramadol 25 mg every 4 hours PRN (adjust for renal function)           Severe: Tramadol 50 mg every 4 hours PRN (adjust for renal function)    Home medications  Tresiba 24 units at bedtime  Novolog 2 units before meal  Labetalol 50 mg daily  Aspirin 81mg daily  lipitor 20 mg daily  gabapentin 300 mg daily    Outpatient medication reconciliation reviewed and will be re-started appropriately.  Plan discussed with multidisciplinary team.

## 2020-02-19 NOTE — PROGRESS NOTE ADULT - ASSESSMENT
59M  with h/o CHF (last admission 8-10 years ago as per patient, EF 56%), HTN, HLD, heart murmur, T2DM (last A1c 8%), controlled asthma (last inhaler used 7/2019), hiatal hernia, left glaucoma, CKD on HD (MWF via L AVF, anuric pre-op)  now s/p LDRT from daughter on 2/18. Simulect induction. JJ ureteral stent    POD#1 s/p LDRT  - Strict I&Os, creatinine trending down   - Cont with nicole and ALDA   - IVF: dc replacements, cont IVF @ 100cc/hr  - Diet: advance as tolerated  - Pain: dc PCA, transition to tylenol/toradol  - Immuno: Envarsus 6mg daily, MMF 1g bid; Pred taper, Simulect induction   - Proph: Bactrim/valcyte/nystatin  - Start Flomax 0.4mg daily  - bowel regimen  - SCDs, encourage incentive spirometry    HTN  -stable for now, will add agents as required  -hold ASA for now    DM  -continue PAYTON,

## 2020-02-19 NOTE — PROGRESS NOTE ADULT - SUBJECTIVE AND OBJECTIVE BOX
Transplant Surgery - Multidisciplinary Progress Note   --------------------------------------------------------------  LDRT 2/18/2020 POD#1    Present: Patient seen with multidisciplinary team including Transplant Surgeon Dr. Sanchez, Dr. Shah, Transplant Nephrologist Dr. Donnelly, Transplant Pharmacist Wayne Kramer, NP Sarah Sanchez, GAMA Forbes, and surgical resident Kenneth Chanel during am rounds and examined with Dr. Sanchez. Disciplines not attendance will be notified of plan.     HPI: 59M  with h/o CHF (last admission 8-10 years ago as per patient, EF 56%), HTN, HLD, heart murmur, T2DM (last A1c 8%), controlled asthma (last inhaler used 7/2019), hiatal hernia, left glaucoma, CKD on HD (MWF via L AVF, anuric pre-op). Underwent LDRT to left iliac fossa from daughter on 2/18/2020. Simulect induction.     Donor:   ABO O  Donor CMV (-), EBV (+)    Recipient ABO O  Recipient CMV (-), EBV (+)  HLA mismatch 1,1,1, crossmatch negative .     OR:   LDRT to left iliac fossa  Anatomy: single artery, single vein, single ureter. Standard anastomoses  Induction: Solumedrol and Simulect    Interval Events:   - POD 1 s/p LDRT - urine output ~100-125cc/hr, creat 5.2 (from 6.04), post op doppler with patent vessels (final read pending)   - Pain well controlled on dPCA (minimal use), transitioned to PO regimen  - Diet advanced, out of bed to chair this am  - TLC removed, PIV in place    Potential Discharge date: pending clinical stability    Education:  Medications    Plan of care:  See Below    MEDICATIONS  (STANDING):  chlorhexidine 4% Liquid 1 Application(s) Topical <User Schedule>  dextrose 5%. 1000 milliLiter(s) (50 mL/Hr) IV Continuous <Continuous>  dextrose 50% Injectable 12.5 Gram(s) IV Push once  famotidine    Tablet 20 milliGRAM(s) Oral daily  insulin lispro (HumaLOG) corrective regimen sliding scale   SubCutaneous every 6 hours  methylPREDNISolone sodium succinate Injectable 125 milliGRAM(s) IV Push two times a day  mycophenolate mofetil 1 Gram(s) Oral every 12 hours  nystatin    Suspension 672166 Unit(s) Swish and Swallow four times a day  senna 2 Tablet(s) Oral at bedtime  sodium chloride 0.9%. 1000 milliLiter(s) (100 mL/Hr) IV Continuous <Continuous>  tacrolimus ER Tablet (ENVARSUS XR) 6 milliGRAM(s) Oral <User Schedule>  tamsulosin 0.4 milliGRAM(s) Oral at bedtime  trimethoprim   80 mG/sulfamethoxazole 400 mG 1 Tablet(s) Oral daily  valGANciclovir 450 milliGRAM(s) Oral daily    MEDICATIONS  (PRN):  acetaminophen   Tablet .. 650 milliGRAM(s) Oral every 6 hours PRN Mild Pain (1 - 3)  dextrose 40% Gel 15 Gram(s) Oral once PRN Blood Glucose LESS THAN 70 milliGRAM(s)/deciliter  glucagon  Injectable 1 milliGRAM(s) IntraMuscular once PRN Glucose LESS THAN 70 milligrams/deciliter  ondansetron Injectable 4 milliGRAM(s) IV Push every 6 hours PRN Nausea and/or Vomiting  traMADol 25 milliGRAM(s) Oral every 4 hours PRN Moderate Pain (4 - 6)  traMADol 50 milliGRAM(s) Oral every 4 hours PRN Severe Pain (7 - 10)      PAST MEDICAL & SURGICAL HISTORY:  Asthma: never been intubated for asthma   last inhaler used 7/2019  Heart murmur  Dyslipidemia  Bilateral dry eyes  History of glaucoma: left eye  Ventral hernia: repair  Diabetes: T2DM  last A1C (8%)  Hypertension  DKA (diabetic ketoacidoses)  Diastolic dysfunction: mild. stage 1. EF 65%  CKD (chronic kidney disease): hemodialysis on Mon, Wed and Fri  Insulin pump status: denies  Pneumonia: 4/2013  Osteomyelitis of ankle or foot: x2  Diabetic neuropathy  Hypertension  Status post cataract extraction  S/P laparoscopic sleeve gastrectomy: 2015  H/O elbow surgery: left  H/O cardiac catheterization: no stents  Retinal hemorrhage, left eye: s/p laser surgery  hx of right eye retinal hemorrahge, tx with laser surgery  S/P amputation: right hallux 5/13  S/P hernia repair  S/P carpal tunnel release: b/l      Vital Signs Last 24 Hrs  T(C): 37.3 (19 Feb 2020 08:00), Max: 37.3 (19 Feb 2020 08:00)  T(F): 99.2 (19 Feb 2020 08:00), Max: 99.2 (19 Feb 2020 08:00)  HR: 88 (19 Feb 2020 08:00) (80 - 100)  BP: 146/70 (19 Feb 2020 08:00) (103/53 - 146/70)  BP(mean): 100 (19 Feb 2020 08:00) (77 - 100)  RR: 18 (19 Feb 2020 08:00) (10 - 18)  SpO2: 95% (19 Feb 2020 08:00) (94% - 100%)    I&O's Summary    18 Feb 2020 07:01  -  19 Feb 2020 07:00  --------------------------------------------------------  IN: 3540 mL / OUT: 3467 mL / NET: 73 mL    19 Feb 2020 07:01  -  19 Feb 2020 11:39  --------------------------------------------------------  IN: 450 mL / OUT: 475 mL / NET: -25 mL                         9.5    11.72 )-----------( 195      ( 19 Feb 2020 05:52 )             31.6     02-19    135  |  94<L>  |  56<H>  ----------------------------<  205<H>  5.2   |  20<L>  |  5.20<H>    Ca    8.4      19 Feb 2020 05:52  Phos  7.1     02-19  Mg     2.7     02-19    TPro  6.9  /  Alb  3.5  /  TBili  0.3  /  DBili  x   /  AST  17  /  ALT  8<L>  /  AlkPhos  74  02-19      Review of systems  Gen: No weight changes, fatigue, fevers/chills, weakness  Skin: No rashes  Head/Eyes/Ears/Mouth: No headache; Normal hearing; Normal vision w/o blurriness; No sinus pain/discomfort, sore throat  Respiratory: No dyspnea, cough, wheezing, hemoptysis  CV: No chest pain, PND, orthopnea  GI: C/O mild abdominal pain at surgical site. no diarrhea, constipation, nausea, vomiting, melena, hematochezia  : No increased frequency, dysuria, hematuria, nocturia  MSK: No joint pain/swelling; no back pain; no edema  Neuro: No dizziness/lightheadedness, weakness, seizures, numbness, tingling  Heme: No easy bruising or bleeding  Endo: No heat/cold intolerance  Psych: No significant nervousness, anxiety, stress, depression  All other systems were reviewed and are negative, except as noted.      PHYSICAL EXAM:  Constitutional: Well developed / well nourished  Eyes: Anicteric, PERRLA  ENMT: nc/at  Neck: supple, no JVD  Respiratory: CTA B/L  Cardiovascular: RRR  Gastrointestinal: Soft abdomen, ND, appropriate incisional TTP. LEFT dermabond incision c/d/i, no signs of infection. JPx1 ss  Genitourinary: Urinary catheter in place with good UO  Extremities: SCD's in place and working bilaterally, no edema  Vascular: + DP pulses (dopplerable);  L FISTULA palpable  Neurological: A&O x3  Skin: no rashes, lesions, ulcerations  Musculoskeletal: Moving all extremities  Psychiatric: Responsive

## 2020-02-19 NOTE — PROGRESS NOTE ADULT - SUBJECTIVE AND OBJECTIVE BOX
--------------------------------------------------------------------------------  Chief Complaint: live donor renal transplant    24 hour events/subjective:    reviewed    PAST HISTORY  --------------------------------------------------------------------------------  No significant changes to PMH, PSH, FHx, SHx, unless otherwise noted    ALLERGIES & MEDICATIONS  --------------------------------------------------------------------------------  Allergies    clonidine (Other)  seasonal allergy (Other)    Intolerances      Standing Inpatient Medications  carvedilol 12.5 milliGRAM(s) Oral every 12 hours  chlorhexidine 4% Liquid 1 Application(s) Topical <User Schedule>  dextrose 5%. 1000 milliLiter(s) IV Continuous <Continuous>  dextrose 50% Injectable 12.5 Gram(s) IV Push once  famotidine    Tablet 20 milliGRAM(s) Oral daily  insulin glargine Injectable (LANTUS) 20 Unit(s) SubCutaneous at bedtime  insulin lispro (HumaLOG) corrective regimen sliding scale   SubCutaneous at bedtime  insulin lispro (HumaLOG) corrective regimen sliding scale   SubCutaneous Before meals and at bedtime  insulin lispro Injectable (HumaLOG) 8 Unit(s) SubCutaneous three times a day before meals  melatonin 3 milliGRAM(s) Oral at bedtime  methylPREDNISolone sodium succinate Injectable   IV Push   mycophenolate mofetil 1 Gram(s) Oral every 12 hours  nystatin    Suspension 899562 Unit(s) Swish and Swallow four times a day  senna 2 Tablet(s) Oral at bedtime  sodium chloride 0.9%. 1000 milliLiter(s) IV Continuous <Continuous>  tamsulosin 0.4 milliGRAM(s) Oral at bedtime  trimethoprim   80 mG/sulfamethoxazole 400 mG 1 Tablet(s) Oral daily  valGANciclovir 450 milliGRAM(s) Oral daily    PRN Inpatient Medications  acetaminophen   Tablet .. 650 milliGRAM(s) Oral every 6 hours PRN  dextrose 40% Gel 15 Gram(s) Oral once PRN  glucagon  Injectable 1 milliGRAM(s) IntraMuscular once PRN  ondansetron Injectable 4 milliGRAM(s) IV Push every 6 hours PRN  traMADol 25 milliGRAM(s) Oral every 4 hours PRN  traMADol 50 milliGRAM(s) Oral every 4 hours PRN      REVIEW OF SYSTEMS  --------------------------------------------------------------------------------  Gen: fatigue, fevers/chills, weakness  Skin: No rashes  Head/Eyes/Ears/Mouth: No headache;No sore throat  Respiratory: No dyspnea, cough,   CV: No chest pain, PND, orthopnea  GI: No abdominal pain, diarrhea, constipation, nausea, vomiting  Transplant: No pain  : No increased frequency, dysuria, hematuria, nocturia  MSK: No joint pain/swelling; no back pain; no edema  Neuro: No dizziness/lightheadedness, weakness, seizures, numbness, tingling  Psych: No significant nervousness, anxiety, stress, depression    All other systems were reviewed and are negative, except as noted.    VITALS/PHYSICAL EXAM  --------------------------------------------------------------------------------  T(C): 37.1 (02-19-20 @ 21:00), Max: 37.3 (02-19-20 @ 08:00)  HR: 86 (02-19-20 @ 21:00) (82 - 100)  BP: 169/82 (02-19-20 @ 21:00) (113/59 - 169/82)  RR: 18 (02-19-20 @ 21:00) (14 - 18)  SpO2: 94% (02-19-20 @ 21:00) (94% - 98%)  Wt(kg): --  Height (cm): 172.72 (02-18-20 @ 08:04)  Weight (kg): 74.8 (02-18-20 @ 08:04)  BMI (kg/m2): 25.1 (02-18-20 @ 08:04)  BSA (m2): 1.88 (02-18-20 @ 08:04)      02-18-20 @ 07:01  -  02-19-20 @ 07:00  --------------------------------------------------------  IN: 3540 mL / OUT: 3467 mL / NET: 73 mL    02-19-20 @ 07:01  -  02-19-20 @ 21:43  --------------------------------------------------------  IN: 2610 mL / OUT: 1715 mL / NET: 895 mL      Physical Exam:  	Gen: No acute distress  	HEENT: PERRL, supple neck, clear oropharynx  	Pulm: CTA B/L  	CV: RRR, S1S2; no rub  	Abd: +BS, soft, nontender/nondistended                      Transplant: LLQ allograft, no bleeding/discharge  	: Ha[place]on catheter draining clear urine  	LE: Warm, no edema  	Neuro: No tremors  	Psych: Normal affect and mood  	Skin: Warm, without rashes      LABS/STUDIES  --------------------------------------------------------------------------------              9.5    11.72 >-----------<  195      [02-19-20 @ 05:52]              31.6     135  |  94  |  56  ----------------------------<  205      [02-19-20 @ 05:52]  5.2   |  20  |  5.20        Ca     8.4     [02-19-20 @ 05:52]      Mg     2.7     [02-19-20 @ 05:52]      Phos  7.1     [02-19-20 @ 05:52]    TPro  6.9  /  Alb  3.5  /  TBili  0.3  /  DBili  x   /  AST  17  /  ALT  8   /  AlkPhos  74  [02-19-20 @ 05:52]    PT/INR: PT 11.8 , INR 1.03       [02-18-20 @ 21:50]  PTT: 32.8       [02-18-20 @ 21:50]      Creatinine Trend:  SCr 5.20 [02-19 @ 05:52]  SCr 6.04 [02-18 @ 21:50]  SCr 6.65 [02-18 @ 16:47]  SCr 7.07 [02-18 @ 12:10]    Iron 51, TIBC 273, %sat 19      [06-12-19 @ 10:11]  Ferritin 635      [06-12-19 @ 10:08]  PTH -- (Ca 8.0)      [07-21-19 @ 10:21]   227  PTH -- (Ca 8.5)      [06-12-19 @ 10:08]   184  Vitamin D (25OH) 41.5      [07-21-19 @ 09:54]  HbA1c 7.6      [02-19-20 @ 08:43]

## 2020-02-20 ENCOUNTER — TRANSCRIPTION ENCOUNTER (OUTPATIENT)
Age: 60
End: 2020-02-20

## 2020-02-20 LAB
ALBUMIN SERPL ELPH-MCNC: 3.2 G/DL — LOW (ref 3.3–5)
ALP SERPL-CCNC: 68 U/L — SIGNIFICANT CHANGE UP (ref 40–120)
ALT FLD-CCNC: 6 U/L — LOW (ref 10–45)
ANION GAP SERPL CALC-SCNC: 13 MMOL/L — SIGNIFICANT CHANGE UP (ref 5–17)
AST SERPL-CCNC: 16 U/L — SIGNIFICANT CHANGE UP (ref 10–40)
BASOPHILS # BLD AUTO: 0 K/UL — SIGNIFICANT CHANGE UP (ref 0–0.2)
BASOPHILS NFR BLD AUTO: 0 % — SIGNIFICANT CHANGE UP (ref 0–2)
BILIRUB SERPL-MCNC: 0.2 MG/DL — SIGNIFICANT CHANGE UP (ref 0.2–1.2)
BUN SERPL-MCNC: 56 MG/DL — HIGH (ref 7–23)
CALCIUM SERPL-MCNC: 8.6 MG/DL — SIGNIFICANT CHANGE UP (ref 8.4–10.5)
CHLORIDE SERPL-SCNC: 107 MMOL/L — SIGNIFICANT CHANGE UP (ref 96–108)
CO2 SERPL-SCNC: 18 MMOL/L — LOW (ref 22–31)
CREAT SERPL-MCNC: 2.64 MG/DL — HIGH (ref 0.5–1.3)
EOSINOPHIL # BLD AUTO: 0 K/UL — SIGNIFICANT CHANGE UP (ref 0–0.5)
EOSINOPHIL NFR BLD AUTO: 0 % — SIGNIFICANT CHANGE UP (ref 0–6)
GLUCOSE BLDC GLUCOMTR-MCNC: 141 MG/DL — HIGH (ref 70–99)
GLUCOSE BLDC GLUCOMTR-MCNC: 195 MG/DL — HIGH (ref 70–99)
GLUCOSE BLDC GLUCOMTR-MCNC: 261 MG/DL — HIGH (ref 70–99)
GLUCOSE BLDC GLUCOMTR-MCNC: 265 MG/DL — HIGH (ref 70–99)
GLUCOSE SERPL-MCNC: 286 MG/DL — HIGH (ref 70–99)
HCT VFR BLD CALC: 29.1 % — LOW (ref 39–50)
HGB BLD-MCNC: 8.5 G/DL — LOW (ref 13–17)
LYMPHOCYTES # BLD AUTO: 0.33 K/UL — LOW (ref 1–3.3)
LYMPHOCYTES # BLD AUTO: 3.5 % — LOW (ref 13–44)
MAGNESIUM SERPL-MCNC: 2.8 MG/DL — HIGH (ref 1.6–2.6)
MANUAL SMEAR VERIFICATION: SIGNIFICANT CHANGE UP
MCHC RBC-ENTMCNC: 28.8 PG — SIGNIFICANT CHANGE UP (ref 27–34)
MCHC RBC-ENTMCNC: 29.2 GM/DL — LOW (ref 32–36)
MCV RBC AUTO: 98.6 FL — SIGNIFICANT CHANGE UP (ref 80–100)
MONOCYTES # BLD AUTO: 0.25 K/UL — SIGNIFICANT CHANGE UP (ref 0–0.9)
MONOCYTES NFR BLD AUTO: 2.6 % — SIGNIFICANT CHANGE UP (ref 2–14)
NEUTROPHILS # BLD AUTO: 8.88 K/UL — HIGH (ref 1.8–7.4)
NEUTROPHILS NFR BLD AUTO: 93 % — HIGH (ref 43–77)
NEUTS BAND # BLD: 0.9 % — SIGNIFICANT CHANGE UP (ref 0–8)
PHOSPHATE SERPL-MCNC: 4.7 MG/DL — HIGH (ref 2.5–4.5)
PLAT MORPH BLD: NORMAL — SIGNIFICANT CHANGE UP
PLATELET # BLD AUTO: 185 K/UL — SIGNIFICANT CHANGE UP (ref 150–400)
POTASSIUM SERPL-MCNC: 4.7 MMOL/L — SIGNIFICANT CHANGE UP (ref 3.5–5.3)
POTASSIUM SERPL-SCNC: 4.7 MMOL/L — SIGNIFICANT CHANGE UP (ref 3.5–5.3)
PROT SERPL-MCNC: 6.2 G/DL — SIGNIFICANT CHANGE UP (ref 6–8.3)
RBC # BLD: 2.95 M/UL — LOW (ref 4.2–5.8)
RBC # FLD: 16.4 % — HIGH (ref 10.3–14.5)
RBC BLD AUTO: SIGNIFICANT CHANGE UP
SODIUM SERPL-SCNC: 138 MMOL/L — SIGNIFICANT CHANGE UP (ref 135–145)
TACROLIMUS SERPL-MCNC: <2 NG/ML — SIGNIFICANT CHANGE UP
WBC # BLD: 9.46 K/UL — SIGNIFICANT CHANGE UP (ref 3.8–10.5)
WBC # FLD AUTO: 9.46 K/UL — SIGNIFICANT CHANGE UP (ref 3.8–10.5)

## 2020-02-20 PROCEDURE — 99232 SBSQ HOSP IP/OBS MODERATE 35: CPT

## 2020-02-20 PROCEDURE — 99232 SBSQ HOSP IP/OBS MODERATE 35: CPT | Mod: GC,24

## 2020-02-20 PROCEDURE — 99254 IP/OBS CNSLTJ NEW/EST MOD 60: CPT

## 2020-02-20 RX ORDER — TACROLIMUS 5 MG/1
5 CAPSULE ORAL
Refills: 0 | Status: DISCONTINUED | OUTPATIENT
Start: 2020-02-20 | End: 2020-02-21

## 2020-02-20 RX ORDER — TACROLIMUS 4 MG/1
4 TABLET, EXTENDED RELEASE ORAL
Qty: 90 | Refills: 11 | Status: DISCONTINUED | COMMUNITY
Start: 2020-02-19 | End: 2020-02-20

## 2020-02-20 RX ORDER — LACTULOSE 10 G/15ML
20 SOLUTION ORAL ONCE
Refills: 0 | Status: DISCONTINUED | OUTPATIENT
Start: 2020-02-20 | End: 2020-02-21

## 2020-02-20 RX ORDER — INSULIN LISPRO 100/ML
12 VIAL (ML) SUBCUTANEOUS
Refills: 0 | Status: DISCONTINUED | OUTPATIENT
Start: 2020-02-20 | End: 2020-02-21

## 2020-02-20 RX ORDER — BASILIXIMAB 20 MG/5ML
20 INJECTION, POWDER, FOR SOLUTION INTRAVENOUS ONCE
Refills: 0 | Status: COMPLETED | OUTPATIENT
Start: 2020-02-21 | End: 2020-02-21

## 2020-02-20 RX ORDER — CARVEDILOL PHOSPHATE 80 MG/1
25 CAPSULE, EXTENDED RELEASE ORAL EVERY 12 HOURS
Refills: 0 | Status: DISCONTINUED | OUTPATIENT
Start: 2020-02-20 | End: 2020-02-21

## 2020-02-20 RX ORDER — TACROLIMUS 1 MG/1
1 TABLET, EXTENDED RELEASE ORAL
Qty: 90 | Refills: 11 | Status: DISCONTINUED | COMMUNITY
Start: 2020-02-19 | End: 2020-02-20

## 2020-02-20 RX ORDER — HYDRALAZINE HCL 50 MG
10 TABLET ORAL ONCE
Refills: 0 | Status: COMPLETED | OUTPATIENT
Start: 2020-02-20 | End: 2020-02-20

## 2020-02-20 RX ORDER — TACROLIMUS 5 MG/1
2 CAPSULE ORAL ONCE
Refills: 0 | Status: COMPLETED | OUTPATIENT
Start: 2020-02-20 | End: 2020-02-20

## 2020-02-20 RX ORDER — ASPIRIN/CALCIUM CARB/MAGNESIUM 324 MG
81 TABLET ORAL DAILY
Refills: 0 | Status: DISCONTINUED | OUTPATIENT
Start: 2020-02-20 | End: 2020-02-21

## 2020-02-20 RX ADMIN — TRAMADOL HYDROCHLORIDE 50 MILLIGRAM(S): 50 TABLET ORAL at 12:50

## 2020-02-20 RX ADMIN — TACROLIMUS 5 MILLIGRAM(S): 5 CAPSULE ORAL at 19:56

## 2020-02-20 RX ADMIN — TRAMADOL HYDROCHLORIDE 50 MILLIGRAM(S): 50 TABLET ORAL at 19:20

## 2020-02-20 RX ADMIN — Medication 8 UNIT(S): at 11:55

## 2020-02-20 RX ADMIN — TRAMADOL HYDROCHLORIDE 50 MILLIGRAM(S): 50 TABLET ORAL at 11:53

## 2020-02-20 RX ADMIN — MYCOPHENOLATE MOFETIL 1 GRAM(S): 250 CAPSULE ORAL at 06:16

## 2020-02-20 RX ADMIN — Medication 12 UNIT(S): at 16:58

## 2020-02-20 RX ADMIN — Medication 1 TABLET(S): at 11:43

## 2020-02-20 RX ADMIN — Medication 500000 UNIT(S): at 11:42

## 2020-02-20 RX ADMIN — TACROLIMUS 2 MILLIGRAM(S): 5 CAPSULE ORAL at 13:18

## 2020-02-20 RX ADMIN — TRAMADOL HYDROCHLORIDE 50 MILLIGRAM(S): 50 TABLET ORAL at 18:32

## 2020-02-20 RX ADMIN — Medication 81 MILLIGRAM(S): at 11:53

## 2020-02-20 RX ADMIN — Medication 60 MILLIGRAM(S): at 06:15

## 2020-02-20 RX ADMIN — FAMOTIDINE 20 MILLIGRAM(S): 10 INJECTION INTRAVENOUS at 11:43

## 2020-02-20 RX ADMIN — CARVEDILOL PHOSPHATE 12.5 MILLIGRAM(S): 80 CAPSULE, EXTENDED RELEASE ORAL at 06:16

## 2020-02-20 RX ADMIN — Medication 8 UNIT(S): at 08:31

## 2020-02-20 RX ADMIN — TACROLIMUS 3 MILLIGRAM(S): 5 CAPSULE ORAL at 08:01

## 2020-02-20 RX ADMIN — Medication 6: at 08:30

## 2020-02-20 RX ADMIN — Medication 500000 UNIT(S): at 18:15

## 2020-02-20 RX ADMIN — TAMSULOSIN HYDROCHLORIDE 0.4 MILLIGRAM(S): 0.4 CAPSULE ORAL at 21:43

## 2020-02-20 RX ADMIN — VALGANCICLOVIR 450 MILLIGRAM(S): 450 TABLET, FILM COATED ORAL at 11:43

## 2020-02-20 RX ADMIN — CARVEDILOL PHOSPHATE 25 MILLIGRAM(S): 80 CAPSULE, EXTENDED RELEASE ORAL at 18:23

## 2020-02-20 RX ADMIN — Medication 2: at 16:57

## 2020-02-20 RX ADMIN — Medication 6: at 11:54

## 2020-02-20 RX ADMIN — MYCOPHENOLATE MOFETIL 1 GRAM(S): 250 CAPSULE ORAL at 18:15

## 2020-02-20 RX ADMIN — INSULIN GLARGINE 20 UNIT(S): 100 INJECTION, SOLUTION SUBCUTANEOUS at 21:44

## 2020-02-20 RX ADMIN — Medication 10 MILLIGRAM(S): at 02:24

## 2020-02-20 RX ADMIN — Medication 500000 UNIT(S): at 21:44

## 2020-02-20 RX ADMIN — SENNA PLUS 2 TABLET(S): 8.6 TABLET ORAL at 21:43

## 2020-02-20 RX ADMIN — Medication 3 MILLIGRAM(S): at 21:43

## 2020-02-20 RX ADMIN — Medication 60 MILLIGRAM(S): at 18:14

## 2020-02-20 RX ADMIN — Medication 500000 UNIT(S): at 06:15

## 2020-02-20 NOTE — DISCHARGE NOTE PROVIDER - CARE PROVIDERS DIRECT ADDRESSES
[email or web address]

## 2020-02-20 NOTE — DISCHARGE NOTE PROVIDER - NSDCFUSCHEDAPPT_GEN_ALL_CORE_FT
JITENDRA FRENCH ; 04/09/2020 ; [facility] [address] JITENDRA Hager ; 04/09/2020 ; [facility] Med Pulm [address] JITENDRA Hager ; 04/14/2020 ; [facility] Surg [address] JITENDRA [place] ; 04/14/2020 ; [facility] Surg Vasc [address] JITENDRA FRENCH ; 04/09/2020 ; [facility] [address] JITENDRA Hager ; 04/09/2020 ; [facility] Med Pulm [address] JITENDRA Hager ; 04/14/2020 ; [facility] Surg [address] JITENDRA [address] ; 04/14/2020 ; [facility] Surg Vasc [address] JITENDRA FRENCH ; 04/09/2020 ; [facility] [address] JITENDRA Hager ; 04/09/2020 ; [facility] Med Pulm [address] JITENDRA Hager ; 04/14/2020 ; [facility] Surg [address] JITENDRA [address] ; 04/14/2020 ; [facility] Surg Vasc [address] JITENDRA FRENCH ; 02/24/2020 ; [facility] Surg [address] [facility] JITENDRA Pitts ; 04/09/2020 ; [facility] Cardio [address] [facility] JITENDRA Hager ; 04/09/2020 ; [facility] Med Pulm [address] [facility] JITENDRA Hager ; 04/14/2020 ; [facility] Surg [address] JITENDRA [place] ; 04/14/2020 ; [facility] Surg Vasc [address]

## 2020-02-20 NOTE — DIETITIAN INITIAL EVALUATION ADULT. - REASON INDICATOR FOR ASSESSMENT
Pt seen for post kidney transplant recipient nutrition evaluation per department protocol.   Information obtained from: medical record, previous RD noted, and pt.

## 2020-02-20 NOTE — DIETITIAN INITIAL EVALUATION ADULT. - OTHER INFO
Pt reports good appetite and PO intake at home. Confirms NKFA; reports having mild intolerance to beef after gastric sleeve in 2015. Reports taking Nephro-Olivia PTA; denies drinking any nutritional supplement. Reports following a DM and renal diet at home; diet recall indicates very low carbohydrate intake; able to recall foods high in potassium and phosphorus; states reading labels to consume low salt diet. Reports monitoring BG 2-6xday with ranges between 120 - 130 mg/dl but reports having episodes of BG at 30 or 60 mg/dl especially overnight; states taking Tresiba and Novolog at home; HbA1c as per chart (02/19) 7.6% - indicates good BG control. Reports obtaining daily weights and they weight him at the dialysis center 3xweek PTA.     Pt S/P LDRT on (02/18) - reports good appetite and PO intake, tolerating diet. Noted 100% PO intake as per flow sheets. Reports difficulty chewing hard foods due to not having teeth in the back - refused changes in diet texture; denies difficulty swallowing. Pt denies nausea, vomiting, diarrhea, or constipation, last BM prior to transplant (02/17), states passing gas. Urine output as per flow sheets (02/18) 3420 ml -> (02/19) 3105 ml -> (02/20) 460 pounds.     Provided extensive education on T2DM, heart failure, and post transplant nutrition therapy and food safety guidelines for transplant recipients before discharge. Discussed importance of thoroughly washing all fresh fruits/vegetables, importance of avoiding uncooked/raw/unpasteurized foods, avoiding pre-made deli/buffet/salad bar meals. Foods recommended as healthy well balanced diet and importance of adequate protein intakes for proper post-surgical healing discussed. Reviewed recommendations to avoid grapefruit, pomegranate and star fruit while taking immunosuppressant medication. Reviewed recommendations for moderate intake of sodium and carbohydrates with transplant medications. Reviewed effect of steroids on BG levels and importance of limiting concentrated sweets. Reviewed foods containing carbohydrates, foods containing proteins, and portion sizes. Stressed the importance of a balanced meal to maintain blood glucose. Encouraged vegetables consumption. Described HbA1c and stressed importance of its normal levels. Encouraged Pt to continue monitoring blood glucose at home. Discussed how to manage hypoglycemia. Recommended water consumption with avoidance of soda and juice. Discussed healthy snacks options. Recommended limited salt intake. Reviewed foods high in salt and amount of salt recommended per day. Discussed nutrition label reading. Stressed the importance of limited fried foods and saturated fat consumption. Discussed the importance of daily weights at home and weight gain parameters for contacting MD. Pt was receptive and expressed understanding; repeats "I know this ... I do that". All questions answered. Provided nutrition handouts: USDA Food Safety for Transplant Recipients booklet, T2DM nutrition therapy, and heart failure nutrition therapy.

## 2020-02-20 NOTE — DISCHARGE NOTE PROVIDER - CARE PROVIDER_API CALL
Keegan Hay (MD)  Surgery  [address] [place] [address]  Phone: [phone]  Fax: [phone]  Follow Up Time:     Pj Donnelly (MD)  Internal Medicine; Nephrology  [address] [place] [address]  Phone: [phone]  Fax: [phone]  Follow Up Time:     Zak Puente (DO)  Nephrology  [address] [facility], Transplant [address]  [place] [id number]  Phone: [phone]  Fax: [phone]  Follow Up Time:     Sveta Puente)  Internal Medicine; Nephrology  [address] [place] [address]  Phone: [phone]  Fax: [phone]  Follow Up Time:     Leroy Good; MPH)  Internal Medicine; Nephrology  [address] [place] [address]  Phone: [phone]  Fax: [phone]  Follow Up Time:

## 2020-02-20 NOTE — DISCHARGE NOTE PROVIDER - HOSPITAL COURSE
59M  with h/o CHF (last admission 8-10 years ago as per patient, EF 56%), HTN, HLD, heart murmur, T2DM (last A1c 8%), controlled asthma (last inhaler used 7/2019), hiatal hernia, left glaucoma, CKD on HD (MWF via L AVF, anuric pre-op). He undersent LDRT to left iliac fossa from daughter on 2/18/2020 w/ Simulect induction.  Post op course was uncomplicated. HTN and DM medications were adjusted as needed. He was started on flomax, rivera was removed on POD3 and he passed TOV. He is ambulating w/ walker, tolerating a regular diet, has bowel function, has good UOP, and Cr is downtrending. He is being discharged with a Cr of......        He received final dose of Simulect. He was evaluated by the multi-disciplinary team including surgeon, nephrologist, NP, pharmacist, nutrition, social work, and nursing and deemed stable for discharge to home with close outpatient follow-up.        Discharge Meds:    Tac 5/5    MMF 1 g BID    Pred taper    ppx: Bactrim/valcyte/nystatin        Donor:     ABO O    Donor CMV (-), EBV (+)        Recipient ABO O    Recipient CMV (-), EBV (+)    HLA mismatch 1,1,1, crossmatch negative .         OR:     LDRT to left iliac fossa    Anatomy: single artery, single vein, single ureter. Standard anastomoses    Induction: Solumedrol and Simulect 59M  with h/o CHF (last admission 8-10 years ago as per patient, EF 56%), HTN, HLD, heart murmur, T2DM (last A1c 8%), controlled asthma (last inhaler used 7/2019), hiatal hernia, left glaucoma, CKD on HD (MWF via L AVF, anuric pre-op). He undersent LDRT to left iliac fossa from daughter on 2/18/2020 w/ Simulect induction.  Post op course was uncomplicated. HTN and DM medications were adjusted as needed. He was started on flomax, rivera was removed on POD3 and he passed TOV. ALDA continued for further monitoring.  He is ambulating w/ walker, tolerating a regular diet, has bowel function, has good UOP, and Cr is downtrending. He is being discharged with a Cr of 1.65        He received final dose of Simulect. He was evaluated by the multi-disciplinary team including surgeon, nephrologist, ACPs, pharmacist, nutrition, social work, and nursing and deemed stable for discharge to home with close outpatient follow-up.        Discharge Meds:    Tacrolimus------------    MMF 1 g BID    Pred taper    ppx: Bactrim/valcyte/nystatin    discharged home with ALDA, education provided        Donor:     ABO O    Donor CMV (-), EBV (+)        Recipient ABO O    Recipient CMV (-), EBV (+)    HLA mismatch 1,1,1, crossmatch negative .         OR:     LDRT to left iliac fossa    Anatomy: single artery, single vein, single ureter. Standard anastomoses    Induction: Solumedrol and Simulect 59M  with h/o CHF (last admission 8-10 years ago as per patient, EF 56%), HTN, HLD, heart murmur, T2DM (last A1c 8%), controlled asthma (last inhaler used 7/2019), hiatal hernia, left glaucoma, CKD on HD (MWF via L AVF, anuric pre-op). He undersent LDRT to left iliac fossa from daughter on 2/18/2020 w/ Simulect induction.  Post op course was uncomplicated. HTN and DM medications were adjusted as needed. He was started on flomax, rivera was removed on POD3 and he passed TOV. ALDA continued for further monitoring.  He is ambulating w/ walker, tolerating a regular diet, has bowel function, has good UOP, and Cr is downtrending. He is being discharged with a Cr of 1.65        He received final dose of Simulect. He was evaluated by the multi-disciplinary team including surgeon, nephrologist, ACPs, pharmacist, nutrition, social work, and nursing and deemed stable for discharge to home with close outpatient follow-up.        Discharge Meds:    Tacrolimus 5/5    MMF 1 g BID    Pred taper    ppx: Bactrim/valcyte/nystatin    discharged home with ALDA, education provided        Donor:     ABO O    Donor CMV (-), EBV (+)        Recipient ABO O    Recipient CMV (-), EBV (+)    HLA mismatch 1,1,1, crossmatch negative .         OR:     LDRT to left iliac fossa    Anatomy: single artery, single vein, single ureter. Standard anastomoses    Induction: Solumedrol and Simulect

## 2020-02-20 NOTE — PHYSICAL THERAPY INITIAL EVALUATION ADULT - PERTINENT HX OF CURRENT PROBLEM, REHAB EVAL
59M  with h/o CHF (last admission 8-10 years ago as per patient, EF 56%), HTN, HLD, heart murmur, T2DM (last A1c 8%), controlled asthma (last inhaler used 7/2019), hiatal hernia, left glaucoma, CKD on HD (MWF via L AVF, anuric pre-op). Underwent LDRT to left iliac fossa from daughter on 2/18/2020. Pt is a 59M  with h/o CHF (last admission 8-10 years ago as per patient, EF 56%), HTN, HLD, heart murmur, T2DM (last A1c 8%), controlled asthma (last inhaler used 7/2019), hiatal hernia, left glaucoma, CKD on HD (MWF via L AVF, anuric pre-op). Underwent LDRT to left iliac fossa from daughter on 2/18/2020.

## 2020-02-20 NOTE — DISCHARGE NOTE PROVIDER - NSDCCPCAREPLAN_GEN_ALL_CORE_FT
PRINCIPAL DISCHARGE DIAGNOSIS  Diagnosis: Renal transplant recipient  Assessment and Plan of Treatment: No heavy lifting anything more than 10-15lbs or straining. Otherwise, you may return to your usual level of physical activity. If you are taking narcotic pain medication (such as Percocet), do NOT drive a car, operate machinery or make important decisions.  Call transplant clinic If you developed any of the following, fever, pain, redness, swelling at incision site, cough, nausea, vomiting, painful urination, difficulty urination, or not making any urine.  NOTIFY YOUR SURGEON IF: You have any bleeding that does not stop, any pus draining from your wound, any fever (over 100.4 F) or chills, persistent nausea/vomiting with inability to tolerate food or liquids, persistent diarrhea, or if your pain is not controlled on your discharge pain medications.

## 2020-02-20 NOTE — PROGRESS NOTE ADULT - ASSESSMENT
59M  with h/o CHF (last admission 8-10 years ago as per patient, EF 56%), HTN, HLD, heart murmur, T2DM (last A1c 8%), controlled asthma (last inhaler used 7/2019), hiatal hernia, left glaucoma, CKD on HD (MWF via L AVF, anuric pre-op)  now s/p LDRT from daughter on 2/18. Simulect induction. JJ ureteral stent    POD#2 s/p LDRT  - Strict I&Os, creatinine trending down   - Cont with rivera and ALDA   - IVF: DC  - Diet: advance as tolerated  - Pain: PRN tylenol/toradol  - Immuno: Tacrolimus 3/3, MMF 1g bid; Pred taper, Simulect induction   - Proph: Bactrim/valcyte/nystatin  - Start Flomax 0.4mg daily  - bowel regimen  - SCDs, encourage incentive spirometry        HTN  -Elevated overnight will increase coreg to 25mg BID for now, will add agents as required  -hold ASA for now    DM  -continue PAYTON  - BG in the high 200's, Started on Lantus 20u, and premeals 8u, yesterday will continue to monitor BG

## 2020-02-20 NOTE — DIETITIAN INITIAL EVALUATION ADULT. - PERTINENT LABORATORY DATA
(02/19) HbA1c 7.6%; Finger sticks: (02/20) 265 (02/19) 187 - 323; (02/20) BUN 56, Cr 2.64, , Mg 2.8, Phos 4.7

## 2020-02-20 NOTE — DIETITIAN INITIAL EVALUATION ADULT. - NS FNS WEIGHT CHANGE REASON
Pt denies weight changes PTA (unwilling to provide weight history after sleeve gastrectomy 2015), reports weighing 230 pounds in July 2019 due to being fluid overload but states UBW has been between 160 - 165 pounds for at least the last 6 months PTA. Weight as per previous RD notes (06/01/2019) 205 pounds with edema -> (07/22/2019) 208.9 pounds with edema. Weight as per flow sheets (02/18) 164 pounds -> (02/20) 176.5 pounds -?accuracy of weight fluctuations likely due to fluid shifts as pt previously on HD, with hx of heart failure, and now S/P LDRT, will continue to monitor.

## 2020-02-20 NOTE — PROGRESS NOTE ADULT - SUBJECTIVE AND OBJECTIVE BOX
Transplant Surgery - Multidisciplinary Progress Note   --------------------------------------------------------------  LDRT 2/18/2020 POD#2    Present:   Patient seen with multidisciplinary team including Transplant Surgeon: Dr. Sanchez, Dr. Shah. Dr. Dominguez, Transplant Nephrologist: Dr. Donnelly.  Pharmacist: Wayne Kramer. NP/PAs: Dominique Jaquez, Shannen Pierre and PGY3 Surgical Resident Kenneth Chanel, and Andre Lozano during am rounds and examined with Dr. Sanchez.    Disciplines not in attendance will be notified of the plan.       HPI: 59M  with h/o CHF (last admission 8-10 years ago as per patient, EF 56%), HTN, HLD, heart murmur, T2DM (last A1c 8%), controlled asthma (last inhaler used 7/2019), hiatal hernia, left glaucoma, CKD on HD (MWF via L AVF, anuric pre-op). Underwent LDRT to left iliac fossa from daughter on 2/18/2020. Simulect induction.     Donor:   ABO O  Donor CMV (-), EBV (+)    Recipient ABO O  Recipient CMV (-), EBV (+)  HLA mismatch 1,1,1, crossmatch negative .     OR:   LDRT to left iliac fossa  Anatomy: single artery, single vein, single ureter. Standard anastomoses  Induction: Solumedrol and Simulect    Interval Events:   - POD 2 s/p LDRT - urine output ~100-125cc/hr, creat 2.64 from 5.2 (from 6.04), post op doppler with patent vessels   - Pain well controlled on PO regimen PRN tramadol and tylenol  - Diet as antoinette, out of bed to chair this am       Potential Discharge date: pending clinical stability    Education:  Medications    Plan of care:  See Below       MEDICATIONS  (STANDING):  carvedilol 25 milliGRAM(s) Oral every 12 hours  chlorhexidine 4% Liquid 1 Application(s) Topical <User Schedule>  dextrose 5%. 1000 milliLiter(s) (50 mL/Hr) IV Continuous <Continuous>  dextrose 50% Injectable 12.5 Gram(s) IV Push once  famotidine    Tablet 20 milliGRAM(s) Oral daily  insulin glargine Injectable (LANTUS) 20 Unit(s) SubCutaneous at bedtime  insulin lispro (HumaLOG) corrective regimen sliding scale   SubCutaneous at bedtime  insulin lispro (HumaLOG) corrective regimen sliding scale   SubCutaneous Before meals and at bedtime  insulin lispro Injectable (HumaLOG) 8 Unit(s) SubCutaneous three times a day before meals  melatonin 3 milliGRAM(s) Oral at bedtime  methylPREDNISolone sodium succinate Injectable 60 milliGRAM(s) IV Push two times a day  methylPREDNISolone sodium succinate Injectable   IV Push   mycophenolate mofetil 1 Gram(s) Oral every 12 hours  nystatin    Suspension [id number] Unit(s) Swish and Swallow four times a day  senna 2 Tablet(s) Oral at bedtime  tacrolimus 3 milliGRAM(s) Oral <User Schedule>  tamsulosin 0.4 milliGRAM(s) Oral at bedtime  trimethoprim   80 mG/sulfamethoxazole 400 mG 1 Tablet(s) Oral daily  valGANciclovir 450 milliGRAM(s) Oral daily    MEDICATIONS  (PRN):  acetaminophen   Tablet .. 650 milliGRAM(s) Oral every 6 hours PRN Mild Pain (1 - 3)  dextrose 40% Gel 15 Gram(s) Oral once PRN Blood Glucose LESS THAN 70 milliGRAM(s)/deciliter  glucagon  Injectable 1 milliGRAM(s) IntraMuscular once PRN Glucose LESS THAN 70 milligrams/deciliter  ondansetron Injectable 4 milliGRAM(s) IV Push every 6 hours PRN Nausea and/or Vomiting  traMADol 25 milliGRAM(s) Oral every 4 hours PRN Moderate Pain (4 - 6)  traMADol 50 milliGRAM(s) Oral every 4 hours PRN Severe Pain (7 - 10)      PAST MEDICAL & SURGICAL HISTORY:  Asthma: never been intubated for asthma   last inhaler used 7/2019  Heart murmur  Dyslipidemia  Bilateral dry eyes  History of glaucoma: left eye  Ventral hernia: repair  Diabetes: T2DM  last A1C (8%)  Hypertension  DKA (diabetic ketoacidoses)  Diastolic dysfunction: mild. stage 1. EF 65%  CKD (chronic kidney disease): hemodialysis on Mon, Wed and Fri  Insulin pump status: denies  Pneumonia: 4/2013  Osteomyelitis of ankle or foot: x2  Diabetic neuropathy  Hypertension  Status post cataract extraction  S/P laparoscopic sleeve gastrectomy: 2015  H/O elbow surgery: left  H/O cardiac catheterization: no stents  Retinal hemorrhage, left eye: s/p laser surgery  hx of right eye retinal hemorrahge, tx with laser surgery  S/P amputation: right hallux 5/13  S/P hernia repair  S/P carpal tunnel release: b/l      Vital Signs Last 24 Hrs  T(C): 36.8 (20 Feb 2020 09:00), Max: 37.2 (19 Feb 2020 12:00)  T(F): 98.3 (20 Feb 2020 09:00), Max: 98.9 (19 Feb 2020 12:00)  HR: 75 (20 Feb 2020 09:00) (65 - 95)  BP: 149/75 (20 Feb 2020 09:00) (149/75 - 182/84)  BP(mean): 103 (19 Feb 2020 16:00) (100 - 113)  RR: 18 (20 Feb 2020 09:00) (18 - 18)  SpO2: 95% (20 Feb 2020 09:00) (94% - 97%)    I&O's Summary    19 Feb 2020 07:01  -  20 Feb 2020 07:00  --------------------------------------------------------  IN: 3610 mL / OUT: 3155 mL / NET: 455 mL    20 Feb 2020 07:01  -  20 Feb 2020 11:29  --------------------------------------------------------  IN: 240 mL / OUT: 625 mL / NET: -385 mL                              8.5    9.46  )-----------( 185      ( 20 Feb 2020 05:58 )             29.1     02-20    138  |  107  |  56<H>  ----------------------------<  286<H>  4.7   |  18<L>  |  2.64<H>    Ca    8.6      20 Feb 2020 05:58  Phos  4.7     02-20  Mg     2.8     02-20    TPro  6.2  /  Alb  3.2<L>  /  TBili  0.2  /  DBili  x   /  AST  16  /  ALT  6<L>  /  AlkPhos  68  02-20    Tacrolimus (), Serum: <2.0 ng/mL (02-20 @ 09:50)      Review of systems  Gen: No weight changes, fatigue, fevers/chills, weakness  Skin: No rashes  Head/Eyes/Ears/Mouth: No headache; Normal hearing; Normal vision w/o blurriness; No sinus pain/discomfort, sore throat  Respiratory: No dyspnea, cough, wheezing, hemoptysis  CV: No chest pain, PND, orthopnea  GI: C/O mild abdominal pain at surgical site. no diarrhea, constipation, nausea, vomiting, melena, hematochezia  : No increased frequency, dysuria, hematuria, nocturia  MSK: No joint pain/swelling; no back pain; no edema  Neuro: No dizziness/lightheadedness, weakness, seizures, numbness, tingling  Heme: No easy bruising or bleeding  Endo: No heat/cold intolerance  Psych: No significant nervousness, anxiety, stress, depression  All other systems were reviewed and are negative, except as noted.      PHYSICAL EXAM:  Constitutional: Well developed / well nourished  Eyes: Anicteric, PERRLA  ENMT: nc/at  Neck: supple, no JVD  Respiratory: CTA B/L  Cardiovascular: RRR  Gastrointestinal: Soft abdomen, ND, appropriate incisional TTP. LEFT dermabond incision c/d/i, no signs of infection. JPx1 ss  Genitourinary: Urinary catheter in place with good UO  Extremities: SCD's in place and working bilaterally, no edema  Vascular: + DP pulses (dopplerable);  L FISTULA palpable  Neurological: A&O x3  Skin: no rashes, lesions, ulcerations  Musculoskeletal: Moving all extremities  Psychiatric: Responsive

## 2020-02-20 NOTE — DIETITIAN INITIAL EVALUATION ADULT. - ETIOLOGY
Increased physiological demand for nutrients for recovery Limited prior education on post-transplant nutrition therapy and food safety guidelines

## 2020-02-20 NOTE — DISCHARGE NOTE PROVIDER - NSDCFUADDINST_GEN_ALL_CORE_FT
Showering: You are allowed to shower. Stand under shower and allow warm water to run over abdomen. Apply gentle soap and allow warm water to run over abdomen to wash away the soap. Do not scrub/rub the wound area and do not soak in tub as  this can cause the wound to become infected. Use a clean towel to gently pat dry.

## 2020-02-20 NOTE — DIETITIAN INITIAL EVALUATION ADULT. - PHYSICAL APPEARANCE
other (specify)/well nourished/No visual signs of muscle/fat loss noted Ht: 68 inches Wt: 164 pounds BMI: 24.9 kg/m2 IBW: 154 (+/-10%) 106.5 %IBW  Noted +1 al. ankle and foot edema as per flow sheets.  Skin: no noted pressure injuries as per documentation.

## 2020-02-20 NOTE — DIETITIAN INITIAL EVALUATION ADULT. - ADD RECOMMEND
1. Will continue to monitor PO intake, weight, labs, skin, GI status, diet, urine output, needs for further education. 2. Encourage PO intake and provide food preferences within therapeutic restrictions. 3. Provided extensive education on T2DM, heart failure, and post transplant nutrition therapy and food safety guidelines for transplant recipients with handouts before discharge - pt made aware RD remains available.

## 2020-02-20 NOTE — DISCHARGE NOTE PROVIDER - NSDCFUADDAPPT_GEN_ALL_CORE_FT
FOLLOW-UP:  1. Please call to make a lab appointment and a follow-up appointment with Dr. Hay on ****  2. Please follow up with your primary care physician in one week regarding your hospitalization. FOLLOW-UP:  1. Please call to make a lab appointment and a follow-up appointment with Dr. Hay on Monday 2/24/2020  2. Please follow up with your primary care physician in one week regarding your hospitalization.

## 2020-02-20 NOTE — DIETITIAN INITIAL EVALUATION ADULT. - DIET TYPE
Recommend change to Consistent Carbohydrate with snack + no concentrated phosphorus diet upon discharge (spoke to Transplant Team). Recommend follow up visit with Transplant MD and outpatient RD for dietary modifications as warranted.

## 2020-02-20 NOTE — PHARMACY EDUCATION NOTE - MEDICATION SAFETY
Herbals/Miss dose instruction/Purpose/Side effects/Interactions/Allergies/Medication precautions/Signs and symptoms to report/Storage and handling/Adherence

## 2020-02-20 NOTE — CONSULT NOTE ADULT - SUBJECTIVE AND OBJECTIVE BOX
Cardiology Initial Consult    History of Present Illness:   59 male with h/o HFpEF (EF 70%), HTN, dyslipidemia, s/p gastric sleeve, T2DM (last A1c 8%), CKD on hemodialysis (M,W,F), left arm fistula, presents to preadmission testing for scheduled left side living donor kidney transplant recipient on 2020.     Pt is s/p LDRT to left iliac fossa from daughter on . Per chart review, pt had symptomatic hypotension while on HD - at one point was on labetalol 400mg bid, minoxidil, amlodipine and hydralazine - all were d/c'ed except labetalol which was decreased to 50mg nightly on nondialysis days.  Since renal transplant, pt has been hypertensive to 180s (mostly 140-160s). Cardiology consulted for HTN management.       PAST MEDICAL & SURGICAL HISTORY:  Asthma: never been intubated for asthma   last inhaler used 2019  Heart murmur  Dyslipidemia  Bilateral dry eyes  History of glaucoma: left eye  Ventral hernia: repair  Diabetes: T2DM  last A1C (8%)  Hypertension  DKA (diabetic ketoacidoses)  Diastolic dysfunction: mild. stage 1. EF 65%  CKD (chronic kidney disease): hemodialysis on Mon, Wed and Fri  Insulin pump status: denies  Pneumonia: 2013  Osteomyelitis of ankle or foot: x2  Diabetic neuropathy  Hypertension  Status post cataract extraction  S/P laparoscopic sleeve gastrectomy:   H/O elbow surgery: left  H/O cardiac catheterization: no stents  Retinal hemorrhage, left eye: s/p laser surgery  hx of right eye retinal hemorrahge, tx with laser surgery  S/P amputation: right hallux   S/P hernia repair  S/P carpal tunnel release: b/l      FAMILY HISTORY:  FH: aortic stenosis: Son  Family history of bone cancer: Grandfather  Family history of heart attack (Grandparent): father  @54 y.o.  grandfather  @ 52 y.o.      SOCIAL HISTORY: Former smoker, 60 pack years. No current alcohol use.     Home Medications:  Alpha Lipoic Acid 200 mg oral capsule: 1 cap(s) orally once a day (2020 06:42)  aspirin 81 mg oral delayed release tablet: 1 tab(s) orally once a day (2020 06:42)  atorvastatin 20 mg oral tablet: 1 tab(s) orally once a day (2020 06:42)  gabapentin 300 mg oral tablet: 1 tab(s) orally once a day (2020 06:42)  labetalol: 50 mg orally once a day at night (2020 06:42)  Natural Tears preserved ophthalmic solution: 1 drop(s) to each affected eye once a day  (2020 06:42)  Nephro-Olivia oral tablet: 1 tab(s) orally once a day (:42)  NovoLOG FlexPen 100 units/mL injectable solution: 2 unit(s) injectable 3 times a day (with meals) (:42)  sevelamer hydrochloride 800 mg oral tablet: 2 tab(s) orally 3 times a day (:42)  Travatan Z 0.004% ophthalmic solution: 1 drop(s) to left eye (in the evening) (:42)  Tresiba 100 units/mL subcutaneous solution: 24 unit(s) subcutaneous once a day, morning  (2020 06:42)      CURRENT MEDICATIONS:   MEDICATIONS  (STANDING):  carvedilol 25 milliGRAM(s) Oral every 12 hours  chlorhexidine 4% Liquid 1 Application(s) Topical <User Schedule>  dextrose 5%. 1000 milliLiter(s) (50 mL/Hr) IV Continuous <Continuous>  dextrose 50% Injectable 12.5 Gram(s) IV Push once  famotidine    Tablet 20 milliGRAM(s) Oral daily  insulin glargine Injectable (LANTUS) 20 Unit(s) SubCutaneous at bedtime  insulin lispro (HumaLOG) corrective regimen sliding scale   SubCutaneous at bedtime  insulin lispro (HumaLOG) corrective regimen sliding scale   SubCutaneous Before meals and at bedtime  insulin lispro Injectable (HumaLOG) 8 Unit(s) SubCutaneous three times a day before meals  melatonin 3 milliGRAM(s) Oral at bedtime  methylPREDNISolone sodium succinate Injectable 60 milliGRAM(s) IV Push two times a day  methylPREDNISolone sodium succinate Injectable   IV Push   mycophenolate mofetil 1 Gram(s) Oral every 12 hours  nystatin    Suspension 422026 Unit(s) Swish and Swallow four times a day  senna 2 Tablet(s) Oral at bedtime  tacrolimus 3 milliGRAM(s) Oral <User Schedule>  tamsulosin 0.4 milliGRAM(s) Oral at bedtime  trimethoprim   80 mG/sulfamethoxazole 400 mG 1 Tablet(s) Oral daily  valGANciclovir 450 milliGRAM(s) Oral daily    MEDICATIONS  (PRN):  acetaminophen   Tablet .. 650 milliGRAM(s) Oral every 6 hours PRN Mild Pain (1 - 3)  dextrose 40% Gel 15 Gram(s) Oral once PRN Blood Glucose LESS THAN 70 milliGRAM(s)/deciliter  glucagon  Injectable 1 milliGRAM(s) IntraMuscular once PRN Glucose LESS THAN 70 milligrams/deciliter  ondansetron Injectable 4 milliGRAM(s) IV Push every 6 hours PRN Nausea and/or Vomiting  traMADol 25 milliGRAM(s) Oral every 4 hours PRN Moderate Pain (4 - 6)  traMADol 50 milliGRAM(s) Oral every 4 hours PRN Severe Pain (7 - 10)      Allergies    clonidine (Other)  seasonal allergy (Other)    Intolerances        REVIEW OF SYSTEMS:   CONSTITUTIONAL: No fever, chills, fatigue, recent weight change  HEENT: No vision changes, eye pain, vertigo, sore throat  RESPIRATORY: No cough, wheezing, shortness of breath  CARDIOVASCULAR: No chest pain, palpitations, PAEZ, PND, orthopnea  GASTROINTESTINAL: No abdominal pain, nausea, vomiting, diarrhea, constipation, melena  GENITOURINARY: No hematuria, dysuria, nocturia, incontinence  MUSCULOSKELETAL: No swelling, joint pain, muscle weakness  NEUROLOGICAL: No focal weakness, paresthesias, headache, syncope  INTEGUMENTARY: No rashes, ecchymoses    Vital Signs Last 24 Hrs  T(C): 36.8 (2020 09:00), Max: 37.2 (2020 10:00)  T(F): 98.3 (2020 09:00), Max: 99 (2020 10:00)  HR: 75 (2020 09:00) (65 - 95)  BP: 149/75 (2020 09:00) (149/75 - 182/84)  BP(mean): 103 (2020 16:00) (100 - 113)  RR: 18 (2020 09:00) (18 - 18)  SpO2: 95% (2020 09:00) (94% - 97%)    I&O's Summary      PHYSICAL EXAM:  General: Well-appearing, NAD  HEENT: PERRL, EOMI, normal oral mucosa  Neck: Supple, no JVD, normal thyroid  Lungs: CTA b/l, no wheezing, rales or rhonchi  Heart: RRR, normal S1, S2, no murmurs  Abdomen: Soft, ND, NT, normoactive bowel sounds  Skin: Warm, well-perfused, no rashes or lesions  Neurologic: A&Ox3, no focal deficits  Extremities: Peripheral pulses 2+ b/l, no edema, cyanosis or clubbing    LABS:   CBC Full  -  ( 2020 05:58 )  WBC Count : 9.46 K/uL  RBC Count : 2.95 M/uL  Hemoglobin : 8.5 g/dL  Hematocrit : 29.1 %  Platelet Count - Automated : 185 K/uL  Mean Cell Volume : 98.6 fl  Mean Cell Hemoglobin : 28.8 pg  Mean Cell Hemoglobin Concentration : 29.2 gm/dL  Auto Neutrophil # : 8.88 K/uL  Auto Lymphocyte # : 0.33 K/uL  Auto Monocyte # : 0.25 K/uL  Auto Eosinophil # : 0.00 K/uL  Auto Basophil # : 0.00 K/uL  Auto Neutrophil % : 93.0 %  Auto Lymphocyte % : 3.5 %  Auto Monocyte % : 2.6 %  Auto Eosinophil % : 0.0 %  Auto Basophil % : 0.0 %          138  |  107  |  56<H>  ----------------------------<  286<H>  4.7   |  18<L>  |  2.64<H>    Ca    8.6      2020 05:58  Phos  4.7     -  Mg     2.8         TPro  6.2  /  Alb  3.2<L>  /  TBili  0.2  /  DBili  x   /  AST  16  /  ALT  6<L>  /  AlkPhos  68  -      HbA1c: Hemoglobin A1C, Whole Blood: 7.6 % ( @ 08:43)    TELEMETRY:     ECG:     RADIOLOGY:    PREVIOUS DIAGNOSTIC TESTING:    Echocardiogram:  < from: Transthoracic Echocardiogram (19 @ 09:19) >  Conclusions:  EF 70%  Normal left ventricular systolic function. No segmental  wall motion abnormalities.  Moderate pulmonary hypertension    < end of copied text >      Catheterization:  < from: Cardiac Cath Lab (10.25.13 @ 09:13) >  SUMMARY:  CORONARY VESSELS: LM: Normal. LAD: Angiography showed minor luminal  irregularities with no flow limiting lesions. Circumflex: Angiography  showed minor luminal irregularities with no flow limiting lesions. RCA:  Normal.    < end of copied text >      Stress Test:   < from: Nuclear Stress Test-Pharmacologic (19 @ 10:19) >  IMPRESSIONS:Normal Study  * Chest Pain: No chest pain with administration of  Regadenoson.  * Symptom: No Symptom.  * HR Response: Appropriate.  * BP Response: Appropriate.  * Heart Rhythm: Sinus Rhythm.  * Baseline ECG: Nonspecific ST-T wave abnormality in III,  aVF, V4, V5, V6, downsloping.  * ECG Abnormalities: ECG unchanged from baseline.  * Arrhythmia: None.  * Review of raw data shows: The study is of good technical  quality.  * The left ventricle was mildly enlarged. There is a  small, mild defect in mid to distal inferior wall that is  fixed, suggestive of diaphragmatic attenuation artifact.  The observed defect is no longer significant with prone  imaging.  * Gated wall motion analysis is performed, and shows  normal wall motion with post stress LVEF of 56% and post  stress LVEDV of  116 ml.  *** Compared with Nuclear/Stress test of 2018, no  significant changes noted.    < end of copied text > Cardiology Initial Consult    History of Present Illness:   59 male with h/o HFpEF (EF 70%), HTN, dyslipidemia, s/p gastric sleeve, T2DM (last A1c 8%), CKD on hemodialysis (M,W,F), left arm fistula, presents to preadmission testing for scheduled left side living donor kidney transplant recipient on 2020.     Pt is s/p LDRT to left iliac fossa from daughter on . Per chart review, pt had symptomatic hypotension while on HD - at one point was on labetalol 400mg bid, minoxidil 10mg qd, amlodipine 10mg qd and hydralazine 150mg bid - all were d/c'ed except labetalol which was decreased to 50mg nightly on nondialysis days.  Since renal transplant, pt has been hypertensive to 180s (mostly 140-160s). Cardiology consulted for HTN management.       PAST MEDICAL & SURGICAL HISTORY:  Asthma: never been intubated for asthma   last inhaler used 2019  Heart murmur  Dyslipidemia  Bilateral dry eyes  History of glaucoma: left eye  Ventral hernia: repair  Diabetes: T2DM  last A1C (8%)  Hypertension  DKA (diabetic ketoacidoses)  Diastolic dysfunction: mild. stage 1. EF 65%  CKD (chronic kidney disease): hemodialysis on Mon, Wed and Fri  Insulin pump status: denies  Pneumonia: 2013  Osteomyelitis of ankle or foot: x2  Diabetic neuropathy  Hypertension  Status post cataract extraction  S/P laparoscopic sleeve gastrectomy:   H/O elbow surgery: left  H/O cardiac catheterization: no stents  Retinal hemorrhage, left eye: s/p laser surgery  hx of right eye retinal hemorrahge, tx with laser surgery  S/P amputation: right hallux   S/P hernia repair  S/P carpal tunnel release: b/l      FAMILY HISTORY:  FH: aortic stenosis: Son  Family history of bone cancer: Grandfather  Family history of heart attack (Grandparent): father  @54 y.o.  grandfather  @ 52 y.o.      SOCIAL HISTORY: Former smoker, 60 pack years. No current alcohol use.     Home Medications:  Alpha Lipoic Acid 200 mg oral capsule: 1 cap(s) orally once a day (2020 06:42)  aspirin 81 mg oral delayed release tablet: 1 tab(s) orally once a day (2020 06:42)  atorvastatin 20 mg oral tablet: 1 tab(s) orally once a day (2020 06:42)  gabapentin 300 mg oral tablet: 1 tab(s) orally once a day (2020 06:42)  labetalol: 50 mg orally once a day at night (2020 06:42)  Natural Tears preserved ophthalmic solution: 1 drop(s) to each affected eye once a day  (2020 06:42)  Nephro-Olivia oral tablet: 1 tab(s) orally once a day (:42)  NovoLOG FlexPen 100 units/mL injectable solution: 2 unit(s) injectable 3 times a day (with meals) (2020 06:42)  sevelamer hydrochloride 800 mg oral tablet: 2 tab(s) orally 3 times a day (:42)  Travatan Z 0.004% ophthalmic solution: 1 drop(s) to left eye (in the evening) (2020 06:42)  Tresiba 100 units/mL subcutaneous solution: 24 unit(s) subcutaneous once a day, morning  (2020 06:42)      CURRENT MEDICATIONS:   MEDICATIONS  (STANDING):  carvedilol 25 milliGRAM(s) Oral every 12 hours  chlorhexidine 4% Liquid 1 Application(s) Topical <User Schedule>  dextrose 5%. 1000 milliLiter(s) (50 mL/Hr) IV Continuous <Continuous>  dextrose 50% Injectable 12.5 Gram(s) IV Push once  famotidine    Tablet 20 milliGRAM(s) Oral daily  insulin glargine Injectable (LANTUS) 20 Unit(s) SubCutaneous at bedtime  insulin lispro (HumaLOG) corrective regimen sliding scale   SubCutaneous at bedtime  insulin lispro (HumaLOG) corrective regimen sliding scale   SubCutaneous Before meals and at bedtime  insulin lispro Injectable (HumaLOG) 8 Unit(s) SubCutaneous three times a day before meals  melatonin 3 milliGRAM(s) Oral at bedtime  methylPREDNISolone sodium succinate Injectable 60 milliGRAM(s) IV Push two times a day  methylPREDNISolone sodium succinate Injectable   IV Push   mycophenolate mofetil 1 Gram(s) Oral every 12 hours  nystatin    Suspension [id number] Unit(s) Swish and Swallow four times a day  senna 2 Tablet(s) Oral at bedtime  tacrolimus 3 milliGRAM(s) Oral <User Schedule>  tamsulosin 0.4 milliGRAM(s) Oral at bedtime  trimethoprim   80 mG/sulfamethoxazole 400 mG 1 Tablet(s) Oral daily  valGANciclovir 450 milliGRAM(s) Oral daily    MEDICATIONS  (PRN):  acetaminophen   Tablet .. 650 milliGRAM(s) Oral every 6 hours PRN Mild Pain (1 - 3)  dextrose 40% Gel 15 Gram(s) Oral once PRN Blood Glucose LESS THAN 70 milliGRAM(s)/deciliter  glucagon  Injectable 1 milliGRAM(s) IntraMuscular once PRN Glucose LESS THAN 70 milligrams/deciliter  ondansetron Injectable 4 milliGRAM(s) IV Push every 6 hours PRN Nausea and/or Vomiting  traMADol 25 milliGRAM(s) Oral every 4 hours PRN Moderate Pain (4 - 6)  traMADol 50 milliGRAM(s) Oral every 4 hours PRN Severe Pain (7 - 10)      Allergies    clonidine (Other)  seasonal allergy (Other)    Intolerances        REVIEW OF SYSTEMS:   CONSTITUTIONAL: No fever, chills, fatigue, recent weight change  HEENT: No vision changes, eye pain, vertigo, sore throat  RESPIRATORY: No cough, wheezing, shortness of breath  CARDIOVASCULAR: No chest pain, palpitations, PAEZ, PND, orthopnea  GASTROINTESTINAL: No abdominal pain, nausea, vomiting, diarrhea, constipation, melena  GENITOURINARY: No hematuria, dysuria, nocturia, incontinence  MUSCULOSKELETAL: No swelling, joint pain, muscle weakness  NEUROLOGICAL: No focal weakness, paresthesias, headache, syncope  INTEGUMENTARY: No rashes, ecchymoses    Vital Signs Last 24 Hrs  T(C): 36.8 (2020 09:00), Max: 37.2 (2020 10:00)  T(F): 98.3 (2020 09:00), Max: 99 (2020 10:00)  HR: 75 (2020 09:00) (65 - 95)  BP: 149/75 (2020 09:00) (149/75 - 182/84)  BP(mean): 103 (2020 16:00) (100 - 113)  RR: 18 (2020 09:00) (18 - 18)  SpO2: 95% (2020 09:00) (94% - 97%)    I&O's Summary      PHYSICAL EXAM:  General: Well-appearing, NAD  HEENT: PERRL, EOMI, normal oral mucosa  Neck: Supple, no JVD, normal thyroid  Lungs: CTA b/l, no wheezing, rales or rhonchi  Heart: RRR, normal S1, S2, no murmurs  Abdomen: Soft, ND, NT, normoactive bowel sounds  Skin: Warm, well-perfused, no rashes or lesions  Neurologic: A&Ox3, no focal deficits  Extremities: Peripheral pulses 2+ b/l, no edema, cyanosis or clubbing    LABS:   CBC Full  -  ( 2020 05:58 )  WBC Count : 9.46 K/uL  RBC Count : 2.95 M/uL  Hemoglobin : 8.5 g/dL  Hematocrit : 29.1 %  Platelet Count - Automated : 185 K/uL  Mean Cell Volume : 98.6 fl  Mean Cell Hemoglobin : 28.8 pg  Mean Cell Hemoglobin Concentration : 29.2 gm/dL  Auto Neutrophil # : 8.88 K/uL  Auto Lymphocyte # : 0.33 K/uL  Auto Monocyte # : 0.25 K/uL  Auto Eosinophil # : 0.00 K/uL  Auto Basophil # : 0.00 K/uL  Auto Neutrophil % : 93.0 %  Auto Lymphocyte % : 3.5 %  Auto Monocyte % : 2.6 %  Auto Eosinophil % : 0.0 %  Auto Basophil % : 0.0 %          138  |  107  |  56<H>  ----------------------------<  286<H>  4.7   |  18<L>  |  2.64<H>    Ca    8.6      2020 05:58  Phos  4.7       Mg     2.8         TPro  6.2  /  Alb  3.2<L>  /  TBili  0.2  /  DBili  x   /  AST  16  /  ALT  6<L>  /  AlkPhos  68  -20      HbA1c: Hemoglobin A1C, Whole Blood: 7.6 % ( @ 08:43)    TELEMETRY:     ECG:     RADIOLOGY:    PREVIOUS DIAGNOSTIC TESTING:    Echocardiogram:  < from: Transthoracic Echocardiogram (19 @ 09:19) >  Conclusions:  EF 70%  Normal left ventricular systolic function. No segmental  wall motion abnormalities.  Moderate pulmonary hypertension    < end of copied text >      Catheterization:  < from: Cardiac Cath Lab (10.25.13 @ 09:13) >  SUMMARY:  CORONARY VESSELS: LM: Normal. LAD: Angiography showed minor luminal  irregularities with no flow limiting lesions. Circumflex: Angiography  showed minor luminal irregularities with no flow limiting lesions. RCA:  Normal.    < end of copied text >      Stress Test:   < from: Nuclear Stress Test-Pharmacologic (19 @ 10:19) >  IMPRESSIONS:Normal Study  * Chest Pain: No chest pain with administration of  Regadenoson.  * Symptom: No Symptom.  * HR Response: Appropriate.  * BP Response: Appropriate.  * Heart Rhythm: Sinus Rhythm.  * Baseline ECG: Nonspecific ST-T wave abnormality in III,  aVF, V4, V5, V6, downsloping.  * ECG Abnormalities: ECG unchanged from baseline.  * Arrhythmia: None.  * Review of raw data shows: The study is of good technical  quality.  * The left ventricle was mildly enlarged. There is a  small, mild defect in mid to distal inferior wall that is  fixed, suggestive of diaphragmatic attenuation artifact.  The observed defect is no longer significant with prone  imaging.  * Gated wall motion analysis is performed, and shows  normal wall motion with post stress LVEF of 56% and post  stress LVEDV of  116 ml.  *** Compared with Nuclear/Stress test of 2018, no  significant changes noted.    < end of copied text > Cardiology Initial Consult    History of Present Illness:   59 male with h/o HFpEF (EF 70%), HTN, dyslipidemia, s/p gastric sleeve, T2DM (last A1c 8%), CKD on hemodialysis (M,W,F), left arm fistula, presents to preadmission testing for scheduled left side living donor kidney transplant recipient on 2020.     Pt is s/p LDRT to left iliac fossa from daughter on . Per chart review, pt had symptomatic hypotension while on HD - at one point was on labetalol 400mg bid, minoxidil 10mg qd, amlodipine 10mg qd and hydralazine 150mg bid - all were d/c'ed except labetalol which was decreased to 50mg nightly on nondialysis days.  Since renal transplant, pt has been hypertensive to 180s (mostly 140-160s). Cardiology consulted for HTN management.       PAST MEDICAL & SURGICAL HISTORY:  Asthma: never been intubated for asthma   last inhaler used 2019  Heart murmur  Dyslipidemia  Bilateral dry eyes  History of glaucoma: left eye  Ventral hernia: repair  Diabetes: T2DM  last A1C (8%)  Hypertension  DKA (diabetic ketoacidoses)  Diastolic dysfunction: mild. stage 1. EF 65%  CKD (chronic kidney disease): hemodialysis on Mon, Wed and Fri  Insulin pump status: denies  Pneumonia: 2013  Osteomyelitis of ankle or foot: x2  Diabetic neuropathy  Hypertension  Status post cataract extraction  S/P laparoscopic sleeve gastrectomy:   H/O elbow surgery: left  H/O cardiac catheterization: no stents  Retinal hemorrhage, left eye: s/p laser surgery  hx of right eye retinal hemorrahge, tx with laser surgery  S/P amputation: right hallux   S/P hernia repair  S/P carpal tunnel release: b/l      FAMILY HISTORY:  FH: aortic stenosis: Son  Family history of bone cancer: Grandfather  Family history of heart attack (Grandparent): father  @54 y.o.  grandfather  @ 52 y.o.      SOCIAL HISTORY: Former smoker, 60 pack years. No current alcohol use.     Home Medications:  Alpha Lipoic Acid 200 mg oral capsule: 1 cap(s) orally once a day (2020 06:42)  aspirin 81 mg oral delayed release tablet: 1 tab(s) orally once a day (2020 06:42)  atorvastatin 20 mg oral tablet: 1 tab(s) orally once a day (2020 06:42)  gabapentin 300 mg oral tablet: 1 tab(s) orally once a day (2020 06:42)  labetalol: 50 mg orally once a day at night (2020 06:42)  Natural Tears preserved ophthalmic solution: 1 drop(s) to each affected eye once a day  (2020 06:42)  Nephro-Olivia oral tablet: 1 tab(s) orally once a day (:42)  NovoLOG FlexPen 100 units/mL injectable solution: 2 unit(s) injectable 3 times a day (with meals) (2020 06:42)  sevelamer hydrochloride 800 mg oral tablet: 2 tab(s) orally 3 times a day (:42)  Travatan Z 0.004% ophthalmic solution: 1 drop(s) to left eye (in the evening) (2020 06:42)  Tresiba 100 units/mL subcutaneous solution: 24 unit(s) subcutaneous once a day, morning  (2020 06:42)      CURRENT MEDICATIONS:   MEDICATIONS  (STANDING):  carvedilol 25 milliGRAM(s) Oral every 12 hours  chlorhexidine 4% Liquid 1 Application(s) Topical <User Schedule>  dextrose 5%. 1000 milliLiter(s) (50 mL/Hr) IV Continuous <Continuous>  dextrose 50% Injectable 12.5 Gram(s) IV Push once  famotidine    Tablet 20 milliGRAM(s) Oral daily  insulin glargine Injectable (LANTUS) 20 Unit(s) SubCutaneous at bedtime  insulin lispro (HumaLOG) corrective regimen sliding scale   SubCutaneous at bedtime  insulin lispro (HumaLOG) corrective regimen sliding scale   SubCutaneous Before meals and at bedtime  insulin lispro Injectable (HumaLOG) 8 Unit(s) SubCutaneous three times a day before meals  melatonin 3 milliGRAM(s) Oral at bedtime  methylPREDNISolone sodium succinate Injectable 60 milliGRAM(s) IV Push two times a day  methylPREDNISolone sodium succinate Injectable   IV Push   mycophenolate mofetil 1 Gram(s) Oral every 12 hours  nystatin    Suspension [id number] Unit(s) Swish and Swallow four times a day  senna 2 Tablet(s) Oral at bedtime  tacrolimus 3 milliGRAM(s) Oral <User Schedule>  tamsulosin 0.4 milliGRAM(s) Oral at bedtime  trimethoprim   80 mG/sulfamethoxazole 400 mG 1 Tablet(s) Oral daily  valGANciclovir 450 milliGRAM(s) Oral daily    MEDICATIONS  (PRN):  acetaminophen   Tablet .. 650 milliGRAM(s) Oral every 6 hours PRN Mild Pain (1 - 3)  dextrose 40% Gel 15 Gram(s) Oral once PRN Blood Glucose LESS THAN 70 milliGRAM(s)/deciliter  glucagon  Injectable 1 milliGRAM(s) IntraMuscular once PRN Glucose LESS THAN 70 milligrams/deciliter  ondansetron Injectable 4 milliGRAM(s) IV Push every 6 hours PRN Nausea and/or Vomiting  traMADol 25 milliGRAM(s) Oral every 4 hours PRN Moderate Pain (4 - 6)  traMADol 50 milliGRAM(s) Oral every 4 hours PRN Severe Pain (7 - 10)      Allergies    clonidine (Other)  seasonal allergy (Other)    Intolerances        REVIEW OF SYSTEMS:   CONSTITUTIONAL: No fever, chills, fatigue, recent weight change  HEENT: No vision changes, eye pain, vertigo, sore throat  RESPIRATORY: No cough, wheezing, shortness of breath  CARDIOVASCULAR: No chest pain, palpitations, PAEZ, PND, orthopnea  GASTROINTESTINAL: No abdominal pain, nausea, vomiting, diarrhea, constipation, melena  GENITOURINARY: Soreness at LLQ sided surgical site. No hematuria, dysuria, nocturia, incontinence  MUSCULOSKELETAL: No swelling, joint pain, muscle weakness  NEUROLOGICAL: No focal weakness, paresthesias, headache, syncope  INTEGUMENTARY: No rashes, ecchymoses    Vital Signs Last 24 Hrs  T(C): 36.8 (2020 09:00), Max: 37.2 (2020 10:00)  T(F): 98.3 (2020 09:00), Max: 99 (2020 10:00)  HR: 75 (2020 09:00) (65 - 95)  BP: 149/75 (2020 09:00) (149/75 - 182/84)  BP(mean): 103 (2020 16:00) (100 - 113)  RR: 18 (2020 09:00) (18 - 18)  SpO2: 95% (2020 09:00) (94% - 97%)    I&O's Summary      PHYSICAL EXAM:  General: Well-appearing, NAD, walking  HEENT: PERRL, EOMI, normal oral mucosa  Neck: Supple, no JVD, normal thyroid  Lungs: CTA b/l, no wheezing, rales or rhonchi  Heart: RRR, normal S1, S2, no murmurs  Abdomen: Soft, ND, NT, normoactive bowel sounds  Skin: Warm, well-perfused, no rashes or lesions  Neurologic: A&Ox3, no focal deficits  Extremities: Peripheral pulses 2+ b/l, no edema, cyanosis or clubbing    LABS:   CBC Full  -  ( 2020 05:58 )  WBC Count : 9.46 K/uL  RBC Count : 2.95 M/uL  Hemoglobin : 8.5 g/dL  Hematocrit : 29.1 %  Platelet Count - Automated : 185 K/uL  Mean Cell Volume : 98.6 fl  Mean Cell Hemoglobin : 28.8 pg  Mean Cell Hemoglobin Concentration : 29.2 gm/dL  Auto Neutrophil # : 8.88 K/uL  Auto Lymphocyte # : 0.33 K/uL  Auto Monocyte # : 0.25 K/uL  Auto Eosinophil # : 0.00 K/uL  Auto Basophil # : 0.00 K/uL  Auto Neutrophil % : 93.0 %  Auto Lymphocyte % : 3.5 %  Auto Monocyte % : 2.6 %  Auto Eosinophil % : 0.0 %  Auto Basophil % : 0.0 %          138  |  107  |  56<H>  ----------------------------<  286<H>  4.7   |  18<L>  |  2.64<H>    Ca    8.6      2020 05:58  Phos  4.7       Mg     2.8         TPro  6.2  /  Alb  3.2<L>  /  TBili  0.2  /  DBili  x   /  AST  16  /  ALT  6<L>  /  AlkPhos  68        HbA1c: Hemoglobin A1C, Whole Blood: 7.6 % ( @ 08:43)      PREVIOUS DIAGNOSTIC TESTING:    Echocardiogram:  < from: Transthoracic Echocardiogram (19 @ 09:19) >  Conclusions:  EF 70%  Normal left ventricular systolic function. No segmental  wall motion abnormalities.  Moderate pulmonary hypertension    < end of copied text >      Catheterization:  < from: Cardiac Cath Lab (10.25.13 @ 09:13) >  SUMMARY:  CORONARY VESSELS: LM: Normal. LAD: Angiography showed minor luminal  irregularities with no flow limiting lesions. Circumflex: Angiography  showed minor luminal irregularities with no flow limiting lesions. RCA:  Normal.    < end of copied text >      Stress Test:   < from: Nuclear Stress Test-Pharmacologic (19 @ 10:19) >  IMPRESSIONS:Normal Study  * Chest Pain: No chest pain with administration of  Regadenoson.  * Symptom: No Symptom.  * HR Response: Appropriate.  * BP Response: Appropriate.  * Heart Rhythm: Sinus Rhythm.  * Baseline ECG: Nonspecific ST-T wave abnormality in III,  aVF, V4, V5, V6, downsloping.  * ECG Abnormalities: ECG unchanged from baseline.  * Arrhythmia: None.  * Review of raw data shows: The study is of good technical  quality.  * The left ventricle was mildly enlarged. There is a  small, mild defect in mid to distal inferior wall that is  fixed, suggestive of diaphragmatic attenuation artifact.  The observed defect is no longer significant with prone  imaging.  * Gated wall motion analysis is performed, and shows  normal wall motion with post stress LVEF of 56% and post  stress LVEDV of  116 ml.  *** Compared with Nuclear/Stress test of 2018, no  significant changes noted.    < end of copied text >

## 2020-02-20 NOTE — CONSULT NOTE ADULT - ASSESSMENT
59 male with h/o HFpEF (EF 70%), HTN, dyslipidemia, s/p gastric sleeve, T2DM (last A1c 8%), CKD on hemodialysis (M,W,F), left arm fistula admitted for scheduled live donor renal transplant, s/p transplant on 2/18. Cardiology consulted for hypertension management.     #hypertension 59 male with h/o HFpEF (EF 70%), HTN, dyslipidemia, s/p gastric sleeve, T2DM (last A1c 8%), CKD on hemodialysis (M,W,F), left arm fistula admitted for scheduled live donor renal transplant, s/p transplant on 2/18. Cardiology consulted for hypertension management.     #hypertension   - recommend starting amlodipine 10mg qd       Marley Zuluaga PGY1    case discussed with fellow 59 male with h/o HFpEF (EF 70%), HTN, dyslipidemia, s/p gastric sleeve, T2DM (last A1c 8%), CKD on hemodialysis (M,W,F), left arm fistula admitted for scheduled live donor renal transplant, s/p transplant on 2/18. Cardiology consulted for hypertension management.     #hypertension   - recommend starting amlodipine 10mg qd   - continue carvedilol      Marley Zuluaga PGY1    case discussed with fellow

## 2020-02-20 NOTE — DISCHARGE NOTE PROVIDER - NSDCMRMEDTOKEN_GEN_ALL_CORE_FT
Alpha Lipoic Acid 200 mg oral capsule: 1 cap(s) orally once a day  aspirin 81 mg oral delayed release tablet: 1 tab(s) orally once a day  atorvastatin 20 mg oral tablet: 1 tab(s) orally once a day  bumetanide 2 mg oral tablet: 4 milligram(s) orally 2 times a day on Tuesday, Thursday, Saturday and Sunday (non-dialysis days)  gabapentin 300 mg oral tablet: 1 tab(s) orally once a day  labetalol: 50 mg orally once a day at night  Natural Tears preserved ophthalmic solution: 1 drop(s) to each affected eye once a day   Nephro-Olivia oral tablet: 1 tab(s) orally once a day  NovoLOG FlexPen 100 units/mL injectable solution: 2 unit(s) injectable 3 times a day (with meals)  Rolling walker for ambulation:   sevelamer hydrochloride 800 mg oral tablet: 2 tab(s) orally 3 times a day  Travatan Z 0.004% ophthalmic solution: 1 drop(s) to left eye (in the evening)  Tresiba 100 units/mL subcutaneous solution: 24 unit(s) subcutaneous once a day, morning   Tylenol 325 mg oral tablet: 2 tab(s) orally 4 times a day, As Needed -for mild pain aspirin 81 mg oral delayed release tablet: 1 tab(s) orally once a day  atorvastatin 20 mg oral tablet: 1 tab(s) orally once a day  carvedilol 25 mg oral tablet: 1 tab(s) orally every 12 hours  CellCept 500 mg oral tablet: 2 tab(s) orally 2 times a day  famotidine 20 mg oral tablet: 1 tab(s) orally once a day  gabapentin 300 mg oral tablet: 1 tab(s) orally once a day  Natural Tears preserved ophthalmic solution: 1 drop(s) to each affected eye once a day   NovoLOG FlexPen 100 units/mL injectable solution: 2 unit(s) injectable 3 times a day (with meals)  nystatin 100,000 units/mL oral suspension: 5 milliliter(s) orally 4 times a day  predniSONE: please follow your steroid taper sheet  Rolling walker for ambulation:   senna oral tablet: 2 tab(s) orally once a day (at bedtime)  sulfamethoxazole-trimethoprim 400 mg-80 mg oral tablet: 1 tab(s) orally once a day  tamsulosin 0.4 mg oral capsule: 1 cap(s) orally once a day (at bedtime)  Travatan Z 0.004% ophthalmic solution: 1 drop(s) to left eye (in the evening)  Tresiba 100 units/mL subcutaneous solution: 24 unit(s) subcutaneous once a day, morning   Tylenol 325 mg oral tablet: 2 tab(s) orally 4 times a day, As Needed -for mild pain   valGANciclovir 450 mg oral tablet: 1 tab(s) orally once a day aspirin 81 mg oral delayed release tablet: 1 tab(s) orally once a day  atorvastatin 20 mg oral tablet: 1 tab(s) orally once a day  carvedilol 25 mg oral tablet: 1 tab(s) orally every 12 hours  CellCept 500 mg oral tablet: 2 tab(s) orally 2 times a day  famotidine 20 mg oral tablet: 1 tab(s) orally once a day  gabapentin 300 mg oral tablet: 1 tab(s) orally once a day  Natural Tears preserved ophthalmic solution: 1 drop(s) to each affected eye once a day   NovoLOG FlexPen 100 units/mL injectable solution: 12 unit(s) injectable 3 times a day (with meals)  nystatin 100,000 units/mL oral suspension: 5 milliliter(s) orally 4 times a day  Out patient Physical Therapy :   predniSONE: please follow your steroid taper sheet  Prograf 5 mg oral capsule: 1 cap(s) orally every 12 hours  Rolling walker for ambulation:   senna oral tablet: 2 tab(s) orally once a day (at bedtime)  sulfamethoxazole-trimethoprim 400 mg-80 mg oral tablet: 1 tab(s) orally once a day  tamsulosin 0.4 mg oral capsule: 1 cap(s) orally once a day (at bedtime)  Travatan Z 0.004% ophthalmic solution: 1 drop(s) to left eye (in the evening)  Tresiba 100 units/mL subcutaneous solution: 20 unit(s) subcutaneous once a day  Tylenol 325 mg oral tablet: 2 tab(s) orally 4 times a day, As Needed -for mild pain   valGANciclovir 450 mg oral tablet: 1 tab(s) orally once a day

## 2020-02-20 NOTE — DISCHARGE NOTE PROVIDER - NS AS DC PROVIDER CONTACT Y/N MULTI
Reason For Visit  DONAL HATFIELD is a(n) established patient here today for.        History of Present Illness  Here for follow-up of right forearm. Subjectively doing well.      Review of Systems    All systems reviewed and are negative except as noted above or in the HPI/patient intake form.       Physical Exam  Constitutional Ortho: alert and in no acute distress.   Neck - Ortho: normal appearing neck.   Pulmonary Ortho: no respiratory distress and no labored respirations.   Psychiatric Ortho: alert and awake, interactive and mood/affect were appropriate for age .    Skin Ortho: no rash. no skin lesions.     PE Free Text Ortho:   Examination of her right upper extremity reveals her cast is intact. Neurovascularly intact.      Results/Data    2 views of right forearm demonstrate healing both bone forearm fracture. Bayonet apposition is noted of the radius. Callus formation noted.      Assessment   1. Closed fracture of right radius and ulna, initial encounter (S52.91XA,S52.201A)    Plan   · XR FOREARM RT 2V; Status:Active - Perform Order; Requested for:22Jun2018;    · XR FOREARM RT 3V; Status:Canceled;   Plan Free Text Ortho: I plan to keep her in her current cast for 2 additional weeks. X-ray and cast at next visit.      Signatures   Electronically signed by : JÚNIOR ALATORRE MD; Jun 22 2018  1:27PM CST     Yes

## 2020-02-20 NOTE — PROGRESS NOTE ADULT - SUBJECTIVE AND OBJECTIVE BOX
--------------------------------------------------------------------------------  Chief Complaint:  Post op pain, controlled    24 hour events/subjective:  Reviewed      PAST HISTORY  --------------------------------------------------------------------------------  No significant changes to PMH, PSH, FHx, SHx, unless otherwise noted    ALLERGIES & MEDICATIONS  --------------------------------------------------------------------------------  Allergies    clonidine (Other)  seasonal allergy (Other)    Intolerances      Standing Inpatient Medications  aspirin enteric coated 81 milliGRAM(s) Oral daily  carvedilol 25 milliGRAM(s) Oral every 12 hours  chlorhexidine 4% Liquid 1 Application(s) Topical <User Schedule>  dextrose 5%. 1000 milliLiter(s) IV Continuous <Continuous>  dextrose 50% Injectable 12.5 Gram(s) IV Push once  famotidine    Tablet 20 milliGRAM(s) Oral daily  insulin glargine Injectable (LANTUS) 20 Unit(s) SubCutaneous at bedtime  insulin lispro (HumaLOG) corrective regimen sliding scale   SubCutaneous at bedtime  insulin lispro (HumaLOG) corrective regimen sliding scale   SubCutaneous Before meals and at bedtime  insulin lispro Injectable (HumaLOG) 12 Unit(s) SubCutaneous three times a day before meals  lactulose Syrup 20 Gram(s) Oral once  melatonin 3 milliGRAM(s) Oral at bedtime  methylPREDNISolone sodium succinate Injectable   IV Push   mycophenolate mofetil 1 Gram(s) Oral every 12 hours  nystatin    Suspension 433873 Unit(s) Swish and Swallow four times a day  senna 2 Tablet(s) Oral at bedtime  tacrolimus 5 milliGRAM(s) Oral <User Schedule>  tamsulosin 0.4 milliGRAM(s) Oral at bedtime  trimethoprim   80 mG/sulfamethoxazole 400 mG 1 Tablet(s) Oral daily  valGANciclovir 450 milliGRAM(s) Oral daily    PRN Inpatient Medications  acetaminophen   Tablet .. 650 milliGRAM(s) Oral every 6 hours PRN  dextrose 40% Gel 15 Gram(s) Oral once PRN  glucagon  Injectable 1 milliGRAM(s) IntraMuscular once PRN  ondansetron Injectable 4 milliGRAM(s) IV Push every 6 hours PRN  traMADol 25 milliGRAM(s) Oral every 4 hours PRN  traMADol 50 milliGRAM(s) Oral every 4 hours PRN      REVIEW OF SYSTEMS  --------------------------------------------------------------------------------  Gen:No fevers/chills, weakness  Skin: No rashes  Head/Eyes/Ears/Mouth: No headache;No sore throat  Respiratory: No dyspnea, cough,   CV: No chest pain, PND, orthopnea  GI: No abdominal pain, diarrhea, constipation, nausea, vomiting  Transplant: Post op pain, controlled  : No increased frequency, dysuria, hematuria, nocturia  MSK: No joint pain/swelling; no back pain; no edema  Neuro: No dizziness/lightheadedness, weakness, seizures, numbness, tingling  Psych: No significant nervousness, anxiety, stress, depression    All other systems were reviewed and are negative, except as noted.    VITALS/PHYSICAL EXAM  --------------------------------------------------------------------------------  T(C): 36.8 (02-20-20 @ 17:00), Max: 37.1 (02-19-20 @ 21:00)  HR: 73 (02-20-20 @ 17:00) (65 - 86)  BP: 150/74 (02-20-20 @ 17:00) (146/71 - 182/84)  RR: 18 (02-20-20 @ 17:00) (18 - 18)  SpO2: 98% (02-20-20 @ 17:00) (94% - 98%)           02-19-20 @ 07:01  -  02-20-20 @ 07:00  --------------------------------------------------------  IN: 3610 mL / OUT: 3155 mL / NET: 455 mL    02-20-20 @ 07:01  -  02-20-20 @ 18:37  --------------------------------------------------------  IN: 720 mL / OUT: 1335 mL / NET: -615 mL      Physical Exam:  	Gen: No acute distress  	HEENT: No acute signs  	Pulm: Breath sounds well heard bilaterally  	CV: No gallop; no rub  	 	Abd: +BS, soft, nontender/nondistended                      Transplant: No acute signs, No bleeding/discharge  	: No suprapubic tenderness  	LE: Warm, no acute signs  	Neuro: No asterixis  	Psych: Normal affect and mood  	Skin: Warm, without rashes        LABS/STUDIES  --------------------------------------------------------------------------------              8.5    9.46  >-----------<  185      [02-20-20 @ 05:58]              29.1     138  |  107  |  56  ----------------------------<  286      [02-20-20 @ 05:58]  4.7   |  18  |  2.64        Ca     8.6     [02-20-20 @ 05:58]      Mg     2.8     [02-20-20 @ 05:58]      Phos  4.7     [02-20-20 @ 05:58]    TPro  6.2  /  Alb  3.2  /  TBili  0.2  /  DBili  x   /  AST  16  /  ALT  6   /  AlkPhos  68  [02-20-20 @ 05:58]    PT/INR: PT 11.8 , INR 1.03       [02-18-20 @ 21:50]  PTT: 32.8       [02-18-20 @ 21:50]      Creatinine Trend:  SCr 2.64 [02-20 @ 05:58]  SCr 5.20 [02-19 @ 05:52]  SCr 6.04 [02-18 @ 21:50]  SCr 6.65 [02-18 @ 16:47]  SCr 7.07 [02-18 @ 12:10]    Tacrolimus (), Serum: <2.0 ng/mL (02-20 @ 09:50)    < from: US Trans Kidney w/ Doppler, Left (02.18.20 @ 19:48) >    Renal Transplant: 11.9 cm. No renal mass, hydronephrosis or calculi. No perinephric fluid or hematoma. Small renal cyst.    Urinary bladder: Hairston catheter in place.    Color and spectral Doppler reveals homogeneous flow throughout the transplant.    Peak iliac artery velocity is 316 cm/sec pre-anastomosis, 228 cm/sec at the anastomosis, and 234 cm/sec post anastomosis.    Transplant Renal Artery:   Peak systolic velocity is 353 cm/sec anastomosis, 200 cm/sec proximal, 306 cm/sec mid, 127 cm/sec distal and 80 cm/sec hilum.   Resistive Indices Range: 0.6-0.76.    Transplant Renal Vein: Patent   .    IMPRESSION:     No perinephric fluid collection. No hydronephrosis.    The transplant kidney is well perfused with patent transplant renal vasculature.    Elevated velocities in the transplant main renal artery may be related to spasm and follow-up examination is recommended.      < end of copied text >              Iron 51, TIBC 273, %sat 19      [06-12-19 @ 10:11]  Ferritin 635      [06-12-19 @ 10:08]  PTH -- (Ca 8.0)      [07-21-19 @ 10:21]   227  PTH -- (Ca 8.5)      [06-12-19 @ 10:08]   184  Vitamin D (25OH) 41.5      [07-21-19 @ 09:54]  HbA1c 7.6      [02-19-20 @ 08:43]

## 2020-02-20 NOTE — DIETITIAN INITIAL EVALUATION ADULT. - ENERGY NEEDS
Pertinent information as per chart: Pt 60 y/o M with PMH: HF, HTN, HLD, heart murmur, T2DM, controlled asthma, hiatal hernia, left glaucoma, CKD on HD (MWF via L AVF, anuric pre-op), S/P laparoscopic sleeve gastrectomy (2015), now S/P LDRT to left iliac fossa from daughter on (02/18) with Simulect induction - with functioning allograft, cardiology following for HTN management.

## 2020-02-20 NOTE — DISCHARGE NOTE PROVIDER - PROVIDER TOKENS
PROVIDER:[TOKEN:[[id number]]],PROVIDER:[TOKEN:[[id number]]],PROVIDER:[TOKEN:[[id number]]],PROVIDER:[TOKEN:[[id number]]],PROVIDER:[TOKEN:[[id number]]]

## 2020-02-21 ENCOUNTER — TRANSCRIPTION ENCOUNTER (OUTPATIENT)
Age: 60
End: 2020-02-21

## 2020-02-21 VITALS
RESPIRATION RATE: 18 BRPM | OXYGEN SATURATION: 97 % | TEMPERATURE: 98 F | DIASTOLIC BLOOD PRESSURE: 84 MMHG | SYSTOLIC BLOOD PRESSURE: 162 MMHG | HEART RATE: 75 BPM

## 2020-02-21 DIAGNOSIS — Z94.0 KIDNEY TRANSPLANT STATUS: ICD-10-CM

## 2020-02-21 PROBLEM — R01.1 CARDIAC MURMUR, UNSPECIFIED: Chronic | Status: ACTIVE | Noted: 2020-02-05

## 2020-02-21 PROBLEM — H04.123 DRY EYE SYNDROME OF BILATERAL LACRIMAL GLANDS: Chronic | Status: ACTIVE | Noted: 2020-02-05

## 2020-02-21 PROBLEM — E78.5 HYPERLIPIDEMIA, UNSPECIFIED: Chronic | Status: ACTIVE | Noted: 2020-02-05

## 2020-02-21 PROBLEM — J45.909 UNSPECIFIED ASTHMA, UNCOMPLICATED: Chronic | Status: ACTIVE | Noted: 2020-02-05

## 2020-02-21 PROBLEM — K43.9 VENTRAL HERNIA WITHOUT OBSTRUCTION OR GANGRENE: Chronic | Status: ACTIVE | Noted: 2018-04-30

## 2020-02-21 LAB
ALBUMIN SERPL ELPH-MCNC: 3.3 G/DL — SIGNIFICANT CHANGE UP (ref 3.3–5)
ALP SERPL-CCNC: 68 U/L — SIGNIFICANT CHANGE UP (ref 40–120)
ALT FLD-CCNC: 9 U/L — LOW (ref 10–45)
ANION GAP SERPL CALC-SCNC: 11 MMOL/L — SIGNIFICANT CHANGE UP (ref 5–17)
AST SERPL-CCNC: 15 U/L — SIGNIFICANT CHANGE UP (ref 10–40)
BASOPHILS # BLD AUTO: 0 K/UL — SIGNIFICANT CHANGE UP (ref 0–0.2)
BASOPHILS NFR BLD AUTO: 0 % — SIGNIFICANT CHANGE UP (ref 0–2)
BILIRUB SERPL-MCNC: 0.2 MG/DL — SIGNIFICANT CHANGE UP (ref 0.2–1.2)
BUN SERPL-MCNC: 47 MG/DL — HIGH (ref 7–23)
CALCIUM SERPL-MCNC: 8.7 MG/DL — SIGNIFICANT CHANGE UP (ref 8.4–10.5)
CHLORIDE SERPL-SCNC: 108 MMOL/L — SIGNIFICANT CHANGE UP (ref 96–108)
CO2 SERPL-SCNC: 17 MMOL/L — LOW (ref 22–31)
CREAT SERPL-MCNC: 1.65 MG/DL — HIGH (ref 0.5–1.3)
EOSINOPHIL # BLD AUTO: 0 K/UL — SIGNIFICANT CHANGE UP (ref 0–0.5)
EOSINOPHIL NFR BLD AUTO: 0 % — SIGNIFICANT CHANGE UP (ref 0–6)
GLUCOSE BLDC GLUCOMTR-MCNC: 253 MG/DL — HIGH (ref 70–99)
GLUCOSE BLDC GLUCOMTR-MCNC: 287 MG/DL — HIGH (ref 70–99)
GLUCOSE SERPL-MCNC: 243 MG/DL — HIGH (ref 70–99)
HCT VFR BLD CALC: 31.1 % — LOW (ref 39–50)
HGB BLD-MCNC: 9.6 G/DL — LOW (ref 13–17)
IMM GRANULOCYTES NFR BLD AUTO: 0.5 % — SIGNIFICANT CHANGE UP (ref 0–1.5)
LYMPHOCYTES # BLD AUTO: 0.55 K/UL — LOW (ref 1–3.3)
LYMPHOCYTES # BLD AUTO: 5.6 % — LOW (ref 13–44)
MAGNESIUM SERPL-MCNC: 2.7 MG/DL — HIGH (ref 1.6–2.6)
MCHC RBC-ENTMCNC: 30.3 PG — SIGNIFICANT CHANGE UP (ref 27–34)
MCHC RBC-ENTMCNC: 30.9 GM/DL — LOW (ref 32–36)
MCV RBC AUTO: 98.1 FL — SIGNIFICANT CHANGE UP (ref 80–100)
MONOCYTES # BLD AUTO: 0.31 K/UL — SIGNIFICANT CHANGE UP (ref 0–0.9)
MONOCYTES NFR BLD AUTO: 3.2 % — SIGNIFICANT CHANGE UP (ref 2–14)
NEUTROPHILS # BLD AUTO: 8.93 K/UL — HIGH (ref 1.8–7.4)
NEUTROPHILS NFR BLD AUTO: 90.7 % — HIGH (ref 43–77)
NRBC # BLD: 0 /100 WBCS — SIGNIFICANT CHANGE UP (ref 0–0)
PHOSPHATE SERPL-MCNC: 3.4 MG/DL — SIGNIFICANT CHANGE UP (ref 2.5–4.5)
PLATELET # BLD AUTO: 170 K/UL — SIGNIFICANT CHANGE UP (ref 150–400)
POTASSIUM SERPL-MCNC: 4.8 MMOL/L — SIGNIFICANT CHANGE UP (ref 3.5–5.3)
POTASSIUM SERPL-SCNC: 4.8 MMOL/L — SIGNIFICANT CHANGE UP (ref 3.5–5.3)
PROT SERPL-MCNC: 6.6 G/DL — SIGNIFICANT CHANGE UP (ref 6–8.3)
RBC # BLD: 3.17 M/UL — LOW (ref 4.2–5.8)
RBC # FLD: 16.1 % — HIGH (ref 10.3–14.5)
SODIUM SERPL-SCNC: 136 MMOL/L — SIGNIFICANT CHANGE UP (ref 135–145)
TACROLIMUS SERPL-MCNC: 10.3 NG/ML — SIGNIFICANT CHANGE UP
WBC # BLD: 9.84 K/UL — SIGNIFICANT CHANGE UP (ref 3.8–10.5)
WBC # FLD AUTO: 9.84 K/UL — SIGNIFICANT CHANGE UP (ref 3.8–10.5)

## 2020-02-21 PROCEDURE — 84295 ASSAY OF SERUM SODIUM: CPT

## 2020-02-21 PROCEDURE — 85610 PROTHROMBIN TIME: CPT

## 2020-02-21 PROCEDURE — 82330 ASSAY OF CALCIUM: CPT

## 2020-02-21 PROCEDURE — 82962 GLUCOSE BLOOD TEST: CPT

## 2020-02-21 PROCEDURE — 71045 X-RAY EXAM CHEST 1 VIEW: CPT

## 2020-02-21 PROCEDURE — 85027 COMPLETE CBC AUTOMATED: CPT

## 2020-02-21 PROCEDURE — 86900 BLOOD TYPING SEROLOGIC ABO: CPT

## 2020-02-21 PROCEDURE — 76776 US EXAM K TRANSPL W/DOPPLER: CPT

## 2020-02-21 PROCEDURE — 84100 ASSAY OF PHOSPHORUS: CPT

## 2020-02-21 PROCEDURE — 99232 SBSQ HOSP IP/OBS MODERATE 35: CPT

## 2020-02-21 PROCEDURE — 85014 HEMATOCRIT: CPT

## 2020-02-21 PROCEDURE — 80197 ASSAY OF TACROLIMUS: CPT

## 2020-02-21 PROCEDURE — 86901 BLOOD TYPING SEROLOGIC RH(D): CPT

## 2020-02-21 PROCEDURE — C2617: CPT

## 2020-02-21 PROCEDURE — 86850 RBC ANTIBODY SCREEN: CPT

## 2020-02-21 PROCEDURE — 83036 HEMOGLOBIN GLYCOSYLATED A1C: CPT

## 2020-02-21 PROCEDURE — 97161 PT EVAL LOW COMPLEX 20 MIN: CPT

## 2020-02-21 PROCEDURE — 85730 THROMBOPLASTIN TIME PARTIAL: CPT

## 2020-02-21 PROCEDURE — 82565 ASSAY OF CREATININE: CPT

## 2020-02-21 PROCEDURE — 84132 ASSAY OF SERUM POTASSIUM: CPT

## 2020-02-21 PROCEDURE — 80048 BASIC METABOLIC PNL TOTAL CA: CPT

## 2020-02-21 PROCEDURE — 82947 ASSAY GLUCOSE BLOOD QUANT: CPT

## 2020-02-21 PROCEDURE — 80053 COMPREHEN METABOLIC PANEL: CPT

## 2020-02-21 PROCEDURE — 82803 BLOOD GASES ANY COMBINATION: CPT

## 2020-02-21 PROCEDURE — 83735 ASSAY OF MAGNESIUM: CPT

## 2020-02-21 PROCEDURE — 83605 ASSAY OF LACTIC ACID: CPT

## 2020-02-21 PROCEDURE — 82435 ASSAY OF BLOOD CHLORIDE: CPT

## 2020-02-21 RX ORDER — TACROLIMUS 5 MG/1
1 CAPSULE ORAL
Qty: 0 | Refills: 0 | DISCHARGE
Start: 2020-02-21

## 2020-02-21 RX ORDER — TAMSULOSIN HYDROCHLORIDE 0.4 MG/1
1 CAPSULE ORAL
Qty: 0 | Refills: 0 | DISCHARGE
Start: 2020-02-21

## 2020-02-21 RX ORDER — NYSTATIN 500MM UNIT
5 POWDER (EA) MISCELLANEOUS
Qty: 0 | Refills: 0 | DISCHARGE
Start: 2020-02-21

## 2020-02-21 RX ORDER — VALGANCICLOVIR 450 MG/1
1 TABLET, FILM COATED ORAL
Qty: 0 | Refills: 0 | DISCHARGE
Start: 2020-02-21

## 2020-02-21 RX ORDER — INSULIN DEGLUDEC 100 U/ML
21 INJECTION, SOLUTION SUBCUTANEOUS
Qty: 0 | Refills: 0 | DISCHARGE

## 2020-02-21 RX ORDER — FAMOTIDINE 10 MG/ML
1 INJECTION INTRAVENOUS
Qty: 0 | Refills: 0 | DISCHARGE
Start: 2020-02-21

## 2020-02-21 RX ORDER — LABETALOL HCL 100 MG
0 TABLET ORAL
Qty: 0 | Refills: 0 | DISCHARGE

## 2020-02-21 RX ORDER — CARVEDILOL PHOSPHATE 80 MG/1
1 CAPSULE, EXTENDED RELEASE ORAL
Qty: 0 | Refills: 0 | DISCHARGE
Start: 2020-02-21

## 2020-02-21 RX ORDER — SENNA PLUS 8.6 MG/1
2 TABLET ORAL
Qty: 0 | Refills: 0 | DISCHARGE
Start: 2020-02-21

## 2020-02-21 RX ORDER — INSULIN ASPART 100 [IU]/ML
2 INJECTION, SOLUTION SUBCUTANEOUS
Qty: 0 | Refills: 0 | DISCHARGE

## 2020-02-21 RX ADMIN — Medication 12 UNIT(S): at 08:12

## 2020-02-21 RX ADMIN — Medication 1 TABLET(S): at 11:37

## 2020-02-21 RX ADMIN — TACROLIMUS 5 MILLIGRAM(S): 5 CAPSULE ORAL at 08:21

## 2020-02-21 RX ADMIN — Medication 500000 UNIT(S): at 11:37

## 2020-02-21 RX ADMIN — VALGANCICLOVIR 450 MILLIGRAM(S): 450 TABLET, FILM COATED ORAL at 11:37

## 2020-02-21 RX ADMIN — BASILIXIMAB 100 MILLIGRAM(S): 20 INJECTION, POWDER, FOR SOLUTION INTRAVENOUS at 09:07

## 2020-02-21 RX ADMIN — Medication 30 MILLIGRAM(S): at 06:12

## 2020-02-21 RX ADMIN — Medication 6: at 08:11

## 2020-02-21 RX ADMIN — Medication 6: at 13:20

## 2020-02-21 RX ADMIN — Medication 30 MILLIGRAM(S): at 16:43

## 2020-02-21 RX ADMIN — CARVEDILOL PHOSPHATE 25 MILLIGRAM(S): 80 CAPSULE, EXTENDED RELEASE ORAL at 06:11

## 2020-02-21 RX ADMIN — Medication 12 UNIT(S): at 13:20

## 2020-02-21 RX ADMIN — Medication 81 MILLIGRAM(S): at 11:37

## 2020-02-21 RX ADMIN — FAMOTIDINE 20 MILLIGRAM(S): 10 INJECTION INTRAVENOUS at 11:37

## 2020-02-21 RX ADMIN — MYCOPHENOLATE MOFETIL 1 GRAM(S): 250 CAPSULE ORAL at 06:12

## 2020-02-21 RX ADMIN — Medication 500000 UNIT(S): at 16:50

## 2020-02-21 RX ADMIN — Medication 500000 UNIT(S): at 06:12

## 2020-02-21 NOTE — DISCHARGE NOTE NURSING/CASE MANAGEMENT/SOCIAL WORK - PATIENT PORTAL LINK FT
You can access the FollowMyHealth Patient Portal offered by [facility] by registering at the following website: [email or web address]. By joining [facility]’s FollowMyHealth portal, you will also be able to view your health information using other applications (apps) compatible with our system.

## 2020-02-21 NOTE — PROGRESS NOTE ADULT - ATTENDING COMMENTS
Doing well  d/c Hairston  Simulect  likely discharge home this afternoon
Immunosuppression changed based on tacrolimus level of undetectable increased to 5mg BID from 3mg
Kidney Transplant recipient with functioning allograft  DM, HTN, constipation  Comorbidities reviewed.  Patient seen, examined and reviewed available clinical and lab data including history,  progress notes and consult notes.  Reviewed immunosuppression and allograft function including urine out put, creatinine trend, urine studies and any allograft and bladder imaging.  Reviewed medication regimen for glycemic control and blood pressure control  Suggestions:    1. Tacrolimus target 8-10 ng/ml  2. Laxative  On discharge follow up in transplant clinic  I was present during and reviewed clinical and lab data as well as assessment and plan as documented . Please contact if any additional questions with any change in clinical condition or on availability of any additional information or reports.
Kidney Transplant recipient with functioning allograft  Post op day 1  DM, HTN  Creatinine trend noted  Comorbidities reviewed.  Patient seen, examined and reviewed available clinical and lab data including history,  progress notes and consult notes.  Reviewed immunosuppression and allograft function including urine out put, creatinine trend, urine studies and allograft imaging.  Reviewed medication regimen for glycemic control and blood pressure control  Suggestions:  1. Tacrolimus target 8-10 ng/ml  2. Insulin regimen reviewed with clinical pharmacist  will follow  I was present during and reviewed clinical and lab data as well as assessment and plan as documented . Please contact if any additional questions with any change in clinical condition or on availability of any additional information or reports.  Pj Donnelly MD  [phone]
Kidney Transplant recipient with functioning allograft  Post op day 2  DM, HTN  Creatinine trend noted  Comorbidities reviewed.  Patient seen, examined and reviewed available clinical and lab data including history,  progress notes and consult notes.  Reviewed immunosuppression, prophylaxis regimen and allograft function including urine out put, creatinine trend, urine studies and  allograft and bladder imaging.  Reviewed medication regimen for glycemic control and blood pressure control  Suggestions:  1. Tacrolimus target level 8-10 ng/ml  2. Ambulation, incentive spirometry, monitor glucose/blood pressur  Will follow  I was present during and reviewed clinical and lab data as well as assessment and plan as documented  . Please contact if any additional questions with any change in clinical condition or on availability of any additional information or reports.

## 2020-02-21 NOTE — PROGRESS NOTE ADULT - ASSESSMENT
59M  with h/o CHF (last admission 8-10 years ago as per patient, EF 56%), HTN, HLD, heart murmur, T2DM (last A1c 8%), controlled asthma (last inhaler used 7/2019), hiatal hernia, left glaucoma, CKD on HD (MWF via L AVF, anuric pre-op)  now s/p LDRT from daughter on 2/18. Simulect induction. JJ ureteral stent    POD#3 s/p LDRT  - Strict I&Os, creatinine trending down   - d/c nicole d/c ALDA. f/u TOV  - Diet: advance as tolerated  - Pain: PRN tylenol/toradol  - Immuno: Tacrolimus per level (on BID dosing 2/2 insurance issues), MMF 1g bid; Pred taper, Simulect today  - Proph: Bactrim/valcyte/nystatin  - Flomax 0.4mg daily  - bowel regimen  - SCDs, encourage incentive spirometry      HTN  -Coreg as ordered  -hold ASA for now    DM  -will restart home tresiba and humalog upon d/c    Dispo  -d/c home today. f/u with Dr. Hay on Monday 2/24/2020

## 2020-02-21 NOTE — PROGRESS NOTE ADULT - REASON FOR ADMISSION
LDRT
live donor kidney transplant
live donor kidney transplantation
live donor kidney transplantation

## 2020-02-21 NOTE — DISCHARGE NOTE NURSING/CASE MANAGEMENT/SOCIAL WORK - NSDCFUADDAPPT_GEN_ALL_CORE_FT
FOLLOW-UP:  1. Please call to make a lab appointment and a follow-up appointment with Dr. Hay on Monday 2/24/2020  2. Please follow up with your primary care physician in one week regarding your hospitalization.

## 2020-02-21 NOTE — PROGRESS NOTE ADULT - SUBJECTIVE AND OBJECTIVE BOX
--------------------------------------------------------------------------------  Chief Complaint:    24 hour events/subjective:        PAST HISTORY  --------------------------------------------------------------------------------  No significant changes to PMH, PSH, FHx, SHx, unless otherwise noted    ALLERGIES & MEDICATIONS  --------------------------------------------------------------------------------  Allergies    clonidine (Other)  seasonal allergy (Other)    Intolerances      Standing Inpatient Medications  aspirin enteric coated 81 milliGRAM(s) Oral daily  carvedilol 25 milliGRAM(s) Oral every 12 hours  chlorhexidine 4% Liquid 1 Application(s) Topical <User Schedule>  dextrose 5%. 1000 milliLiter(s) IV Continuous <Continuous>  dextrose 50% Injectable 12.5 Gram(s) IV Push once  famotidine    Tablet 20 milliGRAM(s) Oral daily  insulin glargine Injectable (LANTUS) 20 Unit(s) SubCutaneous at bedtime  insulin lispro (HumaLOG) corrective regimen sliding scale   SubCutaneous at bedtime  insulin lispro (HumaLOG) corrective regimen sliding scale   SubCutaneous Before meals and at bedtime  insulin lispro Injectable (HumaLOG) 12 Unit(s) SubCutaneous three times a day before meals  lactulose Syrup 20 Gram(s) Oral once  melatonin 3 milliGRAM(s) Oral at bedtime  methylPREDNISolone sodium succinate Injectable 30 milliGRAM(s) IV Push two times a day  methylPREDNISolone sodium succinate Injectable   IV Push   mycophenolate mofetil 1 Gram(s) Oral every 12 hours  nystatin    Suspension [id number] Unit(s) Swish and Swallow four times a day  senna 2 Tablet(s) Oral at bedtime  tacrolimus 5 milliGRAM(s) Oral <User Schedule>  tamsulosin 0.4 milliGRAM(s) Oral at bedtime  trimethoprim   80 mG/sulfamethoxazole 400 mG 1 Tablet(s) Oral daily  valGANciclovir 450 milliGRAM(s) Oral daily    PRN Inpatient Medications  acetaminophen   Tablet .. 650 milliGRAM(s) Oral every 6 hours PRN  dextrose 40% Gel 15 Gram(s) Oral once PRN  glucagon  Injectable 1 milliGRAM(s) IntraMuscular once PRN  ondansetron Injectable 4 milliGRAM(s) IV Push every 6 hours PRN  traMADol 25 milliGRAM(s) Oral every 4 hours PRN  traMADol 50 milliGRAM(s) Oral every 4 hours PRN      REVIEW OF SYSTEMS  --------------------------------------------------------------------------------  Gen: fatigue, fevers/chills, weakness  Skin: No rashes  Head/Eyes/Ears/Mouth: No headache;No sore throat  Respiratory: No dyspnea, cough,   CV: No chest pain, PND, orthopnea  GI: constipated  Transplant: No pain  : No increased frequency, dysuria, hematuria, nocturia  MSK: No joint pain/swelling; no back pain; no edema  Neuro: No dizziness/lightheadedness, weakness, seizures, numbness, tingling  Psych: No significant nervousness, anxiety, stress, depression    All other systems were reviewed and are negative, except as noted.    VITALS/PHYSICAL EXAM  --------------------------------------------------------------------------------  T(C): 36.8 (02-21-20 @ 09:32), Max: 36.9 (02-20-20 @ 13:00)  HR: 70 (02-21-20 @ 09:32) (70 - 78)  BP: 133/62 (02-21-20 @ 09:32) (133/62 - 171/85)  RR: 18 (02-21-20 @ 09:32) (18 - 18)  SpO2: 97% (02-21-20 @ 09:32) (96% - 98%)  Wt(kg): --        02-20-20 @ 07:01  -  02-21-20 @ 07:00  --------------------------------------------------------  IN: 720 mL / OUT: 2710 mL / NET: -1990 mL    02-21-20 @ 07:01  -  02-21-20 @ 11:02  --------------------------------------------------------  IN: 0 mL / OUT: 135 mL / NET: -135 mL      Physical Exam:  	Gen: NAD, well-appearing  	HEENT: PERRL, supple neck, clear oropharynx  	Pulm: CTA B/L  	CV: RRR, S1S2; no rub  	Back: No spinal or CVA tenderness; no sacral edema  	Abd: +BS, soft, nontender/nondistended                      Transplant: No tenderness, swelling  	: No suprapubic tenderness  	LE: Warm, FROM, intact strength; no edema  	Neuro: No focal deficits, intact gait  	Psych: Normal affect and mood  	Skin: Warm, without rashes      LABS/STUDIES  --------------------------------------------------------------------------------              9.6    9.84  >-----------<  170      [02-21-20 @ 05:59]              31.1     136  |  108  |  47  ----------------------------<  243      [02-21-20 @ 05:59]  4.8   |  17  |  1.65        Ca     8.7     [02-21-20 @ 05:59]      Mg     2.7     [02-21-20 @ 05:59]      Phos  3.4     [02-21-20 @ 05:59]    TPro  6.6  /  Alb  3.3  /  TBili  0.2  /  DBili  x   /  AST  15  /  ALT  9   /  AlkPhos  68  [02-21-20 @ 05:59]      Creatinine Trend:  SCr 1.65 [02-21 @ 05:59]  SCr 2.64 [02-20 @ 05:58]  SCr 5.20 [02-19 @ 05:52]  SCr 6.04 [02-18 @ 21:50]  SCr 6.65 [02-18 @ 16:47]    Tacrolimus (), Serum: 10.3 ng/mL (02-21 @ 07:27)  Tacrolimus (), Serum: <2.0 ng/mL (02-20 @ 09:50)                Iron 51, TIBC 273, %sat 19      [06-12-19 @ 10:11]  Ferritin 635      [06-12-19 @ 10:08]  PTH -- (Ca 8.0)      [07-21-19 @ 10:21]   227  PTH -- (Ca 8.5)      [06-12-19 @ 10:08]   184  Vitamin D (25OH) 41.5      [07-21-19 @ 09:54]  HbA1c 7.6      [02-19-20 @ 08:43]

## 2020-02-21 NOTE — PROGRESS NOTE ADULT - SUBJECTIVE AND OBJECTIVE BOX
Transplant Surgery - Multidisciplinary Progress Note   --------------------------------------------------------------  LDRT 2/18/2020 POD#3    Present:   Patient seen with multidisciplinary team including Transplant Surgeon: Dr. Shah. Dr. Sanchez. Transplant Nephrologist: Dr. Donnelly/Cindi.  Pharmacist: Wayne Kramer.  Inpatient: Brigid/Dave/Yanique/Blake during am rounds and examined with Dr. Sanchez.    Disciplines not in attendance will be notified of the plan.       HPI: 59M  with h/o CHF (last admission 8-10 years ago as per patient, EF 56%), HTN, HLD, heart murmur, T2DM (last A1c 8%), controlled asthma (last inhaler used 7/2019), hiatal hernia, left glaucoma, CKD on HD (MWF via L AVF, anuric pre-op). Underwent LDRT to left iliac fossa from daughter on 2/18/2020. Simulect induction.     Donor:   ABO O  Donor CMV (-), EBV (+)    Recipient ABO O  Recipient CMV (-), EBV (+)  HLA mismatch 1,1,1, crossmatch negative .     OR:   LDRT to left iliac fossa  Anatomy: single artery, single vein, single ureter. Standard anastomoses  Induction: Solumedrol and Simulect    Interval Events:   - POD 3 s/p LDRT - urine output ~100-125cc/hr, creat 1.65  - Pain well controlled on PO regimen PRN tramadol and tylenol  - Tolerating diet, ambulating without assistance  - having bowel function       Potential Discharge date: today    Education:  Medications    Plan of care:  See Below       MEDICATIONS  (STANDING):  aspirin enteric coated 81 milliGRAM(s) Oral daily  carvedilol 25 milliGRAM(s) Oral every 12 hours  chlorhexidine 4% Liquid 1 Application(s) Topical <User Schedule>  dextrose 5%. 1000 milliLiter(s) (50 mL/Hr) IV Continuous <Continuous>  dextrose 50% Injectable 12.5 Gram(s) IV Push once  famotidine    Tablet 20 milliGRAM(s) Oral daily  insulin glargine Injectable (LANTUS) 20 Unit(s) SubCutaneous at bedtime  insulin lispro (HumaLOG) corrective regimen sliding scale   SubCutaneous at bedtime  insulin lispro (HumaLOG) corrective regimen sliding scale   SubCutaneous Before meals and at bedtime  insulin lispro Injectable (HumaLOG) 12 Unit(s) SubCutaneous three times a day before meals  lactulose Syrup 20 Gram(s) Oral once  melatonin 3 milliGRAM(s) Oral at bedtime  methylPREDNISolone sodium succinate Injectable 30 milliGRAM(s) IV Push two times a day  methylPREDNISolone sodium succinate Injectable   IV Push   mycophenolate mofetil 1 Gram(s) Oral every 12 hours  nystatin    Suspension 204702 Unit(s) Swish and Swallow four times a day  senna 2 Tablet(s) Oral at bedtime  tacrolimus 5 milliGRAM(s) Oral <User Schedule>  tamsulosin 0.4 milliGRAM(s) Oral at bedtime  trimethoprim   80 mG/sulfamethoxazole 400 mG 1 Tablet(s) Oral daily  valGANciclovir 450 milliGRAM(s) Oral daily    MEDICATIONS  (PRN):  acetaminophen   Tablet .. 650 milliGRAM(s) Oral every 6 hours PRN Mild Pain (1 - 3)  dextrose 40% Gel 15 Gram(s) Oral once PRN Blood Glucose LESS THAN 70 milliGRAM(s)/deciliter  glucagon  Injectable 1 milliGRAM(s) IntraMuscular once PRN Glucose LESS THAN 70 milligrams/deciliter  ondansetron Injectable 4 milliGRAM(s) IV Push every 6 hours PRN Nausea and/or Vomiting  traMADol 25 milliGRAM(s) Oral every 4 hours PRN Moderate Pain (4 - 6)  traMADol 50 milliGRAM(s) Oral every 4 hours PRN Severe Pain (7 - 10)      PAST MEDICAL & SURGICAL HISTORY:  Asthma: never been intubated for asthma   last inhaler used 7/2019  Heart murmur  Dyslipidemia  Bilateral dry eyes  History of glaucoma: left eye  Ventral hernia: repair  Diabetes: T2DM  last A1C (8%)  Hypertension  DKA (diabetic ketoacidoses)  Diastolic dysfunction: mild. stage 1. EF 65%  CKD (chronic kidney disease): hemodialysis on Mon, Wed and Fri  Insulin pump status: denies  Pneumonia: 4/2013  Osteomyelitis of ankle or foot: x2  Diabetic neuropathy  Hypertension  Status post cataract extraction  S/P laparoscopic sleeve gastrectomy: 2015  H/O elbow surgery: left  H/O cardiac catheterization: no stents  Retinal hemorrhage, left eye: s/p laser surgery  hx of right eye retinal hemorrahge, tx with laser surgery  S/P amputation: right hallux 5/13  S/P hernia repair  S/P carpal tunnel release: b/l      Vital Signs Last 24 Hrs  T(C): 36.8 (21 Feb 2020 09:32), Max: 36.9 (20 Feb 2020 13:00)  T(F): 98.2 (21 Feb 2020 09:32), Max: 98.4 (20 Feb 2020 13:00)  HR: 70 (21 Feb 2020 09:32) (70 - 78)  BP: 133/62 (21 Feb 2020 09:32) (133/62 - 171/85)  BP(mean): 88 (21 Feb 2020 09:32) (88 - 88)  RR: 18 (21 Feb 2020 09:32) (18 - 18)  SpO2: 97% (21 Feb 2020 09:32) (96% - 98%)    I&O's Summary    20 Feb 2020 07:01  -  21 Feb 2020 07:00  --------------------------------------------------------  IN: 720 mL / OUT: 2710 mL / NET: -1990 mL    21 Feb 2020 07:01  -  21 Feb 2020 10:35  --------------------------------------------------------  IN: 0 mL / OUT: 135 mL / NET: -135 mL                              9.6    9.84  )-----------( 170      ( 21 Feb 2020 05:59 )             31.1     02-21    136  |  108  |  47<H>  ----------------------------<  243<H>  4.8   |  17<L>  |  1.65<H>    Ca    8.7      21 Feb 2020 05:59  Phos  3.4     02-21  Mg     2.7     02-21    TPro  6.6  /  Alb  3.3  /  TBili  0.2  /  DBili  x   /  AST  15  /  ALT  9<L>  /  AlkPhos  68  02-21    Tacrolimus (), Serum: 10.3 ng/mL (02-21 @ 07:27)                Review of systems  Gen: No weight changes, fatigue, fevers/chills, weakness  Skin: No rashes  Head/Eyes/Ears/Mouth: No headache; Normal hearing; Normal vision w/o blurriness; No sinus pain/discomfort, sore throat  Respiratory: No dyspnea, cough, wheezing, hemoptysis  CV: No chest pain, PND, orthopnea  GI: C/O mild abdominal pain at surgical site. no diarrhea, constipation, nausea, vomiting, melena, hematochezia  : No increased frequency, dysuria, hematuria, nocturia  MSK: No joint pain/swelling; no back pain; no edema  Neuro: No dizziness/lightheadedness, weakness, seizures, numbness, tingling  Heme: No easy bruising or bleeding  Endo: No heat/cold intolerance  Psych: No significant nervousness, anxiety, stress, depression  All other systems were reviewed and are negative, except as noted.      PHYSICAL EXAM:  Constitutional: Well developed / well nourished  Eyes: Anicteric, PERRLA  ENMT: nc/at  Neck: supple, no JVD  Respiratory: CTA B/L  Cardiovascular: RRR  Gastrointestinal: Soft abdomen, ND, appropriate incisional TTP. LEFT dermabond incision c/d/i, no signs of infection. JPx1 ss  Genitourinary: Urinary catheter in place with good UO  Extremities: SCD's in place and working bilaterally, no edema  Vascular: + DP pulses (dopplerable);  L FISTULA palpable  Neurological: A&O x3  Skin: no rashes, lesions, ulcerations  Musculoskeletal: Moving all extremities  Psychiatric: Responsive

## 2020-02-21 NOTE — PROGRESS NOTE ADULT - MINUTES
Patient requesting refills of the following be sent to [facility] [place] Dru) Simvastatin  2) Metoprolol  3) Zocor  4) Levothyroxine  5) Losartan
Please attach refills  And send to provider bin 
Pt is requesting refill on simvastatin, metoprolol, zocor, levothyroxine, losartan    Last OV:02/12/2020  Future OV:05/13/2020
30

## 2020-02-24 ENCOUNTER — APPOINTMENT (OUTPATIENT)
Dept: TRANSPLANT | Facility: CLINIC | Age: 60
End: 2020-02-24

## 2020-02-24 ENCOUNTER — APPOINTMENT (OUTPATIENT)
Dept: TRANSPLANT | Facility: CLINIC | Age: 60
End: 2020-02-24
Payer: COMMERCIAL

## 2020-02-24 VITALS
WEIGHT: 172.4 LBS | TEMPERATURE: 98.1 F | DIASTOLIC BLOOD PRESSURE: 76 MMHG | SYSTOLIC BLOOD PRESSURE: 134 MMHG | OXYGEN SATURATION: 99 % | HEIGHT: 69 IN | RESPIRATION RATE: 12 BRPM | BODY MASS INDEX: 25.53 KG/M2 | HEART RATE: 70 BPM

## 2020-02-24 LAB
ALBUMIN SERPL ELPH-MCNC: 3.8 G/DL
ALP BLD-CCNC: 76 U/L
ALT SERPL-CCNC: 25 U/L
ANION GAP SERPL CALC-SCNC: 9 MMOL/L
APPEARANCE: ABNORMAL
AST SERPL-CCNC: 16 U/L
BACTERIA: NEGATIVE
BASOPHILS # BLD AUTO: 0 K/UL
BASOPHILS NFR BLD AUTO: 0 %
BILIRUB SERPL-MCNC: 0.4 MG/DL
BILIRUBIN URINE: NEGATIVE
BLOOD URINE: ABNORMAL
BUN SERPL-MCNC: 34 MG/DL
CALCIUM SERPL-MCNC: 8.6 MG/DL
CHLORIDE SERPL-SCNC: 111 MMOL/L
CO2 SERPL-SCNC: 20 MMOL/L
COLOR: YELLOW
CREAT SERPL-MCNC: 1.24 MG/DL
CREAT SPEC-SCNC: 143 MG/DL
CREAT/PROT UR: 0.7 RATIO
EOSINOPHIL # BLD AUTO: 0.05 K/UL
EOSINOPHIL NFR BLD AUTO: 0.7 %
GLUCOSE QUALITATIVE U: ABNORMAL
GLUCOSE SERPL-MCNC: 171 MG/DL
HCT VFR BLD CALC: 34.2 %
HGB BLD-MCNC: 10.1 G/DL
HYALINE CASTS: 0 /LPF
IMM GRANULOCYTES NFR BLD AUTO: 0.6 %
KETONES URINE: NEGATIVE
LDH SERPL-CCNC: 200 U/L
LEUKOCYTE ESTERASE URINE: NEGATIVE
LYMPHOCYTES # BLD AUTO: 1.67 K/UL
LYMPHOCYTES NFR BLD AUTO: 24.4 %
MAGNESIUM SERPL-MCNC: 2.1 MG/DL
MAN DIFF?: NORMAL
MCHC RBC-ENTMCNC: 29.5 GM/DL
MCHC RBC-ENTMCNC: 29.5 PG
MCV RBC AUTO: 100 FL
MICROSCOPIC-UA: NORMAL
MONOCYTES # BLD AUTO: 0.36 K/UL
MONOCYTES NFR BLD AUTO: 5.3 %
NEUTROPHILS # BLD AUTO: 4.73 K/UL
NEUTROPHILS NFR BLD AUTO: 69 %
NITRITE URINE: NEGATIVE
PH URINE: 6.5
PHOSPHATE SERPL-MCNC: 1.5 MG/DL
PLATELET # BLD AUTO: 220 K/UL
POTASSIUM SERPL-SCNC: 4.5 MMOL/L
PROT SERPL-MCNC: 6.4 G/DL
PROT UR-MCNC: 93 MG/DL
PROTEIN URINE: ABNORMAL
RBC # BLD: 3.42 M/UL
RBC # FLD: 16.1 %
RED BLOOD CELLS URINE: >720 /HPF
SODIUM SERPL-SCNC: 141 MMOL/L
SPECIFIC GRAVITY URINE: 1.03
SQUAMOUS EPITHELIAL CELLS: 2 /HPF
TACROLIMUS SERPL-MCNC: 12.1 NG/ML
URATE SERPL-MCNC: 3.2 MG/DL
UROBILINOGEN URINE: NORMAL
WBC # FLD AUTO: 6.85 K/UL
WHITE BLOOD CELLS URINE: 8 /HPF

## 2020-02-24 PROCEDURE — 99213 OFFICE O/P EST LOW 20 MIN: CPT

## 2020-02-24 RX ORDER — VIT B COMP NO.3/FOLIC/C/BIOTIN 1 MG-60 MG
1 TABLET ORAL DAILY
Qty: 90 | Refills: 3 | Status: DISCONTINUED | COMMUNITY
End: 2020-02-24

## 2020-02-24 RX ORDER — TACROLIMUS 5 MG/1
5 CAPSULE ORAL
Qty: 60 | Refills: 11 | Status: DISCONTINUED | COMMUNITY
Start: 2020-02-20 | End: 2020-02-24

## 2020-02-24 RX ORDER — VIT B COMP NO.3/FOLIC/C/BIOTIN 1 MG-60 MG
1 TABLET ORAL
Qty: 90 | Refills: 3 | Status: DISCONTINUED | COMMUNITY
Start: 2019-08-02 | End: 2020-02-24

## 2020-02-24 NOTE — PLAN
[FreeTextEntry1] : Adjust insulin dosage.\par I removed drain today.\par IMMUNOSUPPRESSION:\par Will adjust prograf dose if necessary once level is available.

## 2020-02-24 NOTE — PHYSICAL EXAM
[Well Developed] : well developed [Well Nourished] : well nourished [No Acute Distress] : no acute distress [Normocephalic] : normocephalic [Atraumatic] : atraumatic [Sclera Anicteric] : sclera anicteric [Neck Supple] : neck supple [Clear to Auscultation] : clear to auscultation [Breathing Comfortably on RA] : breathing comfortably on room air [Normal Rate] : normal rate [Regular Rhythm] : regular rhythm [Soft] : soft [Non-tender] : non-tender [] : left dorsalis pedis palpable [Alert] : alert [Responds to Questions Appropriately] : responds to questions appropriately [Oriented] : oriented [Appropriate] : appropriate [TextBox_34] : No edema [TextBox_86] : No palpable adenopathy [Clean] : clean [Dry] : dry [Healing Well] : healing well [Bleeding] : no active bleeding [Foul Odor] : no foul smell [Serosanguinous Drainage] : no serosanguinous drainage [Purulent Drainage] : no purulent drainage [Erythema] : not erythematous [Warm] : not warm [Tender] : not tender

## 2020-02-24 NOTE — HISTORY OF PRESENT ILLNESS
[de-identified] : Immunosuppression management follow up visit after kidney transplantation.\par Presented with wife Rebeca.\par Elevated glucose levels/\par IMMUNOSUPPRESSION:\par PROGRAF 5-5\par CELLCEPT 1-1\par PREDNISONE 5

## 2020-02-27 ENCOUNTER — APPOINTMENT (OUTPATIENT)
Dept: PULMONOLOGY | Facility: CLINIC | Age: 60
End: 2020-02-27
Payer: COMMERCIAL

## 2020-02-27 VITALS
HEIGHT: 69 IN | HEART RATE: 79 BPM | BODY MASS INDEX: 25.62 KG/M2 | TEMPERATURE: 98.6 F | WEIGHT: 173 LBS | RESPIRATION RATE: 12 BRPM | OXYGEN SATURATION: 99 % | DIASTOLIC BLOOD PRESSURE: 68 MMHG | SYSTOLIC BLOOD PRESSURE: 160 MMHG

## 2020-02-27 LAB
BKV DNA SPEC QL NAA+PROBE: NOT DETECTED COPIES/ML
CMV DNA SPEC QL NAA+PROBE: NOT DETECTED

## 2020-02-27 PROCEDURE — 99214 OFFICE O/P EST MOD 30 MIN: CPT

## 2020-03-02 ENCOUNTER — APPOINTMENT (OUTPATIENT)
Dept: NEPHROLOGY | Facility: CLINIC | Age: 60
End: 2020-03-02

## 2020-03-05 ENCOUNTER — APPOINTMENT (OUTPATIENT)
Dept: NEPHROLOGY | Facility: CLINIC | Age: 60
End: 2020-03-05
Payer: COMMERCIAL

## 2020-03-05 VITALS
BODY MASS INDEX: 24.73 KG/M2 | TEMPERATURE: 98.7 F | OXYGEN SATURATION: 100 % | WEIGHT: 167 LBS | RESPIRATION RATE: 12 BRPM | DIASTOLIC BLOOD PRESSURE: 67 MMHG | SYSTOLIC BLOOD PRESSURE: 156 MMHG | HEART RATE: 60 BPM | HEIGHT: 69 IN

## 2020-03-05 LAB
ALBUMIN SERPL ELPH-MCNC: 4.2 G/DL
ALP BLD-CCNC: 97 U/L
ALT SERPL-CCNC: 14 U/L
ANION GAP SERPL CALC-SCNC: 13 MMOL/L
APPEARANCE: CLEAR
AST SERPL-CCNC: 12 U/L
BACTERIA: ABNORMAL
BASOPHILS # BLD AUTO: 0.05 K/UL
BASOPHILS NFR BLD AUTO: 0.6 %
BILIRUB SERPL-MCNC: 0.2 MG/DL
BILIRUBIN URINE: NEGATIVE
BLOOD URINE: ABNORMAL
BUN SERPL-MCNC: 29 MG/DL
CALCIUM SERPL-MCNC: 9.5 MG/DL
CHLORIDE SERPL-SCNC: 112 MMOL/L
CO2 SERPL-SCNC: 16 MMOL/L
COLOR: YELLOW
CREAT SERPL-MCNC: 1.2 MG/DL
CREAT SPEC-SCNC: 125 MG/DL
CREAT/PROT UR: 0.6 RATIO
EOSINOPHIL # BLD AUTO: 0.11 K/UL
EOSINOPHIL NFR BLD AUTO: 1.4 %
GLUCOSE QUALITATIVE U: NEGATIVE
GLUCOSE SERPL-MCNC: 96 MG/DL
HCT VFR BLD CALC: 38 %
HGB BLD-MCNC: 10.9 G/DL
HYALINE CASTS: 0 /LPF
IMM GRANULOCYTES NFR BLD AUTO: 0.5 %
KETONES URINE: NEGATIVE
LDH SERPL-CCNC: 219 U/L
LEUKOCYTE ESTERASE URINE: NEGATIVE
LYMPHOCYTES # BLD AUTO: 1.21 K/UL
LYMPHOCYTES NFR BLD AUTO: 15.5 %
MAGNESIUM SERPL-MCNC: 2.1 MG/DL
MAN DIFF?: NORMAL
MCHC RBC-ENTMCNC: 28.7 GM/DL
MCHC RBC-ENTMCNC: 29.5 PG
MCV RBC AUTO: 103 FL
MICROSCOPIC-UA: NORMAL
MONOCYTES # BLD AUTO: 0.32 K/UL
MONOCYTES NFR BLD AUTO: 4.1 %
NEUTROPHILS # BLD AUTO: 6.08 K/UL
NEUTROPHILS NFR BLD AUTO: 77.9 %
NITRITE URINE: NEGATIVE
PH URINE: 6
PHOSPHATE SERPL-MCNC: 2.4 MG/DL
PLATELET # BLD AUTO: 285 K/UL
POTASSIUM SERPL-SCNC: 5.7 MMOL/L
PROT SERPL-MCNC: 6.7 G/DL
PROT UR-MCNC: 78 MG/DL
PROTEIN URINE: ABNORMAL
RBC # BLD: 3.69 M/UL
RBC # FLD: 17.4 %
RED BLOOD CELLS URINE: 11 /HPF
SODIUM SERPL-SCNC: 141 MMOL/L
SPECIFIC GRAVITY URINE: 1.03
SQUAMOUS EPITHELIAL CELLS: 1 /HPF
TACROLIMUS SERPL-MCNC: 12.7 NG/ML
URATE SERPL-MCNC: 4 MG/DL
UROBILINOGEN URINE: NORMAL
WBC # FLD AUTO: 7.81 K/UL
WHITE BLOOD CELLS URINE: 5 /HPF

## 2020-03-05 PROCEDURE — 99214 OFFICE O/P EST MOD 30 MIN: CPT

## 2020-03-05 RX ORDER — DIBASIC SODIUM PHOSPHATE, MONOBASIC POTASSIUM PHOSPHATE AND MONOBASIC SODIUM PHOSPHATE 852; 155; 130 MG/1; MG/1; MG/1
155-852-130 TABLET ORAL
Qty: 60 | Refills: 2 | Status: DISCONTINUED | COMMUNITY
Start: 2020-02-24 | End: 2020-03-05

## 2020-03-05 NOTE — ASSESSMENT
[FreeTextEntry1] : 59 year old male s/p LRT from daughter on 2/18/2020 \par \par 1. Allograft function is excellent. most recent Cr is 1.2 . will check labs today \par 2. IS meds- Dosing tac by level . goal 8-10. will switch to Myfortic 360 mg po bid and continue Prednisone\par 3. HTN- controlled on Coreg 25 mg po bid \par 4. DM- Increase LIspro to 12/14/14 and continue Tresiba  25 units at [facility]\par 5. Anal fissure- can use Lidocaine ointment ,  will start Nitroglycerin ointment and add Metamucil for bulking up stool\par \par

## 2020-03-05 NOTE — PHYSICAL EXAM
[FreeTextEntry1] : two anal fissures noted.  [Well Developed] : well developed [Well Nourished] : well nourished [No Acute Distress] : no acute distress [Normocephalic] : normocephalic [Atraumatic] : atraumatic [Sclera Anicteric] : sclera anicteric [Neck Supple] : neck supple [Clear to Auscultation] : clear to auscultation [Breathing Comfortably on RA] : breathing comfortably on room air [Normal Rate] : normal rate [Regular Rhythm] : regular rhythm [Soft] : soft [Non-tender] : non-tender [] : right dorsalis pedis palpable [Alert] : alert [Responds to Questions Appropriately] : responds to questions appropriately [Oriented] : oriented [Appropriate] : appropriate [TextBox_34] : No edema [TextBox_86] : No palpable adenopathy

## 2020-03-05 NOTE — HISTORY OF PRESENT ILLNESS
[Living Donor] : Living donor [Basiliximab] : basiliximab [Negative/Negative] : Donor Negative/Recipient Negative [TextBox_7] : 2/18/2020 [FreeTextEntry1] : 59M with ESRD was on dialysis , now s/p LRT from his daughter on 2/12/2020 presents for a follow up visit in clinic. \par PMH includes CHF, HTN, HLD, T2DM,  asthma, left glaucoma\par Donor:\par ABO O\par Donor CMV (-), EBV (+)\par Recipient ABO O\par Recipient CMV (-), EBV (+)\par HLA mismatch 1,1,1, crossmatch negative \par \par He c/o pain and burning while passing bowels. does not have hard stools, but scanty amount of sft stool. no blood in stools , no blood on wiping. Denies any fever, chills, dysuria, LE edema, cough, SOb, chest pain , nausea, vomiting , diarrhea, constipation or any other  complaints. \par \par BP ranges 130-150/70-80\par FBS 80-90's PP  usually 180-200's , some readings of over 300 noted post lunch and post dinner. \par \par \par

## 2020-03-06 LAB — BKV DNA SPEC QL NAA+PROBE: NOT DETECTED COPIES/ML

## 2020-03-10 ENCOUNTER — APPOINTMENT (OUTPATIENT)
Dept: TRANSPLANT | Facility: CLINIC | Age: 60
End: 2020-03-10

## 2020-03-11 LAB
ALBUMIN SERPL ELPH-MCNC: 4.5 G/DL
ANION GAP SERPL CALC-SCNC: 15 MMOL/L
BUN SERPL-MCNC: 39 MG/DL
CALCIUM SERPL-MCNC: 9.4 MG/DL
CHLORIDE SERPL-SCNC: 101 MMOL/L
CO2 SERPL-SCNC: 22 MMOL/L
CREAT SERPL-MCNC: 1.5 MG/DL
GLUCOSE SERPL-MCNC: 431 MG/DL
PHOSPHATE SERPL-MCNC: 3.1 MG/DL
POTASSIUM SERPL-SCNC: 5.9 MMOL/L
SODIUM SERPL-SCNC: 138 MMOL/L

## 2020-03-13 ENCOUNTER — APPOINTMENT (OUTPATIENT)
Dept: TRANSPLANT | Facility: CLINIC | Age: 60
End: 2020-03-13
Payer: COMMERCIAL

## 2020-03-13 VITALS
SYSTOLIC BLOOD PRESSURE: 109 MMHG | DIASTOLIC BLOOD PRESSURE: 60 MMHG | HEIGHT: 69 IN | OXYGEN SATURATION: 99 % | RESPIRATION RATE: 12 BRPM | WEIGHT: 166 LBS | TEMPERATURE: 98.2 F | BODY MASS INDEX: 24.59 KG/M2 | HEART RATE: 71 BPM

## 2020-03-13 LAB
ALBUMIN SERPL ELPH-MCNC: 4.3 G/DL
ALP BLD-CCNC: 104 U/L
ALT SERPL-CCNC: 12 U/L
ANION GAP SERPL CALC-SCNC: 13 MMOL/L
APPEARANCE: CLEAR
AST SERPL-CCNC: 10 U/L
BACTERIA: NEGATIVE
BASOPHILS # BLD AUTO: 0.04 K/UL
BASOPHILS NFR BLD AUTO: 0.8 %
BILIRUB SERPL-MCNC: 0.3 MG/DL
BILIRUBIN URINE: NEGATIVE
BLOOD URINE: NEGATIVE
BUN SERPL-MCNC: 30 MG/DL
CALCIUM SERPL-MCNC: 9.4 MG/DL
CHLORIDE SERPL-SCNC: 107 MMOL/L
CO2 SERPL-SCNC: 24 MMOL/L
COLOR: YELLOW
CREAT SERPL-MCNC: 1.27 MG/DL
CREAT SPEC-SCNC: 85 MG/DL
CREAT/PROT UR: 0.3 RATIO
EOSINOPHIL # BLD AUTO: 0.06 K/UL
EOSINOPHIL NFR BLD AUTO: 1.2 %
GLUCOSE QUALITATIVE U: NORMAL
GLUCOSE SERPL-MCNC: 213 MG/DL
HCT VFR BLD CALC: 38.8 %
HGB BLD-MCNC: 11.5 G/DL
HYALINE CASTS: 2 /LPF
IMM GRANULOCYTES NFR BLD AUTO: 0.4 %
KETONES URINE: NEGATIVE
LDH SERPL-CCNC: 151 U/L
LEUKOCYTE ESTERASE URINE: NEGATIVE
LYMPHOCYTES # BLD AUTO: 1.53 K/UL
LYMPHOCYTES NFR BLD AUTO: 29.5 %
MAGNESIUM SERPL-MCNC: 1.9 MG/DL
MAN DIFF?: NORMAL
MCHC RBC-ENTMCNC: 29.6 GM/DL
MCHC RBC-ENTMCNC: 29.9 PG
MCV RBC AUTO: 100.8 FL
MICROSCOPIC-UA: NORMAL
MONOCYTES # BLD AUTO: 0.16 K/UL
MONOCYTES NFR BLD AUTO: 3.1 %
NEUTROPHILS # BLD AUTO: 3.37 K/UL
NEUTROPHILS NFR BLD AUTO: 65 %
NITRITE URINE: NEGATIVE
PH URINE: 6
PHOSPHATE SERPL-MCNC: 2.5 MG/DL
PLATELET # BLD AUTO: 253 K/UL
POTASSIUM SERPL-SCNC: 4.3 MMOL/L
PROT SERPL-MCNC: 6.7 G/DL
PROT UR-MCNC: 25 MG/DL
PROTEIN URINE: NORMAL
RBC # BLD: 3.85 M/UL
RBC # FLD: 16.6 %
RED BLOOD CELLS URINE: 2 /HPF
SODIUM SERPL-SCNC: 144 MMOL/L
SPECIFIC GRAVITY URINE: 1.02
SQUAMOUS EPITHELIAL CELLS: 1 /HPF
TACROLIMUS SERPL-MCNC: 11.5 NG/ML
URATE SERPL-MCNC: 6.3 MG/DL
UROBILINOGEN URINE: NORMAL
WBC # FLD AUTO: 5.18 K/UL
WHITE BLOOD CELLS URINE: 4 /HPF

## 2020-03-13 PROCEDURE — 99024 POSTOP FOLLOW-UP VISIT: CPT

## 2020-03-13 RX ORDER — SEVELAMER CARBONATE 800 MG/1
800 TABLET, FILM COATED ORAL 3 TIMES DAILY
Qty: 810 | Refills: 0 | Status: DISCONTINUED | COMMUNITY
Start: 2018-06-21 | End: 2020-03-13

## 2020-03-13 RX ORDER — BUMETANIDE 2 MG/1
2 TABLET ORAL
Qty: 300 | Refills: 3 | Status: DISCONTINUED | COMMUNITY
Start: 2019-07-15 | End: 2020-03-13

## 2020-03-13 RX ORDER — LABETALOL HYDROCHLORIDE 100 MG/1
100 TABLET, FILM COATED ORAL
Qty: 30 | Refills: 1 | Status: DISCONTINUED | COMMUNITY
Start: 2018-03-15 | End: 2020-03-13

## 2020-03-13 RX ORDER — MYCOPHENOLATE MOFETIL 500 MG/1
500 TABLET ORAL
Qty: 60 | Refills: 11 | Status: DISCONTINUED | COMMUNITY
Start: 2020-02-19 | End: 2020-03-13

## 2020-03-13 NOTE — REASON FOR VISIT
[Follow-Up] : a follow-up visit  [Spouse] : spouse [FreeTextEntry3] : LDRT [FreeTextEntry5] : 2/18/2020

## 2020-03-13 NOTE — PHYSICAL EXAM
[No Acute Distress] : no acute distress [Clear to Auscultation] : clear to auscultation [Normal Rate] : normal rate [Regular Rhythm] : regular rhythm [Soft] : soft [Non-tender] : non-tender [] : right dorsalis pedis diminished [Normal] : normal [Oriented] : oriented [Appropriate] : appropriate [Site: ___] : Site: [unfilled] [Clean] : clean [Dry] : dry [Healing Well] : healing well [FreeTextEntry1] : Anicteric, no oral thrush [TextBox_25] : Reducible umbilical hernia [Bleeding] : no active bleeding [Foul Odor] : no foul smell [Purulent Drainage] : no purulent drainage [Serosanguinous Drainage] : no serosanguinous drainage [Erythema] : not erythematous [Warm] : not warm [Tender] : not tender

## 2020-03-13 NOTE — PLAN
[FreeTextEntry1] : 3 weeks s/p LDRT with good renal function, Cr 1.5\par +hyperkalemia- cont lokelma, hold bactrim. Pt educated about avoiding high-potassium foods- he was eating protein bars high in K. \par Immunosuppression - Cont Prograf 4mg bid, Prednisone 5mg daily, Myfortic 360mg bid\par Will check tacrolimus level and adjust dose accordingly, with goal 8-12.\par Cont coreg for HTN\par DM controlled with Tresiba and novolog 12/14/14\par Cont lasix for now; will plan to decrease or stop depending on K and Cr\par avoid strenuous activity or heavy lifting \par Patient to f/u with nephrology Dr Puente next week, and alternate weekly visits with nephrology and surgery

## 2020-03-13 NOTE — HISTORY OF PRESENT ILLNESS
[Living Donor] : Living donor [Basiliximab] : basiliximab [Steroids] : steroids [Negative/Negative] : Donor Negative/Recipient Negative [] : Yes [de-identified] : 59M with DM, ESRD on HD s/p LDRT from his daughter 2/18/2020.\par Excellent renal function on discharge with slow drop in creatinine, which ranged from 1.2 - 1.5.\par He is making good urine, and denies any fevers, abdominal pain, nausea, vomiting, hematuria or dysuria.\par His diarrhea has improved with metamucil and transitioning to myfortic.\par Bactrim held due to hyperkalemia, for which he has also been taking lokelma for past 2 days.\par \par BP controlled 130-160's/60-70's\par Glucose better controlled with diet awareness, now \par \par Currently taking Prograf 4mg bid

## 2020-03-15 LAB — BKV DNA SPEC QL NAA+PROBE: NOT DETECTED COPIES/ML

## 2020-03-16 ENCOUNTER — APPOINTMENT (OUTPATIENT)
Dept: TRANSPLANT | Facility: CLINIC | Age: 60
End: 2020-03-16

## 2020-03-16 ENCOUNTER — APPOINTMENT (OUTPATIENT)
Dept: NEPHROLOGY | Facility: CLINIC | Age: 60
End: 2020-03-16

## 2020-03-16 ENCOUNTER — APPOINTMENT (OUTPATIENT)
Dept: TRANSPLANT | Facility: HOSPITAL | Age: 60
End: 2020-03-16

## 2020-03-18 ENCOUNTER — APPOINTMENT (OUTPATIENT)
Dept: TRANSPLANT | Facility: CLINIC | Age: 60
End: 2020-03-18

## 2020-03-18 LAB
ALBUMIN SERPL ELPH-MCNC: 4.4 G/DL
ALP BLD-CCNC: 116 U/L
ALT SERPL-CCNC: 11 U/L
ANION GAP SERPL CALC-SCNC: 12 MMOL/L
APPEARANCE: CLEAR
AST SERPL-CCNC: 10 U/L
BACTERIA: NEGATIVE
BASOPHILS # BLD AUTO: 0.04 K/UL
BASOPHILS NFR BLD AUTO: 0.7 %
BILIRUB SERPL-MCNC: 0.3 MG/DL
BILIRUBIN URINE: NEGATIVE
BLOOD URINE: NEGATIVE
BUN SERPL-MCNC: 29 MG/DL
CALCIUM SERPL-MCNC: 10 MG/DL
CHLORIDE SERPL-SCNC: 104 MMOL/L
CO2 SERPL-SCNC: 27 MMOL/L
COLOR: YELLOW
CREAT SERPL-MCNC: 1.16 MG/DL
CREAT SPEC-SCNC: 70 MG/DL
CREAT/PROT UR: 0.5 RATIO
EOSINOPHIL # BLD AUTO: 0.08 K/UL
EOSINOPHIL NFR BLD AUTO: 1.4 %
GLUCOSE QUALITATIVE U: ABNORMAL
GLUCOSE SERPL-MCNC: 120 MG/DL
HCT VFR BLD CALC: 39.1 %
HGB BLD-MCNC: 11.8 G/DL
HYALINE CASTS: 0 /LPF
IMM GRANULOCYTES NFR BLD AUTO: 0.5 %
KETONES URINE: NEGATIVE
LDH SERPL-CCNC: 174 U/L
LEUKOCYTE ESTERASE URINE: NEGATIVE
LYMPHOCYTES # BLD AUTO: 1.46 K/UL
LYMPHOCYTES NFR BLD AUTO: 25.4 %
MAGNESIUM SERPL-MCNC: 1.8 MG/DL
MAN DIFF?: NORMAL
MCHC RBC-ENTMCNC: 30 PG
MCHC RBC-ENTMCNC: 30.2 GM/DL
MCV RBC AUTO: 99.5 FL
MICROSCOPIC-UA: NORMAL
MONOCYTES # BLD AUTO: 0.22 K/UL
MONOCYTES NFR BLD AUTO: 3.8 %
NEUTROPHILS # BLD AUTO: 3.91 K/UL
NEUTROPHILS NFR BLD AUTO: 68.2 %
NITRITE URINE: NEGATIVE
PH URINE: 6.5
PHOSPHATE SERPL-MCNC: 2.4 MG/DL
PLATELET # BLD AUTO: 281 K/UL
POTASSIUM SERPL-SCNC: 4.9 MMOL/L
PROT SERPL-MCNC: 6.9 G/DL
PROT UR-MCNC: 33 MG/DL
PROTEIN URINE: ABNORMAL
RBC # BLD: 3.93 M/UL
RBC # FLD: 15.9 %
RED BLOOD CELLS URINE: 1 /HPF
SODIUM SERPL-SCNC: 143 MMOL/L
SPECIFIC GRAVITY URINE: 1.02
SQUAMOUS EPITHELIAL CELLS: 1 /HPF
TACROLIMUS SERPL-MCNC: 12.9 NG/ML
URATE SERPL-MCNC: 5.1 MG/DL
UROBILINOGEN URINE: NORMAL
WBC # FLD AUTO: 5.74 K/UL
WHITE BLOOD CELLS URINE: 3 /HPF

## 2020-03-18 RX ORDER — SODIUM ZIRCONIUM CYCLOSILICATE 10 G/10G
10 POWDER, FOR SUSPENSION ORAL DAILY
Qty: 90 | Refills: 0 | Status: DISCONTINUED | COMMUNITY
Start: 2020-03-05 | End: 2020-03-18

## 2020-03-19 ENCOUNTER — APPOINTMENT (OUTPATIENT)
Dept: TRANSPLANT | Facility: CLINIC | Age: 60
End: 2020-03-19

## 2020-03-20 LAB — BKV DNA SPEC QL NAA+PROBE: NOT DETECTED COPIES/ML

## 2020-03-23 ENCOUNTER — APPOINTMENT (OUTPATIENT)
Dept: NEPHROLOGY | Facility: CLINIC | Age: 60
End: 2020-03-23

## 2020-03-23 ENCOUNTER — APPOINTMENT (OUTPATIENT)
Dept: TRANSPLANT | Facility: CLINIC | Age: 60
End: 2020-03-23

## 2020-03-23 LAB
ALBUMIN SERPL ELPH-MCNC: 4.5 G/DL
ALP BLD-CCNC: 122 U/L
ALT SERPL-CCNC: 12 U/L
ANION GAP SERPL CALC-SCNC: 12 MMOL/L
APPEARANCE: CLEAR
AST SERPL-CCNC: 12 U/L
BASOPHILS # BLD AUTO: 0.04 K/UL
BASOPHILS NFR BLD AUTO: 0.5 %
BILIRUB SERPL-MCNC: 0.3 MG/DL
BILIRUBIN URINE: NEGATIVE
BLOOD URINE: NEGATIVE
BUN SERPL-MCNC: 27 MG/DL
CALCIUM SERPL-MCNC: 9.6 MG/DL
CHLORIDE SERPL-SCNC: 102 MMOL/L
CO2 SERPL-SCNC: 25 MMOL/L
COLOR: YELLOW
CREAT SERPL-MCNC: 1.11 MG/DL
CREAT SPEC-SCNC: 80 MG/DL
CREAT/PROT UR: 0.2 RATIO
EOSINOPHIL # BLD AUTO: 0.03 K/UL
EOSINOPHIL NFR BLD AUTO: 0.4 %
GLUCOSE QUALITATIVE U: ABNORMAL
GLUCOSE SERPL-MCNC: 281 MG/DL
HCT VFR BLD CALC: 40.5 %
HGB BLD-MCNC: 12.3 G/DL
IMM GRANULOCYTES NFR BLD AUTO: 0.8 %
KETONES URINE: NEGATIVE
LDH SERPL-CCNC: 172 U/L
LEUKOCYTE ESTERASE URINE: NEGATIVE
LYMPHOCYTES # BLD AUTO: 2.05 K/UL
LYMPHOCYTES NFR BLD AUTO: 26.8 %
MAGNESIUM SERPL-MCNC: 1.8 MG/DL
MAN DIFF?: NORMAL
MCHC RBC-ENTMCNC: 30.1 PG
MCHC RBC-ENTMCNC: 30.4 GM/DL
MCV RBC AUTO: 99.3 FL
MONOCYTES # BLD AUTO: 0.29 K/UL
MONOCYTES NFR BLD AUTO: 3.8 %
NEUTROPHILS # BLD AUTO: 5.19 K/UL
NEUTROPHILS NFR BLD AUTO: 67.7 %
NITRITE URINE: NEGATIVE
PH URINE: 6
PHOSPHATE SERPL-MCNC: 2.1 MG/DL
PLATELET # BLD AUTO: 292 K/UL
POTASSIUM SERPL-SCNC: 4.1 MMOL/L
PROT SERPL-MCNC: 6.9 G/DL
PROT UR-MCNC: 16 MG/DL
PROTEIN URINE: NORMAL
RBC # BLD: 4.08 M/UL
RBC # FLD: 15.8 %
SODIUM SERPL-SCNC: 138 MMOL/L
SPECIFIC GRAVITY URINE: 1.03
TACROLIMUS SERPL-MCNC: 12.6 NG/ML
URATE SERPL-MCNC: 4.9 MG/DL
UROBILINOGEN URINE: NORMAL
WBC # FLD AUTO: 7.66 K/UL

## 2020-03-23 RX ORDER — PREDNISONE 5 MG/1
5 TABLET ORAL
Qty: 42 | Refills: 0 | Status: DISCONTINUED | COMMUNITY
Start: 2020-02-19 | End: 2020-03-23

## 2020-03-23 RX ORDER — LIDOCAINE AND PRILOCAINE 25; 25 MG/G; MG/G
2.5-2.5 CREAM TOPICAL
Qty: 3 | Refills: 3 | Status: DISCONTINUED | COMMUNITY
Start: 2019-09-16 | End: 2020-03-23

## 2020-03-23 RX ORDER — ASPIRIN 81 MG/1
81 TABLET, CHEWABLE ORAL DAILY
Qty: 30 | Refills: 11 | Status: DISCONTINUED | COMMUNITY
Start: 2020-02-20 | End: 2020-03-23

## 2020-03-25 LAB — BKV DNA SPEC QL NAA+PROBE: NOT DETECTED COPIES/ML

## 2020-03-30 ENCOUNTER — APPOINTMENT (OUTPATIENT)
Dept: TRANSPLANT | Facility: CLINIC | Age: 60
End: 2020-03-30

## 2020-03-30 ENCOUNTER — APPOINTMENT (OUTPATIENT)
Dept: NEPHROLOGY | Facility: CLINIC | Age: 60
End: 2020-03-30

## 2020-03-31 LAB
ALBUMIN SERPL ELPH-MCNC: 4.9 G/DL
ALP BLD-CCNC: 118 U/L
ALT SERPL-CCNC: 18 U/L
ANION GAP SERPL CALC-SCNC: 13 MMOL/L
APPEARANCE: CLEAR
AST SERPL-CCNC: 19 U/L
BACTERIA: NEGATIVE
BASOPHILS # BLD AUTO: 0.06 K/UL
BASOPHILS NFR BLD AUTO: 0.7 %
BILIRUB SERPL-MCNC: 0.4 MG/DL
BILIRUBIN URINE: NEGATIVE
BLOOD URINE: NEGATIVE
BUN SERPL-MCNC: 33 MG/DL
CALCIUM SERPL-MCNC: 10 MG/DL
CHLORIDE SERPL-SCNC: 104 MMOL/L
CO2 SERPL-SCNC: 23 MMOL/L
COLOR: YELLOW
CREAT SERPL-MCNC: 1.23 MG/DL
CREAT SPEC-SCNC: 140 MG/DL
CREAT/PROT UR: 0.4 RATIO
EOSINOPHIL # BLD AUTO: 0.04 K/UL
EOSINOPHIL NFR BLD AUTO: 0.5 %
GLUCOSE QUALITATIVE U: ABNORMAL
GLUCOSE SERPL-MCNC: 169 MG/DL
HCT VFR BLD CALC: 47.3 %
HGB BLD-MCNC: 14.1 G/DL
HYALINE CASTS: 1 /LPF
IMM GRANULOCYTES NFR BLD AUTO: 0.7 %
KETONES URINE: NORMAL
LDH SERPL-CCNC: 211 U/L
LEUKOCYTE ESTERASE URINE: NEGATIVE
LYMPHOCYTES # BLD AUTO: 2.08 K/UL
LYMPHOCYTES NFR BLD AUTO: 25.3 %
MAGNESIUM SERPL-MCNC: 2.3 MG/DL
MAN DIFF?: NORMAL
MCHC RBC-ENTMCNC: 29.8 GM/DL
MCHC RBC-ENTMCNC: 30.1 PG
MCV RBC AUTO: 101.1 FL
MICROSCOPIC-UA: NORMAL
MONOCYTES # BLD AUTO: 0.24 K/UL
MONOCYTES NFR BLD AUTO: 2.9 %
NEUTROPHILS # BLD AUTO: 5.75 K/UL
NEUTROPHILS NFR BLD AUTO: 69.9 %
NITRITE URINE: NEGATIVE
PH URINE: 6
PHOSPHATE SERPL-MCNC: 2.5 MG/DL
PLATELET # BLD AUTO: 280 K/UL
POTASSIUM SERPL-SCNC: 5.6 MMOL/L
PROT SERPL-MCNC: 7.7 G/DL
PROT UR-MCNC: 52 MG/DL
PROTEIN URINE: ABNORMAL
RBC # BLD: 4.68 M/UL
RBC # FLD: 15.9 %
RED BLOOD CELLS URINE: 4 /HPF
SODIUM SERPL-SCNC: 139 MMOL/L
SPECIFIC GRAVITY URINE: 1.03
SQUAMOUS EPITHELIAL CELLS: 2 /HPF
TACROLIMUS SERPL-MCNC: 8 NG/ML
URATE SERPL-MCNC: 5.5 MG/DL
UROBILINOGEN URINE: ABNORMAL
WBC # FLD AUTO: 8.23 K/UL
WHITE BLOOD CELLS URINE: 4 /HPF

## 2020-04-01 LAB — BKV DNA SPEC QL NAA+PROBE: NOT DETECTED COPIES/ML

## 2020-04-06 ENCOUNTER — APPOINTMENT (OUTPATIENT)
Dept: TRANSPLANT | Facility: CLINIC | Age: 60
End: 2020-04-06
Payer: COMMERCIAL

## 2020-04-06 PROCEDURE — 99422 OL DIG E/M SVC 11-20 MIN: CPT

## 2020-04-06 RX ORDER — SODIUM ZIRCONIUM CYCLOSILICATE 10 G/10G
10 POWDER, FOR SUSPENSION ORAL DAILY
Qty: 30 | Refills: 0 | Status: DISCONTINUED | COMMUNITY
End: 2020-04-06

## 2020-04-06 NOTE — HISTORY OF PRESENT ILLNESS
[Home] : at home, [unfilled] , at the time of the visit. [Living Donor] : Living donor [Basiliximab] : basiliximab [Steroids] : steroids [] : Yes [TextBox_7] : 2/28/2020 [de-identified] : 59 with ESRD due to DM, s/p LDRT from his daughter 2/18/2020\par Excellent renal function, with Cr ~1-1.3. Currently taking Prograf 2mg bid.\par \par Main issue post-op has been hyperkalemia, mostly due to poor dietary choices. Initially bactrim was held. He is currently back on bactrim and taking lokelma.\par \par He denies any fevers, abdominal pain, nausea, diarrhea, dysuria or hematuria.\par

## 2020-04-06 NOTE — PLAN
[FreeTextEntry1] : 7 weeks s/p LDRT with good renal function, Cr 1.3\par hyperkalemia improving- cont lokelma for now. Pt educated about avoiding high-potassium foods\par Immunosuppression - Cont Prograf 2mg bid, Prednisone 5mg daily, Myfortic 360mg bid\par Will check tacrolimus level and adjust dose accordingly, with goal 8-12.\par Cont coreg for HTN\par DM controlled with Tresiba and novolog 12/14/14\par avoid strenuous activity or heavy lifting \par will plan for telemedicine visit with nephrology next week

## 2020-04-09 LAB
ALBUMIN SERPL ELPH-MCNC: 4.4 G/DL
ALP BLD-CCNC: 104 U/L
ALT SERPL-CCNC: 18 U/L
ANION GAP SERPL CALC-SCNC: 12 MMOL/L
APPEARANCE: CLEAR
AST SERPL-CCNC: 17 U/L
BACTERIA: NEGATIVE
BASOPHILS # BLD AUTO: 0.03 K/UL
BASOPHILS NFR BLD AUTO: 0.4 %
BILIRUB SERPL-MCNC: 0.2 MG/DL
BILIRUBIN URINE: NEGATIVE
BKV DNA SPEC QL NAA+PROBE: NOT DETECTED COPIES/ML
BLOOD URINE: NEGATIVE
BUN SERPL-MCNC: 26 MG/DL
CALCIUM SERPL-MCNC: 9.6 MG/DL
CALCIUM SERPL-MCNC: 9.6 MG/DL
CHLORIDE SERPL-SCNC: 107 MMOL/L
CO2 SERPL-SCNC: 24 MMOL/L
COLOR: YELLOW
CREAT SERPL-MCNC: 1.06 MG/DL
CREAT SPEC-SCNC: 128 MG/DL
CREAT/PROT UR: 0.4 RATIO
EOSINOPHIL # BLD AUTO: 0.04 K/UL
EOSINOPHIL NFR BLD AUTO: 0.6 %
GLUCOSE QUALITATIVE U: ABNORMAL
GLUCOSE SERPL-MCNC: 48 MG/DL
HCT VFR BLD CALC: 43.8 %
HGB BLD-MCNC: 13.5 G/DL
HYALINE CASTS: 0 /LPF
IMM GRANULOCYTES NFR BLD AUTO: 0.6 %
KETONES URINE: NEGATIVE
LDH SERPL-CCNC: 167 U/L
LEUKOCYTE ESTERASE URINE: NEGATIVE
LYMPHOCYTES # BLD AUTO: 1.97 K/UL
LYMPHOCYTES NFR BLD AUTO: 29.5 %
MAGNESIUM SERPL-MCNC: 1.9 MG/DL
MAN DIFF?: NORMAL
MCHC RBC-ENTMCNC: 30.8 GM/DL
MCHC RBC-ENTMCNC: 31 PG
MCV RBC AUTO: 100.5 FL
MICROSCOPIC-UA: NORMAL
MONOCYTES # BLD AUTO: 0.25 K/UL
MONOCYTES NFR BLD AUTO: 3.7 %
NEUTROPHILS # BLD AUTO: 4.35 K/UL
NEUTROPHILS NFR BLD AUTO: 65.2 %
NITRITE URINE: NEGATIVE
PARATHYROID HORMONE INTACT: 73 PG/ML
PH URINE: 6.5
PHOSPHATE SERPL-MCNC: 2.3 MG/DL
PLATELET # BLD AUTO: 212 K/UL
POTASSIUM SERPL-SCNC: 4.3 MMOL/L
PROT SERPL-MCNC: 7 G/DL
PROT UR-MCNC: 49 MG/DL
PROTEIN URINE: ABNORMAL
RBC # BLD: 4.36 M/UL
RBC # FLD: 15.7 %
RED BLOOD CELLS URINE: 11 /HPF
SODIUM SERPL-SCNC: 143 MMOL/L
SPECIFIC GRAVITY URINE: 1.03
SQUAMOUS EPITHELIAL CELLS: 1 /HPF
TACROLIMUS SERPL-MCNC: 8.3 NG/ML
URATE SERPL-MCNC: 4.8 MG/DL
UROBILINOGEN URINE: NORMAL
WBC # FLD AUTO: 6.68 K/UL
WHITE BLOOD CELLS URINE: 2 /HPF

## 2020-04-13 ENCOUNTER — APPOINTMENT (OUTPATIENT)
Dept: TRANSPLANT | Facility: CLINIC | Age: 60
End: 2020-04-13

## 2020-04-13 ENCOUNTER — APPOINTMENT (OUTPATIENT)
Dept: NEPHROLOGY | Facility: CLINIC | Age: 60
End: 2020-04-13
Payer: COMMERCIAL

## 2020-04-13 PROCEDURE — 99242 OFF/OP CONSLTJ NEW/EST SF 20: CPT | Mod: GT

## 2020-04-14 ENCOUNTER — APPOINTMENT (OUTPATIENT)
Dept: TRANSPLANT | Facility: CLINIC | Age: 60
End: 2020-04-14

## 2020-04-14 LAB
ALBUMIN SERPL ELPH-MCNC: 4.8 G/DL
ALP BLD-CCNC: 99 U/L
ALT SERPL-CCNC: 19 U/L
ANION GAP SERPL CALC-SCNC: 12 MMOL/L
APPEARANCE: CLEAR
AST SERPL-CCNC: 19 U/L
BACTERIA: NEGATIVE
BASOPHILS # BLD AUTO: 0.06 K/UL
BASOPHILS NFR BLD AUTO: 0.9 %
BILIRUB SERPL-MCNC: 0.4 MG/DL
BILIRUBIN URINE: NEGATIVE
BLOOD URINE: NEGATIVE
BUN SERPL-MCNC: 25 MG/DL
CALCIUM SERPL-MCNC: 10.2 MG/DL
CHLORIDE SERPL-SCNC: 100 MMOL/L
CO2 SERPL-SCNC: 26 MMOL/L
COLOR: YELLOW
CREAT SERPL-MCNC: 1.14 MG/DL
CREAT SPEC-SCNC: 90 MG/DL
CREAT/PROT UR: 0.4 RATIO
EOSINOPHIL # BLD AUTO: 0.05 K/UL
EOSINOPHIL NFR BLD AUTO: 0.7 %
GLUCOSE QUALITATIVE U: NORMAL
GLUCOSE SERPL-MCNC: 186 MG/DL
HCT VFR BLD CALC: 43.6 %
HGB BLD-MCNC: 13.6 G/DL
HYALINE CASTS: 0 /LPF
IMM GRANULOCYTES NFR BLD AUTO: 0.3 %
KETONES URINE: NEGATIVE
LDH SERPL-CCNC: 176 U/L
LEUKOCYTE ESTERASE URINE: NEGATIVE
LYMPHOCYTES # BLD AUTO: 2.37 K/UL
LYMPHOCYTES NFR BLD AUTO: 34.6 %
MAGNESIUM SERPL-MCNC: 2 MG/DL
MAN DIFF?: NORMAL
MCHC RBC-ENTMCNC: 31 PG
MCHC RBC-ENTMCNC: 31.2 GM/DL
MCV RBC AUTO: 99.3 FL
MICROSCOPIC-UA: NORMAL
MONOCYTES # BLD AUTO: 0.41 K/UL
MONOCYTES NFR BLD AUTO: 6 %
NEUTROPHILS # BLD AUTO: 3.93 K/UL
NEUTROPHILS NFR BLD AUTO: 57.5 %
NITRITE URINE: NEGATIVE
PH URINE: 6
PHOSPHATE SERPL-MCNC: 2.4 MG/DL
PLATELET # BLD AUTO: 201 K/UL
POTASSIUM SERPL-SCNC: 5.8 MMOL/L
PROT SERPL-MCNC: 7.4 G/DL
PROT UR-MCNC: 32 MG/DL
PROTEIN URINE: ABNORMAL
RBC # BLD: 4.39 M/UL
RBC # FLD: 15.2 %
RED BLOOD CELLS URINE: 4 /HPF
SODIUM SERPL-SCNC: 138 MMOL/L
SPECIFIC GRAVITY URINE: 1.02
SQUAMOUS EPITHELIAL CELLS: 1 /HPF
TACROLIMUS SERPL-MCNC: 9.2 NG/ML
UROBILINOGEN URINE: NORMAL
WBC # FLD AUTO: 6.84 K/UL
WHITE BLOOD CELLS URINE: 1 /HPF

## 2020-04-20 ENCOUNTER — APPOINTMENT (OUTPATIENT)
Dept: TRANSPLANT | Facility: CLINIC | Age: 60
End: 2020-04-20

## 2020-04-27 ENCOUNTER — APPOINTMENT (OUTPATIENT)
Dept: NEPHROLOGY | Facility: CLINIC | Age: 60
End: 2020-04-27

## 2020-04-28 LAB
ALBUMIN SERPL ELPH-MCNC: 5 G/DL
ALP BLD-CCNC: 98 U/L
ALT SERPL-CCNC: 21 U/L
ANION GAP SERPL CALC-SCNC: 13 MMOL/L
APPEARANCE: CLEAR
AST SERPL-CCNC: 20 U/L
BACTERIA: NEGATIVE
BASOPHILS # BLD AUTO: 0.06 K/UL
BASOPHILS NFR BLD AUTO: 0.8 %
BILIRUB SERPL-MCNC: 0.4 MG/DL
BILIRUBIN URINE: NEGATIVE
BLOOD URINE: NEGATIVE
BUN SERPL-MCNC: 29 MG/DL
CALCIUM SERPL-MCNC: 10.4 MG/DL
CHLORIDE SERPL-SCNC: 105 MMOL/L
CO2 SERPL-SCNC: 25 MMOL/L
COLOR: YELLOW
CREAT SERPL-MCNC: 1.23 MG/DL
CREAT SPEC-SCNC: 123 MG/DL
CREAT/PROT UR: 0.2 RATIO
EOSINOPHIL # BLD AUTO: 0.08 K/UL
EOSINOPHIL NFR BLD AUTO: 1 %
GLUCOSE QUALITATIVE U: NEGATIVE
GLUCOSE SERPL-MCNC: 152 MG/DL
HCT VFR BLD CALC: 49.9 %
HGB BLD-MCNC: 14.9 G/DL
HYALINE CASTS: 0 /LPF
IMM GRANULOCYTES NFR BLD AUTO: 0.5 %
KETONES URINE: NEGATIVE
LDH SERPL-CCNC: 193 U/L
LEUKOCYTE ESTERASE URINE: NEGATIVE
LYMPHOCYTES # BLD AUTO: 2.26 K/UL
LYMPHOCYTES NFR BLD AUTO: 29.2 %
MAGNESIUM SERPL-MCNC: 2.2 MG/DL
MAN DIFF?: NORMAL
MCHC RBC-ENTMCNC: 29.9 GM/DL
MCHC RBC-ENTMCNC: 30.6 PG
MCV RBC AUTO: 102.5 FL
MICROSCOPIC-UA: NORMAL
MONOCYTES # BLD AUTO: 0.43 K/UL
MONOCYTES NFR BLD AUTO: 5.6 %
NEUTROPHILS # BLD AUTO: 4.87 K/UL
NEUTROPHILS NFR BLD AUTO: 62.9 %
NITRITE URINE: NEGATIVE
PH URINE: 6
PHOSPHATE SERPL-MCNC: 2.6 MG/DL
PLATELET # BLD AUTO: 241 K/UL
POTASSIUM SERPL-SCNC: 5.7 MMOL/L
PROT SERPL-MCNC: 7.4 G/DL
PROT UR-MCNC: 30 MG/DL
PROTEIN URINE: ABNORMAL
RBC # BLD: 4.87 M/UL
RBC # FLD: 14.5 %
RED BLOOD CELLS URINE: 4 /HPF
SODIUM SERPL-SCNC: 143 MMOL/L
SPECIFIC GRAVITY URINE: 1.03
SQUAMOUS EPITHELIAL CELLS: 1 /HPF
TACROLIMUS SERPL-MCNC: 10.5 NG/ML
URATE SERPL-MCNC: 5.9 MG/DL
UROBILINOGEN URINE: NORMAL
WBC # FLD AUTO: 7.74 K/UL
WHITE BLOOD CELLS URINE: 2 /HPF

## 2020-04-29 LAB — BKV DNA SPEC QL NAA+PROBE: NOT DETECTED COPIES/ML

## 2020-05-04 ENCOUNTER — APPOINTMENT (OUTPATIENT)
Dept: TRANSPLANT | Facility: CLINIC | Age: 60
End: 2020-05-04

## 2020-05-05 LAB
ALBUMIN SERPL ELPH-MCNC: 4.7 G/DL
ALP BLD-CCNC: 91 U/L
ALT SERPL-CCNC: 19 U/L
ANION GAP SERPL CALC-SCNC: 12 MMOL/L
APPEARANCE: CLEAR
AST SERPL-CCNC: 22 U/L
BACTERIA: NEGATIVE
BASOPHILS # BLD AUTO: 0.05 K/UL
BASOPHILS NFR BLD AUTO: 0.7 %
BILIRUB SERPL-MCNC: 0.3 MG/DL
BILIRUBIN URINE: NEGATIVE
BLOOD URINE: NEGATIVE
BUN SERPL-MCNC: 42 MG/DL
CALCIUM SERPL-MCNC: 9.8 MG/DL
CHLORIDE SERPL-SCNC: 107 MMOL/L
CO2 SERPL-SCNC: 23 MMOL/L
COLOR: YELLOW
CREAT SERPL-MCNC: 1.36 MG/DL
CREAT SPEC-SCNC: 141 MG/DL
CREAT/PROT UR: 0.2 RATIO
EOSINOPHIL # BLD AUTO: 0.06 K/UL
EOSINOPHIL NFR BLD AUTO: 0.8 %
GLUCOSE QUALITATIVE U: ABNORMAL
GLUCOSE SERPL-MCNC: 226 MG/DL
HCT VFR BLD CALC: 46.1 %
HGB BLD-MCNC: 14.3 G/DL
HYALINE CASTS: 0 /LPF
IMM GRANULOCYTES NFR BLD AUTO: 0.3 %
KETONES URINE: NEGATIVE
LDH SERPL-CCNC: 185 U/L
LEUKOCYTE ESTERASE URINE: NEGATIVE
LYMPHOCYTES # BLD AUTO: 1.81 K/UL
LYMPHOCYTES NFR BLD AUTO: 24.9 %
MAGNESIUM SERPL-MCNC: 2.2 MG/DL
MAN DIFF?: NORMAL
MCHC RBC-ENTMCNC: 31 GM/DL
MCHC RBC-ENTMCNC: 31.3 PG
MCV RBC AUTO: 100.9 FL
MICROSCOPIC-UA: NORMAL
MONOCYTES # BLD AUTO: 0.44 K/UL
MONOCYTES NFR BLD AUTO: 6.1 %
NEUTROPHILS # BLD AUTO: 4.88 K/UL
NEUTROPHILS NFR BLD AUTO: 67.2 %
NITRITE URINE: NEGATIVE
PH URINE: 6
PHOSPHATE SERPL-MCNC: 3.1 MG/DL
PLATELET # BLD AUTO: 214 K/UL
POTASSIUM SERPL-SCNC: 4.8 MMOL/L
PROT SERPL-MCNC: 7.3 G/DL
PROT UR-MCNC: 32 MG/DL
PROTEIN URINE: ABNORMAL
RBC # BLD: 4.57 M/UL
RBC # FLD: 14.4 %
RED BLOOD CELLS URINE: 4 /HPF
SODIUM SERPL-SCNC: 142 MMOL/L
SPECIFIC GRAVITY URINE: 1.03
SQUAMOUS EPITHELIAL CELLS: 1 /HPF
TACROLIMUS SERPL-MCNC: 7.8 NG/ML
URATE SERPL-MCNC: 6.8 MG/DL
UROBILINOGEN URINE: NORMAL
WBC # FLD AUTO: 7.26 K/UL
WHITE BLOOD CELLS URINE: 1 /HPF

## 2020-05-07 LAB — BKV DNA SPEC QL NAA+PROBE: NOT DETECTED COPIES/ML

## 2020-05-11 ENCOUNTER — APPOINTMENT (OUTPATIENT)
Dept: NEPHROLOGY | Facility: CLINIC | Age: 60
End: 2020-05-11
Payer: COMMERCIAL

## 2020-05-11 PROCEDURE — 99214 OFFICE O/P EST MOD 30 MIN: CPT | Mod: 95

## 2020-05-11 NOTE — ASSESSMENT
[FreeTextEntry1] : 59 year old male s/p LRT from daughter on 2/18/2020 \par \par 1. Allograft function has been excellent ~ 1-1.2. Labs from last week showed a slight bump in Cr to 1.3. will check labs next week \par 2. IS meds- Dosing tac by level . On Tac 2 mg po bid. Myfortic increased to 720 mg po bid and continue Prednisone 5 mg po bid \par 3. HTN- controlled on Coreg 25 mg po bid \par 4. DM- currently on Lispro to 12/14/14 and  Tresiba  25 units at night. BS have not been well controlled. Follows with endocrinologist  , Dr Tracey Randall. Has an appt on 5/13/20 with plans to be placed back  on insulin pump \par 5. Hyperkalemia - On Lokelma \par 6. Metabolic acidosis- bicarb is wnl . sodium bicab tab was d/c \par \par

## 2020-05-11 NOTE — HISTORY OF PRESENT ILLNESS
[Living Donor] : Living donor [Basiliximab] : basiliximab [Negative/Negative] : Donor Negative/Recipient Negative [Medical Office: ([facility])___] : at ~his/her~ medical office located in [place] [Home] : at home, [unfilled] , at the time of the visit. [TextBox_7] : 2/18/2020 [FreeTextEntry1] : 59 year old  M with ESRD was on dialysis , now s/p LRT from his daughter on 2/12/2020 .\par   PMH includes CHF, HTN, HLD, T2DM,  asthma, left glaucoma.\par  Donor: ABO O Donor CMV (-), EBV (+)\par  Recipient ABO O Recipient CMV (-), EBV (+) HLA mismatch 1,1,1, crossmatch negative.\par \par \par On Telehealth visit:\par Patient feels well, no acute symptoms.    Denies any fever, chills, dysuria, LE edema, cough, SOB, chest pain , nausea, vomiting , diarrhea, constipation or any other active complaints.    \par Taking precautions to prevent COVID exposure\par I reviewed current home blood pressure and medications.\par Patient appears comfortable\par No distress, not dyspneic\par Conscious, alert, oriented X 3\par Speech: Normal\par Other observations as noted\par Reviewed most recent labs\par

## 2020-05-13 ENCOUNTER — LABORATORY RESULT (OUTPATIENT)
Age: 60
End: 2020-05-13

## 2020-05-15 ENCOUNTER — APPOINTMENT (OUTPATIENT)
Dept: TRANSPLANT | Facility: CLINIC | Age: 60
End: 2020-05-15
Payer: COMMERCIAL

## 2020-05-15 PROCEDURE — 52310 CYSTOSCOPY AND TREATMENT: CPT | Mod: 58

## 2020-05-18 ENCOUNTER — APPOINTMENT (OUTPATIENT)
Dept: TRANSPLANT | Facility: CLINIC | Age: 60
End: 2020-05-18

## 2020-05-19 LAB
ANION GAP SERPL CALC-SCNC: 12 MMOL/L
APPEARANCE: CLEAR
BACTERIA: NEGATIVE
BASOPHILS # BLD AUTO: 0.05 K/UL
BASOPHILS NFR BLD AUTO: 0.7 %
BILIRUBIN URINE: NEGATIVE
BLOOD URINE: ABNORMAL
BUN SERPL-MCNC: 41 MG/DL
CALCIUM SERPL-MCNC: 9.5 MG/DL
CALCIUM SERPL-MCNC: 9.5 MG/DL
CHLORIDE SERPL-SCNC: 102 MMOL/L
CHOLEST SERPL-MCNC: 168 MG/DL
CHOLEST/HDLC SERPL: 4.3 RATIO
CO2 SERPL-SCNC: 24 MMOL/L
COLOR: YELLOW
CREAT SERPL-MCNC: 1.39 MG/DL
CREAT SPEC-SCNC: 107 MG/DL
CREAT/PROT UR: 0.2 RATIO
EOSINOPHIL # BLD AUTO: 0.05 K/UL
EOSINOPHIL NFR BLD AUTO: 0.7 %
ESTIMATED AVERAGE GLUCOSE: 206 MG/DL
GLUCOSE QUALITATIVE U: ABNORMAL
GLUCOSE SERPL-MCNC: 311 MG/DL
HBA1C MFR BLD HPLC: 8.8 %
HCT VFR BLD CALC: 47.7 %
HDLC SERPL-MCNC: 39 MG/DL
HGB BLD-MCNC: 14.6 G/DL
HYALINE CASTS: 0 /LPF
IMM GRANULOCYTES NFR BLD AUTO: 0.4 %
KETONES URINE: NEGATIVE
LDH SERPL-CCNC: 205 U/L
LDLC SERPL CALC-MCNC: 72 MG/DL
LEUKOCYTE ESTERASE URINE: NEGATIVE
LYMPHOCYTES # BLD AUTO: 1.99 K/UL
LYMPHOCYTES NFR BLD AUTO: 27.5 %
MAGNESIUM SERPL-MCNC: 2.2 MG/DL
MAN DIFF?: NORMAL
MCHC RBC-ENTMCNC: 30.6 GM/DL
MCHC RBC-ENTMCNC: 31.3 PG
MCV RBC AUTO: 102.1 FL
MICROSCOPIC-UA: NORMAL
MONOCYTES # BLD AUTO: 0.42 K/UL
MONOCYTES NFR BLD AUTO: 5.8 %
NEUTROPHILS # BLD AUTO: 4.7 K/UL
NEUTROPHILS NFR BLD AUTO: 64.9 %
NITRITE URINE: NEGATIVE
PARATHYROID HORMONE INTACT: 93 PG/ML
PH URINE: 6
PHOSPHATE SERPL-MCNC: 3.1 MG/DL
PLATELET # BLD AUTO: 214 K/UL
POTASSIUM SERPL-SCNC: 5.2 MMOL/L
PROT UR-MCNC: 24 MG/DL
PROTEIN URINE: NORMAL
RBC # BLD: 4.67 M/UL
RBC # FLD: 13.4 %
RED BLOOD CELLS URINE: 40 /HPF
SODIUM SERPL-SCNC: 138 MMOL/L
SPECIFIC GRAVITY URINE: 1.03
SQUAMOUS EPITHELIAL CELLS: 2 /HPF
TACROLIMUS SERPL-MCNC: 9.2 NG/ML
TRIGL SERPL-MCNC: 283 MG/DL
URATE SERPL-MCNC: 5.8 MG/DL
UROBILINOGEN URINE: NORMAL
WBC # FLD AUTO: 7.24 K/UL
WHITE BLOOD CELLS URINE: 5 /HPF

## 2020-05-20 LAB
CMV DNA SPEC QL NAA+PROBE: NOT DETECTED
CMVPCR LOG: NOT DETECTED LOG10IU/ML

## 2020-05-29 ENCOUNTER — APPOINTMENT (OUTPATIENT)
Dept: ENDOCRINOLOGY | Facility: CLINIC | Age: 60
End: 2020-05-29
Payer: COMMERCIAL

## 2020-05-29 VITALS
WEIGHT: 183 LBS | TEMPERATURE: 97.9 F | BODY MASS INDEX: 27.11 KG/M2 | SYSTOLIC BLOOD PRESSURE: 120 MMHG | HEIGHT: 69 IN | DIASTOLIC BLOOD PRESSURE: 80 MMHG

## 2020-05-29 LAB
GLUCOSE BLDC GLUCOMTR-MCNC: 101
GLUCOSE BLDC GLUCOMTR-MCNC: 65

## 2020-05-29 PROCEDURE — 99245 OFF/OP CONSLTJ NEW/EST HI 55: CPT

## 2020-05-31 NOTE — PHYSICAL EXAM
[Alert] : alert [Well Nourished] : well nourished [No Acute Distress] : no acute distress [Normal Sclera/Conjunctiva] : normal sclera/conjunctiva [PERRL] : pupils equal, round and reactive to light [No Proptosis] : no proptosis [Normal Outer Ear/Nose] : the ears and nose were normal in appearance [Normal Hearing] : hearing was normal [Normal TMs] : both tympanic membranes were normal [Thyroid Not Enlarged] : the thyroid was not enlarged [No Thyroid Nodules] : no palpable thyroid nodules [No Respiratory Distress] : no respiratory distress [Clear to Auscultation] : lungs were clear to auscultation bilaterally [Normal S1, S2] : normal S1 and S2 [Normal Rate] : heart rate was normal [Regular Rhythm] : with a regular rhythm [Normal Bowel Sounds] : normal bowel sounds [Not Tender] : non-tender [Soft] : abdomen soft [Normal Gait] : normal gait [No Clubbing, Cyanosis] : no clubbing  or cyanosis of the fingernails [No Joint Swelling] : no joint swelling seen [No Rash] : no rash [No Skin Lesions] : no skin lesions [No Motor Deficits] : the motor exam was normal [Normal Reflexes] : deep tendon reflexes were 2+ and symmetric [No Tremors] : no tremors [Oriented x3] : oriented to person, place, and time [Normal Affect] : the affect was normal [Normal Insight/Judgement] : insight and judgment were intact

## 2020-05-31 NOTE — ASSESSMENT
[FreeTextEntry1] : 59 year old male with history of renal transplant in 02/2020, DM Type 2 on insulin here for evaluation. \par Most recent HgA1C is 8.8%\par At this time had extensive discussion on following a more carb consistent diet. Will make a CDE appt to learn carb counting and learning the various insulin pumps for future use. \par \par -Novolog 10 units prior to meals ( 30-45 gm of carbs per meal) \par -Make the addition of a moderate sliding scale \par -Continue Tresiba 30 units at bedtime \par -Check bloodwork on next visit \par \par Renal transplant\par -Use of prednisone causing hyperglycemia in the evening \par -Use of tacrolimus increases insulin resistance \par \par HLD\par -Continue Atorvastatin \par \par -Follow up with CDE next available \par -Follow up with in 8 weeks

## 2020-05-31 NOTE — REVIEW OF SYSTEMS
[Fatigue] : no fatigue [Decreased Appetite] : appetite not decreased [Recent Weight Gain (___ Lbs)] : no recent weight gain [Recent Weight Loss (___ Lbs)] : no recent weight loss [Visual Field Defect] : no visual field defect [Dry Eyes] : no dryness [Dysphagia] : no dysphagia [Neck Pain] : no neck pain [Dysphonia] : no dysphonia [Nasal Congestion] : no nasal congestion [Chest Pain] : no chest pain [Slow Heart Rate] : heart rate is not slow [Palpitations] : no palpitations [Fast Heart Rate] : heart rate is not fast [Shortness Of Breath] : no shortness of breath [Cough] : no cough [Nausea] : no nausea [Constipation] : no constipation [Vomiting] : no vomiting [Diarrhea] : no diarrhea [Polyuria] : no polyuria [Hesistancy] : no hesitancy [Joint Pain] : no joint pain [Headaches] : no headaches [Dizziness] : no dizziness [Tremors] : no tremors [Depression] : no depression [Polydipsia] : no polydipsia [Cold Intolerance] : no cold intolerance [Easy Bleeding] : no ~M tendency for easy bleeding [Easy Bruising] : no tendency for easy bruising

## 2020-05-31 NOTE — HISTORY OF PRESENT ILLNESS
[FreeTextEntry1] : Diabetes New Patient HPI\par \par 02/2020 renal transplant ( donor is the daughter) \par Stent was recently removed \par \par CC\par Patient referred by Dr. Zak Puente for diabetes management.\par \par \par History of gastric sleeve- at the time was on the insulin pump and came off \par \par \par \par HPI:\par \par Duration of Diabetes: 25-30 years         \par Is patient on Insulin? yes, for 25-30 years           \par \par List Current Medications for Glycemic control and the doses:\par 1-  Tresiba 30 units in the morning          \par 2-  Novolog 12 units TID and 5 units for a snack ( 7-8 pm)     \par 3-        \par \par SMBG (self monitored blood glucose) readings: \par - Name of glucometer:          \par - How often does the patient check BG?             \par - Does the patient keep a log?           \par \par If detailed record is available, what is the range of most of the BG readings?\par - Before Breakfast: 160s\par - Before Lunch: 320s \par - Before Dinner: 367\par - Before Bedtime: 203-213\par \par \par Does patient get Hypoglycemic episodes?  Yes             \par If yes how frequent?            \par Hoe low do the BG readings reach?  40s          \par When do most of those episodes occur? Middle of the night          \par What symptoms does the patient get during those episodes? sweating            \par \par Diabetic Complications: Is patient aware of having any of those complications?\par - Eyes: Retinopathy?       History of cataract surgery, + history of retinopathy gets laser surgery       \par        	When was the last fully dilated eye exam?  3-4 months ago           \par - Feet: 	Neuropathy?  Yes ( now on alpha lipoic acid )           \par         	Foot Ulcers?     History of right toe ulcer \par 	When was the last time patient saw a Podiatrist? 3 months ago         \par - Kidneys: Nephropathy?           Renal transplant \par \par Diet: review patient's diet: \par \par breakfast: Egg salad , 1/2 judson \par Lunch: [place], [place] \par Dinner: meat, potatos, vegetables \par Snacks: Chips, fruits \par Juice or soda: None \par Desserts: yogurt, sugar free jello \par \par \par Exercise: review patient exercise habits:  Not much                 \par

## 2020-06-01 ENCOUNTER — APPOINTMENT (OUTPATIENT)
Dept: TRANSPLANT | Facility: CLINIC | Age: 60
End: 2020-06-01

## 2020-06-02 LAB
25(OH)D3 SERPL-MCNC: 28.3 NG/ML
ALBUMIN SERPL ELPH-MCNC: 4.8 G/DL
ALP BLD-CCNC: 95 U/L
ALT SERPL-CCNC: 20 U/L
ANION GAP SERPL CALC-SCNC: 14 MMOL/L
APPEARANCE: CLEAR
AST SERPL-CCNC: 19 U/L
BACTERIA: NEGATIVE
BASOPHILS # BLD AUTO: 0.03 K/UL
BASOPHILS NFR BLD AUTO: 0.5 %
BILIRUB SERPL-MCNC: 0.5 MG/DL
BILIRUBIN URINE: NEGATIVE
BLOOD URINE: NEGATIVE
BUN SERPL-MCNC: 39 MG/DL
CALCIUM SERPL-MCNC: 9.6 MG/DL
CALCIUM SERPL-MCNC: 9.6 MG/DL
CHLORIDE SERPL-SCNC: 104 MMOL/L
CHOLEST SERPL-MCNC: 170 MG/DL
CHOLEST/HDLC SERPL: 4 RATIO
CO2 SERPL-SCNC: 21 MMOL/L
COLOR: YELLOW
CREAT SERPL-MCNC: 1.3 MG/DL
CREAT SPEC-SCNC: 121 MG/DL
CREAT/PROT UR: 0.2 RATIO
EOSINOPHIL # BLD AUTO: 0.05 K/UL
EOSINOPHIL NFR BLD AUTO: 0.8 %
ESTIMATED AVERAGE GLUCOSE: 214 MG/DL
GLUCOSE QUALITATIVE U: ABNORMAL
GLUCOSE SERPL-MCNC: 282 MG/DL
HBA1C MFR BLD HPLC: 9.1 %
HCT VFR BLD CALC: 46.6 %
HDLC SERPL-MCNC: 43 MG/DL
HGB BLD-MCNC: 14.7 G/DL
HYALINE CASTS: 0 /LPF
IMM GRANULOCYTES NFR BLD AUTO: 0.2 %
KETONES URINE: NEGATIVE
LDLC SERPL CALC-MCNC: 87 MG/DL
LEUKOCYTE ESTERASE URINE: NEGATIVE
LYMPHOCYTES # BLD AUTO: 1.54 K/UL
LYMPHOCYTES NFR BLD AUTO: 26.1 %
MAGNESIUM SERPL-MCNC: 2.2 MG/DL
MAN DIFF?: NORMAL
MCHC RBC-ENTMCNC: 31.5 GM/DL
MCHC RBC-ENTMCNC: 31.6 PG
MCV RBC AUTO: 100.2 FL
MICROSCOPIC-UA: NORMAL
MONOCYTES # BLD AUTO: 0.33 K/UL
MONOCYTES NFR BLD AUTO: 5.6 %
NEUTROPHILS # BLD AUTO: 3.93 K/UL
NEUTROPHILS NFR BLD AUTO: 66.8 %
NITRITE URINE: NEGATIVE
PARATHYROID HORMONE INTACT: 88 PG/ML
PH URINE: 6
PHOSPHATE SERPL-MCNC: 3.2 MG/DL
PLATELET # BLD AUTO: 208 K/UL
POTASSIUM SERPL-SCNC: 4.9 MMOL/L
PROT SERPL-MCNC: 7.4 G/DL
PROT UR-MCNC: 22 MG/DL
PROTEIN URINE: NORMAL
RBC # BLD: 4.65 M/UL
RBC # FLD: 13.1 %
RED BLOOD CELLS URINE: 2 /HPF
SODIUM SERPL-SCNC: 139 MMOL/L
SPECIFIC GRAVITY URINE: 1.03
SQUAMOUS EPITHELIAL CELLS: 0 /HPF
TACROLIMUS SERPL-MCNC: 9.2 NG/ML
TRIGL SERPL-MCNC: 201 MG/DL
URATE SERPL-MCNC: 6.2 MG/DL
UROBILINOGEN URINE: NORMAL
WBC # FLD AUTO: 5.89 K/UL
WHITE BLOOD CELLS URINE: 0 /HPF

## 2020-06-03 LAB
BKV DNA SPEC QL NAA+PROBE: NOT DETECTED COPIES/ML
CMV DNA SPEC QL NAA+PROBE: NOT DETECTED
CMVPCR LOG: NOT DETECTED LOG10IU/ML

## 2020-06-11 ENCOUNTER — NON-APPOINTMENT (OUTPATIENT)
Age: 60
End: 2020-06-11

## 2020-06-11 ENCOUNTER — APPOINTMENT (OUTPATIENT)
Dept: CARDIOLOGY | Facility: CLINIC | Age: 60
End: 2020-06-11
Payer: COMMERCIAL

## 2020-06-11 VITALS — SYSTOLIC BLOOD PRESSURE: 124 MMHG | DIASTOLIC BLOOD PRESSURE: 68 MMHG

## 2020-06-11 VITALS
HEIGHT: 69 IN | RESPIRATION RATE: 12 BRPM | SYSTOLIC BLOOD PRESSURE: 168 MMHG | WEIGHT: 187 LBS | OXYGEN SATURATION: 95 % | HEART RATE: 76 BPM | TEMPERATURE: 99.2 F | DIASTOLIC BLOOD PRESSURE: 72 MMHG | BODY MASS INDEX: 27.7 KG/M2

## 2020-06-11 PROCEDURE — 99214 OFFICE O/P EST MOD 30 MIN: CPT

## 2020-06-11 RX ORDER — NITROGLYCERIN 4 MG/G
0.4 OINTMENT RECTAL
Qty: 1 | Refills: 0 | Status: DISCONTINUED | COMMUNITY
Start: 2020-03-05 | End: 2020-06-11

## 2020-06-11 RX ORDER — SODIUM BICARBONATE 650 MG/1
650 TABLET ORAL
Qty: 120 | Refills: 3 | Status: DISCONTINUED | COMMUNITY
Start: 2020-03-05 | End: 2020-06-11

## 2020-06-11 RX ORDER — SENNOSIDES 8.6 MG TABLETS 8.6 MG/1
8.6 TABLET ORAL DAILY
Qty: 30 | Refills: 1 | Status: DISCONTINUED | COMMUNITY
Start: 2020-02-19 | End: 2020-06-11

## 2020-06-12 NOTE — REVIEW OF SYSTEMS
[see HPI] : see HPI [Negative] : Heme/Lymph [Recent Weight Gain (___ Lbs)] : recent [unfilled] ~Ulb weight gain [Shortness Of Breath] : no shortness of breath [Recent Weight Loss (___ Lbs)] : no recent weight loss [Chest Pain] : no chest pain [Lower Ext Edema] : no extremity edema [Dyspnea on exertion] : not dyspnea during exertion [Palpitations] : no palpitations

## 2020-06-12 NOTE — PHYSICAL EXAM
[No Deformities] : no deformities [Eyelids - No Xanthelasma] : the eyelids demonstrated no xanthelasmas [Normal Oral Mucosa] : normal oral mucosa [Normal Conjunctiva] : the conjunctiva exhibited no abnormalities [No Oral Pallor] : no oral pallor [No Oral Cyanosis] : no oral cyanosis [Normal Jugular Venous A Waves Present] : normal jugular venous A waves present [Normal Jugular Venous V Waves Present] : normal jugular venous V waves present [No Jugular Venous Marin A Waves] : no jugular venous marin A waves [Auscultation Breath Sounds / Voice Sounds] : lungs were clear to auscultation bilaterally [Exaggerated Use Of Accessory Muscles For Inspiration] : no accessory muscle use [Respiration, Rhythm And Depth] : normal respiratory rhythm and effort [Heart Rate And Rhythm] : heart rate and rhythm were normal [Murmurs] : no murmurs present [Heart Sounds] : normal S1 and S2 [Abdomen Soft] : soft [Abdomen Tenderness] : non-tender [Abdomen Mass (___ Cm)] : no abdominal mass palpated [Gait - Sufficient For Exercise Testing] : the gait was sufficient for exercise testing [Abnormal Walk] : normal gait [Nail Clubbing] : no clubbing of the fingernails [Petechial Hemorrhages (___cm)] : no petechial hemorrhages [Cyanosis, Localized] : no localized cyanosis [Skin Color & Pigmentation] : normal skin color and pigmentation [] : no rash [No Venous Stasis] : no venous stasis [No Skin Ulcers] : no skin ulcer [Skin Lesions] : no skin lesions [Affect] : the affect was normal [Oriented To Time, Place, And Person] : oriented to person, place, and time [No Xanthoma] : no  xanthoma was observed [Mood] : the mood was normal [No Anxiety] : not feeling anxious [FreeTextEntry1] : ; bilateral ankle edema

## 2020-06-12 NOTE — HISTORY OF PRESENT ILLNESS
[FreeTextEntry1] : Orion is s/p renal transplant. His glucose started to rise secondary to prednisone/ tacrolumis. No chest pain, palpitations or shortness of breath. Recently changed endo secondary to diabetes educator. He was started on sliding scale.

## 2020-06-16 LAB
ALBUMIN SERPL ELPH-MCNC: 4.9 G/DL
ALP BLD-CCNC: 85 U/L
ALT SERPL-CCNC: 17 U/L
ANION GAP SERPL CALC-SCNC: 13 MMOL/L
APPEARANCE: CLEAR
AST SERPL-CCNC: 19 U/L
BACTERIA: NEGATIVE
BASOPHILS # BLD AUTO: 0.03 K/UL
BASOPHILS NFR BLD AUTO: 0.5 %
BILIRUB SERPL-MCNC: 0.4 MG/DL
BILIRUBIN URINE: NEGATIVE
BLOOD URINE: NEGATIVE
BUN SERPL-MCNC: 30 MG/DL
CALCIUM SERPL-MCNC: 9.7 MG/DL
CHLORIDE SERPL-SCNC: 104 MMOL/L
CO2 SERPL-SCNC: 24 MMOL/L
COLOR: YELLOW
CREAT SERPL-MCNC: 1.23 MG/DL
CREAT SPEC-SCNC: 148 MG/DL
CREAT/PROT UR: 0.2 RATIO
EOSINOPHIL # BLD AUTO: 0.04 K/UL
EOSINOPHIL NFR BLD AUTO: 0.7 %
GLUCOSE QUALITATIVE U: ABNORMAL
GLUCOSE SERPL-MCNC: 176 MG/DL
HCT VFR BLD CALC: 48.5 %
HGB BLD-MCNC: 14.8 G/DL
HYALINE CASTS: 0 /LPF
IMM GRANULOCYTES NFR BLD AUTO: 0.3 %
KETONES URINE: NEGATIVE
LDH SERPL-CCNC: 187 U/L
LEUKOCYTE ESTERASE URINE: NEGATIVE
LYMPHOCYTES # BLD AUTO: 1.62 K/UL
LYMPHOCYTES NFR BLD AUTO: 28.3 %
MAGNESIUM SERPL-MCNC: 2 MG/DL
MAN DIFF?: NORMAL
MCHC RBC-ENTMCNC: 30.5 GM/DL
MCHC RBC-ENTMCNC: 31.2 PG
MCV RBC AUTO: 102.1 FL
MICROSCOPIC-UA: NORMAL
MONOCYTES # BLD AUTO: 0.3 K/UL
MONOCYTES NFR BLD AUTO: 5.2 %
NEUTROPHILS # BLD AUTO: 3.72 K/UL
NEUTROPHILS NFR BLD AUTO: 65 %
NITRITE URINE: NEGATIVE
PH URINE: 6
PHOSPHATE SERPL-MCNC: 3 MG/DL
PLATELET # BLD AUTO: 219 K/UL
POTASSIUM SERPL-SCNC: 4.8 MMOL/L
PROT SERPL-MCNC: 7.4 G/DL
PROT UR-MCNC: 31 MG/DL
PROTEIN URINE: ABNORMAL
RBC # BLD: 4.75 M/UL
RBC # FLD: 12.6 %
RED BLOOD CELLS URINE: 1 /HPF
SODIUM SERPL-SCNC: 140 MMOL/L
SPECIFIC GRAVITY URINE: 1.03
SQUAMOUS EPITHELIAL CELLS: 0 /HPF
TACROLIMUS SERPL-MCNC: 9.2 NG/ML
URATE SERPL-MCNC: 5.5 MG/DL
UROBILINOGEN URINE: NORMAL
WBC # FLD AUTO: 5.73 K/UL
WHITE BLOOD CELLS URINE: 0 /HPF

## 2020-06-17 LAB
CMV DNA SPEC QL NAA+PROBE: NOT DETECTED
CMVPCR LOG: NOT DETECTED LOG10IU/ML

## 2020-06-18 ENCOUNTER — APPOINTMENT (OUTPATIENT)
Dept: NEPHROLOGY | Facility: CLINIC | Age: 60
End: 2020-06-18
Payer: COMMERCIAL

## 2020-06-18 ENCOUNTER — APPOINTMENT (OUTPATIENT)
Dept: PULMONOLOGY | Facility: CLINIC | Age: 60
End: 2020-06-18

## 2020-06-18 LAB — BKV DNA SPEC QL NAA+PROBE: NOT DETECTED COPIES/ML

## 2020-06-18 PROCEDURE — 99214 OFFICE O/P EST MOD 30 MIN: CPT | Mod: 95

## 2020-06-18 NOTE — ASSESSMENT
[FreeTextEntry1] : 59 year old male s/p LRT from daughter on 2/18/2020 \par \par 1. Allograft function has been excellent . Most recent Cr was 1.2. \par 2. IS meds- Dosing tac by level . goal 8-10. On Tac 3/2 mg po bid. Myfortic 720 mg po bid and continue Prednisone 5 mg po bid \par 3. HTN- well controlled on Coreg 25 mg po bid \par 4. DM- BS now well controlled. [facility]. currently on Lispro to 10 units tid with meals and additions with sliding scale and  Tresiba  30 units at night.\par 5. Hyperkalemia - K is wnl.  Lokelma which can be d/c \par \par Labs in 3 weeks and f/u appt in 4 weeks. \par \par

## 2020-06-18 NOTE — HISTORY OF PRESENT ILLNESS
[Living Donor] : Living donor [Basiliximab] : basiliximab [Negative/Negative] : Donor Negative/Recipient Negative [Home] : at home, [unfilled] , at the time of the visit. [Medical Office: ([facility])___] : at ~his/her~ medical office located in [place] [TextBox_7] : 2/18/2020 [FreeTextEntry1] : 59 year old  M with ESRD was on dialysis , now s/p LRT from his daughter on 2/12/2020 .\par   PMH includes CHF, HTN, HLD, T2DM,  asthma, left glaucoma.\par  Donor: ABO O Donor CMV (-), EBV (+)\par  Recipient ABO O Recipient CMV (-), EBV (+) HLA mismatch 1,1,1, crossmatch negative.\par \par \par On Telehealth visit:\par Patient feels well, no acute symptoms.    Denies any fever, chills, dysuria, LE edema, cough, SOB, chest pain , nausea, vomiting , diarrhea, constipation or any other active complaints.    \par Taking precautions to prevent COVID exposure\par I reviewed current home blood pressure and medications.\par Patient appears comfortable\par No distress, not dyspneic\par Conscious, alert, oriented X 3\par Speech: Normal\par Other observations as noted\par Reviewed most recent labs\par

## 2020-06-23 ENCOUNTER — APPOINTMENT (OUTPATIENT)
Dept: ENDOCRINOLOGY | Facility: CLINIC | Age: 60
End: 2020-06-23
Payer: COMMERCIAL

## 2020-06-23 PROCEDURE — G0108 DIAB MANAGE TRN  PER INDIV: CPT

## 2020-06-26 ENCOUNTER — APPOINTMENT (OUTPATIENT)
Dept: ENDOCRINOLOGY | Facility: CLINIC | Age: 60
End: 2020-06-26

## 2020-07-13 ENCOUNTER — LABORATORY RESULT (OUTPATIENT)
Age: 60
End: 2020-07-13

## 2020-07-13 ENCOUNTER — APPOINTMENT (OUTPATIENT)
Dept: NEPHROLOGY | Facility: CLINIC | Age: 60
End: 2020-07-13
Payer: COMMERCIAL

## 2020-07-13 ENCOUNTER — APPOINTMENT (OUTPATIENT)
Dept: TRANSPLANT | Facility: CLINIC | Age: 60
End: 2020-07-13

## 2020-07-13 VITALS
DIASTOLIC BLOOD PRESSURE: 84 MMHG | WEIGHT: 189 LBS | SYSTOLIC BLOOD PRESSURE: 146 MMHG | OXYGEN SATURATION: 98 % | RESPIRATION RATE: 12 BRPM | HEIGHT: 69 IN | BODY MASS INDEX: 27.99 KG/M2 | HEART RATE: 73 BPM | TEMPERATURE: 98.4 F

## 2020-07-13 LAB
ALBUMIN SERPL ELPH-MCNC: 4.9 G/DL
ALP BLD-CCNC: 78 U/L
ALT SERPL-CCNC: 19 U/L
ANION GAP SERPL CALC-SCNC: 14 MMOL/L
APPEARANCE: CLEAR
AST SERPL-CCNC: 21 U/L
BACTERIA: NEGATIVE
BASOPHILS # BLD AUTO: 0.08 K/UL
BASOPHILS NFR BLD AUTO: 1.7 %
BILIRUB SERPL-MCNC: 0.5 MG/DL
BILIRUBIN URINE: NEGATIVE
BLOOD URINE: NEGATIVE
BUN SERPL-MCNC: 35 MG/DL
CALCIUM SERPL-MCNC: 9.6 MG/DL
CALCIUM SERPL-MCNC: 9.6 MG/DL
CHLORIDE SERPL-SCNC: 104 MMOL/L
CO2 SERPL-SCNC: 22 MMOL/L
COLOR: YELLOW
CREAT SERPL-MCNC: 1.4 MG/DL
CREAT SPEC-SCNC: 138 MG/DL
CREAT/PROT UR: 0.2 RATIO
EOSINOPHIL # BLD AUTO: 0 K/UL
EOSINOPHIL NFR BLD AUTO: 0 %
GLUCOSE QUALITATIVE U: NEGATIVE
GLUCOSE SERPL-MCNC: 179 MG/DL
HCT VFR BLD CALC: 47 %
HGB BLD-MCNC: 14.6 G/DL
HYALINE CASTS: 0 /LPF
KETONES URINE: NEGATIVE
LDH SERPL-CCNC: 278 U/L
LEUKOCYTE ESTERASE URINE: NEGATIVE
LYMPHOCYTES # BLD AUTO: 1.78 K/UL
LYMPHOCYTES NFR BLD AUTO: 36.5 %
MAGNESIUM SERPL-MCNC: 2.1 MG/DL
MAN DIFF?: NORMAL
MCHC RBC-ENTMCNC: 31.1 GM/DL
MCHC RBC-ENTMCNC: 31.1 PG
MCV RBC AUTO: 100.2 FL
MICROSCOPIC-UA: NORMAL
MONOCYTES # BLD AUTO: 0.21 K/UL
MONOCYTES NFR BLD AUTO: 4.4 %
NEUTROPHILS # BLD AUTO: 2.8 K/UL
NEUTROPHILS NFR BLD AUTO: 56.5 %
NITRITE URINE: NEGATIVE
PARATHYROID HORMONE INTACT: 82 PG/ML
PH URINE: 6
PHOSPHATE SERPL-MCNC: 2.7 MG/DL
PLATELET # BLD AUTO: 205 K/UL
POTASSIUM SERPL-SCNC: 5 MMOL/L
PROT SERPL-MCNC: 7.3 G/DL
PROT UR-MCNC: 32 MG/DL
PROTEIN URINE: ABNORMAL
RBC # BLD: 4.69 M/UL
RBC # FLD: 12.9 %
RED BLOOD CELLS URINE: 1 /HPF
SODIUM SERPL-SCNC: 139 MMOL/L
SPECIFIC GRAVITY URINE: 1.03
SQUAMOUS EPITHELIAL CELLS: 0 /HPF
TACROLIMUS SERPL-MCNC: 8.9 NG/ML
URATE SERPL-MCNC: 5.8 MG/DL
UROBILINOGEN URINE: NORMAL
WBC # FLD AUTO: 4.88 K/UL
WHITE BLOOD CELLS URINE: 0 /HPF

## 2020-07-13 PROCEDURE — 99214 OFFICE O/P EST MOD 30 MIN: CPT

## 2020-07-13 RX ORDER — FUROSEMIDE 80 MG/1
80 TABLET ORAL DAILY
Refills: 0 | Status: DISCONTINUED | COMMUNITY
Start: 2020-03-05 | End: 2020-07-13

## 2020-07-13 NOTE — ASSESSMENT
[FreeTextEntry1] : 59 year old male s/p LRT from daughter on 2/18/2020 \par \par 1. Allograft function has been excellent . Most recent Cr was 1.2. will f/u labs today \par 2. IS meds- Dosing tac by level . goal 8-10. On Tac 3/2 mg po bid. Myfortic 720 mg po bid and continue Prednisone 5 mg po bid \par 3. HTN- well controlled on Coreg 25 mg po bid \par 4. DM- BS now well controlled. follows with  [facility] Dr Medrano. currently on Lispro to 10 units tid with meals and additions with sliding scale and  Tresiba  30 units at night.\par 5. Hyperkalemia - K is wnl.  Lokelma  can be d/c \par  \par \par

## 2020-07-13 NOTE — HISTORY OF PRESENT ILLNESS
[Living Donor] : Living donor [Basiliximab] : basiliximab [Negative/Negative] : Donor Negative/Recipient Negative [TextBox_7] : 2/18/2020 [FreeTextEntry1] : 59 year old  M with ESRD was on dialysis , now s/p LRT from his daughter on 2/12/2020 .\par   PMH includes CHF, HTN, HLD, T2DM,  asthma, left glaucoma.\par  Donor: ABO O Donor CMV (-), EBV (+)\par  Recipient ABO O Recipient CMV (-), EBV (+) HLA mismatch 1,1,1, crossmatch negative.\par \par Presents for a follow up visit in clinic today . He feels well, no acute symptoms.    Denies any fever, chills, dysuria, LE edema, cough, SOB, chest pain , nausea, vomiting , diarrhea, constipation or any other active complaints.    Taking precautions to prevent COVID exposure. I reviewed current home blood pressure and medications. Reviewed most recent labs\par

## 2020-07-13 NOTE — PHYSICAL EXAM
[General Appearance - Alert] : alert [General Appearance - In No Acute Distress] : in no acute distress [Sclera] : the sclera and conjunctiva were normal [Extraocular Movements] : extraocular movements were intact [PERRL With Normal Accommodation] : pupils were equal in size, round, and reactive to light [Oropharynx] : the oropharynx was normal [Outer Ear] : the ears and nose were normal in appearance [Neck Cervical Mass (___cm)] : no neck mass was observed [Neck Appearance] : the appearance of the neck was normal [Jugular Venous Distention Increased] : there was no jugular-venous distention [Thyroid Nodule] : there were no palpable thyroid nodules [Thyroid Diffuse Enlargement] : the thyroid was not enlarged [Auscultation Breath Sounds / Voice Sounds] : lungs were clear to auscultation bilaterally [Heart Rate And Rhythm] : heart rate was normal and rhythm regular [Heart Sounds] : normal S1 and S2 [Heart Sounds Gallop] : no gallops [Murmurs] : no murmurs [Heart Sounds Pericardial Friction Rub] : no pericardial rub [Full Pulse] : the pedal pulses are present [Edema] : there was no peripheral edema [Bowel Sounds] : normal bowel sounds [Abdomen Soft] : soft [Abdomen Tenderness] : non-tender [Abdomen Mass (___ Cm)] : no abdominal mass palpated [Abnormal Walk] : normal gait [Nail Clubbing] : no clubbing  or cyanosis of the fingernails [Motor Tone] : muscle strength and tone were normal [Musculoskeletal - Swelling] : no joint swelling seen [Skin Turgor] : normal skin turgor [Skin Color & Pigmentation] : normal skin color and pigmentation [Normal] : normal [] : no rash [Deep Tendon Reflexes (DTR)] : deep tendon reflexes were 2+ and symmetric [Sensation] : the sensory exam was normal to light touch and pinprick [No Focal Deficits] : no focal deficits [Oriented To Time, Place, And Person] : oriented to person, place, and time [Affect] : the affect was normal [Impaired Insight] : insight and judgment were intact

## 2020-07-15 ENCOUNTER — RESULT CHARGE (OUTPATIENT)
Age: 60
End: 2020-07-15

## 2020-07-15 ENCOUNTER — APPOINTMENT (OUTPATIENT)
Dept: ULTRASOUND IMAGING | Facility: IMAGING CENTER | Age: 60
End: 2020-07-15
Payer: COMMERCIAL

## 2020-07-15 ENCOUNTER — APPOINTMENT (OUTPATIENT)
Dept: ENDOCRINOLOGY | Facility: CLINIC | Age: 60
End: 2020-07-15
Payer: COMMERCIAL

## 2020-07-15 ENCOUNTER — OUTPATIENT (OUTPATIENT)
Dept: OUTPATIENT SERVICES | Facility: HOSPITAL | Age: 60
LOS: 1 days | End: 2020-07-15
Payer: COMMERCIAL

## 2020-07-15 VITALS
HEART RATE: 73 BPM | BODY MASS INDEX: 27.7 KG/M2 | TEMPERATURE: 98.2 F | SYSTOLIC BLOOD PRESSURE: 140 MMHG | OXYGEN SATURATION: 98 % | WEIGHT: 187 LBS | DIASTOLIC BLOOD PRESSURE: 70 MMHG | HEIGHT: 69 IN

## 2020-07-15 DIAGNOSIS — H35.62 RETINAL HEMORRHAGE, LEFT EYE: Chronic | ICD-10-CM

## 2020-07-15 DIAGNOSIS — Z94.0 KIDNEY TRANSPLANT STATUS: ICD-10-CM

## 2020-07-15 DIAGNOSIS — Z98.49 CATARACT EXTRACTION STATUS, UNSPECIFIED EYE: Chronic | ICD-10-CM

## 2020-07-15 DIAGNOSIS — Z98.890 OTHER SPECIFIED POSTPROCEDURAL STATES: Chronic | ICD-10-CM

## 2020-07-15 DIAGNOSIS — Z98.84 BARIATRIC SURGERY STATUS: Chronic | ICD-10-CM

## 2020-07-15 LAB
BKV DNA SPEC QL NAA+PROBE: NOT DETECTED COPIES/ML
GLUCOSE BLDC GLUCOMTR-MCNC: 288

## 2020-07-15 PROCEDURE — 99214 OFFICE O/P EST MOD 30 MIN: CPT

## 2020-07-15 PROCEDURE — 76776 US EXAM K TRANSPL W/DOPPLER: CPT | Mod: 26,LT

## 2020-07-15 PROCEDURE — 76776 US EXAM K TRANSPL W/DOPPLER: CPT

## 2020-07-15 NOTE — ASSESSMENT
[FreeTextEntry1] : 59 year old male with history of renal transplant in 02/2020, DM Type 2 on insulin here for evaluation. \par Most recent HgA1C is 9.1 (6/2/20)\par At this time had extensive discussion on following a more carb consistent diet. Recent appointment with CDE. After that visit, patient started on ICR 1:3 Novolog 10-18 units. Continuing with Tresiba 30 units.\par \par Diabetes\par -FS log demonstrates predominantly uncontrolled FS (180-300s), not at goal. Likely uncontrolled 2/2 incorrect carb counting, dietary indiscretion, and medication related (prednisone and tacrolimus)\par -Values in the AM and throughout the day are persistently > 200s. Will increase Tresiba to 34 units\par -Elevated AM FS and has a snack between breakfast and lunch, will increase breakfast ICR 1:2, and keep lunch and dinner ICR 1:3. \par -will set up for insulin pump as patient will have more continue FS readings, leading to improved control. Until then, continue FS log and call office with hypoglycemia or persistent FS > 250. \par -All blood work recent, within the past 1.5 months \par \par Renal transplant\par -Use of prednisone causing hyperglycemia in the evening \par -Use of tacrolimus increases insulin resistance \par \par HLD\par -Continue Atorvastatin \par \par -Follow up with in 8 weeks.

## 2020-07-15 NOTE — PHYSICAL EXAM
[Healthy Appearance] : healthy appearance [Alert] : alert [PERRL] : pupils equal, round and reactive to light [Normal Sclera/Conjunctiva] : normal sclera/conjunctiva [No Acute Distress] : no acute distress [No Neck Mass] : no neck mass was observed [Thyroid Not Enlarged] : the thyroid was not enlarged [No LAD] : no lymphadenopathy [No Respiratory Distress] : no respiratory distress [Clear to Auscultation] : lungs were clear to auscultation bilaterally [No Murmurs] : no murmurs [Normal Rate] : heart rate was normal [Regular Rhythm] : with a regular rhythm [Normal Appearance] : normal in appearance [Normal Bowel Sounds] : normal bowel sounds [Not Distended] : not distended [Soft] : abdomen soft

## 2020-07-15 NOTE — HISTORY OF PRESENT ILLNESS
[FreeTextEntry1] : 02/2020 renal transplant ( donor is the daughter) \par Stent was recently removed \par \par CC\par Patient referred by Dr. Zak Puente for diabetes management.\par \par \par History of gastric sleeve- at the time was on the insulin pump and came off \par \par \par \par HPI:\par \par Duration of Diabetes: 25-30 years \par Is patient on Insulin? yes, for 25-30 years \par \par List Current Medications for Glycemic control and the doses:\par 1- Tresiba 30 units in the morning \par 2- Novolog 10-18 units before meals per ICR 1:3 \par 3- \par \par SMBG (self monitored blood glucose) readings: \par - Name of glucometer: \par - How often does the patient check BG? Three times a day\par - Does the patient keep a log? Yes\par \par If detailed record is available, what is the range of most of the BG readings?\par - Before Breakfast: 200-330\par - Before Lunch: 170-300s\par - Before Dinner: 200-340s\par \par \par Does patient get Hypoglycemic episodes? Not anymore (prior to transfer here, occasional FS in 40s in middle of night)\par If yes how frequent? \par How low do the BG readings reach?\par When do most of those episodes occur? \par What symptoms does the patient get during those episodes? \par \par Diabetic Complications: Is patient aware of having any of those complications?\par - Eyes: Retinopathy? History of cataract surgery, + history of retinopathy gets laser surgery \par  	When was the last fully dilated eye exam? 1 week ago\par - Feet: 	Neuropathy? Yes ( now on alpha lipoic acid ) \par  	Foot Ulcers? History of right toe ulcer \par 	When was the last time patient saw a Podiatrist? 3 months ago \par - Kidneys: Nephropathy? Renal transplant \par \par Diet: review patient's diet: \par \par breakfast: 2-3 eggs with cheddar, some spears, wrap\par Lunch: [place], cheeseburger, [place] \par Dinner: chicken, vegetables \par Snacks: after breakfast - corn chips; after dinner - ice cream\par Juice or soda: None \par \par Exercise: review patient exercise habits: Not much  \par

## 2020-07-15 NOTE — REVIEW OF SYSTEMS
[Fatigue] : no fatigue [Decreased Appetite] : appetite not decreased [Fever] : no fever [Eye Pain] : no pain [Dysphagia] : no dysphagia [Blurred Vision] : no blurred vision [Dysphonia] : no dysphonia [Chest Pain] : no chest pain [Neck Pain] : no neck pain [Palpitations] : no palpitations [Nausea] : no nausea [Shortness Of Breath] : no shortness of breath [Constipation] : no constipation [Abdominal Pain] : no abdominal pain [Vomiting] : no vomiting [Diarrhea] : no diarrhea [Polyuria] : no polyuria [Muscle Weakness] : no muscle weakness [Headaches] : no headaches [Joint Pain] : no joint pain [Dizziness] : no dizziness [Depression] : no depression [Polydipsia] : no polydipsia [Tremors] : no tremors

## 2020-07-16 ENCOUNTER — APPOINTMENT (OUTPATIENT)
Dept: VASCULAR SURGERY | Facility: CLINIC | Age: 60
End: 2020-07-16
Payer: COMMERCIAL

## 2020-07-16 PROCEDURE — 99213 OFFICE O/P EST LOW 20 MIN: CPT

## 2020-07-16 PROCEDURE — 93990 DOPPLER FLOW TESTING: CPT

## 2020-07-16 NOTE — HISTORY OF PRESENT ILLNESS
[FreeTextEntry1] : 58 yo male with history of esrd on hd via left upper extremity avf presents for follow up of left upper extremity avf.  pt without any complaints at this time \par s/p transplant Feb 2020 [] : left radiocephalic fistula

## 2020-07-16 NOTE — DISCUSSION/SUMMARY
[FreeTextEntry1] : 58 yo male with history of esrd on hd via left upper extremity avf presents for follow up of left upper extremity avf. \par duplex shows patent avf with 50% stenosis of the juxta anastomosis with flow rate of 811 \par no need for further duplexes\par to f/u in 6 months for possible avf ligation

## 2020-07-16 NOTE — PHYSICAL EXAM
[Hand well perfused] : hand well perfused [Thrill] : thrill [Ulcer] : no ulcer [Normal] : coordination grossly intact, no focal deficits [2+] : left 2+ [de-identified] : intact

## 2020-07-22 LAB
ALBUMIN SERPL ELPH-MCNC: 4.8 G/DL
ALP BLD-CCNC: 109 U/L
ALT SERPL-CCNC: 19 U/L
ANION GAP SERPL CALC-SCNC: 13 MMOL/L
APPEARANCE: CLEAR
AST SERPL-CCNC: 14 U/L
BACTERIA: NEGATIVE
BASOPHILS # BLD AUTO: 0.04 K/UL
BASOPHILS NFR BLD AUTO: 0.5 %
BILIRUB SERPL-MCNC: 0.3 MG/DL
BILIRUBIN URINE: NEGATIVE
BLOOD URINE: NEGATIVE
BUN SERPL-MCNC: 31 MG/DL
CALCIUM SERPL-MCNC: 10 MG/DL
CHLORIDE SERPL-SCNC: 104 MMOL/L
CO2 SERPL-SCNC: 24 MMOL/L
COLOR: YELLOW
CREAT SERPL-MCNC: 1.22 MG/DL
CREAT SPEC-SCNC: 92 MG/DL
CREAT/PROT UR: 0.3 RATIO
EOSINOPHIL # BLD AUTO: 0.04 K/UL
EOSINOPHIL NFR BLD AUTO: 0.5 %
GLUCOSE QUALITATIVE U: ABNORMAL
GLUCOSE SERPL-MCNC: 140 MG/DL
HCT VFR BLD CALC: 47.9 %
HGB BLD-MCNC: 14.8 G/DL
HYALINE CASTS: 0 /LPF
IMM GRANULOCYTES NFR BLD AUTO: 0.6 %
KETONES URINE: NEGATIVE
LDH SERPL-CCNC: 183 U/L
LEUKOCYTE ESTERASE URINE: NEGATIVE
LYMPHOCYTES # BLD AUTO: 2.03 K/UL
LYMPHOCYTES NFR BLD AUTO: 24.8 %
MAGNESIUM SERPL-MCNC: 2.1 MG/DL
MAN DIFF?: NORMAL
MCHC RBC-ENTMCNC: 30.9 GM/DL
MCHC RBC-ENTMCNC: 31.1 PG
MCV RBC AUTO: 100.6 FL
MICROSCOPIC-UA: NORMAL
MONOCYTES # BLD AUTO: 0.31 K/UL
MONOCYTES NFR BLD AUTO: 3.8 %
NEUTROPHILS # BLD AUTO: 5.72 K/UL
NEUTROPHILS NFR BLD AUTO: 69.8 %
NITRITE URINE: NEGATIVE
PH URINE: 6
PHOSPHATE SERPL-MCNC: 2 MG/DL
PLATELET # BLD AUTO: 244 K/UL
POTASSIUM SERPL-SCNC: 4.8 MMOL/L
PROT SERPL-MCNC: 7.7 G/DL
PROT UR-MCNC: 23 MG/DL
PROTEIN URINE: NORMAL
RBC # BLD: 4.76 M/UL
RBC # FLD: 14.9 %
RED BLOOD CELLS URINE: 3 /HPF
SODIUM SERPL-SCNC: 141 MMOL/L
SPECIFIC GRAVITY URINE: 1.02
SQUAMOUS EPITHELIAL CELLS: 1 /HPF
TACROLIMUS SERPL-MCNC: 9.6 NG/ML
UROBILINOGEN URINE: NORMAL
WBC # FLD AUTO: 8.19 K/UL
WHITE BLOOD CELLS URINE: 1 /HPF

## 2020-07-27 ENCOUNTER — LABORATORY RESULT (OUTPATIENT)
Age: 60
End: 2020-07-27

## 2020-07-27 ENCOUNTER — APPOINTMENT (OUTPATIENT)
Dept: TRANSPLANT | Facility: CLINIC | Age: 60
End: 2020-07-27

## 2020-07-27 LAB
25(OH)D3 SERPL-MCNC: 19.8 NG/ML
ALBUMIN SERPL ELPH-MCNC: 4.8 G/DL
ALP BLD-CCNC: 81 U/L
ALT SERPL-CCNC: 20 U/L
ANION GAP SERPL CALC-SCNC: 13 MMOL/L
APPEARANCE: CLEAR
AST SERPL-CCNC: 20 U/L
BACTERIA: NEGATIVE
BASOPHILS # BLD AUTO: 0 K/UL
BASOPHILS NFR BLD AUTO: 0 %
BILIRUB SERPL-MCNC: 0.4 MG/DL
BILIRUBIN URINE: NEGATIVE
BLOOD URINE: NEGATIVE
BUN SERPL-MCNC: 33 MG/DL
CALCIUM SERPL-MCNC: 9.7 MG/DL
CALCIUM SERPL-MCNC: 9.7 MG/DL
CHLORIDE SERPL-SCNC: 108 MMOL/L
CHOLEST SERPL-MCNC: 162 MG/DL
CHOLEST/HDLC SERPL: 3.8 RATIO
CO2 SERPL-SCNC: 22 MMOL/L
COLOR: YELLOW
CREAT SERPL-MCNC: 1.34 MG/DL
CREAT SPEC-SCNC: 108 MG/DL
CREAT/PROT UR: 0.3 RATIO
EOSINOPHIL # BLD AUTO: 0.19 K/UL
EOSINOPHIL NFR BLD AUTO: 4.4 %
GLUCOSE QUALITATIVE U: NORMAL
GLUCOSE SERPL-MCNC: 85 MG/DL
HCT VFR BLD CALC: 42.8 %
HDLC SERPL-MCNC: 43 MG/DL
HGB BLD-MCNC: 13.5 G/DL
HYALINE CASTS: 0 /LPF
KETONES URINE: NEGATIVE
LDH SERPL-CCNC: 303 U/L
LDLC SERPL CALC-MCNC: 53 MG/DL
LEUKOCYTE ESTERASE URINE: NEGATIVE
LYMPHOCYTES # BLD AUTO: 1.51 K/UL
LYMPHOCYTES NFR BLD AUTO: 35.9 %
MAGNESIUM SERPL-MCNC: 2.1 MG/DL
MAN DIFF?: NORMAL
MCHC RBC-ENTMCNC: 31.5 GM/DL
MCHC RBC-ENTMCNC: 31.8 PG
MCV RBC AUTO: 100.7 FL
MICROSCOPIC-UA: NORMAL
MONOCYTES # BLD AUTO: 0.52 K/UL
MONOCYTES NFR BLD AUTO: 12.3 %
NEUTROPHILS # BLD AUTO: 2 K/UL
NEUTROPHILS NFR BLD AUTO: 47.4 %
NITRITE URINE: NEGATIVE
PARATHYROID HORMONE INTACT: 52 PG/ML
PH URINE: 6
PHOSPHATE SERPL-MCNC: 3.6 MG/DL
PLATELET # BLD AUTO: 204 K/UL
POTASSIUM SERPL-SCNC: 5.4 MMOL/L
PROT SERPL-MCNC: 7.2 G/DL
PROT UR-MCNC: 30 MG/DL
PROTEIN URINE: NORMAL
RBC # BLD: 4.25 M/UL
RBC # FLD: 13 %
RED BLOOD CELLS URINE: 1 /HPF
SODIUM SERPL-SCNC: 143 MMOL/L
SPECIFIC GRAVITY URINE: 1.03
SQUAMOUS EPITHELIAL CELLS: 0 /HPF
TACROLIMUS SERPL-MCNC: 6.7 NG/ML
TRIGL SERPL-MCNC: 330 MG/DL
URATE SERPL-MCNC: 4.4 MG/DL
UROBILINOGEN URINE: NORMAL
WBC # FLD AUTO: 4.22 K/UL
WHITE BLOOD CELLS URINE: 0 /HPF

## 2020-07-30 LAB
BKV DNA SPEC QL NAA+PROBE: NOT DETECTED COPIES/ML
CMV DNA SPEC QL NAA+PROBE: NOT DETECTED IU/ML

## 2020-08-10 ENCOUNTER — LABORATORY RESULT (OUTPATIENT)
Age: 60
End: 2020-08-10

## 2020-08-12 LAB
ALBUMIN SERPL ELPH-MCNC: 5 G/DL
ALP BLD-CCNC: 74 U/L
ALT SERPL-CCNC: 19 U/L
ANION GAP SERPL CALC-SCNC: 13 MMOL/L
APPEARANCE: CLEAR
AST SERPL-CCNC: 19 U/L
BACTERIA: NEGATIVE
BASOPHILS # BLD AUTO: 0.06 K/UL
BASOPHILS NFR BLD AUTO: 1.7 %
BILIRUB SERPL-MCNC: 0.5 MG/DL
BILIRUBIN URINE: NEGATIVE
BLOOD URINE: NEGATIVE
BUN SERPL-MCNC: 22 MG/DL
CALCIUM SERPL-MCNC: 9.8 MG/DL
CHLORIDE SERPL-SCNC: 106 MMOL/L
CO2 SERPL-SCNC: 22 MMOL/L
COLOR: YELLOW
CREAT SERPL-MCNC: 1.1 MG/DL
CREAT SPEC-SCNC: 131 MG/DL
CREAT/PROT UR: 0.3 RATIO
EOSINOPHIL # BLD AUTO: 0.03 K/UL
EOSINOPHIL NFR BLD AUTO: 0.9 %
GLUCOSE QUALITATIVE U: ABNORMAL
GLUCOSE SERPL-MCNC: 165 MG/DL
HCT VFR BLD CALC: 47.5 %
HGB BLD-MCNC: 14.5 G/DL
HYALINE CASTS: 0 /LPF
KETONES URINE: NEGATIVE
LDH SERPL-CCNC: 310 U/L
LEUKOCYTE ESTERASE URINE: NEGATIVE
LYMPHOCYTES # BLD AUTO: 1.38 K/UL
LYMPHOCYTES NFR BLD AUTO: 37.9 %
MAGNESIUM SERPL-MCNC: 1.9 MG/DL
MAN DIFF?: NORMAL
MCHC RBC-ENTMCNC: 30.5 GM/DL
MCHC RBC-ENTMCNC: 31.3 PG
MCV RBC AUTO: 102.6 FL
MICROSCOPIC-UA: NORMAL
MONOCYTES # BLD AUTO: 0.19 K/UL
MONOCYTES NFR BLD AUTO: 5.2 %
NEUTROPHILS # BLD AUTO: 1.94 K/UL
NEUTROPHILS NFR BLD AUTO: 53.4 %
NITRITE URINE: NEGATIVE
PH URINE: 6
PHOSPHATE SERPL-MCNC: 3.1 MG/DL
PLATELET # BLD AUTO: 196 K/UL
POTASSIUM SERPL-SCNC: 4.9 MMOL/L
PROT SERPL-MCNC: 7.3 G/DL
PROT UR-MCNC: 42 MG/DL
PROTEIN URINE: ABNORMAL
RBC # BLD: 4.63 M/UL
RBC # FLD: 12.8 %
RED BLOOD CELLS URINE: 1 /HPF
SODIUM SERPL-SCNC: 141 MMOL/L
SPECIFIC GRAVITY URINE: 1.03
SQUAMOUS EPITHELIAL CELLS: 0 /HPF
TACROLIMUS SERPL-MCNC: 7.1 NG/ML
URATE SERPL-MCNC: 3.8 MG/DL
UROBILINOGEN URINE: NORMAL
WBC # FLD AUTO: 3.63 K/UL
WHITE BLOOD CELLS URINE: 0 /HPF

## 2020-08-14 LAB
BKV DNA SPEC QL NAA+PROBE: NOT DETECTED COPIES/ML
CMV DNA SPEC QL NAA+PROBE: NOT DETECTED IU/ML

## 2020-08-18 NOTE — PAST MEDICAL HISTORY
[No therapy indicated for cases scheduled for less than one hour] : No therapy indicated for cases scheduled for less than one hour. [Increasing age ( >40 years old)] : Increasing age ( >40 years old) [FreeTextEntry1] : Malignant Hyperthermia Screening Tool and Risk of Bleeding Assessment \par \par Mr. JITENDRA FRENCH denies family of unexpected death following Anesthesia or Exercise.\par Denies Family history of Malignant Hyperthermia, Muscle or Neuromuscular disorder and High Temperature  following exercise.\par \par Mr. JITENDRA FRENCH denies history of Muscle Spasm, Dark or Chocolate- Colored and Unanticipated fever immediately following anaesthesia or serious exercise.\par Mr. JITENDRA FRENCH also denies bleeding tendencies/Risks of Bleeding.\par  Vamsi Gautam (MD): 49y F presents with left thigh burning and right calf pain. Will obtain US to R/O DVT. However, likely muscular strain as PE is negative for any redness, swelling, or calf TTP. There is no signs of bony injury. Will obtain labs to assess lytes.

## 2020-08-25 ENCOUNTER — LABORATORY RESULT (OUTPATIENT)
Age: 60
End: 2020-08-25

## 2020-08-25 ENCOUNTER — APPOINTMENT (OUTPATIENT)
Dept: NEPHROLOGY | Facility: CLINIC | Age: 60
End: 2020-08-25
Payer: COMMERCIAL

## 2020-08-25 ENCOUNTER — APPOINTMENT (OUTPATIENT)
Dept: TRANSPLANT | Facility: CLINIC | Age: 60
End: 2020-08-25

## 2020-08-25 VITALS
TEMPERATURE: 97 F | BODY MASS INDEX: 29.62 KG/M2 | DIASTOLIC BLOOD PRESSURE: 76 MMHG | HEART RATE: 70 BPM | HEIGHT: 69 IN | RESPIRATION RATE: 12 BRPM | OXYGEN SATURATION: 100 % | SYSTOLIC BLOOD PRESSURE: 186 MMHG | WEIGHT: 200 LBS

## 2020-08-25 PROCEDURE — 99214 OFFICE O/P EST MOD 30 MIN: CPT

## 2020-08-25 NOTE — HISTORY OF PRESENT ILLNESS
[Living Donor] : Living donor [Basiliximab] : basiliximab [Negative/Negative] : Donor Negative/Recipient Negative [TextBox_7] : 2/18/2020 [FreeTextEntry1] : 60 year old  M with ESRD was on dialysis , now s/p LRT from his daughter on 2/12/2020 .\par   PMH includes CHF, HTN, HLD, T2DM,  asthma, left glaucoma.\par  Donor: ABO O Donor CMV (-), EBV (+)\par  Recipient ABO O Recipient CMV (-), EBV (+) HLA mismatch 1,1,1, crossmatch negative.\par \par Presents for a follow up visit in clinic today . He feels well, no acute symptoms.    Denies any fever, chills, dysuria, LE edema, cough, SOB, chest pain , nausea, vomiting , diarrhea, constipation or any other active complaints.    Taking precautions to prevent COVID exposure. I reviewed current home blood pressure and medications. Reviewed most recent labs\par

## 2020-08-25 NOTE — ASSESSMENT
[FreeTextEntry1] : 60 year old male s/p LRT from daughter on 2/18/2020 \par \par 1. Allograft function has been excellent . Most recent Cr was 1.1. will f/u labs today \par 2. IS meds- Dosing tac by level . Dosing Tac by goal 6-8. Continue Myfortic 720 mg po bid and continue Prednisone 5 mg po bid \par 3. HTN- controlled on Coreg 25 mg po bid \par 4. DM- BS now well controlled. follows with  [facility] Dr Medrano. currently on Lispro to 10 units tid with meals and additions with sliding scale and  Tresiba  30 units at night.\par 5. Hyperkalemia - K is wnl.  Lokelma  can be d/c \par  \par \par

## 2020-08-25 NOTE — PHYSICAL EXAM
[General Appearance - Alert] : alert [General Appearance - In No Acute Distress] : in no acute distress [PERRL With Normal Accommodation] : pupils were equal in size, round, and reactive to light [Sclera] : the sclera and conjunctiva were normal [Outer Ear] : the ears and nose were normal in appearance [Extraocular Movements] : extraocular movements were intact [Oropharynx] : the oropharynx was normal [Neck Cervical Mass (___cm)] : no neck mass was observed [Neck Appearance] : the appearance of the neck was normal [Jugular Venous Distention Increased] : there was no jugular-venous distention [Thyroid Diffuse Enlargement] : the thyroid was not enlarged [Thyroid Nodule] : there were no palpable thyroid nodules [Auscultation Breath Sounds / Voice Sounds] : lungs were clear to auscultation bilaterally [Heart Rate And Rhythm] : heart rate was normal and rhythm regular [Heart Sounds] : normal S1 and S2 [Heart Sounds Gallop] : no gallops [Murmurs] : no murmurs [Heart Sounds Pericardial Friction Rub] : no pericardial rub [Full Pulse] : the pedal pulses are present [Edema] : there was no peripheral edema [Abdomen Soft] : soft [Bowel Sounds] : normal bowel sounds [Abdomen Tenderness] : non-tender [Abdomen Mass (___ Cm)] : no abdominal mass palpated [Abnormal Walk] : normal gait [Nail Clubbing] : no clubbing  or cyanosis of the fingernails [Skin Color & Pigmentation] : normal skin color and pigmentation [Motor Tone] : muscle strength and tone were normal [Musculoskeletal - Swelling] : no joint swelling seen [] : no rash [Skin Turgor] : normal skin turgor [Deep Tendon Reflexes (DTR)] : deep tendon reflexes were 2+ and symmetric [Normal] : normal [Sensation] : the sensory exam was normal to light touch and pinprick [No Focal Deficits] : no focal deficits [Oriented To Time, Place, And Person] : oriented to person, place, and time [Impaired Insight] : insight and judgment were intact [Affect] : the affect was normal

## 2020-08-26 LAB
ALBUMIN SERPL ELPH-MCNC: 4.9 G/DL
ALP BLD-CCNC: 76 U/L
ALT SERPL-CCNC: 19 U/L
ANION GAP SERPL CALC-SCNC: 10 MMOL/L
APPEARANCE: CLEAR
AST SERPL-CCNC: 16 U/L
BACTERIA: NEGATIVE
BASOPHILS # BLD AUTO: 0.04 K/UL
BASOPHILS NFR BLD AUTO: 2 %
BILIRUB SERPL-MCNC: 0.4 MG/DL
BILIRUBIN URINE: NEGATIVE
BLOOD URINE: NEGATIVE
BUN SERPL-MCNC: 23 MG/DL
CALCIUM SERPL-MCNC: 10.2 MG/DL
CHLORIDE SERPL-SCNC: 104 MMOL/L
CO2 SERPL-SCNC: 24 MMOL/L
COLOR: YELLOW
CREAT SERPL-MCNC: 1.11 MG/DL
CREAT SPEC-SCNC: 91 MG/DL
CREAT/PROT UR: 0.3 RATIO
EOSINOPHIL # BLD AUTO: 0.07 K/UL
EOSINOPHIL NFR BLD AUTO: 3.5 %
GLUCOSE QUALITATIVE U: ABNORMAL
GLUCOSE SERPL-MCNC: 86 MG/DL
HCT VFR BLD CALC: 44.2 %
HGB BLD-MCNC: 13.8 G/DL
HYALINE CASTS: 0 /LPF
IMM GRANULOCYTES NFR BLD AUTO: 0.5 %
KETONES URINE: NEGATIVE
LDH SERPL-CCNC: 212 U/L
LEUKOCYTE ESTERASE URINE: NEGATIVE
LYMPHOCYTES # BLD AUTO: 1.02 K/UL
LYMPHOCYTES NFR BLD AUTO: 51.5 %
MAGNESIUM SERPL-MCNC: 2 MG/DL
MAN DIFF?: NORMAL
MCHC RBC-ENTMCNC: 31.1 PG
MCHC RBC-ENTMCNC: 31.2 GM/DL
MCV RBC AUTO: 99.5 FL
MICROSCOPIC-UA: NORMAL
MONOCYTES # BLD AUTO: 0.65 K/UL
MONOCYTES NFR BLD AUTO: 32.8 %
NEUTROPHILS # BLD AUTO: 0.19 K/UL
NEUTROPHILS NFR BLD AUTO: 9.7 %
NITRITE URINE: NEGATIVE
PH URINE: 6
PHOSPHATE SERPL-MCNC: 3 MG/DL
PLATELET # BLD AUTO: 220 K/UL
POTASSIUM SERPL-SCNC: 5.2 MMOL/L
PROT SERPL-MCNC: 7.2 G/DL
PROT UR-MCNC: 30 MG/DL
PROTEIN URINE: ABNORMAL
RBC # BLD: 4.44 M/UL
RBC # FLD: 12.4 %
RED BLOOD CELLS URINE: 1 /HPF
SODIUM SERPL-SCNC: 139 MMOL/L
SPECIFIC GRAVITY URINE: 1.03
SQUAMOUS EPITHELIAL CELLS: 1 /HPF
TACROLIMUS SERPL-MCNC: 4.9 NG/ML
URATE SERPL-MCNC: 3.8 MG/DL
UROBILINOGEN URINE: NORMAL
WBC # FLD AUTO: 1.98 K/UL
WHITE BLOOD CELLS URINE: 1 /HPF

## 2020-08-27 LAB
BKV DNA SPEC QL NAA+PROBE: 322 COPIES/ML
CMV DNA SPEC QL NAA+PROBE: NOT DETECTED IU/ML

## 2020-08-27 RX ORDER — MYCOPHENILIC ACID 360 MG/1
360 TABLET, DELAYED RELEASE ORAL
Qty: 360 | Refills: 3 | Status: DISCONTINUED | COMMUNITY
Start: 2020-03-05 | End: 2020-08-27

## 2020-09-09 ENCOUNTER — APPOINTMENT (OUTPATIENT)
Dept: NEPHROLOGY | Facility: CLINIC | Age: 60
End: 2020-09-09

## 2020-09-09 NOTE — DISCHARGE NOTE PROVIDER - [id number]_ALL_CORE
Discharge Planner OT   Patient plan for discharge: home  Current status: evaluation completed, treatment initiated.  Patient lives alone in a house with 3STE and main level living.  She reports being independent with all ADLs at baseline, and drives to get groceries.  She has 3 children that live nearby and help with IADLs if needed, and neighbors bring meals at times.  Today patient SBA supine <> sit and ambulation in room with 2WW.  Pt c/o mild dizziness throughout session. ModA toilet transfer and clothing management, though set-up at home is likely more conducive to independence.  ModA dressing tasks, SBA standing at sink for hygienes.  Patient reports she stays stronger at home than at the hospital, as she gets up to move more.  Barriers to return to prior living situation: weakness, current level of assist, dizziness, lives alone  Recommendations for discharge: TCU, though patient may progress to home pending LOS  Rationale for recommendations: patient below baseline, and would benefit from continued therapy to increase safety and independence with ADLs/IADLs, prior to returning home alone.        Entered by: Nevaeh Stratton 09/09/2020 1:13 PM       This document is complete and the patient is ready for discharge.

## 2020-09-18 ENCOUNTER — APPOINTMENT (OUTPATIENT)
Dept: ENDOCRINOLOGY | Facility: CLINIC | Age: 60
End: 2020-09-18
Payer: COMMERCIAL

## 2020-09-18 PROCEDURE — G0108 DIAB MANAGE TRN  PER INDIV: CPT

## 2020-09-21 ENCOUNTER — LABORATORY RESULT (OUTPATIENT)
Age: 60
End: 2020-09-21

## 2020-09-23 ENCOUNTER — APPOINTMENT (OUTPATIENT)
Dept: ENDOCRINOLOGY | Facility: CLINIC | Age: 60
End: 2020-09-23
Payer: COMMERCIAL

## 2020-09-23 PROCEDURE — P0004: CPT

## 2020-09-24 ENCOUNTER — NON-APPOINTMENT (OUTPATIENT)
Age: 60
End: 2020-09-24

## 2020-09-24 ENCOUNTER — APPOINTMENT (OUTPATIENT)
Dept: PULMONOLOGY | Facility: CLINIC | Age: 60
End: 2020-09-24

## 2020-09-24 ENCOUNTER — APPOINTMENT (OUTPATIENT)
Dept: CARDIOLOGY | Facility: CLINIC | Age: 60
End: 2020-09-24
Payer: COMMERCIAL

## 2020-09-24 VITALS
WEIGHT: 211.64 LBS | OXYGEN SATURATION: 98 % | SYSTOLIC BLOOD PRESSURE: 130 MMHG | HEART RATE: 71 BPM | BODY MASS INDEX: 31.25 KG/M2 | TEMPERATURE: 97.3 F | DIASTOLIC BLOOD PRESSURE: 76 MMHG | RESPIRATION RATE: 12 BRPM

## 2020-09-24 LAB
ANION GAP SERPL CALC-SCNC: 13 MMOL/L
BASOPHILS # BLD AUTO: 0.03 K/UL
BASOPHILS NFR BLD AUTO: 0.4 %
BUN SERPL-MCNC: 28 MG/DL
CALCIUM SERPL-MCNC: 9.7 MG/DL
CHLORIDE SERPL-SCNC: 109 MMOL/L
CO2 SERPL-SCNC: 19 MMOL/L
CREAT SERPL-MCNC: 1.17 MG/DL
EOSINOPHIL # BLD AUTO: 0.05 K/UL
EOSINOPHIL NFR BLD AUTO: 0.7 %
GLUCOSE SERPL-MCNC: 218 MG/DL
HCT VFR BLD CALC: 44.8 %
HGB BLD-MCNC: 14 G/DL
IMM GRANULOCYTES NFR BLD AUTO: 0.3 %
LYMPHOCYTES # BLD AUTO: 1.46 K/UL
LYMPHOCYTES NFR BLD AUTO: 20.1 %
MAN DIFF?: NORMAL
MCHC RBC-ENTMCNC: 31.3 GM/DL
MCHC RBC-ENTMCNC: 31.7 PG
MCV RBC AUTO: 101.4 FL
MONOCYTES # BLD AUTO: 0.34 K/UL
MONOCYTES NFR BLD AUTO: 4.7 %
NEUTROPHILS # BLD AUTO: 5.35 K/UL
NEUTROPHILS NFR BLD AUTO: 73.8 %
PLATELET # BLD AUTO: 182 K/UL
POTASSIUM SERPL-SCNC: 4.4 MMOL/L
RBC # BLD: 4.42 M/UL
RBC # FLD: 13 %
SODIUM SERPL-SCNC: 140 MMOL/L
WBC # FLD AUTO: 7.25 K/UL

## 2020-09-24 PROCEDURE — 90662 IIV NO PRSV INCREASED AG IM: CPT

## 2020-09-24 PROCEDURE — G0008: CPT

## 2020-09-24 PROCEDURE — 99214 OFFICE O/P EST MOD 30 MIN: CPT | Mod: 25

## 2020-09-24 NOTE — HISTORY OF PRESENT ILLNESS
[FreeTextEntry1] : Orion is s/p renal transplant. His glucose started to rise secondary to prednisone/ tacrolumis. No chest pain, palpitations or shortness of breath. NOw diagnosed with BK virus

## 2020-09-24 NOTE — REVIEW OF SYSTEMS
[see HPI] : see HPI [Recent Weight Gain (___ Lbs)] : recent [unfilled] ~Ulb weight gain [Negative] : Heme/Lymph [Recent Weight Loss (___ Lbs)] : no recent weight loss [Shortness Of Breath] : no shortness of breath [Dyspnea on exertion] : not dyspnea during exertion [Chest Pain] : no chest pain [Lower Ext Edema] : no extremity edema [Palpitations] : no palpitations

## 2020-09-24 NOTE — DISCUSSION/SUMMARY
[FreeTextEntry1] : The patient is a 60-year-old ex-smoker family history of coronary artery disease, diabetes mellitus on insulin, hypertension, hyperlipidemia, renal insufficiency, s/p gastric sleeve , HFpEF, s/p renal transplant who is doing well. \par #1 CV- No angina\par #2 HFpEF- on bumex\par #2 DM- on insulin, better control, continues to improve\par #3 Htn- coreg 25mg bid\par #4 Lipids- atorvastatin optimal\par #5 Renal - s/p transplant, on immunosuppressant and prednisone but now diagnosed with BK virus, also hyperkalemia. Flu Vaccine today\par \par

## 2020-09-25 ENCOUNTER — APPOINTMENT (OUTPATIENT)
Dept: ENDOCRINOLOGY | Facility: CLINIC | Age: 60
End: 2020-09-25

## 2020-10-22 ENCOUNTER — APPOINTMENT (OUTPATIENT)
Dept: ENDOCRINOLOGY | Facility: CLINIC | Age: 60
End: 2020-10-22
Payer: COMMERCIAL

## 2020-10-22 VITALS
TEMPERATURE: 98 F | HEIGHT: 69 IN | WEIGHT: 211 LBS | BODY MASS INDEX: 31.25 KG/M2 | SYSTOLIC BLOOD PRESSURE: 122 MMHG | DIASTOLIC BLOOD PRESSURE: 80 MMHG

## 2020-10-22 PROCEDURE — 99072 ADDL SUPL MATRL&STAF TM PHE: CPT

## 2020-10-22 PROCEDURE — 99214 OFFICE O/P EST MOD 30 MIN: CPT | Mod: 25

## 2020-10-26 NOTE — REVIEW OF SYSTEMS
[Fatigue] : no fatigue [Decreased Appetite] : appetite not decreased [Recent Weight Gain (___ Lbs)] : no recent weight gain [Recent Weight Loss (___ Lbs)] : no recent weight loss [Visual Field Defect] : no visual field defect [Dry Eyes] : no dryness [Dysphagia] : no dysphagia [Neck Pain] : no neck pain [Dysphonia] : no dysphonia [Nasal Congestion] : no nasal congestion [Chest Pain] : no chest pain [Slow Heart Rate] : heart rate is not slow [Palpitations] : no palpitations [Fast Heart Rate] : heart rate is not fast [Shortness Of Breath] : no shortness of breath [Cough] : no cough [Nausea] : no nausea [Constipation] : no constipation [Vomiting] : no vomiting [Diarrhea] : no diarrhea [Polyuria] : no polyuria [Hesistancy] : no hesitancy [Joint Pain] : no joint pain [Muscle Weakness] : no muscle weakness [Headaches] : no headaches [Dizziness] : no dizziness [Tremors] : no tremors [Pain/Numbness of Digits] : no pain/numbness of digits [Depression] : no depression [Polydipsia] : no polydipsia [Cold Intolerance] : no cold intolerance [Easy Bleeding] : no ~M tendency for easy bleeding [Easy Bruising] : no tendency for easy bruising

## 2020-10-26 NOTE — ASSESSMENT
[FreeTextEntry1] : 60 year old male with history of renal transplant in 02/2020, DM Type 2 on insulin here for follow-up. \par SMBGs are much better controlled at this time. \par At this time most of his hyperglycemia is post prandial after lunch. He reported that it is 2/2 increased snacking post lunch. Discussed to pick healthier low carb snacks post lunch. \par \par DM Type 2 \par -Continue same insulin regimen at this time \par -Healthier snacks were discussed with the patient \par -Still is having higher glycemic index carbs \par -SMBGS with DEXCOM \par \par \par \par Renal transplant\par -Use of prednisone causing hyperglycemia in the evening \par -Use of tacrolimus increases insulin resistance \par \par HLD\par -Continue Atorvastatin \par \par \par -Follow up with in 4 months

## 2020-10-26 NOTE — HISTORY OF PRESENT ILLNESS
[FreeTextEntry1] : Diabetes F/u Patient HPI\par \par 02/2020 renal transplant ( donor is the daughter) \par Stent was recently removed \par \par CC\par Patient referred by Dr. Zak Puente for diabetes management.\par \par \par History of gastric sleeve- at the time was on the insulin pump and came off \par \par \par \par HPI:\par \par Duration of Diabetes: 25-30 years \par Is patient on Insulin? yes, for 25-30 years \par \par List Current Medications for Glycemic control and the doses:\par 1- Tresiba 30 units in the morning \par 2- Novolog 12 units TID and 5 units for a snack ( 7-8 pm) \par 3- \par \par SMBG (self monitored blood glucose) readings: \par - Name of glucometer: \par - How often does the patient check BG? \par - Does the patient keep a log? \par \par If detailed record is available, what is the range of most of the BG readings?\par - Before Breakfast: 150\par - Before Lunch: \par - Before Dinner: 367\par - Before Bedtime: 203-213\par \par \par Does patient get Hypoglycemic episodes? None recently ---- previously he overcompensated \par If yes how frequent? \par Hoe low do the BG readings reach? 40s \par When do most of those episodes occur? Middle of the night \par What symptoms does the patient get during those episodes? sweating \par \par Diabetic Complications: Is patient aware of having any of those complications?\par - Eyes: Retinopathy? History of cataract surgery, + history of retinopathy gets laser surgery \par  	When was the last fully dilated eye exam? 07/2020 \par - Feet: 	Neuropathy? Yes ( now on alpha lipoic acid ) \par  	Foot Ulcers? History of right toe ulcer \par 	When was the last time patient saw a Podiatrist? 3 months ago \par - Kidneys: Nephropathy? Renal transplant \par \par Diet: review patient's diet: \par \par breakfast: Egg salad , 1/2 judson \par Lunch: [place], [place] \par Dinner: meat, potatos, vegetables \par Snacks: Chips, fruits \par Juice or soda: None \par Desserts: yogurt, sugar free jello \par \par \par Exercise: review patient exercise habits: Not much  \par

## 2020-10-27 ENCOUNTER — APPOINTMENT (OUTPATIENT)
Dept: NEPHROLOGY | Facility: CLINIC | Age: 60
End: 2020-10-27
Payer: COMMERCIAL

## 2020-10-27 VITALS
SYSTOLIC BLOOD PRESSURE: 161 MMHG | OXYGEN SATURATION: 97 % | TEMPERATURE: 97.9 F | RESPIRATION RATE: 12 BRPM | HEART RATE: 75 BPM | BODY MASS INDEX: 31.25 KG/M2 | HEIGHT: 69 IN | DIASTOLIC BLOOD PRESSURE: 102 MMHG | WEIGHT: 211 LBS

## 2020-10-27 LAB
25(OH)D3 SERPL-MCNC: 34.8 NG/ML
ALBUMIN SERPL ELPH-MCNC: 5 G/DL
ALP BLD-CCNC: 73 U/L
ALT SERPL-CCNC: 26 U/L
ANION GAP SERPL CALC-SCNC: 14 MMOL/L
AST SERPL-CCNC: 20 U/L
BASOPHILS # BLD AUTO: 0.04 K/UL
BASOPHILS NFR BLD AUTO: 0.4 %
BILIRUB SERPL-MCNC: 0.5 MG/DL
BUN SERPL-MCNC: 36 MG/DL
CALCIUM SERPL-MCNC: 10.3 MG/DL
CALCIUM SERPL-MCNC: 10.3 MG/DL
CHLORIDE SERPL-SCNC: 103 MMOL/L
CHOLEST SERPL-MCNC: 184 MG/DL
CO2 SERPL-SCNC: 24 MMOL/L
CREAT SERPL-MCNC: 1.16 MG/DL
CREAT SPEC-SCNC: 100 MG/DL
CREAT/PROT UR: 0.2 RATIO
EOSINOPHIL # BLD AUTO: 0.08 K/UL
EOSINOPHIL NFR BLD AUTO: 0.9 %
ESTIMATED AVERAGE GLUCOSE: 192 MG/DL
GLUCOSE SERPL-MCNC: 172 MG/DL
HBA1C MFR BLD HPLC: 8.3 %
HCT VFR BLD CALC: 49.7 %
HDLC SERPL-MCNC: 46 MG/DL
HGB BLD-MCNC: 15.5 G/DL
IMM GRANULOCYTES NFR BLD AUTO: 0.3 %
LDH SERPL-CCNC: 231 U/L
LDLC SERPL CALC-MCNC: 89 MG/DL
LYMPHOCYTES # BLD AUTO: 1.84 K/UL
LYMPHOCYTES NFR BLD AUTO: 19.9 %
MAGNESIUM SERPL-MCNC: 1.8 MG/DL
MAN DIFF?: NORMAL
MCHC RBC-ENTMCNC: 31.2 GM/DL
MCHC RBC-ENTMCNC: 31.6 PG
MCV RBC AUTO: 101.2 FL
MONOCYTES # BLD AUTO: 0.67 K/UL
MONOCYTES NFR BLD AUTO: 7.2 %
NEUTROPHILS # BLD AUTO: 6.6 K/UL
NEUTROPHILS NFR BLD AUTO: 71.3 %
NONHDLC SERPL-MCNC: 138 MG/DL
PARATHYROID HORMONE INTACT: 48 PG/ML
PHOSPHATE SERPL-MCNC: 3.7 MG/DL
PLATELET # BLD AUTO: 192 K/UL
POTASSIUM SERPL-SCNC: 5.8 MMOL/L
PROT SERPL-MCNC: 7.6 G/DL
PROT UR-MCNC: 22 MG/DL
RBC # BLD: 4.91 M/UL
RBC # FLD: 12.7 %
SODIUM SERPL-SCNC: 141 MMOL/L
TACROLIMUS SERPL-MCNC: 10.4 NG/ML
TRIGL SERPL-MCNC: 243 MG/DL
URATE SERPL-MCNC: 4.8 MG/DL
WBC # FLD AUTO: 9.26 K/UL

## 2020-10-27 PROCEDURE — 99214 OFFICE O/P EST MOD 30 MIN: CPT | Mod: 25

## 2020-10-27 PROCEDURE — 99072 ADDL SUPL MATRL&STAF TM PHE: CPT

## 2020-10-27 RX ORDER — TACROLIMUS 0.5 MG/1
0.5 CAPSULE ORAL
Qty: 60 | Refills: 11 | Status: DISCONTINUED | COMMUNITY
Start: 2020-08-26 | End: 2020-10-27

## 2020-10-27 RX ORDER — VALGANCICLOVIR HYDROCHLORIDE 450 MG/1
450 TABLET ORAL
Qty: 30 | Refills: 5 | Status: DISCONTINUED | COMMUNITY
Start: 2020-02-19 | End: 2020-10-27

## 2020-10-27 NOTE — PHYSICAL EXAM

## 2020-10-27 NOTE — ASSESSMENT
[FreeTextEntry1] : 60 year old male s/p LRT from daughter on 2/18/2020 \par \par 1. Allograft function has been excellent . Most recent Cr was 1.2. will f/u labs today \par 2. IS meds- Dosing Tac by goal 6-8. Continue Myfortic 720 mg po bid and continue Prednisone 5 mg po bid\par 3. Ppx- Bactrim. valcyte can be d/c  \par 3. HTN- controlled on Coreg 25 mg po bid \par 4. DM- BS now well controlled. follows with  [facility] Dr Medrano. currently on insulin pump\par \par  \par \par

## 2020-10-29 ENCOUNTER — NON-APPOINTMENT (OUTPATIENT)
Age: 60
End: 2020-10-29

## 2020-10-29 LAB
APPEARANCE: CLEAR
BACTERIA: NEGATIVE
BILIRUBIN URINE: NEGATIVE
BKV DNA SPEC QL NAA+PROBE: ABNORMAL COPIES/ML
BLOOD URINE: NEGATIVE
CMV DNA SPEC QL NAA+PROBE: NOT DETECTED IU/ML
COLOR: YELLOW
GLUCOSE QUALITATIVE U: NORMAL
HYALINE CASTS: 0 /LPF
KETONES URINE: NEGATIVE
LEUKOCYTE ESTERASE URINE: NEGATIVE
MICROSCOPIC-UA: NORMAL
NITRITE URINE: NEGATIVE
PH URINE: 6
PROTEIN URINE: NORMAL
RED BLOOD CELLS URINE: 1 /HPF
SPECIFIC GRAVITY URINE: 1.02
SQUAMOUS EPITHELIAL CELLS: 0 /HPF
UROBILINOGEN URINE: NORMAL
WHITE BLOOD CELLS URINE: 1 /HPF

## 2020-11-10 ENCOUNTER — APPOINTMENT (OUTPATIENT)
Dept: TRANSPLANT | Facility: CLINIC | Age: 60
End: 2020-11-10

## 2020-11-12 ENCOUNTER — NON-APPOINTMENT (OUTPATIENT)
Age: 60
End: 2020-11-12

## 2020-11-12 ENCOUNTER — APPOINTMENT (OUTPATIENT)
Dept: NEPHROLOGY | Facility: CLINIC | Age: 60
End: 2020-11-12

## 2020-11-12 ENCOUNTER — APPOINTMENT (OUTPATIENT)
Dept: NEPHROLOGY | Facility: CLINIC | Age: 60
End: 2020-11-12
Payer: COMMERCIAL

## 2020-11-12 VITALS
HEIGHT: 69 IN | HEART RATE: 71 BPM | DIASTOLIC BLOOD PRESSURE: 70 MMHG | SYSTOLIC BLOOD PRESSURE: 151 MMHG | TEMPERATURE: 97.3 F | BODY MASS INDEX: 32.14 KG/M2 | OXYGEN SATURATION: 99 % | WEIGHT: 217 LBS

## 2020-11-12 LAB
ALBUMIN SERPL ELPH-MCNC: 4.5 G/DL
ALBUMIN SERPL ELPH-MCNC: 4.7 G/DL
ALP BLD-CCNC: 74 U/L
ALT SERPL-CCNC: 33 U/L
ANION GAP SERPL CALC-SCNC: 13 MMOL/L
ANION GAP SERPL CALC-SCNC: 14 MMOL/L
APPEARANCE: CLEAR
AST SERPL-CCNC: 25 U/L
BACTERIA: NEGATIVE
BASOPHILS # BLD AUTO: 0.03 K/UL
BASOPHILS # BLD AUTO: 0.04 K/UL
BASOPHILS NFR BLD AUTO: 0.4 %
BASOPHILS NFR BLD AUTO: 0.5 %
BILIRUB SERPL-MCNC: 0.5 MG/DL
BILIRUBIN URINE: NEGATIVE
BKV DNA SPEC QL NAA+PROBE: ABNORMAL COPIES/ML
BLOOD URINE: NEGATIVE
BUN SERPL-MCNC: 27 MG/DL
BUN SERPL-MCNC: 29 MG/DL
CALCIUM SERPL-MCNC: 10.2 MG/DL
CHLORIDE SERPL-SCNC: 102 MMOL/L
CHLORIDE SERPL-SCNC: 107 MMOL/L
CO2 SERPL-SCNC: 24 MMOL/L
CO2 SERPL-SCNC: 26 MMOL/L
COLOR: NORMAL
CREAT SERPL-MCNC: 1.14 MG/DL
CREAT SERPL-MCNC: 1.23 MG/DL
CREAT SPEC-SCNC: 105 MG/DL
CREAT/PROT UR: 0.2 RATIO
EOSINOPHIL # BLD AUTO: 0.1 K/UL
EOSINOPHIL # BLD AUTO: 0.15 K/UL
EOSINOPHIL NFR BLD AUTO: 1.3 %
EOSINOPHIL NFR BLD AUTO: 1.8 %
GLUCOSE QUALITATIVE U: NEGATIVE
GLUCOSE SERPL-MCNC: 162 MG/DL
GLUCOSE SERPL-MCNC: 82 MG/DL
HCT VFR BLD CALC: 47.6 %
HCT VFR BLD CALC: 49.2 %
HGB BLD-MCNC: 15 G/DL
HGB BLD-MCNC: 15.3 G/DL
HYALINE CASTS: 0 /LPF
IMM GRANULOCYTES NFR BLD AUTO: 0.3 %
IMM GRANULOCYTES NFR BLD AUTO: 0.4 %
KETONES URINE: NEGATIVE
LDH SERPL-CCNC: 218 U/L
LEUKOCYTE ESTERASE URINE: NEGATIVE
LYMPHOCYTES # BLD AUTO: 1.74 K/UL
LYMPHOCYTES # BLD AUTO: 2.04 K/UL
LYMPHOCYTES NFR BLD AUTO: 22.4 %
LYMPHOCYTES NFR BLD AUTO: 24.6 %
MAGNESIUM SERPL-MCNC: 2 MG/DL
MAN DIFF?: NORMAL
MAN DIFF?: NORMAL
MCHC RBC-ENTMCNC: 30.5 GM/DL
MCHC RBC-ENTMCNC: 31.1 PG
MCHC RBC-ENTMCNC: 31.2 PG
MCHC RBC-ENTMCNC: 32.1 GM/DL
MCV RBC AUTO: 102.1 FL
MCV RBC AUTO: 97.1 FL
MICROSCOPIC-UA: NORMAL
MONOCYTES # BLD AUTO: 0.55 K/UL
MONOCYTES # BLD AUTO: 0.56 K/UL
MONOCYTES NFR BLD AUTO: 6.6 %
MONOCYTES NFR BLD AUTO: 7.2 %
NEUTROPHILS # BLD AUTO: 5.31 K/UL
NEUTROPHILS # BLD AUTO: 5.48 K/UL
NEUTROPHILS NFR BLD AUTO: 66.1 %
NEUTROPHILS NFR BLD AUTO: 68.4 %
NITRITE URINE: NEGATIVE
PARATHYROID HORMONE INTACT: 44 PG/ML
PH URINE: 6
PHOSPHATE SERPL-MCNC: 3.3 MG/DL
PHOSPHATE SERPL-MCNC: 3.8 MG/DL
PLATELET # BLD AUTO: 204 K/UL
PLATELET # BLD AUTO: 208 K/UL
POTASSIUM SERPL-SCNC: 4.6 MMOL/L
POTASSIUM SERPL-SCNC: 4.8 MMOL/L
PROT SERPL-MCNC: 7.1 G/DL
PROT UR-MCNC: 20 MG/DL
PROTEIN URINE: NORMAL
RBC # BLD: 4.82 M/UL
RBC # BLD: 4.9 M/UL
RBC # FLD: 12.7 %
RBC # FLD: 13 %
RED BLOOD CELLS URINE: 2 /HPF
SODIUM SERPL-SCNC: 141 MMOL/L
SODIUM SERPL-SCNC: 145 MMOL/L
SPECIFIC GRAVITY URINE: 1.02
SQUAMOUS EPITHELIAL CELLS: 1 /HPF
TACROLIMUS SERPL-MCNC: 4.8 NG/ML
TACROLIMUS SERPL-MCNC: 4.9 NG/ML
URATE SERPL-MCNC: 4.5 MG/DL
UROBILINOGEN URINE: NORMAL
WBC # FLD AUTO: 7.76 K/UL
WBC # FLD AUTO: 8.29 K/UL
WHITE BLOOD CELLS URINE: 1 /HPF

## 2020-11-12 PROCEDURE — 99072 ADDL SUPL MATRL&STAF TM PHE: CPT

## 2020-11-12 PROCEDURE — 99215 OFFICE O/P EST HI 40 MIN: CPT | Mod: 25

## 2020-11-12 NOTE — HISTORY OF PRESENT ILLNESS
[Living Donor] : Living donor [Basiliximab] : basiliximab [Negative/Negative] : Donor Negative/Recipient Negative [TextBox_7] : 2/18/2020 [FreeTextEntry1] : 60 year old  M with ESRD was on dialysis , now s/p LRT from his daughter on 2/12/2020 .\par   PMH includes CHF, HTN, HLD, T2DM,  asthma, left glaucoma.\par  Donor: ABO O Donor CMV (-), EBV (+)\par  Recipient ABO O Recipient CMV (-), EBV (+) HLA mismatch 1,1,1, crossmatch negative.\par \stas Presents to clinic today with c/o redness over his right leg. He mentions that he saw his podiatrist a few days ago and was found to have an ulcer on the plantar aspect of his  right 1st digit stump and  a dressing was placed.Today morning  he noticed redness above his ankle . Denies fever , pain or swelling over the leg. Has no vomiting, diarrhea, cough , dysuria , sob or any other complaints. \par \par

## 2020-11-12 NOTE — PHYSICAL EXAM
[General Appearance - Alert] : alert [General Appearance - In No Acute Distress] : in no acute distress [Sclera] : the sclera and conjunctiva were normal [PERRL With Normal Accommodation] : pupils were equal in size, round, and reactive to light [Extraocular Movements] : extraocular movements were intact [Outer Ear] : the ears and nose were normal in appearance [Oropharynx] : the oropharynx was normal [Neck Appearance] : the appearance of the neck was normal [Neck Cervical Mass (___cm)] : no neck mass was observed [Jugular Venous Distention Increased] : there was no jugular-venous distention [Thyroid Diffuse Enlargement] : the thyroid was not enlarged [Thyroid Nodule] : there were no palpable thyroid nodules [Auscultation Breath Sounds / Voice Sounds] : lungs were clear to auscultation bilaterally [Heart Rate And Rhythm] : heart rate was normal and rhythm regular [Heart Sounds] : normal S1 and S2 [Heart Sounds Gallop] : no gallops [Murmurs] : no murmurs [Heart Sounds Pericardial Friction Rub] : no pericardial rub [Full Pulse] : the pedal pulses are present [Edema] : there was no peripheral edema [Bowel Sounds] : normal bowel sounds [Abdomen Soft] : soft [Abdomen Tenderness] : non-tender [Abdomen Mass (___ Cm)] : no abdominal mass palpated [Abnormal Walk] : normal gait [Nail Clubbing] : no clubbing  or cyanosis of the fingernails [Musculoskeletal - Swelling] : no joint swelling seen [Motor Tone] : muscle strength and tone were normal [FreeTextEntry1] : redness over RLE . Right 1st digit stump with an ulcer. no discharge noted [Skin Color & Pigmentation] : normal skin color and pigmentation [Skin Turgor] : normal skin turgor [] : no rash [Normal] : normal [Deep Tendon Reflexes (DTR)] : deep tendon reflexes were 2+ and symmetric [Sensation] : the sensory exam was normal to light touch and pinprick [No Focal Deficits] : no focal deficits [Oriented To Time, Place, And Person] : oriented to person, place, and time [Impaired Insight] : insight and judgment were intact [Affect] : the affect was normal

## 2020-11-12 NOTE — ASSESSMENT
[FreeTextEntry1] : 60 year old male s/p LRT from daughter on 2/18/2020 \par \par 1. Allograft function has been excellent . Most recent Cr was 1.14. \par 2. IS meds- On Prograf 1mg po bid.  . 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 continue Prednisone 5 mg po bid\par 3. Cellulitis of RLE , non purulent. plan for Cephalexin 500 po q6h X 7 days. if no improvement in symptoms n the next 48 hrs, will plan to admit for IV antibiotics. \par 4. Ppx-on Bactrim.  \par 5. HTN- controlled on Coreg 25 mg po bid \par 6. DM- BS now well controlled. follows with  [facility] to [facility] Dr Medrano. currently on insulin pump\par 7. BK Virema- off Myfortic. on prograft 1 mg po bid ( last level was 4.8) BK VL trending down. ( from 9200 to 5600 on Nov 10th ) \par \par  \par \par

## 2020-11-13 RX ORDER — PEN NEEDLE, DIABETIC 29 G X1/2"
32G X 4 MM NEEDLE, DISPOSABLE MISCELLANEOUS
Qty: 2 | Refills: 2 | Status: ACTIVE | COMMUNITY
Start: 2020-06-23 | End: 1900-01-01

## 2020-11-18 ENCOUNTER — APPOINTMENT (OUTPATIENT)
Dept: NEPHROLOGY | Facility: CLINIC | Age: 60
End: 2020-11-18
Payer: COMMERCIAL

## 2020-11-18 PROCEDURE — 99214 OFFICE O/P EST MOD 30 MIN: CPT | Mod: 25

## 2020-11-18 NOTE — PHYSICAL EXAM
[General Appearance - Alert] : alert [General Appearance - In No Acute Distress] : in no acute distress [Sclera] : the sclera and conjunctiva were normal [PERRL With Normal Accommodation] : pupils were equal in size, round, and reactive to light [Extraocular Movements] : extraocular movements were intact [Outer Ear] : the ears and nose were normal in appearance [Oropharynx] : the oropharynx was normal [Neck Appearance] : the appearance of the neck was normal [Neck Cervical Mass (___cm)] : no neck mass was observed [Jugular Venous Distention Increased] : there was no jugular-venous distention [Thyroid Diffuse Enlargement] : the thyroid was not enlarged [Thyroid Nodule] : there were no palpable thyroid nodules [Auscultation Breath Sounds / Voice Sounds] : lungs were clear to auscultation bilaterally [Heart Rate And Rhythm] : heart rate was normal and rhythm regular [Heart Sounds] : normal S1 and S2 [Heart Sounds Gallop] : no gallops [Murmurs] : no murmurs [Heart Sounds Pericardial Friction Rub] : no pericardial rub [Full Pulse] : the pedal pulses are present [Edema] : there was no peripheral edema [Bowel Sounds] : normal bowel sounds [Abdomen Soft] : soft [Abdomen Tenderness] : non-tender [Abdomen Mass (___ Cm)] : no abdominal mass palpated [Abnormal Walk] : normal gait [Nail Clubbing] : no clubbing  or cyanosis of the fingernails [Musculoskeletal - Swelling] : no joint swelling seen [Motor Tone] : muscle strength and tone were normal [FreeTextEntry1] : some redness over RLE which has improved from last week. No warmth.  Right 1st digit stump with a healing ulcer. no discharge noted [Skin Color & Pigmentation] : normal skin color and pigmentation [Skin Turgor] : normal skin turgor [] : no rash [Normal] : normal [Deep Tendon Reflexes (DTR)] : deep tendon reflexes were 2+ and symmetric [Sensation] : the sensory exam was normal to light touch and pinprick [No Focal Deficits] : no focal deficits [Oriented To Time, Place, And Person] : oriented to person, place, and time [Impaired Insight] : insight and judgment were intact [Affect] : the affect was normal

## 2020-11-18 NOTE — ASSESSMENT
[FreeTextEntry1] : 60 year old male s/p LRT from daughter on 2/18/2020 \par \par 1. Allograft function has been excellent . Most recent Cr was 1.14. will f/u labs today \par 2. IS meds- On Prograf 1mg po bid.  . 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 continue Prednisone 5 mg po bid\par 3. Cellulitis of RLE , non purulent. Continue Cephalexin 500 po q6h for a total duration of 10 days\par 4. Ppx-on Bactrim.  \par 5. HTN- controlled on Coreg 25 mg po bid \par 6. DM- BS now well controlled. follows with  with Dr Medrano. currently on insulin pump\par 7. BK Virema- off Myfortic. on Prograft 1 mg po bid ( last level was 4.8) BK VL trending down. ( from 9200 to 5600 on Nov 10th ) \par \par  \par \par

## 2020-11-18 NOTE — HISTORY OF PRESENT ILLNESS
[Living Donor] : Living donor [Basiliximab] : basiliximab [Negative/Negative] : Donor Negative/Recipient Negative [TextBox_7] : 2/18/2020 [FreeTextEntry1] : 60 year old  M with ESRD was on dialysis , now s/p LRT from his daughter on 2/12/2020 .\par   PMH includes CHF, HTN, HLD, T2DM,  asthma, left glaucoma.\par  Donor: ABO O Donor CMV (-), EBV (+)\par  Recipient ABO O Recipient CMV (-), EBV (+) HLA mismatch 1,1,1, crossmatch negative.\par \stas Presents to clinic today for a f/u visit after presenting with redness and pain over his right leg last week. He was started on keflex after which the redness and pain has improved. reports that the podiatrist mentioned that the ulcer  on the plantar aspect of his  right 1st digit stump has healed. \par  . Denies fever , pain or swelling over the leg. Has no vomiting, diarrhea, cough , dysuria , sob or any other complaints. \par \par

## 2020-11-19 ENCOUNTER — APPOINTMENT (OUTPATIENT)
Dept: TRANSPLANT | Facility: CLINIC | Age: 60
End: 2020-11-19

## 2020-11-19 LAB
ALBUMIN SERPL ELPH-MCNC: 4.4 G/DL
ALP BLD-CCNC: 73 U/L
ALT SERPL-CCNC: 40 U/L
ANION GAP SERPL CALC-SCNC: 11 MMOL/L
APPEARANCE: CLEAR
AST SERPL-CCNC: 32 U/L
BACTERIA: NEGATIVE
BASOPHILS # BLD AUTO: 0.03 K/UL
BASOPHILS NFR BLD AUTO: 0.3 %
BILIRUB SERPL-MCNC: 0.3 MG/DL
BILIRUBIN URINE: NEGATIVE
BLOOD URINE: NEGATIVE
BUN SERPL-MCNC: 33 MG/DL
CALCIUM SERPL-MCNC: 9.6 MG/DL
CALCIUM SERPL-MCNC: 9.6 MG/DL
CHLORIDE SERPL-SCNC: 104 MMOL/L
CO2 SERPL-SCNC: 26 MMOL/L
COLOR: YELLOW
CREAT SERPL-MCNC: 1.22 MG/DL
CREAT SPEC-SCNC: 120 MG/DL
CREAT/PROT UR: 0.2 RATIO
EOSINOPHIL # BLD AUTO: 0.1 K/UL
EOSINOPHIL NFR BLD AUTO: 1.1 %
GLUCOSE QUALITATIVE U: NEGATIVE
GLUCOSE SERPL-MCNC: 125 MG/DL
HCT VFR BLD CALC: 48 %
HGB BLD-MCNC: 15 G/DL
HYALINE CASTS: 0 /LPF
IMM GRANULOCYTES NFR BLD AUTO: 0.3 %
KETONES URINE: NEGATIVE
LDH SERPL-CCNC: 215 U/L
LEUKOCYTE ESTERASE URINE: NEGATIVE
LYMPHOCYTES # BLD AUTO: 2.02 K/UL
LYMPHOCYTES NFR BLD AUTO: 22.4 %
MAGNESIUM SERPL-MCNC: 2.1 MG/DL
MAN DIFF?: NORMAL
MCHC RBC-ENTMCNC: 31.3 GM/DL
MCHC RBC-ENTMCNC: 32 PG
MCV RBC AUTO: 102.3 FL
MICROSCOPIC-UA: NORMAL
MONOCYTES # BLD AUTO: 0.83 K/UL
MONOCYTES NFR BLD AUTO: 9.2 %
NEUTROPHILS # BLD AUTO: 6.01 K/UL
NEUTROPHILS NFR BLD AUTO: 66.7 %
NITRITE URINE: NEGATIVE
PARATHYROID HORMONE INTACT: 52 PG/ML
PH URINE: 6
PHOSPHATE SERPL-MCNC: 2.8 MG/DL
PLATELET # BLD AUTO: 207 K/UL
POTASSIUM SERPL-SCNC: 4.8 MMOL/L
PROT SERPL-MCNC: 7 G/DL
PROT UR-MCNC: 23 MG/DL
PROTEIN URINE: NORMAL
RBC # BLD: 4.69 M/UL
RBC # FLD: 12.7 %
RED BLOOD CELLS URINE: 1 /HPF
SODIUM SERPL-SCNC: 141 MMOL/L
SPECIFIC GRAVITY URINE: 1.03
SQUAMOUS EPITHELIAL CELLS: 0 /HPF
TACROLIMUS SERPL-MCNC: 4.7 NG/ML
URATE SERPL-MCNC: 5.3 MG/DL
UROBILINOGEN URINE: ABNORMAL
WBC # FLD AUTO: 9.02 K/UL
WHITE BLOOD CELLS URINE: 1 /HPF

## 2020-11-20 LAB — BKV DNA SPEC QL NAA+PROBE: ABNORMAL COPIES/ML

## 2020-11-23 ENCOUNTER — APPOINTMENT (OUTPATIENT)
Dept: TRANSPLANT | Facility: CLINIC | Age: 60
End: 2020-11-23

## 2020-11-24 LAB
ALBUMIN SERPL ELPH-MCNC: 4.7 G/DL
ALP BLD-CCNC: 77 U/L
ALT SERPL-CCNC: 44 U/L
ANION GAP SERPL CALC-SCNC: 10 MMOL/L
APPEARANCE: CLEAR
AST SERPL-CCNC: 28 U/L
BACTERIA: NEGATIVE
BASOPHILS # BLD AUTO: 0.04 K/UL
BASOPHILS NFR BLD AUTO: 0.4 %
BILIRUB SERPL-MCNC: 0.4 MG/DL
BILIRUBIN URINE: NEGATIVE
BLOOD URINE: NEGATIVE
BUN SERPL-MCNC: 24 MG/DL
CALCIUM SERPL-MCNC: 9.8 MG/DL
CHLORIDE SERPL-SCNC: 105 MMOL/L
CO2 SERPL-SCNC: 25 MMOL/L
COLOR: YELLOW
CREAT SERPL-MCNC: 1.17 MG/DL
CREAT SPEC-SCNC: 137 MG/DL
CREAT/PROT UR: 0.2 RATIO
EOSINOPHIL # BLD AUTO: 0.09 K/UL
EOSINOPHIL NFR BLD AUTO: 0.9 %
GLUCOSE QUALITATIVE U: NEGATIVE
GLUCOSE SERPL-MCNC: 202 MG/DL
HCT VFR BLD CALC: 50.7 %
HGB BLD-MCNC: 15.2 G/DL
HYALINE CASTS: 0 /LPF
IMM GRANULOCYTES NFR BLD AUTO: 0.5 %
KETONES URINE: NEGATIVE
LDH SERPL-CCNC: 231 U/L
LEUKOCYTE ESTERASE URINE: NEGATIVE
LYMPHOCYTES # BLD AUTO: 2.09 K/UL
LYMPHOCYTES NFR BLD AUTO: 21.2 %
MAGNESIUM SERPL-MCNC: 2.1 MG/DL
MAN DIFF?: NORMAL
MCHC RBC-ENTMCNC: 30 GM/DL
MCHC RBC-ENTMCNC: 31.1 PG
MCV RBC AUTO: 103.7 FL
MICROSCOPIC-UA: NORMAL
MONOCYTES # BLD AUTO: 0.65 K/UL
MONOCYTES NFR BLD AUTO: 6.6 %
NEUTROPHILS # BLD AUTO: 6.93 K/UL
NEUTROPHILS NFR BLD AUTO: 70.4 %
NITRITE URINE: NEGATIVE
PH URINE: 6
PHOSPHATE SERPL-MCNC: 3.1 MG/DL
PLATELET # BLD AUTO: 221 K/UL
POTASSIUM SERPL-SCNC: 5.4 MMOL/L
PROT SERPL-MCNC: 7.4 G/DL
PROT UR-MCNC: 22 MG/DL
PROTEIN URINE: NORMAL
RBC # BLD: 4.89 M/UL
RBC # FLD: 12.8 %
RED BLOOD CELLS URINE: 1 /HPF
SODIUM SERPL-SCNC: 140 MMOL/L
SPECIFIC GRAVITY URINE: 1.03
SQUAMOUS EPITHELIAL CELLS: 0 /HPF
TACROLIMUS SERPL-MCNC: 5.2 NG/ML
URATE SERPL-MCNC: 4.4 MG/DL
UROBILINOGEN URINE: NORMAL
WBC # FLD AUTO: 9.85 K/UL
WHITE BLOOD CELLS URINE: 2 /HPF

## 2020-11-25 LAB — BKV DNA SPEC QL NAA+PROBE: ABNORMAL COPIES/ML

## 2020-11-27 LAB — CMV DNA SPEC QL NAA+PROBE: NOT DETECTED IU/ML

## 2020-12-14 ENCOUNTER — APPOINTMENT (OUTPATIENT)
Dept: PULMONOLOGY | Facility: CLINIC | Age: 60
End: 2020-12-14
Payer: COMMERCIAL

## 2020-12-14 VITALS
SYSTOLIC BLOOD PRESSURE: 152 MMHG | OXYGEN SATURATION: 97 % | DIASTOLIC BLOOD PRESSURE: 64 MMHG | WEIGHT: 220 LBS | TEMPERATURE: 97.7 F | RESPIRATION RATE: 12 BRPM | HEART RATE: 78 BPM | BODY MASS INDEX: 32.49 KG/M2

## 2020-12-14 DIAGNOSIS — Z83.0 FAMILY HISTORY OF HUMAN IMMUNODEFICIENCY VIRUS [HIV] DISEASE: ICD-10-CM

## 2020-12-14 PROCEDURE — 99214 OFFICE O/P EST MOD 30 MIN: CPT

## 2020-12-14 PROCEDURE — 99072 ADDL SUPL MATRL&STAF TM PHE: CPT

## 2020-12-16 ENCOUNTER — LABORATORY RESULT (OUTPATIENT)
Age: 60
End: 2020-12-16

## 2020-12-16 PROBLEM — J01.90 ACUTE RHINOSINUSITIS: Status: RESOLVED | Noted: 2018-05-31 | Resolved: 2020-12-16

## 2020-12-17 ENCOUNTER — APPOINTMENT (OUTPATIENT)
Dept: CARDIOLOGY | Facility: CLINIC | Age: 60
End: 2020-12-17

## 2020-12-20 NOTE — H&P PST ADULT - NS ABD PE RECTAL EXAM
Return to the ER if any worsening. Follow up with primary care in 1-7 days. Take Norco as needed as directed for pain. Take Naprosyn as needed as directed for pain. Do not drink alcohol, drive, or take other sedating medications while under the influence of the medications given to you in the Emergency Room for at least the next 6 hours. not examined

## 2020-12-21 ENCOUNTER — APPOINTMENT (OUTPATIENT)
Dept: TRANSPLANT | Facility: CLINIC | Age: 60
End: 2020-12-21

## 2020-12-21 PROBLEM — Z87.09 HISTORY OF ACUTE BRONCHITIS WITH BRONCHOSPASM: Status: RESOLVED | Noted: 2018-05-31 | Resolved: 2020-12-21

## 2020-12-22 ENCOUNTER — APPOINTMENT (OUTPATIENT)
Dept: NEPHROLOGY | Facility: CLINIC | Age: 60
End: 2020-12-22
Payer: COMMERCIAL

## 2020-12-22 PROCEDURE — 99214 OFFICE O/P EST MOD 30 MIN: CPT | Mod: 95

## 2020-12-22 NOTE — ASSESSMENT
[FreeTextEntry1] : 60 year old male s/p LRT from daughter on 2/18/2020 \par \par 1. Allograft function has been excellent . Most recent Cr was 1.17 lastmonth. needs to do labs \par 2. IS meds- On Prograf 1mg po bid. . 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 continue Prednisone 5 mg po bid\par 3. Ppx-on Bactrim.  \par 4. HTN- controlled on Coreg 25 mg po bid \par 5. DM- BS now well controlled. follows with  with Dr Medrano. currently on insulin pump\par 6. BK Virema- off Myfortic. on Prograft 1 mg po bid ( last level was 4.8) BK VL trending down. ( from 9200 to 5600 on Nov 10th ) \par 7. Covid positive, mild symptoms of myalgia . no SOB or fever\par \par \par \par  \par \par

## 2020-12-22 NOTE — HISTORY OF PRESENT ILLNESS
[Home] : at home, [unfilled] , at the time of the visit. [Medical Office: ([facility])___] : at the medical office located in  [Verbal consent obtained from patient] : the patient, [unfilled] [Living Donor] : Living donor [Basiliximab] : basiliximab [Negative/Negative] : Donor Negative/Recipient Negative [TextBox_7] : 2/18/2020 [FreeTextEntry1] : 60 year old male with ESRD was on dialysis , now s/p LRT from his daughter on 2/12/2020 .\par   PMH includes CHF, HTN, HLD, T2DM,  asthma, left glaucoma.\par  Donor: ABO O Donor CMV (-), EBV (+)\par  Recipient ABO O Recipient CMV (-), EBV (+) HLA mismatch 1,1,1, crossmatch negative.\par \par On Telehealth visit:\par Patient tested positive for covid last week. His wife and son are Covid positive. He endorses some body aches and a dry cough  . Denies SOB , fever, chills, dysuria, LE edema,chest pain , nausea, vomiting, diarrhea or constipation \par He is hydrating well and has not lost his appetite . The redness and pain over his right leg has resolved and he is following with podiatry for the ulcer on his right 1st digit stump. \par I reviewed current home blood pressure and medications.\par Patient appears comfortable\par No distress, not dyspneic\par Conscious, alert, oriented X 3\par Speech: Normal\par Other observations as noted\par Reviewed most recent labs\par \par

## 2020-12-26 LAB
25(OH)D3 SERPL-MCNC: 34.1 NG/ML
ALBUMIN SERPL ELPH-MCNC: 4.2 G/DL
ALP BLD-CCNC: 92 U/L
ALT SERPL-CCNC: 35 U/L
ANION GAP SERPL CALC-SCNC: 13 MMOL/L
APPEARANCE: CLEAR
AST SERPL-CCNC: 27 U/L
BACTERIA: NEGATIVE
BASOPHILS # BLD AUTO: 0.02 K/UL
BASOPHILS NFR BLD AUTO: 0.3 %
BILIRUB SERPL-MCNC: 0.4 MG/DL
BILIRUBIN URINE: NEGATIVE
BLOOD URINE: NEGATIVE
BUN SERPL-MCNC: 22 MG/DL
CALCIUM SERPL-MCNC: 8.8 MG/DL
CALCIUM SERPL-MCNC: 8.8 MG/DL
CHLORIDE SERPL-SCNC: 103 MMOL/L
CHOLEST SERPL-MCNC: 106 MG/DL
CO2 SERPL-SCNC: 22 MMOL/L
COLOR: YELLOW
CREAT SERPL-MCNC: 1.42 MG/DL
CREAT SPEC-SCNC: 180 MG/DL
CREAT/PROT UR: 0.3 RATIO
EOSINOPHIL # BLD AUTO: 0.05 K/UL
EOSINOPHIL NFR BLD AUTO: 0.7 %
ESTIMATED AVERAGE GLUCOSE: 174 MG/DL
GLUCOSE QUALITATIVE U: NEGATIVE
GLUCOSE SERPL-MCNC: 198 MG/DL
HBA1C MFR BLD HPLC: 7.7 %
HCT VFR BLD CALC: 47.9 %
HDLC SERPL-MCNC: 29 MG/DL
HGB BLD-MCNC: 14.9 G/DL
HYALINE CASTS: 0 /LPF
IMM GRANULOCYTES NFR BLD AUTO: 0.3 %
KETONES URINE: NEGATIVE
LDH SERPL-CCNC: 233 U/L
LDLC SERPL CALC-MCNC: 32 MG/DL
LEUKOCYTE ESTERASE URINE: NEGATIVE
LYMPHOCYTES # BLD AUTO: 1.35 K/UL
LYMPHOCYTES NFR BLD AUTO: 19.6 %
MAGNESIUM SERPL-MCNC: 1.9 MG/DL
MAN DIFF?: NORMAL
MCHC RBC-ENTMCNC: 31.1 GM/DL
MCHC RBC-ENTMCNC: 31.3 PG
MCV RBC AUTO: 100.6 FL
MICROSCOPIC-UA: NORMAL
MONOCYTES # BLD AUTO: 0.45 K/UL
MONOCYTES NFR BLD AUTO: 6.5 %
NEUTROPHILS # BLD AUTO: 4.99 K/UL
NEUTROPHILS NFR BLD AUTO: 72.6 %
NITRITE URINE: NEGATIVE
NONHDLC SERPL-MCNC: 77 MG/DL
PARATHYROID HORMONE INTACT: 45 PG/ML
PH URINE: 6
PHOSPHATE SERPL-MCNC: 2.3 MG/DL
PLATELET # BLD AUTO: 185 K/UL
POTASSIUM SERPL-SCNC: 4.3 MMOL/L
PROT SERPL-MCNC: 7.2 G/DL
PROT UR-MCNC: 48 MG/DL
PROTEIN URINE: ABNORMAL
RBC # BLD: 4.76 M/UL
RBC # FLD: 12.8 %
RED BLOOD CELLS URINE: 1 /HPF
SODIUM SERPL-SCNC: 138 MMOL/L
SPECIFIC GRAVITY URINE: 1.02
SQUAMOUS EPITHELIAL CELLS: 1 /HPF
TACROLIMUS SERPL-MCNC: 5.3 NG/ML
TRIGL SERPL-MCNC: 226 MG/DL
URATE SERPL-MCNC: 4.5 MG/DL
UROBILINOGEN URINE: NORMAL
WBC # FLD AUTO: 6.88 K/UL
WHITE BLOOD CELLS URINE: 2 /HPF

## 2020-12-30 ENCOUNTER — APPOINTMENT (OUTPATIENT)
Dept: TRANSPLANT | Facility: CLINIC | Age: 60
End: 2020-12-30

## 2020-12-31 LAB
ALBUMIN SERPL ELPH-MCNC: 4 G/DL
ALP BLD-CCNC: 88 U/L
ALT SERPL-CCNC: 36 U/L
ANION GAP SERPL CALC-SCNC: 15 MMOL/L
APPEARANCE: CLEAR
AST SERPL-CCNC: 32 U/L
BACTERIA: NEGATIVE
BASOPHILS # BLD AUTO: 0.03 K/UL
BASOPHILS NFR BLD AUTO: 0.3 %
BILIRUB SERPL-MCNC: 0.2 MG/DL
BILIRUBIN URINE: NEGATIVE
BKV DNA SPEC QL NAA+PROBE: 82 COPIES/ML
BLOOD URINE: NEGATIVE
BUN SERPL-MCNC: 30 MG/DL
CALCIUM SERPL-MCNC: 9.7 MG/DL
CHLORIDE SERPL-SCNC: 107 MMOL/L
CMV DNA SPEC QL NAA+PROBE: NOT DETECTED IU/ML
CO2 SERPL-SCNC: 20 MMOL/L
COLOR: YELLOW
CREAT SERPL-MCNC: 1.33 MG/DL
EOSINOPHIL # BLD AUTO: 0.08 K/UL
EOSINOPHIL NFR BLD AUTO: 0.7 %
GLUCOSE QUALITATIVE U: NEGATIVE
GLUCOSE SERPL-MCNC: 34 MG/DL
GRANULAR CASTS: 2 /LPF
HCT VFR BLD CALC: 44.5 %
HGB BLD-MCNC: 14 G/DL
HYALINE CASTS: 3 /LPF
IMM GRANULOCYTES NFR BLD AUTO: 1.6 %
KETONES URINE: NORMAL
LEUKOCYTE ESTERASE URINE: NEGATIVE
LYMPHOCYTES # BLD AUTO: 1.45 K/UL
LYMPHOCYTES NFR BLD AUTO: 12.5 %
MAGNESIUM SERPL-MCNC: 2.1 MG/DL
MAN DIFF?: NORMAL
MCHC RBC-ENTMCNC: 31 PG
MCHC RBC-ENTMCNC: 31.5 GM/DL
MCV RBC AUTO: 98.7 FL
MICROSCOPIC-UA: NORMAL
MONOCYTES # BLD AUTO: 0.57 K/UL
MONOCYTES NFR BLD AUTO: 4.9 %
NEUTROPHILS # BLD AUTO: 9.27 K/UL
NEUTROPHILS NFR BLD AUTO: 80 %
NITRITE URINE: NEGATIVE
PH URINE: 6
PHOSPHATE SERPL-MCNC: 2.7 MG/DL
PLATELET # BLD AUTO: 271 K/UL
POTASSIUM SERPL-SCNC: 6.1 MMOL/L
PROT SERPL-MCNC: 7.2 G/DL
PROTEIN URINE: ABNORMAL
RBC # BLD: 4.51 M/UL
RBC # FLD: 12.5 %
RED BLOOD CELLS URINE: 2 /HPF
SODIUM SERPL-SCNC: 142 MMOL/L
SPECIFIC GRAVITY URINE: 1.04
SQUAMOUS EPITHELIAL CELLS: 1 /HPF
TACROLIMUS SERPL-MCNC: 9.1 NG/ML
UROBILINOGEN URINE: ABNORMAL
WBC # FLD AUTO: 11.58 K/UL
WHITE BLOOD CELLS URINE: 1 /HPF

## 2021-01-02 LAB
ALBUMIN SERPL ELPH-MCNC: 4.4 G/DL
ALP BLD-CCNC: 91 U/L
ALT SERPL-CCNC: 30 U/L
ANION GAP SERPL CALC-SCNC: 12 MMOL/L
AST SERPL-CCNC: 28 U/L
BILIRUB SERPL-MCNC: 0.3 MG/DL
BUN SERPL-MCNC: 29 MG/DL
CALCIUM SERPL-MCNC: 9.6 MG/DL
CHLORIDE SERPL-SCNC: 109 MMOL/L
CO2 SERPL-SCNC: 21 MMOL/L
CREAT SERPL-MCNC: 1.39 MG/DL
GLUCOSE SERPL-MCNC: 130 MG/DL
POTASSIUM SERPL-SCNC: 5.6 MMOL/L
PROT SERPL-MCNC: 7.5 G/DL
SODIUM SERPL-SCNC: 142 MMOL/L

## 2021-01-08 LAB
ALBUMIN SERPL ELPH-MCNC: 4.5 G/DL
ALP BLD-CCNC: 96 U/L
ALT SERPL-CCNC: 42 U/L
ANION GAP SERPL CALC-SCNC: 13 MMOL/L
APPEARANCE: CLEAR
AST SERPL-CCNC: 28 U/L
BACTERIA: NEGATIVE
BASOPHILS # BLD AUTO: 0.07 K/UL
BASOPHILS NFR BLD AUTO: 0.7 %
BILIRUB SERPL-MCNC: 0.3 MG/DL
BILIRUBIN URINE: NEGATIVE
BLOOD URINE: NEGATIVE
BUN SERPL-MCNC: 28 MG/DL
CALCIUM SERPL-MCNC: 10.5 MG/DL
CHLORIDE SERPL-SCNC: 107 MMOL/L
CO2 SERPL-SCNC: 22 MMOL/L
COLOR: YELLOW
CREAT SERPL-MCNC: 1.31 MG/DL
CREAT SPEC-SCNC: 164 MG/DL
CREAT/PROT UR: 0.2 RATIO
EOSINOPHIL # BLD AUTO: 0.2 K/UL
EOSINOPHIL NFR BLD AUTO: 2 %
GLUCOSE QUALITATIVE U: NEGATIVE
GLUCOSE SERPL-MCNC: 141 MG/DL
HCT VFR BLD CALC: 45.9 %
HGB BLD-MCNC: 14.2 G/DL
HYALINE CASTS: 0 /LPF
IMM GRANULOCYTES NFR BLD AUTO: 0.6 %
KETONES URINE: NEGATIVE
LDH SERPL-CCNC: 249 U/L
LEUKOCYTE ESTERASE URINE: NEGATIVE
LYMPHOCYTES # BLD AUTO: 2.14 K/UL
LYMPHOCYTES NFR BLD AUTO: 21.4 %
MAGNESIUM SERPL-MCNC: 1.9 MG/DL
MAN DIFF?: NORMAL
MCHC RBC-ENTMCNC: 30.9 GM/DL
MCHC RBC-ENTMCNC: 31.7 PG
MCV RBC AUTO: 102.5 FL
MICROSCOPIC-UA: NORMAL
MONOCYTES # BLD AUTO: 0.6 K/UL
MONOCYTES NFR BLD AUTO: 6 %
NEUTROPHILS # BLD AUTO: 6.92 K/UL
NEUTROPHILS NFR BLD AUTO: 69.3 %
NITRITE URINE: NEGATIVE
PH URINE: 6
PHOSPHATE SERPL-MCNC: 3.3 MG/DL
PLATELET # BLD AUTO: 320 K/UL
POTASSIUM SERPL-SCNC: 4.9 MMOL/L
PROT SERPL-MCNC: 7.3 G/DL
PROT UR-MCNC: 35 MG/DL
PROTEIN URINE: ABNORMAL
RBC # BLD: 4.48 M/UL
RBC # FLD: 12.7 %
RED BLOOD CELLS URINE: 1 /HPF
SODIUM SERPL-SCNC: 141 MMOL/L
SPECIFIC GRAVITY URINE: 1.03
SQUAMOUS EPITHELIAL CELLS: 1 /HPF
TACROLIMUS SERPL-MCNC: 4.4 NG/ML
URATE SERPL-MCNC: 4.7 MG/DL
UROBILINOGEN URINE: NORMAL
WBC # FLD AUTO: 9.99 K/UL
WHITE BLOOD CELLS URINE: 1 /HPF

## 2021-01-11 LAB — CMV DNA SPEC QL NAA+PROBE: NOT DETECTED IU/ML

## 2021-01-15 LAB — BKV DNA SPEC QL NAA+PROBE: ABNORMAL COPIES/ML

## 2021-01-27 RX ORDER — CHOLECALCIFEROL (VITAMIN D3) 1250 MCG
1.25 MG CAPSULE ORAL
Qty: 8 | Refills: 0 | Status: DISCONTINUED | COMMUNITY
Start: 2020-07-27 | End: 2021-01-27

## 2021-02-18 ENCOUNTER — APPOINTMENT (OUTPATIENT)
Dept: NEPHROLOGY | Facility: CLINIC | Age: 61
End: 2021-02-18
Payer: COMMERCIAL

## 2021-02-18 VITALS
HEIGHT: 69 IN | BODY MASS INDEX: 33.18 KG/M2 | SYSTOLIC BLOOD PRESSURE: 166 MMHG | HEART RATE: 74 BPM | TEMPERATURE: 97.9 F | WEIGHT: 224 LBS | DIASTOLIC BLOOD PRESSURE: 85 MMHG | OXYGEN SATURATION: 98 % | RESPIRATION RATE: 12 BRPM

## 2021-02-18 LAB
ALBUMIN SERPL ELPH-MCNC: 4.3 G/DL
ALP BLD-CCNC: 93 U/L
ALT SERPL-CCNC: 35 U/L
ANION GAP SERPL CALC-SCNC: 12 MMOL/L
APPEARANCE: CLEAR
AST SERPL-CCNC: 21 U/L
BACTERIA: NEGATIVE
BASOPHILS # BLD AUTO: 0.04 K/UL
BASOPHILS NFR BLD AUTO: 0.5 %
BILIRUB SERPL-MCNC: 0.3 MG/DL
BILIRUBIN URINE: NEGATIVE
BLOOD URINE: NEGATIVE
BUN SERPL-MCNC: 31 MG/DL
CALCIUM SERPL-MCNC: 9.9 MG/DL
CHLORIDE SERPL-SCNC: 107 MMOL/L
CO2 SERPL-SCNC: 23 MMOL/L
COLOR: YELLOW
CREAT SERPL-MCNC: 1.57 MG/DL
CREAT SPEC-SCNC: 196 MG/DL
CREAT/PROT UR: 0.2 RATIO
EOSINOPHIL # BLD AUTO: 0.1 K/UL
EOSINOPHIL NFR BLD AUTO: 1.2 %
GLUCOSE QUALITATIVE U: ABNORMAL
GLUCOSE SERPL-MCNC: 142 MG/DL
HCT VFR BLD CALC: 47.2 %
HGB BLD-MCNC: 15.1 G/DL
HYALINE CASTS: 1 /LPF
IMM GRANULOCYTES NFR BLD AUTO: 0.2 %
KETONES URINE: NEGATIVE
LDH SERPL-CCNC: 208 U/L
LEUKOCYTE ESTERASE URINE: NEGATIVE
LYMPHOCYTES # BLD AUTO: 1.63 K/UL
LYMPHOCYTES NFR BLD AUTO: 19.6 %
MAGNESIUM SERPL-MCNC: 2.1 MG/DL
MAN DIFF?: NORMAL
MCHC RBC-ENTMCNC: 32 GM/DL
MCHC RBC-ENTMCNC: 32.3 PG
MCV RBC AUTO: 100.9 FL
MICROSCOPIC-UA: NORMAL
MONOCYTES # BLD AUTO: 0.51 K/UL
MONOCYTES NFR BLD AUTO: 6.1 %
NEUTROPHILS # BLD AUTO: 6.01 K/UL
NEUTROPHILS NFR BLD AUTO: 72.4 %
NITRITE URINE: NEGATIVE
PH URINE: 6
PHOSPHATE SERPL-MCNC: 3.2 MG/DL
PLATELET # BLD AUTO: 209 K/UL
POTASSIUM SERPL-SCNC: 5.1 MMOL/L
PROT SERPL-MCNC: 7.2 G/DL
PROT UR-MCNC: 34 MG/DL
PROTEIN URINE: ABNORMAL
RBC # BLD: 4.68 M/UL
RBC # FLD: 13.1 %
RED BLOOD CELLS URINE: 2 /HPF
SODIUM SERPL-SCNC: 143 MMOL/L
SPECIFIC GRAVITY URINE: 1.03
SQUAMOUS EPITHELIAL CELLS: 1 /HPF
TACROLIMUS SERPL-MCNC: 8.6 NG/ML
URATE SERPL-MCNC: 5.3 MG/DL
UROBILINOGEN URINE: NORMAL
WBC # FLD AUTO: 8.31 K/UL
WHITE BLOOD CELLS URINE: 1 /HPF

## 2021-02-18 PROCEDURE — 99072 ADDL SUPL MATRL&STAF TM PHE: CPT

## 2021-02-18 PROCEDURE — 99213 OFFICE O/P EST LOW 20 MIN: CPT

## 2021-02-18 NOTE — HISTORY OF PRESENT ILLNESS
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 \par Patient tested positive for covid in December , he was sick for about a week with just a runny nose and headache and then recovered. \par Currently feel fine.  Denies SOB , fever, chills, dysuria, LE edema,chest pain , nausea, vomiting, diarrhea or constipation . He has gained close to 30 pounds since transplant. \par \par \par

## 2021-02-18 NOTE — PHYSICAL EXAM
[General Appearance - Alert] : alert [General Appearance - In No Acute Distress] : in no acute distress [Sclera] : the sclera and conjunctiva were normal [Extraocular Movements] : extraocular movements were intact [PERRL With Normal Accommodation] : pupils were equal in size, round, and reactive to light [Outer Ear] : the ears and nose were normal in appearance [Oropharynx] : the oropharynx was normal [Neck Appearance] : the appearance of the neck was normal [Neck Cervical Mass (___cm)] : no neck mass was observed [Jugular Venous Distention Increased] : there was no jugular-venous distention [Thyroid Diffuse Enlargement] : the thyroid was not enlarged [Thyroid Nodule] : there were no palpable thyroid nodules [Auscultation Breath Sounds / Voice Sounds] : lungs were clear to auscultation bilaterally [Heart Rate And Rhythm] : heart rate was normal and rhythm regular [Heart Sounds] : normal S1 and S2 [Heart Sounds Gallop] : no gallops [Heart Sounds Pericardial Friction Rub] : no pericardial rub [Murmurs] : no murmurs [Full Pulse] : the pedal pulses are present [Edema] : there was no peripheral edema [Bowel Sounds] : normal bowel sounds [Abdomen Tenderness] : non-tender [Abdomen Soft] : soft [Abdomen Mass (___ Cm)] : no abdominal mass palpated [No CVA Tenderness] : no ~M costovertebral angle tenderness [No Spinal Tenderness] : no spinal tenderness [Skin Color & Pigmentation] : normal skin color and pigmentation [Skin Turgor] : normal skin turgor [] : no rash [Normal] : normal [Deep Tendon Reflexes (DTR)] : deep tendon reflexes were 2+ and symmetric [Sensation] : the sensory exam was normal to light touch and pinprick [No Focal Deficits] : no focal deficits [Oriented To Time, Place, And Person] : oriented to person, place, and time [Impaired Insight] : insight and judgment were intact [Affect] : the affect was normal

## 2021-02-18 NOTE — ASSESSMENT
[FreeTextEntry1] : 60 year old male s/p LRT from daughter on 2/18/2020 \par \par 1. Allograft function has been excellent . Most recent Cr was 1.3  last month. needs to do labs \par 2. IS meds- on Prograft 1.5 mg po bid ,  was keeping a lower goal for tac as he had BK viremia which has now imporved. will check today's level and adjust dose accordingly..Myfortic can be restarted . continue Prednisone 5 mg po bid\par 3. Ppx-on Bactrim.  \par 4. HTN- BP not at goal. on  Coreg 25 mg po bid , will add Nifedipine 30 mg po daily \par 5. DM-  follows with  with Dr Medrano. currently on insulin pump\par 6. BK Virema-  BK VL trending down. ( most recently < 39 ) will restart myfortic \par \par \par \par \par  \par \par

## 2021-02-23 ENCOUNTER — APPOINTMENT (OUTPATIENT)
Dept: VASCULAR SURGERY | Facility: CLINIC | Age: 61
End: 2021-02-23
Payer: COMMERCIAL

## 2021-02-23 VITALS — TEMPERATURE: 97.7 F

## 2021-02-23 LAB — BKV DNA SPEC QL NAA+PROBE: ABNORMAL COPIES/ML

## 2021-02-23 PROCEDURE — 99072 ADDL SUPL MATRL&STAF TM PHE: CPT

## 2021-02-23 PROCEDURE — 99213 OFFICE O/P EST LOW 20 MIN: CPT

## 2021-02-23 PROCEDURE — 93990 DOPPLER FLOW TESTING: CPT

## 2021-02-23 NOTE — HISTORY OF PRESENT ILLNESS
[] : left radiocephalic fistula [FreeTextEntry1] : 61 yo male with history of esrd no longer on hd s/p transplant about 1 year ago presents for follow up of left upper extremity avf.  pt without any complaints at this time \par pt reports no pain or weakness in the left hand

## 2021-02-23 NOTE — PHYSICAL EXAM
[Thrill] : thrill [Hand well perfused] : hand well perfused [2+] : left 2+ [Normal] : coordination grossly intact, no focal deficits [Pulsatile Thrill] : no pulsatile thrill [Aneurysm] : no aneurysm [Bleeding] : no bleeding [Ulcer] : no ulcer [de-identified] :  strength 5/5 b/l upper extremities [de-identified] : intact

## 2021-02-23 NOTE — DISCUSSION/SUMMARY
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

## 2021-02-24 LAB — CMV DNA SPEC QL NAA+PROBE: NOT DETECTED IU/ML

## 2021-02-27 ENCOUNTER — NON-APPOINTMENT (OUTPATIENT)
Age: 61
End: 2021-02-27

## 2021-03-04 ENCOUNTER — APPOINTMENT (OUTPATIENT)
Dept: CARDIOLOGY | Facility: CLINIC | Age: 61
End: 2021-03-04
Payer: COMMERCIAL

## 2021-03-04 ENCOUNTER — NON-APPOINTMENT (OUTPATIENT)
Age: 61
End: 2021-03-04

## 2021-03-04 VITALS
BODY MASS INDEX: 32.58 KG/M2 | TEMPERATURE: 97.1 F | OXYGEN SATURATION: 98 % | HEIGHT: 69 IN | RESPIRATION RATE: 12 BRPM | SYSTOLIC BLOOD PRESSURE: 175 MMHG | DIASTOLIC BLOOD PRESSURE: 81 MMHG | HEART RATE: 83 BPM | WEIGHT: 220 LBS

## 2021-03-04 VITALS — SYSTOLIC BLOOD PRESSURE: 136 MMHG | DIASTOLIC BLOOD PRESSURE: 80 MMHG

## 2021-03-04 PROCEDURE — 99072 ADDL SUPL MATRL&STAF TM PHE: CPT

## 2021-03-04 PROCEDURE — 99214 OFFICE O/P EST MOD 30 MIN: CPT

## 2021-03-04 PROCEDURE — 93000 ELECTROCARDIOGRAM COMPLETE: CPT

## 2021-03-04 RX ORDER — COLLAGENASE SANTYL 250 [ARB'U]/G
250 OINTMENT TOPICAL
Qty: 90 | Refills: 0 | Status: DISCONTINUED | COMMUNITY
Start: 2021-01-12

## 2021-03-05 NOTE — DISCUSSION/SUMMARY
[FreeTextEntry1] : The patient is a 60-year-old ex-smoker family history of coronary artery disease, diabetes mellitus on insulin, hypertension, hyperlipidemia, renal insufficiency, s/p gastric sleeve , HFpEF, s/p renal transplant who is recovering from COVID.\par #1 CV- No angina\par #2 HFpEF- start furosemide 40mg \par #2 DM- continue tresiba insulin, better control, continues to improve\par #3 Htn- continue coreg 25mg bid\par #4 Lipids- continue atorvastatin optimal\par #5 Renal - s/p transplant, on immunosuppressant and prednisone\par #6 COVID- s/p steroids, all records reviewed\par \par

## 2021-03-05 NOTE — HISTORY OF PRESENT ILLNESS
[FreeTextEntry1] : Orion is one year s/p renal transplant. He had COVID in January and not back to fulltime work yet. He continues to have some fatigue. Gained weight being at home and inactive. No chest pain, palpitations or shortness of breath.

## 2021-03-08 ENCOUNTER — APPOINTMENT (OUTPATIENT)
Dept: ENDOCRINOLOGY | Facility: CLINIC | Age: 61
End: 2021-03-08
Payer: COMMERCIAL

## 2021-03-08 VITALS
OXYGEN SATURATION: 98 % | DIASTOLIC BLOOD PRESSURE: 80 MMHG | SYSTOLIC BLOOD PRESSURE: 150 MMHG | HEART RATE: 78 BPM | HEIGHT: 69 IN | WEIGHT: 225 LBS | TEMPERATURE: 98.2 F | BODY MASS INDEX: 33.33 KG/M2

## 2021-03-08 PROCEDURE — 99214 OFFICE O/P EST MOD 30 MIN: CPT

## 2021-03-08 PROCEDURE — 99072 ADDL SUPL MATRL&STAF TM PHE: CPT

## 2021-03-11 RX ORDER — INSULIN PUMP CARTRIDGE
CARTRIDGE (EA) SUBCUTANEOUS
Qty: 3 | Refills: 3 | Status: ACTIVE | COMMUNITY
Start: 2021-03-11 | End: 1900-01-01

## 2021-03-11 RX ORDER — BLOOD-GLUCOSE TRANSMITTER
EACH MISCELLANEOUS
Qty: 1 | Refills: 3 | Status: ACTIVE | COMMUNITY
Start: 2021-03-11 | End: 1900-01-01

## 2021-03-15 ENCOUNTER — APPOINTMENT (OUTPATIENT)
Dept: TRANSPLANT | Facility: CLINIC | Age: 61
End: 2021-03-15

## 2021-03-15 NOTE — REVIEW OF SYSTEMS
[Fatigue] : no fatigue [Decreased Appetite] : appetite not decreased [Recent Weight Gain (___ Lbs)] : no recent weight gain [Recent Weight Loss (___ Lbs)] : no recent weight loss [Visual Field Defect] : no visual field defect [Dry Eyes] : no dryness [Dysphagia] : no dysphagia [Neck Pain] : no neck pain [Dysphonia] : no dysphonia [Nasal Congestion] : no nasal congestion [Chest Pain] : no chest pain [Slow Heart Rate] : heart rate is not slow [Palpitations] : no palpitations [Fast Heart Rate] : heart rate is not fast [Shortness Of Breath] : no shortness of breath [Cough] : no cough [Constipation] : no constipation [Diarrhea] : no diarrhea

## 2021-03-15 NOTE — HISTORY OF PRESENT ILLNESS
[FreeTextEntry1] : Diabetes F/u Patient HPI\par \par 02/2020 renal transplant ( donor is the daughter) \par Stent was recently removed \par \par CC\par Patient referred by Dr. Zak Puente for diabetes management.\par \par \par History of gastric sleeve- at the time was on the insulin pump and came off \par \par \par \par HPI:\par \par Duration of Diabetes: 25-30 years \par Is patient on Insulin? yes, for 25-30 years \par \par List Current Medications for Glycemic control and the doses:\par Novolog insulin pump \par \par SMBG (self monitored blood glucose) readings: \par - Name of glucometer: \par - How often does the patient check BG? \par - Does the patient keep a log? \par \par If detailed record is available, what is the range of most of the BG readings?\par - Before Breakfast: 150-180s\par - Before Lunch: \par - Before Dinner: 180-200\par - Before Bedtime: 200-215\par \par \par Does patient get Hypoglycemic episodes?  previously he overcompensated \par If yes how frequent? \par Hoe low do the BG readings reach? 40s \par When do most of those episodes occur? Middle of the night \par What symptoms does the patient get during those episodes? sweating \par \par Diabetic Complications: Is patient aware of having any of those complications?\par - Eyes: Retinopathy? History of cataract surgery, + history of retinopathy gets laser surgery \par  	When was the last fully dilated eye exam? 10/2020\par - Feet: 	Neuropathy? Yes ( now on alpha lipoic acid ) \par  	Foot Ulcers? History of right toe ulcer \par 	When was the last time patient saw a Podiatrist? 3 months ago \par - Kidneys: Nephropathy? Renal transplant \par \par Diet: review patient's diet: \par \par breakfast: Egg salad , 1/2 judson \par Lunch: [place], [place] \par Dinner: meat, potatos, vegetables \par Snacks: Chips, fruits \par Juice or soda: None \par Desserts: yogurt, sugar free jello \par \par \par Exercise: review patient exercise habits: Not much \par \par  \par

## 2021-03-15 NOTE — ASSESSMENT
[FreeTextEntry1] : 60 year old male with history of renal transplant in 02/2020, DM Type 2 on insulin here for follow-up. \par SMBGs are much better controlled at this time. At this time most of his hyperglycemia is post prandial after lunch. He reported that it is 2/2 increased snacking post lunch. Discussed to pick healthier low carb snacks post lunch. \par \par DM Type 2 \par -Will increase predinner carb ratio to improve post dinner hyperglycemia \par -Healthier snacks were discussed with the patient \par -Still is having higher glycemic index carbs \par -SMBGS with DEXCOM \par \par Renal transplant\par -Use of prednisone causing hyperglycemia in the evening \par -Use of tacrolimus increases insulin resistance \par \par HLD\par -Continue Atorvastatin \par \par \par -Follow up with in 4 months. \par

## 2021-03-16 LAB
ALBUMIN SERPL ELPH-MCNC: 4.6 G/DL
ALP BLD-CCNC: 98 U/L
ALT SERPL-CCNC: 39 U/L
ANION GAP SERPL CALC-SCNC: 13 MMOL/L
APPEARANCE: CLEAR
AST SERPL-CCNC: 25 U/L
BACTERIA: NEGATIVE
BASOPHILS # BLD AUTO: 0.05 K/UL
BASOPHILS NFR BLD AUTO: 0.5 %
BILIRUB SERPL-MCNC: 0.4 MG/DL
BILIRUBIN URINE: NEGATIVE
BLOOD URINE: NEGATIVE
BUN SERPL-MCNC: 39 MG/DL
CALCIUM SERPL-MCNC: 10.3 MG/DL
CHLORIDE SERPL-SCNC: 105 MMOL/L
CO2 SERPL-SCNC: 25 MMOL/L
COLOR: YELLOW
CREAT SERPL-MCNC: 1.55 MG/DL
CREAT SPEC-SCNC: 110 MG/DL
CREAT/PROT UR: 0.1 RATIO
EOSINOPHIL # BLD AUTO: 0.11 K/UL
EOSINOPHIL NFR BLD AUTO: 1 %
GLUCOSE QUALITATIVE U: NEGATIVE
GLUCOSE SERPL-MCNC: 115 MG/DL
HCT VFR BLD CALC: 50.2 %
HGB BLD-MCNC: 15.9 G/DL
HYALINE CASTS: 0 /LPF
IMM GRANULOCYTES NFR BLD AUTO: 0.4 %
KETONES URINE: NEGATIVE
LDH SERPL-CCNC: 240 U/L
LEUKOCYTE ESTERASE URINE: NEGATIVE
LYMPHOCYTES # BLD AUTO: 2.2 K/UL
LYMPHOCYTES NFR BLD AUTO: 20.8 %
MAGNESIUM SERPL-MCNC: 2.1 MG/DL
MAN DIFF?: NORMAL
MCHC RBC-ENTMCNC: 31.1 PG
MCHC RBC-ENTMCNC: 31.7 GM/DL
MCV RBC AUTO: 98 FL
MICROSCOPIC-UA: NORMAL
MONOCYTES # BLD AUTO: 0.73 K/UL
MONOCYTES NFR BLD AUTO: 6.9 %
NEUTROPHILS # BLD AUTO: 7.43 K/UL
NEUTROPHILS NFR BLD AUTO: 70.4 %
NITRITE URINE: NEGATIVE
PH URINE: 6
PHOSPHATE SERPL-MCNC: 3.5 MG/DL
PLATELET # BLD AUTO: 205 K/UL
POTASSIUM SERPL-SCNC: 5.1 MMOL/L
PROT SERPL-MCNC: 7.5 G/DL
PROT UR-MCNC: 13 MG/DL
PROTEIN URINE: NORMAL
RBC # BLD: 5.12 M/UL
RBC # FLD: 12.9 %
RED BLOOD CELLS URINE: 0 /HPF
SODIUM SERPL-SCNC: 142 MMOL/L
SPECIFIC GRAVITY URINE: 1.02
SQUAMOUS EPITHELIAL CELLS: 0 /HPF
TACROLIMUS SERPL-MCNC: 9 NG/ML
URATE SERPL-MCNC: 6.7 MG/DL
UROBILINOGEN URINE: NORMAL
WBC # FLD AUTO: 10.56 K/UL
WHITE BLOOD CELLS URINE: 1 /HPF

## 2021-03-16 RX ORDER — NYSTATIN 100000 [USP'U]/ML
100000 SUSPENSION ORAL 4 TIMES DAILY
Qty: 600 | Refills: 2 | Status: DISCONTINUED | COMMUNITY
Start: 2020-02-19 | End: 2021-03-16

## 2021-03-18 LAB
BKV DNA SPEC QL NAA+PROBE: NEGATIVE COPIES/ML
CMV DNA SPEC QL NAA+PROBE: NOT DETECTED IU/ML
LOG 10 BK QUANTITATION PCR: NORMAL

## 2021-03-22 ENCOUNTER — APPOINTMENT (OUTPATIENT)
Dept: ENDOCRINOLOGY | Facility: CLINIC | Age: 61
End: 2021-03-22

## 2021-03-23 ENCOUNTER — APPOINTMENT (OUTPATIENT)
Dept: NEPHROLOGY | Facility: CLINIC | Age: 61
End: 2021-03-23

## 2021-03-23 ENCOUNTER — APPOINTMENT (OUTPATIENT)
Dept: NEPHROLOGY | Facility: CLINIC | Age: 61
End: 2021-03-23
Payer: COMMERCIAL

## 2021-03-23 PROCEDURE — 99214 OFFICE O/P EST MOD 30 MIN: CPT | Mod: 95

## 2021-03-23 NOTE — ASSESSMENT
[FreeTextEntry1] : 60 year old male s/p LRT from daughter on 2/18/2020 \par \par 1. Allograft function has been good  and was ranging  ~ 1.2 . slight rise in Cr to 1.4 was noted since December ( after he got sick with covid and had BK viremia) Most recent Cr was 1.5  last month. \par 2. IS meds- on Prograft 1 mg po bid ( lowered due to BK viremia which has now improved ) continue Myfortic 360 mg po bid and Prednisone 5 mg po bid.\par 3. Ppx-on Bactrim.  \par 4. HTN- Coreg 25 mg po bid and Nifedipine 30 mg po daily \par 5. DM-  follows with Dr Medrano. Currently on insulin pump and started on Truvada\par 6. BK Virema-  most recent  BK VL was undetectable. \par \par \par \par \par \par \par  \par \par

## 2021-03-23 NOTE — HISTORY OF PRESENT ILLNESS
[Home] : at home, [unfilled] , at the time of the visit. [Medical Office: ([facility])___] : at the medical office located in  [Verbal consent obtained from patient] : the patient, [unfilled] [Living Donor] : Living donor [Basiliximab] : basiliximab [Negative/Negative] : Donor Negative/Recipient Negative [TextBox_7] : 2/18/2020 [FreeTextEntry1] : 60 year old male with ESRD was on dialysis , now s/p LRT from his daughter on 2/12/2020 .\par   PMH includes CHF, HTN, HLD, T2DM,  asthma, left glaucoma.\par  Donor: ABO O Donor CMV (-), EBV (+)\par  Recipient ABO O Recipient CMV (-), EBV (+) HLA mismatch 1,1,1, crossmatch negative.\par \par Patient tested positive for covid in December , he was sick for about a week with just a runny nose and headache and then recovered. \par \par \par On Telehealth visit:\par Patient feels well, no acute symptoms. Denies SOB , fever, chills, dysuria, LE edema,chest pain , nausea, vomiting, diarrhea or constipation .\par I reviewed current home blood pressure and medications.\par Patient appears comfortable\par No distress, not dyspneic\par Conscious, alert, oriented X 3\par Speech: Normal\par Other observations as noted\par Reviewed most recent labs\par  He has gained close to 30 pounds since transplant. \par \par \par

## 2021-03-31 LAB
25(OH)D3 SERPL-MCNC: 28.5 NG/ML
ALBUMIN SERPL ELPH-MCNC: 4.6 G/DL
ALP BLD-CCNC: 92 U/L
ALT SERPL-CCNC: 34 U/L
ANION GAP SERPL CALC-SCNC: 15 MMOL/L
APPEARANCE: CLEAR
AST SERPL-CCNC: 24 U/L
BACTERIA: NEGATIVE
BASOPHILS # BLD AUTO: 0.03 K/UL
BASOPHILS NFR BLD AUTO: 0.4 %
BILIRUB SERPL-MCNC: 0.4 MG/DL
BILIRUBIN URINE: NEGATIVE
BLOOD URINE: NEGATIVE
BUN SERPL-MCNC: 29 MG/DL
CALCIUM SERPL-MCNC: 10 MG/DL
CALCIUM SERPL-MCNC: 10 MG/DL
CHLORIDE SERPL-SCNC: 104 MMOL/L
CHOLEST SERPL-MCNC: 138 MG/DL
CO2 SERPL-SCNC: 25 MMOL/L
COLOR: YELLOW
CREAT SERPL-MCNC: 1.75 MG/DL
CREAT SPEC-SCNC: 279 MG/DL
CREAT/PROT UR: 0.1 RATIO
EOSINOPHIL # BLD AUTO: 0.07 K/UL
EOSINOPHIL NFR BLD AUTO: 0.9 %
ESTIMATED AVERAGE GLUCOSE: 169 MG/DL
GLUCOSE QUALITATIVE U: NEGATIVE
GLUCOSE SERPL-MCNC: 138 MG/DL
HBA1C MFR BLD HPLC: 7.5 %
HCT VFR BLD CALC: 53.2 %
HDLC SERPL-MCNC: 40 MG/DL
HGB BLD-MCNC: 16.6 G/DL
HYALINE CASTS: 1 /LPF
IMM GRANULOCYTES NFR BLD AUTO: 0.2 %
KETONES URINE: NORMAL
LDH SERPL-CCNC: 213 U/L
LDLC SERPL CALC-MCNC: 49 MG/DL
LEUKOCYTE ESTERASE URINE: NEGATIVE
LYMPHOCYTES # BLD AUTO: 1.1 K/UL
LYMPHOCYTES NFR BLD AUTO: 13.6 %
MAGNESIUM SERPL-MCNC: 2.2 MG/DL
MAN DIFF?: NORMAL
MCHC RBC-ENTMCNC: 31.2 GM/DL
MCHC RBC-ENTMCNC: 31.6 PG
MCV RBC AUTO: 101.3 FL
MICROSCOPIC-UA: NORMAL
MONOCYTES # BLD AUTO: 0.6 K/UL
MONOCYTES NFR BLD AUTO: 7.4 %
NEUTROPHILS # BLD AUTO: 6.26 K/UL
NEUTROPHILS NFR BLD AUTO: 77.5 %
NITRITE URINE: NEGATIVE
NONHDLC SERPL-MCNC: 98 MG/DL
PARATHYROID HORMONE INTACT: 51 PG/ML
PH URINE: 6
PHOSPHATE SERPL-MCNC: 2.9 MG/DL
PLATELET # BLD AUTO: 197 K/UL
POTASSIUM SERPL-SCNC: 5.2 MMOL/L
PROT SERPL-MCNC: 7.4 G/DL
PROT UR-MCNC: 28 MG/DL
PROTEIN URINE: ABNORMAL
RBC # BLD: 5.25 M/UL
RBC # FLD: 13.1 %
RED BLOOD CELLS URINE: 1 /HPF
SODIUM SERPL-SCNC: 143 MMOL/L
SPECIFIC GRAVITY URINE: 1.03
SQUAMOUS EPITHELIAL CELLS: 0 /HPF
TACROLIMUS SERPL-MCNC: 4.9 NG/ML
TRIGL SERPL-MCNC: 245 MG/DL
URATE SERPL-MCNC: 6.7 MG/DL
UROBILINOGEN URINE: ABNORMAL
WBC # FLD AUTO: 8.08 K/UL
WHITE BLOOD CELLS URINE: 1 /HPF

## 2021-04-01 ENCOUNTER — RESULT REVIEW (OUTPATIENT)
Age: 61
End: 2021-04-01

## 2021-04-01 RX ORDER — FUROSEMIDE 40 MG/1
40 TABLET ORAL DAILY
Qty: 14 | Refills: 0 | Status: DISCONTINUED | COMMUNITY
Start: 2021-03-04 | End: 2021-04-01

## 2021-04-05 ENCOUNTER — APPOINTMENT (OUTPATIENT)
Dept: ULTRASOUND IMAGING | Facility: IMAGING CENTER | Age: 61
End: 2021-04-05
Payer: COMMERCIAL

## 2021-04-05 ENCOUNTER — APPOINTMENT (OUTPATIENT)
Dept: TRANSPLANT | Facility: CLINIC | Age: 61
End: 2021-04-05

## 2021-04-05 ENCOUNTER — OUTPATIENT (OUTPATIENT)
Dept: OUTPATIENT SERVICES | Facility: HOSPITAL | Age: 61
LOS: 1 days | End: 2021-04-05
Payer: COMMERCIAL

## 2021-04-05 DIAGNOSIS — Z98.84 BARIATRIC SURGERY STATUS: Chronic | ICD-10-CM

## 2021-04-05 DIAGNOSIS — Z98.890 OTHER SPECIFIED POSTPROCEDURAL STATES: Chronic | ICD-10-CM

## 2021-04-05 DIAGNOSIS — Z94.0 KIDNEY TRANSPLANT STATUS: ICD-10-CM

## 2021-04-05 DIAGNOSIS — Z98.49 CATARACT EXTRACTION STATUS, UNSPECIFIED EYE: Chronic | ICD-10-CM

## 2021-04-05 DIAGNOSIS — H35.62 RETINAL HEMORRHAGE, LEFT EYE: Chronic | ICD-10-CM

## 2021-04-05 PROCEDURE — 76776 US EXAM K TRANSPL W/DOPPLER: CPT | Mod: 26,LT

## 2021-04-05 PROCEDURE — 76776 US EXAM K TRANSPL W/DOPPLER: CPT

## 2021-04-06 ENCOUNTER — APPOINTMENT (OUTPATIENT)
Dept: TRANSPLANT | Facility: CLINIC | Age: 61
End: 2021-04-06
Payer: COMMERCIAL

## 2021-04-06 LAB
ALBUMIN SERPL ELPH-MCNC: 4.6 G/DL
ALP BLD-CCNC: 84 U/L
ALT SERPL-CCNC: 41 U/L
ANION GAP SERPL CALC-SCNC: 11 MMOL/L
APPEARANCE: CLEAR
AST SERPL-CCNC: 27 U/L
BACTERIA: NEGATIVE
BASOPHILS # BLD AUTO: 0.04 K/UL
BASOPHILS NFR BLD AUTO: 0.5 %
BILIRUB SERPL-MCNC: 0.4 MG/DL
BILIRUBIN URINE: NEGATIVE
BLOOD URINE: NEGATIVE
BUN SERPL-MCNC: 22 MG/DL
CALCIUM SERPL-MCNC: 10.1 MG/DL
CALCIUM SERPL-MCNC: 10.1 MG/DL
CHLORIDE SERPL-SCNC: 105 MMOL/L
CO2 SERPL-SCNC: 22 MMOL/L
COLOR: YELLOW
CREAT SERPL-MCNC: 1.07 MG/DL
CREAT SPEC-SCNC: 135 MG/DL
CREAT/PROT UR: 0.2 RATIO
EOSINOPHIL # BLD AUTO: 0.06 K/UL
EOSINOPHIL NFR BLD AUTO: 0.7 %
GLUCOSE QUALITATIVE U: NEGATIVE
GLUCOSE SERPL-MCNC: 150 MG/DL
HCT VFR BLD CALC: 48.8 %
HGB BLD-MCNC: 16 G/DL
HYALINE CASTS: 0 /LPF
IMM GRANULOCYTES NFR BLD AUTO: 0.3 %
KETONES URINE: NEGATIVE
LDH SERPL-CCNC: 221 U/L
LEUKOCYTE ESTERASE URINE: NEGATIVE
LYMPHOCYTES # BLD AUTO: 1.79 K/UL
LYMPHOCYTES NFR BLD AUTO: 20.8 %
MAGNESIUM SERPL-MCNC: 2.3 MG/DL
MAN DIFF?: NORMAL
MCHC RBC-ENTMCNC: 31.9 PG
MCHC RBC-ENTMCNC: 32.8 GM/DL
MCV RBC AUTO: 97.4 FL
MICROSCOPIC-UA: NORMAL
MONOCYTES # BLD AUTO: 0.43 K/UL
MONOCYTES NFR BLD AUTO: 5 %
NEUTROPHILS # BLD AUTO: 6.27 K/UL
NEUTROPHILS NFR BLD AUTO: 72.7 %
NITRITE URINE: NEGATIVE
PARATHYROID HORMONE INTACT: 57 PG/ML
PH URINE: 6
PHOSPHATE SERPL-MCNC: 2.4 MG/DL
PLATELET # BLD AUTO: 227 K/UL
POTASSIUM SERPL-SCNC: 4.9 MMOL/L
PROT SERPL-MCNC: 7.2 G/DL
PROT UR-MCNC: 27 MG/DL
PROTEIN URINE: NORMAL
RBC # BLD: 5.01 M/UL
RBC # FLD: 12.9 %
RED BLOOD CELLS URINE: 1 /HPF
SODIUM SERPL-SCNC: 139 MMOL/L
SPECIFIC GRAVITY URINE: 1.03
SQUAMOUS EPITHELIAL CELLS: 0 /HPF
TACROLIMUS SERPL-MCNC: 9.3 NG/ML
URATE SERPL-MCNC: 4.7 MG/DL
UROBILINOGEN URINE: NORMAL
WBC # FLD AUTO: 8.62 K/UL
WHITE BLOOD CELLS URINE: 1 /HPF

## 2021-04-06 PROCEDURE — 86833 HLA CLASS II HIGH DEFIN QUAL: CPT

## 2021-04-06 PROCEDURE — 99001T: CUSTOM

## 2021-04-06 PROCEDURE — 86832 HLA CLASS I HIGH DEFIN QUAL: CPT

## 2021-04-08 LAB
BKV DNA SPEC QL NAA+PROBE: NEGATIVE COPIES/ML
LOG 10 BK QUANTITATION PCR: NORMAL

## 2021-04-09 LAB — ROGOSIN: NORMAL

## 2021-04-26 ENCOUNTER — APPOINTMENT (OUTPATIENT)
Dept: TRANSPLANT | Facility: CLINIC | Age: 61
End: 2021-04-26

## 2021-04-27 ENCOUNTER — APPOINTMENT (OUTPATIENT)
Dept: NEPHROLOGY | Facility: CLINIC | Age: 61
End: 2021-04-27
Payer: COMMERCIAL

## 2021-04-27 LAB
ALBUMIN SERPL ELPH-MCNC: 4.6 G/DL
ALP BLD-CCNC: 90 U/L
ALT SERPL-CCNC: 44 U/L
ANION GAP SERPL CALC-SCNC: 12 MMOL/L
APPEARANCE: CLEAR
AST SERPL-CCNC: 27 U/L
BACTERIA: NEGATIVE
BASOPHILS # BLD AUTO: 0.03 K/UL
BASOPHILS NFR BLD AUTO: 0.4 %
BILIRUB SERPL-MCNC: 0.4 MG/DL
BILIRUBIN URINE: NEGATIVE
BLOOD URINE: NEGATIVE
BUN SERPL-MCNC: 20 MG/DL
CALCIUM SERPL-MCNC: 10.1 MG/DL
CHLORIDE SERPL-SCNC: 108 MMOL/L
CO2 SERPL-SCNC: 23 MMOL/L
COLOR: YELLOW
CREAT SERPL-MCNC: 1.13 MG/DL
CREAT SPEC-SCNC: 156 MG/DL
CREAT/PROT UR: 0.2 RATIO
EOSINOPHIL # BLD AUTO: 0.09 K/UL
EOSINOPHIL NFR BLD AUTO: 1.1 %
GLUCOSE QUALITATIVE U: NEGATIVE
GLUCOSE SERPL-MCNC: 137 MG/DL
HCT VFR BLD CALC: 53.1 %
HGB BLD-MCNC: 16.6 G/DL
HYALINE CASTS: 0 /LPF
IMM GRANULOCYTES NFR BLD AUTO: 0.2 %
KETONES URINE: NEGATIVE
LDH SERPL-CCNC: 243 U/L
LEUKOCYTE ESTERASE URINE: NEGATIVE
LYMPHOCYTES # BLD AUTO: 1.8 K/UL
LYMPHOCYTES NFR BLD AUTO: 21.7 %
MAGNESIUM SERPL-MCNC: 2.2 MG/DL
MAN DIFF?: NORMAL
MCHC RBC-ENTMCNC: 30.5 PG
MCHC RBC-ENTMCNC: 31.3 GM/DL
MCV RBC AUTO: 97.4 FL
MICROSCOPIC-UA: NORMAL
MONOCYTES # BLD AUTO: 0.58 K/UL
MONOCYTES NFR BLD AUTO: 7 %
NEUTROPHILS # BLD AUTO: 5.76 K/UL
NEUTROPHILS NFR BLD AUTO: 69.6 %
NITRITE URINE: NEGATIVE
PH URINE: 6
PHOSPHATE SERPL-MCNC: 2.9 MG/DL
PLATELET # BLD AUTO: 207 K/UL
POTASSIUM SERPL-SCNC: 5.1 MMOL/L
PROT SERPL-MCNC: 7.1 G/DL
PROT UR-MCNC: 30 MG/DL
PROTEIN URINE: NORMAL
RBC # BLD: 5.45 M/UL
RBC # FLD: 13.2 %
RED BLOOD CELLS URINE: 2 /HPF
SODIUM SERPL-SCNC: 143 MMOL/L
SPECIFIC GRAVITY URINE: 1.03
SQUAMOUS EPITHELIAL CELLS: 0 /HPF
TACROLIMUS SERPL-MCNC: 7.4 NG/ML
URATE SERPL-MCNC: 4.4 MG/DL
UROBILINOGEN URINE: NORMAL
WBC # FLD AUTO: 8.28 K/UL
WHITE BLOOD CELLS URINE: 1 /HPF

## 2021-04-27 PROCEDURE — 99214 OFFICE O/P EST MOD 30 MIN: CPT | Mod: 95

## 2021-04-27 NOTE — HISTORY OF PRESENT ILLNESS
[Living Donor] : Living donor [Basiliximab] : basiliximab [Negative/Negative] : Donor Negative/Recipient Negative [Home] : at home, [unfilled] , at the time of the visit. [Medical Office: ([facility])___] : at the medical office located in  [Verbal consent obtained from patient] : the patient, [unfilled] [TextBox_7] : 2/18/2020 [FreeTextEntry1] : 60 year old male with ESRD was on dialysis , now s/p LRT from his daughter on 2/12/2020 .\par   PMH includes CHF, HTN, HLD, T2DM,  asthma, left glaucoma.\par  Donor: ABO O Donor CMV (-), EBV (+)\par  Recipient ABO O Recipient CMV (-), EBV (+) HLA mismatch 1,1,1, crossmatch negative.\par \par Patient tested positive for covid in December , he was sick for about a week with just a runny nose and headache and then recovered. \par \par \par On Telehealth visit:\par Patient feels well, no acute symptoms. Denies SOB , fever, chills, dysuria, LE edema,chest pain , nausea, vomiting, diarrhea or constipation .\par I reviewed current home blood pressure and medications.\par Patient appears comfortable\par No distress, not dyspneic\par Conscious, alert, oriented X 3\par Speech: Normal\par Other observations as noted\par Reviewed most recent labs\par He has gained close to 30 pounds since transplant and is now trying to lose weight \par He received both of his covid shots \par \par \par \par

## 2021-04-27 NOTE — ASSESSMENT
[FreeTextEntry1] : 60 year old male s/p LRT from daughter on 2/18/2020 \par \par 1. Allograft function has been good  and was ranging  ~ 1.2 . slight rise in Cr to 1.4 was noted since December ( after he got sick with covid and had BK viremia and was using lasix ) Most recent Cr was 1.13 \par 2. IS meds- on Prograft 1 mg po bid ( lowered due to BK viremia which has now improved ) continue Myfortic 360 mg po bid and Prednisone 5 mg po bid.\par 3. Ppx-on Bactrim.  \par 4. HTN- Coreg 25 mg po bid and Nifedipine 30 mg po daily \par 5. DM-  follows with Dr Medrano. 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 \par \par \par \par \par \par \par  \par \par

## 2021-04-30 LAB
COVID-19 SPIKE DOMAIN ANTIBODY INTERPRETATION: POSITIVE
SARS-COV-2 AB SERPL IA-ACNC: >250 U/ML

## 2021-05-10 ENCOUNTER — APPOINTMENT (OUTPATIENT)
Dept: PULMONOLOGY | Facility: CLINIC | Age: 61
End: 2021-05-10
Payer: COMMERCIAL

## 2021-05-10 PROCEDURE — 99213 OFFICE O/P EST LOW 20 MIN: CPT | Mod: 95

## 2021-05-27 ENCOUNTER — APPOINTMENT (OUTPATIENT)
Dept: NEPHROLOGY | Facility: CLINIC | Age: 61
End: 2021-05-27

## 2021-06-02 ENCOUNTER — APPOINTMENT (OUTPATIENT)
Dept: ENDOCRINOLOGY | Facility: CLINIC | Age: 61
End: 2021-06-02
Payer: COMMERCIAL

## 2021-06-02 VITALS
WEIGHT: 215 LBS | DIASTOLIC BLOOD PRESSURE: 64 MMHG | SYSTOLIC BLOOD PRESSURE: 118 MMHG | TEMPERATURE: 98.1 F | BODY MASS INDEX: 31.84 KG/M2 | HEART RATE: 81 BPM | HEIGHT: 69 IN | OXYGEN SATURATION: 97 %

## 2021-06-02 PROCEDURE — 99214 OFFICE O/P EST MOD 30 MIN: CPT

## 2021-06-02 NOTE — PHYSICAL EXAM
[Alert] : alert [Well Nourished] : well nourished [No Acute Distress] : no acute distress [Normal Sclera/Conjunctiva] : normal sclera/conjunctiva [EOMI] : extra ocular movement intact [PERRL] : pupils equal, round and reactive to light [Normal Outer Ear/Nose] : the ears and nose were normal in appearance [Normal Hearing] : hearing was normal [Normal TMs] : both tympanic membranes were normal [No Neck Mass] : no neck mass was observed [Thyroid Not Enlarged] : the thyroid was not enlarged [No Respiratory Distress] : no respiratory distress [Clear to Auscultation] : lungs were clear to auscultation bilaterally [Normal S1, S2] : normal S1 and S2 [Normal Rate] : heart rate was normal [Regular Rhythm] : with a regular rhythm [Normal Bowel Sounds] : normal bowel sounds [Not Tender] : non-tender [Soft] : abdomen soft [No Rash] : no rash [No Skin Lesions] : no skin lesions [No Motor Deficits] : the motor exam was normal [Normal Reflexes] : deep tendon reflexes were 2+ and symmetric [No Tremors] : no tremors [Oriented x3] : oriented to person, place, and time [Normal Affect] : the affect was normal [Normal Insight/Judgement] : insight and judgment were intact [Normal Mood] : the mood was normal

## 2021-06-02 NOTE — HISTORY OF PRESENT ILLNESS
[FreeTextEntry1] : Diabetes F/u Patient HPI\par \par 02/2020 renal transplant ( donor is the daughter) \par Stent was recently removed \par \par CC\par Patient referred by Dr. Zak Puente for diabetes management.\par \par \par History of gastric sleeve- at the time was on the insulin pump and came off \par \par \par \par HPI:\par \par Duration of Diabetes: 25-30 years \par Is patient on Insulin? yes, for 25-30 years \par \par List Current Medications for Glycemic control and the doses:\par Novolog insulin pump \par Trulicity 0.75 mcg weekly \par \par SMBG (self monitored blood glucose) readings: \par - Name of glucometer: \par - How often does the patient check BG? \par - Does the patient keep a log? \par \par If detailed record is available, what is the range of most of the BG readings?\par - Before Breakfast: 150-180s\par - Before Lunch: \par - Before Dinner: 180-200\par - Before Bedtime: 200-215\par \par \par Does patient get Hypoglycemic episodes? previously he overcompensated \par If yes how frequent? \par Hoe low do the BG readings reach? 40s \par When do most of those episodes occur? Middle of the night \par What symptoms does the patient get during those episodes? sweating \par \par Diabetic Complications: Is patient aware of having any of those complications?\par - Eyes: Retinopathy? History of cataract surgery, + history of retinopathy gets laser surgery \par  	When was the last fully dilated eye exam? 3 months ago \par - Feet: 	Neuropathy? Yes ( now on alpha lipoic acid ) \par  	Foot Ulcers? History of right toe ulcer \par 	When was the last time patient saw a Podiatrist? 2-3 weeks \par - Kidneys: Nephropathy? Renal transplant \par \par Diet: review patient's diet: \par Breakfast: Egg salad , 1/2 judson \par Lunch: [place], [place] \par Dinner: meat, potatos, vegetables \par Snacks: Chips, fruits \par Juice or soda: None \par Desserts: yogurt, sugar free jello \par \par \par Exercise: review patient exercise habits: Not much \par \par  \par

## 2021-06-02 NOTE — ASSESSMENT
[FreeTextEntry1] : 60 year old male with history of renal transplant in 02/2020, DM Type 2 on insulin here for follow-up. \par SMBGs are much better controlled at this time. At this time most of his hyperglycemia is post prandial after lunch. \par Will change carb ratio pre-lunch \par \par DM Type 2 \par - 11 am: carb ratio 1 :1.5 \par -Healthier snacks were discussed with the patient \par -Still is having higher glycemic index carbs \par -SMBGS with DEXCOM \par \par Renal transplant\par -Use of prednisone causing hyperglycemia in the evening \par -Use of tacrolimus increases insulin resistance \par \par HLD\par -Continue Atorvastatin \par \par \par -Follow up with in 4 months.

## 2021-06-03 RX ORDER — DULAGLUTIDE 0.75 MG/.5ML
0.75 INJECTION, SOLUTION SUBCUTANEOUS
Qty: 3 | Refills: 3 | Status: DISCONTINUED | COMMUNITY
Start: 2021-03-08 | End: 2021-06-03

## 2021-06-21 ENCOUNTER — NON-APPOINTMENT (OUTPATIENT)
Age: 61
End: 2021-06-21

## 2021-06-21 ENCOUNTER — APPOINTMENT (OUTPATIENT)
Dept: TRANSPLANT | Facility: CLINIC | Age: 61
End: 2021-06-21

## 2021-06-21 LAB
ALBUMIN SERPL ELPH-MCNC: 4.9 G/DL
ALP BLD-CCNC: 101 U/L
ALT SERPL-CCNC: 49 U/L
ANION GAP SERPL CALC-SCNC: 11 MMOL/L
APPEARANCE: CLEAR
AST SERPL-CCNC: 31 U/L
BACTERIA: NEGATIVE
BASOPHILS # BLD AUTO: 0.04 K/UL
BASOPHILS NFR BLD AUTO: 0.5 %
BILIRUB SERPL-MCNC: 0.4 MG/DL
BILIRUBIN URINE: NEGATIVE
BLOOD URINE: NEGATIVE
BUN SERPL-MCNC: 28 MG/DL
CALCIUM SERPL-MCNC: 10.7 MG/DL
CHLORIDE SERPL-SCNC: 106 MMOL/L
CO2 SERPL-SCNC: 26 MMOL/L
COLOR: YELLOW
CREAT SERPL-MCNC: 1.26 MG/DL
CREAT SPEC-SCNC: 186 MG/DL
CREAT/PROT UR: 0.1 RATIO
EOSINOPHIL # BLD AUTO: 0.11 K/UL
EOSINOPHIL NFR BLD AUTO: 1.3 %
GLUCOSE QUALITATIVE U: ABNORMAL
GLUCOSE SERPL-MCNC: 115 MG/DL
HCT VFR BLD CALC: 49.3 %
HGB BLD-MCNC: 15.8 G/DL
HYALINE CASTS: 1 /LPF
IMM GRANULOCYTES NFR BLD AUTO: 0.3 %
KETONES URINE: NEGATIVE
LDH SERPL-CCNC: 217 U/L
LEUKOCYTE ESTERASE URINE: NEGATIVE
LYMPHOCYTES # BLD AUTO: 2.1 K/UL
LYMPHOCYTES NFR BLD AUTO: 24 %
MAGNESIUM SERPL-MCNC: 2.1 MG/DL
MAN DIFF?: NORMAL
MCHC RBC-ENTMCNC: 30.9 PG
MCHC RBC-ENTMCNC: 32 GM/DL
MCV RBC AUTO: 96.5 FL
MICROSCOPIC-UA: NORMAL
MONOCYTES # BLD AUTO: 0.53 K/UL
MONOCYTES NFR BLD AUTO: 6.1 %
NEUTROPHILS # BLD AUTO: 5.94 K/UL
NEUTROPHILS NFR BLD AUTO: 67.8 %
NITRITE URINE: NEGATIVE
PH URINE: 6
PHOSPHATE SERPL-MCNC: 3.7 MG/DL
PLATELET # BLD AUTO: 214 K/UL
POTASSIUM SERPL-SCNC: 5.4 MMOL/L
PROT SERPL-MCNC: 7.6 G/DL
PROT UR-MCNC: 24 MG/DL
PROTEIN URINE: NORMAL
RBC # BLD: 5.11 M/UL
RBC # FLD: 13.4 %
RED BLOOD CELLS URINE: 1 /HPF
SODIUM SERPL-SCNC: 143 MMOL/L
SPECIFIC GRAVITY URINE: 1.04
SQUAMOUS EPITHELIAL CELLS: 0 /HPF
TACROLIMUS SERPL-MCNC: 8.7 NG/ML
URATE SERPL-MCNC: 5.1 MG/DL
UROBILINOGEN URINE: NORMAL
WBC # FLD AUTO: 8.75 K/UL
WHITE BLOOD CELLS URINE: 1 /HPF

## 2021-06-23 LAB — CMV DNA SPEC QL NAA+PROBE: NOT DETECTED IU/ML

## 2021-06-24 LAB
BKV DNA SPEC QL NAA+PROBE: NEGATIVE COPIES/ML
LOG 10 BK QUANTITATION PCR: NORMAL

## 2021-07-01 ENCOUNTER — NON-APPOINTMENT (OUTPATIENT)
Age: 61
End: 2021-07-01

## 2021-07-01 ENCOUNTER — APPOINTMENT (OUTPATIENT)
Dept: CARDIOLOGY | Facility: CLINIC | Age: 61
End: 2021-07-01
Payer: COMMERCIAL

## 2021-07-01 VITALS
SYSTOLIC BLOOD PRESSURE: 170 MMHG | WEIGHT: 218 LBS | OXYGEN SATURATION: 95 % | DIASTOLIC BLOOD PRESSURE: 75 MMHG | BODY MASS INDEX: 32.29 KG/M2 | HEART RATE: 77 BPM | RESPIRATION RATE: 12 BRPM | HEIGHT: 69 IN | TEMPERATURE: 97.2 F

## 2021-07-01 VITALS — DIASTOLIC BLOOD PRESSURE: 68 MMHG | SYSTOLIC BLOOD PRESSURE: 124 MMHG

## 2021-07-01 PROCEDURE — 93000 ELECTROCARDIOGRAM COMPLETE: CPT

## 2021-07-01 PROCEDURE — 99214 OFFICE O/P EST MOD 30 MIN: CPT

## 2021-07-03 ENCOUNTER — INPATIENT (INPATIENT)
Facility: HOSPITAL | Age: 61
LOS: 2 days | Discharge: HOME | DRG: 184 | End: 2021-07-06
Attending: TRANSPLANT SURGERY | Admitting: TRANSPLANT SURGERY
Payer: COMMERCIAL

## 2021-07-03 VITALS
RESPIRATION RATE: 20 BRPM | WEIGHT: 210.1 LBS | HEIGHT: 68 IN | TEMPERATURE: 98 F | SYSTOLIC BLOOD PRESSURE: 197 MMHG | HEART RATE: 79 BPM | DIASTOLIC BLOOD PRESSURE: 113 MMHG | OXYGEN SATURATION: 98 %

## 2021-07-03 DIAGNOSIS — Z98.890 OTHER SPECIFIED POSTPROCEDURAL STATES: Chronic | ICD-10-CM

## 2021-07-03 DIAGNOSIS — Z98.84 BARIATRIC SURGERY STATUS: Chronic | ICD-10-CM

## 2021-07-03 DIAGNOSIS — Z98.49 CATARACT EXTRACTION STATUS, UNSPECIFIED EYE: Chronic | ICD-10-CM

## 2021-07-03 DIAGNOSIS — H35.62 RETINAL HEMORRHAGE, LEFT EYE: Chronic | ICD-10-CM

## 2021-07-03 LAB
ALBUMIN SERPL ELPH-MCNC: 4.3 G/DL — SIGNIFICANT CHANGE UP (ref 3.3–5)
ALP SERPL-CCNC: 82 U/L — SIGNIFICANT CHANGE UP (ref 40–120)
ALT FLD-CCNC: 32 U/L — SIGNIFICANT CHANGE UP (ref 10–45)
ANION GAP SERPL CALC-SCNC: 14 MMOL/L — SIGNIFICANT CHANGE UP (ref 5–17)
AST SERPL-CCNC: 44 U/L — HIGH (ref 10–40)
BASOPHILS # BLD AUTO: 0.01 K/UL — SIGNIFICANT CHANGE UP (ref 0–0.2)
BASOPHILS NFR BLD AUTO: 0.1 % — SIGNIFICANT CHANGE UP (ref 0–2)
BILIRUB SERPL-MCNC: 0.3 MG/DL — SIGNIFICANT CHANGE UP (ref 0.2–1.2)
BUN SERPL-MCNC: 22 MG/DL — SIGNIFICANT CHANGE UP (ref 7–23)
CALCIUM SERPL-MCNC: 9.8 MG/DL — SIGNIFICANT CHANGE UP (ref 8.4–10.5)
CHLORIDE SERPL-SCNC: 106 MMOL/L — SIGNIFICANT CHANGE UP (ref 96–108)
CO2 SERPL-SCNC: 19 MMOL/L — LOW (ref 22–31)
CREAT SERPL-MCNC: 0.97 MG/DL — SIGNIFICANT CHANGE UP (ref 0.5–1.3)
EOSINOPHIL # BLD AUTO: 0.03 K/UL — SIGNIFICANT CHANGE UP (ref 0–0.5)
EOSINOPHIL NFR BLD AUTO: 0.3 % — SIGNIFICANT CHANGE UP (ref 0–6)
GLUCOSE SERPL-MCNC: 188 MG/DL — HIGH (ref 70–99)
HCT VFR BLD CALC: 47.5 % — SIGNIFICANT CHANGE UP (ref 39–50)
HGB BLD-MCNC: 15.2 G/DL — SIGNIFICANT CHANGE UP (ref 13–17)
IMM GRANULOCYTES NFR BLD AUTO: 0.3 % — SIGNIFICANT CHANGE UP (ref 0–1.5)
LYMPHOCYTES # BLD AUTO: 0.97 K/UL — LOW (ref 1–3.3)
LYMPHOCYTES # BLD AUTO: 8.3 % — LOW (ref 13–44)
MCHC RBC-ENTMCNC: 31.1 PG — SIGNIFICANT CHANGE UP (ref 27–34)
MCHC RBC-ENTMCNC: 32 GM/DL — SIGNIFICANT CHANGE UP (ref 32–36)
MCV RBC AUTO: 97.1 FL — SIGNIFICANT CHANGE UP (ref 80–100)
MONOCYTES # BLD AUTO: 0.65 K/UL — SIGNIFICANT CHANGE UP (ref 0–0.9)
MONOCYTES NFR BLD AUTO: 5.5 % — SIGNIFICANT CHANGE UP (ref 2–14)
NEUTROPHILS # BLD AUTO: 10.04 K/UL — HIGH (ref 1.8–7.4)
NEUTROPHILS NFR BLD AUTO: 85.5 % — HIGH (ref 43–77)
NRBC # BLD: 0 /100 WBCS — SIGNIFICANT CHANGE UP (ref 0–0)
PLATELET # BLD AUTO: 213 K/UL — SIGNIFICANT CHANGE UP (ref 150–400)
POTASSIUM SERPL-MCNC: 4.7 MMOL/L — SIGNIFICANT CHANGE UP (ref 3.5–5.3)
POTASSIUM SERPL-MCNC: 5.7 MMOL/L — HIGH (ref 3.5–5.3)
POTASSIUM SERPL-SCNC: 4.7 MMOL/L — SIGNIFICANT CHANGE UP (ref 3.5–5.3)
POTASSIUM SERPL-SCNC: 5.7 MMOL/L — HIGH (ref 3.5–5.3)
PROT SERPL-MCNC: 7.9 G/DL — SIGNIFICANT CHANGE UP (ref 6–8.3)
RBC # BLD: 4.89 M/UL — SIGNIFICANT CHANGE UP (ref 4.2–5.8)
RBC # FLD: 13.1 % — SIGNIFICANT CHANGE UP (ref 10.3–14.5)
SODIUM SERPL-SCNC: 139 MMOL/L — SIGNIFICANT CHANGE UP (ref 135–145)
WBC # BLD: 11.74 K/UL — HIGH (ref 3.8–10.5)
WBC # FLD AUTO: 11.74 K/UL — HIGH (ref 3.8–10.5)

## 2021-07-03 PROCEDURE — 71111 X-RAY EXAM RIBS/CHEST4/> VWS: CPT | Mod: 26

## 2021-07-03 PROCEDURE — 99285 EMERGENCY DEPT VISIT HI MDM: CPT

## 2021-07-03 PROCEDURE — 71250 CT THORAX DX C-: CPT | Mod: 26,MG

## 2021-07-03 PROCEDURE — G1004: CPT

## 2021-07-03 RX ORDER — CARVEDILOL PHOSPHATE 80 MG/1
25 CAPSULE, EXTENDED RELEASE ORAL ONCE
Refills: 0 | Status: COMPLETED | OUTPATIENT
Start: 2021-07-03 | End: 2021-07-03

## 2021-07-03 RX ORDER — DIAZEPAM 5 MG
5 TABLET ORAL ONCE
Refills: 0 | Status: DISCONTINUED | OUTPATIENT
Start: 2021-07-03 | End: 2021-07-03

## 2021-07-03 RX ORDER — TACROLIMUS 5 MG/1
2 CAPSULE ORAL ONCE
Refills: 0 | Status: COMPLETED | OUTPATIENT
Start: 2021-07-03 | End: 2021-07-03

## 2021-07-03 RX ORDER — OXYCODONE HYDROCHLORIDE 5 MG/1
5 TABLET ORAL ONCE
Refills: 0 | Status: DISCONTINUED | OUTPATIENT
Start: 2021-07-03 | End: 2021-07-03

## 2021-07-03 RX ORDER — ATORVASTATIN CALCIUM 80 MG/1
20 TABLET, FILM COATED ORAL ONCE
Refills: 0 | Status: COMPLETED | OUTPATIENT
Start: 2021-07-03 | End: 2021-07-03

## 2021-07-03 RX ADMIN — ATORVASTATIN CALCIUM 20 MILLIGRAM(S): 80 TABLET, FILM COATED ORAL at 21:38

## 2021-07-03 RX ADMIN — TACROLIMUS 2 MILLIGRAM(S): 5 CAPSULE ORAL at 21:37

## 2021-07-03 RX ADMIN — OXYCODONE HYDROCHLORIDE 5 MILLIGRAM(S): 5 TABLET ORAL at 23:16

## 2021-07-03 RX ADMIN — CARVEDILOL PHOSPHATE 25 MILLIGRAM(S): 80 CAPSULE, EXTENDED RELEASE ORAL at 21:38

## 2021-07-03 RX ADMIN — Medication 5 MILLIGRAM(S): at 18:50

## 2021-07-03 NOTE — H&P ADULT - NSICDXPASTMEDICALHX_GEN_ALL_CORE_FT
PAST MEDICAL HISTORY:  Asthma never been intubated for asthma   last inhaler used 7/2019    Bilateral dry eyes     CKD (chronic kidney disease) hemodialysis on Mon, Wed and Fri    Diabetes T2DM  last A1C (8%)    Diabetic neuropathy     Diastolic dysfunction mild. stage 1. EF 65%    DKA (diabetic ketoacidoses)     Dyslipidemia     Heart murmur     History of glaucoma left eye    Hypertension     Hypertension     Insulin pump status denies    Osteomyelitis of ankle or foot x2    Pneumonia 4/2013    Ventral hernia repair

## 2021-07-03 NOTE — ED PROVIDER NOTE - PHYSICAL EXAMINATION
Gen: Alert and oriented. Lying comfortably in bed. Answering questions appropriately  HEENT: extra occular movements intact, no nasal discharge, mucous membranes moist  CV: Regular rate and rhythm, +S1/S2, no murmurs/rubs/gallops,   Resp: Clear to ausculation bilaterally, no wheezes/rhonchi/rales  GI: Abdomen soft non-distended, non tender to palpation, no masses  MSK: TTP at right side of midback.   Neuro: A&Ox4, following commands, moving all four extremities spontaneously  Psych: appropriate mood

## 2021-07-03 NOTE — H&P ADULT - NSHPLABSRESULTS_GEN_ALL_CORE
Vital Signs Last 24 Hrs  T(C): 36.6 (04 Jul 2021 00:05), Max: 36.7 (03 Jul 2021 17:45)  T(F): 97.8 (04 Jul 2021 00:05), Max: 98.1 (03 Jul 2021 17:45)  HR: 80 (04 Jul 2021 00:05) (79 - 80)  BP: 130/72 (04 Jul 2021 00:05) (130/72 - 197/113)  BP(mean): --  RR: 17 (04 Jul 2021 00:05) (17 - 20)  SpO2: 98% (04 Jul 2021 00:05) (98% - 98%)      LABS:                        15.2   11.74 )-----------( 213      ( 03 Jul 2021 20:37 )             47.5     07-03    x   |  x   |  x   ----------------------------<  x   4.7   |  x   |  x     Ca    9.8      03 Jul 2021 20:37    TPro  7.9  /  Alb  4.3  /  TBili  0.3  /  DBili  x   /  AST  44<H>  /  ALT  32  /  AlkPhos  82  07-03          INs and OUTs:    IMAGING:    < from: CT Chest No Cont (07.03.21 @ 20:43) >      EXAM:  CT CHEST                            PROCEDURE DATE:  07/03/2021            INTERPRETATION:  CLINICAL INFORMATION: Assess rib fractures seen on same-day rib series    COMPARISON: Same-day rib series and frontal view of the chest 2/18/2020. CTchest 4/8/2013.    CONTRAST/COMPLICATIONS:  IV Contrast: None  Oral Contrast: None  Complications: None    PROCEDURE:  CT of the Chest was performed.  Sagittal and coronal reformats were performed.    FINDINGS:    LUNGS AND AIRWAYS: Patent central airways.  Small focus of tree-in-bud lung opacity at the right apex.  PLEURA: No pleural effusion.  MEDIASTINUM AND MARITZA: No lymphadenopathy.  VESSELS: Atherosclerotic changes of the coronary arteries and aorta.  HEART: Heart size is normal. No pericardial effusion.  CHEST WALL AND LOWER NECK: Within normal limits.  VISUALIZED UPPER ABDOMEN: Gastric sleeve surgery. Bilateral atrophic kidneys. Unchanged right adrenal myelolipoma.  BONES: Acute rib fractures of the right sixth seventh and eighth ribs.    IMPRESSION:    Acute rib fractures of the right sixth seventh and eighth ribs              CLARE SPENCER MD; Resident Radiology  This document has been electronically signed.  JITENDRA TORRES MD; Attending Radiologist  This document has been electronically signed. Jul  3 2021  9:32PM    < end of copied text >

## 2021-07-03 NOTE — ED PROVIDER NOTE - PROGRESS NOTE DETAILS
discussed with transplant and trauma surg, transplant recc trauma surg admission with xplant >1year ago jerica- discussed with trauma and the patient was admitted to trauma for further evaluation and treatment/management

## 2021-07-03 NOTE — H&P ADULT - NSHPPHYSICALEXAM_GEN_ALL_CORE
General: NAD, Sitting in bed comfortably  HEENT: NC/AT, EOMI  Neck: Soft, supple  Cardio: RRR, nml S1/S2  Resp: Good effort, CTA b/l  Thorax: Right side chest wall tenderness  GI/Abd: Soft, NT/ND, no rebound/guarding, no masses palpated  Skin: Intact, no breakdown  Lymphatic/Nodes: No palpable lymphadenopathy  Musculoskeletal: All 4 extremities moving spontaneously, no limitations

## 2021-07-03 NOTE — DISCUSSION/SUMMARY
[FreeTextEntry1] : The patient is a 60-year-old ex-smoker family history of coronary artery disease, diabetes mellitus on insulin, hypertension, hyperlipidemia, renal insufficiency, s/p gastric sleeve , HFpEF, s/p renal transplant s/p COVID who is doing well..\par #1 CV- No angina\par #2 HFpEF- continue furosemide 40mg \par #2 DM- continue tresiba insulin, better control, continues to improve\par #3 Htn- continue coreg 25mg bid\par #4 Lipids- continue atorvastatin optimal\par #5 Renal - s/p transplant, on immunosuppressant and prednisone\par #6 COVID- s/p steroids\par \par

## 2021-07-03 NOTE — HISTORY OF PRESENT ILLNESS
[FreeTextEntry1] : Orion has recovered from COVID. He feels more like himself. No chest pain, palpitations or shorntess of breath. Trying to lose weight.

## 2021-07-03 NOTE — ED PROVIDER NOTE - OBJECTIVE STATEMENT
61 yo M presenting with right sided back pain after mechanical fall 4 days ago. Stabbing in nature. Moderate to severe. Worse with movement and inspiration. Denies LOC, hitting head, AC. Also denies swelling in legs, ho clots, chest pain, blood in urine. Tried to use Tylenol and lidocaine patches at home with no improvement.

## 2021-07-03 NOTE — H&P ADULT - HISTORY OF PRESENT ILLNESS
58M w/ ESRD, was on HD but now s/p LDRT (Feb 2020, Dr. Hay), CHF, HLD, heart murmur, DM, R toe amputation, controlled asthma, L arm AVF, now presenting after a mechanical fall at home. Pt states he was picking up some broken tiles from home, slipped on something, and fell backwards landing on his Right back side 3 days ago. Initially pain was tolerable, but became gradually worse prompting him to present to ED.    In ED vitals wnl stable, exam w/ R chest tenderness, pulling 2.5L on IS, labs w/ leukocytosis to 11.7, otherwise all wnl, CT chest showing acute fx of R 6,7,8 rib fx, nondisplaced. Otherwise pt has no other complaints or pain anywhere else on the body

## 2021-07-03 NOTE — ED ADULT NURSE NOTE - CHIEF COMPLAINT QUOTE
"I fell on Wednesday, everything was fine, but since yesterday I am having a lot of back pain"  Pt states BP has to be manual "it is always very high and not accurate when I do electronic"  "I have a insulin pump because my sugar was high after my transplant"

## 2021-07-03 NOTE — H&P ADULT - TIME BILLING
Evaluation and management of multiple rib fractures. Evaluation and management of multiple rib fractures in kidney transplant recipient.

## 2021-07-03 NOTE — ED PROVIDER NOTE - CLINICAL SUMMARY MEDICAL DECISION MAKING FREE TEXT BOX
Joseph Frankel PGY3: 59 yo with right sided back pain after mechanical fall. VSS. Patient looks well and is non toxic appearing. PE as above. Most likely rib fracture vs bad contusion. Will get xray, pain control, and reassess.

## 2021-07-03 NOTE — ED PROVIDER NOTE - NS ED ROS FT
Gen: Denies fever, chills, generalized weakness  CV: Denies chest pain, palpitations  HEENT: Denies neck pain, headache, vision changes  Skin: Denies rash, erythema, color changes  Resp: Denies SOB, cough  GI: Denies constipation, nausea, vomiting, diarrhea  Msk: + back pain, denies LE swelling, extremity pain  : Denies dysuria, increased frequency  Neuro: Denies LOC, focal weakness, numbness, tingling  Psych: Denies hx of psych, SI, HI

## 2021-07-03 NOTE — ED PROVIDER NOTE - ATTENDING CONTRIBUTION TO CARE
I, Carlito Patten, performed a history and physical exam of the patient and discussed their management with the resident and/or advanced care provider. I reviewed the resident and/or advanced care provider's note and agree with the documented findings and plan of care. I was present and available for all procedures.    59 yo M presenting with right sided back pain after mechanical fall 4 days ago. Stabbing in nature. Moderate to severe. Worse with movement and inspiration. no head trauma loc ac use, on steroids and xplant ~1year ago, hasn't taken anything for relief. no neck pain    Well appearing and in NAD, head normal appearing atraumatic, trachea midline, no respiratory distress, lungs cta bilaterally, mild tachycardia no murmurs, right mid thoracic posterior paraspinal back pain, no ctl spinal midline ttp, soft NT ND abdomen, no visible extremity deformities, Alert and oriented, non focal neuro exam, skin warm and dry, normal affect and mood    concern for msk vs rib bony injury will eval with cxr, possible further ct scan if xray unremarkable will give analgesia, no nsaids 2/2 xplant status

## 2021-07-03 NOTE — ED PROVIDER NOTE - CARE PLAN
Principal Discharge DX:	Closed fracture of multiple ribs of right side, initial encounter  Secondary Diagnosis:	Insulin pump status  Secondary Diagnosis:	Transplanted kidney

## 2021-07-03 NOTE — ED ADULT TRIAGE NOTE - CHIEF COMPLAINT QUOTE
"I fell on Wednesday, everything was fine, but since yesterday I am having a lot of back pain" "I fell on Wednesday, everything was fine, but since yesterday I am having a lot of back pain"  Pt states BP has to be manual "it is always very high and not accurate when I do electronic"  "I have a insulin pump because my sugar was high after my transplant"

## 2021-07-03 NOTE — ED PROVIDER NOTE - PMH
Asthma  never been intubated for asthma   last inhaler used 7/2019  Bilateral dry eyes    CKD (chronic kidney disease)  hemodialysis on Mon, Wed and Fri  Diabetes  T2DM  last A1C (8%)  Diabetic neuropathy    Diastolic dysfunction  mild. stage 1. EF 65%  DKA (diabetic ketoacidoses)    Dyslipidemia    Heart murmur    History of glaucoma  left eye  Hypertension    Hypertension    Insulin pump status  denies  Osteomyelitis of ankle or foot  x2  Pneumonia  4/2013  Ventral hernia  repair

## 2021-07-03 NOTE — H&P ADULT - ASSESSMENT
60M w/ hx of ESRD, hx of renal transplant in 2020, now presenting s/p mechanical fall, found to have R sided rib fx x 3 (6,7,8)    - Admit to ACS team, Dr. Beasley  - Pain control and encourage IS use  - regular diet, will continue transplant medication  - consult transplant nephrology  - Discussed w/ Dr. Ramos Madrigal PGY-4  ACS team     60M w/ hx of ESRD, hx of renal transplant in 2020, now presenting s/p mechanical fall, found to have R sided rib fx x 3 (6,7,8)    - Admit to ACS team, Dr. Beasley  - Pain control and encourage IS use  - regular diet, will continue transplant medication  - transplant nephrology called, will see in AM  - Discussed w/ Dr. Ramos Madrigal PGY-4  ACS team

## 2021-07-04 ENCOUNTER — TRANSCRIPTION ENCOUNTER (OUTPATIENT)
Age: 61
End: 2021-07-04

## 2021-07-04 DIAGNOSIS — E11.22 TYPE 2 DIABETES MELLITUS WITH DIABETIC CHRONIC KIDNEY DISEASE: ICD-10-CM

## 2021-07-04 DIAGNOSIS — Z96.41 PRESENCE OF INSULIN PUMP (EXTERNAL) (INTERNAL): ICD-10-CM

## 2021-07-04 DIAGNOSIS — D84.9 IMMUNODEFICIENCY, UNSPECIFIED: ICD-10-CM

## 2021-07-04 DIAGNOSIS — I10 ESSENTIAL (PRIMARY) HYPERTENSION: ICD-10-CM

## 2021-07-04 DIAGNOSIS — S22.41XA MULTIPLE FRACTURES OF RIBS, RIGHT SIDE, INITIAL ENCOUNTER FOR CLOSED FRACTURE: ICD-10-CM

## 2021-07-04 DIAGNOSIS — E78.5 HYPERLIPIDEMIA, UNSPECIFIED: ICD-10-CM

## 2021-07-04 DIAGNOSIS — Z94.0 KIDNEY TRANSPLANT STATUS: ICD-10-CM

## 2021-07-04 LAB
ANION GAP SERPL CALC-SCNC: 15 MMOL/L — SIGNIFICANT CHANGE UP (ref 5–17)
APPEARANCE UR: CLEAR — SIGNIFICANT CHANGE UP
BILIRUB UR-MCNC: NEGATIVE — SIGNIFICANT CHANGE UP
BUN SERPL-MCNC: 18 MG/DL — SIGNIFICANT CHANGE UP (ref 7–23)
CALCIUM SERPL-MCNC: 9.8 MG/DL — SIGNIFICANT CHANGE UP (ref 8.4–10.5)
CHLORIDE SERPL-SCNC: 105 MMOL/L — SIGNIFICANT CHANGE UP (ref 96–108)
CO2 SERPL-SCNC: 18 MMOL/L — LOW (ref 22–31)
COLOR SPEC: YELLOW — SIGNIFICANT CHANGE UP
CREAT SERPL-MCNC: 0.86 MG/DL — SIGNIFICANT CHANGE UP (ref 0.5–1.3)
DIFF PNL FLD: NEGATIVE — SIGNIFICANT CHANGE UP
GLUCOSE BLDC GLUCOMTR-MCNC: 148 MG/DL — HIGH (ref 70–99)
GLUCOSE BLDC GLUCOMTR-MCNC: 162 MG/DL — HIGH (ref 70–99)
GLUCOSE BLDC GLUCOMTR-MCNC: 172 MG/DL — HIGH (ref 70–99)
GLUCOSE BLDC GLUCOMTR-MCNC: 176 MG/DL — HIGH (ref 70–99)
GLUCOSE BLDC GLUCOMTR-MCNC: 185 MG/DL — HIGH (ref 70–99)
GLUCOSE SERPL-MCNC: 158 MG/DL — HIGH (ref 70–99)
GLUCOSE UR QL: ABNORMAL
HCT VFR BLD CALC: 47.9 % — SIGNIFICANT CHANGE UP (ref 39–50)
HGB BLD-MCNC: 15.5 G/DL — SIGNIFICANT CHANGE UP (ref 13–17)
KETONES UR-MCNC: NEGATIVE — SIGNIFICANT CHANGE UP
LEUKOCYTE ESTERASE UR-ACNC: NEGATIVE — SIGNIFICANT CHANGE UP
MAGNESIUM SERPL-MCNC: 2 MG/DL — SIGNIFICANT CHANGE UP (ref 1.6–2.6)
MCHC RBC-ENTMCNC: 31.4 PG — SIGNIFICANT CHANGE UP (ref 27–34)
MCHC RBC-ENTMCNC: 32.4 GM/DL — SIGNIFICANT CHANGE UP (ref 32–36)
MCV RBC AUTO: 97.2 FL — SIGNIFICANT CHANGE UP (ref 80–100)
NITRITE UR-MCNC: NEGATIVE — SIGNIFICANT CHANGE UP
NRBC # BLD: 0 /100 WBCS — SIGNIFICANT CHANGE UP (ref 0–0)
PH UR: 6 — SIGNIFICANT CHANGE UP (ref 5–8)
PHOSPHATE SERPL-MCNC: 2.6 MG/DL — SIGNIFICANT CHANGE UP (ref 2.5–4.5)
PLATELET # BLD AUTO: 205 K/UL — SIGNIFICANT CHANGE UP (ref 150–400)
POTASSIUM SERPL-MCNC: 5.1 MMOL/L — SIGNIFICANT CHANGE UP (ref 3.5–5.3)
POTASSIUM SERPL-SCNC: 5.1 MMOL/L — SIGNIFICANT CHANGE UP (ref 3.5–5.3)
PROT UR-MCNC: ABNORMAL
RBC # BLD: 4.93 M/UL — SIGNIFICANT CHANGE UP (ref 4.2–5.8)
RBC # FLD: 13.1 % — SIGNIFICANT CHANGE UP (ref 10.3–14.5)
SARS-COV-2 RNA SPEC QL NAA+PROBE: SIGNIFICANT CHANGE UP
SODIUM SERPL-SCNC: 138 MMOL/L — SIGNIFICANT CHANGE UP (ref 135–145)
SP GR SPEC: 1.03 — HIGH (ref 1.01–1.02)
TACROLIMUS SERPL-MCNC: 7.7 NG/ML — SIGNIFICANT CHANGE UP
UROBILINOGEN FLD QL: NEGATIVE — SIGNIFICANT CHANGE UP
WBC # BLD: 11.36 K/UL — HIGH (ref 3.8–10.5)
WBC # FLD AUTO: 11.36 K/UL — HIGH (ref 3.8–10.5)

## 2021-07-04 PROCEDURE — 99223 1ST HOSP IP/OBS HIGH 75: CPT

## 2021-07-04 PROCEDURE — 99254 IP/OBS CNSLTJ NEW/EST MOD 60: CPT

## 2021-07-04 PROCEDURE — 71045 X-RAY EXAM CHEST 1 VIEW: CPT | Mod: 26

## 2021-07-04 PROCEDURE — 99222 1ST HOSP IP/OBS MODERATE 55: CPT

## 2021-07-04 RX ORDER — GABAPENTIN 400 MG/1
1 CAPSULE ORAL
Qty: 0 | Refills: 0 | DISCHARGE

## 2021-07-04 RX ORDER — SODIUM ZIRCONIUM CYCLOSILICATE 10 G/10G
1 POWDER, FOR SUSPENSION ORAL
Qty: 0 | Refills: 0 | DISCHARGE

## 2021-07-04 RX ORDER — LIDOCAINE 4 G/100G
1 CREAM TOPICAL ONCE
Refills: 0 | Status: COMPLETED | OUTPATIENT
Start: 2021-07-04 | End: 2021-07-04

## 2021-07-04 RX ORDER — TACROLIMUS 5 MG/1
1 CAPSULE ORAL EVERY 12 HOURS
Refills: 0 | Status: DISCONTINUED | OUTPATIENT
Start: 2021-07-04 | End: 2021-07-06

## 2021-07-04 RX ORDER — OXYCODONE HYDROCHLORIDE 5 MG/1
5 TABLET ORAL EVERY 4 HOURS
Refills: 0 | Status: DISCONTINUED | OUTPATIENT
Start: 2021-07-04 | End: 2021-07-04

## 2021-07-04 RX ORDER — SODIUM ZIRCONIUM CYCLOSILICATE 10 G/10G
10 POWDER, FOR SUSPENSION ORAL EVERY OTHER DAY
Refills: 0 | Status: DISCONTINUED | OUTPATIENT
Start: 2021-07-04 | End: 2021-07-06

## 2021-07-04 RX ORDER — ACETAMINOPHEN 500 MG
975 TABLET ORAL EVERY 6 HOURS
Refills: 0 | Status: DISCONTINUED | OUTPATIENT
Start: 2021-07-04 | End: 2021-07-06

## 2021-07-04 RX ORDER — INSULIN DEGLUDEC 100 U/ML
20 INJECTION, SOLUTION SUBCUTANEOUS
Qty: 0 | Refills: 0 | DISCHARGE

## 2021-07-04 RX ORDER — TRAVOPROST 0.04 MG/ML
1 SOLUTION/ DROPS OPHTHALMIC
Qty: 0 | Refills: 0 | DISCHARGE

## 2021-07-04 RX ORDER — TACROLIMUS 5 MG/1
1 CAPSULE ORAL
Qty: 0 | Refills: 0 | DISCHARGE

## 2021-07-04 RX ORDER — OXYCODONE HYDROCHLORIDE 5 MG/1
5 TABLET ORAL EVERY 4 HOURS
Refills: 0 | Status: DISCONTINUED | OUTPATIENT
Start: 2021-07-04 | End: 2021-07-05

## 2021-07-04 RX ORDER — MYCOPHENOLATE MOFETIL 250 MG/1
2 CAPSULE ORAL
Qty: 0 | Refills: 0 | DISCHARGE

## 2021-07-04 RX ORDER — INSULIN ASPART 100 [IU]/ML
1 INJECTION, SOLUTION SUBCUTANEOUS
Refills: 0 | Status: DISCONTINUED | OUTPATIENT
Start: 2021-07-04 | End: 2021-07-06

## 2021-07-04 RX ORDER — ASPIRIN/CALCIUM CARB/MAGNESIUM 324 MG
81 TABLET ORAL DAILY
Refills: 0 | Status: DISCONTINUED | OUTPATIENT
Start: 2021-07-04 | End: 2021-07-06

## 2021-07-04 RX ORDER — INSULIN ASPART 100 [IU]/ML
12 INJECTION, SOLUTION SUBCUTANEOUS
Qty: 0 | Refills: 0 | DISCHARGE

## 2021-07-04 RX ORDER — TAMSULOSIN HYDROCHLORIDE 0.4 MG/1
0.4 CAPSULE ORAL AT BEDTIME
Refills: 0 | Status: DISCONTINUED | OUTPATIENT
Start: 2021-07-04 | End: 2021-07-06

## 2021-07-04 RX ORDER — LIDOCAINE 4 G/100G
1 CREAM TOPICAL DAILY
Refills: 0 | Status: DISCONTINUED | OUTPATIENT
Start: 2021-07-04 | End: 2021-07-06

## 2021-07-04 RX ORDER — HEPARIN SODIUM 5000 [USP'U]/ML
5000 INJECTION INTRAVENOUS; SUBCUTANEOUS EVERY 8 HOURS
Refills: 0 | Status: DISCONTINUED | OUTPATIENT
Start: 2021-07-04 | End: 2021-07-06

## 2021-07-04 RX ORDER — ATORVASTATIN CALCIUM 80 MG/1
20 TABLET, FILM COATED ORAL AT BEDTIME
Refills: 0 | Status: DISCONTINUED | OUTPATIENT
Start: 2021-07-04 | End: 2021-07-06

## 2021-07-04 RX ORDER — ASPIRIN/CALCIUM CARB/MAGNESIUM 324 MG
1 TABLET ORAL
Qty: 0 | Refills: 0 | DISCHARGE

## 2021-07-04 RX ORDER — OXYCODONE HYDROCHLORIDE 5 MG/1
10 TABLET ORAL EVERY 4 HOURS
Refills: 0 | Status: DISCONTINUED | OUTPATIENT
Start: 2021-07-04 | End: 2021-07-05

## 2021-07-04 RX ORDER — CARVEDILOL PHOSPHATE 80 MG/1
25 CAPSULE, EXTENDED RELEASE ORAL EVERY 12 HOURS
Refills: 0 | Status: DISCONTINUED | OUTPATIENT
Start: 2021-07-04 | End: 2021-07-06

## 2021-07-04 RX ORDER — OXYCODONE HYDROCHLORIDE 5 MG/1
2.5 TABLET ORAL EVERY 4 HOURS
Refills: 0 | Status: DISCONTINUED | OUTPATIENT
Start: 2021-07-04 | End: 2021-07-04

## 2021-07-04 RX ADMIN — Medication 975 MILLIGRAM(S): at 11:45

## 2021-07-04 RX ADMIN — SODIUM ZIRCONIUM CYCLOSILICATE 10 GRAM(S): 10 POWDER, FOR SUSPENSION ORAL at 11:14

## 2021-07-04 RX ADMIN — Medication 975 MILLIGRAM(S): at 11:14

## 2021-07-04 RX ADMIN — HEPARIN SODIUM 5000 UNIT(S): 5000 INJECTION INTRAVENOUS; SUBCUTANEOUS at 06:35

## 2021-07-04 RX ADMIN — HEPARIN SODIUM 5000 UNIT(S): 5000 INJECTION INTRAVENOUS; SUBCUTANEOUS at 13:21

## 2021-07-04 RX ADMIN — Medication 975 MILLIGRAM(S): at 06:35

## 2021-07-04 RX ADMIN — TACROLIMUS 1 MILLIGRAM(S): 5 CAPSULE ORAL at 17:35

## 2021-07-04 RX ADMIN — Medication 975 MILLIGRAM(S): at 07:05

## 2021-07-04 RX ADMIN — Medication 5 MILLIGRAM(S): at 06:35

## 2021-07-04 RX ADMIN — OXYCODONE HYDROCHLORIDE 5 MILLIGRAM(S): 5 TABLET ORAL at 03:47

## 2021-07-04 RX ADMIN — LIDOCAINE 1 PATCH: 4 CREAM TOPICAL at 11:15

## 2021-07-04 RX ADMIN — TAMSULOSIN HYDROCHLORIDE 0.4 MILLIGRAM(S): 0.4 CAPSULE ORAL at 21:40

## 2021-07-04 RX ADMIN — OXYCODONE HYDROCHLORIDE 10 MILLIGRAM(S): 5 TABLET ORAL at 21:41

## 2021-07-04 RX ADMIN — Medication 1 TABLET(S): at 11:14

## 2021-07-04 RX ADMIN — OXYCODONE HYDROCHLORIDE 10 MILLIGRAM(S): 5 TABLET ORAL at 13:21

## 2021-07-04 RX ADMIN — Medication 81 MILLIGRAM(S): at 11:14

## 2021-07-04 RX ADMIN — Medication 975 MILLIGRAM(S): at 18:05

## 2021-07-04 RX ADMIN — OXYCODONE HYDROCHLORIDE 10 MILLIGRAM(S): 5 TABLET ORAL at 18:10

## 2021-07-04 RX ADMIN — Medication 975 MILLIGRAM(S): at 23:43

## 2021-07-04 RX ADMIN — OXYCODONE HYDROCHLORIDE 10 MILLIGRAM(S): 5 TABLET ORAL at 09:15

## 2021-07-04 RX ADMIN — LIDOCAINE 1 PATCH: 4 CREAM TOPICAL at 19:12

## 2021-07-04 RX ADMIN — LIDOCAINE 1 PATCH: 4 CREAM TOPICAL at 14:30

## 2021-07-04 RX ADMIN — OXYCODONE HYDROCHLORIDE 10 MILLIGRAM(S): 5 TABLET ORAL at 17:43

## 2021-07-04 RX ADMIN — Medication 975 MILLIGRAM(S): at 17:35

## 2021-07-04 RX ADMIN — LIDOCAINE 1 PATCH: 4 CREAM TOPICAL at 23:50

## 2021-07-04 RX ADMIN — OXYCODONE HYDROCHLORIDE 10 MILLIGRAM(S): 5 TABLET ORAL at 14:00

## 2021-07-04 RX ADMIN — HEPARIN SODIUM 5000 UNIT(S): 5000 INJECTION INTRAVENOUS; SUBCUTANEOUS at 21:41

## 2021-07-04 RX ADMIN — OXYCODONE HYDROCHLORIDE 10 MILLIGRAM(S): 5 TABLET ORAL at 22:10

## 2021-07-04 RX ADMIN — ATORVASTATIN CALCIUM 20 MILLIGRAM(S): 80 TABLET, FILM COATED ORAL at 21:40

## 2021-07-04 RX ADMIN — OXYCODONE HYDROCHLORIDE 10 MILLIGRAM(S): 5 TABLET ORAL at 09:45

## 2021-07-04 RX ADMIN — OXYCODONE HYDROCHLORIDE 5 MILLIGRAM(S): 5 TABLET ORAL at 04:15

## 2021-07-04 RX ADMIN — TACROLIMUS 1 MILLIGRAM(S): 5 CAPSULE ORAL at 06:35

## 2021-07-04 RX ADMIN — CARVEDILOL PHOSPHATE 25 MILLIGRAM(S): 80 CAPSULE, EXTENDED RELEASE ORAL at 08:34

## 2021-07-04 RX ADMIN — CARVEDILOL PHOSPHATE 25 MILLIGRAM(S): 80 CAPSULE, EXTENDED RELEASE ORAL at 21:40

## 2021-07-04 NOTE — CONSULT NOTE ADULT - PROBLEM SELECTOR RECOMMENDATION 9
Discussed with patient the importance of Carb consistent diet and exercise as tolerated.    Recommend nutritional consultation  Recommend DM education through RN staff (see physical to RN order)  Discussed glycemic goal of a1c <7% to prevent microvascular complications of diabetes mellitus and reduce the risk of macrovascular complications.   Recommend annual podiatry and ophthalmology follow up.   Glucose goal of 80-180mg/dl while in patient.   Continue insulin pump with settings above.   I'll place the order for insulin pump management.    Discharge recommendation/considerations:

## 2021-07-04 NOTE — CONSULT NOTE ADULT - ASSESSMENT
JITENDRA FRENCH is  JITENDRA FRENCH is Patient is a 58-year-old man with history of type 2 diabetes, diagnosed about 30 years ago, currently on insulin pump therapy with t:slim, with Dexcom G6 sensor, using the control IQ algorithm, follows up with Dr. Medrano at [facility] endocr[facility]ology [facility].  Patient also with a history of end-stage renal disease status post renal transplant in February 2020, CHF, hyperlipidemia, heart murmur, right toe amputation, controlled asthma, presenting after mechanical fall at home and fracturing 3 ribs.  Endocrine consulted for type 2 DM on insulin pump.

## 2021-07-04 NOTE — CONSULT NOTE ADULT - SUBJECTIVE AND OBJECTIVE BOX
HPI:  58M w/ ESRD, was on HD but now s/p LDRT (2020, Dr. Hay), CHF, HLD, heart murmur, DM, R toe amputation, controlled asthma, L arm AVF, now presenting after a mechanical fall at home. Pt states he was picking up some broken tiles from home, slipped on something, and fell backwards landing on his Right back side 3 days ago. Initially pain was tolerable, but became gradually worse prompting him to present to ED.    In ED vitals wnl stable, exam w/ R chest tenderness, pulling 2.5L on IS, labs w/ leukocytosis to 11.7, otherwise all wnl, CT chest showing acute fx of R 6,7,8 rib fx, nondisplaced. Otherwise pt has no other complaints or pain anywhere else on the body (2021 23:47)      PAST MEDICAL & SURGICAL HISTORY:  Hypertension  Diabetic neuropathy  Osteomyelitis of ankle or foot  x2  Pneumonia  2013  Insulin pump status  denies  CKD (chronic kidney disease)  hemodialysis on Mon, Wed and Fri  Diastolic dysfunction  mild. stage 1. EF 65%  DKA (diabetic ketoacidoses)  Hypertension  Diabetes  T2DM  last A1C (8%)  Ventral hernia  repair  History of glaucoma  left eye  Bilateral dry eyes  Dyslipidemia  Heart murmur  Asthma  never been intubated for asthma   last inhaler used 2019  S/P carpal tunnel release  b/l  S/P hernia repair  S/P amputation  right hallux   Retinal hemorrhage, left eye  s/p laser surgery  hx of right eye retinal hemorrahge, tx with laser surgery  H/O cardiac catheterization  no stents  H/O elbow surgery  left  S/P laparoscopic sleeve gastrectomy  2015  Status post cataract extraction      FAMILY HISTORY:  Family history of heart attack (Grandparent)  father  @54 y.o.  grandfather  @ 52 y.o.    Family history of bone cancer  Grandfather    FH: aortic stenosis  Son      Social History:    Home Medications:  aspirin 81 mg oral delayed release tablet: 1 tab(s) orally once a day (2021 00:25)  atorvastatin 20 mg oral tablet: 1 tab(s) orally once a day (2021 00:25)  carvedilol 25 mg oral tablet: 1 tab(s) orally every 12 hours (2021 00:25)  Lokelma 10 g oral powder for reconstitution: 1 packet(s) orally every other day (2021 00:42)  predniSONE 5 mg oral tablet: 1 tab(s) orally once a day (2021 00:42)  sulfamethoxazole-trimethoprim 400 mg-80 mg oral tablet: 1 tab(s) orally once a day (2021 00:42)  tacrolimus 1 mg oral capsule: 1 cap(s) orally every 12 hours (2021 00:42)  tamsulosin 0.4 mg oral capsule: 1 cap(s) orally once a day (at bedtime) (2021 00:25)      MEDICATIONS  (STANDING):  acetaminophen   Tablet .. 975 milliGRAM(s) Oral every 6 hours  aspirin enteric coated 81 milliGRAM(s) Oral daily  atorvastatin 20 milliGRAM(s) Oral at bedtime  carvedilol 25 milliGRAM(s) Oral every 12 hours  heparin   Injectable 5000 Unit(s) SubCutaneous every 8 hours  lidocaine   Patch 1 Patch Transdermal daily  predniSONE   Tablet 5 milliGRAM(s) Oral daily  sodium zirconium cyclosilicate 10 Gram(s) Oral every other day  tacrolimus 1 milliGRAM(s) Oral every 12 hours  tamsulosin 0.4 milliGRAM(s) Oral at bedtime  trimethoprim   80 mG/sulfamethoxazole 400 mG 1 Tablet(s) Oral daily    MEDICATIONS  (PRN):  oxyCODONE    IR 5 milliGRAM(s) Oral every 4 hours PRN Moderate Pain (4 - 6)  oxyCODONE    IR 10 milliGRAM(s) Oral every 4 hours PRN Severe Pain (7 - 10)      Allergies    clonidine (Other)  seasonal allergy (Other)    Intolerances      Review of Systems:  Constitutional: No fever  Eyes: No blurry vision  Neuro: No tremors  HEENT: No pain  Cardiovascular: No chest pain, palpitations  Respiratory: No SOB, no cough  GI: No nausea, vomiting, abdominal pain  : No dysuria  Skin: no rash  Psych: no depression  Endocrine: no polyuria, polydipsia  Hem/lymph: no swelling  Osteoporosis: no fractures    ALL OTHER SYSTEMS REVIEWED AND NEGATIVE    UNABLE TO OBTAIN    PHYSICAL EXAM:  -----------------------------  VITALS: T(C): 36.8 (21 @ 07:52)  T(F): 98.2 (21 @ 07:52), Max: 98.2 (21 @ 07:52)  HR: 83 (21 @ 07:52) (79 - 83)  BP: 130/84 (21 @ 08:30) (126/84 - 197/113)  RR:  (17 - 20)  SpO2:  (96% - 98%)  Wt(kg): --  GENERAL: NAD, well-groomed, well-developed  EYES: No proptosis, no lid lag, anicteric  HEENT:  Atraumatic, Normocephalic, moist mucous membranes  THYROID: Normal size, no palpable nodules  RESPIRATORY: Clear to auscultation bilaterally; No rales, rhonchi, wheezing, or rubs  CARDIOVASCULAR: Regular rate and rhythm; No murmurs; no peripheral edema  GI: Soft, nontender, non distended, normal bowel sounds  SKIN: Dry, intact, No rashes or lesions  MUSCULOSKELETAL: Full range of motion, normal strength  NEURO: sensation intact, extraocular movements intact, no tremor, normal reflexes  PSYCH: Alert and oriented x 3, normal affect, normal mood  CUSHING'S SIGNS: no striae    POCT Blood Glucose.: 162 mg/dL (21 @ 08:16)  POCT Blood Glucose.: 176 mg/dL (21 @ 01:39)                            15.5   11.36 )-----------( 205      ( 2021 07:03 )             47.9       07-04    138  |  105  |  18  ----------------------------<  158<H>  5.1   |  18<L>  |  0.86    EGFR if : 111  EGFR if non : 96    Ca    9.8      07-04  Mg     2.0     07-04  Phos  2.6     07-04    TPro  7.9  /  Alb  4.3  /  TBili  0.3  /  DBili  x   /  AST  44<H>  /  ALT  32  /  AlkPhos  82  07-03      Thyroid Function Tests:        Radiology:   ------------------------  < from: Xray Chest 1 View- PORTABLE-Urgent (Xray Chest 1 View- PORTABLE-Urgent .) (21 @ 09:49) >  EXAM:  XR CHEST PORTABLE URGENT 1V                            PROCEDURE DATE:  2021      INTERPRETATION:  DATE OF STUDY: 21    PRIOR:7/3/21 right rib series; 7/3/21 CT scan of chest.    CLINICAL INDICATION: Has right rib fractures.    TECHNIQUE: portable chest.    IMPRESSION:  The cardiomediastinal silhouette is within normal limits.  No focal consolidations. No pleural effusion or pneumothorax.  The acute right 6th, 7th and 8th rib, posterolateral fractures  are redemonstrated.      < end of copied text >                 HPI:  58M w/ ESRD, was on HD but now s/p LDRT (2020, Dr. Hay), CHF, HLD, heart murmur, DM, R toe amputation, controlled asthma, L arm AVF, now presenting after a mechanical fall at home. Pt states he was picking up some broken tiles from home, slipped on something, and fell backwards landing on his Right back side 3 days ago. Initially pain was tolerable, but became gradually worse prompting him to present to ED.    In ED vitals wnl stable, exam w/ R chest tenderness, pulling 2.5L on IS, labs w/ leukocytosis to 11.7, otherwise all wnl, CT chest showing acute fx of R 6,7,8 rib fx, nondisplaced. Otherwise pt has no other complaints or pain anywhere else on the body (2021 23:47)    Endocrine history:   Patient is a 58-year-old man with history of type 2 diabetes, diagnosed about 30 years ago, currently on insulin pump therapy with t:slim, with Dexcom G6 sensor, using the control IQ algorithm, follows up with Dr. Medrano at [facility] endocrinology faculty practice.  Patient also with a history of end-stage renal disease status post renal transplant in 2020, CHF, hyperlipidemia, heart murmur, right toe amputation, controlled asthma, presenting after mechanical fall at home and fracturing 3 ribs.      Regarding type 2 diabetes, currently on t:slim insulin pump with the following setting  12 AM 1.1 units/h  Total daily basal 26.4 units  ISF  12 AM 1: 30 mg/dL  6 AM 1: 30 mg/dL  11 AM 1: 25 mg/dL  Insulin to carbohydrate ratio  12 AM 1: 3 g/unit  6 AM 1: 2 g/unit  11 AM 1: 1.5 g/unit  Glucose target  12  110 mg/dL  Active insulin time 3 hours     Using Dexcom G6 has all his insulin pump supplies, uses NovoLog and his insulin pump.  His wife brought in his insulin supplies.  He is due for site change tomorrow.  The Dexcom sensor was placed about 3 days ago.  He feels overall alert and orientated and able to adjust insulin pump settings.  His glucose has been overall well controlled.    Regarding hypertension, patient is on carvedilol 25 mg twice daily.    Regarding hyperlipidemia he is currently on atorvastatin 20 mg once daily.  No side effects.        PAST MEDICAL & SURGICAL HISTORY:  Hypertension  Diabetic neuropathy  Osteomyelitis of ankle or foot  x2  Pneumonia  2013  Insulin pump status  denies  CKD (chronic kidney disease)  hemodialysis on Mon, Wed and Fri  Diastolic dysfunction  mild. stage 1. EF 65%  DKA (diabetic ketoacidoses)  Hypertension  Diabetes  T2DM  last A1C (8%)  Ventral hernia  repair  History of glaucoma  left eye  Bilateral dry eyes  Dyslipidemia  Heart murmur  Asthma  never been intubated for asthma   last inhaler used 2019  S/P carpal tunnel release  b/l  S/P hernia repair  S/P amputation  right hallux   Retinal hemorrhage, left eye  s/p laser surgery  hx of right eye retinal hemorrahge, tx with laser surgery  H/O cardiac catheterization  no stents  H/O elbow surgery  left  S/P laparoscopic sleeve gastrectomy    Status post cataract extraction      FAMILY HISTORY:  Family history of heart attack (Grandparent)  father  @54 y.o.  grandfather  @ 52 y.o.    Family history of bone cancer  Grandfather    FH: aortic stenosis  Son      Social History:    Home Medications:  aspirin 81 mg oral delayed release tablet: 1 tab(s) orally once a day (:25)  atorvastatin 20 mg oral tablet: 1 tab(s) orally once a day (:25)  carvedilol 25 mg oral tablet: 1 tab(s) orally every 12 hours (:25)  Lokelma 10 g oral powder for reconstitution: 1 packet(s) orally every other day (2021 00:42)  predniSONE 5 mg oral tablet: 1 tab(s) orally once a day (:42)  sulfamethoxazole-trimethoprim 400 mg-80 mg oral tablet: 1 tab(s) orally once a day (:42)  tacrolimus 1 mg oral capsule: 1 cap(s) orally every 12 hours (:42)  tamsulosin 0.4 mg oral capsule: 1 cap(s) orally once a day (at bedtime) (:25)      MEDICATIONS  (STANDING):  acetaminophen   Tablet .. 975 milliGRAM(s) Oral every 6 hours  aspirin enteric coated 81 milliGRAM(s) Oral daily  atorvastatin 20 milliGRAM(s) Oral at bedtime  carvedilol 25 milliGRAM(s) Oral every 12 hours  heparin   Injectable 5000 Unit(s) SubCutaneous every 8 hours  lidocaine   Patch 1 Patch Transdermal daily  predniSONE   Tablet 5 milliGRAM(s) Oral daily  sodium zirconium cyclosilicate 10 Gram(s) Oral every other day  tacrolimus 1 milliGRAM(s) Oral every 12 hours  tamsulosin 0.4 milliGRAM(s) Oral at bedtime  trimethoprim   80 mG/sulfamethoxazole 400 mG 1 Tablet(s) Oral daily    MEDICATIONS  (PRN):  oxyCODONE    IR 5 milliGRAM(s) Oral every 4 hours PRN Moderate Pain (4 - 6)  oxyCODONE    IR 10 milliGRAM(s) Oral every 4 hours PRN Severe Pain (7 - 10)      Allergies    clonidine (Other)  seasonal allergy (Other)    Intolerances      Review of Systems:  Constitutional: No fever  Eyes: No blurry vision  Neuro: No tremors  HEENT: No pain  Cardiovascular: No chest pain, palpitations  Respiratory: No SOB, no cough  GI: No nausea, vomiting, abdominal pain  : No dysuria  Skin: no rash  Psych: no depression  Endocrine: no polyuria, polydipsia  Hem/lymph: no swelling  Osteoporosis: no fractures    ALL OTHER SYSTEMS REVIEWED AND NEGATIVE    UNABLE TO OBTAIN    PHYSICAL EXAM:  -----------------------------  VITALS: T(C): 36.8 (21 @ 07:52)  T(F): 98.2 (21 @ 07:52), Max: 98.2 (21 @ 07:52)  HR: 83 (21 @ 07:52) (79 - 83)  BP: 130/84 (21 @ 08:30) (126/84 - 197/113)  RR:  (17 - 20)  SpO2:  (96% - 98%)  Wt(kg): --  GENERAL: NAD, well-groomed, well-developed  EYES: No proptosis, no lid lag, anicteric  HEENT:  Atraumatic, Normocephalic, moist mucous membranes  THYROID: Normal size, no palpable nodules  RESPIRATORY: Clear to auscultation bilaterally; No rales, rhonchi, wheezing, or rubs  CARDIOVASCULAR: Regular rate and rhythm; No murmurs; no peripheral edema  GI: Soft, nontender, non distended, normal bowel sounds  SKIN: Dry, intact, No rashes or lesions  MUSCULOSKELETAL: Full range of motion, normal strength  NEURO: sensation intact, extraocular movements intact, no tremor, normal reflexes  PSYCH: Alert and oriented x 3, normal affect, normal mood  CUSHING'S SIGNS: no striae    POCT Blood Glucose.: 162 mg/dL (21 @ 08:16)  POCT Blood Glucose.: 176 mg/dL (21 @ 01:39)                            15.5   11.36 )-----------( 205      ( 2021 07:03 )             47.9       07-04    138  |  105  |  18  ----------------------------<  158<H>  5.1   |  18<L>  |  0.86    EGFR if : 111  EGFR if non : 96    Ca    9.8      07-  Mg     2.0     07-  Phos  2.6     07-04    TPro  7.9  /  Alb  4.3  /  TBili  0.3  /  DBili  x   /  AST  44<H>  /  ALT  32  /  AlkPhos  82  07-03      Thyroid Function Tests:        Radiology:   ------------------------  < from: Xray Chest 1 View- PORTABLE-Urgent (Xray Chest 1 View- PORTABLE-Urgent .) (21 @ 09:49) >  EXAM:  XR CHEST PORTABLE URGENT 1V                            PROCEDURE DATE:  2021      INTERPRETATION:  DATE OF STUDY: 21    PRIOR:7/3/21 right rib series; 7/3/21 CT scan of chest.    CLINICAL INDICATION: Has right rib fractures.    TECHNIQUE: portable chest.    IMPRESSION:  The cardiomediastinal silhouette is within normal limits.  No focal consolidations. No pleural effusion or pneumothorax.  The acute right 6th, 7th and 8th rib, posterolateral fractures  are redemonstrated.      < end of copied text >

## 2021-07-04 NOTE — PHYSICAL THERAPY INITIAL EVALUATION ADULT - PERTINENT HX OF CURRENT PROBLEM, REHAB EVAL
58M w/ ESRD, was on HD but now s/p LDRT (Feb 2020, Dr. Hay), CHF, HLD, heart murmur, DM, R toe amputation, controlled asthma, L arm AVF, now presenting after a mechanical fall at home. Pt states he was picking up some broken tiles from home, slipped on something, and fell backwards landing on his Right back side 3 days ago. Initially pain was tolerable, but became gradually worse prompting him to present to ED. IMAGING: CXR: The cardiomediastinal silhouette is within normal limits.

## 2021-07-04 NOTE — PHYSICAL THERAPY INITIAL EVALUATION ADULT - ADDITIONAL COMMENTS
Pt lives with wife and son in private home with 2 steps to enter and flight of stairs inside home. PTA, pt independent in all functional mobility without use of AD.

## 2021-07-04 NOTE — DISCHARGE NOTE PROVIDER - HOSPITAL COURSE
58M w/ ESRD, was on HD but now s/p LDRT (Feb 2020, Dr. Hay), CHF, HLD, heart murmur, DM, R toe amputation, controlled asthma, L arm AVF, now presenting after a mechanical fall at home. Pt states he was picking up some broken tiles from home, slipped on something, and fell backwards landing on his Right back side 3 days ago. Initially pain was tolerable, but became gradually worse prompting him to present to ED.    In ED vitals wnl stable, exam w/ R chest tenderness, pulling 2.5L on IS, labs w/ leukocytosis to 11.7, otherwise all wnl, CT chest showing acute fx of R 6,7,8 rib fx, nondisplaced. Otherwise pt has no other complaints or pain anywhere else on the body.     Transplant consult called and recommended Allograft function is good and at baseline. Cr is 0.8. Continue Prograft 1mg po bid , goal level 6-8 and prednisone 5 mg po daily. Off MMF due to GI side effects., 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     Pt continued to have adequate respiratory function without any difficulties with breathing, rib fractures treated mainly for pain control. Pt seen by Physical therapy, as per PT, pt may be  cleared for either outpatient p/t or home with no needs,    58M w/ ESRD, was on HD but now s/p LDRT (Feb 2020, Dr. Hay), CHF, HLD, heart murmur, DM, R toe amputation, controlled asthma, L arm AVF, now presenting after a mechanical fall at home. Pt states he was picking up some broken tiles from home, slipped on something, and fell backwards landing on his Right back side 3 days ago. Initially pain was tolerable, but became gradually worse prompting him to present to ED.    In ED vitals wnl stable, exam w/ R chest tenderness, pulling 2.5L on IS, labs w/ leukocytosis to 11.7, otherwise all wnl, CT chest showing acute fx of R 6,7,8 rib fx, nondisplaced. Otherwise pt has no other complaints or pain anywhere else on the body.     Transplant consult called and recommended Allograft function is good and at baseline. Cr is 0.8. Continue Prograft 1mg po bid , goal level 6-8 and prednisone 5 mg po daily. Off MMF due to GI side effects., 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     Pt continued to have adequate respiratory function without any difficulties with breathing, rib fractures treated mainly for pain control. Pt seen by Physical therapy, as per PT, pt may be cleared for either outpatient p/t or home with no needs.    Pain is now well controlled.  Pt is tolerating a diet, voiding and ambulating.  Pt is ready for discharge in stable condition.  Pt will follow up with Denia as an outpatient in one to two weeks.       58M w/ ESRD, was on HD but now s/p LDRT (Feb 2020, Dr. Hay), CHF, HLD, heart murmur, DM, R toe amputation, controlled asthma, L arm AVF, now presenting after a mechanical fall at home. Pt states he was picking up some broken tiles from home, slipped on something, and fell backwards landing on his Right back side 3 days ago. Initially pain was tolerable, but became gradually worse prompting him to present to ED.    In ED vitals wnl stable, exam w/ R chest tenderness, pulling 2.5L on IS, labs w/ leukocytosis to 11.7, otherwise all wnl, CT chest showing acute fx of R 6,7,8 rib fx, nondisplaced. Otherwise pt has no other complaints or pain anywhere else on the body.   Transplant consult called and recommended Allograft function is good and at baseline. Cr is 0.8. Continue Prograft 1mg po bid , goal level 6-8 and prednisone 5 mg po daily. Off MMF due to GI side effects., 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   Endocrine consulted for type 2 DM on insulin pump.    Pt continued to have adequate respiratory function without any difficulties with breathing, rib fractures treated mainly for pain control. Pt seen by Physical therapy, pt may be cleared for either outpatient p/t or home with no needs.    Pain is now well controlled.  Pt is tolerating a diet, voiding and ambulating.  Pt is ready for discharge in stable condition.  Pt will follow up with Denia as an outpatient in one to two weeks.       58M w/ ESRD, was on HD but now s/p LDRT (Feb 2020, Dr. Hay), CHF, HLD, heart murmur, DM, R toe amputation, controlled asthma, L arm AVF, now presenting after a mechanical fall at home. Pt states he was picking up some broken tiles from home, slipped on something, and fell backwards landing on his Right back side 3 days ago. Initially pain was tolerable, but became gradually worse prompting him to present to ED.    In ED vitals wnl stable, exam w/ R chest tenderness, pulling 2.5L on IS, labs w/ leukocytosis to 11.7, otherwise all wnl, CT chest showing acute fx of R 6,7,8 rib fx, nondisplaced. Otherwise pt has no other complaints or pain anywhere else on the body.   Transplant consult called and recommended Allograft function is good and at baseline. Cr is 0.8. Continue Prograft 1mg po bid , goal level 6-8 and prednisone 5 mg po daily. Off MMF due to GI side effects., 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   Endocrine consulted for type 2 DM on insulin pump.    Pt continued to have adequate respiratory function without any difficulties with breathing, rib fractures treated mainly for pain control. Pt seen by Physical therapy, pt may be cleared for either outpatient p/t or home with no needs.    Pain is now well controlled.  Pt is tolerating a diet, voiding and ambulating.  Pt is ready for discharge in stable condition.  Pt will follow up with Maggie in 2 weeks.

## 2021-07-04 NOTE — PROGRESS NOTE ADULT - ASSESSMENT
60M w/ hx of ESRD, hx of renal transplant in 2020, CHF, HLD, heart murmur, DM, R toe amputation, controlled asthma, L arm AVF now presenting s/p mechanical fall, found to have R sided rib fx x 3 (6,7,8)    - Will monitor for pain  - Encourage IS use  - Consistent carb diet  - Consider endocrine consult for insulin pump management  - Will continue transplant medication  - Transplant nephrology called, will see in AM     60M w/ hx of ESRD, hx of renal transplant in 2020, CHF, HLD, heart murmur, DM, R toe amputation, controlled asthma, L arm AVF now presenting s/p mechanical fall, found to have R sided rib fx x 3 (6,7,8)    - Will monitor for pain  - Encourage IS use  - Consistent carb diet  - f/u endo consult  - Will continue transplant medication  - appreciate transplant nephro recs

## 2021-07-04 NOTE — CONSULT NOTE ADULT - ATTENDING COMMENTS
I have personally seen, examined and participated in the care of this patient. I have reviewed all pertinent clinical information including history, physical exam , plan and fellow's note and agree with the plan.      58 year old male  s/p LDRT from daughter in Feb 2020 admitted for rib fractures due to a mechanical fall  Allograft function is good and at baseline. Cr is 0.8  Continue Prograft 1mg po bid , goal level 6-8 and prednisone 5 mg po daily. Off MMF due to GI side effects.   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

## 2021-07-04 NOTE — DISCHARGE NOTE PROVIDER - CARE PROVIDERS DIRECT ADDRESSES
,[email or web address]

## 2021-07-04 NOTE — CONSULT NOTE ADULT - PROBLEM SELECTOR RECOMMENDATION 2
Basal setting  Insulin type: Novolog vials.   12 AM 1.1 units/h  Total daily basal 26.4 units  ISF  12 AM 1: 30 mg/dL  6 AM 1: 30 mg/dL  11 AM 1: 25 mg/dL  Insulin to carbohydrate ratio  12 AM 1: 3 g/unit  6 AM 1: 2 g/unit  11 AM 1: 1.5 g/unit  Glucose target  12  110 mg/dL  Active insulin time 3 hours     Site change due: 7/5/2021

## 2021-07-04 NOTE — DISCHARGE NOTE PROVIDER - PROVIDER TOKENS
PROVIDER:[TOKEN:[[id number]]] PROVIDER:[TOKEN:[[id number]]],PROVIDER:[TOKEN:[[id number]],FOLLOWUP:[2 weeks]],PROVIDER:[TOKEN:[[id number]],FOLLOWUP:[2 weeks]]

## 2021-07-04 NOTE — DISCHARGE NOTE PROVIDER - NSDCMRMEDTOKEN_GEN_ALL_CORE_FT
aspirin 81 mg oral delayed release tablet: 1 tab(s) orally once a day  atorvastatin 20 mg oral tablet: 1 tab(s) orally once a day  carvedilol 25 mg oral tablet: 1 tab(s) orally every 12 hours  Lokelma 10 g oral powder for reconstitution: 1 packet(s) orally every other day  predniSONE 5 mg oral tablet: 1 tab(s) orally once a day  sulfamethoxazole-trimethoprim 400 mg-80 mg oral tablet: 1 tab(s) orally once a day  tacrolimus 1 mg oral capsule: 1 cap(s) orally every 12 hours  tamsulosin 0.4 mg oral capsule: 1 cap(s) orally once a day (at bedtime)   acetaminophen 325 mg oral tablet: 3 tab(s) orally every 6 hours  aspirin 81 mg oral delayed release tablet: 1 tab(s) orally once a day  atorvastatin 20 mg oral tablet: 1 tab(s) orally once a day  carvedilol 25 mg oral tablet: 1 tab(s) orally every 12 hours  Lokelma 10 g oral powder for reconstitution: 1 packet(s) orally every other day  predniSONE 5 mg oral tablet: 1 tab(s) orally once a day  sulfamethoxazole-trimethoprim 400 mg-80 mg oral tablet: 1 tab(s) orally once a day  tacrolimus 1 mg oral capsule: 1 cap(s) orally every 12 hours  tamsulosin 0.4 mg oral capsule: 1 cap(s) orally once a day (at bedtime)  traMADol 50 mg oral tablet: 0.5 tab(s) orally every 6 hours, As Needed -Mild Pain (1 - 3) MDD:4   acetaminophen 325 mg oral tablet: 3 tab(s) orally every 6 hours  aspirin 81 mg oral delayed release tablet: 1 tab(s) orally once a day  atorvastatin 20 mg oral tablet: 1 tab(s) orally once a day  carvedilol 25 mg oral tablet: 1 tab(s) orally every 12 hours  Lokelma 10 g oral powder for reconstitution: 1 packet(s) orally every other day  oxyCODONE 10 mg oral tablet: 1 cap(s) orally 4 times a day, As Needed  predniSONE 5 mg oral tablet: 1 tab(s) orally once a day  sulfamethoxazole-trimethoprim 400 mg-80 mg oral tablet: 1 tab(s) orally once a day  tacrolimus 1 mg oral capsule: 1 cap(s) orally every 12 hours  tamsulosin 0.4 mg oral capsule: 1 cap(s) orally once a day (at bedtime)  traMADol 50 mg oral tablet: 0.5 tab(s) orally every 6 hours, As Needed -Mild Pain (1 - 3) MDD:4

## 2021-07-04 NOTE — DISCHARGE NOTE PROVIDER - CARE PROVIDER_API CALL
Eliezer Loza (MD)  Surgery; Surgical Critical Care  [address] [facility], [address], [place] [address]  Phone: [phone]  Fax: [phone]  Follow Up Time:    Eliezer Loza)  Surgery; Surgical Critical Care  [address] [facility], [address] [address]  Phone: [phone]  Fax: [phone]  Follow Up Time:     Jose L Medrano (DO)  Internal Medicine  [address], [address]  [place] [address]  Phone: [phone]  Fax: [phone]  Follow Up Time: 2 weeks    Sveta Puente)  Internal Medicine; Nephrology  [address] [place] [address]  Phone: [phone]  Fax: [phone]  Follow Up Time: 2 weeks

## 2021-07-04 NOTE — CONSULT NOTE ADULT - ASSESSMENT
58 y.o. Male with PMhx ESKD was on HD for ~8 months, now s/p LDRT (From daughter in Feb 2020), COVID19 infection in 12/2020, CHF, HLD, HTN, heart murmur, DM, R toe amputation, asthma, L arm AVF, presented to ED s/p mechanical fall at home. Found with Acute rib fractures of the right sixth seventh and eighth ribs. Transplant nephrology consulted for immunosuppression management.            #s/p LDRT in 02/2020 -- [id number]  Patient had kidney transplant at [facility] in Feb/18/2020, donor was his daughter.   Donor CMV (-), EBV (+), --- Recipient CMV (-), EBV (+)  HLA mismatch 1,1,1, crossmatch negative.     Patient with Good graft Function. - no hx of rejection.   Follows with Dr. APURVA Puente. - last seen in clinic was 4/2021  hx of BK viremia (on lowered dose of tacro - most recent BK VL undetectable   sCr currently at baseline 0.9mg/dl.   Avoid NSAIDs, ACEI/ARBS, RCA and nephrotoxins.     #immunosuppression     At home taking prograf 1mg po bid, prednisone 5mg po day   he was taking Myfortic but as per pt he was having GI issues and was stopped by private nephrologist.   At this time continue with Prograf 1mg po bid and prednisone 5mg po day   check tacro level tomorrow morning, 30min before morning dose.     #prophylaxis     c/w bactrim home dose.     #HTN  BP at target range. Monitor BP on current BP medication. Low salt diet.

## 2021-07-04 NOTE — DISCHARGE NOTE PROVIDER - NSDCCPCAREPLAN_GEN_ALL_CORE_FT
PRINCIPAL DISCHARGE DIAGNOSIS  Diagnosis: Closed fracture of multiple ribs of right side, initial encounter  Assessment and Plan of Treatment: Dr. Loza, Acute Care Surgery in 7 to 10 days.  Call [phone] for appointment.  Notify your physician/surgeon and return to ER for difficulty breathing, shortness of breath, for temperatures greater than 101, chills sweats, pain not controlled with pain medications, persistent nausea and vomiting, or acutely concerning matters to you, that may require urgent medical attention.        SECONDARY DISCHARGE DIAGNOSES  Diagnosis: Insulin pump status  Assessment and Plan of Treatment: Your Endocrinologist within one to two weeks to update your medical records regarding this hospitalization and to review all your current medications and dosages.  Call their office for appointment.      Diagnosis: Transplanted kidney  Assessment and Plan of Treatment: Follow up with Transplant.     PRINCIPAL DISCHARGE DIAGNOSIS  Diagnosis: Closed fracture of multiple ribs of right side, initial encounter  Assessment and Plan of Treatment: Dr. Loza, Acute Care Surgery in 7 to 10 days.  Call [phone] for appointment.  Notify your physician/surgeon and return to ER for difficulty breathing, shortness of breath, for temperatures greater than 101, chills sweats, pain not controlled with pain medications, persistent nausea and vomiting, or acutely concerning matters to you, that may require urgent medical attention.        SECONDARY DISCHARGE DIAGNOSES  Diagnosis: Insulin pump status  Assessment and Plan of Treatment: Your Endocrinologist within one to two weeks to update your medical records regarding this hospitalization and to review all your current medications and dosages.  Call their office for appointment.      Diagnosis: Transplanted kidney  Assessment and Plan of Treatment: Follow up with your  Transplant  Nephrologist.  Call their office in one week to make follow up appointment.     PRINCIPAL DISCHARGE DIAGNOSIS  Diagnosis: Closed fracture of multiple ribs of right side, initial encounter  Assessment and Plan of Treatment: Dr. Loza, Acute Care Surgery in 7 to 10 days.  Call [phone] for appointment.  Notify your physician/surgeon and return to ER for difficulty breathing, shortness of breath, for temperatures greater than 101, chills sweats, pain not controlled with pain medications, persistent nausea and vomiting, or acutely concerning matters to you, that may require urgent medical attention.        SECONDARY DISCHARGE DIAGNOSES  Diagnosis: Insulin pump status  Assessment and Plan of Treatment: Your Endocrinologist within one to two weeks to update your medical records regarding this hospitalization and to review all your current medications and dosages.  Call their office for appointment.  Recommend annual podiatry and ophthalmology follow       Diagnosis: Transplanted kidney  Assessment and Plan of Treatment: Follow up with your  Transplant  Nephrologist.  Call their office in one week to make follow up appointment.  Continue with Prograf 1mg po bid and prednisone 5mg po day

## 2021-07-05 LAB
A1C WITH ESTIMATED AVERAGE GLUCOSE RESULT: 7.2 % — HIGH (ref 4–5.6)
ANION GAP SERPL CALC-SCNC: 14 MMOL/L — SIGNIFICANT CHANGE UP (ref 5–17)
BUN SERPL-MCNC: 19 MG/DL — SIGNIFICANT CHANGE UP (ref 7–23)
CALCIUM SERPL-MCNC: 9.7 MG/DL — SIGNIFICANT CHANGE UP (ref 8.4–10.5)
CHLORIDE SERPL-SCNC: 103 MMOL/L — SIGNIFICANT CHANGE UP (ref 96–108)
CO2 SERPL-SCNC: 22 MMOL/L — SIGNIFICANT CHANGE UP (ref 22–31)
COVID-19 SPIKE DOMAIN AB INTERP: POSITIVE
COVID-19 SPIKE DOMAIN ANTIBODY RESULT: >250 U/ML — HIGH
CREAT SERPL-MCNC: 1.02 MG/DL — SIGNIFICANT CHANGE UP (ref 0.5–1.3)
ESTIMATED AVERAGE GLUCOSE: 160 MG/DL — HIGH (ref 68–114)
GLUCOSE BLDC GLUCOMTR-MCNC: 100 MG/DL — HIGH (ref 70–99)
GLUCOSE BLDC GLUCOMTR-MCNC: 107 MG/DL — HIGH (ref 70–99)
GLUCOSE BLDC GLUCOMTR-MCNC: 158 MG/DL — HIGH (ref 70–99)
GLUCOSE BLDC GLUCOMTR-MCNC: 167 MG/DL — HIGH (ref 70–99)
GLUCOSE SERPL-MCNC: 134 MG/DL — HIGH (ref 70–99)
HCT VFR BLD CALC: 50.7 % — HIGH (ref 39–50)
HGB BLD-MCNC: 15.7 G/DL — SIGNIFICANT CHANGE UP (ref 13–17)
MAGNESIUM SERPL-MCNC: 2.2 MG/DL — SIGNIFICANT CHANGE UP (ref 1.6–2.6)
MCHC RBC-ENTMCNC: 30.4 PG — SIGNIFICANT CHANGE UP (ref 27–34)
MCHC RBC-ENTMCNC: 31 GM/DL — LOW (ref 32–36)
MCV RBC AUTO: 98.1 FL — SIGNIFICANT CHANGE UP (ref 80–100)
NRBC # BLD: 0 /100 WBCS — SIGNIFICANT CHANGE UP (ref 0–0)
PHOSPHATE SERPL-MCNC: 2.5 MG/DL — SIGNIFICANT CHANGE UP (ref 2.5–4.5)
PLATELET # BLD AUTO: 195 K/UL — SIGNIFICANT CHANGE UP (ref 150–400)
POTASSIUM SERPL-MCNC: 4.3 MMOL/L — SIGNIFICANT CHANGE UP (ref 3.5–5.3)
POTASSIUM SERPL-SCNC: 4.3 MMOL/L — SIGNIFICANT CHANGE UP (ref 3.5–5.3)
RBC # BLD: 5.17 M/UL — SIGNIFICANT CHANGE UP (ref 4.2–5.8)
RBC # FLD: 13.1 % — SIGNIFICANT CHANGE UP (ref 10.3–14.5)
SARS-COV-2 IGG+IGM SERPL QL IA: >250 U/ML — HIGH
SARS-COV-2 IGG+IGM SERPL QL IA: POSITIVE
SODIUM SERPL-SCNC: 139 MMOL/L — SIGNIFICANT CHANGE UP (ref 135–145)
TACROLIMUS SERPL-MCNC: 6.2 NG/ML — SIGNIFICANT CHANGE UP
WBC # BLD: 9.73 K/UL — SIGNIFICANT CHANGE UP (ref 3.8–10.5)
WBC # FLD AUTO: 9.73 K/UL — SIGNIFICANT CHANGE UP (ref 3.8–10.5)

## 2021-07-05 PROCEDURE — 99232 SBSQ HOSP IP/OBS MODERATE 35: CPT

## 2021-07-05 RX ORDER — LIDOCAINE 4 G/100G
1 CREAM TOPICAL DAILY
Refills: 0 | Status: DISCONTINUED | OUTPATIENT
Start: 2021-07-05 | End: 2021-07-06

## 2021-07-05 RX ORDER — SODIUM,POTASSIUM PHOSPHATES 278-250MG
2 POWDER IN PACKET (EA) ORAL EVERY 6 HOURS
Refills: 0 | Status: DISCONTINUED | OUTPATIENT
Start: 2021-07-05 | End: 2021-07-05

## 2021-07-05 RX ORDER — TRAMADOL HYDROCHLORIDE 50 MG/1
25 TABLET ORAL EVERY 6 HOURS
Refills: 0 | Status: DISCONTINUED | OUTPATIENT
Start: 2021-07-05 | End: 2021-07-05

## 2021-07-05 RX ORDER — TRAMADOL HYDROCHLORIDE 50 MG/1
50 TABLET ORAL EVERY 4 HOURS
Refills: 0 | Status: DISCONTINUED | OUTPATIENT
Start: 2021-07-05 | End: 2021-07-05

## 2021-07-05 RX ORDER — TRAMADOL HYDROCHLORIDE 50 MG/1
25 TABLET ORAL EVERY 4 HOURS
Refills: 0 | Status: DISCONTINUED | OUTPATIENT
Start: 2021-07-05 | End: 2021-07-06

## 2021-07-05 RX ORDER — OXYCODONE HYDROCHLORIDE 5 MG/1
10 TABLET ORAL EVERY 4 HOURS
Refills: 0 | Status: DISCONTINUED | OUTPATIENT
Start: 2021-07-05 | End: 2021-07-06

## 2021-07-05 RX ADMIN — OXYCODONE HYDROCHLORIDE 10 MILLIGRAM(S): 5 TABLET ORAL at 22:36

## 2021-07-05 RX ADMIN — Medication 975 MILLIGRAM(S): at 07:35

## 2021-07-05 RX ADMIN — CARVEDILOL PHOSPHATE 25 MILLIGRAM(S): 80 CAPSULE, EXTENDED RELEASE ORAL at 22:17

## 2021-07-05 RX ADMIN — OXYCODONE HYDROCHLORIDE 10 MILLIGRAM(S): 5 TABLET ORAL at 02:15

## 2021-07-05 RX ADMIN — OXYCODONE HYDROCHLORIDE 10 MILLIGRAM(S): 5 TABLET ORAL at 23:06

## 2021-07-05 RX ADMIN — HEPARIN SODIUM 5000 UNIT(S): 5000 INJECTION INTRAVENOUS; SUBCUTANEOUS at 06:32

## 2021-07-05 RX ADMIN — Medication 975 MILLIGRAM(S): at 06:32

## 2021-07-05 RX ADMIN — HEPARIN SODIUM 5000 UNIT(S): 5000 INJECTION INTRAVENOUS; SUBCUTANEOUS at 13:20

## 2021-07-05 RX ADMIN — OXYCODONE HYDROCHLORIDE 10 MILLIGRAM(S): 5 TABLET ORAL at 18:38

## 2021-07-05 RX ADMIN — Medication 81 MILLIGRAM(S): at 13:12

## 2021-07-05 RX ADMIN — LIDOCAINE 1 PATCH: 4 CREAM TOPICAL at 13:13

## 2021-07-05 RX ADMIN — TACROLIMUS 1 MILLIGRAM(S): 5 CAPSULE ORAL at 06:32

## 2021-07-05 RX ADMIN — LIDOCAINE 1 PATCH: 4 CREAM TOPICAL at 01:47

## 2021-07-05 RX ADMIN — HEPARIN SODIUM 5000 UNIT(S): 5000 INJECTION INTRAVENOUS; SUBCUTANEOUS at 22:17

## 2021-07-05 RX ADMIN — LIDOCAINE 1 PATCH: 4 CREAM TOPICAL at 19:56

## 2021-07-05 RX ADMIN — OXYCODONE HYDROCHLORIDE 10 MILLIGRAM(S): 5 TABLET ORAL at 10:27

## 2021-07-05 RX ADMIN — CARVEDILOL PHOSPHATE 25 MILLIGRAM(S): 80 CAPSULE, EXTENDED RELEASE ORAL at 10:26

## 2021-07-05 RX ADMIN — Medication 975 MILLIGRAM(S): at 13:42

## 2021-07-05 RX ADMIN — OXYCODONE HYDROCHLORIDE 10 MILLIGRAM(S): 5 TABLET ORAL at 11:00

## 2021-07-05 RX ADMIN — OXYCODONE HYDROCHLORIDE 10 MILLIGRAM(S): 5 TABLET ORAL at 19:10

## 2021-07-05 RX ADMIN — OXYCODONE HYDROCHLORIDE 10 MILLIGRAM(S): 5 TABLET ORAL at 06:32

## 2021-07-05 RX ADMIN — OXYCODONE HYDROCHLORIDE 10 MILLIGRAM(S): 5 TABLET ORAL at 01:45

## 2021-07-05 RX ADMIN — Medication 5 MILLIGRAM(S): at 06:32

## 2021-07-05 RX ADMIN — TACROLIMUS 1 MILLIGRAM(S): 5 CAPSULE ORAL at 17:17

## 2021-07-05 RX ADMIN — Medication 1 TABLET(S): at 13:12

## 2021-07-05 RX ADMIN — Medication 975 MILLIGRAM(S): at 23:50

## 2021-07-05 RX ADMIN — TAMSULOSIN HYDROCHLORIDE 0.4 MILLIGRAM(S): 0.4 CAPSULE ORAL at 22:17

## 2021-07-05 RX ADMIN — Medication 975 MILLIGRAM(S): at 00:13

## 2021-07-05 RX ADMIN — Medication 975 MILLIGRAM(S): at 13:12

## 2021-07-05 RX ADMIN — OXYCODONE HYDROCHLORIDE 10 MILLIGRAM(S): 5 TABLET ORAL at 07:00

## 2021-07-05 RX ADMIN — ATORVASTATIN CALCIUM 20 MILLIGRAM(S): 80 TABLET, FILM COATED ORAL at 22:17

## 2021-07-05 RX ADMIN — OXYCODONE HYDROCHLORIDE 10 MILLIGRAM(S): 5 TABLET ORAL at 15:15

## 2021-07-05 RX ADMIN — Medication 975 MILLIGRAM(S): at 17:45

## 2021-07-05 RX ADMIN — Medication 975 MILLIGRAM(S): at 17:17

## 2021-07-05 RX ADMIN — OXYCODONE HYDROCHLORIDE 10 MILLIGRAM(S): 5 TABLET ORAL at 14:46

## 2021-07-05 NOTE — PROGRESS NOTE ADULT - ASSESSMENT
60M w/ hx of ESRD, hx of renal transplant in 2020, CHF, HLD, heart murmur, DM, R toe amputation, controlled asthma, L arm AVF now presenting 3 days s/p mechanical fall, found to have R sided rib fx x 3 (6,7,8)    - Optimize pain regimen  - PT re-consult - home vs outpatient PT  - Encourage IS use and ambulation  - Diet changes from consistent carb to full  - Daily labs including tacro  - Appreciate endo consult for insulin pump  - Will continue transplant medication  - Appreciate transplant nephro recs  - Renal US duplex prior to dispo  - Prepare for dispo home tomorrow    Trauma  [id number] 60M w/ hx of ESRD, hx of renal transplant in 2020, CHF, HLD, heart murmur, DM, R toe amputation, controlled asthma, L arm AVF now presenting 3 days s/p mechanical fall, found to have R sided rib fx x 3 (6,7,8)    - Optimize pain regimen  - PT re-consult - home vs outpatient PT  - Encourage IS use and ambulation  - Diet changes from consistent carb to full with fingersticks to monitor  - Daily labs including tacro  - Will continue transplant medication  - Appreciate endo consult for insulin pump  - Appreciate transplant nephro recs  - Renal US duplex prior to dispo  - Prepare for potential dispo home tomorrow    Trauma  [id number]

## 2021-07-05 NOTE — PROGRESS NOTE ADULT - ASSESSMENT
58 year old male  s/p LDRT from daughter in Feb 2020 admitted for rib fractures due to a mechanical fall.    1. Allograft function is good and at baseline.   2. IS meds- Continue Prograft 1mg po bid , goal level 6-8 and prednisone 5 mg po daily. Off MMF due to GI side effects. 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  3. Right sided rib fracture  x 3 (6,7,8)- management as per surgery

## 2021-07-06 ENCOUNTER — TRANSCRIPTION ENCOUNTER (OUTPATIENT)
Age: 61
End: 2021-07-06

## 2021-07-06 VITALS
RESPIRATION RATE: 17 BRPM | DIASTOLIC BLOOD PRESSURE: 80 MMHG | TEMPERATURE: 98 F | SYSTOLIC BLOOD PRESSURE: 132 MMHG | OXYGEN SATURATION: 96 % | HEART RATE: 75 BPM

## 2021-07-06 LAB
ANION GAP SERPL CALC-SCNC: 12 MMOL/L — SIGNIFICANT CHANGE UP (ref 5–17)
BUN SERPL-MCNC: 21 MG/DL — SIGNIFICANT CHANGE UP (ref 7–23)
CALCIUM SERPL-MCNC: 9.6 MG/DL — SIGNIFICANT CHANGE UP (ref 8.4–10.5)
CHLORIDE SERPL-SCNC: 103 MMOL/L — SIGNIFICANT CHANGE UP (ref 96–108)
CO2 SERPL-SCNC: 21 MMOL/L — LOW (ref 22–31)
CREAT SERPL-MCNC: 1.08 MG/DL — SIGNIFICANT CHANGE UP (ref 0.5–1.3)
GLUCOSE BLDC GLUCOMTR-MCNC: 137 MG/DL — HIGH (ref 70–99)
GLUCOSE BLDC GLUCOMTR-MCNC: 166 MG/DL — HIGH (ref 70–99)
GLUCOSE BLDC GLUCOMTR-MCNC: 174 MG/DL — HIGH (ref 70–99)
GLUCOSE SERPL-MCNC: 145 MG/DL — HIGH (ref 70–99)
HCT VFR BLD CALC: 46.6 % — SIGNIFICANT CHANGE UP (ref 39–50)
HGB BLD-MCNC: 14.8 G/DL — SIGNIFICANT CHANGE UP (ref 13–17)
MAGNESIUM SERPL-MCNC: 2.2 MG/DL — SIGNIFICANT CHANGE UP (ref 1.6–2.6)
MCHC RBC-ENTMCNC: 31 PG — SIGNIFICANT CHANGE UP (ref 27–34)
MCHC RBC-ENTMCNC: 31.8 GM/DL — LOW (ref 32–36)
MCV RBC AUTO: 97.5 FL — SIGNIFICANT CHANGE UP (ref 80–100)
NRBC # BLD: 0 /100 WBCS — SIGNIFICANT CHANGE UP (ref 0–0)
PHOSPHATE SERPL-MCNC: 2.5 MG/DL — SIGNIFICANT CHANGE UP (ref 2.5–4.5)
PLATELET # BLD AUTO: 185 K/UL — SIGNIFICANT CHANGE UP (ref 150–400)
POTASSIUM SERPL-MCNC: 4.4 MMOL/L — SIGNIFICANT CHANGE UP (ref 3.5–5.3)
POTASSIUM SERPL-SCNC: 4.4 MMOL/L — SIGNIFICANT CHANGE UP (ref 3.5–5.3)
RBC # BLD: 4.78 M/UL — SIGNIFICANT CHANGE UP (ref 4.2–5.8)
RBC # FLD: 13 % — SIGNIFICANT CHANGE UP (ref 10.3–14.5)
SODIUM SERPL-SCNC: 136 MMOL/L — SIGNIFICANT CHANGE UP (ref 135–145)
TACROLIMUS SERPL-MCNC: 6.5 NG/ML — SIGNIFICANT CHANGE UP
WBC # BLD: 8.41 K/UL — SIGNIFICANT CHANGE UP (ref 3.8–10.5)
WBC # FLD AUTO: 8.41 K/UL — SIGNIFICANT CHANGE UP (ref 3.8–10.5)

## 2021-07-06 PROCEDURE — 80197 ASSAY OF TACROLIMUS: CPT

## 2021-07-06 PROCEDURE — 99285 EMERGENCY DEPT VISIT HI MDM: CPT

## 2021-07-06 PROCEDURE — 85027 COMPLETE CBC AUTOMATED: CPT

## 2021-07-06 PROCEDURE — 83735 ASSAY OF MAGNESIUM: CPT

## 2021-07-06 PROCEDURE — U0005: CPT

## 2021-07-06 PROCEDURE — 97161 PT EVAL LOW COMPLEX 20 MIN: CPT

## 2021-07-06 PROCEDURE — 81001 URINALYSIS AUTO W/SCOPE: CPT

## 2021-07-06 PROCEDURE — 97116 GAIT TRAINING THERAPY: CPT

## 2021-07-06 PROCEDURE — U0003: CPT

## 2021-07-06 PROCEDURE — 84132 ASSAY OF SERUM POTASSIUM: CPT

## 2021-07-06 PROCEDURE — 82962 GLUCOSE BLOOD TEST: CPT

## 2021-07-06 PROCEDURE — 99238 HOSP IP/OBS DSCHRG MGMT 30/<: CPT

## 2021-07-06 PROCEDURE — 71250 CT THORAX DX C-: CPT

## 2021-07-06 PROCEDURE — 71045 X-RAY EXAM CHEST 1 VIEW: CPT

## 2021-07-06 PROCEDURE — 80048 BASIC METABOLIC PNL TOTAL CA: CPT

## 2021-07-06 PROCEDURE — G0378: CPT

## 2021-07-06 PROCEDURE — 85025 COMPLETE CBC W/AUTO DIFF WBC: CPT

## 2021-07-06 PROCEDURE — 86769 SARS-COV-2 COVID-19 ANTIBODY: CPT

## 2021-07-06 PROCEDURE — 84100 ASSAY OF PHOSPHORUS: CPT

## 2021-07-06 PROCEDURE — 80053 COMPREHEN METABOLIC PANEL: CPT

## 2021-07-06 PROCEDURE — 83036 HEMOGLOBIN GLYCOSYLATED A1C: CPT

## 2021-07-06 PROCEDURE — 71111 X-RAY EXAM RIBS/CHEST4/> VWS: CPT

## 2021-07-06 RX ORDER — ACETAMINOPHEN 500 MG
3 TABLET ORAL
Qty: 0 | Refills: 0 | DISCHARGE
Start: 2021-07-06

## 2021-07-06 RX ORDER — TRAMADOL HYDROCHLORIDE 50 MG/1
0.5 TABLET ORAL
Qty: 20 | Refills: 0
Start: 2021-07-06 | End: 2021-07-15

## 2021-07-06 RX ORDER — OXYCODONE HYDROCHLORIDE 5 MG/1
1 TABLET ORAL
Qty: 0 | Refills: 0 | DISCHARGE
Start: 2021-07-06 | End: 2021-07-09

## 2021-07-06 RX ORDER — OXYCODONE HYDROCHLORIDE 5 MG/1
1 TABLET ORAL
Qty: 15 | Refills: 0
Start: 2021-07-06

## 2021-07-06 RX ADMIN — OXYCODONE HYDROCHLORIDE 10 MILLIGRAM(S): 5 TABLET ORAL at 03:43

## 2021-07-06 RX ADMIN — Medication 975 MILLIGRAM(S): at 05:53

## 2021-07-06 RX ADMIN — HEPARIN SODIUM 5000 UNIT(S): 5000 INJECTION INTRAVENOUS; SUBCUTANEOUS at 13:26

## 2021-07-06 RX ADMIN — TACROLIMUS 1 MILLIGRAM(S): 5 CAPSULE ORAL at 17:35

## 2021-07-06 RX ADMIN — LIDOCAINE 1 PATCH: 4 CREAM TOPICAL at 11:22

## 2021-07-06 RX ADMIN — Medication 81 MILLIGRAM(S): at 11:23

## 2021-07-06 RX ADMIN — LIDOCAINE 1 PATCH: 4 CREAM TOPICAL at 01:15

## 2021-07-06 RX ADMIN — OXYCODONE HYDROCHLORIDE 10 MILLIGRAM(S): 5 TABLET ORAL at 09:10

## 2021-07-06 RX ADMIN — Medication 85 MILLIMOLE(S): at 11:18

## 2021-07-06 RX ADMIN — OXYCODONE HYDROCHLORIDE 10 MILLIGRAM(S): 5 TABLET ORAL at 15:33

## 2021-07-06 RX ADMIN — SODIUM ZIRCONIUM CYCLOSILICATE 10 GRAM(S): 10 POWDER, FOR SUSPENSION ORAL at 11:21

## 2021-07-06 RX ADMIN — Medication 5 MILLIGRAM(S): at 05:54

## 2021-07-06 RX ADMIN — OXYCODONE HYDROCHLORIDE 10 MILLIGRAM(S): 5 TABLET ORAL at 08:41

## 2021-07-06 RX ADMIN — TACROLIMUS 1 MILLIGRAM(S): 5 CAPSULE ORAL at 05:53

## 2021-07-06 RX ADMIN — Medication 975 MILLIGRAM(S): at 11:23

## 2021-07-06 RX ADMIN — Medication 975 MILLIGRAM(S): at 00:20

## 2021-07-06 RX ADMIN — OXYCODONE HYDROCHLORIDE 10 MILLIGRAM(S): 5 TABLET ORAL at 04:13

## 2021-07-06 RX ADMIN — Medication 975 MILLIGRAM(S): at 06:54

## 2021-07-06 RX ADMIN — OXYCODONE HYDROCHLORIDE 10 MILLIGRAM(S): 5 TABLET ORAL at 16:03

## 2021-07-06 RX ADMIN — HEPARIN SODIUM 5000 UNIT(S): 5000 INJECTION INTRAVENOUS; SUBCUTANEOUS at 05:54

## 2021-07-06 RX ADMIN — CARVEDILOL PHOSPHATE 25 MILLIGRAM(S): 80 CAPSULE, EXTENDED RELEASE ORAL at 11:00

## 2021-07-06 RX ADMIN — Medication 975 MILLIGRAM(S): at 17:35

## 2021-07-06 RX ADMIN — Medication 975 MILLIGRAM(S): at 12:00

## 2021-07-06 RX ADMIN — Medication 975 MILLIGRAM(S): at 18:00

## 2021-07-06 RX ADMIN — LIDOCAINE 1 PATCH: 4 CREAM TOPICAL at 18:00

## 2021-07-06 RX ADMIN — Medication 1 TABLET(S): at 12:16

## 2021-07-06 NOTE — PROGRESS NOTE ADULT - SUBJECTIVE AND OBJECTIVE BOX
SURGERY DAILY PROGRESS NOTE:       SUBJECTIVE/ROS:   No acute events overnight  Patient feels generally well  Denies abdominal pain, nausea, vomiting       OBJECTIVE:    Vital Signs Last 24 Hrs  T(C): 36.6 (04 Jul 2021 02:29), Max: 36.7 (03 Jul 2021 17:45)  T(F): 97.8 (04 Jul 2021 02:29), Max: 98.1 (03 Jul 2021 17:45)  HR: 80 (04 Jul 2021 02:29) (79 - 80)  BP: 126/84 (04 Jul 2021 02:29) (126/84 - 197/113)  BP(mean): --  RR: 18 (04 Jul 2021 02:29) (17 - 20)  SpO2: 96% (04 Jul 2021 02:29) (96% - 98%)    PHYSICAL EXAM:  Constitutional: well developed, well nourished, NAD  Gastrointestinal: abdomen soft, nontender, nondistended. No obvious masses. No peritonitis  Extremities: FROM, warm    I&O's Detail      LABS:                        15.2   11.74 )-----------( 213      ( 03 Jul 2021 20:37 )             47.5     07-03    x   |  x   |  x   ----------------------------<  x   4.7   |  x   |  x     Ca    9.8      03 Jul 2021 20:37    TPro  7.9  /  Alb  4.3  /  TBili  0.3  /  DBili  x   /  AST  44<H>  /  ALT  32  /  AlkPhos  82  07-03      MEDICATIONS:  MEDICATIONS  (STANDING):  acetaminophen   Tablet .. 975 milliGRAM(s) Oral every 6 hours  aspirin enteric coated 81 milliGRAM(s) Oral daily  atorvastatin 20 milliGRAM(s) Oral at bedtime  carvedilol 25 milliGRAM(s) Oral every 12 hours  heparin   Injectable 5000 Unit(s) SubCutaneous every 8 hours  lidocaine   Patch 1 Patch Transdermal daily  predniSONE   Tablet 5 milliGRAM(s) Oral daily  sodium zirconium cyclosilicate 10 Gram(s) Oral every other day  tacrolimus 1 milliGRAM(s) Oral every 12 hours  tamsulosin 0.4 milliGRAM(s) Oral at bedtime  trimethoprim   80 mG/sulfamethoxazole 400 mG 1 Tablet(s) Oral daily    MEDICATIONS  (PRN):  oxyCODONE    IR 2.5 milliGRAM(s) Oral every 4 hours PRN Moderate Pain (4 - 6)  oxyCODONE    IR 5 milliGRAM(s) Oral every 4 hours PRN Severe Pain (7 - 10)      IMAGING:    EXAM:  CT CHEST                        PROCEDURE DATE:  07/03/2021    INTERPRETATION:  CLINICAL INFORMATION: Assess rib fractures seen on same-day rib series  COMPARISON: Same-day rib series and frontal view of the chest 2/18/2020. CTchest 4/8/2013.    CONTRAST/COMPLICATIONS:  IV Contrast: None  Oral Contrast: None  Complications: None    PROCEDURE:  CT of the Chest was performed.  Sagittal and coronal reformats were performed.    FINDINGS:    LUNGS AND AIRWAYS: Patent central airways.  Small focus of tree-in-bud lung opacity at the right apex.  PLEURA: No pleural effusion.  MEDIASTINUM AND MARITZA: No lymphadenopathy.  VESSELS: Atherosclerotic changes of the coronary arteries and aorta.  HEART: Heart size is normal. No pericardial effusion.  CHEST WALL AND LOWER NECK: Within normal limits.  VISUALIZED UPPER ABDOMEN: Gastric sleeve surgery. Bilateral atrophic kidneys. Unchanged right adrenal myelolipoma.  BONES: Acute rib fractures of the right sixth seventh and eighth ribs.    IMPRESSION:    Acute rib fractures of the right sixth seventh and eighth ribs            
SURGERY DAILY PROGRESS NOTE:       SUBJECTIVE/ROS: No acute events overnight  Patient feels generally well with c/o "spasms in front and back"  Denies abdominal pain, nausea, vomiting  Denies nausea, vomiting, chest pain, shortness of breath         MEDICATIONS  (STANDING):  acetaminophen   Tablet .. 975 milliGRAM(s) Oral every 6 hours  aspirin enteric coated 81 milliGRAM(s) Oral daily  atorvastatin 20 milliGRAM(s) Oral at bedtime  carvedilol 25 milliGRAM(s) Oral every 12 hours  heparin   Injectable 5000 Unit(s) SubCutaneous every 8 hours  insulin aspart (NovoLOG) Pump 1 Each SubCutaneous Continuous Pump  lidocaine   Patch 1 Patch Transdermal daily  lidocaine   Patch 1 Patch Transdermal daily  predniSONE   Tablet 5 milliGRAM(s) Oral daily  sodium phosphate IVPB 15 milliMole(s) IV Intermittent once  sodium zirconium cyclosilicate 10 Gram(s) Oral every other day  tacrolimus 1 milliGRAM(s) Oral every 12 hours  tamsulosin 0.4 milliGRAM(s) Oral at bedtime  trimethoprim   80 mG/sulfamethoxazole 400 mG 1 Tablet(s) Oral daily    MEDICATIONS  (PRN):  oxyCODONE    IR 10 milliGRAM(s) Oral every 4 hours PRN Severe Pain (7 - 10)  traMADol 50 milliGRAM(s) Oral every 4 hours PRN Moderate Pain (4 - 6)      OBJECTIVE:    Vital Signs Last 24 Hrs  T(C): 36.9 (2021 08:15), Max: 36.9 (2021 16:18)  T(F): 98.4 (2021 08:15), Max: 98.4 (2021 16:18)  HR: 78 (2021 08:15) (73 - 79)  BP: 132/88 (2021 08:15) (128/72 - 140/82)  BP(mean): --  RR: 18 (2021 08:15) (17 - 18)  SpO2: 93% (2021 08:15) (93% - 99%)        I&O's Detail    2021 07:01  -  2021 07:00  --------------------------------------------------------  IN:    Oral Fluid: 2030 mL  Total IN: 2030 mL    OUT:    Voided (mL): 1275 mL  Total OUT: 1275 mL    Total NET: 755 mL      2021 07:01  -  2021 13:13  --------------------------------------------------------  IN:  Total IN: 0 mL    OUT:    Voided (mL): 300 mL  Total OUT: 300 mL    Total NET: -300 mL          Daily     Daily     LABS:                        15.7   9.73  )-----------( 195      ( 2021 06:28 )             50.7     07-05    139  |  103  |  19  ----------------------------<  134<H>  4.3   |  22  |  1.02    Ca    9.7      2021 06:30  Phos  2.5     07-05  Mg     2.2     07-05    TPro  7.9  /  Alb  4.3  /  TBili  0.3  /  DBili  x   /  AST  44<H>  /  ALT  32  /  AlkPhos  82  07-03      Urinalysis Basic - ( 2021 11:36 )    Color: Yellow / Appearance: Clear / S.029 / pH: x  Gluc: x / Ketone: Negative  / Bili: Negative / Urobili: Negative   Blood: x / Protein: 30 mg/dL / Nitrite: Negative   Leuk Esterase: Negative / RBC: 1 /hpf / WBC 1 /HPF   Sq Epi: x / Non Sq Epi: 0 /hpf / Bacteria: Negative                PHYSICAL EXAM:  Constitutional: well developed, well nourished, NAD  Eyes: anicteric  ENMT: normal facies, symmetric  Neck: supple  Respiratory: CTA bilaterally  Cardiovascular: RRR  Gastrointestinal: abdomen soft, nontender, nondistended. No obvious masses. No peritonitis. Tenderness over R rib fractures  Extremities: FROM, warm  Neurological: intact, non-focal  Skin: no gross lesions  Lymph Nodes: no gross adenopathy  Musculoskeletal: equal strength bilateral upper and lower extremities  Psychiatric: oriented x 3; appropriate        
ACS DAILY PROGRESS NOTE:       SUBJECTIVE/ROS: No acute events overnight. Pain is controlled. denies dizziness, SOB, CP or palpitations.          MEDICATIONS  (STANDING):  acetaminophen   Tablet .. 975 milliGRAM(s) Oral every 6 hours  aspirin enteric coated 81 milliGRAM(s) Oral daily  atorvastatin 20 milliGRAM(s) Oral at bedtime  carvedilol 25 milliGRAM(s) Oral every 12 hours  heparin   Injectable 5000 Unit(s) SubCutaneous every 8 hours  insulin aspart (NovoLOG) Pump 1 Each SubCutaneous Continuous Pump  lidocaine   Patch 1 Patch Transdermal daily  lidocaine   Patch 1 Patch Transdermal daily  predniSONE   Tablet 5 milliGRAM(s) Oral daily  sodium zirconium cyclosilicate 10 Gram(s) Oral every other day  tacrolimus 1 milliGRAM(s) Oral every 12 hours  tamsulosin 0.4 milliGRAM(s) Oral at bedtime  trimethoprim   80 mG/sulfamethoxazole 400 mG 1 Tablet(s) Oral daily    MEDICATIONS  (PRN):  oxyCODONE    IR 10 milliGRAM(s) Oral every 4 hours PRN Severe Pain (7 - 10)  traMADol 25 milliGRAM(s) Oral every 4 hours PRN Mild Pain (1 - 3)      OBJECTIVE:    Vital Signs Last 24 Hrs  T(C): 37 (2021 00:14), Max: 37.1 (2021 14:54)  T(F): 98.6 (2021 00:14), Max: 98.7 (2021 14:54)  HR: 79 (2021 00:14) (73 - 85)  BP: 130/82 (2021 00:14) (122/76 - 140/82)  BP(mean): --  RR: 18 (2021 00:14) (17 - 18)  SpO2: 96% (2021 00:14) (93% - 97%)    Physical Exam:   General: NAD, Sitting in bed comfortably  HEENT: NC/AT, EOMI  Neck: Soft, supple  Cardio: RRR, nml S1/S2  Resp: Good effort, CTA b/l  Thorax: Right side chest wall tenderness  GI/Abd: Soft, NT/ND, no rebound/guarding, no masses palpated  Skin: Intact, no breakdown  Lymphatic/Nodes: No palpable lymphadenopathy  Musculoskeletal: All 4 extremities moving spontaneously, no limitations      I&O's Detail    2021 07:01  -  2021 07:00  --------------------------------------------------------  IN:    Oral Fluid: 2030 mL  Total IN: 2030 mL    OUT:    Voided (mL): 1275 mL  Total OUT: 1275 mL    Total NET: 755 mL      2021 07:01  -  2021 02:30  --------------------------------------------------------  IN:    Oral Fluid: 960 mL  Total IN: 960 mL    OUT:    Voided (mL): 500 mL  Total OUT: 500 mL    Total NET: 460 mL          Daily     Daily     LABS:                        15.7   9.73  )-----------( 195      ( 2021 06:28 )             50.7     07-05    139  |  103  |  19  ----------------------------<  134<H>  4.3   |  22  |  1.02    Ca    9.7      2021 06:30  Phos  2.5     07-05  Mg     2.2     07-05        Urinalysis Basic - ( 2021 11:36 )    Color: Yellow / Appearance: Clear / S.029 / pH: x  Gluc: x / Ketone: Negative  / Bili: Negative / Urobili: Negative   Blood: x / Protein: 30 mg/dL / Nitrite: Negative   Leuk Esterase: Negative / RBC: 1 /hpf / WBC 1 /HPF   Sq Epi: x / Non Sq Epi: 0 /hpf / Bacteria: Negative                    
[facility] DIVISION OF KIDNEY DISEASES AND HYPERTENSION -- FOLLOW UP NOTE  --------------------------------------------------------------------------------  Chief Complaint:    24 hour events/subjective:  Patient was seen and examined at bedside      PAST HISTORY  --------------------------------------------------------------------------------  No significant changes to PMH, PSH, FHx, SHx, unless otherwise noted    ALLERGIES & MEDICATIONS  --------------------------------------------------------------------------------  Allergies    clonidine (Other)  seasonal allergy (Other)    Intolerances      Standing Inpatient Medications  acetaminophen   Tablet .. 975 milliGRAM(s) Oral every 6 hours  aspirin enteric coated 81 milliGRAM(s) Oral daily  atorvastatin 20 milliGRAM(s) Oral at bedtime  carvedilol 25 milliGRAM(s) Oral every 12 hours  heparin   Injectable 5000 Unit(s) SubCutaneous every 8 hours  insulin aspart (NovoLOG) Pump 1 Each SubCutaneous Continuous Pump  lidocaine   Patch 1 Patch Transdermal daily  lidocaine   Patch 1 Patch Transdermal daily  predniSONE   Tablet 5 milliGRAM(s) Oral daily  sodium phosphate IVPB 15 milliMole(s) IV Intermittent once  sodium zirconium cyclosilicate 10 Gram(s) Oral every other day  tacrolimus 1 milliGRAM(s) Oral every 12 hours  tamsulosin 0.4 milliGRAM(s) Oral at bedtime  trimethoprim   80 mG/sulfamethoxazole 400 mG 1 Tablet(s) Oral daily    PRN Inpatient Medications  oxyCODONE    IR 5 milliGRAM(s) Oral every 4 hours PRN  oxyCODONE    IR 10 milliGRAM(s) Oral every 4 hours PRN      REVIEW OF SYSTEMS  --------------------------------------------------------------------------------  Gen: No fatigue, fevers/chills, weakness  Skin: No rashes  Head/Eyes/Ears/Mouth: No headache;No sore throat  Respiratory: No dyspnea, cough. has pain on deep inspiration   CV: no chest pain, PND, orthopnea  GI: No abdominal pain, diarrhea, constipation, nausea, vomiting  Transplant: No pain  : No increased frequency, dysuria, hematuria, nocturia  MSK: No joint pain/swelling; no back pain; no edema  Neuro: No dizziness/lightheadedness, weakness, seizures, numbness, tingling  Psych: No significant nervousness, anxiety, stress, depression    All other systems were reviewed and are negative, except as noted.    VITALS/PHYSICAL EXAM  --------------------------------------------------------------------------------  T(C): 36.9 (07-05-21 @ 08:15), Max: 36.9 (07-04-21 @ 16:18)  HR: 78 (07-05-21 @ 08:15) (73 - 80)  BP: 132/88 (07-05-21 @ 08:15) (128/72 - 140/82)  RR: 18 (07-05-21 @ 08:15) (17 - 18)  SpO2: 93% (07-05-21 @ 08:15) (93% - 99%)  Wt(kg): --  Height (cm): 172.7 (07-03-21 @ 17:45)  Weight (kg): 95.3 (07-03-21 @ 17:45)  BMI (kg/m2): 32 (07-03-21 @ 17:45)  BSA (m2): 2.09 (07-03-21 @ 17:45)      07-04-21 @ 07:01  -  07-05-21 @ 07:00  --------------------------------------------------------  IN: 2030 mL / OUT: 1275 mL / NET: 755 mL    07-05-21 @ 07:01  -  07-05-21 @ 11:20  --------------------------------------------------------  IN: 0 mL / OUT: 300 mL / NET: -300 mL      Physical Exam:  	Gen: NAD  	HEENT: PERRL, supple neck, clear oropharynx  	Pulm: CTA B/L  	CV: RRR, S1S2; no rub  	Back: No spinal or CVA tenderness; no sacral edema  	Abd: +BS, soft, nontender/nondistended                      Transplant: No tenderness, swelling  	: No suprapubic tenderness  	UE: Warm, FROM; no edema; no asterixis  	LE: Warm, FROM; no edema  	Neuro: No focal deficits  	Psych: Normal affect and mood  	Skin: Warm, without rashes      LABS/STUDIES  --------------------------------------------------------------------------------              15.7   9.73  >-----------<  195      [07-05-21 @ 06:28]              50.7     139  |  103  |  19  ----------------------------<  134      [07-05-21 @ 06:30]  4.3   |  22  |  1.02        Ca     9.7     [07-05-21 @ 06:30]      Mg     2.2     [07-05-21 @ 06:30]      Phos  2.5     [07-05-21 @ 06:30]    TPro  7.9  /  Alb  4.3  /  TBili  0.3  /  DBili  x   /  AST  44  /  ALT  32  /  AlkPhos  82  [07-03-21 @ 20:37]          Creatinine Trend:  SCr 1.02 [07-05 @ 06:30]  SCr 0.86 [07-04 @ 07:03]  SCr 0.97 [07-03 @ 20:37]    Tacrolimus (), Serum: 6.2 ng/mL (07-05 @ 07:58)  Tacrolimus (), Serum: 7.7 ng/mL (07-04 @ 08:22)        Urinalysis - [07-04-21 @ 11:36]      Color Yellow / Appearance Clear / SG 1.029 / pH 6.0      Gluc Trace / Ketone Negative  / Bili Negative / Urobili Negative       Blood Negative / Protein 30 mg/dL / Leuk Est Negative / Nitrite Negative      RBC 1 / WBC 1 / Hyaline 2 / Gran  / Sq Epi  / Non Sq Epi 0 / Bacteria Negative      HbA1c 7.6      [02-19-20 @ 08:43]

## 2021-07-06 NOTE — ADVANCED PRACTICE NURSE CONSULT - ASSESSMENT
Patient seen at bedside on [address] [place]. He is on Tslim pump, site on right upper stomach. Site due to be changed today 7/6. He has supplies at bedside. Dexcom G6 site is on the middle stomach, more than 3 inches away from his insulin pump site. Insulin pump settings as per order.

## 2021-07-06 NOTE — DISCHARGE NOTE NURSING/CASE MANAGEMENT/SOCIAL WORK - PATIENT PORTAL LINK FT
You can access the FollowMyHealth Patient Portal offered by [facility] by registering at the following website: [email or web address]. By joining [employer]’s FollowMyHealth portal, you will also be able to view your health information using other applications (apps) compatible with our system.

## 2021-07-06 NOTE — PROGRESS NOTE ADULT - ASSESSMENT
60M w/ hx of ESRD, hx of renal transplant in 2020, CHF, HLD, heart murmur, DM, R toe amputation, controlled asthma, L arm AVF now presenting 3 days s/p mechanical fall, found to have R sided rib fx x 3 (6,7,8)    - Optimize pain regimen  - PT re-consult - home vs outpatient PT  - Encourage IS use and ambulation  - Diet changes from consistent carb to full with fingersticks to monitor  - Daily labs including tacro  - Will continue transplant medication  - Appreciate endo consult for insulin pump  - Appreciate transplant nephro recs  - Renal US duplex prior to dispo  - Prepare for potential dispo home tomorrow    Trauma  [id number]     60M w/ hx of ESRD, hx of renal transplant in 2020, CHF, HLD, heart murmur, DM, R toe amputation, controlled asthma, L arm AVF now presenting 3 days s/p mechanical fall, found to have R sided rib fx x 3 (6,7,8)    - Optimize pain regimen  - PT re-consult - home vs outpatient PT  - Encourage IS use and ambulation  -Regular diet and BG to be monitored  - Daily labs including tacro  - Will continue transplant medication  - Appreciate endo consult for insulin pump  - Appreciate transplant nephro recs    Trauma  [id number]

## 2021-07-06 NOTE — PROGRESS NOTE ADULT - ATTENDING COMMENTS
Patient seen and examined on AM rounds on 7/6 and agree with resident note.  s/p mechanical fall with resulting multiple right sided rib fractures.  No acute events overnight.   Pain is significantly improved today. He states he does have pain primarily with getting out of bed but overall better.   Respiratory state is normal on room air.   Continues to do well with IS > 2 liters.   Appreciate endo consult. BG within range.   As per transplant nephrology- no need for renal US  Plan for discharge to home.
Patient seen and examined on AM rounds on 7/5 and agree with resident note.  s/p mechanical fall with resulting multiple right sided rib fractures.  No acute events overnight.   Continues to complain of pain. Increased oxycodone and will consider adding tramadol.  Respiratory state is normal on room air.   Patient with 2.5 liters on IS.  He would like a regular diet even though he is diabetic and states he knows how to regulate what he eats. Given his blood glucose have been within appropriate limit I have decided this is ok and will monitor BG closely.   WIll follow with transplant nephrology to see if renal ultrasound is needed.   Once pain is controlled patient can be discharged to home.

## 2021-07-06 NOTE — ADVANCED PRACTICE NURSE CONSULT - RECOMMEDATIONS
Patient is able to manage his insulin pump while admitted. He has supplies at bedside. Staff to continue to document in flowsheets.

## 2021-07-06 NOTE — CHART NOTE - NSCHARTNOTEFT_GEN_A_CORE
pt seen and assessed at bedside, poc glucose/dexcom g6 (Right Upper Abdomen placed)/ tslim (placed on Right Upper Abdomen)  reviewed at bedside, pt tolerating PO , being d/c home today, pt follows with Dr Marcela alvarado faculty; BG well controlled and at goal past 24 hours pt bolusing appropriately     Insulin pump settings:    12 AM 1.1 units/h  Total daily basal 26.4 units  ISF  12 AM 1: 30 mg/dL  6 AM 1: 30 mg/dL  11 AM 1: 25 mg/dL  Insulin to carbohydrate ratio  12 AM 1: 3 g/unit  6 AM 1: 2 g/unit  11 AM 1: 1.5 g/unit  Glucose target  12  110 mg/dL  Active insulin time 3 hours       Dispo  continue tslim novolog insulin pump on current settings upon dc  pt reports additionally  he takes  trulicity  150 mg sq qweekly at home dose was recently increased in office, c/w trulicity on discharge     f/u with Dr Medrano as OP

## 2021-07-08 ENCOUNTER — RX RENEWAL (OUTPATIENT)
Age: 61
End: 2021-07-08

## 2021-07-26 ENCOUNTER — APPOINTMENT (OUTPATIENT)
Dept: TRANSPLANT | Facility: CLINIC | Age: 61
End: 2021-07-26

## 2021-07-27 LAB
25(OH)D3 SERPL-MCNC: 27.9 NG/ML
ALBUMIN SERPL ELPH-MCNC: 4.7 G/DL
ALP BLD-CCNC: 121 U/L
ALT SERPL-CCNC: 33 U/L
ANION GAP SERPL CALC-SCNC: 14 MMOL/L
APPEARANCE: CLEAR
AST SERPL-CCNC: 25 U/L
BACTERIA: NEGATIVE
BASOPHILS # BLD AUTO: 0.03 K/UL
BASOPHILS NFR BLD AUTO: 0.3 %
BILIRUB SERPL-MCNC: 0.5 MG/DL
BILIRUBIN URINE: NEGATIVE
BLOOD URINE: NEGATIVE
BUN SERPL-MCNC: 21 MG/DL
CALCIUM SERPL-MCNC: 10.2 MG/DL
CALCIUM SERPL-MCNC: 10.2 MG/DL
CHLORIDE SERPL-SCNC: 106 MMOL/L
CHOLEST SERPL-MCNC: 132 MG/DL
CO2 SERPL-SCNC: 20 MMOL/L
COLOR: YELLOW
COVID-19 SPIKE DOMAIN ANTIBODY INTERPRETATION: POSITIVE
CREAT SERPL-MCNC: 1.29 MG/DL
CREAT SPEC-SCNC: 226 MG/DL
CREAT/PROT UR: 0.1 RATIO
EOSINOPHIL # BLD AUTO: 0.11 K/UL
EOSINOPHIL NFR BLD AUTO: 1.2 %
ESTIMATED AVERAGE GLUCOSE: 151 MG/DL
GLUCOSE QUALITATIVE U: ABNORMAL
GLUCOSE SERPL-MCNC: 134 MG/DL
HBA1C MFR BLD HPLC: 6.9 %
HCT VFR BLD CALC: 48.4 %
HDLC SERPL-MCNC: 41 MG/DL
HGB BLD-MCNC: 15.2 G/DL
HYALINE CASTS: 0 /LPF
IMM GRANULOCYTES NFR BLD AUTO: 0.2 %
KETONES URINE: NEGATIVE
LDH SERPL-CCNC: 225 U/L
LDLC SERPL CALC-MCNC: 50 MG/DL
LEUKOCYTE ESTERASE URINE: NEGATIVE
LYMPHOCYTES # BLD AUTO: 2.09 K/UL
LYMPHOCYTES NFR BLD AUTO: 23.5 %
MAGNESIUM SERPL-MCNC: 2.1 MG/DL
MAN DIFF?: NORMAL
MCHC RBC-ENTMCNC: 31.1 PG
MCHC RBC-ENTMCNC: 31.4 GM/DL
MCV RBC AUTO: 99 FL
MICROSCOPIC-UA: NORMAL
MONOCYTES # BLD AUTO: 0.55 K/UL
MONOCYTES NFR BLD AUTO: 6.2 %
NEUTROPHILS # BLD AUTO: 6.08 K/UL
NEUTROPHILS NFR BLD AUTO: 68.6 %
NITRITE URINE: NEGATIVE
NONHDLC SERPL-MCNC: 91 MG/DL
PARATHYROID HORMONE INTACT: 57 PG/ML
PH URINE: 6
PHOSPHATE SERPL-MCNC: 2.9 MG/DL
PLATELET # BLD AUTO: 228 K/UL
POTASSIUM SERPL-SCNC: 5.5 MMOL/L
PROT SERPL-MCNC: 7.2 G/DL
PROT UR-MCNC: 31 MG/DL
PROTEIN URINE: ABNORMAL
RBC # BLD: 4.89 M/UL
RBC # FLD: 13.2 %
RED BLOOD CELLS URINE: 3 /HPF
SARS-COV-2 AB SERPL IA-ACNC: >250 U/ML
SODIUM SERPL-SCNC: 140 MMOL/L
SPECIFIC GRAVITY URINE: 1.03
SQUAMOUS EPITHELIAL CELLS: 0 /HPF
TACROLIMUS SERPL-MCNC: 8.1 NG/ML
TRIGL SERPL-MCNC: 202 MG/DL
URATE SERPL-MCNC: 5 MG/DL
UROBILINOGEN URINE: NORMAL
WBC # FLD AUTO: 8.88 K/UL
WHITE BLOOD CELLS URINE: 1 /HPF

## 2021-07-28 LAB — CMV DNA SPEC QL NAA+PROBE: NOT DETECTED IU/ML

## 2021-07-29 LAB
BKV DNA SPEC QL NAA+PROBE: NEGATIVE COPIES/ML
LOG 10 BK QUANTITATION PCR: NORMAL
SARS-COV-2 N GENE NPH QL NAA+PROBE: NOT DETECTED

## 2021-07-30 ENCOUNTER — NON-APPOINTMENT (OUTPATIENT)
Age: 61
End: 2021-07-30

## 2021-08-19 ENCOUNTER — APPOINTMENT (OUTPATIENT)
Dept: NEPHROLOGY | Facility: CLINIC | Age: 61
End: 2021-08-19
Payer: COMMERCIAL

## 2021-08-19 LAB
ALBUMIN SERPL ELPH-MCNC: 4.5 G/DL
ALP BLD-CCNC: 104 U/L
ALT SERPL-CCNC: 39 U/L
ANION GAP SERPL CALC-SCNC: 13 MMOL/L
APPEARANCE: CLEAR
AST SERPL-CCNC: 25 U/L
BACTERIA: NEGATIVE
BASOPHILS # BLD AUTO: 0.03 K/UL
BASOPHILS NFR BLD AUTO: 0.3 %
BILIRUB SERPL-MCNC: 0.4 MG/DL
BILIRUBIN URINE: NEGATIVE
BLOOD URINE: NEGATIVE
BUN SERPL-MCNC: 24 MG/DL
CALCIUM SERPL-MCNC: 10.2 MG/DL
CHLORIDE SERPL-SCNC: 107 MMOL/L
CO2 SERPL-SCNC: 23 MMOL/L
COLOR: YELLOW
CREAT SERPL-MCNC: 1.18 MG/DL
CREAT SPEC-SCNC: 93 MG/DL
CREAT/PROT UR: 0.1 RATIO
EOSINOPHIL # BLD AUTO: 0.11 K/UL
EOSINOPHIL NFR BLD AUTO: 1.2 %
GLUCOSE QUALITATIVE U: NEGATIVE
GLUCOSE SERPL-MCNC: 83 MG/DL
HCT VFR BLD CALC: 48.4 %
HGB BLD-MCNC: 15.4 G/DL
HYALINE CASTS: 0 /LPF
IMM GRANULOCYTES NFR BLD AUTO: 0.7 %
KETONES URINE: NEGATIVE
LDH SERPL-CCNC: 195 U/L
LEUKOCYTE ESTERASE URINE: NEGATIVE
LYMPHOCYTES # BLD AUTO: 2.19 K/UL
LYMPHOCYTES NFR BLD AUTO: 24.3 %
MAGNESIUM SERPL-MCNC: 2.1 MG/DL
MAN DIFF?: NORMAL
MCHC RBC-ENTMCNC: 31.8 GM/DL
MCHC RBC-ENTMCNC: 32 PG
MCV RBC AUTO: 100.6 FL
MICROSCOPIC-UA: NORMAL
MONOCYTES # BLD AUTO: 0.64 K/UL
MONOCYTES NFR BLD AUTO: 7.1 %
NEUTROPHILS # BLD AUTO: 6 K/UL
NEUTROPHILS NFR BLD AUTO: 66.4 %
NITRITE URINE: NEGATIVE
PH URINE: 6
PHOSPHATE SERPL-MCNC: 3.5 MG/DL
PLATELET # BLD AUTO: 223 K/UL
POTASSIUM SERPL-SCNC: 5.6 MMOL/L
PROT SERPL-MCNC: 7.1 G/DL
PROT UR-MCNC: 13 MG/DL
PROTEIN URINE: NORMAL
RBC # BLD: 4.81 M/UL
RBC # FLD: 13.2 %
RED BLOOD CELLS URINE: 0 /HPF
SODIUM SERPL-SCNC: 143 MMOL/L
SPECIFIC GRAVITY URINE: 1.02
SQUAMOUS EPITHELIAL CELLS: 0 /HPF
TACROLIMUS SERPL-MCNC: 7.2 NG/ML
URATE SERPL-MCNC: 5.1 MG/DL
UROBILINOGEN URINE: NORMAL
WBC # FLD AUTO: 9.03 K/UL
WHITE BLOOD CELLS URINE: 0 /HPF

## 2021-08-19 PROCEDURE — 99214 OFFICE O/P EST MOD 30 MIN: CPT

## 2021-08-19 NOTE — PHYSICAL EXAM
[General Appearance - Alert] : alert [General Appearance - In No Acute Distress] : in no acute distress [Sclera] : the sclera and conjunctiva were normal [PERRL With Normal Accommodation] : pupils were equal in size, round, and reactive to light [Extraocular Movements] : extraocular movements were intact [Outer Ear] : the ears and nose were normal in appearance [Oropharynx] : the oropharynx was normal [Neck Appearance] : the appearance of the neck was normal [Neck Cervical Mass (___cm)] : no neck mass was observed [Jugular Venous Distention Increased] : there was no jugular-venous distention [Thyroid Diffuse Enlargement] : the thyroid was not enlarged [Thyroid Nodule] : there were no palpable thyroid nodules [Auscultation Breath Sounds / Voice Sounds] : lungs were clear to auscultation bilaterally [Heart Rate And Rhythm] : heart rate was normal and rhythm regular [Heart Sounds Gallop] : no gallops [Heart Sounds] : normal S1 and S2 [Murmurs] : no murmurs [Heart Sounds Pericardial Friction Rub] : no pericardial rub [Full Pulse] : the pedal pulses are present [Edema] : there was no peripheral edema [Bowel Sounds] : normal bowel sounds [Abdomen Soft] : soft [Abdomen Tenderness] : non-tender [Abdomen Mass (___ Cm)] : no abdominal mass palpated [No CVA Tenderness] : no ~M costovertebral angle tenderness [No Spinal Tenderness] : no spinal tenderness [Abnormal Walk] : normal gait [Nail Clubbing] : no clubbing  or cyanosis of the fingernails [Musculoskeletal - Swelling] : no joint swelling seen [Skin Color & Pigmentation] : normal skin color and pigmentation [Motor Tone] : muscle strength and tone were normal [Skin Turgor] : normal skin turgor [] : no rash [Normal] : normal [Deep Tendon Reflexes (DTR)] : deep tendon reflexes were 2+ and symmetric [Sensation] : the sensory exam was normal to light touch and pinprick [No Focal Deficits] : no focal deficits [Oriented To Time, Place, And Person] : oriented to person, place, and time [Impaired Insight] : insight and judgment were intact [Affect] : the affect was normal

## 2021-08-19 NOTE — ASSESSMENT
[FreeTextEntry1] : 60 year old male s/p LRT from daughter on 2/18/2020 \par \par 1. Allograft function has been good  and was ranging  ~ 1.2 . slight rise in Cr  was noted since December ( after he got sick with covid and had BK viremia and was using lasix ) \par 2. IS meds- on Prograft 1 mg po bid ( lowered due to BK viremia which has now improved ) continue Myfortic 360 mg po bid and Prednisone 5 mg po bid.\par 3. Ppx-on Bactrim.  \par 4. HTN- Coreg 25 mg po bid and Nifedipine 30 mg po daily \par 5. DM-  follows with Dr Medrano. 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 \par \par \par \par \par \par \par  \par \par

## 2021-08-19 NOTE — HISTORY OF PRESENT ILLNESS
[Living Donor] : Living donor [Basiliximab] : basiliximab [Negative/Negative] : Donor Negative/Recipient Negative [TextBox_7] : 2/18/2020 [FreeTextEntry1] : 60 year old male with ESRD was on dialysis , now s/p LRT from his daughter on 2/12/2020 .\par   PMH includes CHF, HTN, HLD, T2DM,  asthma, left glaucoma.\par  Donor: ABO O Donor CMV (-), EBV (+)\par  Recipient ABO O Recipient CMV (-), EBV (+) HLA mismatch 1,1,1, crossmatch negative.\par \par Patient tested positive for covid in December , he was sick for about a week with just a runny nose and headache and then recovered. \par \par Patient feels well, no acute symptoms. Denies SOB , fever, chills, dysuria, LE edema,chest pain , nausea, vomiting, diarrhea or constipation . He received both of his covid shots and is going to schedule his kinjal\par \par \par \par

## 2021-08-21 LAB — CMV DNA SPEC QL NAA+PROBE: NOT DETECTED IU/ML

## 2021-08-23 LAB
BKV DNA SPEC QL NAA+PROBE: NEGATIVE COPIES/ML
LOG 10 BK QUANTITATION PCR: NORMAL

## 2021-10-01 ENCOUNTER — APPOINTMENT (OUTPATIENT)
Dept: INTERNAL MEDICINE | Facility: CLINIC | Age: 61
End: 2021-10-01
Payer: COMMERCIAL

## 2021-10-01 ENCOUNTER — MED ADMIN CHARGE (OUTPATIENT)
Age: 61
End: 2021-10-01

## 2021-10-01 PROCEDURE — XXXXX: CPT

## 2021-10-11 ENCOUNTER — APPOINTMENT (OUTPATIENT)
Dept: PULMONOLOGY | Facility: CLINIC | Age: 61
End: 2021-10-11
Payer: COMMERCIAL

## 2021-10-11 PROCEDURE — 99213 OFFICE O/P EST LOW 20 MIN: CPT | Mod: 95

## 2021-10-20 ENCOUNTER — APPOINTMENT (OUTPATIENT)
Dept: ENDOCRINOLOGY | Facility: CLINIC | Age: 61
End: 2021-10-20
Payer: COMMERCIAL

## 2021-10-20 VITALS
WEIGHT: 224 LBS | TEMPERATURE: 97.6 F | DIASTOLIC BLOOD PRESSURE: 70 MMHG | SYSTOLIC BLOOD PRESSURE: 140 MMHG | HEART RATE: 76 BPM | OXYGEN SATURATION: 96 % | HEIGHT: 69 IN | BODY MASS INDEX: 33.18 KG/M2

## 2021-10-20 PROCEDURE — 83036 HEMOGLOBIN GLYCOSYLATED A1C: CPT | Mod: QW

## 2021-10-20 PROCEDURE — 82962 GLUCOSE BLOOD TEST: CPT

## 2021-10-20 PROCEDURE — 99214 OFFICE O/P EST MOD 30 MIN: CPT

## 2021-10-20 RX ORDER — EMPAGLIFLOZIN 10 MG/1
10 TABLET, FILM COATED ORAL
Qty: 30 | Refills: 0 | Status: DISCONTINUED | COMMUNITY
Start: 2021-08-27 | End: 2021-10-20

## 2021-10-20 RX ORDER — EMPAGLIFLOZIN 10 MG/1
10 TABLET, FILM COATED ORAL
Qty: 1 | Refills: 1 | Status: DISCONTINUED | COMMUNITY
Start: 2021-08-27 | End: 2021-10-20

## 2021-10-22 NOTE — HISTORY OF PRESENT ILLNESS
[FreeTextEntry1] : Diabetes F/u Patient HPI\par \par 02/2020 renal transplant ( donor is the daughter) \par Stent was recently removed \par \par CC\par Patient referred by Dr. Zak Puente for diabetes management.\par \par \par History of gastric sleeve- at the time was on the insulin pump and came off \par \par \par \par HPI:\par \par Duration of Diabetes: 25-30 years \par Is patient on Insulin? yes, for 25-30 years \par \par List Current Medications for Glycemic control and the doses:\par Novolog insulin pump \par Trulicity 0.75 mcg weekly \par \par SMBG (self monitored blood glucose) readings: \par - Name of glucometer: \par - How often does the patient check BG? \par - Does the patient keep a log? \par \par If detailed record is available, what is the range of most of the BG readings?\par - Before Breakfast: 150-180s\par - Before Lunch: \par - Before Dinner: 180-200\par - Before Bedtime: 200-215\par \par \par Does patient get Hypoglycemic episodes? previously he overcompensated \par If yes how frequent? \par Hoe low do the BG readings reach? 40s \par When do most of those episodes occur? Middle of the night \par What symptoms does the patient get during those episodes? sweating \par \par Diabetic Complications: Is patient aware of having any of those complications?\par - Eyes: Retinopathy? History of cataract surgery, + history of retinopathy gets laser surgery \par  	When was the last fully dilated eye exam? 3 months ago \par - Feet: 	Neuropathy? Yes ( now on alpha lipoic acid ) \par  	Foot Ulcers? History of right toe ulcer \par 	When was the last time patient saw a Podiatrist? 2-3 weeks \par - Kidneys: Nephropathy? Renal transplant \par \par Diet: review patient's diet: \par Breakfast: Egg salad , 1/2 judson \par Lunch: [place], [place] \par Dinner: meat, potatos, vegetables \par Snacks: Chips, fruits \par Juice or soda: None \par Desserts: yogurt, sugar free jello \par \par \par Exercise: review patient exercise habits: Not much \par

## 2021-10-22 NOTE — ASSESSMENT
[FreeTextEntry1] : 61 year old male with history of renal transplant in 02/2020, DM Type 2 on insulin here for follow-up. \par SMBGs are much better controlled at this time. At this time most of his hyperglycemia is post prandial after lunch. \par Will change carb ratio pre-lunch \par \par DM Type 2 \par - 11 am: carb ratio 1 :1.5 \par -Healthier snacks were discussed with the patient \par -Still is having higher glycemic index carbs \par -SMBGS with DEXCOM \par \par Renal transplant\par -Use of prednisone causing hyperglycemia in the evening \par -Use of tacrolimus increases insulin resistance \par \par HLD\par -Continue Atorvastatin \par \par \par -Follow up with in 4 months. \par

## 2021-10-22 NOTE — HISTORY OF PRESENT ILLNESS
[FreeTextEntry1] : Diabetes F/u Patient HPI\par \par 02/2020 renal transplant ( donor is the daughter) \par Stent was recently removed \par \par CC\par Patient referred by Dr. Zak Puente for diabetes management.\par \par \par History of gastric sleeve- at the time was on the insulin pump and came off \par \par \par \par HPI:\par \par Duration of Diabetes: 25-30 years \par Is patient on Insulin? yes, for 25-30 years \par \par List Current Medications for Glycemic control and the doses:\par Novolog insulin pump \par Trulicity 0.75 mcg weekly \par \par SMBG (self monitored blood glucose) readings: \par - Name of glucometer: \par - How often does the patient check BG? \par - Does the patient keep a log? \par \par If detailed record is available, what is the range of most of the BG readings?\par - Before Breakfast: 150-180s\par - Before Lunch: \par - Before Dinner: 180-200\par - Before Bedtime: 200-215\par \par \par Does patient get Hypoglycemic episodes? previously he overcompensated \par If yes how frequent? \par Hoe low do the BG readings reach? 40s \par When do most of those episodes occur? Middle of the night \par What symptoms does the patient get during those episodes? sweating \par \par Diabetic Complications: Is patient aware of having any of those complications?\par - Eyes: Retinopathy? History of cataract surgery, + history of retinopathy gets laser surgery \par  	When was the last fully dilated eye exam? 3 months ago \par - Feet: 	Neuropathy? Yes ( now on alpha lipoic acid ) \par  	Foot Ulcers? History of right toe ulcer \par 	When was the last time patient saw a Podiatrist? 2-3 weeks \par - Kidneys: Nephropathy? Renal transplant \par \par Diet: review patient's diet: \par Breakfast: Egg salad , 1/2 judson \par Lunch: [place], [place] \par Dinner: meat, potatos, vegetables \par Snacks: Chips, fruits \par Juice or soda: None \par Desserts: yogurt, sugar free jello \par \par \par Exercise: review patient exercise habits: Not much \par

## 2021-10-24 LAB
GLUCOSE BLDC GLUCOMTR-MCNC: 103
HBA1C MFR BLD HPLC: 6.9

## 2021-11-04 ENCOUNTER — NON-APPOINTMENT (OUTPATIENT)
Age: 61
End: 2021-11-04

## 2021-11-04 ENCOUNTER — APPOINTMENT (OUTPATIENT)
Dept: CARDIOLOGY | Facility: CLINIC | Age: 61
End: 2021-11-04
Payer: COMMERCIAL

## 2021-11-04 VITALS
BODY MASS INDEX: 32.78 KG/M2 | RESPIRATION RATE: 12 BRPM | HEART RATE: 67 BPM | OXYGEN SATURATION: 96 % | SYSTOLIC BLOOD PRESSURE: 124 MMHG | WEIGHT: 222 LBS | DIASTOLIC BLOOD PRESSURE: 70 MMHG | TEMPERATURE: 96.4 F

## 2021-11-04 DIAGNOSIS — R60.0 LOCALIZED EDEMA: ICD-10-CM

## 2021-11-04 PROCEDURE — 99214 OFFICE O/P EST MOD 30 MIN: CPT

## 2021-11-04 PROCEDURE — 93000 ELECTROCARDIOGRAM COMPLETE: CPT

## 2021-11-04 RX ORDER — FLASH GLUCOSE SENSOR
KIT MISCELLANEOUS
Qty: 7 | Refills: 3 | Status: DISCONTINUED | COMMUNITY
Start: 2020-06-24 | End: 2021-11-04

## 2021-11-04 RX ORDER — CEPHALEXIN 500 MG/1
500 CAPSULE ORAL
Qty: 28 | Refills: 0 | Status: DISCONTINUED | COMMUNITY
Start: 2020-11-12 | End: 2021-11-04

## 2021-11-04 RX ORDER — DULAGLUTIDE 1.5 MG/.5ML
1.5 INJECTION, SOLUTION SUBCUTANEOUS
Qty: 3 | Refills: 3 | Status: DISCONTINUED | COMMUNITY
Start: 2021-06-02 | End: 2021-11-04

## 2021-11-04 RX ORDER — B-COMPLEX WITH VITAMIN C
100 TABLET ORAL DAILY
Refills: 0 | Status: ACTIVE | COMMUNITY
Start: 2021-11-04

## 2021-11-04 RX ORDER — LIDOCAINE 50 MG/G
5 CREAM TOPICAL DAILY
Qty: 1 | Refills: 0 | Status: DISCONTINUED | COMMUNITY
Start: 2020-03-05 | End: 2021-11-04

## 2021-11-04 RX ORDER — FAMOTIDINE 20 MG/1
20 TABLET, FILM COATED ORAL
Qty: 30 | Refills: 5 | Status: DISCONTINUED | COMMUNITY
Start: 2020-02-19 | End: 2021-11-04

## 2021-11-04 RX ORDER — INSULIN DEGLUDEC INJECTION 200 U/ML
200 INJECTION, SOLUTION SUBCUTANEOUS DAILY
Refills: 2 | Status: DISCONTINUED | COMMUNITY
Start: 2020-06-23 | End: 2021-11-04

## 2021-11-04 RX ORDER — SULFAMETHOXAZOLE AND TRIMETHOPRIM 400; 80 MG/1; MG/1
400-80 TABLET ORAL DAILY
Qty: 90 | Refills: 3 | Status: DISCONTINUED | COMMUNITY
Start: 2020-03-23 | End: 2021-11-04

## 2021-11-04 RX ORDER — SAW/PYGEUM/BETA/HERB/D3/B6/ZN 30 MG-25MG
200 CAPSULE ORAL
Refills: 0 | Status: ACTIVE | COMMUNITY
Start: 2021-11-04

## 2021-11-04 RX ORDER — ALBUTEROL SULFATE 90 UG/1
108 (90 BASE) AEROSOL, METERED RESPIRATORY (INHALATION) TWICE DAILY
Qty: 18 | Refills: 0 | Status: DISCONTINUED | COMMUNITY
Start: 2018-05-31 | End: 2021-11-04

## 2021-11-04 RX ORDER — SODIUM ZIRCONIUM CYCLOSILICATE 5 G/5G
5 POWDER, FOR SUSPENSION ORAL
Refills: 0 | Status: DISCONTINUED | COMMUNITY
Start: 2021-05-05 | End: 2021-11-04

## 2021-11-06 PROBLEM — R60.0 BILATERAL EDEMA OF LOWER EXTREMITY: Status: ACTIVE | Noted: 2019-06-26

## 2021-11-06 NOTE — REVIEW OF SYSTEMS
[Negative] : Heme/Lymph [Weight Gain (___ Lbs)] : [unfilled] ~Ulb weight gain [SOB] : no shortness of breath [Dyspnea on exertion] : not dyspnea during exertion [Chest Discomfort] : no chest discomfort [Lower Ext Edema] : no extremity edema [Leg Claudication] : no intermittent leg claudication [Palpitations] : no palpitations

## 2021-11-06 NOTE — HISTORY OF PRESENT ILLNESS
[FreeTextEntry1] : Orion gained weight but now trying to lose it again. Works from home and was not moving much. No CP, palpitations, SOB or dizziness.

## 2021-11-06 NOTE — DISCUSSION/SUMMARY
Patient has noticed an increase in thirst, loss of appetite. He has more incontinence, frequency, and urgency. He denies burning. He has occasional chills and possibly fever but he has not checked his temperature. Order placed for urine and advised to call PCP regarding increase thirst and loss of appetite. He voiced understanding. Please advise. Thank you. [___ Month(s)] : in [unfilled] month(s) [FreeTextEntry1] : The patient is a 61-year-old ex-smoker family history of coronary artery disease, diabetes mellitus on insulin, hypertension, hyperlipidemia, renal insufficiency, s/p gastric sleeve , HFpEF, s/p renal transplant s/p COVID with weight gain..\par #1 CV- No angina\par #2 HFpEF- continue furosemide 40mg \par #2 DM- continue tresiba insulin,recently adjusted and feels better\par #3 Htn- continue coreg 25mg bid\par #4 Lipids- continue atorvastatin optimal\par #5 Renal - s/p transplant, on immunosuppressant and prednisone\par #6 COVID- s/p steroids\par \par

## 2021-11-06 NOTE — H&P ADULT - NSHPRISKHIVSCREEN_GEN_ALL_CORE
PAST MEDICAL HISTORY:  Acute chest syndrome Pt unaware    Constipation     H/O transfusion ~ pRBC    Hypertension     Intellectual disability ~ mild    Left ventricular dysfunction mild LVD on echo in 7/18, normal LVF in 12/18    Sickle Cell Disease     Sickle cell disease      Offered and patient declined

## 2021-11-14 NOTE — H&P PST ADULT - SCARS
Brian No-  I saw this patient of yours in urgent care today.  I suspect he is having anxiety/panic attacks and he also is becoming slightly paranoid.  I started him on p.r.n. hydroxyzine.  He was also referred to Behavioral [facility] for therapy/counseling.  I advised him to follow-up with you within the next 1 week for re-evaluation. Please see my note for further details and let me know if you have any questions.  Thanks!  Pauline Lopez location/abdomen

## 2021-11-17 LAB — SARS-COV-2 N GENE NPH QL NAA+PROBE: NOT DETECTED

## 2021-11-23 ENCOUNTER — APPOINTMENT (OUTPATIENT)
Dept: NEPHROLOGY | Facility: CLINIC | Age: 61
End: 2021-11-23
Payer: COMMERCIAL

## 2021-11-23 VITALS
HEIGHT: 69 IN | WEIGHT: 223 LBS | BODY MASS INDEX: 33.03 KG/M2 | SYSTOLIC BLOOD PRESSURE: 168 MMHG | RESPIRATION RATE: 12 BRPM | TEMPERATURE: 97 F | HEART RATE: 68 BPM | OXYGEN SATURATION: 97 % | DIASTOLIC BLOOD PRESSURE: 79 MMHG

## 2021-11-23 DIAGNOSIS — E11.21 TYPE 2 DIABETES MELLITUS WITH DIABETIC NEPHROPATHY: ICD-10-CM

## 2021-11-23 PROCEDURE — 99214 OFFICE O/P EST MOD 30 MIN: CPT

## 2021-11-23 NOTE — ASSESSMENT
[FreeTextEntry1] : 60 year old male s/p LRT from daughter on 2/18/2020 \par \par 1. Allograft function has been good  and was ranging  ~ 1.2 . slight rise in Cr  was noted since December ( after he got sick with covid and had BK viremia and was using lasix ) . Last Cr was 1.18, will f/u labs today \par 2. IS meds- on Prograft 1 mg po in am and 2 mg in pm  and Prednisone 5 mg po bid. Not on Cellcept due to GI issues . will restart at a  lower dose \par 3. Ppx- on Bactrim.  \par 4. HTN- controlled on Coreg 25 mg po bid and Nifedipine 30 mg po daily \par 5. DM-  follows with Dr Medrano. 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 \par \par \par \par \par \par \par \par \par  \par \par

## 2021-11-24 LAB
25(OH)D3 SERPL-MCNC: 25.1 NG/ML
ALBUMIN SERPL ELPH-MCNC: 4.5 G/DL
ALP BLD-CCNC: 96 U/L
ALT SERPL-CCNC: 32 U/L
ANION GAP SERPL CALC-SCNC: 14 MMOL/L
APPEARANCE: CLEAR
AST SERPL-CCNC: 23 U/L
BACTERIA: NEGATIVE
BASOPHILS # BLD AUTO: 0.03 K/UL
BASOPHILS NFR BLD AUTO: 0.4 %
BILIRUB SERPL-MCNC: 0.3 MG/DL
BILIRUBIN URINE: NEGATIVE
BLOOD URINE: NEGATIVE
BUN SERPL-MCNC: 23 MG/DL
CALCIUM SERPL-MCNC: 10 MG/DL
CALCIUM SERPL-MCNC: 10 MG/DL
CHLORIDE SERPL-SCNC: 108 MMOL/L
CHOLEST SERPL-MCNC: 148 MG/DL
CO2 SERPL-SCNC: 23 MMOL/L
COLOR: YELLOW
CREAT SERPL-MCNC: 1.26 MG/DL
CREAT SPEC-SCNC: 112 MG/DL
CREAT/PROT UR: 0.1 RATIO
EOSINOPHIL # BLD AUTO: 0.09 K/UL
EOSINOPHIL NFR BLD AUTO: 1.1 %
ESTIMATED AVERAGE GLUCOSE: 157 MG/DL
GLUCOSE QUALITATIVE U: ABNORMAL
GLUCOSE SERPL-MCNC: 123 MG/DL
HBA1C MFR BLD HPLC: 7.1 %
HCT VFR BLD CALC: 51.7 %
HDLC SERPL-MCNC: 39 MG/DL
HGB BLD-MCNC: 16.1 G/DL
HYALINE CASTS: 0 /LPF
IMM GRANULOCYTES NFR BLD AUTO: 0.5 %
KETONES URINE: NEGATIVE
LDH SERPL-CCNC: 237 U/L
LDLC SERPL CALC-MCNC: 41 MG/DL
LEUKOCYTE ESTERASE URINE: NEGATIVE
LYMPHOCYTES # BLD AUTO: 1.75 K/UL
LYMPHOCYTES NFR BLD AUTO: 20.5 %
MAGNESIUM SERPL-MCNC: 2.2 MG/DL
MAN DIFF?: NORMAL
MCHC RBC-ENTMCNC: 31.1 GM/DL
MCHC RBC-ENTMCNC: 31.4 PG
MCV RBC AUTO: 100.8 FL
MICROSCOPIC-UA: NORMAL
MONOCYTES # BLD AUTO: 0.58 K/UL
MONOCYTES NFR BLD AUTO: 6.8 %
NEUTROPHILS # BLD AUTO: 6.05 K/UL
NEUTROPHILS NFR BLD AUTO: 70.7 %
NITRITE URINE: NEGATIVE
NONHDLC SERPL-MCNC: 109 MG/DL
PARATHYROID HORMONE INTACT: 56 PG/ML
PH URINE: 6
PHOSPHATE SERPL-MCNC: 3.6 MG/DL
PLATELET # BLD AUTO: 209 K/UL
POTASSIUM SERPL-SCNC: 5.2 MMOL/L
PROT SERPL-MCNC: 7 G/DL
PROT UR-MCNC: 11 MG/DL
PROTEIN URINE: NEGATIVE
RBC # BLD: 5.13 M/UL
RBC # FLD: 12.9 %
RED BLOOD CELLS URINE: 0 /HPF
SODIUM SERPL-SCNC: 145 MMOL/L
SPECIFIC GRAVITY URINE: 1.04
SQUAMOUS EPITHELIAL CELLS: 0 /HPF
TACROLIMUS SERPL-MCNC: 8.6 NG/ML
TRIGL SERPL-MCNC: 343 MG/DL
URATE SERPL-MCNC: 4.6 MG/DL
UROBILINOGEN URINE: NORMAL
WBC # FLD AUTO: 8.54 K/UL
WHITE BLOOD CELLS URINE: 0 /HPF

## 2021-11-29 LAB — CMV DNA SPEC QL NAA+PROBE: NOT DETECTED IU/ML

## 2021-11-30 LAB — BKV DNA SPEC QL NAA+PROBE: 69 COPIES/ML

## 2021-12-27 ENCOUNTER — APPOINTMENT (OUTPATIENT)
Dept: TRANSPLANT | Facility: CLINIC | Age: 61
End: 2021-12-27

## 2021-12-28 LAB
ALBUMIN SERPL ELPH-MCNC: 4.8 G/DL
ALP BLD-CCNC: 100 U/L
ALT SERPL-CCNC: 39 U/L
ANION GAP SERPL CALC-SCNC: 14 MMOL/L
APPEARANCE: CLEAR
AST SERPL-CCNC: 27 U/L
BACTERIA: NEGATIVE
BASOPHILS # BLD AUTO: 0.04 K/UL
BASOPHILS NFR BLD AUTO: 0.4 %
BILIRUB SERPL-MCNC: 0.4 MG/DL
BILIRUBIN URINE: NEGATIVE
BLOOD URINE: NEGATIVE
BUN SERPL-MCNC: 27 MG/DL
CALCIUM SERPL-MCNC: 9.9 MG/DL
CALCIUM SERPL-MCNC: 9.9 MG/DL
CHLORIDE SERPL-SCNC: 105 MMOL/L
CO2 SERPL-SCNC: 24 MMOL/L
COLOR: YELLOW
COVID-19 SPIKE DOMAIN ANTIBODY INTERPRETATION: POSITIVE
CREAT SERPL-MCNC: 1.4 MG/DL
CREAT SPEC-SCNC: 109 MG/DL
CREAT/PROT UR: 0.1 RATIO
EOSINOPHIL # BLD AUTO: 0.1 K/UL
EOSINOPHIL NFR BLD AUTO: 1.1 %
GLUCOSE QUALITATIVE U: ABNORMAL
GLUCOSE SERPL-MCNC: 217 MG/DL
HCT VFR BLD CALC: 52.8 %
HGB BLD-MCNC: 16.5 G/DL
HYALINE CASTS: 0 /LPF
IMM GRANULOCYTES NFR BLD AUTO: 0.2 %
KETONES URINE: NEGATIVE
LEUKOCYTE ESTERASE URINE: NEGATIVE
LYMPHOCYTES # BLD AUTO: 2 K/UL
LYMPHOCYTES NFR BLD AUTO: 22.4 %
MAGNESIUM SERPL-MCNC: 2.1 MG/DL
MAN DIFF?: NORMAL
MCHC RBC-ENTMCNC: 31.3 GM/DL
MCHC RBC-ENTMCNC: 31.8 PG
MCV RBC AUTO: 101.7 FL
MICROSCOPIC-UA: NORMAL
MONOCYTES # BLD AUTO: 0.61 K/UL
MONOCYTES NFR BLD AUTO: 6.8 %
NEUTROPHILS # BLD AUTO: 6.15 K/UL
NEUTROPHILS NFR BLD AUTO: 69.1 %
NITRITE URINE: NEGATIVE
PARATHYROID HORMONE INTACT: 77 PG/ML
PH URINE: 6
PHOSPHATE SERPL-MCNC: 4.1 MG/DL
PLATELET # BLD AUTO: 200 K/UL
POTASSIUM SERPL-SCNC: 5.6 MMOL/L
PROT SERPL-MCNC: 7.2 G/DL
PROT UR-MCNC: 14 MG/DL
PROTEIN URINE: NORMAL
RBC # BLD: 5.19 M/UL
RBC # FLD: 13.2 %
RED BLOOD CELLS URINE: 1 /HPF
SARS-COV-2 AB SERPL IA-ACNC: >250 U/ML
SODIUM SERPL-SCNC: 142 MMOL/L
SPECIFIC GRAVITY URINE: 1.04
SQUAMOUS EPITHELIAL CELLS: 0 /HPF
TACROLIMUS SERPL-MCNC: 5.5 NG/ML
URATE SERPL-MCNC: 5.1 MG/DL
UROBILINOGEN URINE: NORMAL
WBC # FLD AUTO: 8.92 K/UL
WHITE BLOOD CELLS URINE: 1 /HPF

## 2021-12-30 LAB
BKV DNA SPEC QL NAA+PROBE: NOT DETECTED COPIES/ML
CMV DNA SPEC QL NAA+PROBE: NOT DETECTED IU/ML

## 2022-01-04 ENCOUNTER — NON-APPOINTMENT (OUTPATIENT)
Age: 62
End: 2022-01-04

## 2022-02-11 ENCOUNTER — NON-APPOINTMENT (OUTPATIENT)
Age: 62
End: 2022-02-11

## 2022-02-11 ENCOUNTER — APPOINTMENT (OUTPATIENT)
Dept: OPHTHALMOLOGY | Facility: CLINIC | Age: 62
End: 2022-02-11
Payer: COMMERCIAL

## 2022-02-11 PROCEDURE — 76514 ECHO EXAM OF EYE THICKNESS: CPT

## 2022-02-11 PROCEDURE — 92133 CPTRZD OPH DX IMG PST SGM ON: CPT

## 2022-02-11 PROCEDURE — 92004 COMPRE OPH EXAM NEW PT 1/>: CPT

## 2022-02-14 ENCOUNTER — APPOINTMENT (OUTPATIENT)
Dept: ENDOCRINOLOGY | Facility: CLINIC | Age: 62
End: 2022-02-14
Payer: COMMERCIAL

## 2022-02-14 VITALS
SYSTOLIC BLOOD PRESSURE: 120 MMHG | HEART RATE: 70 BPM | TEMPERATURE: 97.4 F | WEIGHT: 229 LBS | DIASTOLIC BLOOD PRESSURE: 80 MMHG | OXYGEN SATURATION: 98 % | HEIGHT: 69 IN | BODY MASS INDEX: 33.92 KG/M2

## 2022-02-14 LAB
GLUCOSE BLDC GLUCOMTR-MCNC: 228
HBA1C MFR BLD HPLC: 6.7

## 2022-02-14 PROCEDURE — 82962 GLUCOSE BLOOD TEST: CPT

## 2022-02-14 PROCEDURE — 83036 HEMOGLOBIN GLYCOSYLATED A1C: CPT | Mod: QW

## 2022-02-14 PROCEDURE — 99214 OFFICE O/P EST MOD 30 MIN: CPT | Mod: 25

## 2022-02-14 NOTE — ASSESSMENT
[FreeTextEntry1] : \par 61 year old male with history of renal transplant in 02/2020, DM Type 2 on insulin here for follow-up. \par SMBGs are much better controlled at this time. At this time most of his hyperglycemia is post prandial after lunch. \par Will change carb ratio pre-lunch \par \par DM Type 2 : Hga1C well controlled at 6.7%\par Tightened carb ratio further \par Carb ratio at 6 am: 1.5 \par  11 am: carb ratio 1:1.1 and ISF tightened to 20 \par -Healthier snacks were discussed with the patient \par -SMBGS with DEXCOM \par \par Renal transplant\par -Use of prednisone causing hyperglycemia in the evening \par -Use of tacrolimus increases insulin resistance \par \par HLD\par -Continue Atorvastatin \par \par \par -Follow up with in 4 months. \par \par  \par \par

## 2022-02-14 NOTE — PHYSICAL EXAM
DOLV: 1/4/22  DONV: 2/14/22  LAST LAB DRAW: 1/12/22  LAST TB TEST: na    Lab Results   Component Value Date     01/12/2022    K 4.0 01/12/2022    CL 99 01/12/2022    CO2 27 01/12/2022    BUN 27 (H) 01/12/2022    CREATININE 2.1 (H) 01/12/2022    GLUCOSE 97 01/12/2022    CALCIUM 10.2 01/12/2022    PROT 7.3 01/12/2022    LABALBU 4.8 01/12/2022    BILITOT 0.3 01/12/2022    ALKPHOS 66 01/12/2022    AST 30 01/12/2022    ALT 21 01/12/2022    LABGLOM 25 (A) 01/12/2022    AGRATIO 1.7 03/24/2020       Recent Labs     01/12/22  1336 01/04/22  1422   WBC 6.4 5.5   HGB 11.3* 11.3*   HCT 37.7 36.3*   MCV 96.2 93.1   PLT see below 157       Lab Results   Component Value Date    SEDRATE 12 01/12/2022       Lab Results   Component Value Date    CRP < 0.30 01/12/2022 From: Julia Stone  To: Dr. Bryan Lucas: 1/18/2022 11:33 PM EST  Subject: Refiling meds    Hey maria luisa r u going to send me Mycophenolate 500 MG i just took my last 2 [Alert] : alert [Well Nourished] : well nourished [No Acute Distress] : no acute distress [EOMI] : extra ocular movement intact [Normal Sclera/Conjunctiva] : normal sclera/conjunctiva [PERRL] : pupils equal, round and reactive to light [Normal Outer Ear/Nose] : the ears and nose were normal in appearance [Normal Hearing] : hearing was normal [Normal TMs] : both tympanic membranes were normal [No Neck Mass] : no neck mass was observed [Thyroid Not Enlarged] : the thyroid was not enlarged [No Respiratory Distress] : no respiratory distress [Clear to Auscultation] : lungs were clear to auscultation bilaterally [Normal S1, S2] : normal S1 and S2 [Normal Rate] : heart rate was normal [Normal Bowel Sounds] : normal bowel sounds [Soft] : abdomen soft [Normal Gait] : normal gait [No Clubbing, Cyanosis] : no clubbing  or cyanosis of the fingernails [No Joint Swelling] : no joint swelling seen [No Rash] : no rash [No Skin Lesions] : no skin lesions [Normal Reflexes] : deep tendon reflexes were 2+ and symmetric [No Motor Deficits] : the motor exam was normal [No Tremors] : no tremors [Oriented x3] : oriented to person, place, and time [Normal Affect] : the affect was normal [Normal Insight/Judgement] : insight and judgment were intact [Normal Mood] : the mood was normal no

## 2022-02-14 NOTE — HISTORY OF PRESENT ILLNESS
[FreeTextEntry1] : Diabetes F/u Patient HPI\par \par 02/2020 renal transplant ( donor is the daughter) \par Stent was recently removed \par \par CC\par Patient referred by Dr. Zak Puente for diabetes management.\par \par \par History of gastric sleeve- at the time was on the insulin pump and came off \par \par \par \par HPI:\par \par Duration of Diabetes: 25-30 years \par Is patient on Insulin? yes, for 25-30 years \par \par List Current Medications for Glycemic control and the doses:\par Novolog insulin pump \par Jardiance 10 mg daily \par \par SMBG (self monitored blood glucose) readings: \par - Name of glucometer: \par - How often does the patient check BG? \par - Does the patient keep a log? \par \par If detailed record is available, what is the range of most of the BG readings?\par -3 am : 115-120\par - 5 am : 150s\par - 12 pm: 180-200\par 3pm: 180s \par 8 pm: 180s \par - Before Dinner: 230-240\par - Before Bedtime: \par \par \par Does patient get Hypoglycemic episodes? None \par If yes how frequent? \par Hoe low do the BG readings reach? 40s \par When do most of those episodes occur? Middle of the night \par What symptoms does the patient get during those episodes? sweating \par \par Diabetic Complications: Is patient aware of having any of those complications?\par - Eyes: Retinopathy? History of cataract surgery, + history of retinopathy gets laser surgery ( Injection in the right eye) \par  	When was the last fully dilated eye exam? 3 months ago ( Dr. Melgoza) \par - Feet: 	Neuropathy? Yes ( now on alpha lipoic acid ) \par  	Foot Ulcers? History of right toe ulcer \par 	When was the last time patient saw a Podiatrist?4 weeks ago \par - Kidneys: Nephropathy? Renal transplant \par \par Diet: review patient's diet: \par Breakfast: Egg salad , 1/2 judson \par Lunch: [place], [place] \par Dinner: meat, potatos, vegetables \par Snacks: Chips, fruits \par Juice or soda: None \par Desserts: yogurt, sugar free jello \par \par \par Exercise: review patient exercise habits: Not much \par

## 2022-02-14 NOTE — REVIEW OF SYSTEMS
[Fatigue] : no fatigue [Decreased Appetite] : appetite not decreased [Recent Weight Gain (___ Lbs)] : no recent weight gain [Recent Weight Loss (___ Lbs)] : no recent weight loss [Visual Field Defect] : no visual field defect [Dry Eyes] : no dryness [Dysphagia] : no dysphagia [Neck Pain] : no neck pain [Dysphonia] : no dysphonia [Chest Pain] : no chest pain [Nasal Congestion] : no nasal congestion [Slow Heart Rate] : heart rate is not slow [Palpitations] : no palpitations [Fast Heart Rate] : heart rate is not fast [Shortness Of Breath] : no shortness of breath [Cough] : no cough [Nausea] : no nausea [Constipation] : no constipation [Vomiting] : no vomiting [Diarrhea] : no diarrhea [Polyuria] : no polyuria [Joint Pain] : no joint pain [Muscle Weakness] : no muscle weakness [Acanthosis] : no acanthosis  [Acne] : no acne [Headaches] : no headaches [Dizziness] : no dizziness [Tremors] : no tremors [Depression] : no depression [Polydipsia] : no polydipsia [Cold Intolerance] : no cold intolerance [Easy Bleeding] : no ~M tendency for easy bleeding [Easy Bruising] : no tendency for easy bruising

## 2022-02-17 ENCOUNTER — APPOINTMENT (OUTPATIENT)
Dept: NEPHROLOGY | Facility: CLINIC | Age: 62
End: 2022-02-17

## 2022-02-17 ENCOUNTER — APPOINTMENT (OUTPATIENT)
Dept: TRANSPLANT | Facility: CLINIC | Age: 62
End: 2022-02-17

## 2022-02-18 LAB
ALBUMIN SERPL ELPH-MCNC: 4.8 G/DL
ALP BLD-CCNC: 94 U/L
ALT SERPL-CCNC: 36 U/L
ANION GAP SERPL CALC-SCNC: 20 MMOL/L
APPEARANCE: CLEAR
AST SERPL-CCNC: 28 U/L
BACTERIA: NEGATIVE
BASOPHILS # BLD AUTO: 0.03 K/UL
BASOPHILS NFR BLD AUTO: 0.4 %
BILIRUB SERPL-MCNC: 0.5 MG/DL
BILIRUBIN URINE: NEGATIVE
BLOOD URINE: NEGATIVE
BUN SERPL-MCNC: 23 MG/DL
CALCIUM SERPL-MCNC: 9.9 MG/DL
CHLORIDE SERPL-SCNC: 106 MMOL/L
CO2 SERPL-SCNC: 17 MMOL/L
COLOR: NORMAL
CREAT SERPL-MCNC: 1.25 MG/DL
CREAT SPEC-SCNC: 107 MG/DL
CREAT/PROT UR: 0.1 RATIO
EOSINOPHIL # BLD AUTO: 0.09 K/UL
EOSINOPHIL NFR BLD AUTO: 1.2 %
GLUCOSE QUALITATIVE U: ABNORMAL
GLUCOSE SERPL-MCNC: 118 MG/DL
HCT VFR BLD CALC: 52.8 %
HGB BLD-MCNC: 16.6 G/DL
HYALINE CASTS: 0 /LPF
IMM GRANULOCYTES NFR BLD AUTO: 0.1 %
KETONES URINE: NEGATIVE
LDH SERPL-CCNC: 257 U/L
LEUKOCYTE ESTERASE URINE: NEGATIVE
LYMPHOCYTES # BLD AUTO: 1.9 K/UL
LYMPHOCYTES NFR BLD AUTO: 25.6 %
MAGNESIUM SERPL-MCNC: 2.2 MG/DL
MAN DIFF?: NORMAL
MCHC RBC-ENTMCNC: 31 PG
MCHC RBC-ENTMCNC: 31.4 GM/DL
MCV RBC AUTO: 98.7 FL
MICROSCOPIC-UA: NORMAL
MONOCYTES # BLD AUTO: 0.45 K/UL
MONOCYTES NFR BLD AUTO: 6.1 %
NEUTROPHILS # BLD AUTO: 4.94 K/UL
NEUTROPHILS NFR BLD AUTO: 66.6 %
NITRITE URINE: NEGATIVE
PH URINE: 6
PHOSPHATE SERPL-MCNC: 3 MG/DL
PLATELET # BLD AUTO: 202 K/UL
POTASSIUM SERPL-SCNC: 4.7 MMOL/L
PROT SERPL-MCNC: 7.4 G/DL
PROT UR-MCNC: 11 MG/DL
PROTEIN URINE: NORMAL
RBC # BLD: 5.35 M/UL
RBC # FLD: 13.1 %
RED BLOOD CELLS URINE: 1 /HPF
SODIUM SERPL-SCNC: 143 MMOL/L
SPECIFIC GRAVITY URINE: 1.03
SQUAMOUS EPITHELIAL CELLS: 0 /HPF
TACROLIMUS SERPL-MCNC: 9.9 NG/ML
URATE SERPL-MCNC: 5.2 MG/DL
UROBILINOGEN URINE: NORMAL
WBC # FLD AUTO: 7.42 K/UL
WHITE BLOOD CELLS URINE: 0 /HPF

## 2022-02-22 LAB
BKV DNA SPEC QL NAA+PROBE: NOT DETECTED COPIES/ML
CMV DNA SPEC QL NAA+PROBE: NOT DETECTED IU/ML

## 2022-03-07 ENCOUNTER — APPOINTMENT (OUTPATIENT)
Dept: TRANSPLANT | Facility: CLINIC | Age: 62
End: 2022-03-07

## 2022-03-08 ENCOUNTER — NON-APPOINTMENT (OUTPATIENT)
Age: 62
End: 2022-03-08

## 2022-03-08 LAB
25(OH)D3 SERPL-MCNC: 24.5 NG/ML
ALBUMIN SERPL ELPH-MCNC: 4.4 G/DL
ALP BLD-CCNC: 99 U/L
ALT SERPL-CCNC: 44 U/L
ANION GAP SERPL CALC-SCNC: 12 MMOL/L
APPEARANCE: CLEAR
AST SERPL-CCNC: 27 U/L
BACTERIA: NEGATIVE
BASOPHILS # BLD AUTO: 0.03 K/UL
BASOPHILS NFR BLD AUTO: 0.3 %
BILIRUB SERPL-MCNC: 0.4 MG/DL
BILIRUBIN URINE: NEGATIVE
BLOOD URINE: NEGATIVE
BUN SERPL-MCNC: 22 MG/DL
CALCIUM SERPL-MCNC: 10 MG/DL
CALCIUM SERPL-MCNC: 10 MG/DL
CHLORIDE SERPL-SCNC: 105 MMOL/L
CHOLEST SERPL-MCNC: 162 MG/DL
CO2 SERPL-SCNC: 28 MMOL/L
COLOR: YELLOW
CREAT SERPL-MCNC: 1.24 MG/DL
CREAT SPEC-SCNC: 96 MG/DL
CREAT/PROT UR: 0.1 RATIO
EGFR: 67 ML/MIN/1.73M2
EOSINOPHIL # BLD AUTO: 0.11 K/UL
EOSINOPHIL NFR BLD AUTO: 1.3 %
ESTIMATED AVERAGE GLUCOSE: 171 MG/DL
GLUCOSE QUALITATIVE U: NEGATIVE
GLUCOSE SERPL-MCNC: 168 MG/DL
HBA1C MFR BLD HPLC: 7.6 %
HCT VFR BLD CALC: 53.3 %
HDLC SERPL-MCNC: 37 MG/DL
HGB BLD-MCNC: 16.1 G/DL
HYALINE CASTS: 0 /LPF
IMM GRANULOCYTES NFR BLD AUTO: 0.5 %
KETONES URINE: NEGATIVE
LDH SERPL-CCNC: 251 U/L
LDLC SERPL CALC-MCNC: 65 MG/DL
LEUKOCYTE ESTERASE URINE: NEGATIVE
LYMPHOCYTES # BLD AUTO: 2 K/UL
LYMPHOCYTES NFR BLD AUTO: 23.3 %
MAGNESIUM SERPL-MCNC: 2 MG/DL
MAN DIFF?: NORMAL
MCHC RBC-ENTMCNC: 30.2 GM/DL
MCHC RBC-ENTMCNC: 31.1 PG
MCV RBC AUTO: 103.1 FL
MICROSCOPIC-UA: NORMAL
MONOCYTES # BLD AUTO: 0.6 K/UL
MONOCYTES NFR BLD AUTO: 7 %
NEUTROPHILS # BLD AUTO: 5.82 K/UL
NEUTROPHILS NFR BLD AUTO: 67.6 %
NITRITE URINE: NEGATIVE
NONHDLC SERPL-MCNC: 125 MG/DL
PARATHYROID HORMONE INTACT: 43 PG/ML
PH URINE: 6.5
PHOSPHATE SERPL-MCNC: 3 MG/DL
PLATELET # BLD AUTO: 211 K/UL
POTASSIUM SERPL-SCNC: 5.4 MMOL/L
PROT SERPL-MCNC: 7 G/DL
PROT UR-MCNC: 9 MG/DL
PROTEIN URINE: NEGATIVE
RBC # BLD: 5.17 M/UL
RBC # FLD: 12.8 %
RED BLOOD CELLS URINE: 1 /HPF
SODIUM SERPL-SCNC: 144 MMOL/L
SPECIFIC GRAVITY URINE: 1.02
SQUAMOUS EPITHELIAL CELLS: 0 /HPF
TACROLIMUS SERPL-MCNC: 6 NG/ML
TRIGL SERPL-MCNC: 301 MG/DL
URATE SERPL-MCNC: 4.6 MG/DL
UROBILINOGEN URINE: NORMAL
WBC # FLD AUTO: 8.6 K/UL
WHITE BLOOD CELLS URINE: 1 /HPF

## 2022-03-08 RX ORDER — SODIUM BICARBONATE 650 MG/1
650 TABLET ORAL TWICE DAILY
Qty: 120 | Refills: 3 | Status: DISCONTINUED | COMMUNITY
Start: 2022-02-18 | End: 2022-03-08

## 2022-03-10 ENCOUNTER — APPOINTMENT (OUTPATIENT)
Dept: CARDIOLOGY | Facility: CLINIC | Age: 62
End: 2022-03-10
Payer: COMMERCIAL

## 2022-03-10 ENCOUNTER — NON-APPOINTMENT (OUTPATIENT)
Age: 62
End: 2022-03-10

## 2022-03-10 VITALS — DIASTOLIC BLOOD PRESSURE: 68 MMHG | SYSTOLIC BLOOD PRESSURE: 126 MMHG

## 2022-03-10 VITALS
OXYGEN SATURATION: 96 % | BODY MASS INDEX: 34.66 KG/M2 | DIASTOLIC BLOOD PRESSURE: 81 MMHG | WEIGHT: 234 LBS | TEMPERATURE: 97.1 F | SYSTOLIC BLOOD PRESSURE: 175 MMHG | HEART RATE: 73 BPM | HEIGHT: 69 IN | RESPIRATION RATE: 12 BRPM

## 2022-03-10 DIAGNOSIS — I77.0 ARTERIOVENOUS FISTULA, ACQUIRED: ICD-10-CM

## 2022-03-10 LAB
BKV DNA SPEC QL NAA+PROBE: ABNORMAL COPIES/ML
CMV DNA SPEC QL NAA+PROBE: ABNORMAL IU/ML

## 2022-03-10 PROCEDURE — 93000 ELECTROCARDIOGRAM COMPLETE: CPT

## 2022-03-10 PROCEDURE — 99214 OFFICE O/P EST MOD 30 MIN: CPT

## 2022-03-11 PROBLEM — I77.0 AVF (ARTERIOVENOUS FISTULA): Status: ACTIVE | Noted: 2019-08-05

## 2022-03-11 NOTE — HISTORY OF PRESENT ILLNESS
[FreeTextEntry1] : Orion was off jardiance but glucose went up so restarting it. Hoping to be able to start ozempic bcause other meds hurt his eyes. No CP, palpitations, dizziness or SOB.

## 2022-03-11 NOTE — DISCUSSION/SUMMARY
[FreeTextEntry1] : The patient is a 61-year-old ex-smoker family history of coronary artery disease, diabetes mellitus on insulin, hypertension, hyperlipidemia, renal insufficiency, s/p gastric sleeve , HFpEF, s/p renal transplant s/p COVID with weight gain..\par #1 CV- No angina\par #2 HFpEF- continue furosemide 40mg \par #2 DM- continue tresiba insulin, weight loss encouraged\par #3 Htn- continue coreg 25mg bid\par #4 Lipids- continue atorvastatin optimal\par #5 Renal - s/p transplant, on immunosuppressant and prednisone\par #6 COVID- s/p steroids\par \par  Street

## 2022-03-11 NOTE — REVIEW OF SYSTEMS
[Weight Gain (___ Lbs)] : [unfilled] ~Ulb weight gain [Negative] : Heme/Lymph [SOB] : no shortness of breath [Dyspnea on exertion] : not dyspnea during exertion [Chest Discomfort] : no chest discomfort [Lower Ext Edema] : no extremity edema [Leg Claudication] : no intermittent leg claudication [Palpitations] : no palpitations

## 2022-03-28 ENCOUNTER — APPOINTMENT (OUTPATIENT)
Dept: PULMONOLOGY | Facility: CLINIC | Age: 62
End: 2022-03-28
Payer: COMMERCIAL

## 2022-03-28 DIAGNOSIS — I70.90 UNSPECIFIED ATHEROSCLEROSIS: ICD-10-CM

## 2022-03-28 DIAGNOSIS — N18.9 CHRONIC KIDNEY DISEASE, UNSPECIFIED: ICD-10-CM

## 2022-03-28 PROCEDURE — 99213 OFFICE O/P EST LOW 20 MIN: CPT | Mod: 95

## 2022-05-19 ENCOUNTER — APPOINTMENT (OUTPATIENT)
Dept: OPHTHALMOLOGY | Facility: CLINIC | Age: 62
End: 2022-05-19
Payer: COMMERCIAL

## 2022-05-19 ENCOUNTER — NON-APPOINTMENT (OUTPATIENT)
Age: 62
End: 2022-05-19

## 2022-05-19 PROCEDURE — 92083 EXTENDED VISUAL FIELD XM: CPT

## 2022-05-19 PROCEDURE — 92012 INTRM OPH EXAM EST PATIENT: CPT

## 2022-06-02 ENCOUNTER — APPOINTMENT (OUTPATIENT)
Dept: TRANSPLANT | Facility: CLINIC | Age: 62
End: 2022-06-02

## 2022-06-03 ENCOUNTER — NON-APPOINTMENT (OUTPATIENT)
Age: 62
End: 2022-06-03

## 2022-06-03 LAB
ALBUMIN SERPL ELPH-MCNC: 4.8 G/DL
ALP BLD-CCNC: 95 U/L
ALT SERPL-CCNC: 49 U/L
ANION GAP SERPL CALC-SCNC: 13 MMOL/L
APPEARANCE: CLEAR
AST SERPL-CCNC: 31 U/L
BACTERIA: NEGATIVE
BASOPHILS # BLD AUTO: 0.03 K/UL
BASOPHILS NFR BLD AUTO: 0.4 %
BILIRUB SERPL-MCNC: 0.4 MG/DL
BILIRUBIN URINE: NEGATIVE
BLOOD URINE: NEGATIVE
BUN SERPL-MCNC: 26 MG/DL
CALCIUM SERPL-MCNC: 9.8 MG/DL
CHLORIDE SERPL-SCNC: 104 MMOL/L
CMV DNA SPEC QL NAA+PROBE: NOT DETECTED IU/ML
CMVPCR LOG: NOT DETECTED LOG10IU/ML
CO2 SERPL-SCNC: 26 MMOL/L
COLOR: YELLOW
CREAT SERPL-MCNC: 1.25 MG/DL
CREAT SPEC-SCNC: 110 MG/DL
CREAT/PROT UR: 0.2 RATIO
EGFR: 66 ML/MIN/1.73M2
EOSINOPHIL # BLD AUTO: 0.1 K/UL
EOSINOPHIL NFR BLD AUTO: 1.4 %
GLUCOSE QUALITATIVE U: ABNORMAL
GLUCOSE SERPL-MCNC: 85 MG/DL
HCT VFR BLD CALC: 54.4 %
HGB BLD-MCNC: 16.9 G/DL
HYALINE CASTS: 0 /LPF
IMM GRANULOCYTES NFR BLD AUTO: 0.3 %
KETONES URINE: NEGATIVE
LEUKOCYTE ESTERASE URINE: NEGATIVE
LYMPHOCYTES # BLD AUTO: 2 K/UL
LYMPHOCYTES NFR BLD AUTO: 28.2 %
MAGNESIUM SERPL-MCNC: 2.3 MG/DL
MAN DIFF?: NORMAL
MCHC RBC-ENTMCNC: 30.8 PG
MCHC RBC-ENTMCNC: 31.1 GM/DL
MCV RBC AUTO: 99.1 FL
MICROSCOPIC-UA: NORMAL
MONOCYTES # BLD AUTO: 0.57 K/UL
MONOCYTES NFR BLD AUTO: 8.1 %
NEUTROPHILS # BLD AUTO: 4.36 K/UL
NEUTROPHILS NFR BLD AUTO: 61.6 %
NITRITE URINE: NEGATIVE
PH URINE: 6
PHOSPHATE SERPL-MCNC: 3.2 MG/DL
PLATELET # BLD AUTO: 193 K/UL
POTASSIUM SERPL-SCNC: 4.9 MMOL/L
PROT SERPL-MCNC: 7.5 G/DL
PROT UR-MCNC: 18 MG/DL
PROTEIN URINE: NORMAL
RBC # BLD: 5.49 M/UL
RBC # FLD: 13 %
RED BLOOD CELLS URINE: 1 /HPF
SODIUM SERPL-SCNC: 143 MMOL/L
SPECIFIC GRAVITY URINE: 1.04
SQUAMOUS EPITHELIAL CELLS: 0 /HPF
TACROLIMUS SERPL-MCNC: 4.1 NG/ML
URATE SERPL-MCNC: 5.1 MG/DL
UROBILINOGEN URINE: NORMAL
WBC # FLD AUTO: 7.08 K/UL
WHITE BLOOD CELLS URINE: 0 /HPF

## 2022-06-06 LAB — BKV DNA SPEC QL NAA+PROBE: NOT DETECTED COPIES/ML

## 2022-06-20 ENCOUNTER — APPOINTMENT (OUTPATIENT)
Dept: ENDOCRINOLOGY | Facility: CLINIC | Age: 62
End: 2022-06-20
Payer: COMMERCIAL

## 2022-06-20 VITALS
TEMPERATURE: 97.2 F | HEART RATE: 72 BPM | WEIGHT: 230 LBS | OXYGEN SATURATION: 96 % | HEIGHT: 69 IN | BODY MASS INDEX: 34.07 KG/M2 | SYSTOLIC BLOOD PRESSURE: 120 MMHG | DIASTOLIC BLOOD PRESSURE: 72 MMHG

## 2022-06-20 LAB
GLUCOSE BLDC GLUCOMTR-MCNC: 96
HBA1C MFR BLD HPLC: 7.6

## 2022-06-20 PROCEDURE — 82962 GLUCOSE BLOOD TEST: CPT

## 2022-06-20 PROCEDURE — 99214 OFFICE O/P EST MOD 30 MIN: CPT | Mod: 25

## 2022-06-20 PROCEDURE — 83036 HEMOGLOBIN GLYCOSYLATED A1C: CPT | Mod: QW

## 2022-06-20 NOTE — ASSESSMENT
[FreeTextEntry1] : \par 61 year old male with history of renal transplant in 02/2020, DM Type 2 on insulin here for follow-up. \par SMBGs are much better controlled at this time. At this time most of his hyperglycemia is post prandial after lunch. \par Will change carb ratio pre-lunch \par \par DM Type 2 : Hga1C of 7.6% \par -No changes made to pump\par -Continue Jardiance 25 mg daily\par -Will start patient on Victoza 1.8 mcg daily ( taper was taught in office) \par -Advised that BG levels will improve, and if he experiences any hypos to call the office. Advised against overbolusing. \par -Healthier snacks were discussed with the patient \par -SMBGS with DEXCOM \par \par Renal transplant\par -Use of prednisone causing hyperglycemia in the evening \par -Use of tacrolimus increases insulin resistance \par \par HLD\par -Continue Atorvastatin \par \par \par -Follow up with in 4 months. \par \par  \par \par

## 2022-06-20 NOTE — HISTORY OF PRESENT ILLNESS
[FreeTextEntry1] : Diabetes F/u Patient HPI\par \par 02/2020 renal transplant ( donor is the daughter) \par Stent was recently removed \par \par CC\par Patient referred by Dr. Zak Puente for diabetes management.\par \par \par History of gastric sleeve- at the time was on the insulin pump and came off \par \par \par \par HPI:\par \par Duration of Diabetes: 25-30 years \par Is patient on Insulin? yes, for 25-30 years \par \par List Current Medications for Glycemic control and the doses:\par Novolog insulin pump \par Jardiance 25 mg daily \par \par SMBG (self monitored blood glucose) readings: \par - Name of glucometer: \par - How often does the patient check BG? \par - Does the patient keep a log? \par \par If detailed record is available, what is the range of most of the BG readings?\par -3 am : 115-120\par - 5 am : 150s\par - 12 pm: 180-200\par 3pm: 180s \par 8 pm: 180s \par - Before Dinner: 230-240\par - Before Bedtime: \par \par \par Does patient get Hypoglycemic episodes? None \par If yes how frequent? \par Hoe low do the BG readings reach? 40s \par When do most of those episodes occur? Middle of the night \par What symptoms does the patient get during those episodes? sweating \par \par Diabetic Complications: Is patient aware of having any of those complications?\par - Eyes: Retinopathy? History of cataract surgery, + history of retinopathy gets laser surgery ( Injection in the right eye) \par  	When was the last fully dilated eye exam? 3 months ago ( Dr. Melgoza) \par - Feet: 	Neuropathy? Yes ( now on alpha lipoic acid ) \par  	Foot Ulcers? History of right toe ulcer \par 	When was the last time patient saw a Podiatrist?4 weeks ago \par - Kidneys: Nephropathy? Renal transplant \par \par Diet: review patient's diet: \par Breakfast: Egg salad , 1/2 judson \par Lunch: [place], [place] \par Dinner: meat, potatos, vegetables \par Snacks: Chips, fruits \par Juice or soda: None \par Desserts: yogurt, sugar free jello \par \par \par Exercise: review patient exercise habits: Not much \par

## 2022-06-24 ENCOUNTER — NON-APPOINTMENT (OUTPATIENT)
Age: 62
End: 2022-06-24

## 2022-07-11 ENCOUNTER — APPOINTMENT (OUTPATIENT)
Dept: CARDIOLOGY | Facility: CLINIC | Age: 62
End: 2022-07-11

## 2022-07-11 ENCOUNTER — NON-APPOINTMENT (OUTPATIENT)
Age: 62
End: 2022-07-11

## 2022-07-11 VITALS
HEART RATE: 80 BPM | RESPIRATION RATE: 12 BRPM | HEIGHT: 69 IN | OXYGEN SATURATION: 95 % | SYSTOLIC BLOOD PRESSURE: 152 MMHG | DIASTOLIC BLOOD PRESSURE: 80 MMHG | WEIGHT: 231 LBS | BODY MASS INDEX: 34.21 KG/M2

## 2022-07-11 PROCEDURE — 99213 OFFICE O/P EST LOW 20 MIN: CPT

## 2022-07-11 PROCEDURE — 93000 ELECTROCARDIOGRAM COMPLETE: CPT

## 2022-07-11 RX ORDER — INSULIN LISPRO 100 [IU]/ML
100 INJECTION, SOLUTION INTRAVENOUS; SUBCUTANEOUS
Qty: 9 | Refills: 3 | Status: DISCONTINUED | COMMUNITY
Start: 2022-01-04 | End: 2022-07-11

## 2022-07-11 RX ORDER — ATORVASTATIN CALCIUM 40 MG/1
40 TABLET, FILM COATED ORAL
Qty: 90 | Refills: 1 | Status: DISCONTINUED | COMMUNITY
Start: 2021-12-15 | End: 2022-07-11

## 2022-07-11 RX ORDER — INSULIN LISPRO 100 [IU]/ML
100 INJECTION, SOLUTION INTRAVENOUS; SUBCUTANEOUS
Qty: 90 | Refills: 0 | Status: DISCONTINUED | COMMUNITY
Start: 2022-04-21 | End: 2022-07-11

## 2022-07-11 NOTE — HISTORY OF PRESENT ILLNESS
[FreeTextEntry1] : Orion is upset about sciatica. Wife concerned he has amyloid. He has sciatica. \par Humalog is not controlling his glucose. NOw going on novolog and victoza.Awaiting insurance approval.

## 2022-07-11 NOTE — DISCUSSION/SUMMARY
[FreeTextEntry1] : The patient is a 61-year-old ex-smoker family history of coronary artery disease, diabetes mellitus on insulin, hypertension, hyperlipidemia, renal insufficiency, s/p gastric sleeve , HFpEF, s/p renal transplant s/p COVID with severe sciatica.\par #1 CV- No angina, echo ordered\par #2 HFpEF- continue furosemide 40mg \par #2 DM- change to novolog and victoza for glucose control\par #3 Htn- continue coreg 25mg bid\par #4 Lipids- continue atorvastatin optimal\par #5 Renal - s/p transplant, on immunosuppressant and prednisone\par #6 Neuro- refer to spine for sciatica. \par \par  [EKG obtained to assist in diagnosis and management of assessed problem(s)] : EKG obtained to assist in diagnosis and management of assessed problem(s)

## 2022-07-20 ENCOUNTER — APPOINTMENT (OUTPATIENT)
Dept: ORTHOPEDIC SURGERY | Facility: CLINIC | Age: 62
End: 2022-07-20

## 2022-07-20 VITALS
HEART RATE: 80 BPM | WEIGHT: 228 LBS | BODY MASS INDEX: 33.77 KG/M2 | HEIGHT: 68.75 IN | SYSTOLIC BLOOD PRESSURE: 146 MMHG | DIASTOLIC BLOOD PRESSURE: 81 MMHG

## 2022-07-20 DIAGNOSIS — Z86.39 PERSONAL HISTORY OF OTHER ENDOCRINE, NUTRITIONAL AND METABOLIC DISEASE: ICD-10-CM

## 2022-07-20 DIAGNOSIS — M51.36 OTHER INTERVERTEBRAL DISC DEGENERATION, LUMBAR REGION: ICD-10-CM

## 2022-07-20 DIAGNOSIS — M54.50 LOW BACK PAIN, UNSPECIFIED: ICD-10-CM

## 2022-07-20 PROCEDURE — 72110 X-RAY EXAM L-2 SPINE 4/>VWS: CPT

## 2022-07-20 PROCEDURE — 99204 OFFICE O/P NEW MOD 45 MIN: CPT

## 2022-07-20 NOTE — DISCUSSION/SUMMARY
[Medication Risks Reviewed] : Medication risks reviewed [de-identified] : Given the complex and chronic nature of the patient's problem and failure of recent conservative treatment is recommend the patient go an MRI evaluation of the lumbar spine.  The patient will follow up with us in 1 to 2 weeks to review the results.

## 2022-07-20 NOTE — HISTORY OF PRESENT ILLNESS
[de-identified] : Patient is a 61-year-old male with a history of back pain following a fall 4 years ago tripping over a sidewalk.  Patient has had worsening symptoms since that time.  His current pain level is a 2 out of 10 on the visual pain analog scale.  It is worse when he is walking particularly at night.  It radiates from the right buttock into the right leg.  Denies any left lower extremity radicular issues.  Patient has been using Tylenol with some relief.  Patient is status post kidney transplantation and has an insulin pump.  Patient has had no recent treatment but does have a history of neuropathy.  Patient tried Lyrica but he did not tolerated.  He recently had several falls the last of which was 1 year ago which may have aggravated his condition.

## 2022-07-20 NOTE — PHYSICAL EXAM
[Antalgic] : antalgic [Wide-Based] : wide-based [LE] : 5/5 motor strength in bilateral lower extremities [] : Sensory: [L5-Bilat] : L5 [S1-Bilat] : S1 [2+] : left patella 2+ [0] : left ankle jerk 0 [Normal RLE] : Right Lower Extremity: No scars, rashes, lesions, ulcers, skin intact [Normal LLE] : Left Lower Extremity: No scars, rashes, lesions, ulcers, skin intact [Normal] : Oriented to person, place, and time, insight and judgement were intact and the affect was normal [de-identified] : There is a limited range of motion to the lumbar spine secondary to pain.  There is a negative TIFFANIE test bilaterally. [de-identified] : AP lateral and flexion-extension x-rays obtained the lumbar spine show mild discogenic and facet changes.  There are bridging syndesmophytes.  There are no obvious fractures bony lesions or chronic instability noted.

## 2022-07-21 ENCOUNTER — APPOINTMENT (OUTPATIENT)
Dept: CARDIOLOGY | Facility: CLINIC | Age: 62
End: 2022-07-21

## 2022-07-21 PROCEDURE — 93306 TTE W/DOPPLER COMPLETE: CPT

## 2022-07-28 ENCOUNTER — APPOINTMENT (OUTPATIENT)
Dept: NEPHROLOGY | Facility: CLINIC | Age: 62
End: 2022-07-28

## 2022-07-28 ENCOUNTER — NON-APPOINTMENT (OUTPATIENT)
Age: 62
End: 2022-07-28

## 2022-07-28 VITALS
HEART RATE: 77 BPM | HEIGHT: 68 IN | RESPIRATION RATE: 12 BRPM | WEIGHT: 228 LBS | BODY MASS INDEX: 34.56 KG/M2 | DIASTOLIC BLOOD PRESSURE: 83 MMHG | TEMPERATURE: 98 F | OXYGEN SATURATION: 96 % | SYSTOLIC BLOOD PRESSURE: 138 MMHG

## 2022-07-28 LAB
ALBUMIN SERPL ELPH-MCNC: 4.2 G/DL
ALP BLD-CCNC: 89 U/L
ALT SERPL-CCNC: 33 U/L
ANION GAP SERPL CALC-SCNC: 11 MMOL/L
APPEARANCE: CLEAR
AST SERPL-CCNC: 29 U/L
BACTERIA: NEGATIVE
BASOPHILS # BLD AUTO: 0.03 K/UL
BASOPHILS NFR BLD AUTO: 0.4 %
BILIRUB SERPL-MCNC: 0.4 MG/DL
BILIRUBIN URINE: NEGATIVE
BLOOD URINE: NEGATIVE
BUN SERPL-MCNC: 26 MG/DL
CALCIUM SERPL-MCNC: 9.9 MG/DL
CHLORIDE SERPL-SCNC: 107 MMOL/L
CMV DNA SPEC QL NAA+PROBE: NOT DETECTED IU/ML
CMVPCR LOG: NOT DETECTED LOG10IU/ML
CO2 SERPL-SCNC: 25 MMOL/L
COLOR: NORMAL
CREAT SERPL-MCNC: 1.35 MG/DL
CREAT SPEC-SCNC: 95 MG/DL
CREAT/PROT UR: 0.1 RATIO
EGFR: 60 ML/MIN/1.73M2
EOSINOPHIL # BLD AUTO: 0.08 K/UL
EOSINOPHIL NFR BLD AUTO: 1 %
GLUCOSE QUALITATIVE U: ABNORMAL
GLUCOSE SERPL-MCNC: 192 MG/DL
HCT VFR BLD CALC: 49.7 %
HGB BLD-MCNC: 16 G/DL
HYALINE CASTS: 0 /LPF
IMM GRANULOCYTES NFR BLD AUTO: 0.5 %
KETONES URINE: NEGATIVE
LEUKOCYTE ESTERASE URINE: NEGATIVE
LYMPHOCYTES # BLD AUTO: 1.68 K/UL
LYMPHOCYTES NFR BLD AUTO: 21.6 %
MAGNESIUM SERPL-MCNC: 2.2 MG/DL
MAN DIFF?: NORMAL
MCHC RBC-ENTMCNC: 31.2 PG
MCHC RBC-ENTMCNC: 32.2 GM/DL
MCV RBC AUTO: 96.9 FL
MICROSCOPIC-UA: NORMAL
MONOCYTES # BLD AUTO: 0.5 K/UL
MONOCYTES NFR BLD AUTO: 6.4 %
NEUTROPHILS # BLD AUTO: 5.45 K/UL
NEUTROPHILS NFR BLD AUTO: 70.1 %
NITRITE URINE: NEGATIVE
PH URINE: 6
PHOSPHATE SERPL-MCNC: 2.6 MG/DL
PLATELET # BLD AUTO: 170 K/UL
POTASSIUM SERPL-SCNC: 5.1 MMOL/L
PROT SERPL-MCNC: 7 G/DL
PROT UR-MCNC: 10 MG/DL
PROTEIN URINE: NEGATIVE
RBC # BLD: 5.13 M/UL
RBC # FLD: 12.9 %
RED BLOOD CELLS URINE: 1 /HPF
SODIUM SERPL-SCNC: 144 MMOL/L
SPECIFIC GRAVITY URINE: 1.04
SQUAMOUS EPITHELIAL CELLS: 0 /HPF
TACROLIMUS SERPL-MCNC: 7.4 NG/ML
URATE SERPL-MCNC: 4.3 MG/DL
UROBILINOGEN URINE: NORMAL
WBC # FLD AUTO: 7.78 K/UL
WHITE BLOOD CELLS URINE: 0 /HPF

## 2022-07-28 PROCEDURE — 99214 OFFICE O/P EST MOD 30 MIN: CPT

## 2022-07-28 NOTE — ASSESSMENT
[FreeTextEntry1] : 60 year old male s/p LRT from daughter on 2/18/2020 \par \par 1. s/p LRT from daughter on 2/18/2020 -  Allograft function has been good with baseline Cr ranging  ~ 1.2 . \par 2. IS meds- on Prograft 1 mg po in am and 2 mg in pm  and Prednisone 5 mg po bid. Restart Myfortic 360 mg po bid \par 3. Ppx- on Bactrim.  \par 4. HTN- controlled on Coreg 25 mg po bid and Nifedipine 30 mg po daily \par 5. DM-  follows with Dr Medrano.\par 6. 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 \par \par \par \par \par \par \par \par \par  \par \par

## 2022-07-28 NOTE — HISTORY OF PRESENT ILLNESS
[Living Donor] : Living donor [Basiliximab] : basiliximab [Negative/Negative] : Donor Negative/Recipient Negative [TextBox_7] : 2/18/2020 [FreeTextEntry1] : 60 year old male with ESRD was on dialysis , now s/p LRT from his daughter on 2/12/2020 .\par   PMH includes CHF, HTN, HLD, T2DM,  asthma, left glaucoma.\par  Donor: ABO O Donor CMV (-), EBV (+)\par  Recipient ABO O Recipient CMV (-), EBV (+) HLA mismatch 1,1,1, crossmatch negative.\par \par Patient tested positive for covid in December , he was sick for about a week with just a runny nose and headache and then recovered. \par \par Patient feels well, no acute symptoms. Denies SOB , fever, chills, dysuria, LE edema,chest pain , nausea, vomiting, diarrhea or constipation . \par \par \par \par

## 2022-08-01 LAB — BKV DNA SPEC QL NAA+PROBE: 64 IU/ML

## 2022-08-06 ENCOUNTER — APPOINTMENT (OUTPATIENT)
Dept: MRI IMAGING | Facility: IMAGING CENTER | Age: 62
End: 2022-08-06

## 2022-08-06 ENCOUNTER — OUTPATIENT (OUTPATIENT)
Dept: OUTPATIENT SERVICES | Facility: HOSPITAL | Age: 62
LOS: 1 days | End: 2022-08-06
Payer: COMMERCIAL

## 2022-08-06 DIAGNOSIS — Z98.890 OTHER SPECIFIED POSTPROCEDURAL STATES: Chronic | ICD-10-CM

## 2022-08-06 DIAGNOSIS — H35.62 RETINAL HEMORRHAGE, LEFT EYE: Chronic | ICD-10-CM

## 2022-08-06 DIAGNOSIS — Z98.49 CATARACT EXTRACTION STATUS, UNSPECIFIED EYE: Chronic | ICD-10-CM

## 2022-08-06 DIAGNOSIS — M51.36 OTHER INTERVERTEBRAL DISC DEGENERATION, LUMBAR REGION: ICD-10-CM

## 2022-08-06 DIAGNOSIS — Z98.84 BARIATRIC SURGERY STATUS: Chronic | ICD-10-CM

## 2022-08-06 PROCEDURE — 72148 MRI LUMBAR SPINE W/O DYE: CPT | Mod: 26

## 2022-08-06 PROCEDURE — 72148 MRI LUMBAR SPINE W/O DYE: CPT

## 2022-08-12 ENCOUNTER — APPOINTMENT (OUTPATIENT)
Dept: ENDOCRINOLOGY | Facility: CLINIC | Age: 62
End: 2022-08-12

## 2022-08-12 VITALS — OXYGEN SATURATION: 98 % | WEIGHT: 220 LBS | BODY MASS INDEX: 33.45 KG/M2 | HEART RATE: 78 BPM | TEMPERATURE: 97.5 F

## 2022-08-12 PROCEDURE — 99214 OFFICE O/P EST MOD 30 MIN: CPT

## 2022-08-12 NOTE — HISTORY OF PRESENT ILLNESS
[FreeTextEntry1] : 62 year old male presents for follow up management of type 2 DM. \par \par 02/2020 renal transplant ( donor is the daughter) \par Stent was recently removed \par \par CC\par Patient referred by Dr. Zak Puente for diabetes management.\par \par History of gastric sleeve- at the time was on the insulin pump and came off \par \par HPI:\par Duration of Diabetes: 25-30 years \par Is patient on Insulin? yes, for 25-30 years \par \par Previous Medications:\par Humalog does not control the patients glucose -- when previously switched the patients numbers on the pump/CGM were very erratic and therefore the patient requires novolog. \par \par Current Medications:\par Novolog via TSLIM: please note that the pump download setting are NOT the ratios the patient is currently using. He often does not consume all of his food, or has unpredictable food consumption spread over a period of time.  So instead, he will often self-split his boluses. \par \par The pump settings from the download are as follows: but the patient is using a carb ratio of 1:10 and a correction factor of 1:10 when he is calculating his pre-meal insulin dosing. \par Basal: 1.1 units/hour, target is set at 150, ISF 1:130 midnight-11a, and 1:20 11a-midnight. \par \par Carb ratio settings in the pump are (?): 12a: 1:3, 6a 1:1.5, 11a: 1:1.1\par \par Jardiance 25 mg daily \par Victoza 1.2 mg daily\par \par Meter Readings:\par Dexcom scanned downloaded and reviewed 76% in range.\par The one low was due to insulin stacking. \par \par \par \par Diabetic Complications: Is patient aware of having any of those complications?\par - Eyes: Retinopathy? History of cataract surgery, + history of retinopathy gets laser surgery ( Injection in the right eye) \par  	When was the last fully dilated eye exam? 3 months ago ( Dr. Melgoza) \par - Feet: 	Neuropathy? Yes ( now on alpha lipoic acid ) \par  	Foot Ulcers? History of right toe ulcer \par 	When was the last time patient saw a Podiatrist?4 weeks ago \par - Kidneys: Nephropathy? Renal transplant \par \par Diet: review patient's diet: \par Breakfast: Egg salad , 1/2 judson \par Lunch: [place], [place] \par Dinner: meat, potatos, vegetables \par Snacks: Chips, fruits \par Juice or soda: None \par Desserts: yogurt, sugar free jello \par --Cannot eat fruit without his glucose going into the 200-300s, he notes fructose intolerance. \par \par \par Exercise: review patient exercise habits: Not much \par

## 2022-08-12 NOTE — ASSESSMENT
[FreeTextEntry1] : 62 year old male with history of renal transplant in 02/2020, DM Type 2 on insulin here for follow-up. \par SMBGs are much better controlled at this time. At this time most of his hyperglycemia is post prandial after lunch. \par Will change carb ratio pre-lunch \par \par DM Type 2 : A1c 7.6%\par -the settings in the pump are not what the patient is currently doing- he does not enter carbs or use the programmed sensitivity.  \par -He uses the carb ratio of 1:10 after he eats because he is not really always able to estimate what he is going to eat\par -so he will take 80% of his calculated insulin dose and then takes the other 20% after-\par -Therefore I cannot make changes in his pump setting, but the programmed setting are not what the patient is doing. \par -Novolog via TSLIM: please note that the pump download setting are NOT the ratios the patient is currently using. He often does not consume all of his food, or has unpredictable food consumption spread over a period of time.  So instead, he will often self-split his boluses. \par \par The patient is using a carb ratio of 1:10 and a correction factor of 1:10 when he is calculating his pre-meal insulin dosing. \par -No other changes in pump settings. \par \par -Continue Jardiance 25 mg daily\par -Increase to Victoza 1.8 mg daily \par \par \par -**I would recommend this patient follows up with Vesta after the next appointment in order to change settings**\par -I asked him to keep a diary of what he is currently doing. \par \par \par \par Renal transplant\par -Use of prednisone causing hyperglycemia in the evening \par -Use of tacrolimus \par \par HLD\par -Continue Atorvastatin \par \par HTN: \par BP at goal\par \par -Follow up with in 4 months. \par \par  \par \par

## 2022-08-17 ENCOUNTER — APPOINTMENT (OUTPATIENT)
Dept: ORTHOPEDIC SURGERY | Facility: CLINIC | Age: 62
End: 2022-08-17

## 2022-08-17 ENCOUNTER — NON-APPOINTMENT (OUTPATIENT)
Age: 62
End: 2022-08-17

## 2022-08-17 VITALS — DIASTOLIC BLOOD PRESSURE: 75 MMHG | HEART RATE: 76 BPM | SYSTOLIC BLOOD PRESSURE: 125 MMHG

## 2022-08-17 DIAGNOSIS — M48.062 SPINAL STENOSIS, LUMBAR REGION WITH NEUROGENIC CLAUDICATION: ICD-10-CM

## 2022-08-17 PROCEDURE — 99214 OFFICE O/P EST MOD 30 MIN: CPT

## 2022-09-19 ENCOUNTER — NON-APPOINTMENT (OUTPATIENT)
Age: 62
End: 2022-09-19

## 2022-09-19 LAB
25(OH)D3 SERPL-MCNC: 39.8 NG/ML
ALBUMIN SERPL ELPH-MCNC: 4.6 G/DL
ALP BLD-CCNC: 86 U/L
ALT SERPL-CCNC: 30 U/L
ANION GAP SERPL CALC-SCNC: 14 MMOL/L
APPEARANCE: CLEAR
AST SERPL-CCNC: 23 U/L
BACTERIA: NEGATIVE
BASOPHILS # BLD AUTO: 0.04 K/UL
BASOPHILS NFR BLD AUTO: 0.5 %
BILIRUB SERPL-MCNC: 0.7 MG/DL
BILIRUBIN URINE: NEGATIVE
BLOOD URINE: NEGATIVE
BUN SERPL-MCNC: 17 MG/DL
CALCIUM SERPL-MCNC: 10.7 MG/DL
CALCIUM SERPL-MCNC: 10.7 MG/DL
CHLORIDE SERPL-SCNC: 107 MMOL/L
CHOLEST SERPL-MCNC: 121 MG/DL
CMV DNA SPEC QL NAA+PROBE: NOT DETECTED IU/ML
CMVPCR LOG: NOT DETECTED LOG10IU/ML
CO2 SERPL-SCNC: 24 MMOL/L
COLOR: YELLOW
CREAT SERPL-MCNC: 1.3 MG/DL
CREAT SPEC-SCNC: 220 MG/DL
CREAT/PROT UR: 0.1 RATIO
EGFR: 63 ML/MIN/1.73M2
EOSINOPHIL # BLD AUTO: 0.05 K/UL
EOSINOPHIL NFR BLD AUTO: 0.6 %
ESTIMATED AVERAGE GLUCOSE: 154 MG/DL
GLUCOSE QUALITATIVE U: ABNORMAL
GLUCOSE SERPL-MCNC: 128 MG/DL
HBA1C MFR BLD HPLC: 7 %
HCT VFR BLD CALC: 56.8 %
HDLC SERPL-MCNC: 45 MG/DL
HGB BLD-MCNC: 17.8 G/DL
HYALINE CASTS: 2 /LPF
IMM GRANULOCYTES NFR BLD AUTO: 0.2 %
KETONES URINE: NORMAL
LDLC SERPL CALC-MCNC: 46 MG/DL
LEUKOCYTE ESTERASE URINE: NEGATIVE
LYMPHOCYTES # BLD AUTO: 1.7 K/UL
LYMPHOCYTES NFR BLD AUTO: 20.6 %
MAGNESIUM SERPL-MCNC: 2.2 MG/DL
MAN DIFF?: NORMAL
MCHC RBC-ENTMCNC: 31.2 PG
MCHC RBC-ENTMCNC: 31.3 GM/DL
MCV RBC AUTO: 99.5 FL
MICROSCOPIC-UA: NORMAL
MONOCYTES # BLD AUTO: 0.55 K/UL
MONOCYTES NFR BLD AUTO: 6.7 %
NEUTROPHILS # BLD AUTO: 5.89 K/UL
NEUTROPHILS NFR BLD AUTO: 71.4 %
NITRITE URINE: NEGATIVE
NONHDLC SERPL-MCNC: 76 MG/DL
PARATHYROID HORMONE INTACT: 61 PG/ML
PH URINE: 6
PHOSPHATE SERPL-MCNC: 3.2 MG/DL
PLATELET # BLD AUTO: 194 K/UL
POTASSIUM SERPL-SCNC: 5.4 MMOL/L
PROT SERPL-MCNC: 7.3 G/DL
PROT UR-MCNC: 14 MG/DL
PROTEIN URINE: NORMAL
RBC # BLD: 5.71 M/UL
RBC # FLD: 13.4 %
RED BLOOD CELLS URINE: 2 /HPF
SODIUM SERPL-SCNC: 145 MMOL/L
SPECIFIC GRAVITY URINE: 1.04
SQUAMOUS EPITHELIAL CELLS: 1 /HPF
TACROLIMUS SERPL-MCNC: 8.6 NG/ML
TRIGL SERPL-MCNC: 150 MG/DL
URATE SERPL-MCNC: 5.1 MG/DL
UROBILINOGEN URINE: NORMAL
WBC # FLD AUTO: 8.25 K/UL
WHITE BLOOD CELLS URINE: 2 /HPF

## 2022-09-20 LAB — BKV DNA SPEC QL NAA+PROBE: ABNORMAL IU/ML

## 2022-09-27 RX ORDER — SYRINGE AND NEEDLE,INSULIN,1ML 28GX1/2"
31G X 5/16" SYRINGE, EMPTY DISPOSABLE MISCELLANEOUS
Qty: 4 | Refills: 0 | Status: ACTIVE | COMMUNITY
Start: 2022-09-27 | End: 1900-01-01

## 2022-10-05 ENCOUNTER — APPOINTMENT (OUTPATIENT)
Dept: PULMONOLOGY | Facility: CLINIC | Age: 62
End: 2022-10-05

## 2022-10-05 DIAGNOSIS — N18.5 CHRONIC KIDNEY DISEASE, STAGE 5: ICD-10-CM

## 2022-10-05 DIAGNOSIS — Z82.49 FAMILY HISTORY OF ISCHEMIC HEART DISEASE AND OTHER DISEASES OF THE CIRCULATORY SYSTEM: ICD-10-CM

## 2022-10-05 DIAGNOSIS — Z87.891 PERSONAL HISTORY OF NICOTINE DEPENDENCE: ICD-10-CM

## 2022-10-05 PROCEDURE — 99213 OFFICE O/P EST LOW 20 MIN: CPT | Mod: 95

## 2022-10-20 ENCOUNTER — APPOINTMENT (OUTPATIENT)
Dept: OPHTHALMOLOGY | Facility: CLINIC | Age: 62
End: 2022-10-20

## 2022-10-21 NOTE — H&P PST ADULT - FUNCTIONAL SCREEN CURRENT LEVEL: TRANSFERRING, MLM
0 = independent Griseofulvin Counseling:  I discussed with the patient the risks of griseofulvin including but not limited to photosensitivity, cytopenia, liver damage, nausea/vomiting and severe allergy.  The patient understands that this medication is best absorbed when taken with a fatty meal (e.g., ice cream or french fries).

## 2022-10-24 ENCOUNTER — APPOINTMENT (OUTPATIENT)
Dept: ENDOCRINOLOGY | Facility: CLINIC | Age: 62
End: 2022-10-24

## 2022-10-24 VITALS
DIASTOLIC BLOOD PRESSURE: 70 MMHG | HEART RATE: 100 BPM | BODY MASS INDEX: 30.31 KG/M2 | WEIGHT: 200 LBS | OXYGEN SATURATION: 98 % | TEMPERATURE: 97.2 F | SYSTOLIC BLOOD PRESSURE: 126 MMHG | HEIGHT: 68 IN

## 2022-10-24 PROCEDURE — 99214 OFFICE O/P EST MOD 30 MIN: CPT

## 2022-10-24 NOTE — REVIEW OF SYSTEMS
[Fatigue] : no fatigue [Decreased Appetite] : appetite not decreased [Recent Weight Gain (___ Lbs)] : no recent weight gain [Recent Weight Loss (___ Lbs)] : no recent weight loss [Visual Field Defect] : no visual field defect [Dry Eyes] : no dryness [Dysphagia] : no dysphagia [Neck Pain] : no neck pain [Dysphonia] : no dysphonia [Nasal Congestion] : no nasal congestion [Chest Pain] : no chest pain [Slow Heart Rate] : heart rate is not slow [Palpitations] : no palpitations [Fast Heart Rate] : heart rate is not fast [Shortness Of Breath] : no shortness of breath [Cough] : no cough [Nausea] : no nausea [Constipation] : no constipation [Vomiting] : no vomiting [Diarrhea] : no diarrhea [Polyuria] : no polyuria [Hesistancy] : no hesitancy [Joint Pain] : no joint pain [Muscle Weakness] : no muscle weakness [Acanthosis] : no acanthosis  [Acne] : no acne [Headaches] : no headaches [Dizziness] : no dizziness [Tremors] : no tremors [Depression] : no depression [Pain/Numbness of Digits] : no pain/numbness of digits [Polydipsia] : no polydipsia [Cold Intolerance] : no cold intolerance [Easy Bleeding] : no ~M tendency for easy bleeding [Easy Bruising] : no tendency for easy bruising

## 2022-10-24 NOTE — PHYSICAL EXAM
[Alert] : alert [Well Nourished] : well nourished [No Acute Distress] : no acute distress [Normal Sclera/Conjunctiva] : normal sclera/conjunctiva [EOMI] : extra ocular movement intact [PERRL] : pupils equal, round and reactive to light [Normal Outer Ear/Nose] : the ears and nose were normal in appearance [Normal Hearing] : hearing was normal [Normal TMs] : both tympanic membranes were normal [No Neck Mass] : no neck mass was observed [Thyroid Not Enlarged] : the thyroid was not enlarged [No Respiratory Distress] : no respiratory distress [Clear to Auscultation] : lungs were clear to auscultation bilaterally [Normal S1, S2] : normal S1 and S2 [Normal Rate] : heart rate was normal [Regular Rhythm] : with a regular rhythm [Normal Bowel Sounds] : normal bowel sounds [Soft] : abdomen soft [Not Tender] : non-tender [Normal Gait] : normal gait [No Clubbing, Cyanosis] : no clubbing  or cyanosis of the fingernails [No Joint Swelling] : no joint swelling seen [Normal Strength/Tone] : muscle strength and tone were normal [No Rash] : no rash [No Skin Lesions] : no skin lesions [No Motor Deficits] : the motor exam was normal [Normal Reflexes] : deep tendon reflexes were 2+ and symmetric [No Tremors] : no tremors [Oriented x3] : oriented to person, place, and time [Normal Affect] : the affect was normal [Normal Insight/Judgement] : insight and judgment were intact [Normal Mood] : the mood was normal

## 2022-10-24 NOTE — HISTORY OF PRESENT ILLNESS
[FreeTextEntry1] : Diabetes F/u Patient HPI\par \par 02/2020 renal transplant ( donor is the daughter) \par Stent was recently removed \par \par CC\par Patient referred by Dr. Zak Puente for diabetes management.\par \par \par History of gastric sleeve- at the time was on the insulin pump and came off \par \par \par \par HPI:\par \par Duration of Diabetes: 25-30 years \par Is patient on Insulin? yes, for 25-30 years \par \par List Current Medications for Glycemic control and the doses:\par Novolog insulin pump \par Jardiance 25 mg daily \par \par SMBG (self monitored blood glucose) readings: \par - Name of glucometer: \par - How often does the patient check BG? \par - Does the patient keep a log? \par \par If detailed record is available, what is the range of most of the BG readings?\par \par - 5 am : 150s\par -10 am: 150s\par - 12 pm: 150-180\par 3pm: 180s \par 8 pm: 180s \par - Before Dinner: 230-240\par - Before Bedtime: \par \par \par Does patient get Hypoglycemic episodes? Once because he skipped dinner \par If yes how frequent? \par Hoe low do the BG readings reach? 40s \par When do most of those episodes occur? Middle of the night \par What symptoms does the patient get during those episodes? sweating \par \par Diabetic Complications: Is patient aware of having any of those complications?\par - Eyes: Retinopathy? History of cataract surgery, + history of retinopathy gets laser surgery ( Injection in the right eye) - No injection in the last appointment ( will be seen in 10 weeks) \par  	When was the last fully dilated eye exam? 1 month ago ( Dr. Melgoza) \par - Feet: 	Neuropathy? Yes ( now on alpha lipoic acid ) \par  	Foot Ulcers? History of right toe ulcer \par 	When was the last time patient saw a Podiatrist?4 weeks ago \par - Kidneys: Nephropathy? Renal transplant \par \par Diet: review patient's diet: \par Breakfast: Egg salad , 1/2 judson \par Lunch: [place], [place] \par Dinner: meat, potatos, vegetables \par Snacks: Chips, fruits \par Juice or soda: None \par Desserts: yogurt, sugar free jello \par \par \par Exercise: review patient exercise habits: Not much \par

## 2022-10-25 ENCOUNTER — TRANSCRIPTION ENCOUNTER (OUTPATIENT)
Age: 62
End: 2022-10-25

## 2022-11-14 ENCOUNTER — NON-APPOINTMENT (OUTPATIENT)
Age: 62
End: 2022-11-14

## 2022-11-14 ENCOUNTER — APPOINTMENT (OUTPATIENT)
Dept: CARDIOLOGY | Facility: CLINIC | Age: 62
End: 2022-11-14

## 2022-11-14 ENCOUNTER — APPOINTMENT (OUTPATIENT)
Dept: CARDIOLOGY | Facility: CLINIC | Age: 62
End: 2022-11-14
Payer: COMMERCIAL

## 2022-11-14 VITALS
DIASTOLIC BLOOD PRESSURE: 75 MMHG | HEART RATE: 82 BPM | BODY MASS INDEX: 30.92 KG/M2 | OXYGEN SATURATION: 97 % | TEMPERATURE: 97.2 F | RESPIRATION RATE: 12 BRPM | SYSTOLIC BLOOD PRESSURE: 129 MMHG | HEIGHT: 68 IN | WEIGHT: 204 LBS

## 2022-11-14 PROCEDURE — 93000 ELECTROCARDIOGRAM COMPLETE: CPT

## 2022-11-14 PROCEDURE — 99213 OFFICE O/P EST LOW 20 MIN: CPT | Mod: 25

## 2022-11-14 NOTE — HISTORY OF PRESENT ILLNESS
[FreeTextEntry1] : Orion has been losing weight and does not need as much medication. No CP, palpitations, dizziness or SOB.

## 2022-11-14 NOTE — CONSULT NOTE ADULT - SUBJECTIVE AND OBJECTIVE BOX
[facility] DIVISION OF KIDNEY DISEASES AND HYPERTENSION -- [phone]  -- INITIAL CONSULT NOTE  --------------------------------------------------------------------------------  If any questions, please feel free to contact me  NS pager: [phone], [facility]: [phone]  Kevin Sykes M.D.  Nephrology Fellow    (After 5 pm or on weekends please page the on-call fellow)  --------------------------------------------------------------------------------    HPI:  58 y.o. Male with PMhx ESKD was on HD for ~8 months, now s/p LDRT (From daughter in 2020), CHF, HLD, HTN, heart murmur, DM, R toe amputation, asthma, L arm AVF, presented to ED s/p mechanical fall at home. Pt states he was picking up some broken tiles from home, slipped on floor and fell backwards landing on his Right side 3 days ago. Initially pain was tolerable, but became gradually worse prompting him to present to ED. CT chest showing Acute rib fractures of the right sixth seventh and eighth ribs. Transplant nephrology consulted for immunosuppression management.      PAST HISTORY  --------------------------------------------------------------------------------  PAST MEDICAL & SURGICAL HISTORY:  Hypertension    Diabetic neuropathy    Osteomyelitis of ankle or foot  x2    Pneumonia  2013    Insulin pump status  denies    CKD (chronic kidney disease)  hemodialysis on Mon, Wed and Fri    Diastolic dysfunction  mild. stage 1. EF 65%    DKA (diabetic ketoacidoses)    Hypertension    Diabetes  T2DM  last A1C (8%)    Ventral hernia  repair    History of glaucoma  left eye    Bilateral dry eyes    Dyslipidemia    Heart murmur    Asthma  never been intubated for asthma   last inhaler used 2019    S/P carpal tunnel release  b/l    S/P hernia repair    S/P amputation  right hallux     Retinal hemorrhage, left eye  s/p laser surgery  hx of right eye retinal hemorrahge, tx with laser surgery    H/O cardiac catheterization  no stents    H/O elbow surgery  left    S/P laparoscopic sleeve gastrectomy      Status post cataract extraction      FAMILY HISTORY:  Family history of heart attack (Grandparent)  father  @54 y.o.  grandfather  @ 52 y.o.    Family history of bone cancer  Grandfather    FH: aortic stenosis  Son      PAST SOCIAL HISTORY:  no smoking no drugs   ALLERGIES & MEDICATIONS  --------------------------------------------------------------------------------  Allergies    clonidine (Other)  seasonal allergy (Other)    Intolerances      Standing Inpatient Medications  acetaminophen   Tablet .. 975 milliGRAM(s) Oral every 6 hours  aspirin enteric coated 81 milliGRAM(s) Oral daily  atorvastatin 20 milliGRAM(s) Oral at bedtime  carvedilol 25 milliGRAM(s) Oral every 12 hours  heparin   Injectable 5000 Unit(s) SubCutaneous every 8 hours  lidocaine   Patch 1 Patch Transdermal daily  predniSONE   Tablet 5 milliGRAM(s) Oral daily  sodium zirconium cyclosilicate 10 Gram(s) Oral every other day  tacrolimus 1 milliGRAM(s) Oral every 12 hours  tamsulosin 0.4 milliGRAM(s) Oral at bedtime  trimethoprim   80 mG/sulfamethoxazole 400 mG 1 Tablet(s) Oral daily    PRN Inpatient Medications  oxyCODONE    IR 5 milliGRAM(s) Oral every 4 hours PRN  oxyCODONE    IR 10 milliGRAM(s) Oral every 4 hours PRN      REVIEW OF SYSTEMS  --------------------------------------------------------------------------------  Gen: No fevers/chills  Skin: No rashes  Head/Eyes/Ears: Normal hearing,   Respiratory: No dyspnea, cough  CV: No chest pain  GI: No abdominal pain, diarrhea  : No dysuria, hematuria  MSK: +right sided back pain. No  edema    All other systems were reviewed and are negative, except as noted.    VITALS/PHYSICAL EXAM  --------------------------------------------------------------------------------  T(C): 36.8 (21 @ 07:52), Max: 36.8 (21 @ 07:52)  HR: 83 (21 @ 07:52) (79 - 83)  BP: 130/84 (21 @ 08:30) (126/84 - 197/113)  RR: 18 (21 @ 07:52) (17 - 20)  SpO2: 97% (21 @ 07:52) (96% - 98%)  Wt(kg): --  Height (cm): 172.7 (21 @ 17:45)  Weight (kg): 95.3 (21 @ 17:45)  BMI (kg/m2): 32 (21 @ 17:45)  BSA (m2): 2.09 (21 @ 17:45)      21 @ 07:01  -  21 @ 07:00  --------------------------------------------------------  IN: 100 mL / OUT: 0 mL / NET: 100 mL    21 @ 07:01  -  21 @ 09:00  --------------------------------------------------------  IN: 0 mL / OUT: 300 mL / NET: -300 mL      Physical Exam:  	Gen: NAD, cooperative  	HEENT: MMM  	Pulm: CTA B/L, no wheezing, no rales  	CV: S1S2, RRR  	Abd: Soft, +BS, NT, ND   	Ext: No LE edema B/L  	Neuro: Awake  	Skin: Warm and dry             Psych: normal affect and mood    LABS/STUDIES  --------------------------------------------------------------------------------              15.5   11.36 >-----------<  205      [21 @ 07:03]              47.9     138  |  105  |  18  ----------------------------<  158      [21 @ 07:03]  5.1   |  18  |  0.86        Ca     9.8     [21 @ 07:03]      Mg     2.0     [21 @ 07:03]      Phos  2.6     [21 @ 07:03]    TPro  7.9  /  Alb  4.3  /  TBili  0.3  /  DBili  x   /  AST  44  /  ALT  32  /  AlkPhos  82  [21 @ 20:37]          Creatinine Trend:  SCr 0.86 [ 07:03]  SCr 0.97 [ 20:37]    Urinalysis - [19 @ 02:55]      Color Colorless / Appearance Clear / SG 1.010 / pH 6.0      Gluc 100 mg/dL / Ketone Negative  / Bili Negative / Urobili <2 mg/dL       Blood Trace / Protein 100 mg/dL / Leuk Est Negative / Nitrite Negative      RBC 3 / WBC 1 / Hyaline 2 / Gran  / Sq Epi  / Non Sq Epi 0 / Bacteria Negative      HbA1c 7.6      [20 @ 08:43]    HBsAb <3.0      [19 @ 20:34]  HBsAb Nonreact      [19 @ 20:34]  HBsAg Nonreact      [19 @ 20:34]  HBcAb Nonreact      [19 20:34]  HCV 0.11, Nonreact      [19 @ 20:34]     Adbry Counseling: I discussed with the patient the risks of tralokinumab including but not limited to eye infection and irritation, cold sores, injection site reactions, worsening of asthma, allergic reactions and increased risk of parasitic infection.  Live vaccines should be avoided while taking tralokinumab. The patient understands that monitoring is required and they must alert us or the primary physician if symptoms of infection or other concerning signs are noted.

## 2022-11-14 NOTE — REVIEW OF SYSTEMS
[Negative] : Heme/Lymph [Weight Gain (___ Lbs)] : no recent weight gain [Weight Loss (___ Lbs)] : [unfilled] ~Ulb weight loss [SOB] : no shortness of breath [Dyspnea on exertion] : not dyspnea during exertion [Chest Discomfort] : no chest discomfort [Lower Ext Edema] : no extremity edema [Leg Claudication] : no intermittent leg claudication [Palpitations] : no palpitations

## 2022-11-14 NOTE — DISCUSSION/SUMMARY
[FreeTextEntry1] : The patient is a 62-year-old ex-smoker +FH, DM on insulin, HTN, HLD, gastric sleeve , HFpEF, s/p renal transplant who is losing weight.\par #1 CV- No angina, nl EF\par #2 HFpEF- continue furosemide 40mg \par #2 DM-  novolog and victoza for glucose control\par #3 HTN- decrease coreg 12.5mg bid\par #4 HLD- continue atorvastatin optimal\par #5 Renal - s/p transplant, on immunosuppressant and prednisone\par #6 Neuro- improved sciatica. \par \par  [EKG obtained to assist in diagnosis and management of assessed problem(s)] : EKG obtained to assist in diagnosis and management of assessed problem(s)

## 2022-11-16 ENCOUNTER — RX RENEWAL (OUTPATIENT)
Age: 62
End: 2022-11-16

## 2023-01-22 LAB
ALBUMIN SERPL ELPH-MCNC: 4.4 G/DL
ALP BLD-CCNC: 87 U/L
ALT SERPL-CCNC: 22 U/L
ANION GAP SERPL CALC-SCNC: 14 MMOL/L
APPEARANCE: CLEAR
AST SERPL-CCNC: 18 U/L
BACTERIA: NEGATIVE
BASOPHILS # BLD AUTO: 0.03 K/UL
BASOPHILS NFR BLD AUTO: 0.4 %
BILIRUB SERPL-MCNC: 0.6 MG/DL
BILIRUBIN URINE: NEGATIVE
BKV DNA SPEC QL NAA+PROBE: 180 IU/ML
BLOOD URINE: NEGATIVE
BUN SERPL-MCNC: 18 MG/DL
CALCIUM SERPL-MCNC: 9.6 MG/DL
CHLORIDE SERPL-SCNC: 108 MMOL/L
CMV DNA SPEC QL NAA+PROBE: NOT DETECTED IU/ML
CMVPCR LOG: NOT DETECTED LOG10IU/ML
CO2 SERPL-SCNC: 20 MMOL/L
COLOR: YELLOW
CREAT SERPL-MCNC: 1.13 MG/DL
CREAT SPEC-SCNC: 147 MG/DL
CREAT/PROT UR: 0.1 RATIO
EGFR: 74 ML/MIN/1.73M2
EOSINOPHIL # BLD AUTO: 0.07 K/UL
EOSINOPHIL NFR BLD AUTO: 0.9 %
ESTIMATED AVERAGE GLUCOSE: 134 MG/DL
GLUCOSE QUALITATIVE U: ABNORMAL
GLUCOSE SERPL-MCNC: 201 MG/DL
HBA1C MFR BLD HPLC: 6.3 %
HCT VFR BLD CALC: 52.8 %
HGB BLD-MCNC: 16.5 G/DL
HYALINE CASTS: 0 /LPF
IMM GRANULOCYTES NFR BLD AUTO: 0.2 %
KETONES URINE: NEGATIVE
LEUKOCYTE ESTERASE URINE: NEGATIVE
LYMPHOCYTES # BLD AUTO: 1.57 K/UL
LYMPHOCYTES NFR BLD AUTO: 19.5 %
MAGNESIUM SERPL-MCNC: 2 MG/DL
MAN DIFF?: NORMAL
MCHC RBC-ENTMCNC: 31.3 GM/DL
MCHC RBC-ENTMCNC: 31.8 PG
MCV RBC AUTO: 101.7 FL
MICROSCOPIC-UA: NORMAL
MONOCYTES # BLD AUTO: 0.62 K/UL
MONOCYTES NFR BLD AUTO: 7.7 %
NEUTROPHILS # BLD AUTO: 5.76 K/UL
NEUTROPHILS NFR BLD AUTO: 71.3 %
NITRITE URINE: NEGATIVE
PH URINE: 6
PHOSPHATE SERPL-MCNC: 3.5 MG/DL
PLATELET # BLD AUTO: 172 K/UL
POTASSIUM SERPL-SCNC: 4.3 MMOL/L
PROT SERPL-MCNC: 7 G/DL
PROT UR-MCNC: 14 MG/DL
PROTEIN URINE: NORMAL
RBC # BLD: 5.19 M/UL
RBC # FLD: 13.2 %
RED BLOOD CELLS URINE: 3 /HPF
SODIUM SERPL-SCNC: 142 MMOL/L
SPECIFIC GRAVITY URINE: 1.04
SQUAMOUS EPITHELIAL CELLS: 0 /HPF
TACROLIMUS SERPL-MCNC: 5.4 NG/ML
URATE SERPL-MCNC: 4.3 MG/DL
UROBILINOGEN URINE: NORMAL
WBC # FLD AUTO: 8.07 K/UL
WHITE BLOOD CELLS URINE: 1 /HPF

## 2023-02-01 ENCOUNTER — APPOINTMENT (OUTPATIENT)
Dept: NEPHROLOGY | Facility: CLINIC | Age: 63
End: 2023-02-01
Payer: COMMERCIAL

## 2023-02-01 VITALS
RESPIRATION RATE: 12 BRPM | DIASTOLIC BLOOD PRESSURE: 89 MMHG | HEART RATE: 74 BPM | TEMPERATURE: 98 F | HEIGHT: 68 IN | SYSTOLIC BLOOD PRESSURE: 164 MMHG | OXYGEN SATURATION: 96 % | WEIGHT: 192 LBS | BODY MASS INDEX: 29.1 KG/M2

## 2023-02-01 PROCEDURE — 99214 OFFICE O/P EST MOD 30 MIN: CPT

## 2023-02-01 NOTE — PLAN
[FreeTextEntry1] : f/u in txp clinic in 1 year\par follows with his nephrologist Dr Tea nazario 3 months

## 2023-02-01 NOTE — ASSESSMENT
[FreeTextEntry1] : 60 year old male s/p LRT from daughter on 2/18/2020 \par \par 1. s/p LRT from daughter on 2/18/2020 -  Allograft function has been good with baseline Cr ranging  ~ 1.2 . \par 2. IS meds- on Prograft 1 mg po in am and 2 mg in pm , Prednisone 5 mg po bid and  Myfortic 360 mg po bid \par 3. Ppx- on Bactrim.  \par 4. HTN- controlled on Coreg 25 mg po bid and Nifedipine 30 mg po daily \par 5. DM-  follows with Dr Medrano.\par 6. 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 \par \par \par \par \par \par \par \par \par  \par \par

## 2023-02-02 LAB
ALBUMIN SERPL ELPH-MCNC: 4.7 G/DL
ALP BLD-CCNC: 85 U/L
ALT SERPL-CCNC: 24 U/L
ANION GAP SERPL CALC-SCNC: 14 MMOL/L
APPEARANCE: CLEAR
AST SERPL-CCNC: 19 U/L
BACTERIA: NEGATIVE
BASOPHILS # BLD AUTO: 0.03 K/UL
BASOPHILS NFR BLD AUTO: 0.3 %
BILIRUB SERPL-MCNC: 0.6 MG/DL
BILIRUBIN URINE: NEGATIVE
BKV DNA SPEC QL NAA+PROBE: 78 IU/ML
BLOOD URINE: NEGATIVE
BUN SERPL-MCNC: 20 MG/DL
CALCIUM SERPL-MCNC: 10 MG/DL
CHLORIDE SERPL-SCNC: 109 MMOL/L
CMV DNA SPEC QL NAA+PROBE: NOT DETECTED IU/ML
CMVPCR LOG: NOT DETECTED LOG10IU/ML
CO2 SERPL-SCNC: 20 MMOL/L
COLOR: NORMAL
CREAT SERPL-MCNC: 1.02 MG/DL
CREAT SPEC-SCNC: 94 MG/DL
CREAT/PROT UR: 0.1 RATIO
EGFR: 84 ML/MIN/1.73M2
EOSINOPHIL # BLD AUTO: 0.04 K/UL
EOSINOPHIL NFR BLD AUTO: 0.4 %
GLUCOSE QUALITATIVE U: ABNORMAL
GLUCOSE SERPL-MCNC: 134 MG/DL
HCT VFR BLD CALC: 55.3 %
HGB BLD-MCNC: 17.2 G/DL
HYALINE CASTS: 0 /LPF
IMM GRANULOCYTES NFR BLD AUTO: 0.3 %
KETONES URINE: NEGATIVE
LEUKOCYTE ESTERASE URINE: NEGATIVE
LYMPHOCYTES # BLD AUTO: 1.23 K/UL
LYMPHOCYTES NFR BLD AUTO: 12.5 %
MAGNESIUM SERPL-MCNC: 2.2 MG/DL
MAN DIFF?: NORMAL
MCHC RBC-ENTMCNC: 31.1 GM/DL
MCHC RBC-ENTMCNC: 31.4 PG
MCV RBC AUTO: 100.9 FL
MICROSCOPIC-UA: NORMAL
MONOCYTES # BLD AUTO: 0.6 K/UL
MONOCYTES NFR BLD AUTO: 6.1 %
NEUTROPHILS # BLD AUTO: 7.88 K/UL
NEUTROPHILS NFR BLD AUTO: 80.4 %
NITRITE URINE: NEGATIVE
PH URINE: 6
PHOSPHATE SERPL-MCNC: 3.2 MG/DL
PLATELET # BLD AUTO: 171 K/UL
POTASSIUM SERPL-SCNC: 4.5 MMOL/L
PROT SERPL-MCNC: 7.1 G/DL
PROT UR-MCNC: 12 MG/DL
PROTEIN URINE: NORMAL
RBC # BLD: 5.48 M/UL
RBC # FLD: 12.9 %
RED BLOOD CELLS URINE: 1 /HPF
SODIUM SERPL-SCNC: 143 MMOL/L
SPECIFIC GRAVITY URINE: 1.04
SQUAMOUS EPITHELIAL CELLS: 0 /HPF
TACROLIMUS SERPL-MCNC: 8.6 NG/ML
URATE SERPL-MCNC: 4.5 MG/DL
UROBILINOGEN URINE: NORMAL
WBC # FLD AUTO: 9.81 K/UL
WHITE BLOOD CELLS URINE: 0 /HPF

## 2023-03-15 ENCOUNTER — RX RENEWAL (OUTPATIENT)
Age: 63
End: 2023-03-15

## 2023-03-15 RX ORDER — PEN NEEDLE, DIABETIC 29 G X1/2"
31G X 5 MM NEEDLE, DISPOSABLE MISCELLANEOUS
Qty: 90 | Refills: 10 | Status: ACTIVE | COMMUNITY
Start: 2022-10-05 | End: 1900-01-01

## 2023-03-27 ENCOUNTER — NON-APPOINTMENT (OUTPATIENT)
Age: 63
End: 2023-03-27

## 2023-03-27 ENCOUNTER — APPOINTMENT (OUTPATIENT)
Dept: CARDIOLOGY | Facility: CLINIC | Age: 63
End: 2023-03-27
Payer: COMMERCIAL

## 2023-03-27 VITALS
DIASTOLIC BLOOD PRESSURE: 70 MMHG | WEIGHT: 187 LBS | DIASTOLIC BLOOD PRESSURE: 78 MMHG | TEMPERATURE: 97.9 F | SYSTOLIC BLOOD PRESSURE: 110 MMHG | RESPIRATION RATE: 12 BRPM | SYSTOLIC BLOOD PRESSURE: 146 MMHG | HEART RATE: 73 BPM | HEIGHT: 68 IN | BODY MASS INDEX: 28.34 KG/M2 | OXYGEN SATURATION: 97 %

## 2023-03-27 PROCEDURE — 93000 ELECTROCARDIOGRAM COMPLETE: CPT

## 2023-03-27 PROCEDURE — 99213 OFFICE O/P EST LOW 20 MIN: CPT | Mod: 25

## 2023-03-27 RX ORDER — TRAVOPROST 0.04 MG/ML
0 SOLUTION/ DROPS OPHTHALMIC
Refills: 0 | Status: DISCONTINUED | COMMUNITY
Start: 2017-08-31 | End: 2023-03-27

## 2023-03-27 NOTE — DISCUSSION/SUMMARY
[FreeTextEntry1] : The patient is a 62-year-old ex-smoker +FH, DM on insulin, HTN, HLD, gastric sleeve , HFpEF, s/p renal transplant who continues to lose weight.\par #1 CV- No angina, nl EF\par #2 HFpEF- continue furosemide 40mg \par #2 DM-  novolog and victoza for glucose control\par #3 HTN- c/w coreg 12.5mg bid\par #4 HLD- continue atorvastatin optimal\par #5 Renal - s/p transplant, on immunosuppressant and prednisone\par #6 Neuro- improved sciatica. \par \par  [EKG obtained to assist in diagnosis and management of assessed problem(s)] : EKG obtained to assist in diagnosis and management of assessed problem(s)

## 2023-03-27 NOTE — HISTORY OF PRESENT ILLNESS
[FreeTextEntry1] : Orion has lost almost 50 pounds in the last year.  Almost 30 pounds since his last visit here.  Sugar has been better.  Lots of stress with his children.  Looking forward to a new grandchild in [place].  Denies any chest pain, palpitations or shortness of breath.

## 2023-03-27 NOTE — REVIEW OF SYSTEMS
[Weight Loss (___ Lbs)] : [unfilled] ~Ulb weight loss [Negative] : Heme/Lymph [Weight Gain (___ Lbs)] : no recent weight gain [SOB] : no shortness of breath [Dyspnea on exertion] : not dyspnea during exertion [Chest Discomfort] : no chest discomfort [Lower Ext Edema] : no extremity edema [Leg Claudication] : no intermittent leg claudication [Palpitations] : no palpitations

## 2023-04-10 ENCOUNTER — APPOINTMENT (OUTPATIENT)
Dept: ENDOCRINOLOGY | Facility: CLINIC | Age: 63
End: 2023-04-10

## 2023-04-10 ENCOUNTER — APPOINTMENT (OUTPATIENT)
Dept: ENDOCRINOLOGY | Facility: CLINIC | Age: 63
End: 2023-04-10
Payer: COMMERCIAL

## 2023-04-10 VITALS
HEIGHT: 68 IN | BODY MASS INDEX: 28.79 KG/M2 | DIASTOLIC BLOOD PRESSURE: 70 MMHG | WEIGHT: 190 LBS | OXYGEN SATURATION: 97 % | SYSTOLIC BLOOD PRESSURE: 124 MMHG | HEART RATE: 78 BPM

## 2023-04-10 LAB — HBA1C MFR BLD HPLC: 6.1

## 2023-04-10 PROCEDURE — 83036 HEMOGLOBIN GLYCOSYLATED A1C: CPT | Mod: QW

## 2023-04-10 PROCEDURE — 99214 OFFICE O/P EST MOD 30 MIN: CPT | Mod: 25

## 2023-04-10 PROCEDURE — 95251 CONT GLUC MNTR ANALYSIS I&R: CPT

## 2023-04-10 NOTE — PHYSICAL EXAM
[Alert] : alert [Well Nourished] : well nourished [No Acute Distress] : no acute distress [Normal Sclera/Conjunctiva] : normal sclera/conjunctiva [EOMI] : extra ocular movement intact [PERRL] : pupils equal, round and reactive to light [Normal Outer Ear/Nose] : the ears and nose were normal in appearance [Normal Hearing] : hearing was normal [Normal TMs] : both tympanic membranes were normal [No Neck Mass] : no neck mass was observed [No Respiratory Distress] : no respiratory distress [Thyroid Not Enlarged] : the thyroid was not enlarged [Clear to Auscultation] : lungs were clear to auscultation bilaterally [Normal S1, S2] : normal S1 and S2 [Normal Rate] : heart rate was normal [Regular Rhythm] : with a regular rhythm [Normal Bowel Sounds] : normal bowel sounds [Not Tender] : non-tender [Soft] : abdomen soft [Normal Gait] : normal gait [No Clubbing, Cyanosis] : no clubbing  or cyanosis of the fingernails [No Joint Swelling] : no joint swelling seen [Normal Strength/Tone] : muscle strength and tone were normal [No Rash] : no rash [No Skin Lesions] : no skin lesions [No Motor Deficits] : the motor exam was normal [Normal Reflexes] : deep tendon reflexes were 2+ and symmetric [No Tremors] : no tremors [Oriented x3] : oriented to person, place, and time [Normal Affect] : the affect was normal [Normal Insight/Judgement] : insight and judgment were intact [Normal Mood] : the mood was normal

## 2023-04-10 NOTE — ASSESSMENT
[FreeTextEntry1] : 62 year old male with history of renal transplant in 02/2020, DM Type 2 on insulin here for follow-up. \par SMBGs are much better controlled at this time.Lost 20 lbs. \par \par DM Type 2 : Hga1C of 6.1% \par -the pump was changed over to manual mode \par -Continue Jardiance 25 mg daily\par -Continue Victoza 1.8 mcg daily\par -Advised that BG levels will improve, and if he experiences any hypos to call the office. Advised against overbolusing. \par -Healthier snacks were discussed with the patient \par -SMBGS with DEXCOM \par \par -Patient requires a therapeutic CGM\par -Patient has diabetes mellitus\par -Patient has been using a home blood glucose monitor and performing frequent (four times a day) testing, 6 months\par -Patient is being insulin-treated with  a continuous subcutaneous insulin infusion (CSII) pump [id number]\par -Patient insulin treatment regimen requires frequent adjustments by the Patient on the basis of therapeutic CGM testing results.\par \par Download and Analysis of Continuous Glucose Monitoring Data:DEXCOM \par Dates reviewed:03/28/2023-04/10/2023\par Date of data printout:04/10/2023\par Analysis and Interpretation of CGM data: 9% very high, 67% in range, 23% high, minimal glucose variability  \par Blood glucose pattern:Stable\par \par \par \par Renal transplant\par -Use of prednisone causing hyperglycemia in the evening \par -Use of tacrolimus increases insulin resistance \par \par HLD\par -Continue Atorvastatin 20 mg daily\par \par \par -Follow up in 6 months. \par \par  \par \par

## 2023-04-10 NOTE — HISTORY OF PRESENT ILLNESS
[FreeTextEntry1] : Diabetes F/u Patient HPI\par \par 02/2020 renal transplant ( donor is the daughter) \par Stent was recently removed \par \par CC\par Patient referred by Dr. Zak Puente for diabetes management.\par \par \par History of gastric sleeve- at the time was on the insulin pump and came off \par \par \par \par HPI:\par \par Duration of Diabetes: 25-30 years \par Is patient on Insulin? yes, for 25-30 years \par \par List Current Medications for Glycemic control and the doses:\par Novolog insulin pump \par Jardiance 25 mg daily \par Victoza 1.8 mg daily \par \par SMBG (self monitored blood glucose) readings: \par - Name of glucometer: \par - How often does the patient check BG? \par - Does the patient keep a log? \par \par If detailed record is available, what is the range of most of the BG readings?\par \par Bg overall on CGM: 160s \par \par \par Does patient get Hypoglycemic episodes? Once because he skipped dinner \par If yes how frequent? \par Hoe low do the BG readings reach? 40s \par When do most of those episodes occur? Middle of the night (11 pm)\par What symptoms does the patient get during those episodes? sweating \par \par Diabetic Complications: Is patient aware of having any of those complications?\par - Eyes: Retinopathy? History of cataract surgery, + history of retinopathy gets laser surgery ( Injection in the right eye) - No injection in the last appointment ( will be seen in 10 weeks) \par  	When was the last fully dilated eye exam? 2 weeks ago ( Dr. Melgoza) \par - Feet: 	Neuropathy? Yes ( now on alpha lipoic acid ) \par  	Foot Ulcers? History of right toe ulcer \par 	When was the last time patient saw a Podiatrist?4 weeks ago \par - Kidneys: Nephropathy? Renal transplant \par \par Diet: review patient's diet: \par Breakfast: Egg salad , 1/2 judson \par Lunch: [place], [place] \par Dinner: meat, potatos, vegetables \par Snacks: Chips, fruits \par Juice or soda: None \par Desserts: yogurt, sugar free jello \par \par Diet has worsened compared to before \par \par Exercise: review patient exercise habits: Not much \par

## 2023-04-22 ENCOUNTER — TRANSCRIPTION ENCOUNTER (OUTPATIENT)
Age: 63
End: 2023-04-22

## 2023-04-26 ENCOUNTER — APPOINTMENT (OUTPATIENT)
Dept: PULMONOLOGY | Facility: CLINIC | Age: 63
End: 2023-04-26
Payer: COMMERCIAL

## 2023-04-26 DIAGNOSIS — K21.9 GASTRO-ESOPHAGEAL REFLUX DISEASE W/OUT ESOPHAGITIS: ICD-10-CM

## 2023-04-26 DIAGNOSIS — Z86.39 PERSONAL HISTORY OF OTHER ENDOCRINE, NUTRITIONAL AND METABOLIC DISEASE: ICD-10-CM

## 2023-04-26 PROCEDURE — 99213 OFFICE O/P EST LOW 20 MIN: CPT | Mod: 95

## 2023-05-10 ENCOUNTER — NON-APPOINTMENT (OUTPATIENT)
Age: 63
End: 2023-05-10

## 2023-05-10 LAB
ALBUMIN SERPL ELPH-MCNC: 4.7 G/DL
ALP BLD-CCNC: 88 U/L
ALT SERPL-CCNC: 57 U/L
ANION GAP SERPL CALC-SCNC: 13 MMOL/L
APPEARANCE: CLEAR
AST SERPL-CCNC: 33 U/L
BACTERIA: NEGATIVE /HPF
BASOPHILS # BLD AUTO: 0.04 K/UL
BASOPHILS NFR BLD AUTO: 0.5 %
BILIRUB SERPL-MCNC: 0.6 MG/DL
BILIRUBIN URINE: NEGATIVE
BKV DNA SPEC QL NAA+PROBE: 240 IU/ML
BLOOD URINE: NEGATIVE
BUN SERPL-MCNC: 25 MG/DL
CALCIUM SERPL-MCNC: 10.4 MG/DL
CAST: 0 /LPF
CHLORIDE SERPL-SCNC: 109 MMOL/L
CMV DNA SPEC QL NAA+PROBE: NOT DETECTED IU/ML
CMVPCR LOG: NOT DETECTED LOG10IU/ML
CO2 SERPL-SCNC: 24 MMOL/L
COLOR: YELLOW
CREAT SERPL-MCNC: 1.17 MG/DL
CREAT SPEC-SCNC: 88 MG/DL
CREAT/PROT UR: 0.1 RATIO
EGFR: 71 ML/MIN/1.73M2
EOSINOPHIL # BLD AUTO: 0.06 K/UL
EOSINOPHIL NFR BLD AUTO: 0.7 %
EPITHELIAL CELLS: 0 /HPF
ESTIMATED AVERAGE GLUCOSE: 148 MG/DL
GLUCOSE QUALITATIVE U: >=1000 MG/DL
GLUCOSE SERPL-MCNC: 131 MG/DL
HBA1C MFR BLD HPLC: 6.8 %
HCT VFR BLD CALC: 56.3 %
HGB BLD-MCNC: 17.5 G/DL
IMM GRANULOCYTES NFR BLD AUTO: 0.5 %
KETONES URINE: NEGATIVE MG/DL
LEUKOCYTE ESTERASE URINE: NEGATIVE
LYMPHOCYTES # BLD AUTO: 1.67 K/UL
LYMPHOCYTES NFR BLD AUTO: 18.8 %
MAGNESIUM SERPL-MCNC: 2.2 MG/DL
MAN DIFF?: NORMAL
MCHC RBC-ENTMCNC: 31.1 GM/DL
MCHC RBC-ENTMCNC: 31.4 PG
MCV RBC AUTO: 101.1 FL
MICROSCOPIC-UA: NORMAL
MONOCYTES # BLD AUTO: 0.65 K/UL
MONOCYTES NFR BLD AUTO: 7.3 %
NEUTROPHILS # BLD AUTO: 6.4 K/UL
NEUTROPHILS NFR BLD AUTO: 72.2 %
NITRITE URINE: NEGATIVE
PH URINE: 6
PHOSPHATE SERPL-MCNC: 4.4 MG/DL
PLATELET # BLD AUTO: 181 K/UL
POTASSIUM SERPL-SCNC: 5.5 MMOL/L
PROT SERPL-MCNC: 7.1 G/DL
PROT UR-MCNC: 11 MG/DL
PROTEIN URINE: NORMAL MG/DL
RBC # BLD: 5.57 M/UL
RBC # FLD: 13.8 %
RED BLOOD CELLS URINE: 1 /HPF
SODIUM SERPL-SCNC: 146 MMOL/L
SPECIFIC GRAVITY URINE: 1.03
TACROLIMUS SERPL-MCNC: 4.6 NG/ML
URATE SERPL-MCNC: 3.5 MG/DL
UROBILINOGEN URINE: 0.2 MG/DL
WBC # FLD AUTO: 8.86 K/UL
WHITE BLOOD CELLS URINE: 0 /HPF

## 2023-06-23 ENCOUNTER — TRANSCRIPTION ENCOUNTER (OUTPATIENT)
Age: 63
End: 2023-06-23

## 2023-06-29 NOTE — ED PROVIDER NOTE - DISPOSITION TYPE
Date of Operation: 6/29/2023    Preoperative diagnosis: Right small finger displaced proximal phalanx fracture    Postoperative diagnoses: Same    Procedure: Right small finger proximal phalanx closed reduction percutaneous pinning    Attending surgeon: Dr. Robin Hadley    Assistant: Ivania Umanzor PA-C    Anesthesia: General    Total tourniquet time: 0 minutes    EBL: minimal    Complications: none    Specimens removed: none    Indications for procedure:    This is a patient who presented to me with a Right small finger displaced proximal phalanx fracture. The benefits and risks of a Right small finger proximal phalanx closed reduction percutaneous pinning were explained to the patient.  Risks including pain infection bleeding damage structures structures a tendon nerve or blood vessel. The patient expressed full understanding. A Written consent was signed and placed in the patient's chart.    Procedure:  The patient was met in the preoperative holding area. The Right upper extremity was marked and checked against written consent as well as verbally with the patient.  The patient was escorted to the operating room. Upon entry room a timeout was performed identifying the patient's name, birthdate and the laterality of the procedure being performed. 2 G of ancef were given for this case within 30 minutes of the incision. The patient was then prepped and draped in sterile manner for the right upper extremity.    Mini c-arm was brought into the field. The fracture was visualized and then reduced in a closed fashion. Two 0.045 inch k-wire was inserted through radial and ulnar base of the proximal phalanx and inserted distally holding the reduction of the fracture.  Final mini c-arm shots were taken showing adequate reduction of the fracture and stability. 6 ml of 1% lidocaine with epinephrine was injected around the back of the hand for postoperative pain control.    The pins were cut flush to the skin. The wounds were  dressed with xeroform, 4x4, sterile webril and an ulnar gutter splint was applied and secured in place with an ACE bandage in an intrinsic plus position.    The patient was then extubated and transferred back to the PACU in stable condition. This was a clean case. All counts were correct after the case.       ADMIT

## 2023-07-25 ENCOUNTER — RX RENEWAL (OUTPATIENT)
Age: 63
End: 2023-07-25

## 2023-07-31 ENCOUNTER — TRANSCRIPTION ENCOUNTER (OUTPATIENT)
Age: 63
End: 2023-07-31

## 2023-08-03 ENCOUNTER — TRANSCRIPTION ENCOUNTER (OUTPATIENT)
Age: 63
End: 2023-08-03

## 2023-08-06 NOTE — PHYSICAL EXAM
[Well Developed] : well developed [Well Nourished] : well nourished [Normal Conjunctiva] : normal conjunctiva [No Acute Distress] : no acute distress [Normal Venous Pressure] : normal venous pressure [No Carotid Bruit] : no carotid bruit [Normal S1, S2] : normal S1, S2 [No Rub] : no rub [No Murmur] : no murmur [No Gallop] : no gallop [Clear Lung Fields] : clear lung fields [Good Air Entry] : good air entry [No Respiratory Distress] : no respiratory distress  [Soft] : abdomen soft [Non Tender] : non-tender [No Masses/organomegaly] : no masses/organomegaly [Normal Bowel Sounds] : normal bowel sounds [No Edema] : no edema [Normal Gait] : normal gait [No Cyanosis] : no cyanosis [No Clubbing] : no clubbing [No Varicosities] : no varicosities [No Rash] : no rash [No Skin Lesions] : no skin lesions [Moves all extremities] : moves all extremities [No Focal Deficits] : no focal deficits [Normal Speech] : normal speech [Normal memory] : normal memory [Alert and Oriented] : alert and oriented

## 2023-08-07 ENCOUNTER — APPOINTMENT (OUTPATIENT)
Dept: CARDIOLOGY | Facility: CLINIC | Age: 63
End: 2023-08-07
Payer: COMMERCIAL

## 2023-08-07 VITALS
WEIGHT: 192 LBS | BODY MASS INDEX: 29.1 KG/M2 | OXYGEN SATURATION: 95 % | SYSTOLIC BLOOD PRESSURE: 163 MMHG | RESPIRATION RATE: 12 BRPM | HEIGHT: 68 IN | HEART RATE: 71 BPM | DIASTOLIC BLOOD PRESSURE: 82 MMHG

## 2023-08-07 PROCEDURE — 93000 ELECTROCARDIOGRAM COMPLETE: CPT

## 2023-08-07 PROCEDURE — 99213 OFFICE O/P EST LOW 20 MIN: CPT | Mod: 25

## 2023-08-07 NOTE — DISCUSSION/SUMMARY
[FreeTextEntry1] : The patient is a 63-year-old ex-smoker +FH, DM on insulin, HTN, HLD, gastric sleeve, HFpEF, s/p renal transplant who gained a few pounds. #1 CV- No angina, nl EF #2 HFpEF- c/w furosemide 40mg  #2 DM-  novolog and victoza for glucose control #3 HTN- c/w coreg 12.5mg bid #4 HLD- c/w atorvastatin optimal #5 Renal - s/p transplant, on immunosuppressant and prednisone #6 Neuro- improved sciatica.    [EKG obtained to assist in diagnosis and management of assessed problem(s)] : EKG obtained to assist in diagnosis and management of assessed problem(s)

## 2023-08-07 NOTE — HISTORY OF PRESENT ILLNESS
[FreeTextEntry1] : Orion has a new job and less stress. Knows he gained a few pounds. No CP, palpitations or SOB.

## 2023-08-22 LAB
ALBUMIN SERPL ELPH-MCNC: 4.7 G/DL
ALP BLD-CCNC: 92 U/L
ALT SERPL-CCNC: 37 U/L
ANION GAP SERPL CALC-SCNC: 11 MMOL/L
APPEARANCE: CLEAR
AST SERPL-CCNC: 29 U/L
BACTERIA: NEGATIVE /HPF
BILIRUB SERPL-MCNC: 0.6 MG/DL
BILIRUBIN URINE: NEGATIVE
BKV DNA SPEC QL NAA+PROBE: NOT DETECTED IU/ML
BLOOD URINE: NEGATIVE
BUN SERPL-MCNC: 19 MG/DL
CALCIUM SERPL-MCNC: 9.8 MG/DL
CAST: 0 /LPF
CHLORIDE SERPL-SCNC: 108 MMOL/L
CMV DNA SPEC QL NAA+PROBE: NOT DETECTED IU/ML
CMVPCR LOG: NOT DETECTED LOG10IU/ML
CO2 SERPL-SCNC: 25 MMOL/L
COLOR: YELLOW
CREAT SERPL-MCNC: 1.26 MG/DL
CREAT SPEC-SCNC: 103 MG/DL
CREAT/PROT UR: 0.1 RATIO
EGFR: 65 ML/MIN/1.73M2
EPITHELIAL CELLS: 0 /HPF
ESTIMATED AVERAGE GLUCOSE: 157 MG/DL
GLUCOSE QUALITATIVE U: >=1000 MG/DL
GLUCOSE SERPL-MCNC: 130 MG/DL
HBA1C MFR BLD HPLC: 7.1 %
KETONES URINE: NEGATIVE MG/DL
LDH SERPL-CCNC: 233 U/L
LEUKOCYTE ESTERASE URINE: NEGATIVE
MAGNESIUM SERPL-MCNC: 2.1 MG/DL
MICROSCOPIC-UA: NORMAL
NITRITE URINE: NEGATIVE
PH URINE: 5.5
PHOSPHATE SERPL-MCNC: 3.4 MG/DL
POTASSIUM SERPL-SCNC: 5.1 MMOL/L
PROT SERPL-MCNC: 6.9 G/DL
PROT UR-MCNC: 9 MG/DL
PROTEIN URINE: NEGATIVE MG/DL
RED BLOOD CELLS URINE: 0 /HPF
SODIUM SERPL-SCNC: 144 MMOL/L
SPECIFIC GRAVITY URINE: >1.03
TACROLIMUS SERPL-MCNC: 5.4 NG/ML
URATE SERPL-MCNC: 3.2 MG/DL
UROBILINOGEN URINE: 0.2 MG/DL
WHITE BLOOD CELLS URINE: 0 /HPF

## 2023-09-25 ENCOUNTER — APPOINTMENT (OUTPATIENT)
Dept: NEPHROLOGY | Facility: CLINIC | Age: 63
End: 2023-09-25
Payer: COMMERCIAL

## 2023-09-25 ENCOUNTER — TRANSCRIPTION ENCOUNTER (OUTPATIENT)
Age: 63
End: 2023-09-25

## 2023-09-25 VITALS
RESPIRATION RATE: 12 BRPM | HEIGHT: 68 IN | BODY MASS INDEX: 29.1 KG/M2 | TEMPERATURE: 99.1 F | OXYGEN SATURATION: 98 % | SYSTOLIC BLOOD PRESSURE: 154 MMHG | HEART RATE: 69 BPM | DIASTOLIC BLOOD PRESSURE: 79 MMHG | WEIGHT: 192 LBS

## 2023-09-25 DIAGNOSIS — D75.1 SECONDARY POLYCYTHEMIA: ICD-10-CM

## 2023-09-25 LAB
ALBUMIN SERPL ELPH-MCNC: 4.4 G/DL
ALP BLD-CCNC: 95 U/L
ALT SERPL-CCNC: 39 U/L
ANION GAP SERPL CALC-SCNC: 11 MMOL/L
APPEARANCE: CLEAR
AST SERPL-CCNC: 27 U/L
BACTERIA: NEGATIVE /HPF
BILIRUB SERPL-MCNC: 0.6 MG/DL
BILIRUBIN URINE: NEGATIVE
BLOOD URINE: NEGATIVE
BUN SERPL-MCNC: 20 MG/DL
CALCIUM SERPL-MCNC: 10 MG/DL
CAST: 0 /LPF
CHLORIDE SERPL-SCNC: 105 MMOL/L
CO2 SERPL-SCNC: 24 MMOL/L
COLOR: YELLOW
CREAT SERPL-MCNC: 1.05 MG/DL
CREAT SPEC-SCNC: 97 MG/DL
CREAT/PROT UR: 0.2 RATIO
EGFR: 81 ML/MIN/1.73M2
EPITHELIAL CELLS: 0 /HPF
GLUCOSE QUALITATIVE U: >=1000 MG/DL
GLUCOSE SERPL-MCNC: 115 MG/DL
HCT VFR BLD CALC: 57.7 %
HGB BLD-MCNC: 18.3 G/DL
KETONES URINE: NEGATIVE MG/DL
LDH SERPL-CCNC: 301 U/L
LEUKOCYTE ESTERASE URINE: NEGATIVE
MAGNESIUM SERPL-MCNC: 2.3 MG/DL
MCHC RBC-ENTMCNC: 31.1 PG
MCHC RBC-ENTMCNC: 31.7 GM/DL
MCV RBC AUTO: 98.1 FL
MICROSCOPIC-UA: NORMAL
NITRITE URINE: NEGATIVE
PH URINE: 6
PHOSPHATE SERPL-MCNC: 3.1 MG/DL
PLATELET # BLD AUTO: 172 K/UL
POTASSIUM SERPL-SCNC: 5.9 MMOL/L
PROT SERPL-MCNC: 6.9 G/DL
PROT UR-MCNC: 14 MG/DL
PROTEIN URINE: NEGATIVE MG/DL
RBC # BLD: 5.88 M/UL
RBC # FLD: 13.2 %
RED BLOOD CELLS URINE: 1 /HPF
SODIUM SERPL-SCNC: 141 MMOL/L
SPECIFIC GRAVITY URINE: >1.03
TACROLIMUS SERPL-MCNC: 8.8 NG/ML
URATE SERPL-MCNC: 3.2 MG/DL
UROBILINOGEN URINE: 1 MG/DL
WBC # FLD AUTO: 9.24 K/UL
WHITE BLOOD CELLS URINE: 0 /HPF

## 2023-09-25 PROCEDURE — 99215 OFFICE O/P EST HI 40 MIN: CPT

## 2023-09-25 RX ORDER — SULFAMETHOXAZOLE AND TRIMETHOPRIM 400; 80 MG/1; MG/1
400-80 TABLET ORAL
Qty: 90 | Refills: 3 | Status: ACTIVE | COMMUNITY
Start: 2020-02-19 | End: 1900-01-01

## 2023-09-25 RX ORDER — PREDNISONE 5 MG/1
5 TABLET ORAL
Qty: 90 | Refills: 3 | Status: ACTIVE | COMMUNITY
Start: 2020-03-18 | End: 1900-01-01

## 2023-09-26 PROBLEM — D75.1 POST-TRANSPLANT ERYTHROCYTOSIS: Status: ACTIVE | Noted: 2023-09-26

## 2023-09-27 LAB
CMV DNA SPEC QL NAA+PROBE: NOT DETECTED IU/ML
CMVPCR LOG: NOT DETECTED LOG10IU/ML

## 2023-09-28 LAB — BKV DNA SPEC QL NAA+PROBE: 183 IU/ML

## 2023-10-02 ENCOUNTER — APPOINTMENT (OUTPATIENT)
Dept: ENDOCRINOLOGY | Facility: CLINIC | Age: 63
End: 2023-10-02
Payer: COMMERCIAL

## 2023-10-02 ENCOUNTER — APPOINTMENT (OUTPATIENT)
Dept: ENDOCRINOLOGY | Facility: CLINIC | Age: 63
End: 2023-10-02

## 2023-10-02 VITALS
WEIGHT: 184 LBS | HEIGHT: 68 IN | OXYGEN SATURATION: 98 % | DIASTOLIC BLOOD PRESSURE: 80 MMHG | BODY MASS INDEX: 27.89 KG/M2 | HEART RATE: 78 BPM | SYSTOLIC BLOOD PRESSURE: 150 MMHG

## 2023-10-02 PROCEDURE — 99214 OFFICE O/P EST MOD 30 MIN: CPT | Mod: 25

## 2023-10-02 PROCEDURE — 95251 CONT GLUC MNTR ANALYSIS I&R: CPT

## 2023-10-06 ENCOUNTER — TRANSCRIPTION ENCOUNTER (OUTPATIENT)
Age: 63
End: 2023-10-06

## 2023-10-17 LAB
ALBUMIN SERPL ELPH-MCNC: 4.3 G/DL
ALP BLD-CCNC: 88 U/L
ALT SERPL-CCNC: 54 U/L
ANION GAP SERPL CALC-SCNC: 11 MMOL/L
APPEARANCE: CLEAR
AST SERPL-CCNC: 41 U/L
BACTERIA: NEGATIVE /HPF
BILIRUB SERPL-MCNC: 0.4 MG/DL
BILIRUBIN URINE: NEGATIVE
BLOOD URINE: NEGATIVE
BUN SERPL-MCNC: 27 MG/DL
CALCIUM SERPL-MCNC: 9.7 MG/DL
CHLORIDE SERPL-SCNC: 110 MMOL/L
CO2 SERPL-SCNC: 23 MMOL/L
COLOR: YELLOW
CREAT SERPL-MCNC: 1.1 MG/DL
EGFR: 76 ML/MIN/1.73M2
ESTIMATED AVERAGE GLUCOSE: 180 MG/DL
GLUCOSE QUALITATIVE U: >=1000 MG/DL
GLUCOSE SERPL-MCNC: 127 MG/DL
HBA1C MFR BLD HPLC: 7.9 %
HCT VFR BLD CALC: 52.9 %
HGB BLD-MCNC: 16.6 G/DL
KETONES URINE: NEGATIVE MG/DL
LEUKOCYTE ESTERASE URINE: NEGATIVE
MAGNESIUM SERPL-MCNC: 2.1 MG/DL
MCHC RBC-ENTMCNC: 31.3 PG
MCHC RBC-ENTMCNC: 31.4 GM/DL
MCV RBC AUTO: 99.6 FL
MICROSCOPIC-UA: NORMAL
NITRITE URINE: NEGATIVE
PH URINE: 5.5
PHOSPHATE SERPL-MCNC: 3 MG/DL
PLATELET # BLD AUTO: 169 K/UL
POTASSIUM SERPL-SCNC: 5.4 MMOL/L
PROT SERPL-MCNC: 6.7 G/DL
PROTEIN URINE: NEGATIVE MG/DL
RBC # BLD: 5.31 M/UL
RBC # FLD: 13.1 %
RED BLOOD CELLS URINE: 1 /HPF
SODIUM SERPL-SCNC: 143 MMOL/L
SPECIFIC GRAVITY URINE: >1.03
TACROLIMUS SERPL-MCNC: 10.6 NG/ML
UROBILINOGEN URINE: 0.2 MG/DL
WBC # FLD AUTO: 7.05 K/UL
WHITE BLOOD CELLS URINE: 3 /HPF

## 2023-11-13 ENCOUNTER — RX RENEWAL (OUTPATIENT)
Age: 63
End: 2023-11-13

## 2023-11-20 NOTE — REASON FOR VISIT
"11/20/2023    HPI:  Singh Wan is a 44 y.o. male who is deaf and is currently using and [employer]  via the [employer], who presents to clinic today for evaluation of his  injury to the right middle finger with associated fracture and soft tissue injury.  He is approximately 15 days status post injury.  States he has kept his dressing dry, clean, and intact as instructed.  States he has finished his antibiotics.  Denies any acute injuries since last visit.  Denies any other complaints this time.    PMHX:  Past Medical History:   Diagnosis Date    a Bilateral Congenital Deafness     On Disability/Medicare For This; His Father Is Also Deaf    a H/O Atypical CP With 3/17/16 LCST = Normal     a Recurrent Palpitations     4/4/16 Referred To Dr. Mitch vazquez Family H/O DM     Depression     e Family H/O Thyroid Disorder     Hypertension     i 1 PPD X 15 YRs Tobacco Use     i PPD POS CXR NEG TXd 2010 With INH X 6 Months     3/11/16 "Stop Smoking"    j GERD     3/11/16 RXd Protonix 40 Mg Daily    l Chronic Low Back Pain     4/4/16 Referred To Dr. Zach roblero Right Hand/Wrist Neuropathy     n Anxiety And Depression     n Benzodiazepine Habituation     Until carissa Sees Psychiatry, No More Than 40 Xanax Tablets Per Month    q BLE Varicose Veins     3/11/16 Referred To Dr. Chivo Elliott    Wellness Visit 3/11/16        PSHX:  Past Surgical History:   Procedure Laterality Date    WRIST SURGERY Right        FMHX:  Family History   Problem Relation Age of Onset    Asthma Father     Lung disease Father        SOCHX:  Social History     Tobacco Use    Smoking status: Not on file     Passive exposure: Current    Smokeless tobacco: Never   Substance Use Topics    Alcohol use: Yes     Alcohol/week: 4.0 standard drinks of alcohol     Types: 4 Cans of beer per week       ALLERGIES:  Patient has no known allergies.    CURRENT MEDICATIONS:  Current Outpatient Medications on File Prior " to Visit   Medication Sig Dispense Refill    acyclovir 5% (ZOVIRAX) 5 % ointment Apply topically 6 (six) times daily. 5 g 0    albuterol (VENTOLIN HFA) 90 mcg/actuation inhaler Inhale 2 puffs into the lungs every 6 (six) hours as needed for Wheezing. Rescue 18 g 0    alprazolam (XANAX) 1 MG tablet Take 1 tablet (1 mg total) by mouth 2 (two) times daily as needed for Anxiety. 40 tablet 1    cyclobenzaprine (FLEXERIL) 10 MG tablet Take 1 tablet (10 mg total) by mouth 3 (three) times daily as needed for Muscle spasms. 21 tablet 0    fluoxetine (PROZAC) 20 MG capsule Take 1 capsule by mouth Daily.      HYDROcodone-acetaminophen (NORCO) 5-325 mg per tablet Take 1 tablet by mouth every 6 (six) hours as needed for Pain. 20 tablet 0    hydrocodone-acetaminophen 5-325mg (NORCO) 5-325 mg per tablet Take 1 tablet by mouth every 4 (four) hours as needed for Pain. 18 tablet 0    ibuprofen (ADVIL,MOTRIN) 600 MG tablet Take 1 tablet (600 mg total) by mouth every 6 (six) hours as needed for Pain. 20 tablet 0    ipratropium (ATROVENT) 21 mcg (0.03 %) nasal spray 2 sprays by Each Nostril route 3 (three) times daily. 30 mL 0    losartan (COZAAR) 50 MG tablet Take 1 tablet by mouth Daily.      meloxicam (MOBIC) 15 MG tablet Take 1 tablet (15 mg total) by mouth daily as needed for Pain. 30 tablet 6    methylPREDNISolone (MEDROL DOSEPACK) 4 mg tablet Dispense one carlos. use as directed 1 each 0    mirtazapine (REMERON) 15 MG tablet Take 1 tablet by mouth Daily.      paroxetine (PAXIL) 20 MG tablet Take 1 tablet by mouth Daily.      promethazine (PHENERGAN) 6.25 mg/5 mL syrup 10mL at night as needed 120 mL 1    promethazine (PHENERGAN) 6.25 mg/5 mL syrup 10mL at night as needed 120 mL 1    promethazine (PHENERGAN) 6.25 mg/5 mL syrup 10mL at night as needed 120 mL 1    lisinopril (PRINIVIL,ZESTRIL) 5 MG tablet Take 1 tablet (5 mg total) by mouth once daily. 90 tablet 3    pantoprazole (PROTONIX) 40 MG tablet Take 1 tablet (40 mg total) by  "mouth once daily. 90 tablet 1     No current facility-administered medications on file prior to visit.       REVIEW OF SYSTEMS:  Review of Systems Complete; Negative, unless noted above.    GENERAL PHYSICAL EXAM:   Ht 6' 3" (1.905 m)   Wt 108.9 kg (240 lb)   BMI 30.00 kg/m²    GEN: well developed, well nourished, no acute distress   PULM: No wheezing, no respiratory distress   CV: RRR    ORTHO EXAM:   Examination of the right middle finger reveals an appropriately healing wound.  Sutures remain intact.  Presence of mild edema.  No erythema, ecchymosis, drainage, purulence, or other signs of infection.  Range of motion not tested secondary to fracture/wound.  Strength not tested secondary to fracture/wound.  Capillary refill less than 2 seconds.    RADIOLOGY:   X-rays of the right middle finger were taken today in clinic.  X-rays reviewed by myself.  Imaging showed the presence of a displaced volarly angulated oblique fracture of the distal portion of the distal phalanx.  No osseous destructive/lytic changes noted on imaging today.  No other significant bony abnormalities noted.    ASSESSMENT:   Right middle finger  injury with associated open distal phalanx fracture and significant soft tissue trauma    PLAN:  1. I discussed with Singh Wan that he is progressing appropriately in the treatment course.  We discussed the best course of action at this time is to remove his sutures in clinic today and place Steri-Strips.  We did discuss the best course of action is to  proceed with immobilization of his right middle finger via a removable Velcro finger splint.  We did discuss the importance of overlapping the finger splint with Coban when he is out of the house.  He verbally agreed with the treatment plan.      2. He was placed in a removable Velcro finger splint of the right middle finger in clinic today.  The Velcro finger splint was overwrapped with Coban.    3. I would like to have him " follow up in clinic in 2 weeks for repeat evaluation.  He was instructed to contact clinic for any problems or concerns in the interim.         [Follow-Up - Clinic] : a clinic follow-up of [Hypertension] : hypertension

## 2024-01-04 NOTE — HISTORY OF PRESENT ILLNESS
[FreeTextEntry1] : Diabetes F/u Patient HPI  02/2020 renal transplant ( donor is the daughter). On prednisone 5 mg BID. Stent was removed  History of gastric sleeve- at the time was on the insulin pump and came off   CC Patient referred by Dr. Zak Puente for diabetes management.  HPI: Duration of Diabetes: 25-30 years  Is patient on Insulin? yes, for 25-30 years   Current DM Meds: -T-slim with control IQ insulin pump- Novolog (previously on [employer]) -Jardiance 25 mg daily  -Victoza 1.8 mg daily   CGM on pump download (9/19-10/2/23): TIR 45%, low 0%, high 29%, very high 26%, GMI 8.3%, avg gluc 208, CV 38.1%, time CGM active 96.6%. Pattern: not visualizing entered carbs on download, has prandial hyperglycemia, does sometimes have steep drops overnight  Does patient get Hypoglycemic episodes? Once because he skipped dinner  How low do the BG readings reach? 40s  When do most of those episodes occur? Middle of the night (11 pm) What symptoms does the patient get during those episodes? sweating   Diabetic Complications:  - Eyes: Retinopathy? History of cataract surgery, +h/o retinopathy gets laser surgery (Injection in the right eye) - Last visit 3 weeks ago (still getting injections)   	When was the last fully dilated eye exam? 2 weeks ago ( Dr. Koehler)  - Feet: 	Neuropathy? Yes ( now on alpha lipoic acid )   	Foot Ulcers? History of right toe ulcer  	When was the last time patient saw a Podiatrist? 1 week ago  - Kidneys: Nephropathy? Renal transplant   Weight: stable (reports lost 80 lbs in a year, has been working on dietary changes)  Diet:  Breakfast: Egg salad , small wrap or rice cake, probiotic yogurt lunch: cucumbers, turkey with provolone wrap with slice of pickle, snack (cakes); roasted cashews  dinner: variable (heaviest carb meal) Snacks: Chips, fruits, stopped pretzels, stopped all the ice cream he was eating during the summer Juice or soda: None  Desserts: yogurt, sugar free jello  Exercise: Not much, walking up and down stairs at work  Interval Health Events: On T-slim with control IQ, and thinks his pump has been acting up, at least since last visit with Dr. Medrano. He's had this pump for at least 3 years. He feels the pump is insulin stacking him, and frequently giving him autoboluses. When he boluses himself, a little while later the pump will notify him that control IQ gave him that bolus (when in reality the patient gave himself that bolus...). Pump is also giving him basal and boluses when he is on the way down or already low (especially when he is sleeping between 7pm and 2am, so they're chasing lows when he is asleep/feels he is crashing at night, and the minute they drink juice to correct the hypo, the pump will autobolus again for the rise, which is scary for them). At work at 4am. Even when is he seeing straight arrows down at , pump will start giving him boluses. He is inputting carbs, but they are not showing on his pump download. Also the pump battery only lasts him max 1.5 days now. He usually thinks the correction doesn't happen early enough, so he does his own 1:10 corrections too, then pump follows with frequent autoboluses.  HTN: Reports home BP usually 120s/70s. Health Maintenance: got flu shot at work (PAEZ)

## 2024-01-04 NOTE — REVIEW OF SYSTEMS
[Nasal Congestion] : no nasal congestion [Slow Heart Rate] : heart rate is not slow [Fast Heart Rate] : heart rate is not fast [Acne] : no acne [Fatigue] : no fatigue [Decreased Appetite] : appetite not decreased [Visual Field Defect] : no visual field defect [Dysphagia] : no dysphagia [Neck Pain] : no neck pain [Dysphonia] : no dysphonia [Chest Pain] : no chest pain [Palpitations] : no palpitations [Shortness Of Breath] : no shortness of breath [Nausea] : no nausea [Vomiting] : no vomiting [Polyuria] : no polyuria [Hesistancy] : no hesitancy [Joint Pain] : no joint pain [Muscle Weakness] : no muscle weakness [Acanthosis] : no acanthosis  [Headaches] : no headaches [Dizziness] : no dizziness [Tremors] : no tremors [Pain/Numbness of Digits] : no pain/numbness of digits [Depression] : no depression [Polydipsia] : no polydipsia [Cold Intolerance] : no cold intolerance [Easy Bleeding] : no ~M tendency for easy bleeding [Easy Bruising] : no tendency for easy bruising

## 2024-01-04 NOTE — ASSESSMENT
[FreeTextEntry1] : 63 year old male with history of renal transplant in 02/2020, DM Type 2 on insulin here for follow-up.   DM Type 2 : Hga1C of 6.1% --> 6.8% --> 7.1% (8/9/2023). -On Control IQ 93% of the time. -Continue Jardiance 25 mg daily -Continue Victoza 1.8 mcg daily -CGM downloaded and Pump Settings adjusted today: Increased ISF from 7p-12a (sleep hours) from 1:20-->1:30 due to fears of steep drops during sleep.  -Recommend try to avoid overcorrecting with his own corrections, if he must then instead of 1:10, try 1:15. Encouraged to bolus properly. If getting error messages, please take photos/document for next visit.  -Healthier snacks were discussed with the patient  -SMBGS with DEXCOM  -Patient requires a therapeutic CGM -Patient has diabetes mellitus -Patient has been using a home blood glucose monitor and performing frequent (four times a day) testing, 6 months -Patient is being insulin-treated with  a continuous subcutaneous insulin infusion (CSII) pump [id number] -Patient insulin treatment regimen requires frequent adjustments by the Patient on the basis of therapeutic CGM testing results.   Renal transplant -Use of prednisone causing hyperglycemia in the evening  -Use of tacrolimus increases insulin resistance   HLD -Continue Atorvastatin 20 mg daily -Repeat lipids next visit (just had recent lab work done).  F/u within 1 month with CDE to review pump download. Message sent to Vesta. Follow up in 6 months with MD. Olivia Sarah NP (Brenda)

## 2024-01-13 ENCOUNTER — NON-APPOINTMENT (OUTPATIENT)
Age: 64
End: 2024-01-13

## 2024-01-24 NOTE — PHYSICAL EXAM
[Well Developed] : well developed [Well Nourished] : well nourished [Normal Conjunctiva] : normal conjunctiva [No Acute Distress] : no acute distress [Normal Venous Pressure] : normal venous pressure [No Carotid Bruit] : no carotid bruit [Normal S1, S2] : normal S1, S2 [No Murmur] : no murmur [No Rub] : no rub [Clear Lung Fields] : clear lung fields [No Gallop] : no gallop [Good Air Entry] : good air entry [No Respiratory Distress] : no respiratory distress  [Soft] : abdomen soft [Non Tender] : non-tender [No Masses/organomegaly] : no masses/organomegaly [Normal Bowel Sounds] : normal bowel sounds [Normal Gait] : normal gait [No Edema] : no edema [No Cyanosis] : no cyanosis [No Clubbing] : no clubbing [No Rash] : no rash [No Varicosities] : no varicosities [No Skin Lesions] : no skin lesions [Moves all extremities] : moves all extremities [No Focal Deficits] : no focal deficits [Alert and Oriented] : alert and oriented [Normal Speech] : normal speech [Normal memory] : normal memory

## 2024-01-25 ENCOUNTER — NON-APPOINTMENT (OUTPATIENT)
Age: 64
End: 2024-01-25

## 2024-01-25 ENCOUNTER — APPOINTMENT (OUTPATIENT)
Dept: CARDIOLOGY | Facility: CLINIC | Age: 64
End: 2024-01-25
Payer: COMMERCIAL

## 2024-01-25 VITALS
OXYGEN SATURATION: 98 % | SYSTOLIC BLOOD PRESSURE: 119 MMHG | HEIGHT: 68 IN | BODY MASS INDEX: 29.1 KG/M2 | HEART RATE: 77 BPM | WEIGHT: 192 LBS | RESPIRATION RATE: 12 BRPM | DIASTOLIC BLOOD PRESSURE: 60 MMHG

## 2024-01-25 DIAGNOSIS — Z99.2 END STAGE RENAL DISEASE: ICD-10-CM

## 2024-01-25 DIAGNOSIS — U07.1 COVID-19: ICD-10-CM

## 2024-01-25 DIAGNOSIS — H26.9 UNSPECIFIED CATARACT: ICD-10-CM

## 2024-01-25 DIAGNOSIS — Z86.39 PERSONAL HISTORY OF OTHER ENDOCRINE, NUTRITIONAL AND METABOLIC DISEASE: ICD-10-CM

## 2024-01-25 DIAGNOSIS — I12.0 HYPERTENSIVE CHRONIC KIDNEY DISEASE WITH STAGE 5 CHRONIC KIDNEY DISEASE OR END STAGE RENAL DISEASE: ICD-10-CM

## 2024-01-25 DIAGNOSIS — Z23 ENCOUNTER FOR IMMUNIZATION: ICD-10-CM

## 2024-01-25 DIAGNOSIS — L03.119 CELLULITIS OF UNSPECIFIED PART OF LIMB: ICD-10-CM

## 2024-01-25 DIAGNOSIS — Z01.818 ENCOUNTER FOR OTHER PREPROCEDURAL EXAMINATION: ICD-10-CM

## 2024-01-25 DIAGNOSIS — Z86.2 PERSONAL HISTORY OF DISEASES OF THE BLOOD AND BLOOD-FORMING ORGANS AND CERTAIN DISORDERS INVOLVING THE IMMUNE MECHANISM: ICD-10-CM

## 2024-01-25 DIAGNOSIS — N18.6 HYPERTENSIVE CHRONIC KIDNEY DISEASE WITH STAGE 5 CHRONIC KIDNEY DISEASE OR END STAGE RENAL DISEASE: ICD-10-CM

## 2024-01-25 DIAGNOSIS — H65.00 ACUTE SEROUS OTITIS MEDIA, UNSPECIFIED EAR: ICD-10-CM

## 2024-01-25 DIAGNOSIS — Z20.822 CONTACT WITH AND (SUSPECTED) EXPOSURE TO COVID-19: ICD-10-CM

## 2024-01-25 DIAGNOSIS — I70.90 UNSPECIFIED ATHEROSCLEROSIS: ICD-10-CM

## 2024-01-25 DIAGNOSIS — B34.8 OTHER VIRAL INFECTIONS OF UNSPECIFIED SITE: ICD-10-CM

## 2024-01-25 DIAGNOSIS — I50.33 ACUTE ON CHRONIC DIASTOLIC (CONGESTIVE) HEART FAILURE: ICD-10-CM

## 2024-01-25 DIAGNOSIS — T82.898A OTHER SPECIFIED COMPLICATION OF VASCULAR PROSTHETIC DEVICES, IMPLANTS AND GRAFTS, INITIAL ENCOUNTER: ICD-10-CM

## 2024-01-25 DIAGNOSIS — N18.6 END STAGE RENAL DISEASE: ICD-10-CM

## 2024-01-25 DIAGNOSIS — Z87.2 PERSONAL HISTORY OF DISEASES OF THE SKIN AND SUBCUTANEOUS TISSUE: ICD-10-CM

## 2024-01-25 DIAGNOSIS — J45.909 UNSPECIFIED ASTHMA, UNCOMPLICATED: ICD-10-CM

## 2024-01-25 DIAGNOSIS — Z87.09 PERSONAL HISTORY OF OTHER DISEASES OF THE RESPIRATORY SYSTEM: ICD-10-CM

## 2024-01-25 DIAGNOSIS — Z87.19 PERSONAL HISTORY OF OTHER DISEASES OF THE DIGESTIVE SYSTEM: ICD-10-CM

## 2024-01-25 PROCEDURE — 93000 ELECTROCARDIOGRAM COMPLETE: CPT

## 2024-01-25 PROCEDURE — 99214 OFFICE O/P EST MOD 30 MIN: CPT | Mod: 25

## 2024-01-25 RX ORDER — AMOXICILLIN 500 MG/1
500 CAPSULE ORAL
Qty: 20 | Refills: 2 | Status: DISCONTINUED | COMMUNITY
Start: 2020-07-23 | End: 2024-01-25

## 2024-01-25 RX ORDER — SEMAGLUTIDE 0.68 MG/ML
2 INJECTION, SOLUTION SUBCUTANEOUS
Qty: 1 | Refills: 3 | Status: DISCONTINUED | COMMUNITY
Start: 2023-09-19 | End: 2024-01-25

## 2024-01-25 RX ORDER — LIRAGLUTIDE 6 MG/ML
18 INJECTION SUBCUTANEOUS
Qty: 3 | Refills: 1 | Status: DISCONTINUED | COMMUNITY
Start: 2022-06-20 | End: 2024-01-25

## 2024-01-25 RX ORDER — SODIUM ZIRCONIUM CYCLOSILICATE 10 G/10G
10 POWDER, FOR SUSPENSION ORAL DAILY
Qty: 90 | Refills: 2 | Status: DISCONTINUED | COMMUNITY
Start: 2020-04-06 | End: 2024-01-25

## 2024-01-25 RX ORDER — TIRZEPATIDE 2.5 MG/.5ML
2.5 INJECTION, SOLUTION SUBCUTANEOUS
Qty: 3 | Refills: 3 | Status: DISCONTINUED | COMMUNITY
Start: 2023-10-04 | End: 2024-01-25

## 2024-01-25 RX ORDER — AMOXICILLIN 500 MG/1
500 CAPSULE ORAL
Qty: 4 | Refills: 5 | Status: DISCONTINUED | COMMUNITY
Start: 2023-12-04 | End: 2024-01-25

## 2024-01-25 RX ORDER — MULTIVIT-MIN/IRON/FOLIC ACID/K 18-600-40
500 CAPSULE ORAL
Qty: 90 | Refills: 3 | Status: DISCONTINUED | COMMUNITY
Start: 2021-11-04 | End: 2024-01-25

## 2024-01-25 RX ORDER — MULTIVIT-MIN/FOLIC/VIT K/LYCOP 400-300MCG
50 MCG TABLET ORAL
Qty: 90 | Refills: 1 | Status: DISCONTINUED | COMMUNITY
Start: 2021-11-04 | End: 2024-01-25

## 2024-01-26 NOTE — DISCUSSION/SUMMARY
[FreeTextEntry1] : The patient is a 63-year-old ex-smoker +FH, DM on insulin, HTN, HLD, gastric sleeve, HFpEF, s/p renal transplant who gained a few pounds. #1 CV- No angina, nl EF #2 HFpEF- c/w furosemide 40mg  #2 DM-  novolog, jardiance and trulicity for glucose control #3 HTN- c/w coreg 12.5mg bid #4 HLD- c/w atorvastatin optimal #5 Renal - s/p transplant, on immunosuppressant and prednisone #6 Neuro- improved sciatica.    [EKG obtained to assist in diagnosis and management of assessed problem(s)] : EKG obtained to assist in diagnosis and management of assessed problem(s)

## 2024-01-26 NOTE — HISTORY OF PRESENT ILLNESS
[FreeTextEntry1] : Orion has gained a few pounds and recently insulin was changed secondary to insurance. Not walking that much although still does alot of stairs at work.

## 2024-02-02 NOTE — PATIENT PROFILE ADULT - HAS THE PATIENT USED TOBACCO IN THE PAST 30 DAYS?
Render In Strict Bullet Format?: No
Continue Regimen: Triamcinolone cream 0.1%- apply to the affected areas twice a day as needed for flares
Detail Level: Zone
No

## 2024-02-05 RX ORDER — AMOXICILLIN AND CLAVULANATE POTASSIUM 500; 125 MG/1; MG/1
500-125 TABLET, FILM COATED ORAL
Qty: 4 | Refills: 0 | Status: DISCONTINUED | COMMUNITY
Start: 2024-02-05 | End: 2024-02-05

## 2024-02-05 RX ORDER — AMOXICILLIN 500 MG/1
500 CAPSULE ORAL
Qty: 4 | Refills: 0 | Status: ACTIVE | COMMUNITY
Start: 2023-12-04 | End: 1900-01-01

## 2024-02-14 ENCOUNTER — APPOINTMENT (OUTPATIENT)
Dept: NEPHROLOGY | Facility: CLINIC | Age: 64
End: 2024-02-14
Payer: COMMERCIAL

## 2024-02-14 VITALS
RESPIRATION RATE: 12 BRPM | SYSTOLIC BLOOD PRESSURE: 153 MMHG | HEIGHT: 68 IN | TEMPERATURE: 98.1 F | OXYGEN SATURATION: 99 % | BODY MASS INDEX: 28.04 KG/M2 | DIASTOLIC BLOOD PRESSURE: 93 MMHG | WEIGHT: 185 LBS | HEART RATE: 72 BPM

## 2024-02-14 DIAGNOSIS — Z79.899 OTHER LONG TERM (CURRENT) DRUG THERAPY: ICD-10-CM

## 2024-02-14 DIAGNOSIS — Z94.0 OTHER LONG TERM (CURRENT) DRUG THERAPY: ICD-10-CM

## 2024-02-14 PROCEDURE — 99214 OFFICE O/P EST MOD 30 MIN: CPT

## 2024-02-14 RX ORDER — TAMSULOSIN HYDROCHLORIDE 0.4 MG/1
0.4 CAPSULE ORAL
Qty: 90 | Refills: 3 | Status: DISCONTINUED | COMMUNITY
Start: 2020-02-19 | End: 2024-02-14

## 2024-02-14 RX ORDER — INSULIN ASPART 100 [IU]/ML
100 INJECTION, SOLUTION INTRAVENOUS; SUBCUTANEOUS
Qty: 90 | Refills: 3 | Status: DISCONTINUED | COMMUNITY
Start: 2020-08-27 | End: 2024-02-14

## 2024-02-14 RX ORDER — FAMOTIDINE 20 MG/1
20 TABLET, FILM COATED ORAL
Qty: 30 | Refills: 11 | Status: DISCONTINUED | COMMUNITY
Start: 2022-05-04 | End: 2024-02-14

## 2024-02-14 RX ORDER — MYCOPHENILIC ACID 360 MG/1
360 TABLET, DELAYED RELEASE ORAL
Qty: 90 | Refills: 3 | Status: ACTIVE | COMMUNITY
Start: 2022-08-01

## 2024-02-14 RX ORDER — INSULIN ASPART 100 [IU]/ML
100 INJECTION, SOLUTION INTRAVENOUS; SUBCUTANEOUS
Qty: 4 | Refills: 3 | Status: DISCONTINUED | COMMUNITY
Start: 2020-06-23 | End: 2024-02-14

## 2024-02-14 NOTE — ASSESSMENT
[FreeTextEntry1] : 63 year old male s/p LRT from daughter on 2/18/2020   1. s/p LRT from daughter on 2/18/2020 -  Allograft function has been good with baseline Cr ranging  ~ 1.2 .  2. 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 HTN- on Coreg 12.5 mg po bid  4. DM-  follows with Dr Medrano. 5. 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  6. Post transplant Erythrocytosis- on Lisinopril 2.5 mg po daily

## 2024-02-14 NOTE — HISTORY OF PRESENT ILLNESS
[Living Donor] : Living donor [Basiliximab] : basiliximab [Negative/Negative] : Donor Negative/Recipient Negative [TextBox_7] : 2/18/2020 [FreeTextEntry1] : 63 year old male with ESRD was on dialysis , now s/p LRT from his daughter on 2/12/2020 . PMH includes CHF, HTN, HLD, T2DM,  asthma, left glaucoma.  Donor: ABO O Donor CMV (-), EBV (+) Recipient ABO O Recipient CMV (-), EBV (+) HLA mismatch 1,1,1, crossmatch negative.  Feels well, no acute symptoms. Denies SOB , fever, chills, dysuria, LE edema, chest pain, nausea, vomiting, diarrhea or constipation .

## 2024-02-15 LAB
25(OH)D3 SERPL-MCNC: 20 NG/ML
ALBUMIN SERPL ELPH-MCNC: 4.3 G/DL
ALP BLD-CCNC: 75 U/L
ALT SERPL-CCNC: 44 U/L
ANION GAP SERPL CALC-SCNC: 11 MMOL/L
APPEARANCE: CLEAR
AST SERPL-CCNC: 31 U/L
BACTERIA: NEGATIVE /HPF
BASOPHILS # BLD AUTO: 0.02 K/UL
BASOPHILS NFR BLD AUTO: 0.3 %
BILIRUB SERPL-MCNC: 0.6 MG/DL
BILIRUBIN URINE: NEGATIVE
BKV DNA SPEC QL NAA+PROBE: 600 IU/ML
BLOOD URINE: NEGATIVE
BUN SERPL-MCNC: 23 MG/DL
CALCIUM SERPL-MCNC: 9.7 MG/DL
CALCIUM SERPL-MCNC: 9.7 MG/DL
CAST: 1 /LPF
CHLORIDE SERPL-SCNC: 107 MMOL/L
CHOLEST SERPL-MCNC: 145 MG/DL
CMV DNA SPEC QL NAA+PROBE: NOT DETECTED IU/ML
CMVPCR LOG: NOT DETECTED LOG10IU/ML
CO2 SERPL-SCNC: 22 MMOL/L
COLOR: YELLOW
CREAT SERPL-MCNC: 1.16 MG/DL
CREAT SPEC-SCNC: 119 MG/DL
CREAT/PROT UR: 0.2 RATIO
EGFR: 72 ML/MIN/1.73M2
EOSINOPHIL # BLD AUTO: 0.05 K/UL
EOSINOPHIL NFR BLD AUTO: 0.8 %
EPITHELIAL CELLS: 0 /HPF
ESTIMATED AVERAGE GLUCOSE: 169 MG/DL
GLUCOSE QUALITATIVE U: >=1000 MG/DL
GLUCOSE SERPL-MCNC: 151 MG/DL
HBA1C MFR BLD HPLC: 7.5 %
HCT VFR BLD CALC: 52.3 %
HDLC SERPL-MCNC: 50 MG/DL
HGB BLD-MCNC: 16.6 G/DL
IMM GRANULOCYTES NFR BLD AUTO: 0.5 %
KETONES URINE: NEGATIVE MG/DL
LDH SERPL-CCNC: 245 U/L
LDLC SERPL CALC-MCNC: 54 MG/DL
LEUKOCYTE ESTERASE URINE: NEGATIVE
LYMPHOCYTES # BLD AUTO: 1.41 K/UL
LYMPHOCYTES NFR BLD AUTO: 23.6 %
MAGNESIUM SERPL-MCNC: 2.2 MG/DL
MAN DIFF?: NORMAL
MCHC RBC-ENTMCNC: 31.7 GM/DL
MCHC RBC-ENTMCNC: 31.7 PG
MCV RBC AUTO: 100 FL
MICROSCOPIC-UA: NORMAL
MONOCYTES # BLD AUTO: 0.39 K/UL
MONOCYTES NFR BLD AUTO: 6.5 %
NEUTROPHILS # BLD AUTO: 4.07 K/UL
NEUTROPHILS NFR BLD AUTO: 68.3 %
NITRITE URINE: NEGATIVE
NONHDLC SERPL-MCNC: 95 MG/DL
PARATHYROID HORMONE INTACT: 62 PG/ML
PH URINE: 5.5
PHOSPHATE SERPL-MCNC: 3.6 MG/DL
PLATELET # BLD AUTO: 164 K/UL
POTASSIUM SERPL-SCNC: 5.3 MMOL/L
PROT SERPL-MCNC: 6.7 G/DL
PROT UR-MCNC: 20 MG/DL
PROTEIN URINE: NORMAL MG/DL
RBC # BLD: 5.23 M/UL
RBC # FLD: 13.1 %
RED BLOOD CELLS URINE: 0 /HPF
SODIUM SERPL-SCNC: 141 MMOL/L
SPECIFIC GRAVITY URINE: >1.03
TACROLIMUS SERPL-MCNC: 4.3 NG/ML
TRIGL SERPL-MCNC: 260 MG/DL
URATE SERPL-MCNC: 4.1 MG/DL
UROBILINOGEN URINE: 0.2 MG/DL
WBC # FLD AUTO: 5.97 K/UL
WHITE BLOOD CELLS URINE: 0 /HPF

## 2024-03-01 ENCOUNTER — APPOINTMENT (OUTPATIENT)
Dept: TRANSPLANT | Facility: CLINIC | Age: 64
End: 2024-03-01

## 2024-03-06 ENCOUNTER — APPOINTMENT (OUTPATIENT)
Dept: ENDOCRINOLOGY | Facility: CLINIC | Age: 64
End: 2024-03-06
Payer: COMMERCIAL

## 2024-03-06 PROCEDURE — G0108 DIAB MANAGE TRN  PER INDIV: CPT

## 2024-03-07 ENCOUNTER — NON-APPOINTMENT (OUTPATIENT)
Age: 64
End: 2024-03-07

## 2024-03-07 LAB
ALBUMIN SERPL ELPH-MCNC: 4.3 G/DL
ALP BLD-CCNC: 76 U/L
ALT SERPL-CCNC: 27 U/L
ANION GAP SERPL CALC-SCNC: 12 MMOL/L
APPEARANCE: CLEAR
AST SERPL-CCNC: 20 U/L
BACTERIA: NEGATIVE /HPF
BASOPHILS # BLD AUTO: 0.04 K/UL
BASOPHILS NFR BLD AUTO: 0.6 %
BILIRUB SERPL-MCNC: 0.6 MG/DL
BILIRUBIN URINE: NEGATIVE
BLOOD URINE: NEGATIVE
BUN SERPL-MCNC: 17 MG/DL
CALCIUM SERPL-MCNC: 9.9 MG/DL
CAST: 0 /LPF
CHLORIDE SERPL-SCNC: 107 MMOL/L
CO2 SERPL-SCNC: 25 MMOL/L
COLOR: YELLOW
CREAT SERPL-MCNC: 1.08 MG/DL
CREAT SPEC-SCNC: 112 MG/DL
CREAT/PROT UR: 0.1 RATIO
EGFR: 78 ML/MIN/1.73M2
EOSINOPHIL # BLD AUTO: 0.05 K/UL
EOSINOPHIL NFR BLD AUTO: 0.7 %
EPITHELIAL CELLS: 0 /HPF
GLUCOSE QUALITATIVE U: >=1000 MG/DL
GLUCOSE SERPL-MCNC: 117 MG/DL
HCT VFR BLD CALC: 53.7 %
HGB BLD-MCNC: 16.7 G/DL
IMM GRANULOCYTES NFR BLD AUTO: 0.3 %
KETONES URINE: NEGATIVE MG/DL
LEUKOCYTE ESTERASE URINE: NEGATIVE
LYMPHOCYTES # BLD AUTO: 1.66 K/UL
LYMPHOCYTES NFR BLD AUTO: 24.2 %
MAGNESIUM SERPL-MCNC: 2 MG/DL
MAN DIFF?: NORMAL
MCHC RBC-ENTMCNC: 31.1 GM/DL
MCHC RBC-ENTMCNC: 31.6 PG
MCV RBC AUTO: 101.5 FL
MICROSCOPIC-UA: NORMAL
MONOCYTES # BLD AUTO: 0.58 K/UL
MONOCYTES NFR BLD AUTO: 8.5 %
NEUTROPHILS # BLD AUTO: 4.5 K/UL
NEUTROPHILS NFR BLD AUTO: 65.7 %
NITRITE URINE: NEGATIVE
PH URINE: 5.5
PHOSPHATE SERPL-MCNC: 2.8 MG/DL
PLATELET # BLD AUTO: 192 K/UL
POTASSIUM SERPL-SCNC: 5.1 MMOL/L
PROT SERPL-MCNC: 6.7 G/DL
PROT UR-MCNC: 11 MG/DL
PROTEIN URINE: NEGATIVE MG/DL
RBC # BLD: 5.29 M/UL
RBC # FLD: 12.9 %
RED BLOOD CELLS URINE: 0 /HPF
SODIUM SERPL-SCNC: 144 MMOL/L
SPECIFIC GRAVITY URINE: >1.03
TACROLIMUS SERPL-MCNC: 12.6 NG/ML
UROBILINOGEN URINE: 0.2 MG/DL
WBC # FLD AUTO: 6.85 K/UL
WHITE BLOOD CELLS URINE: 0 /HPF

## 2024-03-07 RX ORDER — TACROLIMUS 1 MG/1
1 CAPSULE ORAL DAILY
Qty: 270 | Refills: 3 | Status: ACTIVE | COMMUNITY
Start: 2020-02-20

## 2024-03-11 NOTE — PRE-ANESTHESIA EVALUATION ADULT - NSANTHPROCED_GEN_ALL_CORE
Kidney function was a little better compared to a month ago.  Her blood sugar was 240.  Not certain if this was fasting or not but make sure her blood sugars are under good control.  If they are elevated it will cause further damage to the kidneys.  Otherwise if doing well repeat  kidney blood test in 2 months.  Best to fast for 6 hours before doing the test so we can  an accurate blood sugar reading.  Orders are in the computer.  Call with any questions or concerns.   Written by Tab Thayer MD on 3/5/2024  6:30 PM CST   Arterial Catheter/Central Venous Catheter

## 2024-03-14 ENCOUNTER — APPOINTMENT (OUTPATIENT)
Dept: ENDOCRINOLOGY | Facility: CLINIC | Age: 64
End: 2024-03-14
Payer: COMMERCIAL

## 2024-03-14 VITALS
SYSTOLIC BLOOD PRESSURE: 124 MMHG | DIASTOLIC BLOOD PRESSURE: 70 MMHG | WEIGHT: 188 LBS | HEART RATE: 73 BPM | OXYGEN SATURATION: 96 % | BODY MASS INDEX: 28.49 KG/M2 | HEIGHT: 68 IN

## 2024-03-14 DIAGNOSIS — Z96.41 PRESENCE OF INSULIN PUMP (EXTERNAL) (INTERNAL): ICD-10-CM

## 2024-03-14 DIAGNOSIS — G62.9 POLYNEUROPATHY, UNSPECIFIED: ICD-10-CM

## 2024-03-14 PROCEDURE — 99215 OFFICE O/P EST HI 40 MIN: CPT

## 2024-03-21 ENCOUNTER — APPOINTMENT (OUTPATIENT)
Dept: TRANSPLANT | Facility: CLINIC | Age: 64
End: 2024-03-21

## 2024-03-25 RX ORDER — EMPAGLIFLOZIN 25 MG/1
25 TABLET, FILM COATED ORAL DAILY
Qty: 90 | Refills: 3 | Status: DISCONTINUED | COMMUNITY
Start: 2021-10-20 | End: 2024-03-25

## 2024-03-25 RX ORDER — EMPAGLIFLOZIN 25 MG/1
25 TABLET, FILM COATED ORAL DAILY
Qty: 90 | Refills: 3 | Status: ACTIVE | COMMUNITY
Start: 2024-02-14 | End: 1900-01-01

## 2024-04-01 ENCOUNTER — NON-APPOINTMENT (OUTPATIENT)
Age: 64
End: 2024-04-01

## 2024-04-01 LAB
ALBUMIN SERPL ELPH-MCNC: 4.4 G/DL
ALP BLD-CCNC: 87 U/L
ALT SERPL-CCNC: 24 U/L
ANION GAP SERPL CALC-SCNC: 13 MMOL/L
APPEARANCE: CLEAR
AST SERPL-CCNC: 21 U/L
BACTERIA: NEGATIVE /HPF
BILIRUB SERPL-MCNC: 0.5 MG/DL
BILIRUBIN URINE: NEGATIVE
BLOOD URINE: NEGATIVE
BUN SERPL-MCNC: 22 MG/DL
CALCIUM SERPL-MCNC: 10 MG/DL
CAST: 0 /LPF
CHLORIDE SERPL-SCNC: 107 MMOL/L
CO2 SERPL-SCNC: 23 MMOL/L
COLOR: YELLOW
CREAT SERPL-MCNC: 1.07 MG/DL
EGFR: 79 ML/MIN/1.73M2
EPITHELIAL CELLS: 0 /HPF
GLUCOSE QUALITATIVE U: >=1000 MG/DL
GLUCOSE SERPL-MCNC: 116 MG/DL
KETONES URINE: NEGATIVE MG/DL
LEUKOCYTE ESTERASE URINE: NEGATIVE
MICROSCOPIC-UA: NORMAL
NITRITE URINE: NEGATIVE
PH URINE: 5.5
POTASSIUM SERPL-SCNC: 5.3 MMOL/L
PROT SERPL-MCNC: 6.9 G/DL
PROTEIN URINE: NEGATIVE MG/DL
RED BLOOD CELLS URINE: 0 /HPF
SODIUM SERPL-SCNC: 142 MMOL/L
SPECIFIC GRAVITY URINE: >1.03
TACROLIMUS SERPL-MCNC: 12 NG/ML
UROBILINOGEN URINE: 0.2 MG/DL
WHITE BLOOD CELLS URINE: 0 /HPF

## 2024-04-01 RX ORDER — AMMONIUM LACTATE 12 %
12 CREAM (GRAM) TOPICAL TWICE DAILY
Qty: 1 | Refills: 3 | Status: ACTIVE | COMMUNITY
Start: 2024-04-01 | End: 1900-01-01

## 2024-04-03 DIAGNOSIS — Z94.0 KIDNEY TRANSPLANT STATUS: ICD-10-CM

## 2024-04-04 RX ORDER — AMMONIUM LACTATE 12 %
12 CREAM (GRAM) TOPICAL TWICE DAILY
Qty: 1 | Refills: 3 | Status: ACTIVE | COMMUNITY
Start: 2024-04-02 | End: 1900-01-01

## 2024-04-13 ENCOUNTER — LABORATORY RESULT (OUTPATIENT)
Age: 64
End: 2024-04-13

## 2024-04-22 RX ORDER — AMOXICILLIN 500 MG/1
500 CAPSULE ORAL
Qty: 4 | Refills: 2 | Status: ACTIVE | COMMUNITY
Start: 2024-02-15 | End: 1900-01-01

## 2024-04-25 NOTE — PRE-ANESTHESIA EVALUATION ADULT - NSANTHOSAYNRD_GEN_A_CORE
No. ARIADNA screening performed.  STOP BANG Legend: 0-2 = LOW Risk; 3-4 = INTERMEDIATE Risk; 5-8 = HIGH Risk
CONSTITUTIONAL: No fever, weight loss  EYES: No eye pain, visual disturbances, or discharge  ENMT: No difficulty hearing, tinnitus, vertigo; No sinus or throat pain  RESPIRATORY: SOB. No cough, wheezing, chills or hemoptysis  CARDIOVASCULAR: Chest pain w/ exertion. No palpitations, dizziness, or leg swelling  GASTROINTESTINAL: No abdominal or epigastric pain. No nausea, vomiting, or hematemesis; No diarrhea or constipation. No melena or hematochezia.  GENITOURINARY: No dysuria, frequency, hematuria, or incontinence  NEUROLOGICAL: No headaches, memory loss, loss of strength, numbness, or tremors  SKIN: No itching, burning, rashes, or lesions   LYMPH NODES: No enlarged glands  ENDOCRINE: No heat or cold intolerance; No hair loss  MUSCULOSKELETAL: b/l LE weakness. No joint pain or swelling;  PSYCHIATRIC: No depression, anxiety, mood swings, or difficulty sleeping  HEME/LYMPH: No easy bruising, or bleeding gums

## 2024-05-04 NOTE — DISCHARGE NOTE PROVIDER - NSDCHOSPICE_GEN_A_CORE
Problem: Safety - Adult  Goal: Free from fall injury  Outcome: Progressing  Flowsheets (Taken 5/4/2024 1710)  Free From Fall Injury: Instruct family/caregiver on patient safety  Note: Orthostatic vital signs obtained at start of shift - see flowsheet for details.  Pt does not meet criteria for orthostasis.  Pt is a Low fall risk. See Apple Fall Score and ABCDS Injury Risk assessments.     - Screening for Orthostasis AND not a [place] Risk per APPLE/ABCDS: Pt bed is in low position, side rails up, call light and belongings are in reach.  Fall risk light is on outside pts room.  Pt encouraged to call for assistance as needed. Will continue with hourly rounds for PO intake, pain needs, toileting and repositioning as needed.      Problem: Musculoskeletal - Adult  Goal: Return mobility to safest level of function  Outcome: Progressing  Flowsheets (Taken 5/4/2024 1710)  Return Mobility to Safest Level of Function: Assess patient stability and activity tolerance for standing, transferring and ambulating with or without assistive devices  Note: Pt independent during shift.      Problem: Hematologic - Adult  Goal: Maintains hematologic stability  Outcome: Progressing  Flowsheets (Taken 5/4/2024 1710)  Maintains hematologic stability:   Monitor labs for bleeding or clotting disorders   Assess for signs and symptoms of bleeding or hemorrhage  Note: Patient's hemoglobin this AM:   Recent Labs     05/04/24  0340   HGB 7.5*     Patient's platelet count this AM:   Recent Labs     05/04/24  0340   PLT 39*    Thrombocytopenia not present at this time.  Patient showing no signs or symptoms of active bleeding.  Transfusion not indicated at this time.  Patient verbalizes understanding of all instructions. Will continue to assess and implement POC. Call light within reach and hourly rounding in place.      Problem: Pain  Goal: Verbalizes/displays adequate comfort level or baseline comfort level  Outcome: Progressing  Flowsheets  No

## 2024-05-08 ENCOUNTER — APPOINTMENT (OUTPATIENT)
Dept: ENDOCRINOLOGY | Facility: CLINIC | Age: 64
End: 2024-05-08
Payer: COMMERCIAL

## 2024-05-08 VITALS — BODY MASS INDEX: 28.59 KG/M2 | WEIGHT: 188 LBS

## 2024-05-08 PROCEDURE — G0108 DIAB MANAGE TRN  PER INDIV: CPT

## 2024-05-08 RX ORDER — DULAGLUTIDE 0.75 MG/.5ML
0.75 INJECTION, SOLUTION SUBCUTANEOUS
Qty: 6 | Refills: 3 | Status: DISCONTINUED | COMMUNITY
Start: 2023-10-16 | End: 2024-05-08

## 2024-05-16 NOTE — PHYSICAL THERAPY INITIAL EVALUATION ADULT - STANDING BALANCE: DYNAMIC, REHAB EVAL
Dr Lakhani patient is a 33 y/o  EDC 2024 EGA 36.0 admitted for SROM and labor.    - Discussed with Lachelle.  - Admit to Labor and Delivery  - Continuous EFM  - Clear diet, IV fluids  - Blood consent signed  - Standard labs (T&S, CBC, RPR)  - Epidural and expectant management.    Risks, benefits, alternatives, and possible complications have been discussed in detail with the patient in her native language. Pre-admission, admission, and post admission procedures and expectations were discussed in detail. All questions answered, all appropriate hospital consents were signed. Anticipate normal vaginal delivery.   Informed consent was obtained. The following was discussed:   - Induction/augmentation of labor: use of medication and/or cook balloon to begin or enhance labor   - Obstetrical management including internal fetal/contraction monitoring   - Normal vaginal delivery   - Possible  section
good minus

## 2024-05-23 ENCOUNTER — NON-APPOINTMENT (OUTPATIENT)
Age: 64
End: 2024-05-23

## 2024-05-23 ENCOUNTER — APPOINTMENT (OUTPATIENT)
Dept: CARDIOLOGY | Facility: CLINIC | Age: 64
End: 2024-05-23
Payer: COMMERCIAL

## 2024-05-23 VITALS
RESPIRATION RATE: 12 BRPM | BODY MASS INDEX: 28.49 KG/M2 | HEART RATE: 73 BPM | DIASTOLIC BLOOD PRESSURE: 72 MMHG | SYSTOLIC BLOOD PRESSURE: 116 MMHG | WEIGHT: 188 LBS | HEIGHT: 68 IN | OXYGEN SATURATION: 95 %

## 2024-05-23 DIAGNOSIS — E78.5 HYPERLIPIDEMIA, UNSPECIFIED: ICD-10-CM

## 2024-05-23 DIAGNOSIS — I10 ESSENTIAL (PRIMARY) HYPERTENSION: ICD-10-CM

## 2024-05-23 PROCEDURE — 93000 ELECTROCARDIOGRAM COMPLETE: CPT

## 2024-05-23 PROCEDURE — 99214 OFFICE O/P EST MOD 30 MIN: CPT | Mod: 25

## 2024-05-23 PROCEDURE — G2211 COMPLEX E/M VISIT ADD ON: CPT | Mod: NC,1L

## 2024-05-23 RX ORDER — CARVEDILOL 6.25 MG/1
6.25 TABLET, FILM COATED ORAL TWICE DAILY
Qty: 180 | Refills: 1 | Status: ACTIVE | COMMUNITY
Start: 2020-02-20 | End: 1900-01-01

## 2024-05-24 NOTE — DISCUSSION/SUMMARY
[FreeTextEntry1] : The patient is a 63-year-old ex-smoker +FH, DM on insulin, HTN, HLD, gastric sleeve, HFpEF, s/p renal transplant who lost some of the weight he gained. #1 CV- No angina, nl EF #2 HFpEF- c/w furosemide 40mg  #2 DM-  novolog, jardiance and trulicity for glucose control #3 HTN- c/w lisionpril 2.5mg, decrease coreg to 6.25mg bid #4 HLD- c/w atorvastatin optimal #5 Renal - s/p transplant, on immunosuppressant and prednisone #6 Neuro- improved sciatica.    [EKG obtained to assist in diagnosis and management of assessed problem(s)] : EKG obtained to assist in diagnosis and management of assessed problem(s)

## 2024-05-24 NOTE — HISTORY OF PRESENT ILLNESS
[FreeTextEntry1] : Orion is feeling well. Glucose was high and working on it now. Getting better. Stress at work. Getting a promotion. Otherwise not walking except for at work. No sx.Dizziness resolved on lower dose lisinopril.

## 2024-06-07 RX ORDER — INSULIN ASPART 100 [IU]/ML
100 INJECTION, SOLUTION INTRAVENOUS; SUBCUTANEOUS
Qty: 9 | Refills: 1 | Status: ACTIVE | COMMUNITY
Start: 2022-07-05 | End: 1900-01-01

## 2024-06-10 DIAGNOSIS — E11.9 TYPE 2 DIABETES MELLITUS W/OUT COMPLICATIONS: ICD-10-CM

## 2024-06-10 RX ORDER — LANCING DEVICE
EACH MISCELLANEOUS
Qty: 1 | Refills: 4 | Status: ACTIVE | COMMUNITY
Start: 2024-06-10 | End: 1900-01-01

## 2024-06-10 RX ORDER — BLOOD-GLUCOSE SENSOR
EACH MISCELLANEOUS
Qty: 9 | Refills: 3 | Status: ACTIVE | COMMUNITY
Start: 2023-07-25 | End: 1900-01-01

## 2024-06-10 RX ORDER — LANCING DEVICE
EACH MISCELLANEOUS
Qty: 1 | Refills: 1 | Status: ACTIVE | COMMUNITY
Start: 2024-06-10 | End: 1900-01-01

## 2024-06-10 RX ORDER — CHOLECALCIFEROL (VITAMIN D3) 10(400)/ML
DROPS ORAL
Qty: 1 | Refills: 3 | Status: ACTIVE | COMMUNITY
Start: 2024-06-10 | End: 1900-01-01

## 2024-06-14 RX ORDER — DULAGLUTIDE 3 MG/.5ML
3 INJECTION, SOLUTION SUBCUTANEOUS
Qty: 3 | Refills: 1 | Status: ACTIVE | COMMUNITY
Start: 2024-04-22 | End: 1900-01-01

## 2024-07-01 ENCOUNTER — RX RENEWAL (OUTPATIENT)
Age: 64
End: 2024-07-01

## 2024-07-03 RX ORDER — BLOOD SUGAR DIAGNOSTIC
STRIP MISCELLANEOUS 3 TIMES DAILY
Qty: 8 | Refills: 3 | Status: ACTIVE | COMMUNITY
Start: 2024-07-03 | End: 1900-01-01

## 2024-07-19 ENCOUNTER — APPOINTMENT (OUTPATIENT)
Dept: ENDOCRINOLOGY | Facility: CLINIC | Age: 64
End: 2024-07-19

## 2024-07-19 VITALS
WEIGHT: 185 LBS | HEIGHT: 68 IN | OXYGEN SATURATION: 95 % | SYSTOLIC BLOOD PRESSURE: 118 MMHG | DIASTOLIC BLOOD PRESSURE: 64 MMHG | BODY MASS INDEX: 28.04 KG/M2 | HEART RATE: 72 BPM

## 2024-07-19 DIAGNOSIS — E11.9 TYPE 2 DIABETES MELLITUS W/OUT COMPLICATIONS: ICD-10-CM

## 2024-07-19 DIAGNOSIS — E78.5 HYPERLIPIDEMIA, UNSPECIFIED: ICD-10-CM

## 2024-07-19 DIAGNOSIS — I10 ESSENTIAL (PRIMARY) HYPERTENSION: ICD-10-CM

## 2024-07-19 DIAGNOSIS — N18.9 CHRONIC KIDNEY DISEASE, UNSPECIFIED: ICD-10-CM

## 2024-07-19 DIAGNOSIS — Z94.0 KIDNEY TRANSPLANT STATUS: ICD-10-CM

## 2024-07-19 PROCEDURE — 99214 OFFICE O/P EST MOD 30 MIN: CPT

## 2024-07-19 PROCEDURE — G2211 COMPLEX E/M VISIT ADD ON: CPT | Mod: NC

## 2024-07-19 PROCEDURE — 83036 HEMOGLOBIN GLYCOSYLATED A1C: CPT | Mod: QW

## 2024-07-22 LAB — HBA1C MFR BLD HPLC: 7.2

## 2024-08-29 NOTE — PHYSICAL THERAPY INITIAL EVALUATION ADULT - ADDITIONAL COMMENTS
YUE Representative present for appointment virtually.     Patient was here for CI follow up, after last appointment 6/6. Impedances were good. Magnet site looked good. Changed microphone cover due to recent decreased sounds.    Patient reported wearing processor all waking hours. Datalogging confirmed 6.1 hours.    Patient stated overall doing good, besides the recent decrease in hearing. Changed microphone cover and patient reported better outcomes after changed. MCLs were measured and patient is within the 20-25 charge unit area. Patient was satisfied with loudness and comfort.    Soundfield testing was completed and results were in the mild hearing loss range.     Patient will follow up 1/30/25.    Juan Manuel Peters/CCC-A  [facility] [id number]  Electronically signed by Juan Manuel Peters on 8/29/2024 at 10:47 AM     PTA pt lived in pvt home with wife and son (son home during the day), 3 steps to enter through the back, no steps inside except flight to basement, pt fully independent without AD, walk in shower.

## 2024-09-19 ENCOUNTER — APPOINTMENT (OUTPATIENT)
Dept: CARDIOLOGY | Facility: CLINIC | Age: 64
End: 2024-09-19

## 2024-09-19 ENCOUNTER — EMERGENCY (EMERGENCY)
Facility: HOSPITAL | Age: 64
LOS: 1 days | Discharge: HOME | End: 2024-09-19
Attending: EMERGENCY MEDICINE
Payer: COMMERCIAL

## 2024-09-19 VITALS
HEART RATE: 77 BPM | SYSTOLIC BLOOD PRESSURE: 127 MMHG | WEIGHT: 179.9 LBS | OXYGEN SATURATION: 98 % | TEMPERATURE: 98 F | RESPIRATION RATE: 16 BRPM | DIASTOLIC BLOOD PRESSURE: 74 MMHG

## 2024-09-19 DIAGNOSIS — Z98.890 OTHER SPECIFIED POSTPROCEDURAL STATES: Chronic | ICD-10-CM

## 2024-09-19 DIAGNOSIS — H35.62 RETINAL HEMORRHAGE, LEFT EYE: Chronic | ICD-10-CM

## 2024-09-19 DIAGNOSIS — Z98.49 CATARACT EXTRACTION STATUS, UNSPECIFIED EYE: Chronic | ICD-10-CM

## 2024-09-19 DIAGNOSIS — Z98.84 BARIATRIC SURGERY STATUS: Chronic | ICD-10-CM

## 2024-09-19 PROCEDURE — 73562 X-RAY EXAM OF KNEE 3: CPT

## 2024-09-19 PROCEDURE — 99284 EMERGENCY DEPT VISIT MOD MDM: CPT

## 2024-09-19 PROCEDURE — 73610 X-RAY EXAM OF ANKLE: CPT

## 2024-09-19 PROCEDURE — 73610 X-RAY EXAM OF ANKLE: CPT | Mod: 26,RT

## 2024-09-19 PROCEDURE — 73562 X-RAY EXAM OF KNEE 3: CPT | Mod: 26,RT

## 2024-09-19 PROCEDURE — 99284 EMERGENCY DEPT VISIT MOD MDM: CPT | Mod: 25

## 2024-09-21 NOTE — DISCUSSION/SUMMARY
[FreeTextEntry1] : The patient is a 59-year-old ex-smoker family history of coronary artery disease, diabetes mellitus on insulin, hypertension, hyperlipidemia, renal insufficiency, s/p gastric sleeve , HFpEF, s/p renal transplant who is doing well. \par #1 CV- No angina\par #2 HFpEF- on bumex\par #2 DM- on insulin, better control, continues to improve\par #3 Htn- coreg 25mg bid\par #4 Lipids- atorvastatin optimal\par #5 Renal - s/p transplant, on immunosuppressant and prednisone\par \par 
99.8

## 2024-10-01 ENCOUNTER — RX RENEWAL (OUTPATIENT)
Age: 64
End: 2024-10-01

## 2024-10-02 DIAGNOSIS — Z94.0 KIDNEY TRANSPLANT STATUS: ICD-10-CM

## 2024-10-03 ENCOUNTER — APPOINTMENT (OUTPATIENT)
Dept: CARDIOLOGY | Facility: CLINIC | Age: 64
End: 2024-10-03
Payer: COMMERCIAL

## 2024-10-03 ENCOUNTER — NON-APPOINTMENT (OUTPATIENT)
Age: 64
End: 2024-10-03

## 2024-10-03 VITALS
WEIGHT: 182 LBS | OXYGEN SATURATION: 96 % | HEIGHT: 68 IN | TEMPERATURE: 97.3 F | BODY MASS INDEX: 27.58 KG/M2 | DIASTOLIC BLOOD PRESSURE: 87 MMHG | SYSTOLIC BLOOD PRESSURE: 156 MMHG | HEART RATE: 89 BPM | RESPIRATION RATE: 12 BRPM

## 2024-10-03 DIAGNOSIS — E78.5 HYPERLIPIDEMIA, UNSPECIFIED: ICD-10-CM

## 2024-10-03 DIAGNOSIS — I10 ESSENTIAL (PRIMARY) HYPERTENSION: ICD-10-CM

## 2024-10-03 DIAGNOSIS — I73.9 PERIPHERAL VASCULAR DISEASE, UNSPECIFIED: ICD-10-CM

## 2024-10-03 DIAGNOSIS — Z23 ENCOUNTER FOR IMMUNIZATION: ICD-10-CM

## 2024-10-03 DIAGNOSIS — I70.90 UNSPECIFIED ATHEROSCLEROSIS: ICD-10-CM

## 2024-10-03 DIAGNOSIS — N18.5 CHRONIC KIDNEY DISEASE, STAGE 5: ICD-10-CM

## 2024-10-03 PROCEDURE — 90471 IMMUNIZATION ADMIN: CPT

## 2024-10-03 PROCEDURE — 90673 RIV3 VACCINE NO PRESERV IM: CPT

## 2024-10-03 PROCEDURE — 99214 OFFICE O/P EST MOD 30 MIN: CPT | Mod: 25

## 2024-10-03 PROCEDURE — 93000 ELECTROCARDIOGRAM COMPLETE: CPT

## 2024-10-04 NOTE — HISTORY OF PRESENT ILLNESS
[FreeTextEntry1] : Orion is in good spirits as he is off today from work. Started to walk a little and lost some more weight. New pump for insulin is helping. No CP, palpitations or SOB>

## 2024-10-04 NOTE — DISCUSSION/SUMMARY
[FreeTextEntry1] : The patient is a 64-year-old ex-smoker +FH, DM on insulin, HTN, HLD, gastric sleeve, HFpEF, s/p renal transplant who continues to lose weight.. #1 CV- No angina, nl EF #2 HFpEF- c/w furosemide 40mg  #2 DM-  Novolog, Jardiance and Trulicity, now with pump. #3 HTN- c/w lisinopril 2.5mg, better on coreg 6.25mg bid #4 HLD- c/w atorvastatin optimal #5 Renal - s/p transplant, on immunosuppressant and prednisone #6 Neuro- improved sciatica.    [EKG obtained to assist in diagnosis and management of assessed problem(s)] : EKG obtained to assist in diagnosis and management of assessed problem(s)

## 2024-10-14 NOTE — REVIEW OF SYSTEMS
keeps eyes closed, answers some questions, follows most commands [Fatigue] : no fatigue [Decreased Appetite] : appetite not decreased [Visual Field Defect] : no visual field defect [Dysphagia] : no dysphagia [Neck Pain] : no neck pain [Dysphonia] : no dysphonia [Nasal Congestion] : no nasal congestion [Chest Pain] : no chest pain [Slow Heart Rate] : heart rate is not slow [Palpitations] : no palpitations [Fast Heart Rate] : heart rate is not fast [Shortness Of Breath] : no shortness of breath [Nausea] : no nausea [Vomiting] : no vomiting [Polyuria] : no polyuria [Hesistancy] : no hesitancy [Joint Pain] : no joint pain [Muscle Weakness] : no muscle weakness [Acanthosis] : no acanthosis  [Acne] : no acne [Headaches] : no headaches [Dizziness] : no dizziness [Tremors] : no tremors [Pain/Numbness of Digits] : no pain/numbness of digits [Depression] : no depression [Cold Intolerance] : no cold intolerance [Polydipsia] : no polydipsia [Easy Bleeding] : no ~M tendency for easy bleeding [Easy Bruising] : no tendency for easy bruising

## 2024-10-24 ENCOUNTER — APPOINTMENT (OUTPATIENT)
Dept: ENDOCRINOLOGY | Facility: CLINIC | Age: 64
End: 2024-10-24
Payer: COMMERCIAL

## 2024-10-24 VITALS
WEIGHT: 182 LBS | DIASTOLIC BLOOD PRESSURE: 68 MMHG | SYSTOLIC BLOOD PRESSURE: 132 MMHG | HEART RATE: 87 BPM | BODY MASS INDEX: 27.67 KG/M2 | OXYGEN SATURATION: 98 %

## 2024-10-24 DIAGNOSIS — Z96.41 PRESENCE OF INSULIN PUMP (EXTERNAL) (INTERNAL): ICD-10-CM

## 2024-10-24 DIAGNOSIS — E11.9 TYPE 2 DIABETES MELLITUS W/OUT COMPLICATIONS: ICD-10-CM

## 2024-10-24 DIAGNOSIS — I10 ESSENTIAL (PRIMARY) HYPERTENSION: ICD-10-CM

## 2024-10-24 DIAGNOSIS — E78.5 HYPERLIPIDEMIA, UNSPECIFIED: ICD-10-CM

## 2024-10-24 PROCEDURE — 95251 CONT GLUC MNTR ANALYSIS I&R: CPT

## 2024-10-24 PROCEDURE — 99214 OFFICE O/P EST MOD 30 MIN: CPT

## 2024-10-24 PROCEDURE — G2211 COMPLEX E/M VISIT ADD ON: CPT | Mod: NC

## 2024-10-24 RX ORDER — DULAGLUTIDE 3 MG/.5ML
3 INJECTION, SOLUTION SUBCUTANEOUS
Qty: 3 | Refills: 0 | Status: ACTIVE | COMMUNITY
Start: 2024-10-24 | End: 1900-01-01

## 2024-11-18 NOTE — REVIEW OF SYSTEMS
[Fatigue] : no fatigue [Decreased Appetite] : appetite not decreased [Recent Weight Gain (___ Lbs)] : no recent weight gain [Recent Weight Loss (___ Lbs)] : no recent weight loss [Visual Field Defect] : no visual field defect [Dry Eyes] : no dryness [Dysphagia] : no dysphagia [Neck Pain] : no neck pain [Dysphonia] : no dysphonia [Nasal Congestion] : no nasal congestion [Chest Pain] : no chest pain [Slow Heart Rate] : heart rate is not slow [Palpitations] : no palpitations [Fast Heart Rate] : heart rate is not fast [Shortness Of Breath] : no shortness of breath [Cough] : no cough [Nausea] : no nausea [Constipation] : no constipation [Vomiting] : no vomiting [Diarrhea] : no diarrhea [Polyuria] : no polyuria [Hesistancy] : no hesitancy [Joint Pain] : no joint pain [Muscle Weakness] : no muscle weakness [Acanthosis] : no acanthosis  [Acne] : no acne [Headaches] : no headaches [Dizziness] : no dizziness [Tremors] : no tremors [Pain/Numbness of Digits] : no pain/numbness of digits [Depression] : no depression [Polydipsia] : no polydipsia [Cold Intolerance] : no cold intolerance [Easy Bleeding] : no ~M tendency for easy bleeding [Easy Bruising] : no tendency for easy bruising Never smoker

## 2024-12-05 DIAGNOSIS — Z94.0 KIDNEY TRANSPLANT STATUS: ICD-10-CM

## 2024-12-09 ENCOUNTER — APPOINTMENT (OUTPATIENT)
Dept: PULMONOLOGY | Facility: CLINIC | Age: 64
End: 2024-12-09

## 2024-12-09 PROCEDURE — 99213 OFFICE O/P EST LOW 20 MIN: CPT

## 2025-01-06 DIAGNOSIS — Z94.0 KIDNEY TRANSPLANT STATUS: ICD-10-CM

## 2025-01-09 ENCOUNTER — APPOINTMENT (OUTPATIENT)
Dept: ENDOCRINOLOGY | Facility: CLINIC | Age: 65
End: 2025-01-09

## 2025-01-15 ENCOUNTER — INPATIENT (INPATIENT)
Facility: HOSPITAL | Age: 65
LOS: 12 days | Discharge: HOME HEALTH | DRG: 854 | End: 2025-01-28
Attending: INTERNAL MEDICINE | Admitting: STUDENT IN AN ORGANIZED HEALTH CARE EDUCATION/TRAINING PROGRAM
Payer: COMMERCIAL

## 2025-01-15 VITALS
HEART RATE: 91 BPM | WEIGHT: 184.97 LBS | RESPIRATION RATE: 20 BRPM | DIASTOLIC BLOOD PRESSURE: 81 MMHG | OXYGEN SATURATION: 95 % | SYSTOLIC BLOOD PRESSURE: 156 MMHG | HEIGHT: 68.5 IN | TEMPERATURE: 102 F

## 2025-01-15 DIAGNOSIS — Z98.890 OTHER SPECIFIED POSTPROCEDURAL STATES: Chronic | ICD-10-CM

## 2025-01-15 DIAGNOSIS — I10 ESSENTIAL (PRIMARY) HYPERTENSION: ICD-10-CM

## 2025-01-15 DIAGNOSIS — E78.5 HYPERLIPIDEMIA, UNSPECIFIED: ICD-10-CM

## 2025-01-15 DIAGNOSIS — E11.9 TYPE 2 DIABETES MELLITUS WITHOUT COMPLICATIONS: ICD-10-CM

## 2025-01-15 DIAGNOSIS — Z29.9 ENCOUNTER FOR PROPHYLACTIC MEASURES, UNSPECIFIED: ICD-10-CM

## 2025-01-15 DIAGNOSIS — L03.90 CELLULITIS, UNSPECIFIED: ICD-10-CM

## 2025-01-15 DIAGNOSIS — Z98.84 BARIATRIC SURGERY STATUS: Chronic | ICD-10-CM

## 2025-01-15 DIAGNOSIS — Z94.0 KIDNEY TRANSPLANT STATUS: ICD-10-CM

## 2025-01-15 DIAGNOSIS — Z98.49 CATARACT EXTRACTION STATUS, UNSPECIFIED EYE: Chronic | ICD-10-CM

## 2025-01-15 DIAGNOSIS — L03.115 CELLULITIS OF RIGHT LOWER LIMB: ICD-10-CM

## 2025-01-15 DIAGNOSIS — A41.9 SEPSIS, UNSPECIFIED ORGANISM: ICD-10-CM

## 2025-01-15 DIAGNOSIS — H35.62 RETINAL HEMORRHAGE, LEFT EYE: Chronic | ICD-10-CM

## 2025-01-15 LAB
ALBUMIN SERPL ELPH-MCNC: 3.9 G/DL — SIGNIFICANT CHANGE UP (ref 3.3–5)
ALP SERPL-CCNC: 98 U/L — SIGNIFICANT CHANGE UP (ref 40–120)
ALT FLD-CCNC: 20 U/L — SIGNIFICANT CHANGE UP (ref 10–45)
ANION GAP SERPL CALC-SCNC: 15 MMOL/L — SIGNIFICANT CHANGE UP (ref 5–17)
APPEARANCE UR: CLEAR — SIGNIFICANT CHANGE UP
APTT BLD: 38.3 SEC — HIGH (ref 24.5–35.6)
AST SERPL-CCNC: 19 U/L — SIGNIFICANT CHANGE UP (ref 10–40)
BACTERIA # UR AUTO: NEGATIVE /HPF — SIGNIFICANT CHANGE UP
BASOPHILS # BLD AUTO: 0.03 K/UL — SIGNIFICANT CHANGE UP (ref 0–0.2)
BASOPHILS NFR BLD AUTO: 0.2 % — SIGNIFICANT CHANGE UP (ref 0–2)
BILIRUB SERPL-MCNC: 0.7 MG/DL — SIGNIFICANT CHANGE UP (ref 0.2–1.2)
BILIRUB UR-MCNC: NEGATIVE — SIGNIFICANT CHANGE UP
BUN SERPL-MCNC: 22 MG/DL — SIGNIFICANT CHANGE UP (ref 7–23)
CALCIUM SERPL-MCNC: 10 MG/DL — SIGNIFICANT CHANGE UP (ref 8.4–10.5)
CAST: 4 /LPF — SIGNIFICANT CHANGE UP (ref 0–4)
CHLORIDE SERPL-SCNC: 101 MMOL/L — SIGNIFICANT CHANGE UP (ref 96–108)
CO2 SERPL-SCNC: 21 MMOL/L — LOW (ref 22–31)
COLOR SPEC: SIGNIFICANT CHANGE UP
CREAT SERPL-MCNC: 1.28 MG/DL — SIGNIFICANT CHANGE UP (ref 0.5–1.3)
DIFF PNL FLD: ABNORMAL
EGFR: 63 ML/MIN/1.73M2 — SIGNIFICANT CHANGE UP
EOSINOPHIL # BLD AUTO: 0.01 K/UL — SIGNIFICANT CHANGE UP (ref 0–0.5)
EOSINOPHIL NFR BLD AUTO: 0.1 % — SIGNIFICANT CHANGE UP (ref 0–6)
FLUAV AG NPH QL: SIGNIFICANT CHANGE UP
FLUBV AG NPH QL: SIGNIFICANT CHANGE UP
GAS PNL BLDV: SIGNIFICANT CHANGE UP
GLUCOSE SERPL-MCNC: 85 MG/DL — SIGNIFICANT CHANGE UP (ref 70–99)
GLUCOSE UR QL: >=1000 MG/DL
HCT VFR BLD CALC: 50.9 % — HIGH (ref 39–50)
HGB BLD-MCNC: 16.2 G/DL — SIGNIFICANT CHANGE UP (ref 13–17)
IMM GRANULOCYTES NFR BLD AUTO: 0.6 % — SIGNIFICANT CHANGE UP (ref 0–0.9)
INR BLD: 1.05 RATIO — SIGNIFICANT CHANGE UP (ref 0.85–1.16)
KETONES UR-MCNC: ABNORMAL MG/DL
LEUKOCYTE ESTERASE UR-ACNC: NEGATIVE — SIGNIFICANT CHANGE UP
LYMPHOCYTES # BLD AUTO: 0.84 K/UL — LOW (ref 1–3.3)
LYMPHOCYTES # BLD AUTO: 5.3 % — LOW (ref 13–44)
MCHC RBC-ENTMCNC: 30.7 PG — SIGNIFICANT CHANGE UP (ref 27–34)
MCHC RBC-ENTMCNC: 31.8 G/DL — LOW (ref 32–36)
MCV RBC AUTO: 96.6 FL — SIGNIFICANT CHANGE UP (ref 80–100)
MONOCYTES # BLD AUTO: 1.16 K/UL — HIGH (ref 0–0.9)
MONOCYTES NFR BLD AUTO: 7.3 % — SIGNIFICANT CHANGE UP (ref 2–14)
NEUTROPHILS # BLD AUTO: 13.77 K/UL — HIGH (ref 1.8–7.4)
NEUTROPHILS NFR BLD AUTO: 86.5 % — HIGH (ref 43–77)
NITRITE UR-MCNC: NEGATIVE — SIGNIFICANT CHANGE UP
NRBC # BLD: 0 /100 WBCS — SIGNIFICANT CHANGE UP (ref 0–0)
NRBC BLD-RTO: 0 /100 WBCS — SIGNIFICANT CHANGE UP (ref 0–0)
PH UR: 5 — SIGNIFICANT CHANGE UP (ref 5–8)
PLATELET # BLD AUTO: 203 K/UL — SIGNIFICANT CHANGE UP (ref 150–400)
POTASSIUM SERPL-MCNC: 4.4 MMOL/L — SIGNIFICANT CHANGE UP (ref 3.5–5.3)
POTASSIUM SERPL-SCNC: 4.4 MMOL/L — SIGNIFICANT CHANGE UP (ref 3.5–5.3)
PROT SERPL-MCNC: 7.7 G/DL — SIGNIFICANT CHANGE UP (ref 6–8.3)
PROT UR-MCNC: 100 MG/DL
PROTHROM AB SERPL-ACNC: 12.1 SEC — SIGNIFICANT CHANGE UP (ref 9.9–13.4)
RBC # BLD: 5.27 M/UL — SIGNIFICANT CHANGE UP (ref 4.2–5.8)
RBC # FLD: 13 % — SIGNIFICANT CHANGE UP (ref 10.3–14.5)
RBC CASTS # UR COMP ASSIST: 0 /HPF — SIGNIFICANT CHANGE UP (ref 0–4)
RSV RNA NPH QL NAA+NON-PROBE: SIGNIFICANT CHANGE UP
SARS-COV-2 RNA SPEC QL NAA+PROBE: SIGNIFICANT CHANGE UP
SODIUM SERPL-SCNC: 137 MMOL/L — SIGNIFICANT CHANGE UP (ref 135–145)
SP GR SPEC: >1.03 — HIGH (ref 1–1.03)
SQUAMOUS # UR AUTO: 1 /HPF — SIGNIFICANT CHANGE UP (ref 0–5)
UROBILINOGEN FLD QL: 1 MG/DL — SIGNIFICANT CHANGE UP (ref 0.2–1)
WBC # BLD: 15.91 K/UL — HIGH (ref 3.8–10.5)
WBC # FLD AUTO: 15.91 K/UL — HIGH (ref 3.8–10.5)
WBC UR QL: 0 /HPF — SIGNIFICANT CHANGE UP (ref 0–5)

## 2025-01-15 PROCEDURE — 73610 X-RAY EXAM OF ANKLE: CPT | Mod: 26,RT

## 2025-01-15 PROCEDURE — 99285 EMERGENCY DEPT VISIT HI MDM: CPT

## 2025-01-15 PROCEDURE — 99223 1ST HOSP IP/OBS HIGH 75: CPT | Mod: GC

## 2025-01-15 PROCEDURE — 73630 X-RAY EXAM OF FOOT: CPT | Mod: 26,RT

## 2025-01-15 RX ORDER — PREDNISONE 5 MG/1
1 TABLET ORAL
Refills: 0 | DISCHARGE

## 2025-01-15 RX ORDER — EMPAGLIFLOZIN 10 MG/1
1 TABLET, FILM COATED ORAL
Refills: 0 | DISCHARGE

## 2025-01-15 RX ORDER — ACETAMINOPHEN 160 MG/5ML
650 SUSPENSION ORAL EVERY 6 HOURS
Refills: 0 | Status: DISCONTINUED | OUTPATIENT
Start: 2025-01-15 | End: 2025-01-24

## 2025-01-15 RX ORDER — DULAGLUTIDE 4.5 MG/.5ML
3 INJECTION, SOLUTION SUBCUTANEOUS
Refills: 0 | DISCHARGE

## 2025-01-15 RX ORDER — ENOXAPARIN SODIUM 100 MG/ML
40 INJECTION SUBCUTANEOUS EVERY 24 HOURS
Refills: 0 | Status: DISCONTINUED | OUTPATIENT
Start: 2025-01-15 | End: 2025-01-23

## 2025-01-15 RX ORDER — TACROLIMUS 1 MG/1
1 CAPSULE, GELATIN COATED ORAL EVERY 12 HOURS
Refills: 0 | Status: DISCONTINUED | OUTPATIENT
Start: 2025-01-15 | End: 2025-01-19

## 2025-01-15 RX ORDER — ASPIRIN 81 MG/1
81 TABLET, COATED ORAL DAILY
Refills: 0 | Status: DISCONTINUED | OUTPATIENT
Start: 2025-01-15 | End: 2025-01-24

## 2025-01-15 RX ORDER — VANCOMYCIN HYDROCHLORIDE 50 MG/ML
1000 KIT ORAL ONCE
Refills: 0 | Status: COMPLETED | OUTPATIENT
Start: 2025-01-15 | End: 2025-01-15

## 2025-01-15 RX ORDER — ACETAMINOPHEN, DIPHENHYDRAMINE HCL, PHENYLEPHRINE HCL 325; 25; 5 MG/1; MG/1; MG/1
3 TABLET ORAL AT BEDTIME
Refills: 0 | Status: DISCONTINUED | OUTPATIENT
Start: 2025-01-15 | End: 2025-01-24

## 2025-01-15 RX ORDER — SODIUM ZIRCONIUM CYCLOSILICATE 5 G/5G
10 POWDER, FOR SUSPENSION ORAL EVERY OTHER DAY
Refills: 0 | Status: DISCONTINUED | OUTPATIENT
Start: 2025-01-15 | End: 2025-01-16

## 2025-01-15 RX ORDER — ATORVASTATIN CALCIUM 80 MG/1
40 TABLET, FILM COATED ORAL AT BEDTIME
Refills: 0 | Status: DISCONTINUED | OUTPATIENT
Start: 2025-01-15 | End: 2025-01-24

## 2025-01-15 RX ORDER — CARVEDILOL 6.25 MG
6.25 TABLET ORAL EVERY 12 HOURS
Refills: 0 | Status: DISCONTINUED | OUTPATIENT
Start: 2025-01-15 | End: 2025-01-24

## 2025-01-15 RX ORDER — PIPERACILLIN SODIUM AND TAZOBACTAM SODIUM 2; 250 G/50ML; MG/50ML
3.38 INJECTION, POWDER, FOR SOLUTION INTRAVENOUS ONCE
Refills: 0 | Status: COMPLETED | OUTPATIENT
Start: 2025-01-15 | End: 2025-01-15

## 2025-01-15 RX ORDER — PREDNISONE 5 MG/1
5 TABLET ORAL DAILY
Refills: 0 | Status: DISCONTINUED | OUTPATIENT
Start: 2025-01-16 | End: 2025-01-24

## 2025-01-15 RX ORDER — MAGNESIUM, ALUMINUM HYDROXIDE 200-225/5
30 SUSPENSION, ORAL (FINAL DOSE FORM) ORAL EVERY 4 HOURS
Refills: 0 | Status: DISCONTINUED | OUTPATIENT
Start: 2025-01-15 | End: 2025-01-24

## 2025-01-15 RX ORDER — TAMSULOSIN HYDROCHLORIDE 0.4 MG/1
0.4 CAPSULE ORAL AT BEDTIME
Refills: 0 | Status: DISCONTINUED | OUTPATIENT
Start: 2025-01-15 | End: 2025-01-24

## 2025-01-15 RX ORDER — VANCOMYCIN HYDROCHLORIDE 50 MG/ML
1250 KIT ORAL EVERY 12 HOURS
Refills: 0 | Status: DISCONTINUED | OUTPATIENT
Start: 2025-01-16 | End: 2025-01-16

## 2025-01-15 RX ORDER — PIPERACILLIN SODIUM AND TAZOBACTAM SODIUM 2; 250 G/50ML; MG/50ML
3.38 INJECTION, POWDER, FOR SOLUTION INTRAVENOUS EVERY 8 HOURS
Refills: 0 | Status: DISCONTINUED | OUTPATIENT
Start: 2025-01-16 | End: 2025-01-20

## 2025-01-15 RX ORDER — CARVEDILOL 6.25 MG
1 TABLET ORAL
Refills: 0 | DISCHARGE

## 2025-01-15 RX ORDER — CLOSTRIDIUM TETANI TOXOID ANTIGEN (FORMALDEHYDE INACTIVATED), CORYNEBACTERIUM DIPHTHERIAE TOXOID ANTIGEN (FORMALDEHYDE INACTIVATED), BORDETELLA PERTUSSIS TOXOID ANTIGEN (GLUTARALDEHYDE INACTIVATED), BORDETELLA PERTUSSIS FILAMENTOUS HEMAGGLUTININ ANTIGEN (FORMALDEHYDE INACTIVATED), BORDETELLA PERTUSSIS PERTACTIN ANTIGEN, AND BORDETELLA PERTUSSIS FIMBRIAE 2/3 ANTIGEN 5; 2; 2.5; 5; 3; 5 [LF]/.5ML; [LF]/.5ML; UG/.5ML; UG/.5ML; UG/.5ML; UG/.5ML
0.5 INJECTION, SUSPENSION INTRAMUSCULAR ONCE
Refills: 0 | Status: DISCONTINUED | OUTPATIENT
Start: 2025-01-15 | End: 2025-01-24

## 2025-01-15 RX ORDER — MYCOPHENOLATE MOFETIL 200 MG/ML
360 POWDER, FOR SUSPENSION ORAL
Refills: 0 | DISCHARGE

## 2025-01-15 RX ADMIN — VANCOMYCIN HYDROCHLORIDE 250 MILLIGRAM(S): KIT at 19:59

## 2025-01-15 RX ADMIN — ENOXAPARIN SODIUM 40 MILLIGRAM(S): 100 INJECTION SUBCUTANEOUS at 23:46

## 2025-01-15 RX ADMIN — PIPERACILLIN SODIUM AND TAZOBACTAM SODIUM 200 GRAM(S): 2; 250 INJECTION, POWDER, FOR SOLUTION INTRAVENOUS at 18:40

## 2025-01-15 NOTE — ED ADULT NURSE NOTE - NSFALLHARMRISKINTERV_ED_ALL_ED

## 2025-01-15 NOTE — H&P ADULT - ASSESSMENT
64M with HTN T2DM on CGM and insulin pump, ESRD s/p renal transplant in 2020 on tacrolimus and mycophenolate, partial R first toe amputation here for treatment of R foot cellulitis.

## 2025-01-15 NOTE — H&P ADULT - NSHPPHYSICALEXAM_GEN_ALL_CORE
Vital Signs Last 24 Hrs  T(C): 37.4 (15 Des 2025 18:25), Max: 38.8 (15 Des 2025 16:52)  T(F): 99.3 (15 Des 2025 18:25), Max: 101.8 (15 Des 2025 16:52)  HR: 91 (15 Des 2025 18:25) (91 - 91)  BP: 128/74 (15 Des 2025 18:25) (128/74 - 156/81)  BP(mean): --  RR: 18 (15 Des 2025 18:25) (18 - 20)  SpO2: 97% (15 Des 2025 18:25) (95% - 97%)    Parameters below as of 15 Des 2025 18:25  Patient On (Oxygen Delivery Method): room air        General: Patient in NAD  HEENT: NCAT, EOMI, no oral lesions  CV: S1+S2, no m/r/g appreciated   Lungs: No respiratory distress, CTAB  Abd: Soft, nontender, no guarding, no rebound tenderness, + bowel sounds, Umbilical hernia noted reducible  : No suprapubic tenderness  Neuro: Alert and oriented to time, place and person. No focal deficits noted.   Ext: No cyanosis, RLE mild edema, erythema and warmth. R great toe amputated. No sensation on plantar surface of CAM feet in anterior side. R foot dressing intact w/o drainage.

## 2025-01-15 NOTE — ED PROVIDER NOTE - CLINICAL SUMMARY MEDICAL DECISION MAKING FREE TEXT BOX
64-year-old male history of renal transplant in 2020 on tacro, MMF, hypertension diabetes on an insulin pump presents to the emergency department with concern for osteomyelitis patient with right lower extremity leg erythema. plan for xray eval for osteomyelitis, and admission for further management given hx of immunocompromised

## 2025-01-15 NOTE — ED PROVIDER NOTE - OBJECTIVE STATEMENT
64-year-old male history of renal transplant in 2020 on tacro, MMF, hypertension diabetes on an insulin pump presents to the emergency department with concern for osteomyelitis patient with right lower extremity leg erythema.  Patient reports his symptoms started earlier this week.  Patient with no fevers or chills.  He saw an outpatient podiatrist Dr. Bojorquez. who recommended ER. wound occurred today when he looked at his feet, and saw the sole the skin came off, pt w/ no fevers, no chills, no nausea no vomiting, no headache no vision changes

## 2025-01-15 NOTE — CONSULT NOTE ADULT - SUBJECTIVE AND OBJECTIVE BOX
· HPI Objective Statement: 64-year-old male history of renal transplant in 2020 on tacro, MMF, hypertension diabetes on an insulin pump presents to the emergency department with concern for osteomyelitis patient with right lower extremity leg erythema.  Patient reports his symptoms started earlier this week.  Patient with no fevers or chills.  He saw an outpatient podiatrist Dr. Bojorquez. who recommended ER. wound occurred today when he looked at his feet, and saw the sole the skin came off, pt w/ no fevers, no chills, no nausea no vomiting, no headache no vision changes  · Chief Complaint: The patient is a 64y Male complaining of L foot wound    PAST MEDICAL & SURGICAL HISTORY:  Hypertension      Diabetic neuropathy      Osteomyelitis of ankle or foot  x2      Pneumonia  4/2013      Insulin pump status  denies      CKD (chronic kidney disease)  hemodialysis on Mon, Wed and Fri      Diastolic dysfunction  mild. stage 1. EF 65%      DKA (diabetic ketoacidoses)      Hypertension      Diabetes  T2DM  last A1C (8%)      Ventral hernia  repair      History of glaucoma  left eye      Bilateral dry eyes      Dyslipidemia      Heart murmur      Asthma  never been intubated for asthma   last inhaler used 7/2019      S/P carpal tunnel release  b/l      S/P hernia repair      S/P amputation  right hallux 5/13      Retinal hemorrhage, left eye  s/p laser surgery  hx of right eye retinal hemorrahge, tx with laser surgery      H/O cardiac catheterization  no stents      H/O elbow surgery  left      S/P laparoscopic sleeve gastrectomy  2015      Status post cataract extraction          MEDICATIONS  (STANDING):  vancomycin  IVPB. 1000 milliGRAM(s) IV Intermittent once    MEDICATIONS  (PRN):      Allergies    clonidine (Other)  seasonal allergy (Other)    Intolerances        VITALS:    Vital Signs Last 24 Hrs  T(C): 37.4 (15 Des 2025 18:25), Max: 38.8 (15 Des 2025 16:52)  T(F): 99.3 (15 Des 2025 18:25), Max: 101.8 (15 Des 2025 16:52)  HR: 91 (15 Des 2025 18:25) (91 - 91)  BP: 128/74 (15 Des 2025 18:25) (128/74 - 156/81)  BP(mean): --  RR: 18 (15 Des 2025 18:25) (18 - 20)  SpO2: 97% (15 Des 2025 18:25) (95% - 97%)    Parameters below as of 15 Des 2025 18:25  Patient On (Oxygen Delivery Method): room air        LABS:                          16.2   15.91 )-----------( 203      ( 15 Des 2025 17:23 )             50.9               CAPILLARY BLOOD GLUCOSE          PT/INR - ( 15 Des 2025 17:23 )   PT: 12.1 sec;   INR: 1.05 ratio         PTT - ( 15 Des 2025 17:23 )  PTT:38.3 sec    LOWER EXTREMITY PHYSICAL EXAM:    Vascular: DP/PT 0/4, B/L, CFT <3 seconds B/L, Temperature gradient warm to warm R, .   Neuro: Epicritic sensation diminished to the level of digits B/L.  Musculoskeletal/Ortho: unremarkable  Skin: R foot submet wound to dermis, erythema extends for wound to anterior lower shin, L foot 3rd digit plantar sulcus wound to subQ, no acute signs of infection      RADIOLOGY & ADDITIONAL STUDIES:    < from: Xray Ankle Complete 3 Views, Right (01.15.25 @ 17:50) >    ******PRELIMINARY REPORT******      ******PRELIMINARY REPORT******         ACC: [id number] EXAM:  XR FOOT COMP MIN 3 VIEWS RT   ORDERED BY: KRISTIAN PRO     ACC: [id number] EXAM:  XR ANKLE COMP MIN 3 VIEWS RT   ORDERED BY: KRISTIAN PRO     PROCEDURE DATE:  01/15/2025    ******PRELIMINARY REPORT******      ******PRELIMINARY REPORT******           INTERPRETATION:  CLINICAL INDICATION: Right foot infection.    TECHNIQUE: 3 views of the right foot, 3 views of the right ankle    COMPARISON: Right ankle x-ray 9/19/2024    FINDINGS:  Status post amputation at the level of the first proximal phalangeal   head. No soft tissue gas tracking, cortical bony erosive changes, or   aggressive periosteal reaction.    There are no acute fractures or dislocations. Chronic fracture deformity   involving the fifth metatarsal shaft. Chronic ossific body inferior to   the medial malleolus.    Symmetric ankle mortise. The joint spaces are maintained.  Small Achilles insertional enthesophyte. Vascular calcifications.    IMPRESSION:  No radiographic evidence of advanced osteomyelitis.        ******PRELIMINARY REPORT******      ******PRELIMINARY REPORT******       MITCHELL KEANE MD; Resident Radiologist  This document is a PRELIMINARY interpretation and is pending final   attending approval. Des 15 2025  6:05PM    < end of copied text >

## 2025-01-15 NOTE — H&P ADULT - PROBLEM SELECTOR PLAN 6
DVT PPx: Lovenox   Diet: CC low potassium DASH diet  PT/OT: at functional baseline   Code Status: Full code   Dispo: Pending DVT PPx: Lovenox   Diet: CC low potassium DASH diet  PT/OT: PT consulted   Code Status: Full code   Dispo: Pending

## 2025-01-15 NOTE — ED PROVIDER NOTE - PHYSICAL EXAMINATION
Const: appearing stated age, no acute distress  Head: atraumatic, normocephalic  Eyes: no conjunctival injection and no scleral icterus  ENMT: Atraumatic external nose and ears, Moist mucus membranes  Neck: Symmetric, trachea midline  CVS: +S1/S2,   RESP: Unlabored respiratory effort, Clear to auscultation bilaterally  GI: Nontender/Nondistended, soft abdomen  MSK: Extremities w/o deformity or ttp except wound on the R lower extremity w/ partial amputation, pt w/ erythema that is streaking on the RLE to the middle of the ankle   Psych: Awake, Alert, & Orientedx3;  Appropriate mood and affect, cooperative

## 2025-01-15 NOTE — ED ADULT NURSE NOTE - OBJECTIVE STATEMENT
64y male, AAOx4, PMH DM2, HTN, Kidney transplant 2019, right great toe amb, presents with concern for infection of right great toe, area red and painfull, denies chest pain, shortness of breath, abdominal pain, n/v/d, 20g right ac, placed in gown, side rails up for safety, bed in lowest position, call bell within reach, patient and family educated on plan of care, comfort and safety provided.

## 2025-01-15 NOTE — ED ADULT TRIAGE NOTE - HEIGHT IN CM
"COPD/PN Week 1 Survey      Responses   [facility] patient discharged from?  [facility]   Does the patient have one of the following disease processes/diagnoses(primary or secondary)?  COPD/Pneumonia   Was the primary reason for admission:  Pneumonia   Week 1 attempt successful?  Yes   Revoke  Decline to participate [Person answering said \"f you\" and hung up.]          KACIE Taylor LPN     Declined to participate  " 173.99 12-Nov-2013

## 2025-01-15 NOTE — H&P ADULT - NSHPREVIEWOFSYSTEMS_GEN_ALL_CORE
Chief Complaint   Patient presents with    Hyperlipidemia     labs    Follow-up       HPI  Jennifer Alston is a 42 y.o. female with multiple medical diagnoses as listed in the medical history and problem list that presents for follow-up to discuss her labs. She has some concern as several of her labs were abnormal. She has a hx of elevated cholesterol and has been trying to limit her intake of fatty foods. She did eat cheese and eggs the night before her cholesterol test.     PAST MEDICAL HISTORY:  Past Medical History:   Diagnosis Date    History of PCOS        PAST SURGICAL HISTORY:  Past Surgical History:   Procedure Laterality Date    KIDNEY STONE SURGERY  1999       SOCIAL HISTORY:  Social History     Socioeconomic History    Marital status:      Spouse name: Not on file    Number of children: Not on file    Years of education: Not on file    Highest education level: Not on file   Social Needs    Financial resource strain: Not on file    Food insecurity - worry: Not on file    Food insecurity - inability: Not on file    Transportation needs - medical: Not on file    Transportation needs - non-medical: Not on file   Occupational History     Employer: [employer]   Tobacco Use    Smoking status: Never Smoker   Substance and Sexual Activity    Alcohol use: No    Drug use: No    Sexual activity: Not on file   Other Topics Concern    Not on file   Social History Narrative    Not on file       FAMILY HISTORY:  Family History   Problem Relation Age of Onset    Hypertension Mother     Hypertension Father     Asthma Brother     Ovarian cancer Neg Hx     Colon cancer Neg Hx     Breast cancer Neg Hx        ALLERGIES AND MEDICATIONS: updated and reviewed.  Review of patient's allergies indicates:   Allergen Reactions    Tramadol      nausea     Current Outpatient Medications   Medication Sig Dispense Refill    hydroCHLOROthiazide (HYDRODIURIL) 25 MG tablet Take 1 tablet (25 mg total) by mouth  "once daily. 30 tablet 11    eflornithine (VANIQA) 13.9 % cream Apply topically 2 (two) times daily. 30 g 3     No current facility-administered medications for this visit.        ROS  Review of Systems   Constitutional: Negative for chills, diaphoresis, fatigue, fever and unexpected weight change.   HENT: Negative for rhinorrhea, sinus pressure, sore throat and tinnitus.    Eyes: Negative for photophobia and visual disturbance.   Respiratory: Negative for cough, shortness of breath and wheezing.    Cardiovascular: Negative for chest pain and palpitations.   Gastrointestinal: Negative for abdominal pain, blood in stool, constipation, diarrhea, nausea and vomiting.   Genitourinary: Negative for dysuria, flank pain, frequency and vaginal discharge.   Musculoskeletal: Negative for arthralgias and joint swelling.   Skin: Negative for rash.   Neurological: Negative for speech difficulty, weakness, light-headedness and headaches.   Psychiatric/Behavioral: Negative for behavioral problems and dysphoric mood.       Physical Exam  Vitals:    08/29/18 0845   BP: 116/80   Pulse: 90   Resp: 18   Temp: 97.9 °F (36.6 °C)   TempSrc: Oral   SpO2: 97%   Weight: 78.5 kg (173 lb)   Height: 5' 2" (1.575 m)    Body mass index is 31.64 kg/m².  Weight: 78.5 kg (173 lb)   Height: 5' 2" (157.5 cm)     Physical Exam   Constitutional: She is oriented to person, place, and time. She appears well-developed and well-nourished.   Eyes: EOM are normal.   Neurological: She is alert and oriented to person, place, and time.   Skin: Skin is warm and dry. No rash noted. No erythema.   Psychiatric: She has a normal mood and affect. Her behavior is normal.   Nursing note and vitals reviewed.      Health Maintenance       Date Due Completion Date    Mammogram 05/08/2018 ---    Influenza Vaccine 08/01/2018 4/15/2016 (Declined)    Override on 4/15/2016: Declined    Pap Smear with HPV Cotest 05/20/2019 5/20/2016    Override on 1/7/2010: Done    TETANUS " Constitutional: Endorses fevers, chills, but no weight loss or fatigue   Skin: R foot erythema and pinpoint wound  Eyes: No discharge	  ENMT: No sore throat, oral thrush, ulcers or exudate  Respiratory: No cough, no SOB  Cardiovascular:  No chest pain, palpitations or edema   Gastrointestinal: No pain, nausea, vomiting, diarrhea or constipation	  Genitourinary: No dysuria, discharge or flank pain  MSK: No arthralgias or back pain   Neurological: No HA, no weakness, no seizures, no AMS VACCINE 04/28/2027 4/28/2017 (Declined)    Override on 4/28/2017: Declined            ASSESSMENT     1. Mixed hyperlipidemia    2. Low blood potassium        PLAN:     Problem List Items Addressed This Visit        Cardiac/Vascular    Mixed hyperlipidemia - Primary    Current Assessment & Plan     Discussed her elevated cholesterol and triglycerides, information given on lowering these via diet and exercise to raise her HDL            Renal/    Low blood potassium    Current Assessment & Plan     Recommend she start taking a multivitamin with potassium, she may also eat leafy veggies, such as spinach and kale to increase this                 Oriana Swartz MD  08/29/2018 1:17 PM        Follow-up in about 6 months (around 2/28/2019) for Follow up.

## 2025-01-15 NOTE — CONSULT NOTE ADULT - ASSESSMENT
63 yo M presented with R foot submet 1 wound to dermis with superimposed cellulites  - Pt seen and evaluated.  - T 99.3, WBC 15.91 ESR/CRP  - R foot submet wound to dermis, erythema extends for wound to anterior lower shin, L foot 3rd digit plantar sulcus wound to subQ, no acute signs of infection  - Right foot X-ray: no OM, no gas  - Right foot wound culture was not obtained 2/2 absence of culturable material  - Recommended admission CDU  - recommended vasc consult  - Recommended IV Unasyn  - Pod plan: Local wound care pending appearance  - Discussed with Attending

## 2025-01-15 NOTE — H&P ADULT - PROBLEM SELECTOR PLAN 5
Hx of ESRD on HD s/p Transplant in 2020 now on Prednisone 5 QD, Tacrolimus BID and Mycophenolate.   Cr. on admission 1/28 eGFR 63     Plan:   - Tacro level in AM   - c/w home tacro 1mg BID and Prednisone 5mg QD   - Hold home Mycophenolate iso Sepsis   - Hold home Jardiance   - Avoid nephrotoxic agents Hx of ESRD on HD s/p Transplant in 2020 now on Prednisone 5 QD, Tacrolimus BID and Mycophenolate.   Cr. on admission 1.28 eGFR 63     Plan:   - Tacro level in AM   - c/w home tacro 1mg BID and Prednisone 5mg QD   - Hold home Mycophenolate iso Sepsis   - Hold home Jardiance   - Avoid nephrotoxic agents

## 2025-01-15 NOTE — ED PROVIDER NOTE - NS ED ROS FT
Constitutional: + fevers no chills.   CV: no chest pain, no palpitations   Respiratory: no shortness of breath, no cough   GI: no abdominal pain, no nausea no vomiting   Neuro: no headache, no weakness, no numbness

## 2025-01-15 NOTE — H&P ADULT - PROBLEM SELECTOR PLAN 2
R foot wound w/ erythema warmth, pain, and swelling   - R foot XR w/o evidence of osteomyelitis   - Podiatry following, low concern for osteomyelitis at this time   - Diminished R pedal pulse   - s/p Vancomycin and Zosyn in ED     Plan:   -C/w Vancomycin and Zosyn (1/15- ); podiatry recommending Unasyn but will c/w Zosyn for pseudomonal coverage iso DM  -F/u MRSA swab   -F.u BCx x2   -F/u Ankle Brachial Index   -glycemic control   Vascular consult in AM for LE vascular patency eval R foot wound w/ erythema warmth, pain, and swelling   - R foot XR w/o evidence of osteomyelitis   - Podiatry following, low concern for osteomyelitis at this time   - Diminished R pedal pulse   - s/p Vancomycin and Zosyn in ED     Plan:   -C/w Vancomycin and Zosyn (1/15- ); podiatry recommending Unasyn but will c/w Zosyn for pseudomonal coverage iso DM  -F/u MRSA swab   -F.u BCx x2   -F/u Ankle Brachial Index   -F/u ESR and CRP; will hold off on MRI for now given low suspicion for OM, can reevaluate based on ESR/CRP  -glycemic control   Vascular consult in AM for LE vascular patency eval

## 2025-01-15 NOTE — PATIENT PROFILE ADULT - FALL HARM RISK - HARM RISK INTERVENTIONS

## 2025-01-15 NOTE — ED PROVIDER NOTE - PROGRESS NOTE DETAILS
podiatry consulted Pt seen by Pods, recommending IV abx and admission to medicine. Can consider MRI inpatient although Podiatry not highly suspicious for osteo at this time. Shirin Vazquez PA-C

## 2025-01-15 NOTE — H&P ADULT - PROBLEM SELECTOR PLAN 1
Febrile to 101.8F tachycardic to 91 w/ WBC count of 15.9 neutrophil predominant   R foot wound w/ erythema warmth, pain, and swelling   foot XR w/o evidence of osteomyelitis   s/p Vancomycin and Zosyn in ED   Infectious workup as below

## 2025-01-15 NOTE — H&P ADULT - ATTENDING COMMENTS
64M pmh HTN T2DM on CGM and insulin pump, ESRD s/p renal transplant in 2020 on tacrolimus and mycophenolate, partial R first toe amputation present s/p Rt foot injury with suspected cellulitis vs OM being admitted a/f sepsis iso Rt foot wound    #Sepsis #Rt foot cellulitis #PAD  - Febrile + tachycardic + elevated wbc + Rt foot infection as source meeting sepsis criteria   - EKG: NSR   - Lab: elevated wbc , Flu/RSV/COVID (-)   - XR Rt foot: no OM   - C/w IV Vanco/zosyn, deescalate pending MRSA swab  - IVF for sepsis fluid resuscitation    - F/u bcx, Ucx   - F/u ESR, CRP  - SANDRA/PVR  - Apprec Podiatry input   - Vascular consult in AM     Rest per resident

## 2025-01-15 NOTE — H&P ADULT - PROBLEM SELECTOR PLAN 4
Patient w/ advanced T2DM complicated by diabetic nephropathy s/p renal transplant, diabetic retinopathy on q 8 week Eylea injection to R eye and diabetic neuropathy.   -Patient has CGM and insulin Pump and states he will use his own insulin pump while admitted     -On Trulicity insulin pump and jardiance at home     Plan:  -Endo consulted for patient's own Insulin Pump and CGM   -Atorvastatin 40 QHS   -CC diet Patient w/ advanced T2DM complicated by diabetic nephropathy s/p renal transplant, diabetic retinopathy on q 8 week Eylea injection to R eye and diabetic neuropathy.   -Patient has CGM and insulin Pump and states he will use his own insulin pump while admitted     -On Trulicity insulin pump and jardiance at home     Plan:  -Endo consulted for patient's own Insulin Pump and CGM   -finger sticks TIDAC until CGM set up by endocrine  -Atorvastatin 40 QHS   -CC diet

## 2025-01-15 NOTE — ED PROVIDER NOTE - RAPID ASSESSMENT
64-year-old male with past medical history of hypertension, diabetes, ESRD s/p renal transplant, partial right first toe amputation presenting with foot infection.  Patient reports over the past few days he has had worsening redness, swelling and pain medial aspect of the left foot.  Patient followed up with his podiatrist today and was instructed to come to the ER for further workup including MRI and vascular consultation.  Patient febrile in triage.    **Patient was rapidly assessed by Edwar cho PA-C. A limited history was obtained. The patient will be seen and further examined/worked up in the main ED and their care will be completed by the main ED team. Receiving team will follow up on labs, analgesia, any clinical imaging, and perform reassessment and disposition of the patient as clinically indicated. All decisions regarding the progression of care will be made at their discretion.

## 2025-01-15 NOTE — H&P ADULT - NSHPLABSRESULTS_GEN_ALL_CORE
LABS:                        16.2   15.91 )-----------( 203      ( 15 Des 2025 17:23 )             50.9     01-15    137  |  101  |  22  ----------------------------<  85  4.4   |  21[L]  |  1.28    Ca    10.0      15 Des 2025 17:23    TPro  7.7  /  Alb  3.9  /  TBili  0.7  /  DBili  x   /  AST  19  /  ALT  20  /  AlkPhos  98  01-15    PT/INR - ( 15 Des 2025 17:23 )   PT: 12.1 sec;   INR: 1.05 ratio         PTT - ( 15 Des 2025 17:23 )  PTT:38.3 sec          Urinalysis Basic - ( 15 Des 2025 17:23 )    Color: Dark Yellow / Appearance: Clear / SG: >1.030 / pH: x  Gluc: 85 mg/dL / Ketone: Trace mg/dL  / Bili: Negative / Urobili: 1.0 mg/dL   Blood: x / Protein: 100 mg/dL / Nitrite: Negative   Leuk Esterase: Negative / RBC: 0 /HPF / WBC 0 /HPF   Sq Epi: x / Non Sq Epi: 1 /HPF / Bacteria: Negative /HPF            EKG and RADIOLOGY:     Most recent EKG and Images Personally Reviewed

## 2025-01-16 DIAGNOSIS — E78.5 HYPERLIPIDEMIA, UNSPECIFIED: ICD-10-CM

## 2025-01-16 DIAGNOSIS — E11.65 TYPE 2 DIABETES MELLITUS WITH HYPERGLYCEMIA: ICD-10-CM

## 2025-01-16 LAB
ALBUMIN SERPL ELPH-MCNC: 3.2 G/DL — LOW (ref 3.3–5)
ALP SERPL-CCNC: 87 U/L — SIGNIFICANT CHANGE UP (ref 40–120)
ALT FLD-CCNC: 15 U/L — SIGNIFICANT CHANGE UP (ref 10–45)
ANION GAP SERPL CALC-SCNC: 15 MMOL/L — SIGNIFICANT CHANGE UP (ref 5–17)
APTT BLD: 40 SEC — HIGH (ref 24.5–35.6)
AST SERPL-CCNC: 14 U/L — SIGNIFICANT CHANGE UP (ref 10–40)
BASOPHILS # BLD AUTO: 0.02 K/UL — SIGNIFICANT CHANGE UP (ref 0–0.2)
BASOPHILS NFR BLD AUTO: 0.2 % — SIGNIFICANT CHANGE UP (ref 0–2)
BILIRUB SERPL-MCNC: 0.7 MG/DL — SIGNIFICANT CHANGE UP (ref 0.2–1.2)
BLD GP AB SCN SERPL QL: NEGATIVE — SIGNIFICANT CHANGE UP
BUN SERPL-MCNC: 18 MG/DL — SIGNIFICANT CHANGE UP (ref 7–23)
CALCIUM SERPL-MCNC: 9 MG/DL — SIGNIFICANT CHANGE UP (ref 8.4–10.5)
CHLORIDE SERPL-SCNC: 103 MMOL/L — SIGNIFICANT CHANGE UP (ref 96–108)
CO2 SERPL-SCNC: 19 MMOL/L — LOW (ref 22–31)
CREAT SERPL-MCNC: 1.2 MG/DL — SIGNIFICANT CHANGE UP (ref 0.5–1.3)
CRP SERPL-MCNC: 229 MG/L — HIGH (ref 0–4)
CULTURE RESULTS: NO GROWTH — SIGNIFICANT CHANGE UP
EGFR: 68 ML/MIN/1.73M2 — SIGNIFICANT CHANGE UP
EOSINOPHIL # BLD AUTO: 0.08 K/UL — SIGNIFICANT CHANGE UP (ref 0–0.5)
EOSINOPHIL NFR BLD AUTO: 0.6 % — SIGNIFICANT CHANGE UP (ref 0–6)
ERYTHROCYTE [SEDIMENTATION RATE] IN BLOOD: 110 MM/HR — HIGH (ref 0–20)
GLUCOSE BLDC GLUCOMTR-MCNC: 110 MG/DL — HIGH (ref 70–99)
GLUCOSE BLDC GLUCOMTR-MCNC: 114 MG/DL — HIGH (ref 70–99)
GLUCOSE BLDC GLUCOMTR-MCNC: 149 MG/DL — HIGH (ref 70–99)
GLUCOSE BLDC GLUCOMTR-MCNC: 154 MG/DL — HIGH (ref 70–99)
GLUCOSE BLDC GLUCOMTR-MCNC: 228 MG/DL — HIGH (ref 70–99)
GLUCOSE SERPL-MCNC: 169 MG/DL — HIGH (ref 70–99)
GRAM STN FLD: SIGNIFICANT CHANGE UP
HCT VFR BLD CALC: 48 % — SIGNIFICANT CHANGE UP (ref 39–50)
HCV AB S/CO SERPL IA: 0.05 S/CO — SIGNIFICANT CHANGE UP
HCV AB SERPL-IMP: SIGNIFICANT CHANGE UP
HGB BLD-MCNC: 15.7 G/DL — SIGNIFICANT CHANGE UP (ref 13–17)
IMM GRANULOCYTES NFR BLD AUTO: 0.6 % — SIGNIFICANT CHANGE UP (ref 0–0.9)
INR BLD: 1.1 RATIO — SIGNIFICANT CHANGE UP (ref 0.85–1.16)
LYMPHOCYTES # BLD AUTO: 1.3 K/UL — SIGNIFICANT CHANGE UP (ref 1–3.3)
LYMPHOCYTES # BLD AUTO: 10.6 % — LOW (ref 13–44)
MAGNESIUM SERPL-MCNC: 2 MG/DL — SIGNIFICANT CHANGE UP (ref 1.6–2.6)
MCHC RBC-ENTMCNC: 31 PG — SIGNIFICANT CHANGE UP (ref 27–34)
MCHC RBC-ENTMCNC: 32.7 G/DL — SIGNIFICANT CHANGE UP (ref 32–36)
MCV RBC AUTO: 94.9 FL — SIGNIFICANT CHANGE UP (ref 80–100)
MONOCYTES # BLD AUTO: 1.14 K/UL — HIGH (ref 0–0.9)
MONOCYTES NFR BLD AUTO: 9.3 % — SIGNIFICANT CHANGE UP (ref 2–14)
MRSA PCR RESULT.: SIGNIFICANT CHANGE UP
NEUTROPHILS # BLD AUTO: 9.71 K/UL — HIGH (ref 1.8–7.4)
NEUTROPHILS NFR BLD AUTO: 78.7 % — HIGH (ref 43–77)
NRBC # BLD: 0 /100 WBCS — SIGNIFICANT CHANGE UP (ref 0–0)
NRBC BLD-RTO: 0 /100 WBCS — SIGNIFICANT CHANGE UP (ref 0–0)
PHOSPHATE SERPL-MCNC: 2.9 MG/DL — SIGNIFICANT CHANGE UP (ref 2.5–4.5)
PLATELET # BLD AUTO: 168 K/UL — SIGNIFICANT CHANGE UP (ref 150–400)
POTASSIUM SERPL-MCNC: 3.8 MMOL/L — SIGNIFICANT CHANGE UP (ref 3.5–5.3)
POTASSIUM SERPL-SCNC: 3.8 MMOL/L — SIGNIFICANT CHANGE UP (ref 3.5–5.3)
PROT SERPL-MCNC: 6.6 G/DL — SIGNIFICANT CHANGE UP (ref 6–8.3)
PROTHROM AB SERPL-ACNC: 12.5 SEC — SIGNIFICANT CHANGE UP (ref 9.9–13.4)
RBC # BLD: 5.06 M/UL — SIGNIFICANT CHANGE UP (ref 4.2–5.8)
RBC # FLD: 13.2 % — SIGNIFICANT CHANGE UP (ref 10.3–14.5)
RH IG SCN BLD-IMP: POSITIVE — SIGNIFICANT CHANGE UP
S AUREUS DNA NOSE QL NAA+PROBE: DETECTED
SODIUM SERPL-SCNC: 137 MMOL/L — SIGNIFICANT CHANGE UP (ref 135–145)
SPECIMEN SOURCE: SIGNIFICANT CHANGE UP
SPECIMEN SOURCE: SIGNIFICANT CHANGE UP
TACROLIMUS SERPL-MCNC: 8.3 NG/ML — SIGNIFICANT CHANGE UP
WBC # BLD: 12.32 K/UL — HIGH (ref 3.8–10.5)
WBC # FLD AUTO: 12.32 K/UL — HIGH (ref 3.8–10.5)

## 2025-01-16 PROCEDURE — 93923 UPR/LXTR ART STDY 3+ LVLS: CPT | Mod: 26

## 2025-01-16 PROCEDURE — 99233 SBSQ HOSP IP/OBS HIGH 50: CPT | Mod: GC

## 2025-01-16 PROCEDURE — 99222 1ST HOSP IP/OBS MODERATE 55: CPT

## 2025-01-16 RX ORDER — INSULIN ASPART 100 [IU]/ML
1 INJECTION, SOLUTION INTRAVENOUS; SUBCUTANEOUS
Refills: 0 | Status: DISCONTINUED | OUTPATIENT
Start: 2025-01-16 | End: 2025-01-24

## 2025-01-16 RX ORDER — INSULIN LISPRO 100/ML
VIAL (ML) SUBCUTANEOUS
Refills: 0 | Status: DISCONTINUED | OUTPATIENT
Start: 2025-01-16 | End: 2025-01-16

## 2025-01-16 RX ORDER — MUPIROCIN 2 G/100G
1 CREAM TOPICAL EVERY 12 HOURS
Refills: 0 | Status: DISCONTINUED | OUTPATIENT
Start: 2025-01-16 | End: 2025-01-24

## 2025-01-16 RX ORDER — GLUCAGON 3 MG/1
1 POWDER NASAL ONCE
Refills: 0 | Status: DISCONTINUED | OUTPATIENT
Start: 2025-01-16 | End: 2025-01-24

## 2025-01-16 RX ORDER — DM/PSEUDOEPHED/ACETAMINOPHEN 10-30-250
15 CAPSULE ORAL ONCE
Refills: 0 | Status: DISCONTINUED | OUTPATIENT
Start: 2025-01-16 | End: 2025-01-24

## 2025-01-16 RX ORDER — DM/PSEUDOEPHED/ACETAMINOPHEN 10-30-250
25 CAPSULE ORAL ONCE
Refills: 0 | Status: DISCONTINUED | OUTPATIENT
Start: 2025-01-16 | End: 2025-01-24

## 2025-01-16 RX ORDER — DM/PSEUDOEPHED/ACETAMINOPHEN 10-30-250
12.5 CAPSULE ORAL ONCE
Refills: 0 | Status: DISCONTINUED | OUTPATIENT
Start: 2025-01-16 | End: 2025-01-24

## 2025-01-16 RX ORDER — DM/PSEUDOEPHED/ACETAMINOPHEN 10-30-250
25 CAPSULE ORAL ONCE
Refills: 0 | Status: DISCONTINUED | OUTPATIENT
Start: 2025-01-16 | End: 2025-01-16

## 2025-01-16 RX ORDER — SODIUM CHLORIDE 9 G/ML
1000 INJECTION, SOLUTION INTRAVENOUS
Refills: 0 | Status: DISCONTINUED | OUTPATIENT
Start: 2025-01-16 | End: 2025-01-24

## 2025-01-16 RX ORDER — SODIUM CHLORIDE 9 G/ML
1000 INJECTION, SOLUTION INTRAVENOUS
Refills: 0 | Status: DISCONTINUED | OUTPATIENT
Start: 2025-01-16 | End: 2025-01-16

## 2025-01-16 RX ORDER — INSULIN LISPRO 100/ML
VIAL (ML) SUBCUTANEOUS AT BEDTIME
Refills: 0 | Status: DISCONTINUED | OUTPATIENT
Start: 2025-01-16 | End: 2025-01-16

## 2025-01-16 RX ORDER — DM/PSEUDOEPHED/ACETAMINOPHEN 10-30-250
15 CAPSULE ORAL ONCE
Refills: 0 | Status: DISCONTINUED | OUTPATIENT
Start: 2025-01-16 | End: 2025-01-16

## 2025-01-16 RX ORDER — SODIUM CHLORIDE 9 G/ML
1000 INJECTION, SOLUTION INTRAVENOUS ONCE
Refills: 0 | Status: DISCONTINUED | OUTPATIENT
Start: 2025-01-16 | End: 2025-01-16

## 2025-01-16 RX ORDER — GLUCAGON 3 MG/1
1 POWDER NASAL ONCE
Refills: 0 | Status: DISCONTINUED | OUTPATIENT
Start: 2025-01-16 | End: 2025-01-16

## 2025-01-16 RX ORDER — BACTERIOSTATIC SODIUM CHLORIDE 0.9 %
1000 VIAL (ML) INJECTION
Refills: 0 | Status: DISCONTINUED | OUTPATIENT
Start: 2025-01-16 | End: 2025-01-28

## 2025-01-16 RX ORDER — DM/PSEUDOEPHED/ACETAMINOPHEN 10-30-250
12.5 CAPSULE ORAL ONCE
Refills: 0 | Status: DISCONTINUED | OUTPATIENT
Start: 2025-01-16 | End: 2025-01-16

## 2025-01-16 RX ADMIN — PREDNISONE 5 MILLIGRAM(S): 5 TABLET ORAL at 05:55

## 2025-01-16 RX ADMIN — Medication 2.5 MILLIGRAM(S): at 05:55

## 2025-01-16 RX ADMIN — PIPERACILLIN SODIUM AND TAZOBACTAM SODIUM 25 GRAM(S): 2; 250 INJECTION, POWDER, FOR SOLUTION INTRAVENOUS at 10:04

## 2025-01-16 RX ADMIN — Medication 100 MILLILITER(S): at 02:41

## 2025-01-16 RX ADMIN — PIPERACILLIN SODIUM AND TAZOBACTAM SODIUM 25 GRAM(S): 2; 250 INJECTION, POWDER, FOR SOLUTION INTRAVENOUS at 18:17

## 2025-01-16 RX ADMIN — PIPERACILLIN SODIUM AND TAZOBACTAM SODIUM 25 GRAM(S): 2; 250 INJECTION, POWDER, FOR SOLUTION INTRAVENOUS at 01:08

## 2025-01-16 RX ADMIN — ATORVASTATIN CALCIUM 40 MILLIGRAM(S): 80 TABLET, FILM COATED ORAL at 21:14

## 2025-01-16 RX ADMIN — TAMSULOSIN HYDROCHLORIDE 0.4 MILLIGRAM(S): 0.4 CAPSULE ORAL at 21:14

## 2025-01-16 RX ADMIN — TACROLIMUS 1 MILLIGRAM(S): 1 CAPSULE, GELATIN COATED ORAL at 21:14

## 2025-01-16 RX ADMIN — Medication 6.25 MILLIGRAM(S): at 05:55

## 2025-01-16 RX ADMIN — TACROLIMUS 1 MILLIGRAM(S): 1 CAPSULE, GELATIN COATED ORAL at 09:12

## 2025-01-16 RX ADMIN — ASPIRIN 81 MILLIGRAM(S): 81 TABLET, COATED ORAL at 16:16

## 2025-01-16 RX ADMIN — ENOXAPARIN SODIUM 40 MILLIGRAM(S): 100 INJECTION SUBCUTANEOUS at 21:14

## 2025-01-16 RX ADMIN — VANCOMYCIN HYDROCHLORIDE 166.67 MILLIGRAM(S): KIT at 05:55

## 2025-01-16 RX ADMIN — Medication 6.25 MILLIGRAM(S): at 18:16

## 2025-01-16 NOTE — CONSULT NOTE ADULT - ATTENDING COMMENTS
Agree with assessment and plan as above by Dr. Thomason. Reviewed all pertinent labs, glucose values, and imaging studies. Modifications made as indicated above. Pt. with Type 2 DM on Mobi insulin pump. Monitor glucose. Adjust pump as needed. Plan to d/c on pump. Has supplies.     Ankur Mayorga D.O  [phone]

## 2025-01-16 NOTE — PROGRESS NOTE ADULT - PROBLEM SELECTOR PLAN 6
DVT PPx: Lovenox   Diet: CC low potassium DASH diet  PT/OT: PT consulted   Code Status: Full code   Dispo: Pending Code: Full  Diet: consistent carbohydrate and low potassium DASH   DVT ppx: enoxaparin   Dispo: pending clinical course, likely home

## 2025-01-16 NOTE — PROGRESS NOTE ADULT - ASSESSMENT
65 yo M presented with R foot submet 1 wound to dermis with superimposed cellulites  - Pt seen and evaluated.  - Afebrile, WBC 12.32, ESR orderd, CRP 22.9  - R foot submet wound to dermis, erythema extends for wound to anterior lower shin, L foot 3rd digit plantar sulcus wound to subQ, no acute signs of infection  - Right foot X-ray: no OM, no gas  - Right foot wound culture: obtained and sent  - recommended vasc consult  - Recommended continue IV Unasyn  - Ordered Mupiocin  -  MRI can not be obtained 2/2 insulin pump  - Pod plan: Local wound care pending appearance/ MR  - Discussed with Attending

## 2025-01-16 NOTE — PROGRESS NOTE ADULT - PROBLEM SELECTOR PLAN 2
R foot wound w/ erythema warmth, pain, and swelling   - R foot XR w/o evidence of osteomyelitis   - Podiatry following, low concern for osteomyelitis at this time   - Diminished R pedal pulse   - s/p Vancomycin and Zosyn in ED     Plan:   -C/w Vancomycin and Zosyn (1/15- ); podiatry recommending Unasyn but will c/w Zosyn for pseudomonal coverage iso DM  -F/u MRSA swab   -F.u BCx x2   -F/u Ankle Brachial Index   -F/u ESR and CRP; will hold off on MRI for now given low suspicion for OM, can reevaluate based on ESR/CRP  -glycemic control   Vascular consult in AM for LE vascular patency eval Resolving R foot erythema and tenderness. No claudication symptoms. Difficult eliciting R/L foot dorsalis pedis; however reportedly localized by doppler in ED. No S&S of acute ischemic limb. Clinically monitor.     -Pending vascular surgery consult to assess for evaluation of peripheral vascular disease   -F/u Ankle Brachial Index   -F/u ESR and CR

## 2025-01-16 NOTE — CONSULT NOTE ADULT - SUBJECTIVE AND OBJECTIVE BOX
*** SURGERY CONSULT NOTE    Consulting Team: Vascular Surgery     Patient: JITENDRA FRENCH , 64y ([birth year])Male   MRN: [id number]  Location: [facility] 5MON 518 W1  Visit: 01-15-25 Inpatient  Date: 01-16-25 @ 17:13      HPI: 64M with HTN T2DM on CGM and insulin pump, ESRD s/p renal transplant in 2020 on tacrolimus and mycophenolate, partial R first toe amputation presenting from podiatry office with c/f foot infection. Patient states that a couple days ago he was walking barefoot outside and had a pine needle stuck on the anterior plantar surface of his foot near the base of his R first toe that became more red prompting podiatry visit. At pods appt there was concern for difficulty finding pedal pulses and also concern for potential bone involvement. Patient given augmentin and sent to ED for further evaluation. At the ED the patient was febrile to 101.8F, tachycardic to 91 w/ stable BP and O2 Sat. WBC count was elevated to 15.9 w/ neutrophil predominance. XR did not reveal any signs of OM. Vascular consulted for PAD eval.      PAST MEDICAL HISTORY:  Hypertension    DM (diabetes mellitus)    Hyperlipidemia    Diabetic neuropathy    Osteomyelitis of ankle or foot    Lower extremity edema    Pneumonia    Insulin pump status    CKD (chronic kidney disease)    Diastolic dysfunction    DKA (diabetic ketoacidoses)    Hypertension    Diabetes    High cholesterol    Ventral hernia    History of glaucoma    Bilateral dry eyes    Dyslipidemia    Heart murmur    Asthma        PAST SURGICAL HISTORY:  S/P carpal tunnel release    S/P hernia repair    S/P amputation    Retinal hemorrhage, left eye    H/O cardiac catheterization    H/O elbow surgery    S/P laparoscopic sleeve gastrectomy    Status post cataract extraction        MEDICATIONS:  acetaminophen     Tablet .. 650 milliGRAM(s) Oral every 6 hours PRN  aluminum hydroxide/magnesium hydroxide/simethicone Suspension 30 milliLiter(s) Oral every 4 hours PRN  aspirin enteric coated 81 milliGRAM(s) Oral daily  atorvastatin 40 milliGRAM(s) Oral at bedtime  carvedilol 6.25 milliGRAM(s) Oral every 12 hours  dextrose 5%. 1000 milliLiter(s) IV Continuous <Continuous>  dextrose 5%. 1000 milliLiter(s) IV Continuous <Continuous>  dextrose 50% Injectable 25 Gram(s) IV Push once  dextrose 50% Injectable 12.5 Gram(s) IV Push once  dextrose 50% Injectable 25 Gram(s) IV Push once  dextrose Oral Gel 15 Gram(s) Oral once  diphtheria/tetanus/pertussis (acellular) Vaccine (Adacel) 0.5 milliLiter(s) IntraMuscular once  enoxaparin Injectable 40 milliGRAM(s) SubCutaneous every 24 hours  glucagon  Injectable 1 milliGRAM(s) IntraMuscular once  insulin aspart (NovoLOG) Pump 1 Each SubCutaneous Continuous Pump  lisinopril 2.5 milliGRAM(s) Oral daily  melatonin 3 milliGRAM(s) Oral at bedtime PRN  mupirocin 2% Ointment 1 Application(s) Topical every 12 hours  piperacillin/tazobactam IVPB.. 3.375 Gram(s) IV Intermittent every 8 hours  predniSONE   Tablet 5 milliGRAM(s) Oral daily  sodium chloride 0.9%. 1000 milliLiter(s) IV Continuous <Continuous>  tacrolimus 1 milliGRAM(s) Oral every 12 hours  tamsulosin 0.4 milliGRAM(s) Oral at bedtime  vancomycin  IVPB 1250 milliGRAM(s) IV Intermittent every 12 hours      ALLERGIES:  clonidine (Other)  seasonal allergy (Other)      VITALS & I/Os:  Vital Signs Last 24 Hrs  T(C): 36.8 (16 Jan 2025 12:10), Max: 37.4 (15 Des 2025 18:25)  T(F): 98.3 (16 Jan 2025 12:10), Max: 99.3 (15 Des 2025 18:25)  HR: 84 (16 Jan 2025 12:10) (81 - 91)  BP: 136/73 (16 Jan 2025 12:10) (128/74 - 146/70)  BP(mean): --  RR: 18 (16 Jan 2025 12:10) (18 - 18)  SpO2: 95% (16 Jan 2025 12:10) (93% - 97%)    Parameters below as of 16 Jan 2025 12:10  Patient On (Oxygen Delivery Method): room air        I&O's Summary    15 Des 2025 07:01  -  16 Jan 2025 07:00  --------------------------------------------------------  IN: 950 mL / OUT: 0 mL / NET: 950 mL    16 Jan 2025 07:01  -  16 Jan 2025 17:13  --------------------------------------------------------  IN: 600 mL / OUT: 200 mL / NET: 400 mL        PHYSICAL EXAM:  General: No acute distress  Respiratory: Nonlabored  Cardiovascular: RRR  Abdominal: Soft, nondistended, nontender. No rebound or guarding. No organomegaly, no palpable mass.  Extremities: WWP, b/l plantar foot wounds without drianage, +ds in b/l DP/PT, motor intact, mild sensory loss in distal L foot (chronic)    LABS:                        15.7   12.32 )-----------( 168      ( 16 Jan 2025 07:27 )             48.0     01-16    137  |  103  |  18  ----------------------------<  169[H]  3.8   |  19[L]  |  1.20    Ca    9.0      16 Jan 2025 07:22  Phos  2.9     01-16  Mg     2.0     01-16    TPro  6.6  /  Alb  3.2[L]  /  TBili  0.7  /  DBili  x   /  AST  14  /  ALT  15  /  AlkPhos  87  01-16    Lactate:  01-15 @ 17:23  1.7    PT/INR - ( 16 Jan 2025 07:22 )   PT: 12.5 sec;   INR: 1.10 ratio         PTT - ( 16 Jan 2025 07:22 )  PTT:40.0 sec          Urinalysis Basic - ( 16 Jan 2025 07:22 )    Color: x / Appearance: x / SG: x / pH: x  Gluc: 169 mg/dL / Ketone: x  / Bili: x / Urobili: x   Blood: x / Protein: x / Nitrite: x   Leuk Esterase: x / RBC: x / WBC x   Sq Epi: x / Non Sq Epi: x / Bacteria: x          IMAGING:

## 2025-01-16 NOTE — PROGRESS NOTE ADULT - SUBJECTIVE AND OBJECTIVE BOX
Patient is a 64y old  Male who presents with a chief complaint of scar tinfection (16 Jan 2025 08:05)       INTERVAL HPI/OVERNIGHT EVENTS:  Patient seen and evaluated at bedside.  Pt is resting comfortable in NAD. Denies N/V/F/C.    Allergies    clonidine (Other)  seasonal allergy (Other)    Intolerances        Vital Signs Last 24 Hrs  T(C): 36.7 (16 Jan 2025 06:45), Max: 38.8 (15 Des 2025 16:52)  T(F): 98.1 (16 Jan 2025 06:45), Max: 101.8 (15 Des 2025 16:52)  HR: 81 (16 Jan 2025 06:45) (81 - 91)  BP: 146/70 (16 Jan 2025 06:45) (128/74 - 156/81)  BP(mean): --  RR: 18 (16 Jan 2025 06:45) (18 - 20)  SpO2: 95% (16 Jan 2025 06:45) (93% - 97%)    Parameters below as of 16 Jan 2025 06:45  Patient On (Oxygen Delivery Method): room air        LABS:                        15.7   12.32 )-----------( 168      ( 16 Jan 2025 07:27 )             48.0     01-16    137  |  103  |  18  ----------------------------<  169[H]  3.8   |  19[L]  |  1.20    Ca    9.0      16 Jan 2025 07:22  Phos  2.9     01-16  Mg     2.0     01-16    TPro  6.6  /  Alb  3.2[L]  /  TBili  0.7  /  DBili  x   /  AST  14  /  ALT  15  /  AlkPhos  87  01-16    PT/INR - ( 16 Jan 2025 07:22 )   PT: 12.5 sec;   INR: 1.10 ratio         PTT - ( 16 Jan 2025 07:22 )  PTT:40.0 sec  Urinalysis Basic - ( 16 Jan 2025 07:22 )    Color: x / Appearance: x / SG: x / pH: x  Gluc: 169 mg/dL / Ketone: x  / Bili: x / Urobili: x   Blood: x / Protein: x / Nitrite: x   Leuk Esterase: x / RBC: x / WBC x   Sq Epi: x / Non Sq Epi: x / Bacteria: x      CAPILLARY BLOOD GLUCOSE      POCT Blood Glucose.: 228 mg/dL (16 Jan 2025 08:46)  POCT Blood Glucose.: 110 mg/dL (16 Jan 2025 00:45)      Lower Extremity Physical Exam:  Vascular: DP/PT 0/4, B/L, CFT <3 seconds B/L, Temperature gradient warm to warm R, .   Neuro: Epicritic sensation diminished to the level of digits B/L.  Musculoskeletal/Ortho: s/p R hallux partial amputation, healed  Skin: R foot submet wound to dermis, erythema extends for wound to anterior lower shin, periwound ecchymosis, L foot 3rd digit plantar sulcus wound to subQ, no acute signs of infection    RADIOLOGY & ADDITIONAL TESTS:

## 2025-01-16 NOTE — PHYSICAL THERAPY INITIAL EVALUATION ADULT - PERTINENT HX OF CURRENT PROBLEM, REHAB EVAL
64M with HTN T2DM on CGM and insulin pump, ESRD s/p renal transplant in 2020 on tacrolimus and mycophenolate, partial R first toe amputation presenting from podiatry office with a foot infection. Patient states that a couple days ago he was walking barefoot outside and had a pine needle stuck on the anterior plantar surface of his foot near the base of his R first toe. He had progressive reddening and pain of the R foot and went to see a podiatrist. Podiatrist had concerns that he did not have any pedal pulses and also concern for potential bone involvement and prescribed a single dose of Augmentin and sent the patient to the ED for further evaluation. At the ED the patient was febrile to 101.8F, tachycardic to 91 w/ stable BP and O2 Sat. WBC count was elevated to 15.9 w/ neutrophil predominance. XR did not reveal any signs of OM and the patient was seen by podiatry who had low suspicion for osteomyelitis but recommended a Vascular surgery consult for concerns of PAD.

## 2025-01-16 NOTE — CONSULT NOTE ADULT - ASSESSMENT
64M with HTN T2DM on CGM and insulin pump, ESRD s/p renal transplant in 2020 on tacrolimus and mycophenolate, partial R first toe amputation presenting from podiatry office with c/f foot infection and difficulty palpating pedal pulses. Vascular consulted to evaluate LLE perfusion.    Rec  - no acute vascular intervention  - b/l feet WWP, +ds in pedal pulses b/l,   - SANDRA/PVRs performed but significantly limited evaluation due to calcified noncompressible vasculature  - would obtain [facility] arterial duplex of b/l LEs  - foot wounds per pods  - incomplete pending discussion with fellow      Vascular surgery [id number]  Stephen Bourgeois MD (PGY2)  64M with HTN T2DM on CGM and insulin pump, ESRD s/p renal transplant in 2020 on tacrolimus and mycophenolate, partial R first toe amputation presenting from podiatry office with c/f foot infection and difficulty palpating pedal pulses. Vascular consulted to evaluate LLE perfusion.    Rec  - no acute vascular intervention  - b/l feet WWP, +ds in pedal pulses b/l,   - SANDRA/PVRs performed but significantly limited evaluation due to calcified noncompressible vasculature  - would obtain VA arterial duplex of b/l LEs  - foot wounds per pods  - discussed with fellow      Vascular surgery [id number]  Stephen Bourgeois MD (PGY2)

## 2025-01-16 NOTE — ADVANCED PRACTICE NURSE CONSULT - REASON FOR CONSULT
Pt seen for insulin pump assessment. Pt has insulin pump Tandem Mobi Control IQ on left  arm and Dexcom G7 on left upper arm. Pt has supplies for one week back up pump in bag.  Pt  uses Novolog insulin and endocrinologist is Dr. Kelley Rodriguez.

## 2025-01-16 NOTE — CONSULT NOTE ADULT - ASSESSMENT
Pending discussion with attending physician.  INCOMPLETE NOTE     Insulin pump is not a contraindication for MRI. Can remove pump and sensor for one hour, undergo MRI, replace pump and place a new sensor.      The patient is a 64y Male with PMH of T2DM, ESRD with renal transplant who presented with lower extremity infection.  Endocrinology consulted for T2DM on insulin pump    #Type 2 Diabetes Mellitus on insulin pump  - Follows with: Dr. Kelley Rodriguez  - - home regimen: Jardiance, Trulicity  Insulin pump type: Tandem Mobi  Insulin type: Novolog  Last site change: due for change tomorrow morning. DEXCOM placed this morning.   Supplies available: for one week  Settings:  Basal:  00:00 0.75  20:00 0.5  ICR 1:10  ISF 1:20  TDD 17 target 110   - eGFR: 68 mL/min/1.73m2 (01-16-25)  - Weight (kg): 82.8 (01-15-25)  - glucose at goal, no evidence of DKA on labs      INPATIENT PLAN:  - The patient wishes to use their insulin pump inpatient, understands how to use it, and has adequate supplies available  - c/w insulin pump at usual home settings  - The patient should not be long without basal insulin. If for any reason the pump becomes dysfunctional or falls off, they should receive 17 units Lantus   - The patient needs to fill out the self-assessment form and the patient and primary provider both need to sign the insulin pump attestation form.   - For each meal, the bedside nurse should document the carbohydrate intake and the insulin bolus the patient gives themselves in the EMR flowsheet.   - FSBG before meals and bedtime, but do not order an insulin correction scale as all insulin should be administered by the patient through the pump.   - Use hypoglycemia protocol if needed.  - Please check A1C  - Please change vanc and zosyn to NS instead of D5 if feasible         DISCHARGE PLANNING:  - Discharge recs pending clinical course, continue previous regimen on discharge  - will need Endocrinology follow up with his provider Dr. Rodriguez, has an appointment scheduled for April 2025.     #Hypertension  - Goal BP <130/80  - on lisinopril 2.5 mg  - Management as per primary team  - check urine albumin level as outpatient    #Hyperlipidemia  - LDL goal <70  - on atorvastatin 40  - check lipid panel as outpatient on a yearly basis    Pending discussion with attending physician.  INCOMPLETE NOTE    The patient is a 64y Male with PMH of T2DM, ESRD with renal transplant who presented with lower extremity infection.  Endocrinology consulted for T2DM on insulin pump    #Type 2 Diabetes Mellitus on insulin pump  - Follows with: Dr. Kelley Rodriguez  - - home regimen: Jardiance, Trulicity  Insulin pump type: Tandem Mobi  Insulin type: Novolog  Last site change: due for change tomorrow morning. DEXCOM placed this morning.   Supplies available: for one week  Settings:  Basal:  00:00 0.75  20:00 0.5  ICR 1:10  ISF 1:20  TDD 17 target 110   - eGFR: 68 mL/min/1.73m2 (01-16-25)  - Weight (kg): 82.8 (01-15-25)  - glucose at goal, no evidence of DKA on labs      INPATIENT PLAN:  - The patient wishes to use their insulin pump inpatient, understands how to use it, and has adequate supplies available  - c/w insulin pump at usual home settings  - The patient should not be long without basal insulin. If for any reason the pump becomes dysfunctional or falls off, they should receive 17 units Lantus   - The patient needs to fill out the self-assessment form and the patient and primary provider both need to sign the insulin pump attestation form.   - For each meal, the bedside nurse should document the carbohydrate intake and the insulin bolus the patient gives themselves in the EMR flowsheet.   - FSBG before meals and bedtime, but do not order an insulin correction scale as all insulin should be administered by the patient through the pump.   - Use hypoglycemia protocol if needed.  - Please check A1C  - Please change vanc and zosyn to NS instead of D5 if feasible         DISCHARGE PLANNING:  - Discharge recs pending clinical course, continue previous regimen on discharge  - will need Endocrinology follow up with his provider Dr. Rodriguez, has an appointment scheduled for April 2025.     #Hypertension  - Goal BP <130/80  - on lisinopril 2.5 mg  - Management as per primary team  - check urine albumin level as outpatient    #Hyperlipidemia  - LDL goal <70  - on atorvastatin 40  - check lipid panel as outpatient on a yearly basis     The patient is a 64y Male with PMH of T2DM, ESRD with renal transplant who presented with lower extremity infection.  Endocrinology consulted for T2DM on insulin pump    #Type 2 Diabetes Mellitus on insulin pump  - Follows with: Dr. Kelley Rodriguez  - - home regimen: Jardiance, Trulicity  Insulin pump type: Tandem Mobi  Insulin type: Novolog  Last site change: due for change tomorrow morning. DEXCOM placed this morning.   Supplies available: for one week  Settings:  Basal:  00:00 0.75  20:00 0.5  ICR 1:10  ISF 1:20  TDD 17 target 110   - eGFR: 68 mL/min/1.73m2 (01-16-25)  - Weight (kg): 82.8 (01-15-25)  - glucose at goal, no evidence of DKA on labs      INPATIENT PLAN:  - The patient wishes to use their insulin pump inpatient, understands how to use it, and has adequate supplies available  - c/w insulin pump at usual home settings  - The patient should not be long without basal insulin. If for any reason the pump becomes dysfunctional or falls off, they should receive 17 units Lantus   - The patient needs to fill out the self-assessment form and the patient and primary provider both need to sign the insulin pump attestation form.   - For each meal, the bedside nurse should document the carbohydrate intake and the insulin bolus the patient gives themselves in the EMR flowsheet.   - FSBG before meals and bedtime, but do not order an insulin correction scale as all insulin should be administered by the patient through the pump.   - Use hypoglycemia protocol if needed.  - Please check A1C  - Please change vanc and zosyn to NS instead of D5 if feasible   - Please note, insulin pump and continuous glucose monitor should be temporarily removed immediately prior to NM imaging radiation exposure tomorrow and replaced as soon as possible after the procedure is done.         DISCHARGE PLANNING:  - Discharge recs pending clinical course, continue previous regimen on discharge  - will need Endocrinology follow up with his provider Dr. Rodriguez, has an appointment scheduled for April 2025.     #Hypertension  - Goal BP <130/80  - on lisinopril 2.5 mg  - Management as per primary team  - check urine albumin level as outpatient    #Hyperlipidemia  - LDL goal <70  - on atorvastatin 40  - check lipid panel as outpatient on a yearly basis

## 2025-01-16 NOTE — CONSULT NOTE ADULT - SUBJECTIVE AND OBJECTIVE BOX
HPI:  64M with HTN T2DM on CGM and insulin pump, ESRD s/p renal transplant in  on tacrolimus and mycophenolate, partial R first toe amputation presenting from podiatry office with a foot infection. Patient states that a couple days ago he was walking barefoot outside and had a pine needle stuck on the anterior plantar surface of his foot near the base of his R first toe. He had progressive reddening and pain of the R foot and went to see a podiatrist. Podiatrist had concerns that he did not have any pedal pulses and also concern for potential bone involvement and prescribed a single dose of Augmentin and sent the patient to the ED for further evaluation. At the ED the patient was febrile to 101.8F, tachycardic to 91 w/ stable BP and O2 Sat. WBC count was elevated to 15.9 w/ neutrophil predominance. XR did not reveal any signs of OM and the patient was seen by podiatry who had low suspicion for osteomyelitis but recommended a Vascular surgery consult for concerns of PAD.  (15 Des 2025 21:58)      DIABETES HX:  History/diagnosis: T2DM  Family history:  Follows with: Dr. Rodriguez  Microvascular complications: Patient denies hx nephropathy, retinopathy, or neuropathy.  Macrovascular complications: No history of CAD or CVA.  Last hemoglobin A1c:    Insulin pump type: Tandem Mobi  Insulin type: Novolog  Last site change:  Supplies available: for one week  Settings:  Basal:  00:00 0.75  20:00 0.5  ICR 1:10  ISF 1:20  TDD 17 target 110     Blood sugar:  Hypoglycemia episodes:  Diet/lifestyle:  Symptoms:        PAST MEDICAL & SURGICAL HISTORY:  Hypertension  Diabetic neuropathy  Osteomyelitis of ankle or foot  x2  Pneumonia  2013  Insulin pump status  denies  CKD (chronic kidney disease)  hemodialysis on Mon, Wed and Fri  Diastolic dysfunction  mild. stage 1. EF 65%  DKA (diabetic ketoacidoses)  Hypertension  Diabetes  T2DM  st A1C (8%)  Ventral hernia  repair  istory of glaucoma  left eye  Bilateral dry eyes  Dyslipidemia  Heart murmur  Asthma  never been intubated for asthma   last inhaler used 2019  S/P carpal tunnel release  b/l  S/P hernia repair  S/P amputation  right hallux   Retinal hemorrhage, left eye  s/p laser surgery  hx of right eye retinal hemorrahge, tx with laser surgery  H/O cardiac catheterization  no stents  H/O elbow surgery  left  S/P laparoscopic sleeve gastrectomy  2015  Status post cataract extraction      FAMILY HISTORY:  Family history of heart attack (Grandparent)  father  @54 y.o.  grandfather  @ 52 y.o.    Family history of bone cancer  Grandfather    FH: aortic stenosis  Son        Social History:    Outpatient Medications:   acetaminophen 325 mg oral tablet: 3 tab(s) orally every 6 hours  aspirin 81 mg oral delayed release tablet: 1 tab(s) orally once a day  atorvastatin 20 mg oral tablet: 2 tab(s) orally once a day (at bedtime)  carvedilol 6.25 mg oral tablet: 1 tab(s) orally 2 times a day  empagliflozin 25 mg oral tablet: 1 tab(s) orally once a day  lisinopril 2.5 mg oral tablet: 1 tab(s) orally once a day (at bedtime)  Lokelma 10 g oral powder for reconstitution: 1 packet(s) orally every other day  Mycophenolate Mofetil: 360 milligram(s) orally once a day  predniSONE 5 mg oral tablet: 1 tab(s) orally once a day  tacrolimus 1 mg oral capsule: 1 cap(s) orally every 12 hours  tamsulosin 0.4 mg oral capsule: 1 cap(s) orally once a day (at bedtime)  Trulicity Pen 3 mg/0.5 mL subcutaneous solution: 3 milligram(s) subcutaneously once a week      MEDICATIONS  (STANDING):  aspirin enteric coated 81 milliGRAM(s) Oral daily  atorvastatin 40 milliGRAM(s) Oral at bedtime  carvedilol 6.25 milliGRAM(s) Oral every 12 hours  diphtheria/tetanus/pertussis (acellular) Vaccine (Adacel) 0.5 milliLiter(s) IntraMuscular once  enoxaparin Injectable 40 milliGRAM(s) SubCutaneous every 24 hours  lisinopril 2.5 milliGRAM(s) Oral daily  mupirocin 2% Ointment 1 Application(s) Topical every 12 hours  piperacillin/tazobactam IVPB.. 3.375 Gram(s) IV Intermittent every 8 hours  predniSONE   Tablet 5 milliGRAM(s) Oral daily  sodium chloride 0.9%. 1000 milliLiter(s) (100 mL/Hr) IV Continuous <Continuous>  sodium zirconium cyclosilicate 10 Gram(s) Oral every other day  tacrolimus 1 milliGRAM(s) Oral every 12 hours  tamsulosin 0.4 milliGRAM(s) Oral at bedtime  vancomycin  IVPB 1250 milliGRAM(s) IV Intermittent every 12 hours    MEDICATIONS  (PRN):  acetaminophen     Tablet .. 650 milliGRAM(s) Oral every 6 hours PRN Temp greater or equal to 38C (100.4F), Mild Pain (1 - 3)  aluminum hydroxide/magnesium hydroxide/simethicone Suspension 30 milliLiter(s) Oral every 4 hours PRN Dyspepsia  melatonin 3 milliGRAM(s) Oral at bedtime PRN Insomnia      Allergies    clonidine (Other)  seasonal allergy (Other)    Intolerances      Review of Systems:  Constitutional: No fever  Eyes: No blurry vision  Neuro: No tremors  HEENT: No pain  Cardiovascular: No chest pain, palpitations  Respiratory: No SOB, no cough  GI: No nausea, vomiting, abdominal pain  : No dysuria  Skin: no rash  Psych: no depression  Endocrine: no polyuria, polydipsia  Hem/lymph: no swelling  Osteoporosis: no fractures    ALL OTHER SYSTEMS REVIEWED AND NEGATIVE    PHYSICAL EXAM:  VITALS: T(C): 36.8 (25 @ 12:10)  T(F): 98.3 (25 @ 12:10), Max: 101.8 (01-15-25 @ 16:52)  HR: 84 (25 @ 12:10) (81 - 91)  BP: 136/73 (25 @ 12:10) (128/74 - 156/81)  RR:  (18 - 20)  SpO2:  (93% - 97%)  Wt(kg): --  GENERAL: NAD, well-groomed, well-developed  EYES: No proptosis, no lid lag, anicteric  HEENT:  Atraumatic, Normocephalic, moist mucous membranes  RESPIRATORY: Normal respiratory effort; no audible wheezing  SKIN: Dry, intact, No rashes or lesions  MUSCULOSKELETAL: Full range of motion, normal strength  NEURO: sensation intact, extraocular movements intact, no tremor  PSYCH: Alert and oriented x 3, normal affect, normal mood  CUSHING'S SIGNS: no striae                          15.7   12.32 )-----------( 168      ( 2025 07:27 )             48.0           137  |  103  |  18  ----------------------------<  169[H]  3.8   |  19[L]  |  1.20    eGFR: 68    Ca    9.0        Mg     2.0       Phos  2.9     -    TPro  6.6  /  Alb  3.2[L]  /  TBili  0.7  /  DBili  x   /  AST  14  /  ALT  15  /  AlkPhos  87            CAPILLARY BLOOD GLUCOSE      POCT Blood Glucose.: 154 mg/dL (2025 12:39)  POCT Blood Glucose.: 228 mg/dL (2025 08:46)  POCT Blood Glucose.: 110 mg/dL (2025 00:45)      Thyroid Function Tests:          Radiology:          HPI:  64M with HTN T2DM on CGM and insulin pump, ESRD s/p renal transplant in  on tacrolimus and mycophenolate, partial R first toe amputation presenting from podiatry office with a foot infection. Patient states that a couple days ago he was walking barefoot outside and had a pine needle stuck on the anterior plantar surface of his foot near the base of his R first toe. He had progressive reddening and pain of the R foot and went to see a podiatrist. Podiatrist had concerns that he did not have any pedal pulses and also concern for potential bone involvement and prescribed a single dose of Augmentin and sent the patient to the ED for further evaluation. At the ED the patient was febrile to 101.8F, tachycardic to 91 w/ stable BP and O2 Sat. WBC count was elevated to 15.9 w/ neutrophil predominance. XR did not reveal any signs of OM and the patient was seen by podiatry who had low suspicion for osteomyelitis but recommended a Vascular surgery consult for concerns of PAD.  (15 Des 2025 21:58)      DIABETES HX:  History/diagnosis: T2DM  Follows with: Dr. Rodriguez  Microvascular complications: Patient denies hx nephropathy, retinopathy, or neuropathy.  Macrovascular complications: No history of CAD or CVA.    Also on Jardiance and Trulicity.  Insulin pump type: Tandem Mobi  Insulin type: Novolog  Last site change: due for change tomorrow morning. DEXCOM placed this morning.   Supplies available: for one week  Settings:  Basal:  00:00 0.75  20:00 0.5  ICR 1:10  ISF 1:20  TDD 17 target 110     Blood sugar: mostly at goal  Hypoglycemia episodes: denies        PAST MEDICAL & SURGICAL HISTORY:  Hypertension  Diabetic neuropathy  Osteomyelitis of ankle or foot  x2  Pneumonia  2013  Insulin pump status  denies  CKD (chronic kidney disease)  hemodialysis on Mon, Wed and Fri  Diastolic dysfunction  mild. stage 1. EF 65%  DKA (diabetic ketoacidoses)  Hypertension  Diabetes  T2DM  st A1C (8%)  Ventral hernia  repair  istory of glaucoma  left eye  Bilateral dry eyes  Dyslipidemia  Heart murmur  Asthma  never been intubated for asthma   last inhaler used 2019  S/P carpal tunnel release  b/l  S/P hernia repair  S/P amputation  right hallux   Retinal hemorrhage, left eye  s/p laser surgery  hx of right eye retinal hemorrahge, tx with laser surgery  H/O cardiac catheterization  no stents  H/O elbow surgery  left  S/P laparoscopic sleeve gastrectomy  2015  Status post cataract extraction      FAMILY HISTORY:  Family history of heart attack (Grandparent)  father  @54 y.o.  grandfather  @ 52 y.o.    Family history of bone cancer  Grandfather    FH: aortic stenosis  Son        Social History:    Outpatient Medications:   acetaminophen 325 mg oral tablet: 3 tab(s) orally every 6 hours  aspirin 81 mg oral delayed release tablet: 1 tab(s) orally once a day  atorvastatin 20 mg oral tablet: 2 tab(s) orally once a day (at bedtime)  carvedilol 6.25 mg oral tablet: 1 tab(s) orally 2 times a day  empagliflozin 25 mg oral tablet: 1 tab(s) orally once a day  lisinopril 2.5 mg oral tablet: 1 tab(s) orally once a day (at bedtime)  Lokelma 10 g oral powder for reconstitution: 1 packet(s) orally every other day  Mycophenolate Mofetil: 360 milligram(s) orally once a day  predniSONE 5 mg oral tablet: 1 tab(s) orally once a day  tacrolimus 1 mg oral capsule: 1 cap(s) orally every 12 hours  tamsulosin 0.4 mg oral capsule: 1 cap(s) orally once a day (at bedtime)  Trulicity Pen 3 mg/0.5 mL subcutaneous solution: 3 milligram(s) subcutaneously once a week      MEDICATIONS  (STANDING):  aspirin enteric coated 81 milliGRAM(s) Oral daily  atorvastatin 40 milliGRAM(s) Oral at bedtime  carvedilol 6.25 milliGRAM(s) Oral every 12 hours  diphtheria/tetanus/pertussis (acellular) Vaccine (Adacel) 0.5 milliLiter(s) IntraMuscular once  enoxaparin Injectable 40 milliGRAM(s) SubCutaneous every 24 hours  lisinopril 2.5 milliGRAM(s) Oral daily  mupirocin 2% Ointment 1 Application(s) Topical every 12 hours  piperacillin/tazobactam IVPB.. 3.375 Gram(s) IV Intermittent every 8 hours  predniSONE   Tablet 5 milliGRAM(s) Oral daily  sodium chloride 0.9%. 1000 milliLiter(s) (100 mL/Hr) IV Continuous <Continuous>  sodium zirconium cyclosilicate 10 Gram(s) Oral every other day  tacrolimus 1 milliGRAM(s) Oral every 12 hours  tamsulosin 0.4 milliGRAM(s) Oral at bedtime  vancomycin  IVPB 1250 milliGRAM(s) IV Intermittent every 12 hours    MEDICATIONS  (PRN):  acetaminophen     Tablet .. 650 milliGRAM(s) Oral every 6 hours PRN Temp greater or equal to 38C (100.4F), Mild Pain (1 - 3)  aluminum hydroxide/magnesium hydroxide/simethicone Suspension 30 milliLiter(s) Oral every 4 hours PRN Dyspepsia  melatonin 3 milliGRAM(s) Oral at bedtime PRN Insomnia      Allergies    clonidine (Other)  seasonal allergy (Other)    Intolerances      Review of Systems:  Constitutional: No fever  Eyes: No blurry vision  Neuro: No tremors  HEENT: No pain  Cardiovascular: No chest pain, palpitations  Respiratory: No SOB, no cough  GI: No nausea, vomiting, abdominal pain  : No dysuria  Skin: no rash  Psych: no depression  Endocrine: no polyuria, polydipsia  Hem/lymph: no swelling  Osteoporosis: no fractures    ALL OTHER SYSTEMS REVIEWED AND NEGATIVE    PHYSICAL EXAM:  VITALS: T(C): 36.8 (25 @ 12:10)  T(F): 98.3 (25 @ 12:10), Max: 101.8 (01-15-25 @ 16:52)  HR: 84 (25 @ 12:10) (81 - 91)  BP: 136/73 (25 @ 12:10) (128/74 - 156/81)  RR:  (18 - 20)  SpO2:  (93% - 97%)  Wt(kg): 82.8 kg  GENERAL: NAD, well-groomed, well-developed, insulin pump and CGM on left upper arm  EYES: No proptosis, no lid lag, anicteric  HEENT:  Atraumatic, Normocephalic, moist mucous membranes  RESPIRATORY: Normal respiratory effort; no audible wheezing  SKIN: Dry, intact, No rashes or lesions, tattoos  MUSCULOSKELETAL: Full range of motion, normal strength  NEURO: sensation intact, extraocular movements intact, no tremor  PSYCH: Alert and oriented x 3, normal affect, normal mood  CUSHING'S SIGNS: no striae                          15.7   12.32 )-----------( 168      ( 2025 07:27 )             48.0           137  |  103  |  18  ----------------------------<  169[H]  3.8   |  19[L]  |  1.20    eGFR: 68    Ca    9.0        Mg     2.0       Phos  2.9         TPro  6.6  /  Alb  3.2[L]  /  TBili  0.7  /  DBili  x   /  AST  14  /  ALT  15  /  AlkPhos  87            CAPILLARY BLOOD GLUCOSE      POCT Blood Glucose.: 154 mg/dL (2025 12:39)  POCT Blood Glucose.: 228 mg/dL (2025 08:46)  POCT Blood Glucose.: 110 mg/dL (2025 00:45)

## 2025-01-16 NOTE — PHYSICAL THERAPY INITIAL EVALUATION ADULT - PATIENT PROFILE REVIEW, REHAB EVAL
Patient arrives for evaluation of fever of 100.1 last night with cough x 2 days - positive exposure to Covid-19 at work  
yes

## 2025-01-16 NOTE — PROGRESS NOTE ADULT - PROBLEM SELECTOR PLAN 5
Hx of ESRD on HD s/p Transplant in 2020 now on Prednisone 5 QD, Tacrolimus BID and Mycophenolate.   Cr. on admission 1.28 eGFR 63     Plan:   - Tacro level in AM   - c/w home tacro 1mg BID and Prednisone 5mg QD   - Hold home Mycophenolate iso Sepsis   - Hold home Jardiance   - Avoid nephrotoxic agents Patient with history of DM2 nephropathy leading to ESRD and renal transplant and immunosuppressive medications. Stable BUN/Cr. Therapeutic tacrolimus level. Clinically monitor.     -C/w predinsone 5 mg po qd   -C/w tacrolimus 1 mg po q12   -Holding mycophenolat mofetil due to sepsis

## 2025-01-16 NOTE — PROGRESS NOTE ADULT - ATTENDING COMMENTS
64M pmh HTN T2DM on CGM and insulin pump, ESRD s/p renal transplant in 2020 on tacrolimus and mycophenolate, partial R first toe amputation present s/p Rt foot injury admitted for sepsis 2/2 R foot wound/cellulitis vs OM. On Zosyn for pseudomonas coverage. Xray foot neg for OM. SANDRA/PVR study limited, pending arterial LE duplex. Vascular surgery and podiatry on board.     #Right LE wound/cellulitis  #r/o OM  #DM on insulin pump    -f/u podiatry- ?MRI foot  -f/u wound culture  -endo consulted- insulin pump    d/w HS  51 minutes spent  Jenae Pineda MD  Division of Hospital Medicine  Available on [employer] Teams

## 2025-01-16 NOTE — PROGRESS NOTE ADULT - SUBJECTIVE AND OBJECTIVE BOX
INPATIENT MEDICINE PROGRESS NOTE:   Authored by Kerri Villegas MD     HISTORY OF PRESENT ILLNESS:  64F with history of DM2 on insulin pump with neuropathy and partial R 1st digit amputation, ESRD s/p renal transplant on tacrolimus and mycophenolate mofetil, asthma, HDL presents to [facility]-ED sent by podiatry due to R foot redness and tenderness with blister.     NAEON.     Seen and examined at bedside with wife, patient reports doing better with decreasing redness from lower calve to foot now and reducing tenderness; endorsing soreness with palpation. Had some chills last night but no further. Always walks barefoot. Denies claudication symptoms and describes pain in lumbar back with radiculopathic pain with pain. Denies hx MI/stroke. Toe amputation was from a diabetic infection.     PHYSICAL EXAM:  Vital Signs Last 24 Hrs  T(C): 36.7 (16 Jan 2025 06:45), Max: 38.8 (15 Des 2025 16:52)  T(F): 98.1 (16 Jan 2025 06:45), Max: 101.8 (15 Des 2025 16:52)  HR: 81 (16 Jan 2025 06:45) (81 - 91)  BP: 146/70 (16 Jan 2025 06:45) (128/74 - 156/81)  BP(mean): --  RR: 18 (16 Jan 2025 06:45) (18 - 20)  SpO2: 95% (16 Jan 2025 06:45) (93% - 97%)    Parameters below as of 16 Jan 2025 06:45  Patient On (Oxygen Delivery Method): room air        General: NAD, calm, comfortable, cooperative, obese habitus, tattoos   Neuro: awake, alert, oriented to person/place/time, 5/5  strength, 5/5 hip flexion/extension b/l, spontaneity, reduced sensation b/l feet   HEENT: NC/AT, PERRLA b/l, EOMI, moist mucous membranes, no supraclavicular/submandibular lymphadenopathy  Chest: nonTTP   Heart: S1/S2, RRR   Lungs: CTA b/l, nonlabored breathing   Abd: soft, nonTTP, nondistended, reducible umbilical bulge   Ext: reducing erythema and TTP of R dorsal foot, no pretibial/pedal edema, ruptured blister on plantar midfoot of R foot, warm to touch b/l, no pain on plantarflexion/dorsiflexion,non tense, non pallor   Back: nonTTP   Pulses: L popliteal pulse 2+, limited L posterior tibialis and dorsalis pedis pulse; limited R popliteal pulse/posterior tibialis/dorsalis pedis   Psych: full range affect, mutual topic,linear and goal directed   Lines/tubes/drains: none     I&O's Summary    15 Des 2025 07:01  -  16 Jan 2025 07:00  --------------------------------------------------------  IN: 950 mL / OUT: 0 mL / NET: 950 mL        LABS:                        16.2   15.91 )-----------( 203      ( 15 Des 2025 17:23 )             50.9     01-15    137  |  101  |  22  ----------------------------<  85  4.4   |  21[L]  |  1.28    Ca    10.0      15 Des 2025 17:23    TPro  7.7  /  Alb  3.9  /  TBili  0.7  /  DBili  x   /  AST  19  /  ALT  20  /  AlkPhos  98  01-15    CAPILLARY BLOOD GLUCOSE      POCT Blood Glucose.: 110 mg/dL (16 Jan 2025 00:45)    PT/INR - ( 16 Jan 2025 07:22 )   PT: 12.5 sec;   INR: 1.10 ratio         PTT - ( 16 Jan 2025 07:22 )  PTT:40.0 sec      Urinalysis Basic - ( 15 Des 2025 17:23 )    Color: Dark Yellow / Appearance: Clear / SG: >1.030 / pH: x  Gluc: 85 mg/dL / Ketone: Trace mg/dL  / Bili: Negative / Urobili: 1.0 mg/dL   Blood: x / Protein: 100 mg/dL / Nitrite: Negative   Leuk Esterase: Negative / RBC: 0 /HPF / WBC 0 /HPF   Sq Epi: x / Non Sq Epi: 1 /HPF / Bacteria: Negative /HPF          MEDICATIONS  (STANDING):  aspirin enteric coated 81 milliGRAM(s) Oral daily  atorvastatin 40 milliGRAM(s) Oral at bedtime  carvedilol 6.25 milliGRAM(s) Oral every 12 hours  dextrose 5%. 1000 milliLiter(s) (50 mL/Hr) IV Continuous <Continuous>  dextrose 5%. 1000 milliLiter(s) (100 mL/Hr) IV Continuous <Continuous>  dextrose 50% Injectable 25 Gram(s) IV Push once  dextrose 50% Injectable 12.5 Gram(s) IV Push once  dextrose 50% Injectable 25 Gram(s) IV Push once  dextrose Oral Gel 15 Gram(s) Oral once  diphtheria/tetanus/pertussis (acellular) Vaccine (Adacel) 0.5 milliLiter(s) IntraMuscular once  enoxaparin Injectable 40 milliGRAM(s) SubCutaneous every 24 hours  glucagon  Injectable 1 milliGRAM(s) IntraMuscular once  insulin lispro (ADMELOG) corrective regimen sliding scale   SubCutaneous three times a day before meals  insulin lispro (ADMELOG) corrective regimen sliding scale   SubCutaneous at bedtime  lisinopril 2.5 milliGRAM(s) Oral daily  piperacillin/tazobactam IVPB.. 3.375 Gram(s) IV Intermittent every 8 hours  predniSONE   Tablet 5 milliGRAM(s) Oral daily  sodium chloride 0.9%. 1000 milliLiter(s) (100 mL/Hr) IV Continuous <Continuous>  sodium zirconium cyclosilicate 10 Gram(s) Oral every other day  tacrolimus 1 milliGRAM(s) Oral every 12 hours  tamsulosin 0.4 milliGRAM(s) Oral at bedtime  vancomycin  IVPB 1250 milliGRAM(s) IV Intermittent every 12 hours    MEDICATIONS  (PRN):  acetaminophen     Tablet .. 650 milliGRAM(s) Oral every 6 hours PRN Temp greater or equal to 38C (100.4F), Mild Pain (1 - 3)  aluminum hydroxide/magnesium hydroxide/simethicone Suspension 30 milliLiter(s) Oral every 4 hours PRN Dyspepsia  melatonin 3 milliGRAM(s) Oral at bedtime PRN Insomnia

## 2025-01-16 NOTE — PROGRESS NOTE ADULT - PROBLEM SELECTOR PLAN 3
c/w Home Lisinopril and carvedilol Normotensive.     -C/w carvedilol 6.25 mg po q12   -C/w lisinopril 2.5 mg po qd

## 2025-01-16 NOTE — PROGRESS NOTE ADULT - ASSESSMENT
64M with HTN T2DM on CGM and insulin pump, ESRD s/p renal transplant in 2020 on tacrolimus and mycophenolate, partial R first toe amputation here for treatment of R foot cellulitis.     64M with history of HTN, DM2 with neuropathy and partial R 1st digit amputation, ESRD s/p renal transplant on tacrolimus and mycophenolate, and HDL here for uncomplicated R foot cellulitis with incidental reduced LE pulses R>L.

## 2025-01-16 NOTE — PROGRESS NOTE ADULT - PROBLEM SELECTOR PLAN 4
Patient w/ advanced T2DM complicated by diabetic nephropathy s/p renal transplant, diabetic retinopathy on q 8 week Eylea injection to R eye and diabetic neuropathy.   -Patient has CGM and insulin Pump and states he will use his own insulin pump while admitted     -On Trulicity insulin pump and jardiance at home     Plan:  -Endo consulted for patient's own Insulin Pump and CGM   -finger sticks TIDAC until CGM set up by endocrine  -Atorvastatin 40 QHS   -CC diet Patient with DM2 with nephropathy, neuropathy, retinopathy here. No concerns for DKA or HHS.     -C/w insulin pump here while measuring fingersticks   -C/w Eylea injection to R eye q8 wks   -Hold home dulaglutide   -Hold home empagliflozin   -Pending endocrinology consult for insulin pump and continous glucose monitoring

## 2025-01-16 NOTE — PROGRESS NOTE ADULT - PROBLEM SELECTOR PLAN 1
Febrile to 101.8F tachycardic to 91 w/ WBC count of 15.9 neutrophil predominant   R foot wound w/ erythema warmth, pain, and swelling   foot XR w/o evidence of osteomyelitis   s/p Vancomycin and Zosyn in ED   Infectious workup as below Patient on arrival meeting SIRS criteria with fever, tachycardia, and leukocytosis. Currently resolution of fevers with downtrending WBC. Exam remarkable for reducing erythema and TTP of R foot limited to dorsal aspect now with some weightbearing nature. No open wounds suggestive of bony involvement. Low concern for osteomyelitis. Clinically improving.     -C/w piperacillin tazobactam 3.375 mg IV q8 (started 1/16)   -C/w vancomycin 1250 mg IV q12 (started 1/16)  -Pending blood cx   -Pending urine cx   -Pending abscess cx and gram stain

## 2025-01-16 NOTE — PHYSICAL THERAPY INITIAL EVALUATION ADULT - ADDITIONAL COMMENTS
Pt lives in ouse with 5 steps to enter and flight of stairs to bedroom Patient was independent with all ADLs and IADLs prior to admission. Amb independently. Currently works for [employer]

## 2025-01-17 LAB
A1C WITH ESTIMATED AVERAGE GLUCOSE RESULT: 7 % — HIGH (ref 4–5.6)
ANION GAP SERPL CALC-SCNC: 15 MMOL/L — SIGNIFICANT CHANGE UP (ref 5–17)
BUN SERPL-MCNC: 13 MG/DL — SIGNIFICANT CHANGE UP (ref 7–23)
CALCIUM SERPL-MCNC: 9.2 MG/DL — SIGNIFICANT CHANGE UP (ref 8.4–10.5)
CHLORIDE SERPL-SCNC: 104 MMOL/L — SIGNIFICANT CHANGE UP (ref 96–108)
CO2 SERPL-SCNC: 20 MMOL/L — LOW (ref 22–31)
CREAT SERPL-MCNC: 1.11 MG/DL — SIGNIFICANT CHANGE UP (ref 0.5–1.3)
EGFR: 75 ML/MIN/1.73M2 — SIGNIFICANT CHANGE UP
ESTIMATED AVERAGE GLUCOSE: 154 MG/DL — HIGH (ref 68–114)
GLUCOSE BLDC GLUCOMTR-MCNC: 115 MG/DL — HIGH (ref 70–99)
GLUCOSE BLDC GLUCOMTR-MCNC: 116 MG/DL — HIGH (ref 70–99)
GLUCOSE BLDC GLUCOMTR-MCNC: 142 MG/DL — HIGH (ref 70–99)
GLUCOSE BLDC GLUCOMTR-MCNC: 151 MG/DL — HIGH (ref 70–99)
GLUCOSE SERPL-MCNC: 147 MG/DL — HIGH (ref 70–99)
HCT VFR BLD CALC: 47.8 % — SIGNIFICANT CHANGE UP (ref 39–50)
HGB BLD-MCNC: 15.2 G/DL — SIGNIFICANT CHANGE UP (ref 13–17)
MAGNESIUM SERPL-MCNC: 2 MG/DL — SIGNIFICANT CHANGE UP (ref 1.6–2.6)
MCHC RBC-ENTMCNC: 30.2 PG — SIGNIFICANT CHANGE UP (ref 27–34)
MCHC RBC-ENTMCNC: 31.8 G/DL — LOW (ref 32–36)
MCV RBC AUTO: 94.8 FL — SIGNIFICANT CHANGE UP (ref 80–100)
NRBC # BLD: 0 /100 WBCS — SIGNIFICANT CHANGE UP (ref 0–0)
NRBC BLD-RTO: 0 /100 WBCS — SIGNIFICANT CHANGE UP (ref 0–0)
PHOSPHATE SERPL-MCNC: 2.5 MG/DL — SIGNIFICANT CHANGE UP (ref 2.5–4.5)
PLATELET # BLD AUTO: 195 K/UL — SIGNIFICANT CHANGE UP (ref 150–400)
POTASSIUM SERPL-MCNC: 4 MMOL/L — SIGNIFICANT CHANGE UP (ref 3.5–5.3)
POTASSIUM SERPL-SCNC: 4 MMOL/L — SIGNIFICANT CHANGE UP (ref 3.5–5.3)
RBC # BLD: 5.04 M/UL — SIGNIFICANT CHANGE UP (ref 4.2–5.8)
RBC # FLD: 13.3 % — SIGNIFICANT CHANGE UP (ref 10.3–14.5)
SODIUM SERPL-SCNC: 139 MMOL/L — SIGNIFICANT CHANGE UP (ref 135–145)
TACROLIMUS SERPL-MCNC: 12 NG/ML — SIGNIFICANT CHANGE UP
WBC # BLD: 13.47 K/UL — HIGH (ref 3.8–10.5)
WBC # FLD AUTO: 13.47 K/UL — HIGH (ref 3.8–10.5)

## 2025-01-17 PROCEDURE — 99233 SBSQ HOSP IP/OBS HIGH 50: CPT | Mod: GC

## 2025-01-17 PROCEDURE — 78830 RP LOCLZJ TUM SPECT W/CT 1: CPT | Mod: 26

## 2025-01-17 PROCEDURE — 93922 UPR/L XTREMITY ART 2 LEVELS: CPT | Mod: 26

## 2025-01-17 PROCEDURE — 93925 LOWER EXTREMITY STUDY: CPT | Mod: 26

## 2025-01-17 PROCEDURE — 78802 RP LOCLZJ TUM WHBDY 1 D IMG: CPT | Mod: 26

## 2025-01-17 RX ORDER — MYCOPHENOLIC ACID 180 MG/1
360 TABLET, DELAYED RELEASE ORAL DAILY
Refills: 0 | Status: DISCONTINUED | OUTPATIENT
Start: 2025-01-17 | End: 2025-01-24

## 2025-01-17 RX ADMIN — PIPERACILLIN SODIUM AND TAZOBACTAM SODIUM 25 GRAM(S): 2; 250 INJECTION, POWDER, FOR SOLUTION INTRAVENOUS at 18:41

## 2025-01-17 RX ADMIN — TACROLIMUS 1 MILLIGRAM(S): 1 CAPSULE, GELATIN COATED ORAL at 20:21

## 2025-01-17 RX ADMIN — PIPERACILLIN SODIUM AND TAZOBACTAM SODIUM 25 GRAM(S): 2; 250 INJECTION, POWDER, FOR SOLUTION INTRAVENOUS at 10:35

## 2025-01-17 RX ADMIN — TACROLIMUS 1 MILLIGRAM(S): 1 CAPSULE, GELATIN COATED ORAL at 10:34

## 2025-01-17 RX ADMIN — Medication 6.25 MILLIGRAM(S): at 18:45

## 2025-01-17 RX ADMIN — ENOXAPARIN SODIUM 40 MILLIGRAM(S): 100 INJECTION SUBCUTANEOUS at 22:01

## 2025-01-17 RX ADMIN — TAMSULOSIN HYDROCHLORIDE 0.4 MILLIGRAM(S): 0.4 CAPSULE ORAL at 22:01

## 2025-01-17 RX ADMIN — Medication 2.5 MILLIGRAM(S): at 05:12

## 2025-01-17 RX ADMIN — PIPERACILLIN SODIUM AND TAZOBACTAM SODIUM 25 GRAM(S): 2; 250 INJECTION, POWDER, FOR SOLUTION INTRAVENOUS at 02:03

## 2025-01-17 RX ADMIN — ATORVASTATIN CALCIUM 40 MILLIGRAM(S): 80 TABLET, FILM COATED ORAL at 22:01

## 2025-01-17 RX ADMIN — MUPIROCIN 1 APPLICATION(S): 2 CREAM TOPICAL at 06:07

## 2025-01-17 RX ADMIN — Medication 6.25 MILLIGRAM(S): at 05:12

## 2025-01-17 RX ADMIN — ASPIRIN 81 MILLIGRAM(S): 81 TABLET, COATED ORAL at 16:03

## 2025-01-17 RX ADMIN — PREDNISONE 5 MILLIGRAM(S): 5 TABLET ORAL at 05:12

## 2025-01-17 NOTE — DISCHARGE NOTE PROVIDER - NSDCMRMEDTOKEN_GEN_ALL_CORE_FT
acetaminophen 325 mg oral tablet: 3 tab(s) orally every 6 hours  aspirin 81 mg oral delayed release tablet: 1 tab(s) orally once a day  atorvastatin 20 mg oral tablet: 2 tab(s) orally once a day (at bedtime)  carvedilol 6.25 mg oral tablet: 1 tab(s) orally 2 times a day  empagliflozin 25 mg oral tablet: 1 tab(s) orally once a day  lisinopril 2.5 mg oral tablet: 1 tab(s) orally once a day (at bedtime)  Lokelma 10 g oral powder for reconstitution: 1 packet(s) orally every other day  Mycophenolate Mofetil: 360 milligram(s) orally once a day  predniSONE 5 mg oral tablet: 1 tab(s) orally once a day  tacrolimus 1 mg oral capsule: 1 cap(s) orally every 12 hours  tamsulosin 0.4 mg oral capsule: 1 cap(s) orally once a day (at bedtime)  Trulicity Pen 3 mg/0.5 mL subcutaneous solution: 3 milligram(s) subcutaneously once a week   acetaminophen 325 mg oral tablet: 3 tab(s) orally every 6 hours  aspirin 81 mg oral delayed release tablet: 1 tab(s) orally once a day  atorvastatin 20 mg oral tablet: 2 tab(s) orally once a day (at bedtime)  carvedilol 6.25 mg oral tablet: 1 tab(s) orally 2 times a day  cbc/cmp and ck: weekly cbc/cmp and ck  DAPTOmycin 1000 mg/100 mL-NaCl 0.9% intravenous solution: 65 milliliter(s) intravenously once a day Last date 3/8/25.  empagliflozin 25 mg oral tablet: 1 tab(s) orally once a day  lisinopril 2.5 mg oral tablet: 1 tab(s) orally once a day (at bedtime)  Lokelma 10 g oral powder for reconstitution: 1 packet(s) orally every other day  Mycophenolate Mofetil: 360 milligram(s) orally once a day  predniSONE 5 mg oral tablet: 1 tab(s) orally once a day  tacrolimus 1 mg oral capsule: 1 cap(s) orally every 12 hours  tamsulosin 0.4 mg oral capsule: 1 cap(s) orally once a day (at bedtime)  Trulicity Pen 3 mg/0.5 mL subcutaneous solution: 3 milligram(s) subcutaneously once a week   acetaminophen 325 mg oral tablet: 3 tab(s) orally every 6 hours  aspirin 81 mg oral delayed release tablet: 1 tab(s) orally once a day  atorvastatin 20 mg oral tablet: 2 tab(s) orally once a day (at bedtime)  carvedilol 6.25 mg oral tablet: 1 tab(s) orally 2 times a day  cbc/cmp and ck: weekly cbc/cmp and ck  DAPTOmycin 1000 mg/100 mL-NaCl 0.9% intravenous solution: 65 milliliter(s) intravenously once a day Last date 3/8/25.  empagliflozin 25 mg oral tablet: 1 tab(s) orally once a day  lisinopril 2.5 mg oral tablet: 1 tab(s) orally once a day (at bedtime)  Lokelma 10 g oral powder for reconstitution: 1 packet(s) orally every other day  Mycophenolate Mofetil: 360 milligram(s) orally once a day  predniSONE 5 mg oral tablet: 1 tab(s) orally once a day  Ra Quigley R53.81: Use as directed  Sodium Chloride 0.9% 10 cc flush: Please flush PICC before and after administration of IV antibiotics.  tacrolimus 1 mg oral capsule: 1 cap(s) orally every 12 hours  tamsulosin 0.4 mg oral capsule: 1 cap(s) orally once a day (at bedtime)  Trulicity Pen 3 mg/0.5 mL subcutaneous solution: 3 milligram(s) subcutaneously once a week   acetaminophen 325 mg oral tablet: 3 tab(s) orally every 6 hours  aspirin 81 mg oral delayed release tablet: 1 tab(s) orally once a day  atorvastatin 20 mg oral tablet: 2 tab(s) orally once a day (at bedtime)  carvedilol 6.25 mg oral tablet: 1 tab(s) orally 2 times a day  cbc/cmp and ck: weekly cbc/cmp and ck  DAPTOmycin 1000 mg/100 mL-NaCl 0.9% intravenous solution: 65 milliliter(s) intravenously once a day Last date 3/8/25.  empagliflozin 25 mg oral tablet: 1 tab(s) orally once a day  insulin aspart: every hour Continue insulin pump  lisinopril 2.5 mg oral tablet: 1 tab(s) orally once a day (at bedtime)  Lokelma 10 g oral powder for reconstitution: 1 packet(s) orally every other day  Mycophenolate Mofetil: 360 milligram(s) orally once a day  predniSONE 5 mg oral tablet: 1 tab(s) orally once a day  Ra Quigley R53.81: Use as directed  Sodium Chloride 0.9% 10 cc flush: Please flush PICC before and after administration of IV antibiotics.  tacrolimus 0.5 mg oral capsule: 1 cap(s) orally once a day (at bedtime)  tacrolimus 1 mg oral capsule: 1 cap(s) orally once a day  tamsulosin 0.4 mg oral capsule: 1 cap(s) orally once a day (at bedtime)  Trulicity Pen 3 mg/0.5 mL subcutaneous solution: 3 milligram(s) subcutaneously once a week   acetaminophen 325 mg oral tablet: 2 tab(s) orally every 6 hours As needed Mild Pain (1 - 3)  aspirin 81 mg oral delayed release tablet: 1 tab(s) orally once a day  atorvastatin 20 mg oral tablet: 2 tab(s) orally once a day (at bedtime)  carvedilol 6.25 mg oral tablet: 1 tab(s) orally 2 times a day  cbc/cmp and ck: weekly cbc/cmp and ck  DAPTOmycin 1000 mg/100 mL-NaCl 0.9% intravenous solution: 65 milliliter(s) intravenously once a day Last date 3/8/25.  empagliflozin 25 mg oral tablet: 1 tab(s) orally once a day  insulin aspart: every hour Continue insulin pump  lisinopril 2.5 mg oral tablet: 1 tab(s) orally once a day (at bedtime)  Lokelma 10 g oral powder for reconstitution: 1 packet(s) orally every other day  Mycophenolate Mofetil: 360 milligram(s) orally once a day  predniSONE 5 mg oral tablet: 1 tab(s) orally once a day  Ra Quigley R53.81: Use as directed  Sodium Chloride 0.9% 10 cc flush: Please flush PICC before and after administration of IV antibiotics.  tacrolimus 0.5 mg oral capsule: 1 cap(s) orally once a day (at bedtime)  tacrolimus 1 mg oral capsule: 1 cap(s) orally once a day  tamsulosin 0.4 mg oral capsule: 1 cap(s) orally once a day (at bedtime)  Trulicity Pen 3 mg/0.5 mL subcutaneous solution: 3 milligram(s) subcutaneously once a week

## 2025-01-17 NOTE — PROGRESS NOTE ADULT - PROBLEM SELECTOR PLAN 4
Patient with DM2 with nephropathy, neuropathy, retinopathy here. No concerns for DKA or HHS. Hba1c 7.0. Fair glycemic control.     -C/w insulin pump here while measuring fingersticks   -C/w Eylea injection to R eye q8 wks   -Hold home dulaglutide   -Hold home empagliflozin   -Appreciate endocriniology consult, insulin pump and CGM must be taken off prior nuclear medicine scan and put back on after; they have extra C7

## 2025-01-17 NOTE — DISCHARGE NOTE PROVIDER - CARE PROVIDER_API CALL
Mo Matos  Phone: (   )    -  Fax: (   )    -  Follow Up Time:    Hugo Parr  Infectious Disease  [address] [place] [id number]  Phone: [phone]  Fax: [phone]  Follow Up Time: 1 month    Reji Hylton  Podiatric Medicine and Surgery  [address] [place] [address]  Phone: [phone]  Fax: [phone]  Established Patient  Follow Up Time: 1 week    Mo Matos  Phone: (   )    -  Fax: (   )    -  Follow Up Time: 2 weeks

## 2025-01-17 NOTE — PROGRESS NOTE ADULT - PROBLEM SELECTOR PLAN 5
Patient with history of DM2 nephropathy leading to ESRD and renal transplant and immunosuppressive medications. Stable BUN/Cr. Therapeutic tacrolimus level. Clinically monitor.     -C/w predinsone 5 mg po qd   -C/w tacrolimus 1 mg po q12   -Holding mycophenolat mofetil due to sepsis

## 2025-01-17 NOTE — CONSULT NOTE ADULT - ASSESSMENT
64 year old Male with PMHx HTN T2DM on CGM and insulin pump, ESRD s/p renal transplant in 2020 on tacrolimus and mycophenolate, partial R first toe amputation presenting from podiatry office with a foot infection.        1- S/P renal transplant  2- HTN  3- foot infection        creatinine is steady  continue prednisone 5 mg daily  continue mycophenolic acid 360 mg daily  continue tacro 1mg BID  check tacro level daily while admitted, goal 5-7  continue zosyn of infection  continue lisinopril 2.5 mg daily  vascular and podiatry following, f/u recs  strict I/O  trend creatinine and electrolytes daily   64 year old Male with PMHx HTN T2DM on CGM and insulin pump, ESRD s/p renal transplant in 2020 on tacrolimus and mycophenolate, partial R first toe amputation presenting from podiatry office with a foot infection.        1- S/P renal transplant  2- HTN  3- foot infection        creatinine is steady  continue prednisone 5 mg daily  continue myfortic  360 mg daily  continue tacro 1mg BID  check tacro level daily   continue zosyn of infection  continue lisinopril 2.5 mg daily  vascular and podiatry following, f/u recs  strict I/O  trend creatinine and electrolytes daily

## 2025-01-17 NOTE — PROGRESS NOTE ADULT - PROBLEM SELECTOR PLAN 2
Resolving R foot erythema and tenderness. No claudication symptoms. Difficult eliciting R/L foot dorsalis pedis; however reportedly localized by doppler in ED and by podiatry today. No S&S of acute ischemic limb. Clinically monitor.     -Ankle Brachial Index limited due to noncompressible vasculature  -F/u arterial doppler to assess pulses   -Appreciate vascular surgery recommendations for peripheral vascular disaease eval  -F/u nuclear medicine scan today to r/o osteomyelitis Resolving R foot erythema and tenderness. No claudication symptoms. Difficult eliciting R/L foot dorsalis pedis; however reportedly localized by doppler in ED and by podiatry today. Superficial 2 wounds, no defects. No S&S of acute ischemic limb. Clinically monitor.     -Ankle Brachial Index limited due to noncompressible vasculature; vascular asking for repeat to be performed on R 2nd toe due to amputation of 1st R toe   -F/u arterial doppler to assess pulses   -Appreciate vascular surgery recommendations for peripheral vascular disaease eval  -F/u nuclear medicine scan to r/o ostemyelitis

## 2025-01-17 NOTE — DISCHARGE NOTE PROVIDER - CARE PROVIDERS DIRECT ADDRESSES
,DirectAddress_Unknown ,[email or web address],DirectAddress_Unknown

## 2025-01-17 NOTE — PROGRESS NOTE ADULT - ASSESSMENT
64M with history of HTN, DM2 with neuropathy and partial R 1st digit amputation, ESRD s/p renal transplant on tacrolimus and mycophenolate, and HDL here for uncomplicated R foot cellulitis with incidental reduced LE pulses R>L; currently here for peripheral vascular disease r/o.

## 2025-01-17 NOTE — PROGRESS NOTE ADULT - ATTENDING COMMENTS
64M pmh HTN T2DM on CGM and insulin pump, ESRD s/p renal transplant in 2020 on tacrolimus and mycophenolate, partial R first toe amputation present s/p Rt foot injury admitted for sepsis 2/2 R foot wound/cellulitis vs rule out OM. On Zosyn for pseudomonas coverage. Xray foot neg for OM. . Arterial LE duplex notable for mild/mod stenosis distal right sup femoral artery/ sig stenosis righ popliteal a./ moderate stenosis of the mid and distal right anterior tibial artery.    #Right LE wound/cellulitis  #r/o OM  #DM on insulin pump  #Renal transplant    -f/u podiatry- pending Nuclear scan 1/17  -f/u wound culture  -cw zosyn  -endo consulted- insulin pump  -nephro recs- resume Myfortic and tacro. Daily tacro level check     d/w HS  52 minutes spent  Jenae Pineda MD  Division of Hospital Medicine  Available on [employer] Teams .

## 2025-01-17 NOTE — PROGRESS NOTE ADULT - PROBLEM SELECTOR PLAN 6
Code: Full  Diet: consistent carbohydrate and low potassium DASH   DVT ppx: enoxaparin   Dispo: pending clinical course, likely home

## 2025-01-17 NOTE — DISCHARGE NOTE PROVIDER - HOSPITAL COURSE
HPI:  64M with HTN T2DM on CGM and insulin pump, ESRD s/p renal transplant in 2020 on tacrolimus and mycophenolate, partial R first toe amputation presenting from podiatry office with a foot infection. Patient states that a couple days ago he was walking barefoot outside and had a pine needle stuck on the anterior plantar surface of his foot near the base of his R first toe. He had progressive reddening and pain of the R foot and went to see a podiatrist. Podiatrist had concerns that he did not have any pedal pulses and also concern for potential bone involvement and prescribed a single dose of Augmentin and sent the patient to the ED for further evaluation. At the ED the patient was febrile to 101.8F, tachycardic to 91 w/ stable BP and O2 Sat. WBC count was elevated to 15.9 w/ neutrophil predominance. XR did not reveal any signs of OM and the patient was seen by podiatry who had low suspicion for osteomyelitis but recommended a Vascular surgery consult for concerns of PAD.  (15 Des 2025 21:58)    Hospital Course:  On arrival to floor, patient no longer had fever and had improvement of erythema and tenderness of R foot after piperacillin/tazobactam and initial vancomycin. A nuclear medicine scan was ordered to r/o osteomyelitis which showed ... Blood culture, abscess culture and gram joseph were negative. Due to decreased palpation of dorsalis pedis of R foot, vascular surgery was consulted to evaluate for peripheral vascular disease and initial SANDRA was limited due to calcification of vasculature. An arterial doppler was ordered and found to have stenosis of both distal posterior tibialis arteries. Patient had resolving sepsis with decreasing leukocytosis and no further fever with resolving symptoms. On day of discharge, patient was medically stable to return home.     Important Medication Changes and Reason:  STARTED ... for completion of cellulitis management     Active or Pending Issues Requiring Follow-up:  1) peripheral vascular disease- f/u with outpatient vascular surgery for treatment planning for stenotic arteries as needed     Advanced Directives:   [X] Full code  [ ] DNR  [ ] Hospice    Discharge Diagnoses:  Acute bacterial cellulitis of R foot 2/2 traumatic injury in setting of diabetic neuropathy   Peripheral vascular disease 2/2 atherosclerosis      HPI:  64M with HTN T2DM on CGM and insulin pump, ESRD s/p renal transplant in 2020 on tacrolimus and mycophenolate, partial R first toe amputation presenting from podiatry office with a foot infection. Patient states that a couple days ago he was walking barefoot outside and had a pine needle stuck on the anterior plantar surface of his foot near the base of his R first toe. He had progressive reddening and pain of the R foot and went to see a podiatrist. Podiatrist had concerns that he did not have any pedal pulses and also concern for potential bone involvement and prescribed a single dose of Augmentin and sent the patient to the ED for further evaluation. At the ED the patient was febrile to 101.8F, tachycardic to 91 w/ stable BP and O2 Sat. WBC count was elevated to 15.9 w/ neutrophil predominance. XR did not reveal any signs of OM and the patient was seen by podiatry who had low suspicion for osteomyelitis but recommended a Vascular surgery consult for concerns of PAD.  (15 Des 2025 21:58)    Hospital Course:  On arrival to floor, patient no longer had fever and had improvement of erythema and tenderness of R foot after piperacillin/tazobactam and initial vancomycin. A nuclear medicine scan was ordered to r/o osteomyelitis which showed increased uptake in the 1st and 2nd digit, equivocal for OM. An MRI foot with contrast was ordered which also showed inflammatory changes in the partially amputated 1st digit and 2nd digit that was equivocal for osteomyelitits and phelgmon changes in the R arch region.  ID transplant was consulted whose impression this was to be osteomyelitis due to equivocal findings in the setting of wound with probe to bone and determined appropiate long-term abx treatment with or w/o surgical intervention. Repeat abscess cx showed staph aureus growth. Due to decreased palpation of dorsalis pedis of R foot, vascular surgery was consulted to evaluate for peripheral vascular disease and initial SANDRA was limited due to calcification of vasculature. An arterial doppler was ordered and found to have stenosis of both distal posterior tibialis arteries.  Vascular surgery determined that patient had adequate blood flow for wound healing and that patient optimized for any podiatric intervention. As sepsis was resolving, foot erythema did not change significantly but dorsalis pedis pulses were more palpable despite being soft. Podiatry did a bedside bone  biopsy which showed.... After extensive discussion, patient ultimately decided to pursue....    Important Medication Changes and Reason:  STARTED on daptomycin 650 mg IV qd (started 1/22-3/5) x 6 wks for osteomyelitis   STARTED cefepime 2000 mg IV q8 (started 1/22-3/5) x 6 wks for osteomyelitis     Active or Pending Issues Requiring Follow-up:  1) peripheral vascular disease- f/u with outpatient vascular surgery for treatment planning for stenotic arteries as needed   2) osteomyelitis- f/u with outpatient infectious disease for management of osteomyelitis   3) amputation ?  f/u  with outpatient podiatry to assess wound healing     Advanced Directives:   [X] Full code  [ ] DNR  [ ] Hospice    Discharge Diagnoses:  Acute osteomyelitis R foot 2/2 traumatic injury in setting of diabetic neuropathy   Peripheral vascular disease 2/2 atherosclerosis      HPI:  64M with HTN T2DM on CGM and insulin pump, ESRD s/p renal transplant in 2020 on tacrolimus and mycophenolate, partial R first toe amputation presenting from podiatry office with a foot infection. Patient states that a couple days ago he was walking barefoot outside and had a pine needle stuck on the anterior plantar surface of his foot near the base of his R first toe. He had progressive reddening and pain of the R foot and went to see a podiatrist. Podiatrist had concerns that he did not have any pedal pulses and also concern for potential bone involvement and prescribed a single dose of Augmentin and sent the patient to the ED for further evaluation. At the ED the patient was febrile to 101.8F, tachycardic to 91 w/ stable BP and O2 Sat. WBC count was elevated to 15.9 w/ neutrophil predominance. XR did not reveal any signs of OM and the patient was seen by podiatry who had low suspicion for osteomyelitis but recommended a Vascular surgery consult for concerns of PAD.  (15 Des 2025 21:58)    Hospital Course:  On arrival to floor, patient no longer had fever and had improvement of erythema and tenderness of R foot after piperacillin/tazobactam and initial vancomycin. A nuclear medicine scan was ordered to r/o osteomyelitis which showed increased uptake in the 1st and 2nd digit, equivocal for OM. An MRI foot with contrast was ordered which also showed inflammatory changes in the partially amputated 1st digit and 2nd digit that was equivocal for osteomyelitits and phelgmon changes in the R arch region.  ID transplant was consulted whose impression this was to be osteomyelitis due to equivocal findings in the setting of wound with probe to bone and determined appropiate long-term abx treatment with or w/o surgical intervention. Repeat abscess cx showed staph aureus growth. Due to decreased palpation of dorsalis pedis of R foot, vascular surgery was consulted to evaluate for peripheral vascular disease and initial SANDRA was limited due to calcification of vasculature. An arterial doppler was ordered and found to have stenosis of both distal posterior tibialis arteries.  Vascular surgery determined that patient had adequate blood flow for wound healing and that patient optimized for any podiatric intervention. As sepsis was resolving, foot erythema did not change significantly but dorsalis pedis pulses were more palpable despite being soft. Podiatry did a bedside bone  biopsy which showed.... After extensive discussion, patient ultimately decided to pursue surgical intervention for the affected R toe and...     Important Medication Changes and Reason:  STARTED on daptomycin 650 mg IV qd (started 1/22-3/5) x 6 wks for osteomyelitis   STARTED cefepime 2000 mg IV q8 (started 1/22-3/5) x 6 wks for osteomyelitis     Active or Pending Issues Requiring Follow-up:  1) peripheral vascular disease- f/u with outpatient vascular surgery for treatment planning for stenotic arteries as needed   2) osteomyelitis- f/u with outpatient infectious disease for management of osteomyelitis   3) amputation ?  f/u  with outpatient podiatry to assess wound healing     Advanced Directives:   [X] Full code  [ ] DNR  [ ] Hospice    Discharge Diagnoses:  Acute osteomyelitis R foot 2/2 traumatic injury in setting of diabetic neuropathy   Peripheral vascular disease 2/2 atherosclerosis      HPI:  64M with HTN T2DM on CGM and insulin pump, ESRD s/p renal transplant in 2020 on tacrolimus and mycophenolate, partial R first toe amputation presenting from podiatry office with a foot infection. Patient states that a couple days ago he was walking barefoot outside and had a pine needle stuck on the anterior plantar surface of his foot near the base of his R first toe. He had progressive reddening and pain of the R foot and went to see a podiatrist. Podiatrist had concerns that he did not have any pedal pulses and also concern for potential bone involvement and prescribed a single dose of Augmentin and sent the patient to the ED for further evaluation. At the ED the patient was febrile to 101.8F, tachycardic to 91 w/ stable BP and O2 Sat. WBC count was elevated to 15.9 w/ neutrophil predominance. XR did not reveal any signs of OM and the patient was seen by podiatry who had low suspicion for osteomyelitis but recommended a Vascular surgery consult for concerns of PAD.  (15 Dse 2025 21:58)    Hospital Course:  On arrival to floor, patient no longer had fever and had improvement of erythema and tenderness of R foot after piperacillin/tazobactam and initial vancomycin. A nuclear medicine scan was ordered to r/o osteomyelitis which showed increased uptake in the 1st and 2nd digit, equivocal for OM. An MRI foot with contrast was ordered which also showed inflammatory changes in the partially amputated 1st digit and 2nd digit that was equivocal for osteomyelitits and phelgmon changes in the R arch region.  ID transplant was consulted whose impression this was to be osteomyelitis due to equivocal findings in the setting of wound with probe to bone and determined appropiate long-term abx treatment with or w/o surgical intervention. Repeat abscess cx showed staph aureus growth. Due to decreased palpation of dorsalis pedis of R foot, vascular surgery was consulted to evaluate for peripheral vascular disease and initial SANDRA was limited due to calcification of vasculature. An arterial doppler was ordered and found to have stenosis of both distal posterior tibialis arteries.  Vascular surgery determined that patient had adequate blood flow for wound healing and that patient optimized for any podiatric intervention. As sepsis was resolving, foot erythema did not change significantly but dorsalis pedis pulses were more palpable despite being soft. Podiatry did a bedside bone  biopsy which showed.... After extensive discussion, patient ultimately decided to pursue surgical intervention for the affected R toe and...     Important Medication Changes and Reason:  STARTED on daptomycin 650 mg IV qd (started 1/25-3/8) x 6 wks for osteomyelitis     Active or Pending Issues Requiring Follow-up:  1) peripheral vascular disease- f/u with outpatient vascular surgery for treatment planning for stenotic arteries as needed   2) osteomyelitis- f/u with outpatient infectious disease for management of osteomyelitis  (Dr. Hugo Parr)  3) amputation -  f/u  with outpatient podiatry to assess wound healing     Advanced Directives:   [X] Full code  [ ] DNR  [ ] Hospice    Discharge Diagnoses:  Acute osteomyelitis R foot 2/2 traumatic injury in setting of diabetic neuropathy   Peripheral vascular disease 2/2 atherosclerosis      HPI:  64M with HTN T2DM on CGM and insulin pump, ESRD s/p renal transplant in 2020 on tacrolimus and mycophenolate, partial R first toe amputation presenting from podiatry office with a foot infection. Patient states that a couple days ago he was walking barefoot outside and had a pine needle stuck on the anterior plantar surface of his foot near the base of his R first toe. He had progressive reddening and pain of the R foot and went to see a podiatrist. Podiatrist had concerns that he did not have any pedal pulses and also concern for potential bone involvement and prescribed a single dose of Augmentin and sent the patient to the ED for further evaluation. At the ED the patient was febrile to 101.8F, tachycardic to 91 w/ stable BP and O2 Sat. WBC count was elevated to 15.9 w/ neutrophil predominance. XR did not reveal any signs of OM and the patient was seen by podiatry who had low suspicion for osteomyelitis but recommended a Vascular surgery consult for concerns of PAD.  (15 Des 2025 21:58)    Hospital Course:  On arrival to floor, patient no longer had fever and had improvement of erythema and tenderness of R foot after piperacillin/tazobactam and initial vancomycin. A nuclear medicine scan was ordered to r/o osteomyelitis which showed increased uptake in the 1st and 2nd digit, equivocal for OM. An MRI foot with contrast was ordered which also showed inflammatory changes in the partially amputated 1st digit and 2nd digit that was equivocal for osteomyelitits and phelgmon changes in the R arch region.  ID transplant was consulted whose impression this was to be osteomyelitis due to equivocal findings in the setting of wound with probe to bone and determined appropiate long-term abx treatment with or w/o surgical intervention. Repeat abscess cx showed staph aureus growth. Due to decreased palpation of dorsalis pedis of R foot, vascular surgery was consulted to evaluate for peripheral vascular disease and initial SANDRA was limited due to calcification of vasculature. An arterial doppler was ordered and found to have stenosis of both distal posterior tibialis arteries.  Vascular surgery determined that patient had adequate blood flow for wound healing and that patient optimized for any podiatric intervention. As sepsis was resolving, foot erythema did not change significantly but dorsalis pedis pulses were more palpable despite being soft. Podiatry did a bedside bone  biopsy which was inconclusive. After extensive discussion, patient ultimately decided to pursue surgical intervention for the affected R toe and is on treatment for osteomyelitis (MRSA).     Important Medication Changes and Reason:  STARTED on daptomycin 650 mg IV qd (started 1/25-3/8) x 6 wks for osteomyelitis     Active or Pending Issues Requiring Follow-up:  1) peripheral vascular disease- f/u with outpatient vascular surgery for treatment planning for stenotic arteries as needed   2) osteomyelitis- f/u with outpatient infectious disease for management of osteomyelitis  (Dr. Hugo Parr)  3) amputation -  f/u  with outpatient podiatry to assess wound healing     Advanced Directives:   [X] Full code  [ ] DNR  [ ] Hospice    Discharge Diagnoses:  Acute osteomyelitis R foot 2/2 traumatic injury in setting of diabetic neuropathy   Peripheral vascular disease 2/2 atherosclerosis

## 2025-01-17 NOTE — PROGRESS NOTE ADULT - ASSESSMENT
64M with HTN T2DM on CGM and insulin pump, ESRD s/p renal transplant in 2020 on tacrolimus and mycophenolate, partial R first toe amputation presenting from podiatry office with c/f foot infection and difficulty palpating pedal pulses. Vascular consulted to evaluate LLE perfusion.    Rec  - No acute vascular intervention  - SANDRA/PVRs performed but significantly limited evaluation due to calcified noncompressible vasculature  - Please repeat toe pressures w/ measurements of the right foot 2-5 digits   - foot wounds per pods      Vascular surgery   [id number] 64M with HTN T2DM on CGM and insulin pump, ESRD s/p renal transplant in 2020 on tacrolimus and mycophenolate, partial R first toe amputation presenting from podiatry office with c/f foot infection and difficulty palpating pedal pulses. Vascular consulted to evaluate LLE perfusion.    Rec  - No acute vascular intervention  - SANDRA/PVRs performed but significantly limited evaluation due to calcified noncompressible vasculature  - Tetanus   - Please repeat toe pressures w/ measurements of the right foot 2-5 digits   - foot wounds per pods      Vascular surgery   [id number]

## 2025-01-17 NOTE — DISCHARGE NOTE PROVIDER - NSDCCPCAREPLAN_GEN_ALL_CORE_FT
PRINCIPAL DISCHARGE DIAGNOSIS  Diagnosis: Cellulitis of right foot  Assessment and Plan of Treatment: You came because your podiatrist was worried about not being able to feel a pulse on the R foot where the cellulitis was going on. You came in and we found you to meet criteria for sepsis with fever increased white blood cell count. We gave you antibiotics and that helped resolve the infection and you started feeling better with increasing ability to walk with less pain. With the podiatist, we wanted to make sure you did not have ostemyelitis and we pursued a nucelar scan which showed... . You got better and we believed you were ready to be discharged. If you develop fever/chills, confusion, dysuria, pus drainage, pain in your legs that do not get better with rest, tense swelling, pallor of your legs, abdominal pain, nausea/vomit, please return to the ED.      SECONDARY DISCHARGE DIAGNOSES  Diagnosis: Peripheral vascular disease  Assessment and Plan of Treatment: The podiatrists were concerned about the decreased ability to find pulses on your feet in setting of the diabetes and amputation you had. We got some studies that showed atherosclerosis in both legs and this would is something important to keep in mind in the future in case you develop further foot injury with potential infection. To evaluate for potential peripheral vascular disease in the setting of decreased pulses, we consulted the vascular surgeons and ordered some studies that showed calcification and narrowing of the vessels of both your legs, howeve greater on the R. You can follow-up with the vascular surgeons outpatient to determine for further treatment planning.     PRINCIPAL DISCHARGE DIAGNOSIS  Diagnosis: Acute osteomyelitis  Assessment and Plan of Treatment: You came because your podiatrist was worried about not being able to feel a pulse on the R foot where the cellulitis was going on. You came in and we found you to meet criteria for sepsis with fever increased white blood cell count. We gave you antibiotics and that helped resolve the infection and you started feeling better with increasing ability to walk with less pain. However there was still alot of equivocal findings to definitely r/o osteomyelitis and ultimately after we got the transplant infectious disease doctor who recommended an MRI of the foot that still showed equivocal findings, however put the entire clinical picture together with the wound that could probe to bone, ultimately decided to treat this as an osteomyelitis. We decided to put you on long-term abx and with podiatry, decided to pursue... If you develop fever/chills, confusion, dysuria, pus drainage, pain in your legs that do not get better with rest, tense swelling, pallor of your legs, abdominal pain, nausea/vomit, please return to the ED.      SECONDARY DISCHARGE DIAGNOSES  Diagnosis: Peripheral vascular disease  Assessment and Plan of Treatment: The podiatrists were concerned about the decreased ability to find pulses on your feet in setting of the diabetes and amputation you had. We got some studies that showed atherosclerosis in both legs and this would is something important to keep in mind in the future in case you develop further foot injury with potential infection. To evaluate for potential peripheral vascular disease in the setting of decreased pulses, we consulted the vascular surgeons and ordered some studies that showed calcification and narrowing of the vessels of both your legs, howeve greater on the R. You can follow-up with the vascular surgeons outpatient to determine for further treatment planning.     PRINCIPAL DISCHARGE DIAGNOSIS  Diagnosis: Acute osteomyelitis  Assessment and Plan of Treatment: You came because your podiatrist was worried about not being able to feel a pulse on the R foot where the cellulitis was going on. You came in and we found you to meet criteria for sepsis with fever increased white blood cell count. We gave you antibiotics and that helped resolve the infection and you started feeling better with increasing ability to walk with less pain. However there was still alot of equivocal findings to definitely r/o osteomyelitis and ultimately after we got the transplant infectious disease doctor who recommended an MRI of the foot that still showed equivocal findings, however put the entire clinical picture together with the wound that could probe to bone, ultimately decided to treat this as an osteomyelitis. We decided to put you on long-term abx and with podiatry, decided to pursue daptomycin. You will have weekly labwork. If you develop fever/chills, confusion, dysuria, pus drainage, pain in your legs that do not get better with rest, tense swelling, pallor of your legs, abdominal pain, nausea/vomit, please return to the ED.      SECONDARY DISCHARGE DIAGNOSES  Diagnosis: Peripheral vascular disease  Assessment and Plan of Treatment: The podiatrists were concerned about the decreased ability to find pulses on your feet in setting of the diabetes and amputation you had. We got some studies that showed atherosclerosis in both legs and this would is something important to keep in mind in the future in case you develop further foot injury with potential infection. To evaluate for potential peripheral vascular disease in the setting of decreased pulses, we consulted the vascular surgeons and ordered some studies that showed calcification and narrowing of the vessels of both your legs, howeve greater on the R. You can follow-up with the vascular surgeons outpatient to determine for further treatment planning.

## 2025-01-17 NOTE — PROGRESS NOTE ADULT - PROBLEM SELECTOR PLAN 1
Patient on arrival meeting SIRS criteria with fever, tachycardia, and leukocytosis. Afebrile. Not meeting SIRS criteria. Downtrending leukocytosis. Exam remarkable for reducing erythema on dorsum with less TTP. No open wounds suggestive of bony involvement. Low concern for osteomyelitis. Clinically improving.     -C/w piperacillin tazobactam 3.375 mg IV q8 (started 1/16)   -C/w vancomycin 1250 mg IV q12 (started 1/16)  -NGTD blood cx, urine cx, abscess cx  -Negative gram stain for abscess Patient on arrival meeting SIRS criteria with fever, tachycardia, and leukocytosis. Afebrile. Not meeting SIRS criteria. Downtrending leukocytosis. Exam remarkable for reducing erythema on dorsum with less TTP. No open wounds suggestive of bony involvement. Low concern for osteomyelitis. Clinically improving.     -C/w piperacillin tazobactam 3.375 mg IV q8 (started 1/16)   -NGTD blood cx, urine cx, abscess cx  -Negative gram stain for abscess

## 2025-01-17 NOTE — CONSULT NOTE ADULT - SUBJECTIVE AND OBJECTIVE BOX
[facility]  [phone]  NEPHROLOGY      INITIAL CONSULT NOTE  --------------------------------------------------------------------------------  HPI:    64 year old Male with PMHx HTN T2DM on CGM and insulin pump, ESRD s/p renal transplant in  on tacrolimus and mycophenolate, partial R first toe amputation presenting from podiatry office with a foot infection. Patient states that a couple days ago he was walking barefoot outside and had a pine needle stuck on the anterior plantar surface of his foot near the base of his R first toe. He had progressive reddening and pain of the R foot and went to see a podiatrist. Podiatrist had concerns that he did not have any pedal pulses and also concern for potential bone involvement and prescribed a single dose of Augmentin and sent the patient to the ED for further evaluation. In the ED the patient was febrile to 101.8F, WBC count was elevated to 15.9 w/ neutrophil predominance. XR did not reveal any signs of OM and the patient was seen by podiatry who had low suspicion for osteomyelitis but recommended a Vascular surgery consult for concerns of PAD. Renal consult given Hx renal transplant    PAST HISTORY  --------------------------------------------------------------------------------  PAST MEDICAL & SURGICAL HISTORY:  Hypertension      Diabetic neuropathy      Osteomyelitis of ankle or foot  x2      Pneumonia  2013      Insulin pump status  denies      CKD (chronic kidney disease)  hemodialysis on Mon, Wed and Fri      Diastolic dysfunction  mild. stage 1. EF 65%      DKA (diabetic ketoacidoses)      Hypertension      Diabetes  T2DM  last A1C (8%)      Ventral hernia  repair      History of glaucoma  left eye      Bilateral dry eyes      Dyslipidemia      Heart murmur      Asthma  never been intubated for asthma   last inhaler used 2019      S/P carpal tunnel release  b/l      S/P hernia repair      S/P amputation  right hallux       Retinal hemorrhage, left eye  s/p laser surgery  hx of right eye retinal hemorrahge, tx with laser surgery      H/O cardiac catheterization  no stents      H/O elbow surgery  left      S/P laparoscopic sleeve gastrectomy        Status post cataract extraction        FAMILY HISTORY:  Family history of heart attack (Grandparent)  father  @54 y.o.  grandfather  @ 52 y.o.    Family history of bone cancer  Grandfather    FH: aortic stenosis  Son      PAST SOCIAL HISTORY:  denies tobacco use      ALLERGIES & MEDICATIONS  --------------------------------------------------------------------------------  Allergies    clonidine (Other)  seasonal allergy (Other)    Intolerances      Standing Inpatient Medications  aspirin enteric coated 81 milliGRAM(s) Oral daily  atorvastatin 40 milliGRAM(s) Oral at bedtime  carvedilol 6.25 milliGRAM(s) Oral every 12 hours  dextrose 5%. 1000 milliLiter(s) IV Continuous <Continuous>  dextrose 5%. 1000 milliLiter(s) IV Continuous <Continuous>  dextrose 50% Injectable 25 Gram(s) IV Push once  dextrose 50% Injectable 12.5 Gram(s) IV Push once  dextrose 50% Injectable 25 Gram(s) IV Push once  dextrose Oral Gel 15 Gram(s) Oral once  diphtheria/tetanus/pertussis (acellular) Vaccine (Adacel) 0.5 milliLiter(s) IntraMuscular once  enoxaparin Injectable 40 milliGRAM(s) SubCutaneous every 24 hours  glucagon  Injectable 1 milliGRAM(s) IntraMuscular once  insulin aspart (NovoLOG) Pump 1 Each SubCutaneous Continuous Pump  lisinopril 2.5 milliGRAM(s) Oral daily  mupirocin 2% Ointment 1 Application(s) Topical every 12 hours  mycophenolic acid  milliGRAM(s) Oral daily  piperacillin/tazobactam IVPB.. 3.375 Gram(s) IV Intermittent every 8 hours  predniSONE   Tablet 5 milliGRAM(s) Oral daily  sodium chloride 0.9%. 1000 milliLiter(s) IV Continuous <Continuous>  tacrolimus 1 milliGRAM(s) Oral every 12 hours  tamsulosin 0.4 milliGRAM(s) Oral at bedtime    PRN Inpatient Medications  acetaminophen     Tablet .. 650 milliGRAM(s) Oral every 6 hours PRN  aluminum hydroxide/magnesium hydroxide/simethicone Suspension 30 milliLiter(s) Oral every 4 hours PRN  melatonin 3 milliGRAM(s) Oral at bedtime PRN      REVIEW OF SYSTEMS  --------------------------------------------------------------------------------  Gen: +fevers/chills   Head/Eyes/Ears/Mouth: No headache; Normal hearing;   Respiratory: No dyspnea, cough, wheezing,  CV: No chest pain, orthopnea  GI: No abdominal pain, diarrhea, nausea, vomiting,  : No dysuria, decrease urination   MSK: No joint pain/swelling;  Neuro: No dizziness/lightheadedness, weakness  also with no edema     VITALS/PHYSICAL EXAM  --------------------------------------------------------------------------------  T(C): 37.5 (25 @ 05:43), Max: 37.5 (25 @ 05:43)  HR: 83 (25 @ 05:43) (82 - 83)  BP: 156/81 (25 @ 05:43) (156/81 - 177/67)  RR: 18 (25 05:43) (18 - 18)  SpO2: 94% (25 @ 05:43) (92% - 94%)  Wt(kg): --  Height (cm): 174 (01-15-25 @ 16:52)  Weight (kg): 82.8 (01-15-25 @ 22:08)  BMI (kg/m2): 27.3 (01-15-25 @ 22:08)  BSA (m2): 1.98 (01-15-25 @ 22:08)      25 @ 07:01  -  25 @ 07:00  --------------------------------------------------------  IN: 600 mL / OUT: 200 mL / NET: 400 mL      Physical Exam:  	Gen: Non toxic comfortable appearing   	Pulm: decrease bs  no rales or ronchi or wheezing  	CV: No JVD. RRR, S1S2; no rub  	Back: No CVA tenderness;   	Abd: +BS, soft, nontender/nondistended  	: No suprapubic tenderness  	UE: Warm, no cyanosis  no clubbing,  no edema  	LE: Warm, no cyanosis  no clubbing, no edema  	Neuro: alert and oriented. speech coherent   	Skin: Warm, no decrease skin turgor     LABS/STUDIES  --------------------------------------------------------------------------------              15.2   13.47 >-----------<  195      [25 @ 07:16]              47.8     139  |  104  |  13  ----------------------------<  147      [25 @ 07:16]  4.0   |  20  |  1.11        Ca     9.2     [25 @ 07:16]      Mg     2.0     [25 07:16]      Phos  2.5     [25 07:16]    TPro  6.6  /  Alb  3.2  /  TBili  0.7  /  DBili  x   /  AST  14  /  ALT  15  /  AlkPhos  87  [25 @ 07:22]    PT/INR: PT 12.5 , INR 1.10       [25 @ 07:22]  PTT: 40.0       [25 @ 07:22]      Creatinine Trend:  SCr 1.11 [ @ 07:16]  SCr 1.20 [ @ 07:22]  SCr 1.28 [01-15 @ 17:23]      Urinalysis (01.15.25 @ 17:23)   Blood, Urine: Trace  Glucose Qualitative, Urine: >=1000 mg/dL  pH Urine: 5.0  Color: Dark Yellow  Urine Appearance: Clear  Bilirubin: Negative  Ketone - Urine: Trace mg/dL  Specific Gravity: >1.030  Protein, Urine: 100 mg/dL  Urobilinogen: 1.0 mg/dL  Nitrite: Negative  Leukocyte Esterase Concentration: Negative      HbA1c 7.6      [20 @ 08:43]    HCV 0.05, Nonreact      [25 @ 07:22]    < from: Xray Foot AP + Lateral + Oblique, Right (01.15.25 @ 17:50) >    ACC: [id number] EXAM:  XR FOOT COMP MIN 3 VIEWS RT   ORDERED BY: KRISTIAN PRO     ACC: [id number] EXAM:  XR ANKLE COMP MIN 3 VIEWS RT   ORDERED BY: KRISTIAN PRO     PROCEDURE DATE:  01/15/2025          INTERPRETATION:  CLINICAL INDICATION: Right foot infection.    TECHNIQUE: 3 views of the right foot, 3 views of the right ankle    COMPARISON: Right ankle x-ray 2024    FINDINGS:  Status post amputation at the level of the first proximal phalangeal   head. No soft tissue gas tracking, cortical bony erosive changes, or   aggressive periosteal reaction.    There are no acute fractures or dislocations. Chronic fracture deformity   involving the fifth metatarsal shaft. Chronic ossific body inferior to   the medial malleolus.    Symmetric ankle mortise. The joint spaces are maintained.  Small Achilles insertional enthesophyte. Vascular calcifications.    IMPRESSION:  No radiographic evidence of advanced osteomyelitis. Consider MRI as   clinically warranted.    --- End of Report ---    < end of copied text >

## 2025-01-17 NOTE — PROGRESS NOTE ADULT - SUBJECTIVE AND OBJECTIVE BOX
Patient is a 64y old  Male who presents with a chief complaint of foot infection (17 Jan 2025 08:17)       INTERVAL HPI/OVERNIGHT EVENTS:  Patient seen and evaluated at bedside.  Pt is resting comfortable in NAD. Denies N/V/F/C.      Allergies    clonidine (Other)  seasonal allergy (Other)    Intolerances        Vital Signs Last 24 Hrs  T(C): 37.5 (17 Jan 2025 05:43), Max: 37.5 (17 Jan 2025 05:43)  T(F): 99.5 (17 Jan 2025 05:43), Max: 99.5 (17 Jan 2025 05:43)  HR: 83 (17 Jan 2025 05:43) (82 - 84)  BP: 156/81 (17 Jan 2025 05:43) (136/73 - 177/67)  BP(mean): --  RR: 18 (17 Jan 2025 05:43) (18 - 18)  SpO2: 94% (17 Jan 2025 05:43) (92% - 95%)    Parameters below as of 17 Jan 2025 05:43  Patient On (Oxygen Delivery Method): room air        LABS:                        15.2   13.47 )-----------( 195      ( 17 Jan 2025 07:16 )             47.8     01-17    139  |  104  |  13  ----------------------------<  147[H]  4.0   |  20[L]  |  1.11    Ca    9.2      17 Jan 2025 07:16  Phos  2.5     01-17  Mg     2.0     01-17    TPro  6.6  /  Alb  3.2[L]  /  TBili  0.7  /  DBili  x   /  AST  14  /  ALT  15  /  AlkPhos  87  01-16    PT/INR - ( 16 Jan 2025 07:22 )   PT: 12.5 sec;   INR: 1.10 ratio         PTT - ( 16 Jan 2025 07:22 )  PTT:40.0 sec  Urinalysis Basic - ( 17 Jan 2025 07:16 )    Color: x / Appearance: x / SG: x / pH: x  Gluc: 147 mg/dL / Ketone: x  / Bili: x / Urobili: x   Blood: x / Protein: x / Nitrite: x   Leuk Esterase: x / RBC: x / WBC x   Sq Epi: x / Non Sq Epi: x / Bacteria: x      CAPILLARY BLOOD GLUCOSE      POCT Blood Glucose.: 149 mg/dL (16 Jan 2025 21:47)  POCT Blood Glucose.: 114 mg/dL (16 Jan 2025 17:54)  POCT Blood Glucose.: 154 mg/dL (16 Jan 2025 12:39)      Lower Extremity Physical Exam:  Vascular: DP/PT 0/4, B/L, CFT <3 seconds B/L, Temperature gradient warm to warm R, .   Neuro: Epicritic sensation diminished to the level of digits B/L.  Musculoskeletal/Ortho: s/p R hallux partial amputation, healed  Skin: R foot submet wound to dermis, erythema extends for wound to anterior lower shin, periwound ecchymosis, L foot 3rd digit plantar sulcus wound to subQ, no acute signs of infection      RADIOLOGY & ADDITIONAL TESTS:

## 2025-01-17 NOTE — PROGRESS NOTE ADULT - SUBJECTIVE AND OBJECTIVE BOX
INPATIENT MEDICINE PROGRESS NOTE:   Authored by Kerri Villegas MD     HISTORY OF PRESENT ILLNESS:  64F with history of DM2 on insulin pump with neuropathy and partial R 1st digit amputation, ESRD s/p renal transplant on tacrolimus and mycophenolate mofetil, asthma, HDL presents to [facility]-ED sent by podiatry due to R foot redness and tenderness with blister.     NAEOn. Afebrile, HR 80s. SBPs 130s-170s. POCT 227/154/114/149.     Seen and examined at bedside, patient reports feeling well, having some liquid stool. Not liking the food here. Decreasing pain with weight bearing on foot. Asking when to go home.     PHYSICAL EXAM:  Vital Signs Last 24 Hrs  T(C): 37.5 (17 Jan 2025 05:43), Max: 37.5 (17 Jan 2025 05:43)  T(F): 99.5 (17 Jan 2025 05:43), Max: 99.5 (17 Jan 2025 05:43)  HR: 83 (17 Jan 2025 05:43) (82 - 84)  BP: 156/81 (17 Jan 2025 05:43) (136/73 - 177/67)  BP(mean): --  RR: 18 (17 Jan 2025 05:43) (18 - 18)  SpO2: 94% (17 Jan 2025 05:43) (92% - 95%)    Parameters below as of 17 Jan 2025 05:43  Patient On (Oxygen Delivery Method): room air      General: NAD, calm, comfortable, cooperative, obese habitus, tattoos   Neuro: awake, alert, oriented to person/place/time, 5/5  strength, 5/5 hip flexion/extension b/l, spontaneity, reduced sensation b/l feet   HEENT: NC/AT, PERRLA b/l, EOMI, moist mucous membranes, no supraclavicular/submandibular lymphadenopathy  Chest: nonTTP   Heart: S1/S2, RRR   Lungs: CTA b/l, nonlabored breathing   Abd: soft, nonTTP, nondistended, reducible umbilical bulge   Ext: reducing erythema and TTP of R dorsal foot, no pretibial/pedal edema, ruptured blister on plantar midfoot of R foot, warm to touch b/l, no pain on plantarflexion/dorsiflexion,non tense, non pallor   Back: nonTTP   Pulses: L popliteal pulse 2+, limited L posterior tibialis and dorsalis pedis pulse; limited R popliteal pulse/posterior tibialis/dorsalis pedis however palpable by podiatry during bedside   Psych: full range affect, mutual topic,linear and goal directed   Lines/tubes/drains: none       I&O's Summary    16 Jan 2025 07:01  -  17 Jan 2025 07:00  --------------------------------------------------------  IN: 600 mL / OUT: 200 mL / NET: 400 mL        LABS:                        15.2   13.47 )-----------( 195      ( 17 Jan 2025 07:16 )             47.8     01-17    139  |  104  |  13  ----------------------------<  147[H]  4.0   |  20[L]  |  1.11    Ca    9.2      17 Jan 2025 07:16  Phos  2.5     01-17  Mg     2.0     01-17    TPro  6.6  /  Alb  3.2[L]  /  TBili  0.7  /  DBili  x   /  AST  14  /  ALT  15  /  AlkPhos  87  01-16    CAPILLARY BLOOD GLUCOSE      POCT Blood Glucose.: 149 mg/dL (16 Jan 2025 21:47)  POCT Blood Glucose.: 114 mg/dL (16 Jan 2025 17:54)  POCT Blood Glucose.: 154 mg/dL (16 Jan 2025 12:39)  POCT Blood Glucose.: 228 mg/dL (16 Jan 2025 08:46)    PT/INR - ( 16 Jan 2025 07:22 )   PT: 12.5 sec;   INR: 1.10 ratio         PTT - ( 16 Jan 2025 07:22 )  PTT:40.0 sec      Urinalysis Basic - ( 17 Jan 2025 07:16 )    Color: x / Appearance: x / SG: x / pH: x  Gluc: 147 mg/dL / Ketone: x  / Bili: x / Urobili: x   Blood: x / Protein: x / Nitrite: x   Leuk Esterase: x / RBC: x / WBC x   Sq Epi: x / Non Sq Epi: x / Bacteria: x        Culture - Abscess with Gram Stain (collected 16 Jan 2025 10:42)  Source: .Abscess  Gram Stain (16 Jan 2025 23:37):    No polymorphonuclear cells seen per low power field    No organisms seen per oil power field    Culture - Blood (collected 15 Des 2025 18:33)  Source: .Blood BLOOD  Preliminary Report (16 Jan 2025 22:02):    No growth at 24 hours    Culture - Urine (collected 15 Des 2025 17:23)  Source: Clean Catch Clean Catch (Midstream)  Final Report (16 Jan 2025 20:41):    No growth    Culture - Blood (collected 15 Des 2025 17:15)  Source: .Blood BLOOD  Preliminary Report (16 Jan 2025 22:02):    No growth at 24 hours        MEDICATIONS  (STANDING):  aspirin enteric coated 81 milliGRAM(s) Oral daily  atorvastatin 40 milliGRAM(s) Oral at bedtime  carvedilol 6.25 milliGRAM(s) Oral every 12 hours  dextrose 5%. 1000 milliLiter(s) (100 mL/Hr) IV Continuous <Continuous>  dextrose 5%. 1000 milliLiter(s) (50 mL/Hr) IV Continuous <Continuous>  dextrose 50% Injectable 25 Gram(s) IV Push once  dextrose 50% Injectable 12.5 Gram(s) IV Push once  dextrose 50% Injectable 25 Gram(s) IV Push once  dextrose Oral Gel 15 Gram(s) Oral once  diphtheria/tetanus/pertussis (acellular) Vaccine (Adacel) 0.5 milliLiter(s) IntraMuscular once  enoxaparin Injectable 40 milliGRAM(s) SubCutaneous every 24 hours  glucagon  Injectable 1 milliGRAM(s) IntraMuscular once  insulin aspart (NovoLOG) Pump 1 Each SubCutaneous Continuous Pump  lisinopril 2.5 milliGRAM(s) Oral daily  mupirocin 2% Ointment 1 Application(s) Topical every 12 hours  piperacillin/tazobactam IVPB.. 3.375 Gram(s) IV Intermittent every 8 hours  predniSONE   Tablet 5 milliGRAM(s) Oral daily  sodium chloride 0.9%. 1000 milliLiter(s) (100 mL/Hr) IV Continuous <Continuous>  tacrolimus 1 milliGRAM(s) Oral every 12 hours  tamsulosin 0.4 milliGRAM(s) Oral at bedtime    MEDICATIONS  (PRN):  acetaminophen     Tablet .. 650 milliGRAM(s) Oral every 6 hours PRN Temp greater or equal to 38C (100.4F), Mild Pain (1 - 3)  aluminum hydroxide/magnesium hydroxide/simethicone Suspension 30 milliLiter(s) Oral every 4 hours PRN Dyspepsia  melatonin 3 milliGRAM(s) Oral at bedtime PRN Insomnia

## 2025-01-17 NOTE — DISCHARGE NOTE PROVIDER - NSFOLLOWUPCLINICSTOKEN_GEN_ALL_ED_FT
[id number]: || ||00\01||False; [id number]: || ||00\01||False;[id number]: || ||00\01||False; 722504:1 month|| ||00\01||True;[id number]: || ||00\01||False;

## 2025-01-17 NOTE — DISCHARGE NOTE PROVIDER - NSDCFUSCHEDAPPT_GEN_ALL_CORE_FT
Sveta Puente  [facility]  NEPHRO [address]  Scheduled Appointment: 02/03/2025    Marylou Topete  [facility]  CARDIOLOGY [address]  Scheduled Appointment: 02/06/2025     Sveta Puente  [facility]  NEPHRO [address]  Scheduled Appointment: 02/03/2025    Marylou Topete  [facility]  CARDIOLOGY [address]  Scheduled Appointment: 02/06/2025    Raven Gomez  [facility]  ENDOCRIN [address] [facility]  Scheduled Appointment: 04/24/2025     Marylou Topete  [facility]  CARDIOLOGY [address]  Scheduled Appointment: 02/06/2025    Rere Ma  [facility]  INFDISEASE [address]  Scheduled Appointment: 03/31/2025    Raven Gomez  [facility] [address] [facility]  Scheduled Appointment: 04/24/2025

## 2025-01-17 NOTE — DISCHARGE NOTE PROVIDER - NSDCCPTREATMENT_GEN_ALL_CORE_FT
PRINCIPAL PROCEDURE  Procedure: Lower extremity doppler arterial  Findings and Treatment:   Diffuse bilateral atherosclerotic plaque. Calcification limits evaluation   of the infrapopliteal arteries.  Mild to moderate stenosis is identified in the distal right superficial   femoral artery. There is significant stenosis in the distal right   popliteal artery. There is moderate stenosis of the mid and distal right   anterior tibial artery.  There is moderate stenosis of the left popliteal artery. There is   moderate to severe stenosis of the mid left posterior tibial artery.       PRINCIPAL PROCEDURE  Procedure: Amputation, toe, right  Findings and Treatment: Right foot incision and drainage to bone with removal of the partial 1st ray and 2nd proximal phalanx bone biopsy which was partially closed. Proximal resection was soft and of poor quality.  Wound was partially closed with 3.0 Vicryl and 3.0 Nylon  PLAN: Follow-up with podiatry in 1 week; follow-up with ID in 4 weeks        SECONDARY PROCEDURE  Procedure: Lower extremity doppler arterial  Findings and Treatment: Diffuse bilateral atherosclerotic plaque. Calcification limits evaluation   of the infrapopliteal arteries.  Mild to moderate stenosis is identified in the distal right superficial   femoral artery. There is significant stenosis in the distal right   popliteal artery. There is moderate stenosis of the mid and distal right   anterior tibial artery.  There is moderate stenosis of the left popliteal artery. There is   moderate to severe stenosis of the mid left posterior tibial artery.

## 2025-01-17 NOTE — DISCHARGE NOTE PROVIDER - PROVIDER TOKENS
FREE:[LAST:[Carlo],FIRST:[Mo],PHONE:[(   )    -],FAX:[(   )    -]] PROVIDER:[TOKEN:[[id number]],FOLLOWUP:[1 month]],PROVIDER:[TOKEN:[[id number]],FOLLOWUP:[1 week],ESTABLISHEDPATIENT:[T]],FREE:[LAST:[Sotirios],FIRST:[Kassapidis],PHONE:[(   )    -],FAX:[(   )    -],FOLLOWUP:[2 weeks]]

## 2025-01-17 NOTE — DISCHARGE NOTE PROVIDER - NSDCFUADDAPPT_GEN_ALL_CORE_FT
APPTS ARE READY TO BE MADE: [X] YES    Best Family or Patient Contact (if needed):    Additional Information about above appointments (if needed):    1: Please make an appointment with your PCP to discuss your most recent hospitalization.  2: Please make an appointment with your nephrologist for management of the kidney transplant.   3: Please make an appointment with your endocrinologist to follow-up on the diabetes.   4: Please consider making an appointment with the vascular surgery to determine any further management for the peripheral vascular disease.     Other comments or requests:    APPTS ARE READY TO BE MADE: [X] YES    Best Family or Patient Contact (if needed):    Additional Information about above appointments (if needed):    1: Please make an appointment with your PCP to discuss your most recent hospitalization.  2: Please make an appointment with your nephrologist for management of the kidney transplant.   3: Please make an appointment with your endocrinologist to follow-up on the diabetes.   4: Please consider making an appointment with the vascular surgery to determine any further management for the peripheral vascular disease.   5: Please f/u with the infectious disease doctors for f/u of osteomyelitis management.   Other comments or requests:    APPTS ARE READY TO BE MADE: [X] YES    Best Family or Patient Contact (if needed):    Additional Information about above appointments (if needed):    1: Please make an appointment with your PCP to discuss your most recent hospitalization.  2: Please make an appointment with your nephrologist for management of the kidney transplant.   3: Please make an appointment with your endocrinologist to follow-up on the diabetes.   4: Please consider making an appointment with the vascular surgery to determine any further management for the peripheral vascular disease.   5: Please f/u with the infectious disease doctors for f/u of osteomyelitis management.   Other comments or requests:     Podiatry Discharge Instructions:  Follow up: Please follow up with Dr. Hylton within 1 week of discharge from the hospital, please call [phone] for appointment and discuss that you recently were seen in the hospital.  Wound Care: Please apply VAC with black granufaom to right foot wound on 125 mmHG on continuous pressure three times a week  Weight bearing: Please weight bear as tolerated to right heel in a surgical shoe.  Antibiotics: Please continue as instructed.   APPTS ARE READY TO BE MADE: [X] YES    Best Family or Patient Contact (if needed):    Additional Information about above appointments (if needed):    1: Please make an appointment with your PCP to discuss your most recent hospitalization.  2: Please make an appointment with your nephrologist for management of the kidney transplant.   3: Please make an appointment with your endocrinologist to follow-up on the diabetes.   4: Please consider making an appointment with the vascular surgery to determine any further management for the peripheral vascular disease.   5: Please f/u with the infectious disease doctors for f/u of osteomyelitis management.   6. Please f/u with podiatry in 1 week  Other comments or requests:     Podiatry Discharge Instructions:  Follow up: Please follow up with Dr. Hylton within 1 week of discharge from the hospital, please call [phone] for appointment and discuss that you recently were seen in the hospital.  Wound Care: Please apply VAC with black granufaom to right foot wound on 125 mmHG on continuous pressure three times a week  Weight bearing: Please weight bear as tolerated to right heel in a surgical shoe.  Antibiotics: Please continue as instructed.   APPTS ARE READY TO BE MADE: [X] YES    Best Family or Patient Contact (if needed):    Additional Information about above appointments (if needed):    1: Please make an appointment with your PCP to discuss your most recent hospitalization.  2: Please make an appointment with your nephrologist for management of the kidney transplant.   3: Please make an appointment with your endocrinologist to follow-up on the diabetes.   4: Please consider making an appointment with the vascular surgery to determine any further management for the peripheral vascular disease.   5: Please f/u with the infectious disease doctors for f/u of osteomyelitis management.   6. Please f/u with podiatry in 1 week  Other comments or requests:     Podiatry Discharge Instructions:  Follow up: Please follow up with Dr. Hylton within 1 week of discharge from the hospital, please call [phone] for appointment and discuss that you recently were seen in the hospital.  Wound Care: Please apply VAC with black granufaom to right foot wound on 125 mmHG on continuous pressure three times a week  Weight bearing: Please weight bear as tolerated to right heel in a surgical shoe.  Antibiotics: Please continue as instructed.      Patient informed us they already have secured a follow up appointment which was not scheduled by our team.  Shira Gomez on 4/24 at 1pm and Dr. Ma on 3/31 at 11:30am    Patient informed us they already have secured a follow up appointment which is not visible on [facility]. Dr. Hylton on 2/3    Provided patient with provider referral information, however patient prefers to schedule the appointments on their own. Nephro ( in touch) and DPM     Patient advised they did not want to proceed with scheduling appointment with their PCP  APPTS ARE READY TO BE MADE: [X] YES    Best Family or Patient Contact (if needed):    Additional Information about above appointments (if needed):    1: Please make an appointment with your PCP to discuss your most recent hospitalization.  2: Please make an appointment with your nephrologist for management of the kidney transplant.   3: Please make an appointment with your endocrinologist to follow-up on the diabetes.   4: Please consider making an appointment with the vascular surgery to determine any further management for the peripheral vascular disease.   5: Please f/u with the infectious disease doctors for f/u of osteomyelitis management.   6. Please f/u with podiatry in 1 week  Other comments or requests:     Podiatry Discharge Instructions:  Follow up: Please follow up with Dr. Hylton within 1 week of discharge from the hospital, please call [phone] for appointment and discuss that you recently were seen in the hospital.  Wound Care: Please apply VAC with black granufaom to right foot wound on 125 mmHG on continuous pressure three times a week  Weight bearing: Please weight bear as tolerated to right heel in a surgical shoe.  Antibiotics: Please continue as instructed.      Patient informed us they already have secured a follow up appointment which was not scheduled by our team.  Endo Dr. Gomez on 4/24 at 1pm and Dr. Ma on 3/31 at 11:30am    Patient informed us they already have secured a follow up appointment which is not visible on [facility]. Surgeon on 2/3    Provided patient with provider referral information, however patient prefers to schedule the appointments on their own. Nephro ( in touch) and DPM     Patient advised they did not want to proceed with scheduling appointment with their PCP

## 2025-01-17 NOTE — PROGRESS NOTE ADULT - ASSESSMENT
65 yo M presented with R foot submet 1 wound to dermis with superimposed cellulites  - Pt seen and evaluated.  - Afebrile, WBC 12.32, , CRP 22.9  - R foot submet wound to dermis, erythema extends for wound to anterior lower shin, L foot 3rd digit plantar sulcus wound to subQ, no acute signs of infection  - Right foot X-ray: no OM, no gas  - Right foot wound culture: pending  - Bone Scan: pending  - Vasc recs, appreciated  - Recommended continue IV Unasyn  - MRI can not be obtained 2/2 insulin pump  - Pod plan: Local wound care pending appearance/ MR  - Discussed with Attending

## 2025-01-17 NOTE — CONSULT NOTE ADULT - NS ATTEND AMEND GEN_ALL_CORE FT
Seen, examined with, formulated plan with and  agree with above as scribed by NP Collin [Jenna]     states right foot erythema has been decreasing   right foot dorsum area erythema hx partial big toe amputation   lungs no rales  abd graft site non tender  ext no edema    renal transplant   DM   foot infection   HTN     tacro level high but it is approx 10 hr level   he is only on once daily myfortic 360 mg daily   recheck tacro level trough   insulin to cont   cont abx as above   cr is steady   podiatry /vascular follow up

## 2025-01-17 NOTE — DISCHARGE NOTE PROVIDER - NSFOLLOWUPCLINICS_GEN_ALL_ED_FT
[facility] Surgery  Vascular Surgery  [address] [place] [address]  Phone: [phone]  Fax:      [facility]ascular Surgery  Vascular Surgery  [address] [place] [address]  Phone: [phone]  Fax:     [facility] Disease  Infectious Disease  [address], [place] [address]  Phone: [phone]  Fax:      [facility]  Infectious Disease  [address], [place] [address]  Phone: [phone]  Fax:   Established Patient  Follow Up Time: 1 month    [facility]ascular Surgery  Vascular Surgery  [address] [place] [address]  Phone: [phone]  Fax:

## 2025-01-17 NOTE — PROGRESS NOTE ADULT - SUBJECTIVE AND OBJECTIVE BOX
Vascular Surgery Daily Progress Note  =====================================================    SUBJECTIVE: Patient reports that they're feeling well. Per him, redness seems to be improving in the left foot.     --------------------------------------------------------------------------------------  VITAL SIGNS:  T(C): 37.5 (01-17-25 @ 05:43), Max: 37.5 (01-17-25 @ 05:43)  HR: 83 (01-17-25 @ 05:43) (82 - 84)  BP: 156/81 (01-17-25 @ 05:43) (136/73 - 177/67)  RR: 18 (01-17-25 @ 05:43) (18 - 18)  SpO2: 94% (01-17-25 @ 05:43) (92% - 95%)  --------------------------------------------------------------------------------------    EXAM    General: NAD, resting in bed comfortably  Cardiac: Regular rate, normotensive  Respiratory: Nonlabored respirations, normal cw expansion, on RA  Neuro: AOx3, CNII-XII intact on gross examination  Vascular/Extremities: Warm, well perfused, RLE w/ erythema extending from the medial plantar surface to the ankle, nontender, DP/PT signals, not palpable, s/p first toe amputation, well healed    --------------------------------------------------------------------------------------

## 2025-01-18 LAB
ANION GAP SERPL CALC-SCNC: 13 MMOL/L — SIGNIFICANT CHANGE UP (ref 5–17)
BUN SERPL-MCNC: 15 MG/DL — SIGNIFICANT CHANGE UP (ref 7–23)
CALCIUM SERPL-MCNC: 9.2 MG/DL — SIGNIFICANT CHANGE UP (ref 8.4–10.5)
CHLORIDE SERPL-SCNC: 104 MMOL/L — SIGNIFICANT CHANGE UP (ref 96–108)
CO2 SERPL-SCNC: 19 MMOL/L — LOW (ref 22–31)
CREAT SERPL-MCNC: 1.14 MG/DL — SIGNIFICANT CHANGE UP (ref 0.5–1.3)
EGFR: 73 ML/MIN/1.73M2 — SIGNIFICANT CHANGE UP
GLUCOSE BLDC GLUCOMTR-MCNC: 116 MG/DL — HIGH (ref 70–99)
GLUCOSE BLDC GLUCOMTR-MCNC: 149 MG/DL — HIGH (ref 70–99)
GLUCOSE BLDC GLUCOMTR-MCNC: 152 MG/DL — HIGH (ref 70–99)
GLUCOSE BLDC GLUCOMTR-MCNC: 157 MG/DL — HIGH (ref 70–99)
GLUCOSE SERPL-MCNC: 131 MG/DL — HIGH (ref 70–99)
HCT VFR BLD CALC: 47.6 % — SIGNIFICANT CHANGE UP (ref 39–50)
HGB BLD-MCNC: 15.4 G/DL — SIGNIFICANT CHANGE UP (ref 13–17)
MCHC RBC-ENTMCNC: 30.9 PG — SIGNIFICANT CHANGE UP (ref 27–34)
MCHC RBC-ENTMCNC: 32.4 G/DL — SIGNIFICANT CHANGE UP (ref 32–36)
MCV RBC AUTO: 95.4 FL — SIGNIFICANT CHANGE UP (ref 80–100)
NRBC # BLD: 0 /100 WBCS — SIGNIFICANT CHANGE UP (ref 0–0)
NRBC BLD-RTO: 0 /100 WBCS — SIGNIFICANT CHANGE UP (ref 0–0)
PLATELET # BLD AUTO: 194 K/UL — SIGNIFICANT CHANGE UP (ref 150–400)
POTASSIUM SERPL-MCNC: 4.3 MMOL/L — SIGNIFICANT CHANGE UP (ref 3.5–5.3)
POTASSIUM SERPL-SCNC: 4.3 MMOL/L — SIGNIFICANT CHANGE UP (ref 3.5–5.3)
RBC # BLD: 4.99 M/UL — SIGNIFICANT CHANGE UP (ref 4.2–5.8)
RBC # FLD: 13.2 % — SIGNIFICANT CHANGE UP (ref 10.3–14.5)
SODIUM SERPL-SCNC: 136 MMOL/L — SIGNIFICANT CHANGE UP (ref 135–145)
TACROLIMUS SERPL-MCNC: 10.9 NG/ML — SIGNIFICANT CHANGE UP
WBC # BLD: 13.63 K/UL — HIGH (ref 3.8–10.5)
WBC # FLD AUTO: 13.63 K/UL — HIGH (ref 3.8–10.5)

## 2025-01-18 PROCEDURE — 99232 SBSQ HOSP IP/OBS MODERATE 35: CPT | Mod: GC

## 2025-01-18 RX ADMIN — TAMSULOSIN HYDROCHLORIDE 0.4 MILLIGRAM(S): 0.4 CAPSULE ORAL at 20:44

## 2025-01-18 RX ADMIN — MYCOPHENOLIC ACID 360 MILLIGRAM(S): 180 TABLET, DELAYED RELEASE ORAL at 11:56

## 2025-01-18 RX ADMIN — Medication 6.25 MILLIGRAM(S): at 20:45

## 2025-01-18 RX ADMIN — PIPERACILLIN SODIUM AND TAZOBACTAM SODIUM 25 GRAM(S): 2; 250 INJECTION, POWDER, FOR SOLUTION INTRAVENOUS at 20:44

## 2025-01-18 RX ADMIN — PREDNISONE 5 MILLIGRAM(S): 5 TABLET ORAL at 06:34

## 2025-01-18 RX ADMIN — PIPERACILLIN SODIUM AND TAZOBACTAM SODIUM 25 GRAM(S): 2; 250 INJECTION, POWDER, FOR SOLUTION INTRAVENOUS at 11:51

## 2025-01-18 RX ADMIN — ASPIRIN 81 MILLIGRAM(S): 81 TABLET, COATED ORAL at 11:56

## 2025-01-18 RX ADMIN — Medication 6.25 MILLIGRAM(S): at 06:34

## 2025-01-18 RX ADMIN — Medication 2.5 MILLIGRAM(S): at 06:34

## 2025-01-18 RX ADMIN — TACROLIMUS 1 MILLIGRAM(S): 1 CAPSULE, GELATIN COATED ORAL at 20:44

## 2025-01-18 RX ADMIN — MUPIROCIN 1 APPLICATION(S): 2 CREAM TOPICAL at 18:13

## 2025-01-18 RX ADMIN — MUPIROCIN 1 APPLICATION(S): 2 CREAM TOPICAL at 06:39

## 2025-01-18 RX ADMIN — PIPERACILLIN SODIUM AND TAZOBACTAM SODIUM 25 GRAM(S): 2; 250 INJECTION, POWDER, FOR SOLUTION INTRAVENOUS at 02:27

## 2025-01-18 RX ADMIN — TACROLIMUS 1 MILLIGRAM(S): 1 CAPSULE, GELATIN COATED ORAL at 09:12

## 2025-01-18 RX ADMIN — ATORVASTATIN CALCIUM 40 MILLIGRAM(S): 80 TABLET, FILM COATED ORAL at 20:44

## 2025-01-18 RX ADMIN — ENOXAPARIN SODIUM 40 MILLIGRAM(S): 100 INJECTION SUBCUTANEOUS at 20:47

## 2025-01-18 NOTE — PROGRESS NOTE ADULT - SUBJECTIVE AND OBJECTIVE BOX
Patient is a 64y old  Male who presents with a chief complaint of scar tinfection (18 Jan 2025 09:25)       INTERVAL HPI/OVERNIGHT EVENTS:  Patient seen and evaluated at bedside.  Pt is resting comfortable in NAD. Denies N/V/F/C.    Allergies    clonidine (Other)  seasonal allergy (Other)    Intolerances        Vital Signs Last 24 Hrs  T(C): 37.7 (18 Jan 2025 12:18), Max: 37.7 (18 Jan 2025 12:18)  T(F): 99.8 (18 Jan 2025 12:18), Max: 99.8 (18 Jan 2025 12:18)  HR: 74 (18 Jan 2025 12:18) (73 - 80)  BP: 108/58 (18 Jan 2025 12:18) (108/58 - 140/69)  BP(mean): --  RR: 18 (18 Jan 2025 12:18) (18 - 18)  SpO2: 95% (18 Jan 2025 12:18) (93% - 95%)    Parameters below as of 18 Jan 2025 12:18  Patient On (Oxygen Delivery Method): room air        LABS:                        15.4   13.63 )-----------( 194      ( 18 Jan 2025 09:19 )             47.6     01-18    136  |  104  |  15  ----------------------------<  131[H]  4.3   |  19[L]  |  1.14    Ca    9.2      18 Jan 2025 09:19  Phos  2.5     01-17  Mg     2.0     01-17        Urinalysis Basic - ( 18 Jan 2025 09:19 )    Color: x / Appearance: x / SG: x / pH: x  Gluc: 131 mg/dL / Ketone: x  / Bili: x / Urobili: x   Blood: x / Protein: x / Nitrite: x   Leuk Esterase: x / RBC: x / WBC x   Sq Epi: x / Non Sq Epi: x / Bacteria: x      CAPILLARY BLOOD GLUCOSE      POCT Blood Glucose.: 152 mg/dL (18 Jan 2025 12:48)  POCT Blood Glucose.: 116 mg/dL (18 Jan 2025 08:59)  POCT Blood Glucose.: 151 mg/dL (17 Jan 2025 22:02)  POCT Blood Glucose.: 142 mg/dL (17 Jan 2025 17:38)  POCT Blood Glucose.: 116 mg/dL (17 Jan 2025 16:05)      Lower Extremity Physical Exam:  Vascular: DP/PT 0/4, B/L, CFT <3 seconds B/L, Temperature gradient warm to warm R, .   Neuro: Epicritic sensation diminished to the level of digits B/L.  Musculoskeletal/Ortho: s/p R hallux partial amputation, healed  Skin: R foot submet wound to dermis, erythema extends for wound to anterior lower shin, periwound ecchymosis, L foot 3rd digit plantar sulcus wound to subQ, no acute signs of infection    RADIOLOGY & ADDITIONAL TESTS:

## 2025-01-18 NOTE — PROGRESS NOTE ADULT - SUBJECTIVE AND OBJECTIVE BOX
[facility]   [phone]  RENAL FOLLOW UP NOTE  --------------------------------------------------------------------------------  Chief Complaint:    24 hour events/subjective:    patient seen and examined  denies sob    PAST HISTORY  --------------------------------------------------------------------------------  No significant changes to PMH, PSH, FHx, SHx, unless otherwise noted    ALLERGIES & MEDICATIONS  --------------------------------------------------------------------------------  Allergies    clonidine (Other)  seasonal allergy (Other)    Intolerances      Standing Inpatient Medications  aspirin enteric coated 81 milliGRAM(s) Oral daily  atorvastatin 40 milliGRAM(s) Oral at bedtime  carvedilol 6.25 milliGRAM(s) Oral every 12 hours  dextrose 5%. 1000 milliLiter(s) IV Continuous <Continuous>  dextrose 5%. 1000 milliLiter(s) IV Continuous <Continuous>  dextrose 50% Injectable 25 Gram(s) IV Push once  dextrose 50% Injectable 12.5 Gram(s) IV Push once  dextrose 50% Injectable 25 Gram(s) IV Push once  dextrose Oral Gel 15 Gram(s) Oral once  diphtheria/tetanus/pertussis (acellular) Vaccine (Adacel) 0.5 milliLiter(s) IntraMuscular once  enoxaparin Injectable 40 milliGRAM(s) SubCutaneous every 24 hours  glucagon  Injectable 1 milliGRAM(s) IntraMuscular once  insulin aspart (NovoLOG) Pump 1 Each SubCutaneous Continuous Pump  lisinopril 2.5 milliGRAM(s) Oral daily  mupirocin 2% Ointment 1 Application(s) Topical every 12 hours  mycophenolic acid  milliGRAM(s) Oral daily  piperacillin/tazobactam IVPB.. 3.375 Gram(s) IV Intermittent every 8 hours  predniSONE   Tablet 5 milliGRAM(s) Oral daily  sodium chloride 0.9%. 1000 milliLiter(s) IV Continuous <Continuous>  tacrolimus 1 milliGRAM(s) Oral every 12 hours  tamsulosin 0.4 milliGRAM(s) Oral at bedtime    PRN Inpatient Medications  acetaminophen     Tablet .. 650 milliGRAM(s) Oral every 6 hours PRN  aluminum hydroxide/magnesium hydroxide/simethicone Suspension 30 milliLiter(s) Oral every 4 hours PRN  melatonin 3 milliGRAM(s) Oral at bedtime PRN      REVIEW OF SYSTEMS  --------------------------------------------------------------------------------    Gen: denies fevers/chills,  CVS: denies chest pain/palpitations  Resp: denies SOB/Cough  GI: Denies N/V/Abd pain  : Denies dysuria    VITALS/PHYSICAL EXAM  --------------------------------------------------------------------------------  T(C): 37.6 (01-18-25 @ 06:35), Max: 37.6 (01-17-25 @ 20:23)  HR: 80 (01-18-25 @ 06:35) (73 - 80)  BP: 133/63 (01-18-25 @ 06:35) (133/63 - 140/69)  RR: 18 (01-18-25 @ 06:35) (18 - 18)  SpO2: 95% (01-18-25 @ 06:35) (93% - 95%)  Wt(kg): --        01-17-25 @ 07:01  -  01-18-25 @ 07:00  --------------------------------------------------------  IN: 100 mL / OUT: 0 mL / NET: 100 mL      Physical Exam:  	  	Gen: Non toxic comfortable appearing   	Pulm: decrease bs  no rales or ronchi or wheezing  	CV: No JVD. RRR, S1S2; no rub  	Abd: +BS, soft, nontender/nondistended  	: No suprapubic tenderness  	UE: Warm, no cyanosis  no clubbing,  no edema  	LE: Warm, no cyanosis  no clubbing, no edema    LABS/STUDIES  --------------------------------------------------------------------------------              15.4   13.63 >-----------<  194      [01-18-25 @ 09:19]              47.6     139  |  104  |  13  ----------------------------<  147      [01-17-25 @ 07:16]  4.0   |  20  |  1.11        Ca     9.2     [01-17-25 @ 07:16]      Mg     2.0     [01-17-25 @ 07:16]      Phos  2.5     [01-17-25 @ 07:16]            Creatinine Trend:  SCr 1.11 [01-17 @ 07:16]  SCr 1.20 [01-16 @ 07:22]  SCr 1.28 [01-15 @ 17:23]    Tacrolimus (), Serum: 12.0 ng/mL (01-17 @ 07:12)  Tacrolimus (), Serum: 8.3 ng/mL (01-16 @ 07:15)              HbA1c 7.6      [02-19-20 @ 08:43]

## 2025-01-18 NOTE — PROGRESS NOTE ADULT - SUBJECTIVE AND OBJECTIVE BOX
Carlos Alberto Cardenas  PGY-2 Resident Physician     Patient is a 64y old  Male who presents with a chief complaint of foot infection (17 Jan 2025 21:27)    SUBJECTIVE / OVERNIGHT EVENTS:  -NAEON    MEDICATIONS  (STANDING):  aspirin enteric coated 81 milliGRAM(s) Oral daily  atorvastatin 40 milliGRAM(s) Oral at bedtime  carvedilol 6.25 milliGRAM(s) Oral every 12 hours  dextrose 5%. 1000 milliLiter(s) (50 mL/Hr) IV Continuous <Continuous>  dextrose 5%. 1000 milliLiter(s) (100 mL/Hr) IV Continuous <Continuous>  dextrose 50% Injectable 25 Gram(s) IV Push once  dextrose 50% Injectable 12.5 Gram(s) IV Push once  dextrose 50% Injectable 25 Gram(s) IV Push once  dextrose Oral Gel 15 Gram(s) Oral once  diphtheria/tetanus/pertussis (acellular) Vaccine (Adacel) 0.5 milliLiter(s) IntraMuscular once  enoxaparin Injectable 40 milliGRAM(s) SubCutaneous every 24 hours  glucagon  Injectable 1 milliGRAM(s) IntraMuscular once  insulin aspart (NovoLOG) Pump 1 Each SubCutaneous Continuous Pump  lisinopril 2.5 milliGRAM(s) Oral daily  mupirocin 2% Ointment 1 Application(s) Topical every 12 hours  mycophenolic acid  milliGRAM(s) Oral daily  piperacillin/tazobactam IVPB.. 3.375 Gram(s) IV Intermittent every 8 hours  predniSONE   Tablet 5 milliGRAM(s) Oral daily  sodium chloride 0.9%. 1000 milliLiter(s) (100 mL/Hr) IV Continuous <Continuous>  tacrolimus 1 milliGRAM(s) Oral every 12 hours  tamsulosin 0.4 milliGRAM(s) Oral at bedtime    MEDICATIONS  (PRN):  acetaminophen     Tablet .. 650 milliGRAM(s) Oral every 6 hours PRN Temp greater or equal to 38C (100.4F), Mild Pain (1 - 3)  aluminum hydroxide/magnesium hydroxide/simethicone Suspension 30 milliLiter(s) Oral every 4 hours PRN Dyspepsia  melatonin 3 milliGRAM(s) Oral at bedtime PRN Insomnia    Allergies    clonidine (Other)  seasonal allergy (Other)    Intolerances        Vital Signs Last 24 Hrs  T(C): 37.6 (18 Jan 2025 06:35), Max: 37.6 (17 Jan 2025 20:23)  T(F): 99.7 (18 Jan 2025 06:35), Max: 99.7 (18 Jan 2025 06:35)  HR: 80 (18 Jan 2025 06:35) (73 - 80)  BP: 133/63 (18 Jan 2025 06:35) (133/63 - 140/69)  BP(mean): --  RR: 18 (18 Jan 2025 06:35) (18 - 18)  SpO2: 95% (18 Jan 2025 06:35) (93% - 95%)    Parameters below as of 18 Jan 2025 06:35  Patient On (Oxygen Delivery Method): room air      Daily     Daily   I&O's Summary    17 Jan 2025 07:01  -  18 Jan 2025 07:00  --------------------------------------------------------  IN: 100 mL / OUT: 0 mL / NET: 100 mL        Physical Exam  General: NAD, calm, comfortable, cooperative, obese habitus, tattoos   Neuro: awake, alert, oriented to person/place/time, 5/5  strength, 5/5 hip flexion/extension b/l, spontaneity, reduced sensation b/l feet   HEENT: NC/AT, PERRLA b/l, EOMI, moist mucous membranes, no supraclavicular/submandibular lymphadenopathy  Chest: nonTTP   Heart: S1/S2, RRR   Lungs: CTA b/l, nonlabored breathing   Abd: soft, nonTTP, nondistended, reducible umbilical bulge   Ext: reducing erythema and TTP of R dorsal foot, no pretibial/pedal edema, ruptured blister on plantar midfoot of R foot, warm to touch b/l, no pain on plantarflexion/dorsiflexion,non tense, non pallor   Back: nonTTP   Pulses: L popliteal pulse 2+, limited L posterior tibialis and dorsalis pedis pulse; limited R popliteal pulse/posterior tibialis/dorsalis pedis however palpable by podiatry during bedside   Psych: full range affect, mutual topic,linear and goal directed   Lines/tubes/drains: none     DIAGNOSTICS:                         15.2   13.47 )-----------( 195      ( 17 Jan 2025 07:16 )             47.8     Hgb Trend: 15.2<--, 15.7<--, 16.2<--  01-17    139  |  104  |  13  ----------------------------<  147[H]  4.0   |  20[L]  |  1.11    Ca    9.2      17 Jan 2025 07:16  Phos  2.5     01-17  Mg     2.0     01-17      CAPILLARY BLOOD GLUCOSE      POCT Blood Glucose.: 151 mg/dL (17 Jan 2025 22:02)  POCT Blood Glucose.: 142 mg/dL (17 Jan 2025 17:38)  POCT Blood Glucose.: 116 mg/dL (17 Jan 2025 16:05)  POCT Blood Glucose.: 115 mg/dL (17 Jan 2025 10:30)    Creatinine Trend: 1.11<--, 1.20<--, 1.28<--          Urinalysis Basic - ( 17 Jan 2025 07:16 )    Color: x / Appearance: x / SG: x / pH: x  Gluc: 147 mg/dL / Ketone: x  / Bili: x / Urobili: x   Blood: x / Protein: x / Nitrite: x   Leuk Esterase: x / RBC: x / WBC x   Sq Epi: x / Non Sq Epi: x / Bacteria: x

## 2025-01-18 NOTE — PROGRESS NOTE ADULT - ASSESSMENT
65 yo M presented with R foot submet 1 wound to dermis with superimposed cellulites  - Pt seen and evaluated.  - Afebrile, WBC 13.63  - R foot submet wound to dermis, erythema extends for wound to anterior lower shin, L foot 3rd digit plantar sulcus wound to subQ, no acute signs of infection  - Right foot X-ray: no OM, no gas  - Right foot wound culture: no growth   - Bone Scan: increase uptake of proximal phalanx 1 and metatarsal phalangeal joint 1 and metatarsal phalangeal joint 2   - Vasc recs, appreciated  - Recommended continue IV Unasyn  - MRI can not be obtained 2/2 insulin pump  - Pod plan: Right foot partial 1st and 2nd ray resection pending patient decision/ vasc recs  - Please document medical clearance for possible podiatric procedure   - Discussed with Attending

## 2025-01-18 NOTE — PROGRESS NOTE ADULT - ATTENDING COMMENTS
64M pmh HTN T2DM on CGM and insulin pump, ESRD s/p renal transplant in 2020 on tacrolimus and mycophenolate, partial R first toe amputation present s/p Rt foot injury admitted for sepsis 2/2 R foot wound/cellulitis vs rule out OM. On Zosyn for pseudomonas coverage. Xray foot neg for OM. . Arterial LE duplex notable for mild/mod stenosis distal right sup femoral artery/ sig stenosis righ popliteal a./ moderate stenosis of the mid and distal right anterior tibial artery.    #Right LE wound/cellulitis/OM  #DM on insulin pump  #Renal transplant    -c/w zosyn for now  -ID consult  -Nuclear scan 1/17: +: Intense uptake is noted in the surrounding soft tissues and bony   structures at the level of the proximal phalanx of the right great toe,   likely extending into the first metatarsophalangeal joint and laterally   into the second metatarsophalangeal joint region.  -f/u podiatry recs:  Right foot partial 1st and 2nd ray resection pending patient decision/ vasc recs  -f/u wound culture, NTD  -endo consulted- insulin pump  -nephro recs appreciated- c/w Myfortic and tacro. Daily tacro level check     #Preoperative evaluation: for 1st and 2nd ray resection.   -Functional Status: > 4 mets.   -No recent Change in clinical status  -No complaints of Chest pain, shortness of breath, Dyspnea on exertion, palpitations, or syncope  -EKG:sinus rhythm   TTE: in 2019: normal biventricular systolic function. dilated LA, moderately elevated pulm pressures.   No significant Obstructive Valvular disease.Not in ADHF  -LHC in 2013 with minor luminla irregularities-  -Pharm/Nuc in 2019 was normal   RCRI=class II risk.   -No further cardiovascular testing is required prior to proceeding with procedure. Patient is class II risk for low risk procedure

## 2025-01-18 NOTE — PROGRESS NOTE ADULT - ASSESSMENT
64 year old Male with PMHx HTN T2DM on CGM and insulin pump, ESRD s/p renal transplant in 2020 on tacrolimus and mycophenolate, partial R first toe amputation presenting from podiatry office with a foot infection.        1- S/P renal transplant  2- HTN  3- foot infection        creatinine is steady  continue prednisone 5 mg daily  continue myfortic  360 mg daily  continue tacro 1mg BID  f/u tacro level today  continue zosyn of infection  continue lisinopril 2.5 mg daily for HTN  vascular and podiatry following, f/u recs  strict I/O  trend creatinine and electrolytes daily

## 2025-01-18 NOTE — PROGRESS NOTE ADULT - PROBLEM SELECTOR PLAN 2
Resolving R foot erythema and tenderness. No claudication symptoms. Difficult eliciting R/L foot dorsalis pedis; however reportedly localized by doppler in ED and by podiatry today. Superficial 2 wounds, no defects. No S&S of acute ischemic limb. Clinically monitor.     -Ankle Brachial Index limited due to noncompressible vasculature; vascular asking for repeat to be performed on R 2nd toe due to amputation of 1st R toe   -F/u arterial doppler to assess pulses   -Appreciate vascular surgery recommendations for peripheral vascular disaease eval  -F/u nuclear medicine scan to r/o ostemyelitis

## 2025-01-18 NOTE — PROGRESS NOTE ADULT - PROBLEM SELECTOR PLAN 1
Patient on arrival meeting SIRS criteria with fever, tachycardia, and leukocytosis. Afebrile. Not meeting SIRS criteria. Downtrending leukocytosis. Exam remarkable for reducing erythema on dorsum with less TTP. No open wounds suggestive of bony involvement. Low concern for osteomyelitis. Clinically improving.     -C/w piperacillin tazobactam 3.375 mg IV q8 (started 1/16)   -NGTD blood cx, urine cx, abscess cx  -Negative gram stain for abscess

## 2025-01-19 LAB
ANION GAP SERPL CALC-SCNC: 14 MMOL/L — SIGNIFICANT CHANGE UP (ref 5–17)
BUN SERPL-MCNC: 16 MG/DL — SIGNIFICANT CHANGE UP (ref 7–23)
CALCIUM SERPL-MCNC: 9.6 MG/DL — SIGNIFICANT CHANGE UP (ref 8.4–10.5)
CHLORIDE SERPL-SCNC: 103 MMOL/L — SIGNIFICANT CHANGE UP (ref 96–108)
CO2 SERPL-SCNC: 20 MMOL/L — LOW (ref 22–31)
CREAT SERPL-MCNC: 1.19 MG/DL — SIGNIFICANT CHANGE UP (ref 0.5–1.3)
EGFR: 69 ML/MIN/1.73M2 — SIGNIFICANT CHANGE UP
GLUCOSE BLDC GLUCOMTR-MCNC: 141 MG/DL — HIGH (ref 70–99)
GLUCOSE BLDC GLUCOMTR-MCNC: 167 MG/DL — HIGH (ref 70–99)
GLUCOSE BLDC GLUCOMTR-MCNC: 197 MG/DL — HIGH (ref 70–99)
GLUCOSE BLDC GLUCOMTR-MCNC: 257 MG/DL — HIGH (ref 70–99)
GLUCOSE SERPL-MCNC: 142 MG/DL — HIGH (ref 70–99)
HCT VFR BLD CALC: 47.2 % — SIGNIFICANT CHANGE UP (ref 39–50)
HGB BLD-MCNC: 15 G/DL — SIGNIFICANT CHANGE UP (ref 13–17)
MCHC RBC-ENTMCNC: 30.3 PG — SIGNIFICANT CHANGE UP (ref 27–34)
MCHC RBC-ENTMCNC: 31.8 G/DL — LOW (ref 32–36)
MCV RBC AUTO: 95.4 FL — SIGNIFICANT CHANGE UP (ref 80–100)
NRBC # BLD: 0 /100 WBCS — SIGNIFICANT CHANGE UP (ref 0–0)
NRBC BLD-RTO: 0 /100 WBCS — SIGNIFICANT CHANGE UP (ref 0–0)
PLATELET # BLD AUTO: 228 K/UL — SIGNIFICANT CHANGE UP (ref 150–400)
POTASSIUM SERPL-MCNC: 4.4 MMOL/L — SIGNIFICANT CHANGE UP (ref 3.5–5.3)
POTASSIUM SERPL-SCNC: 4.4 MMOL/L — SIGNIFICANT CHANGE UP (ref 3.5–5.3)
RBC # BLD: 4.95 M/UL — SIGNIFICANT CHANGE UP (ref 4.2–5.8)
RBC # FLD: 13.4 % — SIGNIFICANT CHANGE UP (ref 10.3–14.5)
SODIUM SERPL-SCNC: 137 MMOL/L — SIGNIFICANT CHANGE UP (ref 135–145)
TACROLIMUS SERPL-MCNC: 14.2 NG/ML — SIGNIFICANT CHANGE UP
WBC # BLD: 13.33 K/UL — HIGH (ref 3.8–10.5)
WBC # FLD AUTO: 13.33 K/UL — HIGH (ref 3.8–10.5)

## 2025-01-19 PROCEDURE — 99232 SBSQ HOSP IP/OBS MODERATE 35: CPT | Mod: GC

## 2025-01-19 RX ORDER — ACETAMINOPHEN 160 MG/5ML
650 SUSPENSION ORAL EVERY 6 HOURS
Refills: 0 | Status: DISCONTINUED | OUTPATIENT
Start: 2025-01-19 | End: 2025-01-19

## 2025-01-19 RX ORDER — NAPROXEN 500 MG
250 TABLET ORAL ONCE
Refills: 0 | Status: COMPLETED | OUTPATIENT
Start: 2025-01-19 | End: 2025-01-19

## 2025-01-19 RX ORDER — TACROLIMUS 1 MG/1
1 CAPSULE, GELATIN COATED ORAL
Refills: 0 | Status: DISCONTINUED | OUTPATIENT
Start: 2025-01-20 | End: 2025-01-21

## 2025-01-19 RX ORDER — TACROLIMUS 1 MG/1
0.5 CAPSULE, GELATIN COATED ORAL
Refills: 0 | Status: DISCONTINUED | OUTPATIENT
Start: 2025-01-19 | End: 2025-01-21

## 2025-01-19 RX ORDER — CILOSTAZOL 100 MG
100 TABLET ORAL
Refills: 0 | Status: DISCONTINUED | OUTPATIENT
Start: 2025-01-19 | End: 2025-01-20

## 2025-01-19 RX ADMIN — ENOXAPARIN SODIUM 40 MILLIGRAM(S): 100 INJECTION SUBCUTANEOUS at 23:02

## 2025-01-19 RX ADMIN — PIPERACILLIN SODIUM AND TAZOBACTAM SODIUM 25 GRAM(S): 2; 250 INJECTION, POWDER, FOR SOLUTION INTRAVENOUS at 06:32

## 2025-01-19 RX ADMIN — ASPIRIN 81 MILLIGRAM(S): 81 TABLET, COATED ORAL at 11:24

## 2025-01-19 RX ADMIN — ATORVASTATIN CALCIUM 40 MILLIGRAM(S): 80 TABLET, FILM COATED ORAL at 23:03

## 2025-01-19 RX ADMIN — PIPERACILLIN SODIUM AND TAZOBACTAM SODIUM 25 GRAM(S): 2; 250 INJECTION, POWDER, FOR SOLUTION INTRAVENOUS at 14:15

## 2025-01-19 RX ADMIN — MYCOPHENOLIC ACID 360 MILLIGRAM(S): 180 TABLET, DELAYED RELEASE ORAL at 11:24

## 2025-01-19 RX ADMIN — MUPIROCIN 1 APPLICATION(S): 2 CREAM TOPICAL at 17:40

## 2025-01-19 RX ADMIN — PREDNISONE 5 MILLIGRAM(S): 5 TABLET ORAL at 06:33

## 2025-01-19 RX ADMIN — Medication 6.25 MILLIGRAM(S): at 06:32

## 2025-01-19 RX ADMIN — PIPERACILLIN SODIUM AND TAZOBACTAM SODIUM 25 GRAM(S): 2; 250 INJECTION, POWDER, FOR SOLUTION INTRAVENOUS at 23:03

## 2025-01-19 RX ADMIN — TACROLIMUS 0.5 MILLIGRAM(S): 1 CAPSULE, GELATIN COATED ORAL at 23:02

## 2025-01-19 RX ADMIN — Medication 100 MILLIGRAM(S): at 17:40

## 2025-01-19 RX ADMIN — TACROLIMUS 1 MILLIGRAM(S): 1 CAPSULE, GELATIN COATED ORAL at 08:20

## 2025-01-19 RX ADMIN — Medication 2.5 MILLIGRAM(S): at 06:32

## 2025-01-19 RX ADMIN — MUPIROCIN 1 APPLICATION(S): 2 CREAM TOPICAL at 06:44

## 2025-01-19 RX ADMIN — TAMSULOSIN HYDROCHLORIDE 0.4 MILLIGRAM(S): 0.4 CAPSULE ORAL at 23:03

## 2025-01-19 RX ADMIN — Medication 250 MILLIGRAM(S): at 11:54

## 2025-01-19 RX ADMIN — Medication 6.25 MILLIGRAM(S): at 17:40

## 2025-01-19 RX ADMIN — Medication 250 MILLIGRAM(S): at 11:24

## 2025-01-19 NOTE — PROGRESS NOTE ADULT - SUBJECTIVE AND OBJECTIVE BOX
INPATIENT MEDICINE PROGRESS NOTE:   Authored by Kerri Villegas MD     HISTORY OF PRESENT ILLNESS:    PHYSICAL EXAM:  Vital Signs Last 24 Hrs  T(C): 37.5 (19 Jan 2025 06:14), Max: 37.7 (18 Jan 2025 12:18)  T(F): 99.5 (19 Jan 2025 06:14), Max: 99.8 (18 Jan 2025 12:18)  HR: 82 (19 Jan 2025 06:14) (74 - 82)  BP: 132/63 (19 Jan 2025 06:14) (108/58 - 137/71)  BP(mean): --  RR: 18 (19 Jan 2025 06:14) (18 - 18)  SpO2: 93% (19 Jan 2025 06:14) (93% - 95%)    Parameters below as of 19 Jan 2025 06:14  Patient On (Oxygen Delivery Method): room air        General:   Neuro:   HEENT:   Chest:   Heart:   Lungs:   Abd:   Ext:   Back:   Skin:   Psych:     I&O's Summary    18 Jan 2025 07:01  -  19 Jan 2025 07:00  --------------------------------------------------------  IN: 480 mL / OUT: 0 mL / NET: 480 mL        LABS:                        15.0   13.33 )-----------( 228      ( 19 Jan 2025 06:56 )             47.2     01-19    137  |  103  |  16  ----------------------------<  142[H]  4.4   |  20[L]  |  1.19    Ca    9.6      19 Jan 2025 06:56      CAPILLARY BLOOD GLUCOSE      POCT Blood Glucose.: 257 mg/dL (19 Jan 2025 08:43)  POCT Blood Glucose.: 157 mg/dL (18 Jan 2025 20:47)  POCT Blood Glucose.: 149 mg/dL (18 Jan 2025 17:17)  POCT Blood Glucose.: 152 mg/dL (18 Jan 2025 12:48)          Urinalysis Basic - ( 19 Jan 2025 06:56 )    Color: x / Appearance: x / SG: x / pH: x  Gluc: 142 mg/dL / Ketone: x  / Bili: x / Urobili: x   Blood: x / Protein: x / Nitrite: x   Leuk Esterase: x / RBC: x / WBC x   Sq Epi: x / Non Sq Epi: x / Bacteria: x        Culture - Abscess with Gram Stain (collected 16 Jan 2025 10:42)  Source: .Abscess  Gram Stain (16 Jan 2025 23:37):    No polymorphonuclear cells seen per low power field    No organisms seen per oil power field  Preliminary Report (17 Jan 2025 14:56):    No growth        MEDICATIONS  (STANDING):  aspirin enteric coated 81 milliGRAM(s) Oral daily  atorvastatin 40 milliGRAM(s) Oral at bedtime  carvedilol 6.25 milliGRAM(s) Oral every 12 hours  cilostazol 100 milliGRAM(s) Oral two times a day  dextrose 5%. 1000 milliLiter(s) (100 mL/Hr) IV Continuous <Continuous>  dextrose 5%. 1000 milliLiter(s) (50 mL/Hr) IV Continuous <Continuous>  dextrose 50% Injectable 25 Gram(s) IV Push once  dextrose 50% Injectable 12.5 Gram(s) IV Push once  dextrose 50% Injectable 25 Gram(s) IV Push once  dextrose Oral Gel 15 Gram(s) Oral once  diphtheria/tetanus/pertussis (acellular) Vaccine (Adacel) 0.5 milliLiter(s) IntraMuscular once  enoxaparin Injectable 40 milliGRAM(s) SubCutaneous every 24 hours  glucagon  Injectable 1 milliGRAM(s) IntraMuscular once  insulin aspart (NovoLOG) Pump 1 Each SubCutaneous Continuous Pump  lisinopril 2.5 milliGRAM(s) Oral daily  mupirocin 2% Ointment 1 Application(s) Topical every 12 hours  mycophenolic acid  milliGRAM(s) Oral daily  naproxen 250 milliGRAM(s) Oral once  piperacillin/tazobactam IVPB.. 3.375 Gram(s) IV Intermittent every 8 hours  predniSONE   Tablet 5 milliGRAM(s) Oral daily  sodium chloride 0.9%. 1000 milliLiter(s) (100 mL/Hr) IV Continuous <Continuous>  tacrolimus 1 milliGRAM(s) Oral every 12 hours  tamsulosin 0.4 milliGRAM(s) Oral at bedtime    MEDICATIONS  (PRN):  acetaminophen     Tablet .. 650 milliGRAM(s) Oral every 6 hours PRN Temp greater or equal to 38C (100.4F), Mild Pain (1 - 3)  aluminum hydroxide/magnesium hydroxide/simethicone Suspension 30 milliLiter(s) Oral every 4 hours PRN Dyspepsia  melatonin 3 milliGRAM(s) Oral at bedtime PRN Insomnia       INPATIENT MEDICINE PROGRESS NOTE:   Authored by Kerri Villegas MD     HISTORY OF PRESENT ILLNESS:  64F with history of DM2 on insulin pump with neuropathy and partial R 1st digit amputation, ESRD s/p renal transplant on tacrolimus and mycophenolate mofetil, asthma, HDL presents to [facility]-ED sent by podiatry due to R foot redness and tenderness with blister.     NAEON. HR 70s-80s. SBPs 110s-130s. POCT 116/152/149/157, thisAM 257.     SEen and examined at bedside, patient and wife frustrated with how news about potential amputation was delivered yesterday. Reports ongoing R foot pain upon resting and worsened by exertion. Having more loose stool with grapes. Otherwise, denies fever/chills, abdominal pain, nausea/ vomit.     PHYSICAL EXAM:  Vital Signs Last 24 Hrs  T(C): 37.5 (19 Jan 2025 06:14), Max: 37.7 (18 Jan 2025 12:18)  T(F): 99.5 (19 Jan 2025 06:14), Max: 99.8 (18 Jan 2025 12:18)  HR: 82 (19 Jan 2025 06:14) (74 - 82)  BP: 132/63 (19 Jan 2025 06:14) (108/58 - 137/71)  BP(mean): --  RR: 18 (19 Jan 2025 06:14) (18 - 18)  SpO2: 93% (19 Jan 2025 06:14) (93% - 95%)    Parameters below as of 19 Jan 2025 06:14  Patient On (Oxygen Delivery Method): room air        General: NAD, calm, comfortable, cooperative, obese habitus, tattoos   Neuro: awake, alert, oriented to person/place/time, 5/5  strength, 5/5 hip flexion/extension b/l, spontaneity, reduced sensation b/l feet   HEENT: NC/AT, PERRLA b/l, EOMI, moist mucous membranes, no supraclavicular/submandibular lymphadenopathy  Chest: nonTTP   Heart: S1/S2, RRR   Lungs: CTA b/l, nonlabored breathing   Abd: soft, nonTTP, nondistended, reducible umbilical bulge   Ext: reducing erythema and TTP of R dorsal foot, no pretibial/pedal edema, ruptured blister on plantar midfoot of R foot, warm to touch b/l, no pain on plantarflexion/dorsiflexion,non tense, non pallor   Back: nonTTP   Pulses: L popliteal pulse 2+, limited L/R posterior tibialis and dorsalis pedis pulse  Psych: full range affect, mutual topic,linear and goal directed   Lines/tubes/drains: none         I&O's Summary    18 Jan 2025 07:01  -  19 Jan 2025 07:00  --------------------------------------------------------  IN: 480 mL / OUT: 0 mL / NET: 480 mL        LABS:                        15.0   13.33 )-----------( 228      ( 19 Jan 2025 06:56 )             47.2     01-19    137  |  103  |  16  ----------------------------<  142[H]  4.4   |  20[L]  |  1.19    Ca    9.6      19 Jan 2025 06:56      CAPILLARY BLOOD GLUCOSE      POCT Blood Glucose.: 257 mg/dL (19 Jan 2025 08:43)  POCT Blood Glucose.: 157 mg/dL (18 Jan 2025 20:47)  POCT Blood Glucose.: 149 mg/dL (18 Jan 2025 17:17)  POCT Blood Glucose.: 152 mg/dL (18 Jan 2025 12:48)          Urinalysis Basic - ( 19 Jan 2025 06:56 )    Color: x / Appearance: x / SG: x / pH: x  Gluc: 142 mg/dL / Ketone: x  / Bili: x / Urobili: x   Blood: x / Protein: x / Nitrite: x   Leuk Esterase: x / RBC: x / WBC x   Sq Epi: x / Non Sq Epi: x / Bacteria: x        Culture - Abscess with Gram Stain (collected 16 Jan 2025 10:42)  Source: .Abscess  Gram Stain (16 Jan 2025 23:37):    No polymorphonuclear cells seen per low power field    No organisms seen per oil power field  Preliminary Report (17 Jan 2025 14:56):    No growth        MEDICATIONS  (STANDING):  aspirin enteric coated 81 milliGRAM(s) Oral daily  atorvastatin 40 milliGRAM(s) Oral at bedtime  carvedilol 6.25 milliGRAM(s) Oral every 12 hours  cilostazol 100 milliGRAM(s) Oral two times a day  dextrose 5%. 1000 milliLiter(s) (100 mL/Hr) IV Continuous <Continuous>  dextrose 5%. 1000 milliLiter(s) (50 mL/Hr) IV Continuous <Continuous>  dextrose 50% Injectable 25 Gram(s) IV Push once  dextrose 50% Injectable 12.5 Gram(s) IV Push once  dextrose 50% Injectable 25 Gram(s) IV Push once  dextrose Oral Gel 15 Gram(s) Oral once  diphtheria/tetanus/pertussis (acellular) Vaccine (Adacel) 0.5 milliLiter(s) IntraMuscular once  enoxaparin Injectable 40 milliGRAM(s) SubCutaneous every 24 hours  glucagon  Injectable 1 milliGRAM(s) IntraMuscular once  insulin aspart (NovoLOG) Pump 1 Each SubCutaneous Continuous Pump  lisinopril 2.5 milliGRAM(s) Oral daily  mupirocin 2% Ointment 1 Application(s) Topical every 12 hours  mycophenolic acid  milliGRAM(s) Oral daily  naproxen 250 milliGRAM(s) Oral once  piperacillin/tazobactam IVPB.. 3.375 Gram(s) IV Intermittent every 8 hours  predniSONE   Tablet 5 milliGRAM(s) Oral daily  sodium chloride 0.9%. 1000 milliLiter(s) (100 mL/Hr) IV Continuous <Continuous>  tacrolimus 1 milliGRAM(s) Oral every 12 hours  tamsulosin 0.4 milliGRAM(s) Oral at bedtime    MEDICATIONS  (PRN):  acetaminophen     Tablet .. 650 milliGRAM(s) Oral every 6 hours PRN Temp greater or equal to 38C (100.4F), Mild Pain (1 - 3)  aluminum hydroxide/magnesium hydroxide/simethicone Suspension 30 milliLiter(s) Oral every 4 hours PRN Dyspepsia  melatonin 3 milliGRAM(s) Oral at bedtime PRN Insomnia

## 2025-01-19 NOTE — PROGRESS NOTE ADULT - SUBJECTIVE AND OBJECTIVE BOX
Vascular Surgery Daily Progress Note  =====================================================    SUBJECTIVE: Patient reports that they're feeling well.     --------------------------------------------------------------------------------------  VITAL SIGNS:  T(C): 37.5 (01-19-25 @ 06:14), Max: 37.7 (01-18-25 @ 12:18)  HR: 82 (01-19-25 @ 06:14) (74 - 82)  BP: 132/63 (01-19-25 @ 06:14) (108/58 - 137/71)  RR: 18 (01-19-25 @ 06:14) (18 - 18)  SpO2: 93% (01-19-25 @ 06:14) (93% - 95%)  --------------------------------------------------------------------------------------    EXAM    General: NAD, resting in bed comfortably  Cardiac: Regular rate, normotensive  Respiratory: Nonlabored respirations, normal cw expansion, on RA  Neuro: AOx3, CNII-XII intact on gross examination  Vascular/Extremities: Warm, well perfused, DP/PT signals     --------------------------------------------------------------------------------------

## 2025-01-19 NOTE — PROGRESS NOTE ADULT - PROBLEM SELECTOR PLAN 5
Patient with history of DM2 nephropathy leading to ESRD and renal transplant and immunosuppressive medications. Stable BUN/Cr. Therapeutic tacrolimus level. Clinically monitor.     -C/w predinsone 5 mg po qd   -C/w tacrolimus 1 mg po q12   -C/w mycophenolat mofetil 360 mg po BID

## 2025-01-19 NOTE — PROGRESS NOTE ADULT - PROBLEM SELECTOR PLAN 1
Patient on arrival meeting SIRS criteria with fever, tachycardia, and leukocytosis. Afebrile. Not meeting SIRS criteria. Downtrending leukocytosis. Exam remarkable for reducing erythema on dorsum with less TTP. No open wounds suggestive of bony involvement. Low concern for osteomyelitis. Clinically improving.     -C/w piperacillin tazobactam 3.375 mg IV q8 (started 1/16)   -NGTD blood cx, urine cx, abscess cx  -Negative gram stain for abscess Patient on arrival meeting SIRS criteria with fever, tachycardia, and leukocytosis. Afebrile. Not meeting SIRS criteria. Downtrending leukocytosis. Exam remarkable for reducing erythema on dorsum with less TTP. No open wounds suggestive of bony involvement. Low concern for osteomyelitis. Clinically improving.     -C/w piperacillin tazobactam 3.375 mg IV q8 (started 1/16)   -NGTD blood cx, urine cx, abscess cx  -Negative gram stain for abscess  -Pending ID consult for osteomyelitis r/o due to discordance between treatment plans amongst various specialties

## 2025-01-19 NOTE — PROGRESS NOTE ADULT - ATTENDING COMMENTS
64M pmh HTN T2DM on CGM and insulin pump, ESRD s/p renal transplant in 2020 on tacrolimus and mycophenolate, partial R first toe amputation present s/p Rt foot injury admitted for sepsis 2/2 R foot wound/cellulitis/OM, pending discussion with vascular surgery and podiatry regarding next step (resection vs angiogram).     #Right LE wound/cellulitis/OM  -switch zosyn to unasyn  -wound culture negative  -NM scan positive (no MRI 2/2 insulin pump  - Appreciated vascular and podiatry recs, ongoing discussion regarding next steps, angiogram Vs resection, and will need to involve transplant renal given patients renal transplant  -ID consult   -f/u wound culture, NTD    #Renal transplant  -cr 1.1 and stable  -AM tacro level  -Tacro adjustments per transplant renal.   c/w Myfortic    #DM on insulin pump  -endo consulted- insulin pump  -monitor FS, goal 120-180    #Preoperative evaluation: for 1st and 2nd ray resection.   -Functional Status: > 4 mets.   -No recent Change in clinical status  -No complaints of Chest pain, shortness of breath, Dyspnea on exertion, palpitations, or syncope  -EKG:sinus rhythm   TTE: in 2019: normal biventricular systolic function. dilated LA, moderately elevated pulm pressures.   No significant Obstructive Valvular disease. Not in ADHF  -LHC in 2013 with minor luminal irregularities-  -Pharm/Nuc in 2019 was normal   RCRI=class II risk.   -No further cardiovascular testing is required prior to proceeding with procedure. Patient is class II risk for low risk procedure .

## 2025-01-19 NOTE — PROGRESS NOTE ADULT - PROBLEM SELECTOR PLAN 2
Resolving R foot erythema and tenderness. Ischemia related pain in R foot in setting of known stenosis, less likely inflammatory related pain, however contributory. Difficult eliciting R/L foot dorsalis pedis; however reportedly localized by doppler in ED and by podiatry today. Superficial 2 wounds, no defects. No S&S of acute ischemic limb. Clinically monitor.     -Ankle Brachial Index limited due to noncompressible vasculature; vascular asking for repeat to be performed on R 2nd toe due to amputation of 1st R toe   -Arterial doppler to assess pulses showing stenosis RLE > LLE   -Nuclear medicine scan suggestive of increased uptake in 1st and 2nd digit of R foot potentially by osteyelitis   -Consider cilostazol 100 mg po BID for ischemic related pain   -Trial naproxen 250 mg po x 1 for inflammatory related pain (taken at home once a in a while)   -Appreciate vascular surgery recommendations for peripheral vascular disaease eval  -F/u nuclear medicine scan to r/o ostemyelitis Resolving R foot erythema and tenderness. Ischemia related pain in R foot in setting of known stenosis, less likely inflammatory related pain, however contributory. Difficult eliciting R/L foot dorsalis pedis; arterial doppler showing adequate blood flow in LE. Superficial 2 wounds, no defects. No S&S of acute ischemic limb. Clinically monitor.     -Ankle Brachial Index limited due to noncompressible vasculature; vascular asking for repeat to be performed on R 2nd toe due to amputation of 1st R toe   -Arterial doppler to assess pulses showing stenosis RLE > LLE   -Nuclear medicine scan suggestive of increased uptake in 1st and 2nd digit of R foot potentially by osteyelitis   -Trial naproxen 250 mg po x 1 for inflammatory related pain (taken at home once a in a while)   -Appreciate vascular surgery recommendations for doing a CT angiogram of RLE prior any podiatry interventions. Resolving R foot erythema and tenderness. Ischemia related pain in R foot in setting of known stenosis, less likely inflammatory related pain, however contributory. Difficult eliciting R/L foot dorsalis pedis; arterial doppler showing adequate blood flow in LE. Superficial 2 wounds, no defects. No S&S of acute ischemic limb. Clinically monitor.     -Ankle Brachial Index limited due to noncompressible vasculature; vascular asking for repeat to be performed on R 2nd toe due to amputation of 1st R toe   -Podiatry recommending amputation of R toe(s)  -Arterial doppler to assess pulses showing stenosis RLE > LLE   -Nuclear medicine scan suggestive of increased uptake in 1st and 2nd digit of R foot potentially by osteyelitis   -Trial naproxen 250 mg po x 1 for inflammatory related pain (taken at home once a in a while)   -Appreciate vascular surgery recommendations for doing a CT angiogram of RLE prior any podiatry interventions. Resolving R foot erythema and tenderness. Ischemia related pain in R foot in setting of known stenosis, less likely inflammatory related pain, however contributory. Difficult eliciting R/L foot dorsalis pedis; arterial doppler showing adequate blood flow in LE. Superficial 2 wounds, no defects. No S&S of acute ischemic limb. Clinically monitor.     -Ankle Brachial Index limited due to noncompressible vasculature; vascular asking for repeat to be performed on R 2nd toe due to amputation of 1st R toe   -Podiatry recommending amputation of R toe(s)  -Arterial doppler to assess pulses showing stenosis RLE > LLE   -Nuclear medicine scan suggestive of increased uptake in 1st and 2nd digit of R foot potentially by osteyelitis   -Trial naproxen 250 mg po x 1 for inflammatory related pain (taken at home once a in a while); naproxen 250 mg po qd PRN   -Appreciate vascular surgery recommendations for doing a CT angiogram of RLE prior any podiatry interventions; vascular surgery will coordinate with transplant nephrology for CT angiogram

## 2025-01-19 NOTE — PROGRESS NOTE ADULT - ASSESSMENT
64M with HTN T2DM on CGM and insulin pump, ESRD s/p renal transplant in 2020 on tacrolimus and mycophenolate, partial R first toe amputation presenting from podiatry office with c/f foot infection and difficulty palpating pedal pulses. Vascular consulted to evaluate LLE perfusion. At this time vascular surgery is recommending against ray resection due to relative recency of the wound and the nonspecific nature of NM inflammatory localization for osteomyelitis.     Rec  - No acute vascular intervention   - Would recommend angiogram prior to any podiatry intervention  - Would not recommend resection of the foot  - Continue with local wound care  - Continue IV abx  - Pain management PRN      Vascular surgery   [id number]

## 2025-01-19 NOTE — PROGRESS NOTE ADULT - ASSESSMENT
64M with history of HTN, DM2 with neuropathy and partial R 1st digit amputation, ESRD s/p renal transplant on tacrolimus and mycophenolate, and HDL here for uncomplicated R foot cellulitis with incidental reduced LE pulses R>L; currently with findings suggestive off ostemyelitis in R 1st digit and 2 digit.

## 2025-01-19 NOTE — PROGRESS NOTE ADULT - ASSESSMENT
65 yo M presented with R foot submet 1 wound to dermis with superimposed cellulites  - Pt seen and evaluated.  - Afebrile, WBC 13.33  - R foot submet wound to dermis, erythema extends for wound to anterior lower shin, L foot 3rd digit plantar sulcus wound to subQ, no acute signs of infection  - Right foot X-ray: no OM, no gas  - Right foot wound culture: no growth   - Bone Scan: increase uptake of proximal phalanx 1 and metatarsal phalangeal joint 1 and metatarsal phalangeal joint 2   - Vasc recs, appreciated  - Recommend ID consult   - MRI can not be obtained 2/2 insulin pump  - Pod plan: local wound care pending vasc recs  - Please document medical clearance for possible podiatric procedure   - Discussed with Attending

## 2025-01-19 NOTE — PROGRESS NOTE ADULT - SUBJECTIVE AND OBJECTIVE BOX
Patient is a 64y old  Male who presents with a chief complaint of foot infection (19 Jan 2025 10:59)       INTERVAL HPI/OVERNIGHT EVENTS:  Patient seen and evaluated at bedside.  Pt is resting comfortable in NAD. Denies N/V/F/C.    Allergies    clonidine (Other)  seasonal allergy (Other)    Intolerances        Vital Signs Last 24 Hrs  T(C): 37.1 (19 Jan 2025 11:53), Max: 37.7 (18 Jan 2025 20:44)  T(F): 98.7 (19 Jan 2025 11:53), Max: 99.8 (18 Jan 2025 20:44)  HR: 89 (19 Jan 2025 11:53) (75 - 89)  BP: 132/70 (19 Jan 2025 11:53) (132/63 - 137/71)  BP(mean): --  RR: 18 (19 Jan 2025 11:53) (18 - 18)  SpO2: 93% (19 Jan 2025 11:53) (93% - 95%)    Parameters below as of 19 Jan 2025 11:53  Patient On (Oxygen Delivery Method): room air        LABS:                        15.0   13.33 )-----------( 228      ( 19 Jan 2025 06:56 )             47.2     01-19    137  |  103  |  16  ----------------------------<  142[H]  4.4   |  20[L]  |  1.19    Ca    9.6      19 Jan 2025 06:56        Urinalysis Basic - ( 19 Jan 2025 06:56 )    Color: x / Appearance: x / SG: x / pH: x  Gluc: 142 mg/dL / Ketone: x  / Bili: x / Urobili: x   Blood: x / Protein: x / Nitrite: x   Leuk Esterase: x / RBC: x / WBC x   Sq Epi: x / Non Sq Epi: x / Bacteria: x      CAPILLARY BLOOD GLUCOSE      POCT Blood Glucose.: 141 mg/dL (19 Jan 2025 12:35)  POCT Blood Glucose.: 257 mg/dL (19 Jan 2025 08:43)  POCT Blood Glucose.: 157 mg/dL (18 Jan 2025 20:47)  POCT Blood Glucose.: 149 mg/dL (18 Jan 2025 17:17)      Lower Extremity Physical Exam:  Vascular: DP/PT 0/4, B/L, CFT <3 seconds B/L, Temperature gradient warm to warm R, .   Neuro: Epicritic sensation diminished to the level of digits B/L.  Musculoskeletal/Ortho: s/p R hallux partial amputation, healed  Skin: R foot submet wound to dermis, erythema extends for wound to anterior lower shin, periwound ecchymosis, L foot 3rd digit plantar sulcus wound to subQ, no acute signs of infection    RADIOLOGY & ADDITIONAL TESTS:

## 2025-01-20 LAB
ANION GAP SERPL CALC-SCNC: 14 MMOL/L — SIGNIFICANT CHANGE UP (ref 5–17)
BUN SERPL-MCNC: 20 MG/DL — SIGNIFICANT CHANGE UP (ref 7–23)
CALCIUM SERPL-MCNC: 9.5 MG/DL — SIGNIFICANT CHANGE UP (ref 8.4–10.5)
CHLORIDE SERPL-SCNC: 104 MMOL/L — SIGNIFICANT CHANGE UP (ref 96–108)
CO2 SERPL-SCNC: 18 MMOL/L — LOW (ref 22–31)
CREAT SERPL-MCNC: 1.33 MG/DL — HIGH (ref 0.5–1.3)
CULTURE RESULTS: SIGNIFICANT CHANGE UP
CULTURE RESULTS: SIGNIFICANT CHANGE UP
EGFR: 60 ML/MIN/1.73M2 — SIGNIFICANT CHANGE UP
GLUCOSE BLDC GLUCOMTR-MCNC: 150 MG/DL — HIGH (ref 70–99)
GLUCOSE BLDC GLUCOMTR-MCNC: 161 MG/DL — HIGH (ref 70–99)
GLUCOSE BLDC GLUCOMTR-MCNC: 167 MG/DL — HIGH (ref 70–99)
GLUCOSE BLDC GLUCOMTR-MCNC: 168 MG/DL — HIGH (ref 70–99)
GLUCOSE SERPL-MCNC: 139 MG/DL — HIGH (ref 70–99)
GRAM STN FLD: SIGNIFICANT CHANGE UP
HCT VFR BLD CALC: 44 % — SIGNIFICANT CHANGE UP (ref 39–50)
HGB BLD-MCNC: 14 G/DL — SIGNIFICANT CHANGE UP (ref 13–17)
MAGNESIUM SERPL-MCNC: 1.9 MG/DL — SIGNIFICANT CHANGE UP (ref 1.6–2.6)
MCHC RBC-ENTMCNC: 29.9 PG — SIGNIFICANT CHANGE UP (ref 27–34)
MCHC RBC-ENTMCNC: 31.8 G/DL — LOW (ref 32–36)
MCV RBC AUTO: 94 FL — SIGNIFICANT CHANGE UP (ref 80–100)
NRBC # BLD: 0 /100 WBCS — SIGNIFICANT CHANGE UP (ref 0–0)
NRBC BLD-RTO: 0 /100 WBCS — SIGNIFICANT CHANGE UP (ref 0–0)
PHOSPHATE SERPL-MCNC: 3.3 MG/DL — SIGNIFICANT CHANGE UP (ref 2.5–4.5)
PLATELET # BLD AUTO: 239 K/UL — SIGNIFICANT CHANGE UP (ref 150–400)
POTASSIUM SERPL-MCNC: 4.2 MMOL/L — SIGNIFICANT CHANGE UP (ref 3.5–5.3)
POTASSIUM SERPL-SCNC: 4.2 MMOL/L — SIGNIFICANT CHANGE UP (ref 3.5–5.3)
RBC # BLD: 4.68 M/UL — SIGNIFICANT CHANGE UP (ref 4.2–5.8)
RBC # FLD: 13.3 % — SIGNIFICANT CHANGE UP (ref 10.3–14.5)
SODIUM SERPL-SCNC: 136 MMOL/L — SIGNIFICANT CHANGE UP (ref 135–145)
SPECIMEN SOURCE: SIGNIFICANT CHANGE UP
TACROLIMUS SERPL-MCNC: 12.5 NG/ML — SIGNIFICANT CHANGE UP
WBC # BLD: 12.09 K/UL — HIGH (ref 3.8–10.5)
WBC # FLD AUTO: 12.09 K/UL — HIGH (ref 3.8–10.5)

## 2025-01-20 PROCEDURE — G0545: CPT

## 2025-01-20 PROCEDURE — 99232 SBSQ HOSP IP/OBS MODERATE 35: CPT | Mod: GC

## 2025-01-20 PROCEDURE — 99223 1ST HOSP IP/OBS HIGH 75: CPT

## 2025-01-20 PROCEDURE — 73720 MRI LWR EXTREMITY W/O&W/DYE: CPT | Mod: 26,RT

## 2025-01-20 RX ORDER — PIPERACILLIN SODIUM AND TAZOBACTAM SODIUM 2; 250 G/50ML; MG/50ML
3.38 INJECTION, POWDER, FOR SOLUTION INTRAVENOUS EVERY 8 HOURS
Refills: 0 | Status: DISCONTINUED | OUTPATIENT
Start: 2025-01-20 | End: 2025-01-22

## 2025-01-20 RX ORDER — AMPICILLIN SODIUM AND SULBACTAM SODIUM 2; 1 G/1; G/1
3 INJECTION, POWDER, FOR SOLUTION INTRAMUSCULAR; INTRAVENOUS EVERY 6 HOURS
Refills: 0 | Status: DISCONTINUED | OUTPATIENT
Start: 2025-01-20 | End: 2025-01-20

## 2025-01-20 RX ADMIN — PIPERACILLIN SODIUM AND TAZOBACTAM SODIUM 25 GRAM(S): 2; 250 INJECTION, POWDER, FOR SOLUTION INTRAVENOUS at 18:06

## 2025-01-20 RX ADMIN — MUPIROCIN 1 APPLICATION(S): 2 CREAM TOPICAL at 18:06

## 2025-01-20 RX ADMIN — Medication 2.5 MILLIGRAM(S): at 06:51

## 2025-01-20 RX ADMIN — TAMSULOSIN HYDROCHLORIDE 0.4 MILLIGRAM(S): 0.4 CAPSULE ORAL at 22:35

## 2025-01-20 RX ADMIN — ATORVASTATIN CALCIUM 40 MILLIGRAM(S): 80 TABLET, FILM COATED ORAL at 22:34

## 2025-01-20 RX ADMIN — PIPERACILLIN SODIUM AND TAZOBACTAM SODIUM 25 GRAM(S): 2; 250 INJECTION, POWDER, FOR SOLUTION INTRAVENOUS at 06:51

## 2025-01-20 RX ADMIN — AMPICILLIN SODIUM AND SULBACTAM SODIUM 200 GRAM(S): 2; 1 INJECTION, POWDER, FOR SOLUTION INTRAMUSCULAR; INTRAVENOUS at 12:00

## 2025-01-20 RX ADMIN — ENOXAPARIN SODIUM 40 MILLIGRAM(S): 100 INJECTION SUBCUTANEOUS at 22:36

## 2025-01-20 RX ADMIN — TACROLIMUS 0.5 MILLIGRAM(S): 1 CAPSULE, GELATIN COATED ORAL at 22:35

## 2025-01-20 RX ADMIN — Medication 6.25 MILLIGRAM(S): at 06:51

## 2025-01-20 RX ADMIN — Medication 6.25 MILLIGRAM(S): at 18:13

## 2025-01-20 RX ADMIN — MUPIROCIN 1 APPLICATION(S): 2 CREAM TOPICAL at 06:52

## 2025-01-20 RX ADMIN — Medication 100 MILLIGRAM(S): at 06:51

## 2025-01-20 RX ADMIN — PIPERACILLIN SODIUM AND TAZOBACTAM SODIUM 25 GRAM(S): 2; 250 INJECTION, POWDER, FOR SOLUTION INTRAVENOUS at 22:34

## 2025-01-20 RX ADMIN — MYCOPHENOLIC ACID 360 MILLIGRAM(S): 180 TABLET, DELAYED RELEASE ORAL at 11:22

## 2025-01-20 RX ADMIN — Medication 30 MILLILITER(S): at 18:16

## 2025-01-20 RX ADMIN — PREDNISONE 5 MILLIGRAM(S): 5 TABLET ORAL at 06:51

## 2025-01-20 RX ADMIN — ASPIRIN 81 MILLIGRAM(S): 81 TABLET, COATED ORAL at 11:22

## 2025-01-20 RX ADMIN — TACROLIMUS 1 MILLIGRAM(S): 1 CAPSULE, GELATIN COATED ORAL at 06:51

## 2025-01-20 NOTE — PROGRESS NOTE ADULT - ASSESSMENT
64 year old Male with PMHx HTN T2DM on CGM and insulin pump, ESRD s/p renal transplant in 2020 on tacrolimus and mycophenolate, partial R first toe amputation presenting from podiatry office with a foot infection.        1- S/P renal transplant  2- HTN  3- foot infection        creatinine is higher tino  suspect in setting of infection and higher tacro lvel   tacro dose was decreased last pm   continue prednisone 5 mg daily  continue myfortic  360 mg daily  decrease tacro to 1 mg am and 0.5 mg pm check level in am and if still high will re evaluate dose again   continue zosyn of infection   continue lisinopril 2.5 mg daily for HTN  vascular and podiatry following, f/u recs  strict I/O  trend creatinine and electrolytes daily

## 2025-01-20 NOTE — PROGRESS NOTE ADULT - ASSESSMENT
64M presented with R foot interspace 1 wound to bone   - Pt seen and evaluated.  - Afebrile, WBC 13.33  - R foot interspace 1 wound to bone, R foot submet wound to dermis, erythema extends for wound to anterior lower shin improving, L foot 3rd digit plantar sulcus wound to subQ, no acute signs of infection  - Right foot X-ray: no OM, no gas  - Right foot wound recultured after deroofing submet 1 pustule extending into 1st interspace with sterile suture removal kit. Patient tolerated procedure well.   - Bone Scan: increase uptake of proximal phalanx 1 and metatarsal phalangeal joint 1 and metatarsal phalangeal joint 2   - Vasc recs, appreciated  - Recommend ID consult   - Ordered right foot MRI   - Pod plan: local wound care vs right foot partial 1st and 2nd ray resection pending vasc recs/ right foot MRI   - Please document medical clearance for possible podiatric procedure   - Discussed with Attending    64M presented with R foot interspace 1 wound to bone   - Pt seen and evaluated.  - Afebrile, WBC 13.33  - R foot interspace 1 wound to bone, R foot submet wound to dermis, erythema extends for wound to anterior lower shin improving, L foot 3rd digit plantar sulcus wound to subQ, no acute signs of infection  - Right foot X-ray: no OM, no gas  - Right foot wound recultured after deroofing submet 1 pustule extending into 1st interspace with sterile suture removal kit. Patient tolerated procedure well.   - Bone Scan: increase uptake of proximal phalanx 1 and metatarsal phalangeal joint 1 and metatarsal phalangeal joint 2   - Vasc recs, appreciated  - ID recs, appreciated  - Ordered right foot MRI   - Pod plan: local wound care vs right foot partial 1st and 2nd ray resection pending vasc recs/ right foot MRI   - Please document medical clearance for possible podiatric procedure   - Discussed with Attending

## 2025-01-20 NOTE — PROGRESS NOTE ADULT - PROBLEM SELECTOR PLAN 1
Patient on arrival meeting SIRS criteria with fever, tachycardia, and leukocytosis. Afebrile. Not meeting SIRS criteria. Downtrending leukocytosis. Resolving foot pain with increasing comfort on ambulation. Exam remarkable for reducing erythema on dorsum without further TTP. No open wounds suggestive of bony involvement. Low concern for osteomyelitis. Clinically improving.     -C/w piperacillin tazobactam 3.375 mg IV q8 (started 1/16) x 5 days   -NGTD blood cx, urine cx, abscess cx  -Negative gram stain for abscess  -Pending ID transplant consult for osteomyelitis r/o due to discordance between treatment plans amongst various specialties Patient on arrival meeting SIRS criteria with fever, tachycardia, and leukocytosis. Afebrile. Not meeting SIRS criteria. Downtrending leukocytosis. Resolving foot pain with increasing comfort on ambulation. Exam remarkable for reducing erythema on dorsum without further TTP. No open wounds suggestive of bony involvement. Low concern for osteomyelitis. Clinically improving.     -D/c piperacillin tazobactam 3.375 mg IV q8 (started 1/16-1/20) and initiate ampicillin-sulbactam 3 mg IV q6 hrs (started 1/20)   -NGTD blood cx, urine cx, abscess cx  -Negative gram stain for abscess  -Pending ID transplant consult for osteomyelitis r/o due to discordance between treatment plans amongst various specialties

## 2025-01-20 NOTE — CONSULT NOTE ADULT - SUBJECTIVE AND OBJECTIVE BOX
Patient is a 64y old  Male who presents with a chief complaint of scar tinfection (2025 09:28)      HPI:  64M with HTN T2DM on CGM and insulin pump, ESRD s/p renal transplant in  on tacrolimus and mycophenolate, partial R first toe amputation presenting from podiatry office with a foot infection. Patient states that a couple days ago he was walking barefoot outside and had a pine needle stuck on the anterior plantar surface of his foot near the base of his R first toe. He had progressive reddening and pain of the R foot and went to see a podiatrist. Podiatrist had concerns that he did not have any pedal pulses and also concern for potential bone involvement and prescribed a single dose of Augmentin and sent the patient to the ED for further evaluation. At the ED the patient was febrile to 101.8F, tachycardic to 91 w/ stable BP and O2 Sat. WBC count was elevated to 15.9 w/ neutrophil predominance. XR did not reveal any signs of OM and the patient was seen by podiatry who had low suspicion for osteomyelitis but recommended a Vascular surgery consult for concerns of PAD.  (15 Des 2025 21:58)     prior hospital charts reviewed [  ]  primary team notes reviewed [  ]  other consultant notes reviewed [  ]    PAST MEDICAL & SURGICAL HISTORY:  Hypertension      Diabetic neuropathy      Osteomyelitis of ankle or foot  x2      Pneumonia  2013      Insulin pump status  denies      CKD (chronic kidney disease)  hemodialysis on Mon, Wed and Fri      Diastolic dysfunction  mild. stage 1. EF 65%      DKA (diabetic ketoacidoses)      Hypertension      Diabetes  T2DM  last A1C (8%)      Ventral hernia  repair      History of glaucoma  left eye      Bilateral dry eyes      Dyslipidemia      Heart murmur      Asthma  never been intubated for asthma   last inhaler used 2019      S/P carpal tunnel release  b/l      S/P hernia repair      S/P amputation  right hallux       Retinal hemorrhage, left eye  s/p laser surgery  hx of right eye retinal hemorrahge, tx with laser surgery      H/O cardiac catheterization  no stents      H/O elbow surgery  left      S/P laparoscopic sleeve gastrectomy        Status post cataract extraction          Allergies  clonidine (Other)  seasonal allergy (Other)    ANTIMICROBIALS (past 90 days)  MEDICATIONS  (STANDING):  ampicillin/sulbactam  IVPB   200 mL/Hr IV Intermittent (25 @ 12:00)    piperacillin/tazobactam IVPB..   25 mL/Hr IV Intermittent (25 @ 06:51)   25 mL/Hr IV Intermittent (25 @ 23:03)   25 mL/Hr IV Intermittent (25 @ 14:15)   25 mL/Hr IV Intermittent (25 @ 06:32)   25 mL/Hr IV Intermittent (25 @ 20:44)   25 mL/Hr IV Intermittent (25 @ 11:51)   25 mL/Hr IV Intermittent (25 @ 02:27)   25 mL/Hr IV Intermittent (25 @ 18:41)   25 mL/Hr IV Intermittent (25 @ 10:35)   25 mL/Hr IV Intermittent (25 @ 02:03)   25 mL/Hr IV Intermittent (25 @ 18:17)   25 mL/Hr IV Intermittent (25 @ 10:04)   25 mL/Hr IV Intermittent (25 @ 01:08)    piperacillin/tazobactam IVPB...   200 mL/Hr IV Intermittent (01-15-25 @ 18:40)    vancomycin  IVPB   166.67 mL/Hr IV Intermittent (25 @ 05:55)    vancomycin  IVPB.   250 mL/Hr IV Intermittent (01-15-25 @ 19:59)      ANTIMICROBIALS:    ampicillin/sulbactam  IVPB 3 every 6 hours    OTHER MEDS: MEDICATIONS  (STANDING):  acetaminophen     Tablet .. 650 every 6 hours PRN  aluminum hydroxide/magnesium hydroxide/simethicone Suspension 30 every 4 hours PRN  aspirin enteric coated 81 daily  atorvastatin 40 at bedtime  carvedilol 6.25 every 12 hours  dextrose 50% Injectable 25 once  dextrose 50% Injectable 12.5 once  dextrose 50% Injectable 25 once  dextrose Oral Gel 15 once  diphtheria/tetanus/pertussis (acellular) Vaccine (Adacel) 0.5 once  enoxaparin Injectable 40 every 24 hours  glucagon  Injectable 1 once  insulin aspart (NovoLOG) Pump 1 Continuous Pump  lisinopril 2.5 daily  melatonin 3 at bedtime PRN  mycophenolic acid  daily  predniSONE   Tablet 5 daily  tacrolimus 1 <User Schedule>  tacrolimus 0.5 <User Schedule>  tamsulosin 0.4 at bedtime    SOCIAL HISTORY:   hx smoking  non-smoker    FAMILY HISTORY:  Family history of heart attack (Grandparent)  father  @54 y.o.  grandfather  @ 52 y.o.    Family history of bone cancer  Grandfather    FH: aortic stenosis  Son      REVIEW OF SYSTEMS  [  ] ROS unobtainable because:    [  ] All other systems negative except as noted below:	    Constitutional:  [ ] fever [ ] chills  [ ] weight loss  [ ] weakness  Skin:  [ ] rash [ ] phlebitis	  Eyes: [ ] icterus [ ] pain  [ ] discharge	  ENMT: [ ] sore throat  [ ] thrush [ ] ulcers [ ] exudates  Respiratory: [ ] dyspnea [ ] hemoptysis [ ] cough [ ] sputum	  Cardiovascular:  [ ] chest pain [ ] palpitations [ ] edema	  Gastrointestinal:  [ ] nausea [ ] vomiting [ ] diarrhea [ ] constipation [ ] pain	  Genitourinary:  [ ] dysuria [ ] frequency [ ] hematuria [ ] discharge [ ] flank pain  [ ] incontinence  Musculoskeletal:  [ ] myalgias [ ] arthralgias [ ] arthritis  [ ] back pain  Neurological:  [ ] headache [ ] seizures  [ ] confusion/altered mental status  Psychiatric:  [ ] anxiety [ ] depression	  Hematology/Lymphatics:  [ ] lymphadenopathy  Endocrine:  [ ] adrenal [ ] thyroid  Allergic/Immunologic:	 [ ] transplant [ ] seasonal    Vital Signs Last 24 Hrs  T(F): 97.8 (25 @ 05:19), Max: 101.8 (01-15-25 @ 16:52)  Vital Signs Last 24 Hrs  HR: 84 (25 @ 05:19) (80 - 84)  BP: 128/71 (25 @ 05:19) (110/61 - 128/71)  RR: 18 (25 @ 05:19)  SpO2: 97% (25 @ 05:19) (94% - 97%)  Wt(kg): --    PHYSICAL EXAMINATION:  General: Alert and Awake, NAD  HEENT: PERRL, EOMI, No subconjunctival hemorrhages, Oropharynx Clear, MMM  Neck: Supple, No JOSE  Cardiac: RRR, No M/R/G  Resp: CTAB, No Wh/Rh/Ra  Abdomen: NBS, NT/ND, No HSM, No rigidity or guarding  MSK: No LE edema. No stigmata of IE. No evidence of phlebitis. No evidence of synovitis.  : No rivera  Skin: No rashes or lesions. Skin is warm and dry to the touch.   Neuro: Alert and Awake. CN 2-12 Grossly intact. Moves all four extremities spontaneously.  Psych: Calm, Pleasant, Cooperative                          14.0   12.09 )-----------( 239      ( 2025 07:04 )             44.0     01-20    136  |  104  |  20  ----------------------------<  139[H]  4.2   |  18[L]  |  1.33[H]    Ca    9.5      2025 07:04  Phos  3.3     -20  Mg     1.9     01-20      Urinalysis Basic - ( 2025 07:04 )    Color: x / Appearance: x / SG: x / pH: x  Gluc: 139 mg/dL / Ketone: x  / Bili: x / Urobili: x   Blood: x / Protein: x / Nitrite: x   Leuk Esterase: x / RBC: x / WBC x   Sq Epi: x / Non Sq Epi: x / Bacteria: x    MICROBIOLOGY:  Culture - Abscess with Gram Stain (collected 2025 10:42)  Source: .Abscess  Gram Stain (2025 23:37):    No polymorphonuclear cells seen per low power field    No organisms seen per oil power field  Preliminary Report (2025 14:56):    No growth    Culture - Blood (collected 15 Des 2025 18:33)  Source: .Blood BLOOD  Preliminary Report (2025 22:00):    No growth at 4 days    Culture - Urine (collected 15 Des 2025 17:23)  Source: Clean Catch Clean Catch (Midstream)  Final Report (2025 20:41):    No growth    Culture - Blood (collected 15 Des 2025 17:15)  Source: .Blood BLOOD  Preliminary Report (2025 22:00):    No growth at 4 days                    RADIOLOGY:    <The imaging below has been reviewed and visualized by me independently. Findings as detailed in report below>     Patient is a 64y old  Male who presents with a chief complaint of foot infection (2025 09:28)      HPI:  64M with HTN T2DM on CGM and insulin pump, ESRD s/p renal transplant in  on tacrolimus and mycophenolate, partial R first toe amputation presenting from podiatry office with a foot infection. Patient states that a couple days ago he was walking barefoot outside and had a pine needle stuck on the anterior plantar surface of his foot near the base of his R first toe. He had progressive reddening and pain of the R foot and went to see a podiatrist. Podiatrist had concerns that he did not have any pedal pulses and also concern for potential bone involvement and prescribed a single dose of Augmentin and sent the patient to the ED for further evaluation. At the ED the patient was febrile to 101.8F, tachycardic to 91 w/ stable BP and O2 Sat. WBC count was elevated to 15.9 w/ neutrophil predominance. XR did not reveal any signs of OM and the patient was seen by podiatry who had low suspicion for osteomyelitis but recommended a Vascular surgery consult for concerns of PAD.    Seen at bedside with wife present, patient & wife feels like they need more work-up to confirm acute on chronic osteomyelitis of the foot. Reports he has had chronic osteomyelitis from 12 years ago, requesting bone biopsy, MRI (chart review indicates unable to perform due to insulin pump), and revascularization by vascular before suggested amputation by podiatry. Transplant ID consulted for recommendations.     prior hospital charts reviewed [  ]  primary team notes reviewed [ x ]  other consultant notes reviewed [x  ]    PAST MEDICAL & SURGICAL HISTORY:  Hypertension      Diabetic neuropathy      Osteomyelitis of ankle or foot  x2      Pneumonia  2013      Insulin pump status  denies      CKD (chronic kidney disease)  hemodialysis on Mon, Wed and Fri      Diastolic dysfunction  mild. stage 1. EF 65%      DKA (diabetic ketoacidoses)      Hypertension      Diabetes  T2DM  last A1C (8%)      Ventral hernia  repair      History of glaucoma  left eye      Bilateral dry eyes      Dyslipidemia      Heart murmur      Asthma  never been intubated for asthma   last inhaler used 2019      S/P carpal tunnel release  b/l      S/P hernia repair      S/P amputation  right hallux       Retinal hemorrhage, left eye  s/p laser surgery  hx of right eye retinal hemorrahge, tx with laser surgery      H/O cardiac catheterization  no stents      H/O elbow surgery  left      S/P laparoscopic sleeve gastrectomy  2015      Status post cataract extraction          Allergies  clonidine (Other)  seasonal allergy (Other)    ANTIMICROBIALS (past 90 days)  MEDICATIONS  (STANDING):  ampicillin/sulbactam  IVPB   200 mL/Hr IV Intermittent (25 @ 12:00)    piperacillin/tazobactam IVPB..   25 mL/Hr IV Intermittent (25 @ 06:51)   25 mL/Hr IV Intermittent (25 @ 23:03)   25 mL/Hr IV Intermittent (25 @ 14:15)   25 mL/Hr IV Intermittent (25 @ 06:32)   25 mL/Hr IV Intermittent (25 @ 20:44)   25 mL/Hr IV Intermittent (25 @ 11:51)   25 mL/Hr IV Intermittent (25 @ 02:27)   25 mL/Hr IV Intermittent (25 @ 18:41)   25 mL/Hr IV Intermittent (25 @ 10:35)   25 mL/Hr IV Intermittent (25 @ 02:03)   25 mL/Hr IV Intermittent (25 @ 18:17)   25 mL/Hr IV Intermittent (25 @ 10:04)   25 mL/Hr IV Intermittent (25 @ 01:08)    piperacillin/tazobactam IVPB...   200 mL/Hr IV Intermittent (01-15-25 @ 18:40)    vancomycin  IVPB   166.67 mL/Hr IV Intermittent (25 @ 05:55)    vancomycin  IVPB.   250 mL/Hr IV Intermittent (01-15-25 @ 19:59)      ANTIMICROBIALS:    ampicillin/sulbactam  IVPB 3 every 6 hours    OTHER MEDS: MEDICATIONS  (STANDING):  acetaminophen     Tablet .. 650 every 6 hours PRN  aluminum hydroxide/magnesium hydroxide/simethicone Suspension 30 every 4 hours PRN  aspirin enteric coated 81 daily  atorvastatin 40 at bedtime  carvedilol 6.25 every 12 hours  dextrose 50% Injectable 25 once  dextrose 50% Injectable 12.5 once  dextrose 50% Injectable 25 once  dextrose Oral Gel 15 once  diphtheria/tetanus/pertussis (acellular) Vaccine (Adacel) 0.5 once  enoxaparin Injectable 40 every 24 hours  glucagon  Injectable 1 once  insulin aspart (NovoLOG) Pump 1 Continuous Pump  lisinopril 2.5 daily  melatonin 3 at bedtime PRN  mycophenolic acid  daily  predniSONE   Tablet 5 daily  tacrolimus 1 <User Schedule>  tacrolimus 0.5 <User Schedule>  tamsulosin 0.4 at bedtime    SOCIAL HISTORY:   hx smoking  non-smoker    FAMILY HISTORY:  Family history of heart attack (Grandparent)  father  @54 y.o.  grandfather  @ 52 y.o.    Family history of bone cancer  Grandfather    FH: aortic stenosis  Son      REVIEW OF SYSTEMS  [  ] ROS unobtainable because:    [ x ] All other systems negative except as noted below:	    Constitutional:  [ ] fever [ ] chills  [ ] weight loss  [ ] weakness  Skin:  [ ] rash [ ] phlebitis	  Eyes: [ ] icterus [ ] pain  [ ] discharge	  ENMT: [ ] sore throat  [ ] thrush [ ] ulcers [ ] exudates  Respiratory: [ ] dyspnea [ ] hemoptysis [ ] cough [ ] sputum	  Cardiovascular:  [ ] chest pain [ ] palpitations [ ] edema	  Gastrointestinal:  [ ] nausea [ ] vomiting [ ] diarrhea [ ] constipation [ ] pain	  Genitourinary:  [ ] dysuria [ ] frequency [ ] hematuria [ ] discharge [ ] flank pain  [ ] incontinence  Musculoskeletal:  [ ] myalgias [ ] arthralgias [ ] arthritis  [x ] R foot pain  Neurological:  [ ] headache [ ] seizures  [ ] confusion/altered mental status  Psychiatric:  [ ] anxiety [ ] depression	  Hematology/Lymphatics:  [ ] lymphadenopathy  Endocrine:  [ ] adrenal [ ] thyroid  Allergic/Immunologic:	 [ ] transplant [ ] seasonal    Vital Signs Last 24 Hrs  T(F): 97.8 (25 @ 05:19), Max: 101.8 (01-15-25 @ 16:52)  Vital Signs Last 24 Hrs  HR: 84 (25 @ 05:19) (80 - 84)  BP: 128/71 (25 @ 05:19) (110/61 - 128/71)  RR: 18 (25 @ 05:19)  SpO2: 97% (25 @ 05:19) (94% - 97%)  Wt(kg): --    PHYSICAL EXAMINATION:  General: Alert and Awake, NAD  HEENT: PERRL, EOMI, No subconjunctival hemorrhages, Oropharynx Clear, MMM  Neck: Supple, No JOSE  Cardiac: RRR, No M/R/G  Resp: CTAB, No Wh/Rh/Ra  Abdomen: NBS, NT/ND, No HSM, No rigidity or guarding  MSK: +RLE edema. R foot wound to dermis, periwound ecchymosis, purulence with palpation, boggy with central clearing to planter and b/w great and 2nd toe  : No Hairston  Skin: No rashes or lesions. Skin is warm and dry to the touch.   Neuro: Alert and Awake. CN 2-12 Grossly intact. Moves all four extremities spontaneously.  Psych: Calm, Pleasant, Cooperative                          14.0   12.09 )-----------( 239      ( 2025 07:04 )             44.0     01-20    136  |  104  |  20  ----------------------------<  139[H]  4.2   |  18[L]  |  1.33[H]    Ca    9.5      2025 07:04  Phos  3.3     01-20  Mg     1.9     01-20      Urinalysis Basic - ( 2025 07:04 )    Color: x / Appearance: x / SG: x / pH: x  Gluc: 139 mg/dL / Ketone: x  / Bili: x / Urobili: x   Blood: x / Protein: x / Nitrite: x   Leuk Esterase: x / RBC: x / WBC x   Sq Epi: x / Non Sq Epi: x / Bacteria: x    MICROBIOLOGY:  Culture - Abscess with Gram Stain (collected 2025 10:42)  Source: .Abscess  Gram Stain (2025 23:37):    No polymorphonuclear cells seen per low power field    No organisms seen per oil power field  Preliminary Report (2025 14:56):    No growth    Culture - Blood (collected 15 Des 2025 18:33)  Source: .Blood BLOOD  Preliminary Report (2025 22:00):    No growth at 4 days    Culture - Urine (collected 15 Des 2025 17:23)  Source: Clean Catch Clean Catch (Midstream)  Final Report (2025 20:41):    No growth    Culture - Blood (collected 15 Des 2025 17:15)  Source: .Blood BLOOD  Preliminary Report (2025 22:00):    No growth at 4 days                    RADIOLOGY:    <The imaging below has been reviewed and visualized by me independently. Findings as detailed in report below>    < from: NM SPECT/CT Inflammatory Loc, Single Area Single Day (25 @ 13:31) >  ACC: [id number] EXAM:  NM MULTI DAY PROCEDURE   ORDERED BY:  FREDDY RODRIGUEZ     ACC: [id number] EXAM:  NM INFLAMM LOC SPECT CT SA SD   ORDERED BY:  FREDDY RODRIGUEZ     ACC: [id number] EXAM:  NM INFLAMM LOC WBC WB SD   ORDERED BY:  FREDDY RODRIGUEZ     PROCEDURE DATE:  2025          INTERPRETATION:  RADIOPHARMACEUTICAL: 10.6 millicuries Tc-99m labeled   autologous leukocytes, IV.    CLINICAL INFORMATION: 64 year old male with right foot wound; referred to   evaluate for right foot osteomyelitis.    TECHNIQUE: Radiolabeled autologous leukocytes were injected on 2025.   Approximately 24 hours later images were obtained.    A SPECT-CT of the bilateral ankles and feet was performed. A computer   workstation was used to obtain composite images for anatomic correlation   by precisely overlying the SPECT and CT images to generate a fused   SPECT/CT image. The CT protocol was optimized for SPECT attenuation   correction and to provide anatomic detail for localization of SPECT   abnormalities. The CT protocol was not designed to produce and cannot   replace state-of- the-art diagnostic CT images with specific imaging   protocols for different body parts and indications.    COMPARISON: None available    CORRELATION: X-ray right ankle andfoot 1/15/2025    FINDINGS:    Intense uptake is noted in the surrounding soft tissues and bony   structures at the level of the proximal phalanx of the right great toe,   likely extending into the first metatarsophalangeal joint and laterally   intothe second metatarsophalangeal joint region.    Status post amputation of the right hallux to the level of the proximal   phalangeal head.    KEY REFERENCE IMAGES HAVE BEEN SAVED.    IMPRESSION:    Positive Tc-99m labeled leukocyte scan.    Intense uptake is noted in the surrounding soft tissues and bony   structures at the level of the proximal phalanx of the right great toe,   likely extending into the first metatarsophalangeal joint and laterally   into the second metatarsophalangeal joint region.        --- End of Report ---      < end of copied text >   Patient is a 64y old  Male who presents with a chief complaint of foot infection (2025 09:28)    HPI:    64M with HTN T2DM on CGM and insulin pump, ESRD s/p renal transplant in  on tacrolimus and mycophenolate, partial R first toe amputation presenting from podiatry office with a foot infection. Patient states that a couple days ago he was walking barefoot outside and had a pine needle stuck on the anterior plantar surface of his foot near the base of his R first toe. He had progressive reddening and pain of the R foot and went to see a podiatrist. Podiatrist had concerns that he did not have any pedal pulses and also concern for potential bone involvement and prescribed a single dose of Augmentin and sent the patient to the ED for further evaluation. At the ED the patient was febrile to 101.8F, tachycardic to 91 w/ stable BP and O2 Sat. WBC count was elevated to 15.9 w/ neutrophil predominance. XR did not reveal any signs of OM and the patient was seen by podiatry who had low suspicion for osteomyelitis but recommended a Vascular surgery consult for concerns of PAD.    Seen at bedside with wife present, patient & wife feels like they need more work-up to confirm acute on chronic osteomyelitis of the foot. Reports he has had chronic osteomyelitis from 12 years ago, requesting bone biopsy, MRI (chart review indicates unable to perform due to insulin pump), and revascularization by vascular before suggested amputation by podiatry. Transplant ID consulted for recommendations.     prior hospital charts reviewed [  ]  primary team notes reviewed [ x ]  other consultant notes reviewed [x  ]    PAST MEDICAL & SURGICAL HISTORY:  Hypertension      Diabetic neuropathy      Osteomyelitis of ankle or foot  x2      Pneumonia  2013      Insulin pump status  denies      CKD (chronic kidney disease)  hemodialysis on Mon, Wed and Fri      Diastolic dysfunction  mild. stage 1. EF 65%      DKA (diabetic ketoacidoses)      Hypertension      Diabetes  T2DM  last A1C (8%)      Ventral hernia  repair      History of glaucoma  left eye      Bilateral dry eyes      Dyslipidemia      Heart murmur      Asthma  never been intubated for asthma   last inhaler used 2019      S/P carpal tunnel release  b/l      S/P hernia repair      S/P amputation  right hallux       Retinal hemorrhage, left eye  s/p laser surgery  hx of right eye retinal hemorrhage, tx with laser surgery      H/O cardiac catheterization  no stents      H/O elbow surgery  left      S/P laparoscopic sleeve gastrectomy  2015      Status post cataract extraction          Allergies  clonidine (Other)  seasonal allergy (Other)    ANTIMICROBIALS (past 90 days)  MEDICATIONS  (STANDING):  ampicillin/sulbactam  IVPB   200 mL/Hr IV Intermittent (25 @ 12:00)    piperacillin/tazobactam IVPB..   25 mL/Hr IV Intermittent (25 @ 06:51)   25 mL/Hr IV Intermittent (25 @ 23:03)   25 mL/Hr IV Intermittent (25 @ 14:15)   25 mL/Hr IV Intermittent (25 @ 06:32)   25 mL/Hr IV Intermittent (25 @ 20:44)   25 mL/Hr IV Intermittent (25 @ 11:51)   25 mL/Hr IV Intermittent (25 @ 02:27)   25 mL/Hr IV Intermittent (25 @ 18:41)   25 mL/Hr IV Intermittent (25 @ 10:35)   25 mL/Hr IV Intermittent (25 @ 02:03)   25 mL/Hr IV Intermittent (25 @ 18:17)   25 mL/Hr IV Intermittent (25 @ 10:04)   25 mL/Hr IV Intermittent (25 @ 01:08)    piperacillin/tazobactam IVPB...   200 mL/Hr IV Intermittent (01-15-25 @ 18:40)    vancomycin  IVPB   166.67 mL/Hr IV Intermittent (25 @ 05:55)    vancomycin  IVPB.   250 mL/Hr IV Intermittent (01-15-25 @ 19:59)      ANTIMICROBIALS:    ampicillin/sulbactam  IVPB 3 every 6 hours    OTHER MEDS: MEDICATIONS  (STANDING):  acetaminophen     Tablet .. 650 every 6 hours PRN  aluminum hydroxide/magnesium hydroxide/simethicone Suspension 30 every 4 hours PRN  aspirin enteric coated 81 daily  atorvastatin 40 at bedtime  carvedilol 6.25 every 12 hours  dextrose 50% Injectable 25 once  dextrose 50% Injectable 12.5 once  dextrose 50% Injectable 25 once  dextrose Oral Gel 15 once  diphtheria/tetanus/pertussis (acellular) Vaccine (Adacel) 0.5 once  enoxaparin Injectable 40 every 24 hours  glucagon  Injectable 1 once  insulin aspart (NovoLOG) Pump 1 Continuous Pump  lisinopril 2.5 daily  melatonin 3 at bedtime PRN  mycophenolic acid  daily  predniSONE   Tablet 5 daily  tacrolimus 1 <User Schedule>  tacrolimus 0.5 <User Schedule>  tamsulosin 0.4 at bedtime    SOCIAL HISTORY: no active smoking or etoh use.     FAMILY HISTORY:  Family history of heart attack (Grandparent)  father  @54 y.o.  grandfather  @ 52 y.o.    Family history of bone cancer  Grandfather    FH: aortic stenosis  Son      REVIEW OF SYSTEMS  [  ] ROS unobtainable because:    [ x ] All other systems negative except as noted below:	    Constitutional:  [ ] fever [ ] chills  [ ] weight loss  [ ] weakness  Skin:  [ ] rash [ ] phlebitis	  Eyes: [ ] icterus [ ] pain  [ ] discharge	  ENMT: [ ] sore throat  [ ] thrush [ ] ulcers [ ] exudates  Respiratory: [ ] dyspnea [ ] hemoptysis [ ] cough [ ] sputum	  Cardiovascular:  [ ] chest pain [ ] palpitations [ ] edema	  Gastrointestinal:  [ ] nausea [ ] vomiting [ ] diarrhea [ ] constipation [ ] pain	  Genitourinary:  [ ] dysuria [ ] frequency [ ] hematuria [ ] discharge [ ] flank pain  [ ] incontinence  Musculoskeletal:  [ ] myalgias [ ] arthralgias [ ] arthritis  [x ] R foot pain  Neurological:  [ ] headache [ ] seizures  [ ] confusion/altered mental status  Psychiatric:  [ ] anxiety [ ] depression	  Hematology/Lymphatics:  [ ] lymphadenopathy  Endocrine:  [ ] adrenal [ ] thyroid  Allergic/Immunologic:	 [ ] transplant [ ] seasonal    Vital Signs Last 24 Hrs  T(F): 97.8 (25 @ 05:19), Max: 101.8 (01-15-25 @ 16:52)  Vital Signs Last 24 Hrs  HR: 84 (25 @ 05:19) (80 - 84)  BP: 128/71 (25 @ 05:19) (110/61 - 128/71)  RR: 18 (25 @ 05:19)  SpO2: 97% (25 @ 05:19) (94% - 97%)  Wt(kg): --    PHYSICAL EXAMINATION:  General: Alert and Awake, NAD  HEENT: PERRL, EOMI, No subconjunctival hemorrhages, Oropharynx Clear, MMM  Neck: Supple, No JOSE  Cardiac: RRR, No M/R/G  Resp: CTAB, No Wh/Rh/Ra  Abdomen: NBS, NT/ND, No HSM, No rigidity or guarding  MSK: +RLE edema. R foot wound to dermis, periwound ecchymosis, purulence with palpation, boggy with central clearing to planter and b/w great and 2nd toe  : No Hairston  Skin: No rashes or lesions. Skin is warm and dry to the touch.   Neuro: Alert and Awake. CN 2-12 Grossly intact. Moves all four extremities spontaneously.  Psych: Calm, Pleasant, Cooperative                          14.0   12.09 )-----------( 239      ( 2025 07:04 )             44.0     01-20    136  |  104  |  20  ----------------------------<  139[H]  4.2   |  18[L]  |  1.33[H]    Ca    9.5      2025 07:04  Phos  3.3     01-20  Mg     1.9     01-20      Urinalysis Basic - ( 2025 07:04 )    Color: x / Appearance: x / SG: x / pH: x  Gluc: 139 mg/dL / Ketone: x  / Bili: x / Urobili: x   Blood: x / Protein: x / Nitrite: x   Leuk Esterase: x / RBC: x / WBC x   Sq Epi: x / Non Sq Epi: x / Bacteria: x    MICROBIOLOGY:  Culture - Abscess with Gram Stain (collected 2025 10:42)  Source: .Abscess  Gram Stain (2025 23:37):    No polymorphonuclear cells seen per low power field    No organisms seen per oil power field  Preliminary Report (2025 14:56):    No growth    Culture - Blood (collected 15 Des 2025 18:33)  Source: .Blood BLOOD  Preliminary Report (2025 22:00):    No growth at 4 days    Culture - Urine (collected 15 Des 2025 17:23)  Source: Clean Catch Clean Catch (Midstream)  Final Report (2025 20:41):    No growth    Culture - Blood (collected 15 Des 2025 17:15)  Source: .Blood BLOOD  Preliminary Report (2025 22:00):    No growth at 4 days    RADIOLOGY:    <The imaging below has been reviewed and visualized by me independently. Findings as detailed in report below>    < from: NM SPECT/CT Inflammatory Loc, Single Area Single Day (25 @ 13:31) >  ACC: [id number] EXAM:  NM MULTI DAY PROCEDURE   ORDERED BY:  FREDDY RODRIGUEZ     ACC: [id number] EXAM:  NM INFLAMM LOC SPECT CT SA SD   ORDERED BY:  FREDDY RODRIGUEZ     ACC: [id number] EXAM:  NM INFLAMM LOC WBC WB SD   ORDERED BY:  FREDDY RODRIGUEZ     PROCEDURE DATE:  2025          INTERPRETATION:  RADIOPHARMACEUTICAL: 10.6 millicuries Tc-99m labeled   autologous leukocytes, IV.    CLINICAL INFORMATION: 64 year old male with right foot wound; referred to   evaluate for right foot osteomyelitis.    TECHNIQUE: Radiolabeled autologous leukocytes were injected on 2025.   Approximately 24 hours later images were obtained.    A SPECT-CT of the bilateral ankles and feet was performed. A computer   workstation was used to obtain composite images for anatomic correlation   by precisely overlying the SPECT and CT images to generate a fused   SPECT/CT image. The CT protocol was optimized for SPECT attenuation   correction and to provide anatomic detail for localization of SPECT   abnormalities. The CT protocol was not designed to produce and cannot   replace state-of- the-art diagnostic CT images with specific imaging   protocols for different body parts and indications.    COMPARISON: None available    CORRELATION: X-ray right ankle andfoot 1/15/2025    FINDINGS:    Intense uptake is noted in the surrounding soft tissues and bony   structures at the level of the proximal phalanx of the right great toe,   likely extending into the first metatarsophalangeal joint and laterally   intothe second metatarsophalangeal joint region.    Status post amputation of the right hallux to the level of the proximal   phalangeal head.    KEY REFERENCE IMAGES HAVE BEEN SAVED.    IMPRESSION:    Positive Tc-99m labeled leukocyte scan.    Intense uptake is noted in the surrounding soft tissues and bony   structures at the level of the proximal phalanx of the right great toe,   likely extending into the first metatarsophalangeal joint and laterally   into the second metatarsophalangeal joint region.        --- End of Report ---      < end of copied text >

## 2025-01-20 NOTE — CONSULT NOTE ADULT - ASSESSMENT
64M with HTN T2DM on CGM and insulin pump, ESRD s/p renal transplant in 2020 on tacrolimus and mycophenolate, partial R first toe amputation presenting from podiatry office with a foot infection. Patient states that a couple days ago he was walking barefoot outside and had a pine needle stuck on the anterior plantar surface of his foot near the base of his R first toe. He had progressive reddening and pain of the R foot and went to see a podiatrist. Podiatrist had concerns that he did not have any pedal pulses and also concern for potential bone involvement and prescribed a single dose of Augmentin and sent the patient to the ED for further evaluation. At the ED the patient was febrile to 101.8F, tachycardic to 91 w/ stable BP and O2 Sat. WBC count was elevated to 15.9 w/ neutrophil predominance. XR did not reveal any signs of OM and the patient was seen by podiatry who had low suspicion for osteomyelitis but recommended a Vascular surgery consult for concerns of PAD.    Seen at bedside with wife present, patient & wife feels like they need more work-up to confirm acute on chronic osteomyelitis of the foot. Reports he has had chronic osteomyelitis from 12 years ago, requesting bone biopsy, MRI (chart review indicates unable to perform due to insulin pump), and revascularization by vascular before suggested amputation by podiatry. Transplant ID consulted for recommendations.         #Renal transplant recipient  #Leucocytosis  #Concern for R foot osteomyelitis  -Febrile on admission, afebrile today  -WBC 12 today  -Abscess Culture Results: No growth  -Sedimentation Rate, Erythrocyte: 110 mm/hr  -C-Reactive Protein: 229 mg/L  -MRSA/MSSA PCR: Staph aureus PCR Result: Detected  -FluA/FluB/RSV/COVID PCR: not detected  -BLOOD Culture Results: No growth at 4 days  -Urine Culture Results: No growth      Recommendations:  Not opposed to bone biopsy and MRI (If able to take out insulin pump)  In favor of vascular consult given notable occlusions on [facility] duplex BLE  Continue empiric Zosyn while inpatient  Pending further workup to guide antimicrobial therapy, might consider Cefepime + Metronidazole +Levofloxacin in the near future   Trend CBC & chemistry  Continue temperature curves 64M with HTN T2DM on CGM and insulin pump, ESRD s/p renal transplant in 2020 on tacrolimus and mycophenolate, partial R first toe amputation presenting from podiatry office with a foot infection. Patient states that a couple days ago he was walking barefoot outside and had a pine needle stuck on the anterior plantar surface of his foot near the base of his R first toe. He had progressive reddening and pain of the R foot and went to see a podiatrist. Podiatrist had concerns that he did not have any pedal pulses and also concern for potential bone involvement and prescribed a single dose of Augmentin and sent the patient to the ED for further evaluation. At the ED the patient was febrile to 101.8F, tachycardic to 91 w/ stable BP and O2 Sat. WBC count was elevated to 15.9 w/ neutrophil predominance. XR did not reveal any signs of OM and the patient was seen by podiatry who had low suspicion for osteomyelitis but recommended a Vascular surgery consult for concerns of PAD.    Seen at bedside with wife present, patient & wife feels like they need more work-up to confirm acute on chronic osteomyelitis of the foot. Reports he has had chronic osteomyelitis from 12 years ago, requesting bone biopsy, MRI (chart review indicates unable to perform due to insulin pump), and revascularization by vascular before suggested amputation by podiatry. Transplant ID consulted for recommendations.     #Renal transplant recipient  #Leucocytosis  #Concern for R foot osteomyelitis  -Febrile on admission, afebrile today  -WBC 12 today  -Abscess Culture Results: No growth  -Sedimentation Rate, Erythrocyte: 110 mm/hr  -C-Reactive Protein: 229 mg/L  -MRSA/MSSA PCR: Staph aureus PCR Result: Detected  -FluA/FluB/RSV/COVID PCR: not detected  -BLOOD Culture Results: No growth at 4 days  -Urine Culture Results: No growth      Recommendations:  --Patient planned for MRI for further characterization  --Favor maintaining Zosyn 3.375g IV Q8H as opposed to Unasyn pending repeat wound cultures  --Continue to follow CBC with diff  --Continue to follow temperature curve  --Follow up on preliminary wound cultures

## 2025-01-20 NOTE — PROGRESS NOTE ADULT - SUBJECTIVE AND OBJECTIVE BOX
[facility]   [phone]  RENAL FOLLOW UP NOTE  --------------------------------------------------------------------------------  Chief Complaint:    24 hour events/subjective:    seen earlier   pain in right foot     PAST HISTORY  --------------------------------------------------------------------------------  No significant changes to PMH, PSH, FHx, SHx, unless otherwise noted    ALLERGIES & MEDICATIONS  --------------------------------------------------------------------------------  Allergies    clonidine (Other)  seasonal allergy (Other)    Intolerances      Standing Inpatient Medications  aspirin enteric coated 81 milliGRAM(s) Oral daily  atorvastatin 40 milliGRAM(s) Oral at bedtime  carvedilol 6.25 milliGRAM(s) Oral every 12 hours  dextrose 5%. 1000 milliLiter(s) IV Continuous <Continuous>  dextrose 5%. 1000 milliLiter(s) IV Continuous <Continuous>  dextrose 50% Injectable 25 Gram(s) IV Push once  dextrose 50% Injectable 12.5 Gram(s) IV Push once  dextrose 50% Injectable 25 Gram(s) IV Push once  dextrose Oral Gel 15 Gram(s) Oral once  diphtheria/tetanus/pertussis (acellular) Vaccine (Adacel) 0.5 milliLiter(s) IntraMuscular once  enoxaparin Injectable 40 milliGRAM(s) SubCutaneous every 24 hours  glucagon  Injectable 1 milliGRAM(s) IntraMuscular once  insulin aspart (NovoLOG) Pump 1 Each SubCutaneous Continuous Pump  lisinopril 2.5 milliGRAM(s) Oral daily  mupirocin 2% Ointment 1 Application(s) Topical every 12 hours  mycophenolic acid  milliGRAM(s) Oral daily  piperacillin/tazobactam IVPB.. 3.375 Gram(s) IV Intermittent every 8 hours  predniSONE   Tablet 5 milliGRAM(s) Oral daily  sodium chloride 0.9%. 1000 milliLiter(s) IV Continuous <Continuous>  tacrolimus 1 milliGRAM(s) Oral <User Schedule>  tacrolimus 0.5 milliGRAM(s) Oral <User Schedule>  tamsulosin 0.4 milliGRAM(s) Oral at bedtime    PRN Inpatient Medications  acetaminophen     Tablet .. 650 milliGRAM(s) Oral every 6 hours PRN  aluminum hydroxide/magnesium hydroxide/simethicone Suspension 30 milliLiter(s) Oral every 4 hours PRN  melatonin 3 milliGRAM(s) Oral at bedtime PRN      REVIEW OF SYSTEMS  --------------------------------------------------------------------------------    Gen: denies  fevers/chills, or HA or dizziness   CVS: denies chest pain/palpitations  Resp: denies SOB/Cough  GI: Denies N/V/Abd pain  : Denies dysuria or decrease urination       VITALS/PHYSICAL EXAM  --------------------------------------------------------------------------------  T(C): 36.3 (01-20-25 @ 18:07), Max: 36.9 (01-19-25 @ 20:45)  HR: 94 (01-20-25 @ 18:07) (80 - 94)  BP: 141/68 (01-20-25 @ 18:07) (111/66 - 141/68)  RR: 18 (01-20-25 @ 18:07) (17 - 18)  SpO2: 94% (01-20-25 @ 18:07) (94% - 97%)  Wt(kg): --        01-19-25 @ 07:01  -  01-20-25 @ 07:00  --------------------------------------------------------  IN: 840 mL / OUT: 0 mL / NET: 840 mL    01-20-25 @ 07:01  -  01-20-25 @ 19:03  --------------------------------------------------------  IN: 1040 mL / OUT: 0 mL / NET: 1040 mL      Physical Exam:  	    	Gen: Non toxic comfortable appearing   	Pulm: decrease bs  no rales or ronchi or wheezing  	CV: No JVD. RRR, S1S2; no rub  	Abd: +BS, soft, nontender/nondistended  	: No suprapubic tenderness renal graft site non tender   	UE: Warm, no cyanosis  no clubbing,  no edema  	LE: Warm, erythema  foot with dressing       LABS/STUDIES  --------------------------------------------------------------------------------              14.0   12.09 >-----------<  239      [01-20-25 @ 07:04]              44.0     136  |  104  |  20  ----------------------------<  139      [01-20-25 @ 07:04]  4.2   |  18  |  1.33        Ca     9.5     [01-20-25 @ 07:04]      Mg     1.9     [01-20-25 @ 07:04]      Phos  3.3     [01-20-25 @ 07:04]            Creatinine Trend:  SCr 1.33 [01-20 @ 07:04]  SCr 1.19 [01-19 @ 06:56]  SCr 1.14 [01-18 @ 09:19]  SCr 1.11 [01-17 @ 07:16]  SCr 1.20 [01-16 @ 07:22]    Tacrolimus (), Serum: 12.5 ng/mL (01-20 @ 07:04)  Tacrolimus (), Serum: 14.2 ng/mL (01-19 @ 06:56)  Tacrolimus (), Serum: 10.9 ng/mL (01-18 @ 09:19)  Tacrolimus (), Serum: 12.0 ng/mL (01-17 @ 07:12)            HbA1c 7.6      [02-19-20 @ 08:43]

## 2025-01-20 NOTE — PROGRESS NOTE ADULT - ASSESSMENT
64M with history of HTN, DM2 with neuropathy and partial R 1st digit amputation, ESRD s/p renal transplant on tacrolimus and mycophenolate, and HDL here for uncomplicated R foot cellulitis with incidental reduced LE pulses R>L; currently with resolving sepsis and cellulitis.

## 2025-01-20 NOTE — PROGRESS NOTE ADULT - SUBJECTIVE AND OBJECTIVE BOX
INPATIENT MEDICINE PROGRESS NOTE:   Authored by Kerri Villegas MD     HISTORY OF PRESENT ILLNESS:  64F with history of DM2 on insulin pump with neuropathy and partial R 1st digit amputation, ESRD s/p renal transplant on tacrolimus and mycophenolate mofetil, asthma, HDL presents to [facility]-ED sent by podiatry due to R foot redness and tenderness with blister.     NAEON. Afebrile, HR 80s. SBPs 100-s130s. POCT 257/141/167/197, thisAM 108.     Seen and examined at bedside, patient reports feeling less foot pain after naproxen 250 mg po x .Doesn't feel like he needs the pain.Believes the swelling is coming down and can walk with less pain. Regrets signing consent for toe amputation. Hopepful about clearing up question about osteomyelitis,ultimately would like to go home if not needing surgery.     PHYSICAL EXAM:  Vital Signs Last 24 Hrs  T(C): 36.6 (20 Jan 2025 05:19), Max: 37.1 (19 Jan 2025 11:53)  T(F): 97.8 (20 Jan 2025 05:19), Max: 98.7 (19 Jan 2025 11:53)  HR: 84 (20 Jan 2025 05:19) (80 - 89)  BP: 128/71 (20 Jan 2025 05:19) (110/61 - 132/70)  BP(mean): --  RR: 18 (20 Jan 2025 05:19) (17 - 18)  SpO2: 97% (20 Jan 2025 05:19) (93% - 97%)    Parameters below as of 20 Jan 2025 05:19  Patient On (Oxygen Delivery Method): room air        General: NAD, calm, comfortable, cooperative, obese habitus, tattoos   Neuro: awake, alert, oriented to person/place/time, 5/5  strength, 5/5 hip flexion/extension b/l, spontaneity, reduced sensation b/l feet   HEENT: NC/AT, PERRLA b/l, EOMI, moist mucous membranes, no supraclavicular/submandibular lymphadenopathy  Chest: nonTTP   Heart: S1/S2, RRR   Lungs: CTA b/l, nonlabored breathing   Abd: soft, nonTTP, nondistended, reducible umbilical bulge   Ext: erythema near R 1st digitand 2nd digit, no TTP, no pretibial/pedal edema, superficial abrasion on plantar midfoot of R foot, warm to touch b/l, no pain on plantarflexion/dorsiflexion,non tense, non pallor   Back: nonTTP   Pulses: soft dorsalis pedis pulses  Psych: full range affect, mutual topic,linear and goal directed   Lines/tubes/drains: none       I&O's Summary    19 Jan 2025 07:01  -  20 Jan 2025 07:00  --------------------------------------------------------  IN: 840 mL / OUT: 0 mL / NET: 840 mL        LABS:                        14.0   12.09 )-----------( 239      ( 20 Jan 2025 07:04 )             44.0     01-20    136  |  104  |  20  ----------------------------<  139[H]  4.2   |  18[L]  |  1.33[H]    Ca    9.5      20 Jan 2025 07:04  Phos  3.3     01-20  Mg     1.9     01-20      CAPILLARY BLOOD GLUCOSE      POCT Blood Glucose.: 168 mg/dL (20 Jan 2025 08:34)  POCT Blood Glucose.: 197 mg/dL (19 Jan 2025 22:29)  POCT Blood Glucose.: 167 mg/dL (19 Jan 2025 17:46)  POCT Blood Glucose.: 141 mg/dL (19 Jan 2025 12:35)          Urinalysis Basic - ( 20 Jan 2025 07:04 )    Color: x / Appearance: x / SG: x / pH: x  Gluc: 139 mg/dL / Ketone: x  / Bili: x / Urobili: x   Blood: x / Protein: x / Nitrite: x   Leuk Esterase: x / RBC: x / WBC x   Sq Epi: x / Non Sq Epi: x / Bacteria: x          MEDICATIONS  (STANDING):  aspirin enteric coated 81 milliGRAM(s) Oral daily  atorvastatin 40 milliGRAM(s) Oral at bedtime  carvedilol 6.25 milliGRAM(s) Oral every 12 hours  dextrose 5%. 1000 milliLiter(s) (50 mL/Hr) IV Continuous <Continuous>  dextrose 5%. 1000 milliLiter(s) (100 mL/Hr) IV Continuous <Continuous>  dextrose 50% Injectable 25 Gram(s) IV Push once  dextrose 50% Injectable 12.5 Gram(s) IV Push once  dextrose 50% Injectable 25 Gram(s) IV Push once  dextrose Oral Gel 15 Gram(s) Oral once  diphtheria/tetanus/pertussis (acellular) Vaccine (Adacel) 0.5 milliLiter(s) IntraMuscular once  enoxaparin Injectable 40 milliGRAM(s) SubCutaneous every 24 hours  glucagon  Injectable 1 milliGRAM(s) IntraMuscular once  insulin aspart (NovoLOG) Pump 1 Each SubCutaneous Continuous Pump  lisinopril 2.5 milliGRAM(s) Oral daily  mupirocin 2% Ointment 1 Application(s) Topical every 12 hours  mycophenolic acid  milliGRAM(s) Oral daily  piperacillin/tazobactam IVPB.. 3.375 Gram(s) IV Intermittent every 8 hours  predniSONE   Tablet 5 milliGRAM(s) Oral daily  sodium chloride 0.9%. 1000 milliLiter(s) (100 mL/Hr) IV Continuous <Continuous>  tacrolimus 1 milliGRAM(s) Oral <User Schedule>  tacrolimus 0.5 milliGRAM(s) Oral <User Schedule>  tamsulosin 0.4 milliGRAM(s) Oral at bedtime    MEDICATIONS  (PRN):  acetaminophen     Tablet .. 650 milliGRAM(s) Oral every 6 hours PRN Temp greater or equal to 38C (100.4F), Mild Pain (1 - 3)  aluminum hydroxide/magnesium hydroxide/simethicone Suspension 30 milliLiter(s) Oral every 4 hours PRN Dyspepsia  melatonin 3 milliGRAM(s) Oral at bedtime PRN Insomnia

## 2025-01-20 NOTE — PROGRESS NOTE ADULT - ATTENDING COMMENTS
64M pmh HTN T2DM on CGM and insulin pump, ESRD s/p renal transplant in 2020 on tacrolimus and mycophenolate, partial R first toe amputation present s/p Rt foot injury admitted for sepsis 2/2 R foot wound/cellulitis/OM, pending discussion with vascular surgery and podiatry regarding next step (resection vs angiogram).     #Right LE wound/cellulitis/OM  -switch zosyn to unasyn, prelim ID recs to keep zosyn, will follow up final recs  -wound culture negative  -NM scan positive (no MRI 2/2 insulin pump  - Appreciated vascular and podiatry recs, ongoing discussion regarding next steps, angiogram Vs resection, and will need to involve transplant renal given patients renal transplant  -ID consult   -f/u wound culture, NTD    #Renal transplant  -cr 1.1 and stable  -AM tacro level  -Tacro adjustments per transplant renal.   c/w Myfortic    #DM on insulin pump  -endo consulted- insulin pump  -monitor FS, goal 120-180    #Preoperative evaluation: for 1st and 2nd ray resection.   -Functional Status: > 4 mets.   -No recent Change in clinical status  -No complaints of Chest pain, shortness of breath, Dyspnea on exertion, palpitations, or syncope  -EKG:sinus rhythm   TTE: in 2019: normal biventricular systolic function. dilated LA, moderately elevated pulm pressures.   No significant Obstructive Valvular disease. Not in ADHF  -LHC in 2013 with minor luminal irregularities-  -Pharm/Nuc in 2019 was normal   RCRI=class II risk.   -No further cardiovascular testing is required prior to proceeding with procedure. Patient is class II risk for low risk procedure . 64M pmh HTN T2DM on CGM and insulin pump, ESRD s/p renal transplant in 2020 on tacrolimus and mycophenolate, partial R first toe amputation present s/p Rt foot injury admitted for sepsis 2/2 R foot wound/cellulitis/OM, pending discussion with vascular surgery and podiatry regarding next step (resection vs angiogram).     #Right LE wound/cellulitis/OM  -switch zosyn to unasyn, prelim ID recs to keep zosyn, will follow up final recs  -wound culture negative  -NM scan positive (initially no MRI 2/2 insulin pump, but now can pursue with removal of insulin pump,and it will have to be replaced)  -MRI  -May need bone bx  - Appreciated vascular and podiatry recs, ongoing discussion regarding next steps, angiogram Vs biopsy, vs resection, and will need to involve transplant renal given patients renal transplant  -ID consult, follow up final recs   -f/u wound culture, NTD    #Renal transplant  -cr 1.1 and stable  -AM tacro level  -Tacro adjustments per transplant renal.   c/w Myfortic    #DM on insulin pump  -endo consulted- insulin pump  -monitor FS, goal 120-180

## 2025-01-20 NOTE — PROGRESS NOTE ADULT - PROBLEM SELECTOR PLAN 5
Patient with history of DM2 nephropathy leading to ESRD and renal transplant and immunosuppressive medications. Stable BUN/Cr. Supratherapeutic tacrolimus level. Clinically monitor.     -C/w predinsone 5 mg po qd   -C/w tacrolimus 1 mg po qam and 0.5 mg po qhs   -C/w mycophenolate mofetil 360 mg po BID

## 2025-01-20 NOTE — PROGRESS NOTE ADULT - PROBLEM SELECTOR PLAN 2
Resolving R foot erythema and tenderness. Ischemia related pain in R foot in setting of known stenosis, less likely inflammatory related pain, however contributory. Soft dorsalis pedis pulses elicited. Arterial doppler showing adequate blood flow in LE. Superficial 2 wounds, no defects. No S&S of acute ischemic limb. Clinically improving.     -Ankle Brachial Index limited due to noncompressible vasculature; vascular asking for repeat to be performed on R 2nd toe due to amputation of 1st R toe   -Podiatry recommending amputation of R toe(s)  -Arterial doppler to assess pulses showing stenosis RLE > LLE   -Nuclear medicine scan suggestive of increased uptake in 1st and 2nd digit of R foot potentially by osteyelitis   -Naproxen 250 mg po qd PRN   -Appreciate vascular surgery recommendations for doing a CT angiogram of RLE prior any podiatry interventions; vascular surgery will coordinate with transplant nephrology for CT angiogram

## 2025-01-20 NOTE — CONSULT NOTE ADULT - NS ATTEND AMEND GEN_ALL_CORE FT
64M with HTN T2DM on CGM and insulin pump, ESRD s/p renal transplant in 2020 on tacrolimus and mycophenolate, partial R first toe amputation presenting from podiatry office with a foot infection. Patient states that a couple days ago he was walking barefoot outside and had a pine needle stuck on the anterior plantar surface of his foot near the base of his R first toe. He had progressive reddening and pain of the R foot and went to see a podiatrist. Podiatrist had concerns that he did not have any pedal pulses and also concern for potential bone involvement and prescribed a single dose of Augmentin and sent the patient to the ED for further evaluation.     Wound cultures here with no growth thus far  MRSA nasal PCR positive for MSSA  NM Study with Intense uptake is noted in the surrounding soft tissues and bony structures at the level of the proximal phalanx of the right great toe, likely extending into the first metatarsophalangeal joint and laterally into the second metatarsophalangeal joint region.    MRI appears to be feasible to pursue after all, will follow results    Would maintain on Zosyn as opposed to Unasyn  Repeat wound culture sent by Podiatry today    Dr. Marciano Ma will be covering the patient starting from tomorrow. Please reach out to her for further questions and follow up.     Hugo Parr M.D.  [facility] Division of Infectious Disease  8AM-5PM Monday - Friday: Available on [employer] Teams  After Hours and Holidays (or if no response on [employer] Teams): Please contact the Infectious Diseases Office at [phone]     The above assessment and plan were discussed with medicine resident

## 2025-01-20 NOTE — PROGRESS NOTE ADULT - SUBJECTIVE AND OBJECTIVE BOX
Patient is a 64y old  Male who presents with a chief complaint of foot Infection (20 Jan 2025 12:06)       INTERVAL HPI/OVERNIGHT EVENTS:  Patient seen and evaluated at bedside.  Pt is resting comfortable in NAD. Denies N/V/F/C.    Allergies    clonidine (Other)  seasonal allergy (Other)    Intolerances        Vital Signs Last 24 Hrs  T(C): 36.6 (20 Jan 2025 05:19), Max: 36.9 (19 Jan 2025 20:45)  T(F): 97.8 (20 Jan 2025 05:19), Max: 98.4 (19 Jan 2025 20:45)  HR: 84 (20 Jan 2025 05:19) (80 - 84)  BP: 128/71 (20 Jan 2025 05:19) (110/61 - 128/71)  BP(mean): --  RR: 18 (20 Jan 2025 05:19) (17 - 18)  SpO2: 97% (20 Jan 2025 05:19) (94% - 97%)    Parameters below as of 20 Jan 2025 05:19  Patient On (Oxygen Delivery Method): room air        LABS:                        14.0   12.09 )-----------( 239      ( 20 Jan 2025 07:04 )             44.0     01-20    136  |  104  |  20  ----------------------------<  139[H]  4.2   |  18[L]  |  1.33[H]    Ca    9.5      20 Jan 2025 07:04  Phos  3.3     01-20  Mg     1.9     01-20        Urinalysis Basic - ( 20 Jan 2025 07:04 )    Color: x / Appearance: x / SG: x / pH: x  Gluc: 139 mg/dL / Ketone: x  / Bili: x / Urobili: x   Blood: x / Protein: x / Nitrite: x   Leuk Esterase: x / RBC: x / WBC x   Sq Epi: x / Non Sq Epi: x / Bacteria: x      CAPILLARY BLOOD GLUCOSE      POCT Blood Glucose.: 150 mg/dL (20 Jan 2025 12:55)  POCT Blood Glucose.: 168 mg/dL (20 Jan 2025 08:34)  POCT Blood Glucose.: 197 mg/dL (19 Jan 2025 22:29)  POCT Blood Glucose.: 167 mg/dL (19 Jan 2025 17:46)      Lower Extremity Physical Exam:  Vascular: DP/PT 0/4, B/L, CFT <3 seconds B/L, Temperature gradient warm to warm R, .   Neuro: Epicritic sensation diminished to the level of digits B/L.  Musculoskeletal/Ortho: s/p R hallux partial amputation, healed  Skin: R foot interspace 1 wound to bone, R foot submet wound to dermis, erythema extends for wound to anterior lower shin improving, L foot 3rd digit plantar sulcus wound to subQ, no acute signs of infection    RADIOLOGY & ADDITIONAL TESTS:

## 2025-01-21 LAB
ANION GAP SERPL CALC-SCNC: 13 MMOL/L — SIGNIFICANT CHANGE UP (ref 5–17)
BUN SERPL-MCNC: 17 MG/DL — SIGNIFICANT CHANGE UP (ref 7–23)
CALCIUM SERPL-MCNC: 9.8 MG/DL — SIGNIFICANT CHANGE UP (ref 8.4–10.5)
CHLORIDE SERPL-SCNC: 103 MMOL/L — SIGNIFICANT CHANGE UP (ref 96–108)
CO2 SERPL-SCNC: 20 MMOL/L — LOW (ref 22–31)
CREAT SERPL-MCNC: 1.2 MG/DL — SIGNIFICANT CHANGE UP (ref 0.5–1.3)
CULTURE RESULTS: SIGNIFICANT CHANGE UP
EGFR: 68 ML/MIN/1.73M2 — SIGNIFICANT CHANGE UP
GLUCOSE BLDC GLUCOMTR-MCNC: 139 MG/DL — HIGH (ref 70–99)
GLUCOSE BLDC GLUCOMTR-MCNC: 159 MG/DL — HIGH (ref 70–99)
GLUCOSE BLDC GLUCOMTR-MCNC: 185 MG/DL — HIGH (ref 70–99)
GLUCOSE BLDC GLUCOMTR-MCNC: 196 MG/DL — HIGH (ref 70–99)
GLUCOSE SERPL-MCNC: 122 MG/DL — HIGH (ref 70–99)
GRAM STN FLD: ABNORMAL
HCT VFR BLD CALC: 46.8 % — SIGNIFICANT CHANGE UP (ref 39–50)
HGB BLD-MCNC: 15.2 G/DL — SIGNIFICANT CHANGE UP (ref 13–17)
MCHC RBC-ENTMCNC: 30.7 PG — SIGNIFICANT CHANGE UP (ref 27–34)
MCHC RBC-ENTMCNC: 32.5 G/DL — SIGNIFICANT CHANGE UP (ref 32–36)
MCV RBC AUTO: 94.5 FL — SIGNIFICANT CHANGE UP (ref 80–100)
NRBC # BLD: 0 /100 WBCS — SIGNIFICANT CHANGE UP (ref 0–0)
NRBC BLD-RTO: 0 /100 WBCS — SIGNIFICANT CHANGE UP (ref 0–0)
PLATELET # BLD AUTO: 278 K/UL — SIGNIFICANT CHANGE UP (ref 150–400)
POTASSIUM SERPL-MCNC: 4.1 MMOL/L — SIGNIFICANT CHANGE UP (ref 3.5–5.3)
POTASSIUM SERPL-SCNC: 4.1 MMOL/L — SIGNIFICANT CHANGE UP (ref 3.5–5.3)
RBC # BLD: 4.95 M/UL — SIGNIFICANT CHANGE UP (ref 4.2–5.8)
RBC # FLD: 13.3 % — SIGNIFICANT CHANGE UP (ref 10.3–14.5)
SODIUM SERPL-SCNC: 136 MMOL/L — SIGNIFICANT CHANGE UP (ref 135–145)
SPECIMEN SOURCE: SIGNIFICANT CHANGE UP
TACROLIMUS SERPL-MCNC: 13 NG/ML — SIGNIFICANT CHANGE UP
WBC # BLD: 12.54 K/UL — HIGH (ref 3.8–10.5)
WBC # FLD AUTO: 12.54 K/UL — HIGH (ref 3.8–10.5)

## 2025-01-21 PROCEDURE — 99233 SBSQ HOSP IP/OBS HIGH 50: CPT

## 2025-01-21 PROCEDURE — G0545: CPT

## 2025-01-21 PROCEDURE — 99233 SBSQ HOSP IP/OBS HIGH 50: CPT | Mod: GC

## 2025-01-21 PROCEDURE — 99232 SBSQ HOSP IP/OBS MODERATE 35: CPT

## 2025-01-21 RX ORDER — TACROLIMUS 1 MG/1
0.5 CAPSULE, GELATIN COATED ORAL
Refills: 0 | Status: DISCONTINUED | OUTPATIENT
Start: 2025-01-21 | End: 2025-01-24

## 2025-01-21 RX ORDER — NAPROXEN 500 MG
250 TABLET ORAL ONCE
Refills: 0 | Status: COMPLETED | OUTPATIENT
Start: 2025-01-21 | End: 2025-01-21

## 2025-01-21 RX ORDER — TACROLIMUS 1 MG/1
1 CAPSULE, GELATIN COATED ORAL
Refills: 0 | Status: DISCONTINUED | OUTPATIENT
Start: 2025-01-22 | End: 2025-01-24

## 2025-01-21 RX ORDER — LIDOCAINE HYDROCHLORIDE 30 MG/G
1 CREAM TOPICAL EVERY 24 HOURS
Refills: 0 | Status: DISCONTINUED | OUTPATIENT
Start: 2025-01-21 | End: 2025-01-24

## 2025-01-21 RX ADMIN — MUPIROCIN 1 APPLICATION(S): 2 CREAM TOPICAL at 06:59

## 2025-01-21 RX ADMIN — Medication 2.5 MILLIGRAM(S): at 06:50

## 2025-01-21 RX ADMIN — TACROLIMUS 1 MILLIGRAM(S): 1 CAPSULE, GELATIN COATED ORAL at 06:50

## 2025-01-21 RX ADMIN — PIPERACILLIN SODIUM AND TAZOBACTAM SODIUM 25 GRAM(S): 2; 250 INJECTION, POWDER, FOR SOLUTION INTRAVENOUS at 15:07

## 2025-01-21 RX ADMIN — Medication 6.25 MILLIGRAM(S): at 17:42

## 2025-01-21 RX ADMIN — ENOXAPARIN SODIUM 40 MILLIGRAM(S): 100 INJECTION SUBCUTANEOUS at 22:11

## 2025-01-21 RX ADMIN — TAMSULOSIN HYDROCHLORIDE 0.4 MILLIGRAM(S): 0.4 CAPSULE ORAL at 22:11

## 2025-01-21 RX ADMIN — PREDNISONE 5 MILLIGRAM(S): 5 TABLET ORAL at 06:50

## 2025-01-21 RX ADMIN — PIPERACILLIN SODIUM AND TAZOBACTAM SODIUM 25 GRAM(S): 2; 250 INJECTION, POWDER, FOR SOLUTION INTRAVENOUS at 06:49

## 2025-01-21 RX ADMIN — PIPERACILLIN SODIUM AND TAZOBACTAM SODIUM 25 GRAM(S): 2; 250 INJECTION, POWDER, FOR SOLUTION INTRAVENOUS at 22:10

## 2025-01-21 RX ADMIN — Medication 250 MILLIGRAM(S): at 12:55

## 2025-01-21 RX ADMIN — ASPIRIN 81 MILLIGRAM(S): 81 TABLET, COATED ORAL at 12:55

## 2025-01-21 RX ADMIN — TACROLIMUS 0.5 MILLIGRAM(S): 1 CAPSULE, GELATIN COATED ORAL at 20:58

## 2025-01-21 RX ADMIN — ATORVASTATIN CALCIUM 40 MILLIGRAM(S): 80 TABLET, FILM COATED ORAL at 22:10

## 2025-01-21 RX ADMIN — MYCOPHENOLIC ACID 360 MILLIGRAM(S): 180 TABLET, DELAYED RELEASE ORAL at 12:55

## 2025-01-21 RX ADMIN — Medication 6.25 MILLIGRAM(S): at 06:50

## 2025-01-21 RX ADMIN — Medication 250 MILLIGRAM(S): at 13:45

## 2025-01-21 RX ADMIN — LIDOCAINE HYDROCHLORIDE 1 PATCH: 30 CREAM TOPICAL at 22:10

## 2025-01-21 NOTE — PROGRESS NOTE ADULT - SUBJECTIVE AND OBJECTIVE BOX
Patient is a 64y old  Male who presents with a chief complaint of scar tinfection (21 Jan 2025 09:46)       INTERVAL HPI/OVERNIGHT EVENTS:  Patient seen and evaluated at bedside.  Pt is resting comfortable in NAD. Denies N/V/F/C.    Allergies    clonidine (Other)  seasonal allergy (Other)    Intolerances        Vital Signs Last 24 Hrs  T(C): 36.6 (21 Jan 2025 06:14), Max: 37.4 (20 Jan 2025 22:07)  T(F): 97.8 (21 Jan 2025 06:14), Max: 99.3 (20 Jan 2025 22:07)  HR: 89 (21 Jan 2025 06:14) (86 - 94)  BP: 137/72 (21 Jan 2025 06:14) (121/61 - 141/75)  BP(mean): --  RR: 18 (21 Jan 2025 06:14) (17 - 18)  SpO2: 97% (21 Jan 2025 06:14) (94% - 97%)    Parameters below as of 21 Jan 2025 06:14  Patient On (Oxygen Delivery Method): room air        LABS:                        15.2   12.54 )-----------( 278      ( 21 Jan 2025 07:15 )             46.8     01-21    136  |  103  |  17  ----------------------------<  122[H]  4.1   |  20[L]  |  1.20    Ca    9.8      21 Jan 2025 07:14  Phos  3.3     01-20  Mg     1.9     01-20        Urinalysis Basic - ( 21 Jan 2025 07:14 )    Color: x / Appearance: x / SG: x / pH: x  Gluc: 122 mg/dL / Ketone: x  / Bili: x / Urobili: x   Blood: x / Protein: x / Nitrite: x   Leuk Esterase: x / RBC: x / WBC x   Sq Epi: x / Non Sq Epi: x / Bacteria: x      CAPILLARY BLOOD GLUCOSE      POCT Blood Glucose.: 139 mg/dL (21 Jan 2025 08:39)  POCT Blood Glucose.: 167 mg/dL (20 Jan 2025 21:58)  POCT Blood Glucose.: 161 mg/dL (20 Jan 2025 17:57)  POCT Blood Glucose.: 150 mg/dL (20 Jan 2025 12:55)      Lower Extremity Physical Exam:  Vascular: DP/PT 0/4, B/L, CFT <3 seconds B/L, Temperature gradient warm to warm R, .   Neuro: Epicritic sensation diminished to the level of digits B/L.  Musculoskeletal/Ortho: s/p R hallux partial amputation, healed  Skin: R foot interspace 1 wound to bone, R foot submet wound to dermis, erythema extends for wound to anterior lower shin improving, L foot 3rd digit plantar sulcus wound to subQ, no acute signs of infection    RADIOLOGY & ADDITIONAL TESTS:

## 2025-01-21 NOTE — PROGRESS NOTE ADULT - ATTENDING COMMENTS
64M pmh HTN T2DM on CGM and insulin pump, ESRD s/p renal transplant in 2020 on tacrolimus and mycophenolate, partial R first toe amputation present s/p Rt foot injury admitted for sepsis 2/2 R foot wound/cellulitis/OM. MRI right foot concerning for 1st prox phalanx stump and hallux sesmoids along with 2nd metarsophalangeal joint. Pending discussion with  podiatry regarding next step (resection vs ?bone biopsy vs IV abx)    #Right LE wound/cellulitis/OM  -cw zosyn. Transplant ID recs  -wound culture Staph aureus, f/u sensitivites  -NM scan positive. MRI concerning for OM  -f/u podiatry (?bone biopsy vs toe resection vs IV abx)  -vascular surgery recs    #Renal transplant  -cr 1.1 and stable  -AM tacro level  -Tacro adjustments per transplant renal.   c/w Myfortic    #DM on insulin pump  -endo consulted- insulin pump  -monitor FS, goal 120-180.     52 minutes spent  Jenae Pineda MD  Division of Hospital Medicine  Available on [employer] Teams 64M pmh HTN T2DM on CGM and insulin pump, ESRD s/p renal transplant in 2020 on tacrolimus and mycophenolate, partial R first toe amputation present s/p Rt foot injury admitted for sepsis 2/2 R foot wound/cellulitis/OM. MRI right foot concerning for 1st prox phalanx stump and hallux sesmoids along with 2nd metarsophalangeal joint. Pending discussion with  podiatry regarding next step (resection vs ?bone biopsy vs IV abx)    #Right LE wound/cellulitis/OM  -cw zosyn. Transplant ID recs  -wound culture Staph aureus, f/u sensitivites  -NM scan positive. MRI concerning for OM  -f/u podiatry (?bone biopsy vs toe resection vs IV abx). If need for resection, patient is medically optimized. RCRI score 2  -vascular surgery recs- d/w Dr. Muniz- no need for angiogram prior to resection    #Renal transplant  -cr 1.1 and stable  -AM tacro level  -Tacro adjustments per transplant renal.   c/w Myfortic    #DM on insulin pump  -endo consulted- insulin pump  -monitor FS, goal 120-180.     52 minutes spent  Jenae Pineda MD  Division of Hospital Medicine  Available on [employer] Teams

## 2025-01-21 NOTE — PROGRESS NOTE ADULT - ASSESSMENT
64M with HTN T2DM on CGM and insulin pump, ESRD s/p renal transplant in 2020 on tacrolimus and mycophenolate, partial R first toe amputation presenting from podiatry office with a foot infection. Patient states that a couple days ago he was walking barefoot outside and had a pine needle stuck on the anterior plantar surface of his foot near the base of his R first toe. He had progressive reddening and pain of the R foot and went to see a podiatrist. Podiatrist had concerns that he did not have any pedal pulses and also concern for potential bone involvement and prescribed a single dose of Augmentin and sent the patient to the ED for further evaluation. At the ED the patient was febrile to 101.8F, tachycardic to 91 w/ stable BP and O2 Sat. WBC count was elevated to 15.9 w/ neutrophil predominance. XR did not reveal any signs of OM and the patient was seen by podiatry who had low suspicion for osteomyelitis but recommended a Vascular surgery consult for concerns of PAD.    Seen at bedside with wife present, patient & wife feels like they need more work-up to confirm acute on chronic osteomyelitis of the foot. Reports he has had chronic osteomyelitis from 12 years ago, requesting bone biopsy, MRI (chart review indicates unable to perform due to insulin pump), and revascularization by vascular before suggested amputation by podiatry. Transplant ID consulted for recommendations.     #Renal transplant recipient  #Leucocytosis  #Concern for R foot osteomyelitis  -Febrile on admission, afebrile today  -WBC 12 today  -Abscess Culture Results: No growth  -Sedimentation Rate, Erythrocyte: 110 mm/hr  -C-Reactive Protein: 229 mg/L  -MRSA/MSSA PCR: Staph aureus PCR Result: Detected  -FluA/FluB/RSV/COVID PCR: not detected  -BLOOD Culture Results: No growth at 4 days  -Urine Culture Results: No growth      Recommendations:  --MRI Right Foot:  Subcortical marrow changes of the 1st proximal phalanx stump and hallux sesamoids may be reactive or reflect osteomyelitis with adjacent soft tissue changes suggesting early phlegmon formation. Faint marrow changes of the 2nd metatarsophalangeal joint may be reactive versus early infection.  --Favor maintaining Zosyn 3.375g IV Q8H pending final repeat wound cultures  --Continue to follow CBC with diff  --Continue to follow temperature curve  --Follow up on preliminary wound cultures - NGTD   64M with HTN T2DM on CGM and insulin pump, ESRD s/p renal transplant in 2020 on tacrolimus and mycophenolate, partial R first toe amputation presenting from podiatry office with a foot infection. Patient states that a couple days ago he was walking barefoot outside and had a pine needle stuck on the anterior plantar surface of his foot near the base of his R first toe. He had progressive reddening and pain of the R foot and went to see a podiatrist. Podiatrist had concerns that he did not have any pedal pulses and also concern for potential bone involvement and prescribed a single dose of Augmentin and sent the patient to the ED for further evaluation. At the ED the patient was febrile to 101.8F, tachycardic to 91 w/ stable BP and O2 Sat. WBC count was elevated to 15.9 w/ neutrophil predominance. XR did not reveal any signs of OM and the patient was seen by podiatry who had low suspicion for osteomyelitis but recommended a Vascular surgery consult for concerns of PAD.    Seen at bedside with wife present, patient & wife feels like they need more work-up to confirm acute on chronic osteomyelitis of the foot. Reports he has had chronic osteomyelitis from 12 years ago, requesting bone biopsy, MRI (chart review indicates unable to perform due to insulin pump), and revascularization by vascular before suggested amputation by podiatry. Transplant ID consulted for recommendations.     #Renal transplant recipient  #Leucocytosis  #Concern for R foot osteomyelitis  -Febrile on admission, afebrile today  -WBC 12 today  -Abscess Culture Results: No growth  -Sedimentation Rate, Erythrocyte: 110 mm/hr  -C-Reactive Protein: 229 mg/L  -MRSA/MSSA PCR: Staph aureus PCR Result: Detected  -FluA/FluB/RSV/COVID PCR: not detected  -BLOOD Culture Results: No growth at 4 days  -Urine Culture Results: No growth      Recommendations:  --Would benefit from a TDAP booster  --MRI Right Foot:  Subcortical marrow changes of the 1st proximal phalanx stump and hallux sesamoids may be reactive or reflect osteomyelitis with adjacent soft tissue changes suggesting early phlegmon formation. Faint marrow changes of the 2nd metatarsophalangeal joint may be reactive versus early infection.  --Favor maintaining Zosyn 3.375g IV Q8H pending final repeat wound cultures  --Continue to follow CBC with diff  --Continue to follow temperature curve  --Follow up on preliminary wound cultures - NGTD   64M with HTN T2DM on CGM and insulin pump, ESRD s/p renal transplant in 2020 on tacrolimus and mycophenolate, partial R first toe amputation presenting from podiatry office with a foot infection. Patient states that a couple days ago he was walking barefoot outside and had a pine needle stuck on the anterior plantar surface of his foot near the base of his R first toe. He had progressive reddening and pain of the R foot and went to see a podiatrist. Podiatrist had concerns that he did not have any pedal pulses and also concern for potential bone involvement and prescribed a single dose of Augmentin and sent the patient to the ED for further evaluation. At the ED the patient was febrile to 101.8F, tachycardic to 91 w/ stable BP and O2 Sat. WBC count was elevated to 15.9 w/ neutrophil predominance. XR did not reveal any signs of OM and the patient was seen by podiatry who had low suspicion for osteomyelitis but recommended a Vascular surgery consult for concerns of PAD.    Seen at bedside with wife present, patient & wife feels like they need more work-up to confirm acute on chronic osteomyelitis of the foot. Reports he has had chronic osteomyelitis from 12 years ago, requesting bone biopsy, MRI (chart review indicates unable to perform due to insulin pump), and revascularization by vascular before suggested amputation by podiatry. Transplant ID consulted for recommendations.     #Renal transplant recipient  #Leucocytosis  #Concern for R foot osteomyelitis  -Febrile on admission, afebrile today  -WBC 12 today  -Abscess Culture Results: No growth  -Sedimentation Rate, Erythrocyte: 110 mm/hr  -C-Reactive Protein: 229 mg/L  -MRSA/MSSA PCR: Staph aureus PCR Result: Detected  -FluA/FluB/RSV/COVID PCR: not detected  -BLOOD Culture Results: No growth at 4 days  -Urine Culture Results: No growth      Recommendations:  --Would benefit from a TDAP booster  --MRI Right Foot:  Subcortical marrow changes of the 1st proximal phalanx stump and hallux sesamoids may be reactive or reflect osteomyelitis with adjacent soft tissue changes suggesting early phlegmon formation. Faint marrow changes of the 2nd metatarsophalangeal joint may be reactive versus early infection.  --Based on NM scan & MRI findings, would treat empirically for acute osteomyelitis  --Would switch Zosyn 3.375g IV Q8H to Daptomycin IV 8 mg/kg + Cefepime 2 gm IV Q8HR tentatively for 6 weeks  --Will require a PICC line OPAT   --Continue to follow CBC with diff  --Continue to follow temperature curve  --Follow up on preliminary wound cultures - NGTD

## 2025-01-21 NOTE — PROGRESS NOTE ADULT - SUBJECTIVE AND OBJECTIVE BOX
Vascular Surgery Daily Progress Note  =====================================================    SUBJECTIVE: ZELALEMO.    --------------------------------------------------------------------------------------  VITAL SIGNS:  T(C): 36.8 (01-21-25 @ 12:59), Max: 37.4 (01-20-25 @ 22:07)  HR: 84 (01-21-25 @ 12:59) (84 - 94)  BP: 117/90 (01-21-25 @ 12:59) (117/90 - 141/75)  RR: 18 (01-21-25 @ 12:59) (18 - 18)  SpO2: 97% (01-21-25 @ 12:59) (94% - 97%)  --------------------------------------------------------------------------------------    EXAM    General: NAD, resting in bed comfortably  Cardiac: Regular rate, normotensive  Respiratory: Nonlabored respirations, normal cw expansion, on RA  Neuro: AOx3, CNII-XII intact on gross examination  Vascular/Extremities: Warm, well perfused, RLE dressing c/d/i      --------------------------------------------------------------------------------------       Vascular Surgery Daily Progress Note  =====================================================    SUBJECTIVE: ZELALEMO.    --------------------------------------------------------------------------------------  VITAL SIGNS:  T(C): 36.8 (01-21-25 @ 12:59), Max: 37.4 (01-20-25 @ 22:07)  HR: 84 (01-21-25 @ 12:59) (84 - 94)  BP: 117/90 (01-21-25 @ 12:59) (117/90 - 141/75)  RR: 18 (01-21-25 @ 12:59) (18 - 18)  SpO2: 97% (01-21-25 @ 12:59) (94% - 97%)  --------------------------------------------------------------------------------------    EXAM    General: NAD, resting in bed comfortably  Cardiac: Regular rate, normotensive  Respiratory: Nonlabored respirations, normal cw expansion, on RA  Neuro: AOx3, CNII-XII intact on gross examination  Vascular/Extremities: Warm, well perfused, RLE iodoform packing in place w/ minimal seropurulent discharge      --------------------------------------------------------------------------------------

## 2025-01-21 NOTE — PROGRESS NOTE ADULT - SUBJECTIVE AND OBJECTIVE BOX
Follow Up: Foot infection    Interval History:  Afebrile, leukocytosis  Wound culture no growth to date  Complaining of right foot pain    REVIEW OF SYSTEMS  [  ] ROS unobtainable because:    [  x] All other systems negative except as noted below    Constitutional:  [ ] fever [ ] chills  [ ] weight loss  [ ] weakness  Skin:  [ ] rash [ ] phlebitis	  Eyes: [ ] icterus [ ] pain  [ ] discharge	  ENMT: [ ] sore throat  [ ] thrush [ ] ulcers [ ] exudates  Respiratory: [ ] dyspnea [ ] hemoptysis [ ] cough [ ] sputum	  Cardiovascular:  [ ] chest pain [ ] palpitations [ ] edema	  Gastrointestinal:  [ ] nausea [ ] vomiting [ ] diarrhea [ ] constipation [ ] pain	  Genitourinary:  [ ] dysuria [ ] frequency [ ] hematuria [ ] discharge [ ] flank pain  [ ] incontinence  Musculoskeletal:  [ ] myalgias [ ] arthralgias [ ] arthritis  [x ] foot pain  Neurological:  [ ] headache [ ] seizures  [ ] confusion/altered mental status    Allergies  clonidine (Other)  seasonal allergy (Other)        ANTIMICROBIALS:  piperacillin/tazobactam IVPB.. 3.375 every 8 hours      OTHER MEDS:  MEDICATIONS  (STANDING):  acetaminophen     Tablet .. 650 every 6 hours PRN  aluminum hydroxide/magnesium hydroxide/simethicone Suspension 30 every 4 hours PRN  aspirin enteric coated 81 daily  atorvastatin 40 at bedtime  carvedilol 6.25 every 12 hours  dextrose 50% Injectable 25 once  dextrose 50% Injectable 12.5 once  dextrose 50% Injectable 25 once  dextrose Oral Gel 15 once  diphtheria/tetanus/pertussis (acellular) Vaccine (Adacel) 0.5 once  enoxaparin Injectable 40 every 24 hours  glucagon  Injectable 1 once  insulin aspart (NovoLOG) Pump 1 Continuous Pump  lisinopril 2.5 daily  melatonin 3 at bedtime PRN  mycophenolic acid  daily  naproxen 250 once  predniSONE   Tablet 5 daily  tacrolimus 1 <User Schedule>  tacrolimus 0.5 <User Schedule>  tamsulosin 0.4 at bedtime      Vital Signs Last 24 Hrs  T(C): 36.6 (21 Jan 2025 06:14), Max: 37.4 (20 Jan 2025 22:07)  T(F): 97.8 (21 Jan 2025 06:14), Max: 99.3 (20 Jan 2025 22:07)  HR: 89 (21 Jan 2025 06:14) (86 - 94)  BP: 137/72 (21 Jan 2025 06:14) (121/61 - 141/75)  BP(mean): --  RR: 18 (21 Jan 2025 06:14) (17 - 18)  SpO2: 97% (21 Jan 2025 06:14) (94% - 97%)    Parameters below as of 21 Jan 2025 06:14  Patient On (Oxygen Delivery Method): room air        PHYSICAL EXAMINATION:  General: Alert and Awake, NAD  HEENT: PERRL, EOMI  Neck: Supple  Cardiac: RRR, No M/R/G  Resp: CTAB, No Wh/Rh/Ra  Abdomen: NBS, NT/ND, No HSM, No rigidity or guarding  MSK: +RLE edema. R foot wound to dermis, periwound ecchymosis, purulence with palpation, boggy with central clearing to planter and b/w great and 2nd toe  : No Hairston  Skin: No rashes or lesions. Skin is warm and dry to the touch.   Neuro: Alert and Awake. CN 2-12 Grossly intact. Moves all four extremities spontaneously.  Psych: Calm, Pleasant, Cooperative                          15.2   12.54 )-----------( 278      ( 21 Jan 2025 07:15 )             46.8       01-21    136  |  103  |  17  ----------------------------<  122[H]  4.1   |  20[L]  |  1.20    Ca    9.8      21 Jan 2025 07:14  Phos  3.3     01-20  Mg     1.9     01-20        Urinalysis Basic - ( 21 Jan 2025 07:14 )    Color: x / Appearance: x / SG: x / pH: x  Gluc: 122 mg/dL / Ketone: x  / Bili: x / Urobili: x   Blood: x / Protein: x / Nitrite: x   Leuk Esterase: x / RBC: x / WBC x   Sq Epi: x / Non Sq Epi: x / Bacteria: x        MICROBIOLOGY:  v  .Abscess  01-20-25 --  --    Rare polymorphonuclear leukocytes per low power field  No organisms seen per oil power field      .Abscess  01-16-25   No growth  --    No polymorphonuclear cells seen per low power field  No organisms seen per oil power field      .Blood BLOOD  01-15-25   No growth at 5 days  --  --      Clean Catch Clean Catch (Midstream)  01-15-25   No growth  --  --      .Blood BLOOD  01-15-25   No growth at 5 days  --  --                RADIOLOGY:    <The imaging below has been reviewed and visualized by me independently. Findings as detailed in report below>      < from: MR Foot w/wo IV Cont, Right (01.20.25 @ 18:07) >  ACC: [id number] EXAM:  MR FOOT WAW IC RT   ORDERED BY:  ADRYAN ARORA     PROCEDURE DATE:  01/20/2025          INTERPRETATION:  MR FOOT WITHOUT AND WITH IV CONTRAST RIGHT    HISTORY: r/o infection. WBC scan positive for osteomyelitis/septic   arthritis of the first right proximal phalanx and first/second   metatarsophalangeal joints.    TECHNIQUE:  Multiplanar MRI of the right forefoot was performed following   the administration of 8.5 cc IV Gadavist, with 1.5cc discarded. .    COMPARISON:  Tc-99m WBC scan 1/17/2025, right foot/ankle x-ray 1/15/2025,   right ankle x-ray 9/19/2024, x-ray right foot 5/14/2013 and 4/9/2013.      FINDINGS:    OSSEOUS STRUCTURES    Fractures: Healed deformity of the 5th metatarsal mid to distal   diaphysis is again seen.    Marrow:  Indication is again seen through the 1st proximal phalanx head   with slight subcortical edema of the amputated margin with slight   postcontrast enhancement. Marrow edema and enhancement also involve the   medial and lateral hallux sesamoids with mild enhancement. There is   slight subcortical edema and enhancement of the 1st metatarsal head.   Slight subcortical changes also involve the 2nd proximal phalanx base and   2nd metatarsal head.    INTERPHALANGEAL JOINTS    Articular Surfaces:  Second through fifth interphalangeal joints are   preserved. Status post first digit amputation to the level of the   proximal phalanx.    Effusions/Synovitis:  None    1ST METATARSOPHALANGEAL JOINT    Articular Surfaces:  Preserved.    Effusions/Synovitis:  Small effusion is present with slight   synovitis/enhancement.    Sesamoids:  Located with mild marrow edema and enhancement.    LESSER METATARSOPHALANGEAL JOINTS    Articular Surfaces:  Preserved.    Effusions/Synovitis:  Trace 2ndmetatarsophalangeal joint effusion is   noted with slight synovitis/enhancement.    TARSOMETATARSAL JOINTS    Articular Surfaces:  Subchondral reactive changes involve the medial   margin of the 2nd cuneiform implying overlying focal chondromalacia.   Articular surfaces appear otherwise preserved.    Effusions/Synovitis:  None.    INTERTARSAL JOINTS    Articular Surfaces:  Preserved.    Effusions/Synovitis:  None.    INTERMETATARSAL SPACES    Neuromas:  None.    Bursa:  Slight 1st webspace bursa is noted.    LISFRANC LIGAMENT  Intact.    TENDONS    Flexor Tendons:  Intact with postsurgical changes from the partial 1st   toe amputation.    Extensor Tendons:  Intact with postsurgical changes from the partial   1st toe amputation.    DISTAL PLANTAR FASCIA  Nodularity of the central cord suggests plantar fibromatosis at the   tarsometatarsal level.    SOFT TISSUES    Musculature:  Severe generalized muscle atrophy is present with   increased T2 signal suggesting diabetic neuropathy.    Subcutaneous Tissues:  Soft tissue wound is noted along the plantar   margin of the 1st metatarsophalangeal joint and 1st web space. There is   confluent subcutaneous edema along the medial and plantar margins of the   remaining 1st toe and 1st metatarsophalangeal joint suggesting a   developing phlegmon measuring up to 0.7 cm in thickness by 6.2 x 2.4 x   3.1 cm (AP x TR x CC). There is associated relative lack of enhancement   of the surrounding subcutaneous tissues that extends along the 1st   through 5th metatarsophalangeal region suggesting a degree of ischemia.    IMPRESSION:  1.  Subcortical marrow changes of the 1st proximal phalanx stump and   hallux sesamoids may be reactive or reflect osteomyelitis with adjacent   soft tissue changes suggesting early phlegmon formation.  2.  Faint marrow changes of the 2nd metatarsophalangeal joint may be   reactive versus early infection.  3.  Consider follow-up MRI to assess stability as warranted.    --- End of Report ---            < end of copied text >

## 2025-01-21 NOTE — PROGRESS NOTE ADULT - PROBLEM SELECTOR PLAN 1
Patient on arrival meeting SIRS criteria with fever, tachycardia, and leukocytosis. Afebrile. Not meeting SIRS criteria. Downtrending leukocytosis. Resolving foot pain with increasing comfort on ambulation. Exam remarkable for reducing erythema on dorsum without further TTP. No open wounds suggestive of bony involvement. Low concern for osteomyelitis. Clinically improving.     -Reinitiate piperacillin/tazobactam 3.375 mg IV q8 (started 1/16) per transplant ID and d/c ampicillin/sulbactam 3 gm IV q6 (started 1/20)   -NGTD blood cx, urine cx, abscess cx  -Negative gram stain for abscess  -Appreciate ID transplant consult for osteomyelitis r/o due to discordance between treatment plans amongst various specialties  -Pending final read of MRI R foot Patient on arrival meeting SIRS criteria with fever, tachycardia, and leukocytosis. Afebrile. Not meeting SIRS criteria. Downtrending leukocytosis. Resolving foot pain with increasing comfort on ambulation. Exam remarkable for reducing erythema on dorsum without further TTP. No open wounds suggestive of bony involvement, however per transplant ID . MRI foot suggestive of inflammatory changes of partially amputated R 1st digit and developing in 2nd digit with possible phelgmon in arch. Clinically improving.     -Reinitiate piperacillin/tazobactam 3.375 mg IV q8 (started 1/16) per transplant ID and d/c ampicillin/sulbactam 3 gm IV q6 (started 1/20)   -MRI R foot showing edema of partial 1st digit amputation with reactive vs infectious changes in 2nd foot with potential phelgmon developing in subcuanteous tissue near arch   -NGTD blood cx, urine cx, abscess cx  -Negative gram stain for abscess  -Appreciate ID transplant consult for osteomyelitis treatment

## 2025-01-21 NOTE — PROGRESS NOTE ADULT - SUBJECTIVE AND OBJECTIVE BOX
[facility]   [phone]  RENAL FOLLOW UP NOTE  --------------------------------------------------------------------------------  Chief Complaint:    24 hour events/subjective:    patient seen and examined.   denies sob    PAST HISTORY  --------------------------------------------------------------------------------  No significant changes to PMH, PSH, FHx, SHx, unless otherwise noted    ALLERGIES & MEDICATIONS  --------------------------------------------------------------------------------  Allergies    clonidine (Other)  seasonal allergy (Other)    Intolerances      Standing Inpatient Medications  aspirin enteric coated 81 milliGRAM(s) Oral daily  atorvastatin 40 milliGRAM(s) Oral at bedtime  carvedilol 6.25 milliGRAM(s) Oral every 12 hours  dextrose 5%. 1000 milliLiter(s) IV Continuous <Continuous>  dextrose 5%. 1000 milliLiter(s) IV Continuous <Continuous>  dextrose 50% Injectable 25 Gram(s) IV Push once  dextrose 50% Injectable 12.5 Gram(s) IV Push once  dextrose 50% Injectable 25 Gram(s) IV Push once  dextrose Oral Gel 15 Gram(s) Oral once  diphtheria/tetanus/pertussis (acellular) Vaccine (Adacel) 0.5 milliLiter(s) IntraMuscular once  enoxaparin Injectable 40 milliGRAM(s) SubCutaneous every 24 hours  glucagon  Injectable 1 milliGRAM(s) IntraMuscular once  insulin aspart (NovoLOG) Pump 1 Each SubCutaneous Continuous Pump  lidocaine   4% Patch 1 Patch Transdermal every 24 hours  lisinopril 2.5 milliGRAM(s) Oral daily  mupirocin 2% Ointment 1 Application(s) Topical every 12 hours  mycophenolic acid  milliGRAM(s) Oral daily  piperacillin/tazobactam IVPB.. 3.375 Gram(s) IV Intermittent every 8 hours  predniSONE   Tablet 5 milliGRAM(s) Oral daily  sodium chloride 0.9%. 1000 milliLiter(s) IV Continuous <Continuous>  tacrolimus 0.5 milliGRAM(s) Oral <User Schedule>  tamsulosin 0.4 milliGRAM(s) Oral at bedtime    PRN Inpatient Medications  acetaminophen     Tablet .. 650 milliGRAM(s) Oral every 6 hours PRN  aluminum hydroxide/magnesium hydroxide/simethicone Suspension 30 milliLiter(s) Oral every 4 hours PRN  melatonin 3 milliGRAM(s) Oral at bedtime PRN      REVIEW OF SYSTEMS  --------------------------------------------------------------------------------    Gen: denies fevers/chills,  CVS: denies chest pain/palpitations  Resp: denies SOB/Cough  GI: Denies N/V/Abd pain  : Denies dysuria/oliguria/hematuria    VITALS/PHYSICAL EXAM  --------------------------------------------------------------------------------  T(C): 36.8 (01-21-25 @ 12:59), Max: 37.4 (01-20-25 @ 22:07)  HR: 84 (01-21-25 @ 12:59) (84 - 94)  BP: 117/90 (01-21-25 @ 12:59) (117/90 - 141/75)  RR: 18 (01-21-25 @ 12:59) (18 - 18)  SpO2: 97% (01-21-25 @ 12:59) (94% - 97%)  Wt(kg): --        01-20-25 @ 07:01  -  01-21-25 @ 07:00  --------------------------------------------------------  IN: 1040 mL / OUT: 0 mL / NET: 1040 mL      Physical Exam:  	  	Gen: Non toxic comfortable appearing   	Pulm: decrease bs  no rales or ronchi or wheezing  	CV: No JVD. RRR, S1S2; no rub  	Abd: +BS, soft, nontender/nondistended  	: No suprapubic tenderness renal graft site non tender   	UE: Warm, no cyanosis  no clubbing,  no edema  	LE: Warm, erythema  foot with dressing     LABS/STUDIES  --------------------------------------------------------------------------------              15.2   12.54 >-----------<  278      [01-21-25 @ 07:15]              46.8     136  |  103  |  17  ----------------------------<  122      [01-21-25 @ 07:14]  4.1   |  20  |  1.20        Ca     9.8     [01-21-25 @ 07:14]      Mg     1.9     [01-20-25 @ 07:04]      Phos  3.3     [01-20-25 @ 07:04]            Creatinine Trend:  SCr 1.20 [01-21 @ 07:14]  SCr 1.33 [01-20 @ 07:04]  SCr 1.19 [01-19 @ 06:56]  SCr 1.14 [01-18 @ 09:19]  SCr 1.11 [01-17 @ 07:16]    Tacrolimus (), Serum: 13.0 ng/mL (01-21 @ 07:15)  Tacrolimus (), Serum: 12.5 ng/mL (01-20 @ 07:04)  Tacrolimus (), Serum: 14.2 ng/mL (01-19 @ 06:56)  Tacrolimus (), Serum: 10.9 ng/mL (01-18 @ 09:19)            HbA1c 7.6      [02-19-20 @ 08:43]

## 2025-01-21 NOTE — PROGRESS NOTE ADULT - ASSESSMENT
64M presented with R foot interspace 1 wound to bone   - Pt seen and evaluated.  - Afebrile, WBC 12.54  - R foot interspace 1 wound to bone, R foot submet wound to dermis, erythema extends for wound to anterior lower shin improving, L foot 3rd digit plantar sulcus wound to subQ, no acute signs of infection  - Right foot X-ray: no OM, no gas  - Bone Scan: increase uptake of proximal phalanx 1 and metatarsal phalangeal joint 1 and metatarsal phalangeal joint 2   - Vasc recs, appreciated  - ID recs, appreciated  - right foot MRI pending   - Pod plan: local wound care vs right foot partial 1st and 2nd ray resection pending vasc recs/ right foot MRI   - Please document medical clearance for possible podiatric procedure   - Discussed with Attending

## 2025-01-21 NOTE — PROGRESS NOTE ADULT - ASSESSMENT
The patient is a 64y Male with PMH of T2DM, ESRD with renal transplant who presented with lower extremity infection.  Endocrinology consulted for T2DM on insulin pump  Patient removed insulin pump and Dexcom G7 for MRI yesterday and replaced it after the testing was completed.   He is currently wearing pump on his Left deltoid (advised patient that it should be placed in subcutaneous area) Dexcom also on Left deltoid.    #Type 2 Diabetes Mellitus on insulin pump  - Follows with: Dr. Kelley Rodriguez  - - home regimen: Jardiance, Trulicity  Insulin pump type: Tandem Mobi  Insulin type: Novolog  Last site change:Pumpt replaced after MRI on 1/20/25. DEXCOM placed after the MRI  Supplies available: for one week  Settings:  Basal:  00:00 0.75  20:00 0.5  ICR 1:10  ISF 1:20  TDD 17 target 110   - eGFR: 68 mL/min/1.73m2 (01-16-25)  - Weight (kg): 82.8 (01-15-25)  - glucose at goal, no evidence of DKA on labs      INPATIENT PLAN:  - The patient wishes to use their insulin pump inpatient, understands how to use it, and has adequate supplies available  - c/w insulin pump at usual home settings  - The patient should not be long without basal insulin. If for any reason the pump becomes dysfunctional or falls off, they should receive 17 units Lantus   - The patient needs to fill out the self-assessment form and the patient and primary provider both need to sign the insulin pump attestation form.   - For each meal, the bedside nurse should document the carbohydrate intake and the insulin bolus the patient gives themselves in the EMR flowsheet.   - FSBG before meals and bedtime, but do not order an insulin correction scale as all insulin should be administered by the patient through the pump.   - Use hypoglycemia protocol if needed.  - Please check A1C  - Please change vanc and zosyn to NS instead of D5 if feasible            DISCHARGE PLANNING:  - Discharge recs pending clinical course, continue previous regimen on discharge  - will need Endocrinology follow up with his provider Dr. Rodriguez, has an appointment scheduled for April 2025.     #Hypertension  - Goal BP <130/80  - on lisinopril 2.5 mg  - Management as per primary team  - check urine albumin level as outpatient    #Hyperlipidemia  - LDL goal <70  - on atorvastatin 40  - check lipid panel as outpatient on a yearly basis    Contact via Microsoft Teams during business hours  To reach covering provider access AMION via sunrise tools  For Urgent matters/after-hours/weekends/holidays please page endocrine fellow on call   For nonurgent matters please email [email or web address]    Please note that this patient may be followed by different provider tomorrow.  Notify endocrine 24 hours prior to discharge for final recommendations

## 2025-01-21 NOTE — PROGRESS NOTE ADULT - PROBLEM SELECTOR PLAN 2
Resolving R foot erythema and tenderness. Ischemia related pain in R foot in setting of known stenosis, less likely inflammatory related pain, however contributory. Soft dorsalis pedis pulses elicited. Arterial doppler showing adequate blood flow in LE. Superficial 2 wounds, no defects. No S&S of acute ischemic limb. Clinically improving.     -Ankle Brachial Index limited due to noncompressible vasculature; vascular asking for repeat to be performed on R 2nd toe due to amputation of 1st R toe   -Podiatry recommending amputation of R toe(s)  -Arterial doppler to assess pulses showing stenosis RLE > LLE   -Nuclear medicine scan suggestive of increased uptake in 1st and 2nd digit of R foot potentially by osteyelitis   -Naproxen 250 mg po qd PRN   -Confirm with outpatient vascular surgeron need for invasive angiogram for RLE to assess flow   -Appreciate vascular surgery recommendations for doing a CT angiogram of RLE prior any podiatry interventions; vascular surgery will coordinate with transplant nephrology for CT angiogram

## 2025-01-21 NOTE — PROGRESS NOTE ADULT - ASSESSMENT
64 year old Male with PMHx HTN T2DM on CGM and insulin pump, ESRD s/p renal transplant in 2020 on tacrolimus and mycophenolate, partial R first toe amputation presenting from podiatry office with a foot infection.        1- S/P renal transplant  2- HTN  3- foot infection        creatinine is higher tino  suspect in setting of infection and higher tacro level   creatinine downtrending today  change taco timing, to 1mg 8am, and 0.5mg 8pm  check tacro level today @ 7pm, given todays level was not a 12 hour level  continue prednisone 5 mg daily  continue myfortic  360 mg daily  continue zosyn of infection   continue lisinopril 2.5 mg daily for HTN  vascular and podiatry following, f/u recs  strict I/O  trend creatinine and electrolytes daily

## 2025-01-21 NOTE — PROGRESS NOTE ADULT - ASSESSMENT
64M with HTN T2DM on CGM and insulin pump, ESRD s/p renal transplant in 2020 on tacrolimus and mycophenolate, partial R first toe amputation presenting from podiatry office with c/f foot infection and difficulty palpating pedal pulses. Vascular consulted to evaluate LLE perfusion. At this time vascular surgery is recommending against ray resection due to relative recency of the wound and the nonspecific nature of NM inflammatory localization for osteomyelitis. S/p unroofing of the wound by podiatry. Patient has a history of renal transplant and will need further evaluation and weighing of risks and benefits of angiogram contrast prior to operative intervention.     Rec  - No acute vascular intervention   - Would recommend angiogram prior to any podiatry intervention  - Would not recommend resection of the foot  - Continue with local wound care  - Continue IV abx  - Pain management PRN      Vascular surgery   [id number] 64M with HTN T2DM on CGM and insulin pump, ESRD s/p renal transplant in 2020 on tacrolimus and mycophenolate, partial R first toe amputation presenting from podiatry office with c/f foot infection and difficulty palpating pedal pulses. Vascular consulted to evaluate LLE perfusion. At this time vascular surgery is recommending against ray resection due to relative recency of the wound and the nonspecific nature of NM inflammatory localization for osteomyelitis. S/p unroofing of the wound by podiatry. Patient has a history of renal transplant and will need further evaluation and weighing of risks and benefits of angiogram contrast prior to operative intervention.     Rec  - No acute vascular intervention   - Patient is stable from Vascular Surgery standpoint to proceed with Podiatry intervention  - Continue with local wound care  - Continue IV abx  - Pain management PRN      Vascular surgery   [id number] 64M with HTN T2DM on CGM and insulin pump, ESRD s/p renal transplant in 2020 on tacrolimus and mycophenolate, partial R first toe amputation presenting from podiatry office with c/f foot infection and difficulty palpating pedal pulses. Vascular consulted to evaluate LLE perfusion. S/p unroofing of the wound by podiatry. Patient appears to have adequate perfusion to the foot.     Rec  - No acute vascular intervention   - Patient is stable from Vascular Surgery standpoint to proceed with Podiatry intervention  - Continue with local wound care  - Continue IV abx  - Pain management PRN      Vascular surgery   [id number]

## 2025-01-21 NOTE — PROVIDER CONTACT NOTE (OTHER) - SITUATION
Pt told Rn that he wanted Naproxen po for pain instead of Tylenol as it disturbed his Insulin pump
Patient has a insulin pump on left arm with continuous glucose monitor. Attestation sheet form in chart. Provider assessment sheet not done.

## 2025-01-21 NOTE — PROGRESS NOTE ADULT - SUBJECTIVE AND OBJECTIVE BOX
INPATIENT MEDICINE PROGRESS NOTE:   Authored by Kerri Villegas MD     HISTORY OF PRESENT ILLNESS:  64F with history of DM2 on insulin pump with neuropathy and partial R 1st digit amputation, ESRD s/p renal transplant on tacrolimus and mycophenolate mofetil, asthma, HDL presents to [facility]-ED sent by podiatry due to R foot redness and tenderness with blister.     NAEON. Afebrile, HR 80s-90s, SBPS 120s-140s. POCT 162/150/161/167, this .     Seen and examined at bedside, patient reports having some soreness at ankle and requesting naproxen. Did not take it yesterday. Otherwise reports that outpatient vascular surgeron visited Dr. Ziegler who believes that there is enough blood flow and doesn't need requirement for invasive angiogram.     PHYSICAL EXAM:  Vital Signs Last 24 Hrs  T(C): 36.6 (21 Jan 2025 06:14), Max: 37.4 (20 Jan 2025 22:07)  T(F): 97.8 (21 Jan 2025 06:14), Max: 99.3 (20 Jan 2025 22:07)  HR: 89 (21 Jan 2025 06:14) (86 - 94)  BP: 137/72 (21 Jan 2025 06:14) (121/61 - 141/75)  BP(mean): --  RR: 18 (21 Jan 2025 06:14) (17 - 18)  SpO2: 97% (21 Jan 2025 06:14) (94% - 97%)    Parameters below as of 21 Jan 2025 06:14  Patient On (Oxygen Delivery Method): room air      General: NAD, calm, comfortable, cooperative, obese habitus, tattoos   Neuro: awake, alert, oriented to person/place/time, 5/5  strength, 5/5 hip flexion/extension b/l, spontaneity, reduced sensation b/l feet   HEENT: NC/AT, PERRLA b/l, EOMI, moist mucous membranes, no supraclavicular/submandibular lymphadenopathy  Chest: nonTTP   Heart: S1/S2, RRR   Lungs: CTA b/l, nonlabored breathing   Abd: soft, nonTTP, nondistended, reducible umbilical bulge   Ext: erythema near R 1st digitand 2nd digit, no TTP, no pretibial/pedal edema, superficial abrasion on plantar midfoot of R foot, warm to touch b/l, no pain on plantarflexion/dorsiflexion,non tense, non pallor   Back: nonTTP   Pulses: soft dorsalis pedis pulses b/l   Psych: full range affect, mutual topic,linear and goal directed   Lines/tubes/drains: none       I&O's Summary    20 Jan 2025 07:01  -  21 Jan 2025 07:00  --------------------------------------------------------  IN: 1040 mL / OUT: 0 mL / NET: 1040 mL        LABS:                        15.2   12.54 )-----------( 278      ( 21 Jan 2025 07:15 )             46.8     01-21    136  |  103  |  17  ----------------------------<  122[H]  4.1   |  20[L]  |  1.20    Ca    9.8      21 Jan 2025 07:14  Phos  3.3     01-20  Mg     1.9     01-20      CAPILLARY BLOOD GLUCOSE      POCT Blood Glucose.: 139 mg/dL (21 Jan 2025 08:39)  POCT Blood Glucose.: 167 mg/dL (20 Jan 2025 21:58)  POCT Blood Glucose.: 161 mg/dL (20 Jan 2025 17:57)  POCT Blood Glucose.: 150 mg/dL (20 Jan 2025 12:55)          Urinalysis Basic - ( 21 Jan 2025 07:14 )    Color: x / Appearance: x / SG: x / pH: x  Gluc: 122 mg/dL / Ketone: x  / Bili: x / Urobili: x   Blood: x / Protein: x / Nitrite: x   Leuk Esterase: x / RBC: x / WBC x   Sq Epi: x / Non Sq Epi: x / Bacteria: x        Culture - Abscess with Gram Stain (collected 20 Jan 2025 13:03)  Source: .Abscess  Gram Stain (20 Jan 2025 23:17):    Rare polymorphonuclear leukocytes per low power field    No organisms seen per oil power field        MEDICATIONS  (STANDING):  aspirin enteric coated 81 milliGRAM(s) Oral daily  atorvastatin 40 milliGRAM(s) Oral at bedtime  carvedilol 6.25 milliGRAM(s) Oral every 12 hours  dextrose 5%. 1000 milliLiter(s) (100 mL/Hr) IV Continuous <Continuous>  dextrose 5%. 1000 milliLiter(s) (50 mL/Hr) IV Continuous <Continuous>  dextrose 50% Injectable 25 Gram(s) IV Push once  dextrose 50% Injectable 12.5 Gram(s) IV Push once  dextrose 50% Injectable 25 Gram(s) IV Push once  dextrose Oral Gel 15 Gram(s) Oral once  diphtheria/tetanus/pertussis (acellular) Vaccine (Adacel) 0.5 milliLiter(s) IntraMuscular once  enoxaparin Injectable 40 milliGRAM(s) SubCutaneous every 24 hours  glucagon  Injectable 1 milliGRAM(s) IntraMuscular once  insulin aspart (NovoLOG) Pump 1 Each SubCutaneous Continuous Pump  lisinopril 2.5 milliGRAM(s) Oral daily  mupirocin 2% Ointment 1 Application(s) Topical every 12 hours  mycophenolic acid  milliGRAM(s) Oral daily  naproxen 250 milliGRAM(s) Oral once  piperacillin/tazobactam IVPB.. 3.375 Gram(s) IV Intermittent every 8 hours  predniSONE   Tablet 5 milliGRAM(s) Oral daily  sodium chloride 0.9%. 1000 milliLiter(s) (100 mL/Hr) IV Continuous <Continuous>  tacrolimus 1 milliGRAM(s) Oral <User Schedule>  tacrolimus 0.5 milliGRAM(s) Oral <User Schedule>  tamsulosin 0.4 milliGRAM(s) Oral at bedtime    MEDICATIONS  (PRN):  acetaminophen     Tablet .. 650 milliGRAM(s) Oral every 6 hours PRN Temp greater or equal to 38C (100.4F), Mild Pain (1 - 3)  aluminum hydroxide/magnesium hydroxide/simethicone Suspension 30 milliLiter(s) Oral every 4 hours PRN Dyspepsia  melatonin 3 milliGRAM(s) Oral at bedtime PRN Insomnia

## 2025-01-21 NOTE — PROGRESS NOTE ADULT - SUBJECTIVE AND OBJECTIVE BOX
Chief Complaint: Diabetes Mellitus follow up    INTERVAL HX:  Patient seen at bedside.  He uses the Tandem Mobi insulin pump to manage BG with the Dexcom G7.  Reports eating well, finishes most food on each tray.   BG over the last 24 hrs have been within goal range 100-180mg/dl. No hypoglycemia.     Review of Systems:  General: As above  GI: No nausea, vomiting  Endocrine: no  S&Sx of hypoglycemia    Allergies    clonidine (Other)  seasonal allergy (Other)    Intolerances      MEDICATIONS  (STANDING):  aspirin enteric coated 81 milliGRAM(s) Oral daily  atorvastatin 40 milliGRAM(s) Oral at bedtime  carvedilol 6.25 milliGRAM(s) Oral every 12 hours  dextrose 5%. 1000 milliLiter(s) (100 mL/Hr) IV Continuous <Continuous>  dextrose 5%. 1000 milliLiter(s) (50 mL/Hr) IV Continuous <Continuous>  dextrose 50% Injectable 25 Gram(s) IV Push once  dextrose 50% Injectable 12.5 Gram(s) IV Push once  dextrose 50% Injectable 25 Gram(s) IV Push once  dextrose Oral Gel 15 Gram(s) Oral once  diphtheria/tetanus/pertussis (acellular) Vaccine (Adacel) 0.5 milliLiter(s) IntraMuscular once  enoxaparin Injectable 40 milliGRAM(s) SubCutaneous every 24 hours  glucagon  Injectable 1 milliGRAM(s) IntraMuscular once  insulin aspart (NovoLOG) Pump 1 Each SubCutaneous Continuous Pump  lidocaine   4% Patch 1 Patch Transdermal every 24 hours  lisinopril 2.5 milliGRAM(s) Oral daily  mupirocin 2% Ointment 1 Application(s) Topical every 12 hours  mycophenolic acid  milliGRAM(s) Oral daily  piperacillin/tazobactam IVPB.. 3.375 Gram(s) IV Intermittent every 8 hours  predniSONE   Tablet 5 milliGRAM(s) Oral daily  sodium chloride 0.9%. 1000 milliLiter(s) (100 mL/Hr) IV Continuous <Continuous>  tacrolimus 0.5 milliGRAM(s) Oral <User Schedule>  tamsulosin 0.4 milliGRAM(s) Oral at bedtime      atorvastatin   40 milliGRAM(s) Oral (01-20-25 @ 22:34)    predniSONE   Tablet   5 milliGRAM(s) Oral (01-21-25 @ 06:50)        PHYSICAL EXAM:  VITALS: T(C): 36.8 (01-21-25 @ 12:59)  T(F): 98.3 (01-21-25 @ 12:59), Max: 99.3 (01-20-25 @ 22:07)  HR: 84 (01-21-25 @ 12:59) (84 - 94)  BP: 117/90 (01-21-25 @ 12:59) (117/90 - 141/75)  RR:  (18 - 18)  SpO2:  (94% - 97%)  Wt(kg): --  GENERAL: NAD  Respiratory: Respirations unlabored   Extremities: Warm, no edema  NEURO: Alert , appropriate     LABS:  POCT Blood Glucose.: 185 mg/dL (01-21-25 @ 13:00)  POCT Blood Glucose.: 139 mg/dL (01-21-25 @ 08:39)  POCT Blood Glucose.: 167 mg/dL (01-20-25 @ 21:58)  POCT Blood Glucose.: 161 mg/dL (01-20-25 @ 17:57)  POCT Blood Glucose.: 150 mg/dL (01-20-25 @ 12:55)  POCT Blood Glucose.: 168 mg/dL (01-20-25 @ 08:34)  POCT Blood Glucose.: 197 mg/dL (01-19-25 @ 22:29)  POCT Blood Glucose.: 167 mg/dL (01-19-25 @ 17:46)  POCT Blood Glucose.: 141 mg/dL (01-19-25 @ 12:35)  POCT Blood Glucose.: 257 mg/dL (01-19-25 @ 08:43)  POCT Blood Glucose.: 157 mg/dL (01-18-25 @ 20:47)  POCT Blood Glucose.: 149 mg/dL (01-18-25 @ 17:17)                          15.2   12.54 )-----------( 278      ( 21 Jan 2025 07:15 )             46.8     01-21    136  |  103  |  17  ----------------------------<  122[H]  4.1   |  20[L]  |  1.20    Ca    9.8      21 Jan 2025 07:14  Phos  3.3     01-20  Mg     1.9     01-20      eGFR: 68 mL/min/1.73m2 (21 Jan 2025 07:14)      Thyroid Function Tests:      A1C with Estimated Average Glucose Result: 7.0 % (01-16-25 @ 17:51)    Estimated Average Glucose: 154 mg/dL (01-16-25 @ 17:51)        Diet, Consistent Carbohydrate/No Snacks:   DASH/TLC Sodium & Cholesterol Restricted (DASH)  No Concentrated Potassium (01-15-25 @ 21:49) [Active]

## 2025-01-21 NOTE — PROVIDER CONTACT NOTE (OTHER) - ACTION/TREATMENT ORDERED:
MD made aware. MD put in order for FS ACHS. MD put endocrine consult in.
Md said they were not comfortable prescribing pt NSAIDS due to elevated Cr and ESRD

## 2025-01-22 ENCOUNTER — TRANSCRIPTION ENCOUNTER (OUTPATIENT)
Age: 65
End: 2025-01-22

## 2025-01-22 ENCOUNTER — RESULT REVIEW (OUTPATIENT)
Age: 65
End: 2025-01-22

## 2025-01-22 LAB
-  CLINDAMYCIN: SIGNIFICANT CHANGE UP
-  ERYTHROMYCIN: SIGNIFICANT CHANGE UP
-  GENTAMICIN: SIGNIFICANT CHANGE UP
-  OXACILLIN: SIGNIFICANT CHANGE UP
-  RIFAMPIN: SIGNIFICANT CHANGE UP
-  TETRACYCLINE: SIGNIFICANT CHANGE UP
-  TRIMETHOPRIM/SULFAMETHOXAZOLE: SIGNIFICANT CHANGE UP
-  VANCOMYCIN: SIGNIFICANT CHANGE UP
ADD ON TEST-SPECIMEN IN LAB: SIGNIFICANT CHANGE UP
ANION GAP SERPL CALC-SCNC: 13 MMOL/L — SIGNIFICANT CHANGE UP (ref 5–17)
BUN SERPL-MCNC: 19 MG/DL — SIGNIFICANT CHANGE UP (ref 7–23)
CALCIUM SERPL-MCNC: 10.2 MG/DL — SIGNIFICANT CHANGE UP (ref 8.4–10.5)
CHLORIDE SERPL-SCNC: 105 MMOL/L — SIGNIFICANT CHANGE UP (ref 96–108)
CK SERPL-CCNC: 52 U/L — SIGNIFICANT CHANGE UP (ref 30–200)
CO2 SERPL-SCNC: 23 MMOL/L — SIGNIFICANT CHANGE UP (ref 22–31)
CREAT SERPL-MCNC: 1.24 MG/DL — SIGNIFICANT CHANGE UP (ref 0.5–1.3)
EGFR: 66 ML/MIN/1.73M2 — SIGNIFICANT CHANGE UP
GLUCOSE BLDC GLUCOMTR-MCNC: 141 MG/DL — HIGH (ref 70–99)
GLUCOSE BLDC GLUCOMTR-MCNC: 178 MG/DL — HIGH (ref 70–99)
GLUCOSE BLDC GLUCOMTR-MCNC: 188 MG/DL — HIGH (ref 70–99)
GLUCOSE BLDC GLUCOMTR-MCNC: 190 MG/DL — HIGH (ref 70–99)
GLUCOSE SERPL-MCNC: 143 MG/DL — HIGH (ref 70–99)
HCT VFR BLD CALC: 49.9 % — SIGNIFICANT CHANGE UP (ref 39–50)
HGB BLD-MCNC: 15.8 G/DL — SIGNIFICANT CHANGE UP (ref 13–17)
MCHC RBC-ENTMCNC: 30.6 PG — SIGNIFICANT CHANGE UP (ref 27–34)
MCHC RBC-ENTMCNC: 31.7 G/DL — LOW (ref 32–36)
MCV RBC AUTO: 96.5 FL — SIGNIFICANT CHANGE UP (ref 80–100)
METHOD TYPE: SIGNIFICANT CHANGE UP
NRBC # BLD: 0 /100 WBCS — SIGNIFICANT CHANGE UP (ref 0–0)
NRBC BLD-RTO: 0 /100 WBCS — SIGNIFICANT CHANGE UP (ref 0–0)
PLATELET # BLD AUTO: 317 K/UL — SIGNIFICANT CHANGE UP (ref 150–400)
POTASSIUM SERPL-MCNC: 5 MMOL/L — SIGNIFICANT CHANGE UP (ref 3.5–5.3)
POTASSIUM SERPL-SCNC: 5 MMOL/L — SIGNIFICANT CHANGE UP (ref 3.5–5.3)
RBC # BLD: 5.17 M/UL — SIGNIFICANT CHANGE UP (ref 4.2–5.8)
RBC # FLD: 13.6 % — SIGNIFICANT CHANGE UP (ref 10.3–14.5)
SODIUM SERPL-SCNC: 141 MMOL/L — SIGNIFICANT CHANGE UP (ref 135–145)
TACROLIMUS SERPL-MCNC: 10 NG/ML — SIGNIFICANT CHANGE UP
TACROLIMUS SERPL-MCNC: 9.4 NG/ML — SIGNIFICANT CHANGE UP
WBC # BLD: 10.62 K/UL — HIGH (ref 3.8–10.5)
WBC # FLD AUTO: 10.62 K/UL — HIGH (ref 3.8–10.5)

## 2025-01-22 PROCEDURE — G0545: CPT

## 2025-01-22 PROCEDURE — 88304 TISSUE EXAM BY PATHOLOGIST: CPT | Mod: 26

## 2025-01-22 PROCEDURE — 88311 DECALCIFY TISSUE: CPT | Mod: 26

## 2025-01-22 PROCEDURE — 99233 SBSQ HOSP IP/OBS HIGH 50: CPT | Mod: GC

## 2025-01-22 PROCEDURE — 73630 X-RAY EXAM OF FOOT: CPT | Mod: 26,RT

## 2025-01-22 PROCEDURE — 99232 SBSQ HOSP IP/OBS MODERATE 35: CPT

## 2025-01-22 PROCEDURE — 99232 SBSQ HOSP IP/OBS MODERATE 35: CPT | Mod: GC

## 2025-01-22 RX ORDER — DAPTOMYCIN 350 MG/7ML
650 INJECTION, POWDER, LYOPHILIZED, FOR SOLUTION INTRAVENOUS EVERY 24 HOURS
Refills: 0 | Status: DISCONTINUED | OUTPATIENT
Start: 2025-01-22 | End: 2025-01-24

## 2025-01-22 RX ORDER — CEFEPIME HCL 1 G
2000 IV SOLUTION, PIGGYBACK, BOTTLE (EA) INTRAVENOUS EVERY 8 HOURS
Refills: 0 | Status: DISCONTINUED | OUTPATIENT
Start: 2025-01-22 | End: 2025-01-24

## 2025-01-22 RX ORDER — LIDOCAINE HYDROCHLORIDE 30 MG/G
1 CREAM TOPICAL ONCE
Refills: 0 | Status: DISCONTINUED | OUTPATIENT
Start: 2025-01-22 | End: 2025-01-24

## 2025-01-22 RX ADMIN — Medication 6.25 MILLIGRAM(S): at 17:42

## 2025-01-22 RX ADMIN — Medication 6.25 MILLIGRAM(S): at 06:39

## 2025-01-22 RX ADMIN — LIDOCAINE HYDROCHLORIDE 1 PATCH: 30 CREAM TOPICAL at 08:15

## 2025-01-22 RX ADMIN — ASPIRIN 81 MILLIGRAM(S): 81 TABLET, COATED ORAL at 13:03

## 2025-01-22 RX ADMIN — LIDOCAINE HYDROCHLORIDE 1 PATCH: 30 CREAM TOPICAL at 11:00

## 2025-01-22 RX ADMIN — Medication 100 MILLIGRAM(S): at 15:41

## 2025-01-22 RX ADMIN — DAPTOMYCIN 126 MILLIGRAM(S): 350 INJECTION, POWDER, LYOPHILIZED, FOR SOLUTION INTRAVENOUS at 08:26

## 2025-01-22 RX ADMIN — TACROLIMUS 1 MILLIGRAM(S): 1 CAPSULE, GELATIN COATED ORAL at 08:44

## 2025-01-22 RX ADMIN — PREDNISONE 5 MILLIGRAM(S): 5 TABLET ORAL at 06:39

## 2025-01-22 RX ADMIN — TACROLIMUS 0.5 MILLIGRAM(S): 1 CAPSULE, GELATIN COATED ORAL at 20:24

## 2025-01-22 RX ADMIN — TAMSULOSIN HYDROCHLORIDE 0.4 MILLIGRAM(S): 0.4 CAPSULE ORAL at 21:09

## 2025-01-22 RX ADMIN — ENOXAPARIN SODIUM 40 MILLIGRAM(S): 100 INJECTION SUBCUTANEOUS at 22:34

## 2025-01-22 RX ADMIN — Medication 100 MILLIGRAM(S): at 21:09

## 2025-01-22 RX ADMIN — Medication 2.5 MILLIGRAM(S): at 06:39

## 2025-01-22 RX ADMIN — ATORVASTATIN CALCIUM 40 MILLIGRAM(S): 80 TABLET, FILM COATED ORAL at 21:09

## 2025-01-22 RX ADMIN — MYCOPHENOLIC ACID 360 MILLIGRAM(S): 180 TABLET, DELAYED RELEASE ORAL at 13:03

## 2025-01-22 NOTE — PROGRESS NOTE ADULT - ASSESSMENT
64M with history of HTN, DM2 with neuropathy and partial R 1st digit amputation, ESRD s/p renal transplant on tacrolimus and mycophenolate, and HDL here for complicated R foot cellulitis with osteomyelitis; currently pending bedside bone biopsy for podiatry.

## 2025-01-22 NOTE — PROGRESS NOTE ADULT - ASSESSMENT
64M with HTN T2DM on CGM and insulin pump, ESRD s/p renal transplant in 2020 on tacrolimus and mycophenolate, partial R first toe amputation presenting from podiatry office with c/f foot infection and difficulty palpating pedal pulses. Vascular consulted to evaluate LLE perfusion. S/p unroofing of the wound by podiatry. Patient appears to have adequate perfusion to the foot for podiatry intervention.     Rec  - No acute vascular intervention   - Patient is stable from Vascular Surgery standpoint to proceed with Podiatry intervention  - Continue with local wound care  - Continue IV abx  - Pain management PRN      Vascular surgery   [id number]

## 2025-01-22 NOTE — PROGRESS NOTE ADULT - ATTENDING COMMENTS
64M pmh HTN T2DM on CGM and insulin pump, ESRD s/p renal transplant in 2020 on tacrolimus and mycophenolate, partial R first toe amputation present s/p Rt foot injury admitted for sepsis 2/2 R foot wound/cellulitis/OM. MRI right foot concerning for 1st prox phalanx stump and hallux sesmoids along with 2nd metarsophalangeal joint. Underwent bone biopsy with podiatry 1/22.     #Right foot OM  - transitioned from zosyn to cefepime and daptomycin 1/22. Wound culture +Staph aurus. Will need PICC line  -NM scan positive. MRI concerning for OM  -f/u podiatry on need for resection. F/u bone biopsy. If need for resection, patient is medically optimized. RCRI score 2  -vascular surgery recs- d/w Dr. Muniz- no need for angiogram prior to resection    #Renal transplant  -cr 1.1 and stable  -AM tacro level  -Tacro adjustments per transplant renal.   c/w Myfortic    #DM on insulin pump  -endo consulted- insulin pump  -monitor FS, goal 120-180.     56 minutes spent  Jenae Pineda MD  Division of Hospital Medicine  Available on [employer] Teams.

## 2025-01-22 NOTE — PROGRESS NOTE ADULT - SUBJECTIVE AND OBJECTIVE BOX
INPATIENT MEDICINE PROGRESS NOTE:   Authored by Kerri Villegas MD     HISTORY OF PRESENT ILLNESS:  64F with history of DM2 on insulin pump with neuropathy and partial R 1st digit amputation, ESRD s/p renal transplant on tacrolimus and mycophenolate mofetil, asthma, HDL presents to [facility]-ED sent by podiatry due to R foot redness and tenderness with blister.     NAEON. HR 70s-80s. Afebrile, SBPs 110s-140s. POCT 139/185/159/196.     Seen and examined at bedside, patient reports doing well. Lidocaine patch helped with R foot pain and allowed patient to sleep. Has appetite. Physically denies pain and other symptoms. Eating what he can to protect kidneys. Understanding about being here. Last Tdap shot >5 yrs ago (prior transplant).     PHYSICAL EXAM:  Vital Signs Last 24 Hrs  T(C): 36.1 (22 Jan 2025 06:24), Max: 36.8 (21 Jan 2025 12:59)  T(F): 96.9 (22 Jan 2025 06:24), Max: 98.3 (21 Jan 2025 12:59)  HR: 75 (22 Jan 2025 06:24) (73 - 84)  BP: 140/81 (22 Jan 2025 06:24) (114/65 - 147/82)  BP(mean): --  RR: 18 (22 Jan 2025 06:24) (18 - 18)  SpO2: 99% (22 Jan 2025 06:24) (97% - 99%)    Parameters below as of 22 Jan 2025 06:24  Patient On (Oxygen Delivery Method): room air        General: NAD, calm, comfortable, cooperative, obese habitus, tattoos   Neuro: awake, alert, oriented to person/place/time, 5/5  strength, 5/5 hip flexion/extension b/l, spontaneity, reduced sensation b/l feet   HEENT: NC/AT, PERRLA b/l, EOMI, moist mucous membranes, no supraclavicular/submandibular lymphadenopathy  Chest: nonTTP   Heart: S1/S2, RRR   Lungs: CTA b/l, nonlabored breathing   Abd: soft, nonTTP, nondistended, reducible umbilical bulge   Ext: reducing erythema near R 1st digitand 2nd digit, purulent discharge at site of packing in interweb of R 1st digit and 2nd digit, no TTP, no pretibial/pedal edema, superficial abrasion on plantar midfoot of R foot, warm to touch b/l, no pain on plantarflexion/dorsiflexion,non tense, non pallor   Back: nonTTP   Pulses: soft dorsalis pedis pulses b/l   Psych: full range affect, mutual topic,linear and goal directed   Lines/tubes/drains: none     I&O's Summary    21 Jan 2025 07:01  -  22 Jan 2025 07:00  --------------------------------------------------------  IN: 100 mL / OUT: 0 mL / NET: 100 mL        LABS:                        15.8   10.62 )-----------( 317      ( 22 Jan 2025 07:05 )             49.9     01-22    141  |  105  |  19  ----------------------------<  143[H]  5.0   |  23  |  1.24    Ca    10.2      22 Jan 2025 07:05      CAPILLARY BLOOD GLUCOSE      POCT Blood Glucose.: 188 mg/dL (22 Jan 2025 08:37)  POCT Blood Glucose.: 196 mg/dL (21 Jan 2025 21:26)  POCT Blood Glucose.: 159 mg/dL (21 Jan 2025 17:29)  POCT Blood Glucose.: 185 mg/dL (21 Jan 2025 13:00)  POCT Blood Glucose.: 139 mg/dL (21 Jan 2025 08:39)          Urinalysis Basic - ( 22 Jan 2025 07:05 )    Color: x / Appearance: x / SG: x / pH: x  Gluc: 143 mg/dL / Ketone: x  / Bili: x / Urobili: x   Blood: x / Protein: x / Nitrite: x   Leuk Esterase: x / RBC: x / WBC x   Sq Epi: x / Non Sq Epi: x / Bacteria: x        Culture - Abscess with Gram Stain (collected 20 Jan 2025 13:03)  Source: .Abscess  Gram Stain (21 Jan 2025 18:05):    Rare polymorphonuclear leukocytes per low power field    No organisms seen per oil power field  Preliminary Report (21 Jan 2025 18:05):    Few Staphylococcus aureus        MEDICATIONS  (STANDING):  aspirin enteric coated 81 milliGRAM(s) Oral daily  atorvastatin 40 milliGRAM(s) Oral at bedtime  carvedilol 6.25 milliGRAM(s) Oral every 12 hours  cefepime   IVPB 2000 milliGRAM(s) IV Intermittent every 8 hours  DAPTOmycin IVPB 650 milliGRAM(s) IV Intermittent every 24 hours  dextrose 5%. 1000 milliLiter(s) (100 mL/Hr) IV Continuous <Continuous>  dextrose 5%. 1000 milliLiter(s) (50 mL/Hr) IV Continuous <Continuous>  dextrose 50% Injectable 25 Gram(s) IV Push once  dextrose 50% Injectable 12.5 Gram(s) IV Push once  dextrose 50% Injectable 25 Gram(s) IV Push once  dextrose Oral Gel 15 Gram(s) Oral once  diphtheria/tetanus/pertussis (acellular) Vaccine (Adacel) 0.5 milliLiter(s) IntraMuscular once  enoxaparin Injectable 40 milliGRAM(s) SubCutaneous every 24 hours  glucagon  Injectable 1 milliGRAM(s) IntraMuscular once  insulin aspart (NovoLOG) Pump 1 Each SubCutaneous Continuous Pump  lidocaine   4% Patch 1 Patch Transdermal every 24 hours  lisinopril 2.5 milliGRAM(s) Oral daily  mupirocin 2% Ointment 1 Application(s) Topical every 12 hours  mycophenolic acid  milliGRAM(s) Oral daily  predniSONE   Tablet 5 milliGRAM(s) Oral daily  sodium chloride 0.9%. 1000 milliLiter(s) (100 mL/Hr) IV Continuous <Continuous>  tacrolimus 1 milliGRAM(s) Oral <User Schedule>  tacrolimus 0.5 milliGRAM(s) Oral <User Schedule>  tamsulosin 0.4 milliGRAM(s) Oral at bedtime    MEDICATIONS  (PRN):  acetaminophen     Tablet .. 650 milliGRAM(s) Oral every 6 hours PRN Temp greater or equal to 38C (100.4F), Mild Pain (1 - 3)  aluminum hydroxide/magnesium hydroxide/simethicone Suspension 30 milliLiter(s) Oral every 4 hours PRN Dyspepsia  melatonin 3 milliGRAM(s) Oral at bedtime PRN Insomnia

## 2025-01-22 NOTE — PROGRESS NOTE ADULT - SUBJECTIVE AND OBJECTIVE BOX
Vascular Surgery Daily Progress Note  =====================================================    SUBJECTIVE: Patient reports that they're feeling well. Pain in the LLE feels as if it is improving with the application of lidocaine patch.     --------------------------------------------------------------------------------------  VITAL SIGNS:  T(C): 36.1 (01-22-25 @ 06:24), Max: 36.8 (01-21-25 @ 12:59)  HR: 75 (01-22-25 @ 06:24) (73 - 84)  BP: 140/81 (01-22-25 @ 06:24) (114/65 - 147/82)  RR: 18 (01-22-25 @ 06:24) (18 - 18)  SpO2: 99% (01-22-25 @ 06:24) (97% - 99%)  --------------------------------------------------------------------------------------    EXAM    General: NAD, resting in bed comfortably  Cardiac: Regular rate, normotensive  Respiratory: Nonlabored respirations, normal cw expansion, on RA  Neuro: AOx3, CNII-XII intact on gross examination  Vascular/Extremities: Warm, well perfused        LLE: DP/PT signals, s/p incision and drainage by pods w/ iodoform packing in place w/ seropurulent drainage, erythema improved on dorsal foot    --------------------------------------------------------------------------------------

## 2025-01-22 NOTE — PROGRESS NOTE ADULT - ATTENDING COMMENTS
Patient was personally seen and examined.    The patient's Doppler studies were evaluated.    At this time the Doppler studies show that the patient has  essentially normal flow to the level of the transmetatarsal area.  There was a diminished toe tracing and pressure however, the tracings still are pulsatile.    At this time there is no further vascular workup necessary.  Would recommend podiatry proceed with planned procedures.  We will continue to follow patient postprocedure

## 2025-01-22 NOTE — PROGRESS NOTE ADULT - ASSESSMENT
Patient reports difficulty walking over past few months due to left knee injury sustained during syncopal episode  No reported prior treatment  Patient wears a knee brace as needed for support     -PT recommending home PT 64M with HTN T2DM on CGM and insulin pump, ESRD s/p renal transplant in 2020 on tacrolimus and mycophenolate, partial R first toe amputation presenting from podiatry office with a foot infection. Patient states that a couple days ago he was walking barefoot outside and had a pine needle stuck on the anterior plantar surface of his foot near the base of his R first toe. He had progressive reddening and pain of the R foot and went to see a podiatrist. Podiatrist had concerns that he did not have any pedal pulses and also concern for potential bone involvement and prescribed a single dose of Augmentin and sent the patient to the ED for further evaluation. At the ED the patient was febrile to 101.8F, tachycardic to 91 w/ stable BP and O2 Sat. WBC count was elevated to 15.9 w/ neutrophil predominance. XR did not reveal any signs of OM and the patient was seen by podiatry who had low suspicion for osteomyelitis but recommended a Vascular surgery consult for concerns of PAD.    Seen at bedside with wife present, patient & wife feels like they need more work-up to confirm acute on chronic osteomyelitis of the foot. Reports he has had chronic osteomyelitis from 12 years ago, requesting bone biopsy, MRI (chart review indicates unable to perform due to insulin pump), and revascularization by vascular before suggested amputation by podiatry. Transplant ID consulted for recommendations.     #Renal transplant recipient  #Leucocytosis  #Concern for R foot osteomyelitis  -Febrile on admission, afebrile today  -WBC 12 today  -Abscess Culture Results:  Few Staphylococcus aureus  -Sedimentation Rate, Erythrocyte: 110 mm/hr  -C-Reactive Protein: 229 mg/L  -MRSA/MSSA PCR: Staph aureus PCR Result: Detected  -FluA/FluB/RSV/COVID PCR: not detected  -BLOOD Culture Results: No growth at 4 days  -Urine Culture Results: No growth      Recommendations:  --Would benefit from a TDAP booster - please administer  --MRI Right Foot:  Subcortical marrow changes of the 1st proximal phalanx stump and hallux sesamoids may be reactive or reflect osteomyelitis with adjacent soft tissue changes suggesting early phlegmon formation. Faint marrow changes of the 2nd metatarsophalangeal joint may be reactive versus early infection.  --Based on NM scan & MRI findings, would treat empirically for acute osteomyelitis  --S/P Zosyn 3.375g IV Q8H   --Continue Daptomycin IV 8 mg/kg + Cefepime 2 gm IV Q8HR tentatively for 6 weeks  --Will require a PICC line OPAT   --Continue to follow CBC with diff  --Continue to follow temperature curve  --Follow up on preliminary wound cultures -  Few Staphylococcus aureus

## 2025-01-22 NOTE — PROGRESS NOTE ADULT - ASSESSMENT
64M presented with R foot interspace 1 wound to bone   - Pt seen and evaluated.  - Afebrile, WBC 10.62  - R foot interspace 1 wound to bone, R foot submet wound to dermis, erythema extends for wound to anterior lower shin improving, L foot 3rd digit plantar sulcus wound to subQ, no acute signs of infection.   - Right foot X-ray: no OM, no gas  - Right foot MRI: Poss MPJ1, MPJ 2, proximal phalanx 1, sesamoid OM vs reactive  - Bone Scan: increase uptake of proximal phalanx 1 and metatarsal phalangeal joint 1 and metatarsal phalangeal joint 2   - After obtaining informed consent, bedside bone biopsy performed with 11 gauge needle and sent to pathology and microbiology.   - Vasc recs, appreciated  - ID recs, appreciated  - Pod plan: Right foot Incision and Drainage vs Right foot partial 1st and 2nd ray resection pending vasc recs/ Pt decision/ Bone biopsy data   - Please document medical clearance for possible podiatric procedure   - Seen with Attending

## 2025-01-22 NOTE — PROGRESS NOTE ADULT - PROBLEM SELECTOR PLAN 1
Patient on arrival meeting SIRS criteria with fever, tachycardia, and leukocytosis. Afebrile. Not meeting SIRS criteria. Downtrending leukocytosis. Resolving foot pain with increasing comfort on ambulation. Exam remarkable for reducing erythema on dorsum without further TTP. No open wounds suggestive of bony involvement, however per transplant ID . MRI foot suggestive of inflammatory changes of partially amputated R 1st digit and developing in 2nd digit with possible phelgmon in arch. Clinically improving.     -Initiate cefepime 2 g IV q8 (started 1/22) and daptomycine 8 mg/kg IV q8 (started 1/22) for minimum 6 weeks for long term abx for ostemyelitis; dc on PICC   -Per transplant ID, discuss with vascular and podiatry need for amputation   -D/c piperacillin/tazobactam 3.375 mg IV q8 (started 1/16-1/21) per transplant ID and d/c ampicillin/sulbactam 3 gm IV q6 (started 1/20)   -MRI R foot showing edema of partial 1st digit amputation with reactive vs infectious changes in 2nd foot with potential phelgmon developing in subcuanteous tissue near arch   -NGTD blood cx, urine cx, abscess cx  -Abscess cx growing some staph aureus   -Pending podiatry bedside bone biopsy of foot   -Appreciate ID transplant consult for osteomyelitis treatment Patient on arrival meeting SIRS criteria with fever, tachycardia, and leukocytosis. Afebrile. Not meeting SIRS criteria. Downtrending leukocytosis. Resolving foot pain with increasing comfort on ambulation. Exam remarkable for reducing erythema on dorsum without further TTP. No open wounds suggestive of bony involvement, however per transplant ID . MRI foot suggestive of inflammatory changes of partially amputated R 1st digit and developing in 2nd digit with possible phelgmon in arch. Clinically improving.     -Initiate cefepime 2 g IV q8 (started 1/22) and daptomycine 8 mg/kg IV q8 (started 1/22) for minimum 6 weeks for long term abx for ostemyelitis; dc on PICC   -Per transplant ID, discuss with vascular and podiatry need for amputation   -D/c piperacillin/tazobactam 3.375 mg IV q8 (started 1/16-1/21) per transplant ID and d/c ampicillin/sulbactam 3 gm IV q6 (started 1/20)   -MRI R foot showing edema of partial 1st digit amputation with reactive vs infectious changes in 2nd foot with potential phelgmon developing in subcuanteous tissue near arch   -NGTD blood cx, urine cx, abscess cx  -Abscess cx growing some staph aureus   -Pending podiatry bedside bone biopsy of foot   -Appreciate ID transplant consult for osteomyelitis treatment  -Per vascular, can proceed with any planned podiatry interventions

## 2025-01-22 NOTE — PROGRESS NOTE ADULT - SUBJECTIVE AND OBJECTIVE BOX
Follow Up:      Interval History:    REVIEW OF SYSTEMS  [  ] ROS unobtainable because:    [  ] All other systems negative except as noted below    Constitutional:  [ ] fever [ ] chills  [ ] weight loss  [ ] weakness  Skin:  [ ] rash [ ] phlebitis	  Eyes: [ ] icterus [ ] pain  [ ] discharge	  ENMT: [ ] sore throat  [ ] thrush [ ] ulcers [ ] exudates  Respiratory: [ ] dyspnea [ ] hemoptysis [ ] cough [ ] sputum	  Cardiovascular:  [ ] chest pain [ ] palpitations [ ] edema	  Gastrointestinal:  [ ] nausea [ ] vomiting [ ] diarrhea [ ] constipation [ ] pain	  Genitourinary:  [ ] dysuria [ ] frequency [ ] hematuria [ ] discharge [ ] flank pain  [ ] incontinence  Musculoskeletal:  [ ] myalgias [ ] arthralgias [ ] arthritis  [ ] back pain  Neurological:  [ ] headache [ ] seizures  [ ] confusion/altered mental status    Allergies  clonidine (Other)  seasonal allergy (Other)        ANTIMICROBIALS:  cefepime   IVPB 2000 every 8 hours  DAPTOmycin IVPB 650 every 24 hours      OTHER MEDS:  MEDICATIONS  (STANDING):  acetaminophen     Tablet .. 650 every 6 hours PRN  aluminum hydroxide/magnesium hydroxide/simethicone Suspension 30 every 4 hours PRN  aspirin enteric coated 81 daily  atorvastatin 40 at bedtime  carvedilol 6.25 every 12 hours  dextrose 50% Injectable 25 once  dextrose 50% Injectable 12.5 once  dextrose 50% Injectable 25 once  dextrose Oral Gel 15 once  diphtheria/tetanus/pertussis (acellular) Vaccine (Adacel) 0.5 once  enoxaparin Injectable 40 every 24 hours  glucagon  Injectable 1 once  insulin aspart (NovoLOG) Pump 1 Continuous Pump  lisinopril 2.5 daily  melatonin 3 at bedtime PRN  mycophenolic acid  daily  predniSONE   Tablet 5 daily  tacrolimus 1 <User Schedule>  tacrolimus 0.5 <User Schedule>  tamsulosin 0.4 at bedtime      Vital Signs Last 24 Hrs  T(C): 36.1 (22 Jan 2025 06:24), Max: 36.8 (21 Jan 2025 12:59)  T(F): 96.9 (22 Jan 2025 06:24), Max: 98.3 (21 Jan 2025 12:59)  HR: 75 (22 Jan 2025 06:24) (73 - 84)  BP: 140/81 (22 Jan 2025 06:24) (114/65 - 147/82)  BP(mean): --  RR: 18 (22 Jan 2025 06:24) (18 - 18)  SpO2: 99% (22 Jan 2025 06:24) (97% - 99%)    Parameters below as of 22 Jan 2025 06:24  Patient On (Oxygen Delivery Method): room air        PHYSICAL EXAMINATION:  General: Alert and Awake, NAD  HEENT: PERRL, EOMI  Neck: Supple  Cardiac: RRR, No M/R/G  Resp: CTAB, No Wh/Rh/Ra  Abdomen: NBS, NT/ND, No HSM, No rigidity or guarding  MSK: No LE edema. No Calf tenderness  : No rivera  Skin: No rashes or lesions. Skin is warm and dry to the touch.   Neuro: Alert and Awake. CN 2-12 Grossly intact. Moves all four extremities spontaneously.  Psych: Calm, Pleasant, Cooperative                          15.8   10.62 )-----------( 317      ( 22 Jan 2025 07:05 )             49.9       01-22    141  |  105  |  19  ----------------------------<  143[H]  5.0   |  23  |  1.24    Ca    10.2      22 Jan 2025 07:05        Urinalysis Basic - ( 22 Jan 2025 07:05 )    Color: x / Appearance: x / SG: x / pH: x  Gluc: 143 mg/dL / Ketone: x  / Bili: x / Urobili: x   Blood: x / Protein: x / Nitrite: x   Leuk Esterase: x / RBC: x / WBC x   Sq Epi: x / Non Sq Epi: x / Bacteria: x        MICROBIOLOGY:  v  .Abscess  01-20-25   Few Staphylococcus aureus  --    Rare polymorphonuclear leukocytes per low power field  No organisms seen per oil power field      .Abscess  01-16-25   No growth at 5 days  --    No polymorphonuclear cells seen per low power field  No organisms seen per oil power field      .Blood BLOOD  01-15-25   No growth at 5 days  --  --      Clean Catch Clean Catch (Midstream)  01-15-25   No growth  --  --      .Blood BLOOD  01-15-25   No growth at 5 days  --  --                RADIOLOGY:    <The imaging below has been reviewed and visualized by me independently. Findings as detailed in report below> Follow Up: Foot infection     Interval History:  Afebrile, wound culture growing Staph Aureus    REVIEW OF SYSTEMS  [  ] ROS unobtainable because:    [ x ] All other systems negative except as noted below    Constitutional:  [ ] fever [ ] chills  [ ] weight loss  [ ] weakness  Skin:  [ ] rash [ ] phlebitis	  Eyes: [ ] icterus [ ] pain  [ ] discharge	  ENMT: [ ] sore throat  [ ] thrush [ ] ulcers [ ] exudates  Respiratory: [ ] dyspnea [ ] hemoptysis [ ] cough [ ] sputum	  Cardiovascular:  [ ] chest pain [ ] palpitations [ ] edema	  Gastrointestinal:  [ ] nausea [ ] vomiting [ ] diarrhea [ ] constipation [ ] pain	  Genitourinary:  [ ] dysuria [ ] frequency [ ] hematuria [ ] discharge [ ] flank pain  [ ] incontinence  Musculoskeletal:  [ ] myalgias [ ] arthralgias [ ] arthritis  [ ] back pain  Neurological:  [ ] headache [ ] seizures  [ ] confusion/altered mental status    Allergies  clonidine (Other)  seasonal allergy (Other)        ANTIMICROBIALS:  cefepime   IVPB 2000 every 8 hours  DAPTOmycin IVPB 650 every 24 hours      OTHER MEDS:  MEDICATIONS  (STANDING):  acetaminophen     Tablet .. 650 every 6 hours PRN  aluminum hydroxide/magnesium hydroxide/simethicone Suspension 30 every 4 hours PRN  aspirin enteric coated 81 daily  atorvastatin 40 at bedtime  carvedilol 6.25 every 12 hours  dextrose 50% Injectable 25 once  dextrose 50% Injectable 12.5 once  dextrose 50% Injectable 25 once  dextrose Oral Gel 15 once  diphtheria/tetanus/pertussis (acellular) Vaccine (Adacel) 0.5 once  enoxaparin Injectable 40 every 24 hours  glucagon  Injectable 1 once  insulin aspart (NovoLOG) Pump 1 Continuous Pump  lisinopril 2.5 daily  melatonin 3 at bedtime PRN  mycophenolic acid  daily  predniSONE   Tablet 5 daily  tacrolimus 1 <User Schedule>  tacrolimus 0.5 <User Schedule>  tamsulosin 0.4 at bedtime      Vital Signs Last 24 Hrs  T(C): 36.1 (22 Jan 2025 06:24), Max: 36.8 (21 Jan 2025 12:59)  T(F): 96.9 (22 Jan 2025 06:24), Max: 98.3 (21 Jan 2025 12:59)  HR: 75 (22 Jan 2025 06:24) (73 - 84)  BP: 140/81 (22 Jan 2025 06:24) (114/65 - 147/82)  BP(mean): --  RR: 18 (22 Jan 2025 06:24) (18 - 18)  SpO2: 99% (22 Jan 2025 06:24) (97% - 99%)    Parameters below as of 22 Jan 2025 06:24  Patient On (Oxygen Delivery Method): room air        PHYSICAL EXAMINATION:  General: Alert and Awake, NAD  HEENT: PERRL, EOMI  Neck: Supple  Cardiac: RRR, No M/R/G  Resp: CTAB, No Wh/Rh/Ra  Abdomen: NBS, NT/ND, No rigidity or guarding  MSK: +RLE edema. R foot wound to dermis, periwound ecchymosis, purulence with palpation, boggy with central clearing to planter and b/w great and 2nd toe  : No Hairston  Skin: No rashes or lesions. Skin is warm and dry to the touch.   Neuro: Alert and Awake. CN 2-12 Grossly intact. Moves all four extremities spontaneously.  Psych: Calm, Pleasant, Cooperative                          15.8   10.62 )-----------( 317      ( 22 Jan 2025 07:05 )             49.9       01-22    141  |  105  |  19  ----------------------------<  143[H]  5.0   |  23  |  1.24    Ca    10.2      22 Jan 2025 07:05        Urinalysis Basic - ( 22 Jan 2025 07:05 )    Color: x / Appearance: x / SG: x / pH: x  Gluc: 143 mg/dL / Ketone: x  / Bili: x / Urobili: x   Blood: x / Protein: x / Nitrite: x   Leuk Esterase: x / RBC: x / WBC x   Sq Epi: x / Non Sq Epi: x / Bacteria: x        MICROBIOLOGY:  v  .Abscess  01-20-25   Few Staphylococcus aureus  --    Rare polymorphonuclear leukocytes per low power field  No organisms seen per oil power field      .Abscess  01-16-25   No growth at 5 days  --    No polymorphonuclear cells seen per low power field  No organisms seen per oil power field      .Blood BLOOD  01-15-25   No growth at 5 days  --  --      Clean Catch Clean Catch (Midstream)  01-15-25   No growth  --  --      .Blood BLOOD  01-15-25   No growth at 5 days  --  --                RADIOLOGY:    <The imaging below has been reviewed and visualized by me independently. Findings as detailed in report below> Follow Up: Foot infection     Interval History:  Afebrile, wound culture growing Staph Aureus    REVIEW OF SYSTEMS  [  ] ROS unobtainable because:    [ x ] All other systems negative except as noted below    Constitutional:  [ ] fever [ ] chills  [ ] weight loss  [ ] weakness  Skin:  [ ] rash [ ] phlebitis	  Eyes: [ ] icterus [ ] pain  [ ] discharge	  ENMT: [ ] sore throat  [ ] thrush [ ] ulcers [ ] exudates  Respiratory: [ ] dyspnea [ ] hemoptysis [ ] cough [ ] sputum	  Cardiovascular:  [ ] chest pain [ ] palpitations [ ] edema	  Gastrointestinal:  [ ] nausea [ ] vomiting [ ] diarrhea [ ] constipation [ ] pain	  Genitourinary:  [ ] dysuria [ ] frequency [ ] hematuria [ ] discharge [ ] flank pain  [ ] incontinence  Musculoskeletal:  [ ] myalgias [ ] arthralgias [ ] arthritis  [ ] back pain  Neurological:  [ ] headache [ ] seizures  [ ] confusion/altered mental status    Allergies  clonidine (Other)  seasonal allergy (Other)        ANTIMICROBIALS:  cefepime   IVPB 2000 every 8 hours  DAPTOmycin IVPB 650 every 24 hours      OTHER MEDS:  MEDICATIONS  (STANDING):  acetaminophen     Tablet .. 650 every 6 hours PRN  aluminum hydroxide/magnesium hydroxide/simethicone Suspension 30 every 4 hours PRN  aspirin enteric coated 81 daily  atorvastatin 40 at bedtime  carvedilol 6.25 every 12 hours  dextrose 50% Injectable 25 once  dextrose 50% Injectable 12.5 once  dextrose 50% Injectable 25 once  dextrose Oral Gel 15 once  diphtheria/tetanus/pertussis (acellular) Vaccine (Adacel) 0.5 once  enoxaparin Injectable 40 every 24 hours  glucagon  Injectable 1 once  insulin aspart (NovoLOG) Pump 1 Continuous Pump  lisinopril 2.5 daily  melatonin 3 at bedtime PRN  mycophenolic acid  daily  predniSONE   Tablet 5 daily  tacrolimus 1 <User Schedule>  tacrolimus 0.5 <User Schedule>  tamsulosin 0.4 at bedtime      Vital Signs Last 24 Hrs  T(C): 36.1 (22 Jan 2025 06:24), Max: 36.8 (21 Jan 2025 12:59)  T(F): 96.9 (22 Jan 2025 06:24), Max: 98.3 (21 Jan 2025 12:59)  HR: 75 (22 Jan 2025 06:24) (73 - 84)  BP: 140/81 (22 Jan 2025 06:24) (114/65 - 147/82)  BP(mean): --  RR: 18 (22 Jan 2025 06:24) (18 - 18)  SpO2: 99% (22 Jan 2025 06:24) (97% - 99%)    Parameters below as of 22 Jan 2025 06:24  Patient On (Oxygen Delivery Method): room air        PHYSICAL EXAMINATION:  General: Alert and Awake, NAD  HEENT: PERRL, EOMI  Neck: Supple  Cardiac: RRR, No M/R/G  Resp: CTAB, No Wh/Rh/Ra  Abdomen: NBS, NT/ND, No rigidity or guarding  MSK: +RLE edema. R foot wound to dermis, periwound ecchymosis, purulence with palpation, boggy with central clearing to planter and b/w great and 2nd toe  : No Hairston  Skin: No rashes or lesions. Skin is warm and dry to the touch.   Neuro: Alert and Awake. CN 2-12 Grossly intact. Moves all four extremities spontaneously.  Psych: Calm, Pleasant, Cooperative                          15.8   10.62 )-----------( 317      ( 22 Jan 2025 07:05 )             49.9       01-22    141  |  105  |  19  ----------------------------<  143[H]  5.0   |  23  |  1.24    Ca    10.2      22 Jan 2025 07:05        Urinalysis Basic - ( 22 Jan 2025 07:05 )    Color: x / Appearance: x / SG: x / pH: x  Gluc: 143 mg/dL / Ketone: x  / Bili: x / Urobili: x   Blood: x / Protein: x / Nitrite: x   Leuk Esterase: x / RBC: x / WBC x   Sq Epi: x / Non Sq Epi: x / Bacteria: x        MICROBIOLOGY:  v  .Abscess  01-20-25   Few Staphylococcus aureus  --    Rare polymorphonuclear leukocytes per low power field  No organisms seen per oil power field      .Abscess  01-16-25   No growth at 5 days  --    No polymorphonuclear cells seen per low power field  No organisms seen per oil power field      .Blood BLOOD  01-15-25   No growth at 5 days  --  --      Clean Catch Clean Catch (Midstream)  01-15-25   No growth  --  --      .Blood BLOOD  01-15-25   No growth at 5 days  --  --                RADIOLOGY:    <The imaging below has been reviewed and visualized by me independently. Findings as detailed in report below>      < from: [facility] Physiol Extremity Lower 1-2 Level, BI (01.17.25 @ 17:01) >  ACC: [id number] EXAM:  PHYSIOL EXTREM LOW 1-2 LEV BI   ORDERED BY:  BERTHA HARTLEY     PROCEDURE DATE:  01/17/2025          INTERPRETATION:  CLINICAL INDICATION: Limited foot pulses    EXAMINATION: [facility]-PVR evaluation    COMPARISON: [facility] - PVR study 1/16/2025. Bilateral lower extremity arterial   duplex ultrasound 1/17/2025.    FINDINGS /  IMPRESSION:    Right 2nd digital pressure is 51mm Hg, TBI 0.32, markedly abnormal. PPG   waveform is low in amplitude but demonstrates some pulsatility.    Left 1st toe pressure is 117 mmHg, TBI 0.73, within normal limits. PPG   waveform is normal appearing/pulsatile.    --- End of Report ---        < end of copied text >

## 2025-01-22 NOTE — PROGRESS NOTE ADULT - SUBJECTIVE AND OBJECTIVE BOX
Patient is a 64y old  Male who presents with a chief complaint of scar tinfection (22 Jan 2025 11:49)       INTERVAL HPI/OVERNIGHT EVENTS:  Patient seen and evaluated at bedside.  Pt is resting comfortable in NAD. Denies N/V/F/C.    Allergies    clonidine (Other)  seasonal allergy (Other)    Intolerances        Vital Signs Last 24 Hrs  T(C): 36.1 (22 Jan 2025 06:24), Max: 36.8 (21 Jan 2025 12:59)  T(F): 96.9 (22 Jan 2025 06:24), Max: 98.3 (21 Jan 2025 12:59)  HR: 75 (22 Jan 2025 06:24) (73 - 84)  BP: 140/81 (22 Jan 2025 06:24) (114/65 - 147/82)  BP(mean): --  RR: 18 (22 Jan 2025 06:24) (18 - 18)  SpO2: 99% (22 Jan 2025 06:24) (97% - 99%)    Parameters below as of 22 Jan 2025 06:24  Patient On (Oxygen Delivery Method): room air        LABS:                        15.8   10.62 )-----------( 317      ( 22 Jan 2025 07:05 )             49.9     01-22    141  |  105  |  19  ----------------------------<  143[H]  5.0   |  23  |  1.24    Ca    10.2      22 Jan 2025 07:05        Urinalysis Basic - ( 22 Jan 2025 07:05 )    Color: x / Appearance: x / SG: x / pH: x  Gluc: 143 mg/dL / Ketone: x  / Bili: x / Urobili: x   Blood: x / Protein: x / Nitrite: x   Leuk Esterase: x / RBC: x / WBC x   Sq Epi: x / Non Sq Epi: x / Bacteria: x      CAPILLARY BLOOD GLUCOSE      POCT Blood Glucose.: 188 mg/dL (22 Jan 2025 08:37)  POCT Blood Glucose.: 196 mg/dL (21 Jan 2025 21:26)  POCT Blood Glucose.: 159 mg/dL (21 Jan 2025 17:29)  POCT Blood Glucose.: 185 mg/dL (21 Jan 2025 13:00)      Lower Extremity Physical Exam:  Vascular: DP/PT 0/4, B/L, CFT <3 seconds B/L, Temperature gradient warm to warm R, .   Neuro: Epicritic sensation diminished to the level of digits B/L.  Musculoskeletal/Ortho: s/p R hallux partial amputation, healed  Skin: R foot interspace 1 wound to bone, R foot submet wound to dermis, erythema extends for wound to anterior lower shin improving, L foot 3rd digit plantar sulcus wound to subQ, no acute signs of infection      RADIOLOGY & ADDITIONAL TESTS:

## 2025-01-22 NOTE — PROGRESS NOTE ADULT - ATTENDING COMMENTS
Long discussion with patient regarding his right foot infection.  I reviewed the MRI discussed results with patient showing reactive marrow edema versus osteomyelitis.  I explained that clinically he has an infection in his right foot with erythema, edema, and increased temperature gradient.  He has 2 wounds on his right foot, the interspace wound probes deep to bone of both the 1st and 2nd metatarsal, purulent drainage was expressed on exam.  I explained that although the MRI shows reactive bone marrow edema versus osteomyelitis it clinically correlates to osteomyelitis based on my exam today.  I discussed options with patient.  He has concerns of performing a amputation of his 1st partial ray.   I explained to patient that optimally the osteomyelitic bone needs to be resected in order to clear the patient of the infection as well as to perform an incision and drainage to express any underlying abscess.  Patient is indecisive on this treatment.  He would like to have conclusive evidence to show a osteomyelitic infection.  He was agreeable to a bone biopsy to be performed today.  Consent was obtained and the bone biopsy was performed on the 1st metatarsal of the patient's right foot.  He will speak to his wife today concerning his options on surgery and the Podiatry team will return later today to discuss his decision.

## 2025-01-23 DIAGNOSIS — M86.10 OTHER ACUTE OSTEOMYELITIS, UNSPECIFIED SITE: ICD-10-CM

## 2025-01-23 LAB
GLUCOSE BLDC GLUCOMTR-MCNC: 140 MG/DL — HIGH (ref 70–99)
GLUCOSE BLDC GLUCOMTR-MCNC: 155 MG/DL — HIGH (ref 70–99)
GLUCOSE BLDC GLUCOMTR-MCNC: 162 MG/DL — HIGH (ref 70–99)
GLUCOSE BLDC GLUCOMTR-MCNC: 290 MG/DL — HIGH (ref 70–99)
GRAM STN FLD: SIGNIFICANT CHANGE UP
SPECIMEN SOURCE: SIGNIFICANT CHANGE UP
TACROLIMUS SERPL-MCNC: 8 NG/ML — SIGNIFICANT CHANGE UP

## 2025-01-23 PROCEDURE — 99232 SBSQ HOSP IP/OBS MODERATE 35: CPT

## 2025-01-23 PROCEDURE — 99233 SBSQ HOSP IP/OBS HIGH 50: CPT | Mod: GC

## 2025-01-23 RX ORDER — NAPROXEN 500 MG
250 TABLET ORAL ONCE
Refills: 0 | Status: COMPLETED | OUTPATIENT
Start: 2025-01-23 | End: 2025-01-23

## 2025-01-23 RX ADMIN — MYCOPHENOLIC ACID 360 MILLIGRAM(S): 180 TABLET, DELAYED RELEASE ORAL at 11:39

## 2025-01-23 RX ADMIN — ASPIRIN 81 MILLIGRAM(S): 81 TABLET, COATED ORAL at 11:39

## 2025-01-23 RX ADMIN — Medication 100 MILLIGRAM(S): at 21:07

## 2025-01-23 RX ADMIN — Medication 6.25 MILLIGRAM(S): at 06:29

## 2025-01-23 RX ADMIN — MUPIROCIN 1 APPLICATION(S): 2 CREAM TOPICAL at 18:34

## 2025-01-23 RX ADMIN — Medication 100 MILLIGRAM(S): at 16:50

## 2025-01-23 RX ADMIN — DAPTOMYCIN 126 MILLIGRAM(S): 350 INJECTION, POWDER, LYOPHILIZED, FOR SOLUTION INTRAVENOUS at 08:26

## 2025-01-23 RX ADMIN — Medication 2.5 MILLIGRAM(S): at 06:29

## 2025-01-23 RX ADMIN — ATORVASTATIN CALCIUM 40 MILLIGRAM(S): 80 TABLET, FILM COATED ORAL at 21:07

## 2025-01-23 RX ADMIN — Medication 100 MILLIGRAM(S): at 06:29

## 2025-01-23 RX ADMIN — PREDNISONE 5 MILLIGRAM(S): 5 TABLET ORAL at 06:29

## 2025-01-23 RX ADMIN — Medication 250 MILLIGRAM(S): at 18:34

## 2025-01-23 RX ADMIN — TAMSULOSIN HYDROCHLORIDE 0.4 MILLIGRAM(S): 0.4 CAPSULE ORAL at 21:07

## 2025-01-23 RX ADMIN — Medication 6.25 MILLIGRAM(S): at 18:27

## 2025-01-23 RX ADMIN — TACROLIMUS 0.5 MILLIGRAM(S): 1 CAPSULE, GELATIN COATED ORAL at 20:00

## 2025-01-23 RX ADMIN — TACROLIMUS 1 MILLIGRAM(S): 1 CAPSULE, GELATIN COATED ORAL at 08:24

## 2025-01-23 NOTE — PROGRESS NOTE ADULT - SUBJECTIVE AND OBJECTIVE BOX
INPATIENT MEDICINE PROGRESS NOTE:   Authored by Kerri Villegas MD     HISTORY OF PRESENT ILLNESS:  64F with history of DM2 on insulin pump with neuropathy and partial R 1st digit amputation, ESRD s/p renal transplant on tacrolimus and mycophenolate mofetil, asthma, HDL presents to [facility]-ED sent by podiatry due to R foot redness and tenderness with blister.     NAEON. HR 70s-80s. SBPs 120s-160s. POCT 168/141/190/178,     Seen and examined at bedside, patient reports doing alright. Not having foot pain now.Has been able to walk more. Wants to amputate the 1st digit but leave the 2nd digit due toe already having adapted how to walk. Hopeful.     PHYSICAL EXAM:  Vital Signs Last 24 Hrs  T(C): 36.5 (23 Jan 2025 06:19), Max: 36.8 (22 Jan 2025 13:56)  T(F): 97.7 (23 Jan 2025 06:19), Max: 98.2 (22 Jan 2025 13:56)  HR: 74 (23 Jan 2025 06:19) (74 - 88)  BP: 148/74 (23 Jan 2025 06:19) (128/75 - 162/78)  BP(mean): --  RR: 18 (23 Jan 2025 06:19) (18 - 18)  SpO2: 94% (23 Jan 2025 06:19) (94% - 97%)    Parameters below as of 23 Jan 2025 06:19  Patient On (Oxygen Delivery Method): room air    General: NAD, calm, comfortable, cooperative, obese habitus, tattoos   Neuro: awake, alert, oriented to person/place/time, 5/5  strength, 5/5 hip flexion/extension b/l, spontaneity, reduced sensation b/l feet   HEENT: NC/AT, PERRLA b/l, EOMI, moist mucous membranes, no supraclavicular/submandibular lymphadenopathy  Chest: nonTTP   Heart: S1/S2, RRR   Lungs: CTA b/l, nonlabored breathing   Abd: soft, nonTTP, nondistended, reducible umbilical bulge   Ext: reducing erythema near R 1st digitand 2nd digit, purulent discharge at site of packing in interweb of R 1st digit and 2nd digit, no TTP, no pretibial/pedal edema, superficial abrasion on plantar midfoot of R foot, warm to touch b/l, no pain on plantarflexion/dorsiflexion,non tense, non pallor   Back: nonTTP   Pulses: soft dorsalis pedis pulses b/l   Psych: full range affect, mutual topic,linear and goal directed   Lines/tubes/drains: none       I&O's Summary    22 Jan 2025 07:01  -  23 Jan 2025 07:00  --------------------------------------------------------  IN: 200 mL / OUT: 0 mL / NET: 200 mL        LABS:                        15.8   10.62 )-----------( 317      ( 22 Jan 2025 07:05 )             49.9     01-22    141  |  105  |  19  ----------------------------<  143[H]  5.0   |  23  |  1.24    Ca    10.2      22 Jan 2025 07:05      CAPILLARY BLOOD GLUCOSE      POCT Blood Glucose.: 178 mg/dL (22 Jan 2025 21:32)  POCT Blood Glucose.: 190 mg/dL (22 Jan 2025 17:33)  POCT Blood Glucose.: 141 mg/dL (22 Jan 2025 12:51)  POCT Blood Glucose.: 188 mg/dL (22 Jan 2025 08:37)          Urinalysis Basic - ( 22 Jan 2025 07:05 )    Color: x / Appearance: x / SG: x / pH: x  Gluc: 143 mg/dL / Ketone: x  / Bili: x / Urobili: x   Blood: x / Protein: x / Nitrite: x   Leuk Esterase: x / RBC: x / WBC x   Sq Epi: x / Non Sq Epi: x / Bacteria: x        Culture - Tissue with Gram Stain (collected 22 Jan 2025 17:07)  Source: Tissue  Gram Stain (23 Jan 2025 01:41):    No polymorphonuclear cells seen per low power field    No organisms seen per oil power field    Culture - Abscess with Gram Stain (collected 20 Jan 2025 13:03)  Source: .Abscess  Gram Stain (21 Jan 2025 18:05):    Rare polymorphonuclear leukocytes per low power field    No organisms seen per oil power field  Preliminary Report (21 Jan 2025 18:05):    Few Staphylococcus aureus  Organism: Staphylococcus aureus (22 Jan 2025 15:55)  Organism: Staphylococcus aureus (22 Jan 2025 15:55)        MEDICATIONS  (STANDING):  aspirin enteric coated 81 milliGRAM(s) Oral daily  atorvastatin 40 milliGRAM(s) Oral at bedtime  carvedilol 6.25 milliGRAM(s) Oral every 12 hours  cefepime   IVPB 2000 milliGRAM(s) IV Intermittent every 8 hours  DAPTOmycin IVPB 650 milliGRAM(s) IV Intermittent every 24 hours  dextrose 5%. 1000 milliLiter(s) (50 mL/Hr) IV Continuous <Continuous>  dextrose 5%. 1000 milliLiter(s) (100 mL/Hr) IV Continuous <Continuous>  dextrose 50% Injectable 25 Gram(s) IV Push once  dextrose 50% Injectable 12.5 Gram(s) IV Push once  dextrose 50% Injectable 25 Gram(s) IV Push once  dextrose Oral Gel 15 Gram(s) Oral once  diphtheria/tetanus/pertussis (acellular) Vaccine (Adacel) 0.5 milliLiter(s) IntraMuscular once  enoxaparin Injectable 40 milliGRAM(s) SubCutaneous every 24 hours  glucagon  Injectable 1 milliGRAM(s) IntraMuscular once  insulin aspart (NovoLOG) Pump 1 Each SubCutaneous Continuous Pump  lidocaine   4% Patch 1 Patch Transdermal every 24 hours  lidocaine   4% Patch 1 Patch Transdermal once  lisinopril 2.5 milliGRAM(s) Oral daily  mupirocin 2% Ointment 1 Application(s) Topical every 12 hours  mycophenolic acid  milliGRAM(s) Oral daily  predniSONE   Tablet 5 milliGRAM(s) Oral daily  sodium chloride 0.9%. 1000 milliLiter(s) (100 mL/Hr) IV Continuous <Continuous>  tacrolimus 1 milliGRAM(s) Oral <User Schedule>  tacrolimus 0.5 milliGRAM(s) Oral <User Schedule>  tamsulosin 0.4 milliGRAM(s) Oral at bedtime    MEDICATIONS  (PRN):  acetaminophen     Tablet .. 650 milliGRAM(s) Oral every 6 hours PRN Temp greater or equal to 38C (100.4F), Mild Pain (1 - 3)  aluminum hydroxide/magnesium hydroxide/simethicone Suspension 30 milliLiter(s) Oral every 4 hours PRN Dyspepsia  melatonin 3 milliGRAM(s) Oral at bedtime PRN Insomnia

## 2025-01-23 NOTE — PROGRESS NOTE ADULT - PROBLEM SELECTOR PLAN 6
Code: Full  Diet: consistent carbohydrate and low potassium DASH   DVT ppx: enoxaparin   Dispo: pending clinical course, likely home Patient with history of DM2 nephropathy leading to ESRD and renal transplant and immunosuppressive medications. Stable BUN/Cr. Supratherapeutic tacrolimus level. Clinically monitor.     -C/w predinsone 5 mg po qd   -C/w tacrolimus 1 mg po qam and 0.5 mg po qhs   -C/w mycophenolate mofetil 360 mg po BID

## 2025-01-23 NOTE — PROGRESS NOTE ADULT - ASSESSMENT
The patient is a 64y Male with PMH of T2DM, ESRD with renal transplant who presented with lower extremity infection.  Endocrinology consulted for T2DM on insulin pump  Tolerating POs, he doesn't eat full meal. BGs reviewed with patient, 140-190s, on CGM only 20 % TIR. Advised to change ICR slightly 1:8-9 from 1:10, but he did not change any pump setting while in hospital, wanted to give himself little bit more of bolus manually. He will be NPO after MN for OR tomorrow. Advised to switch to exercise mode while NPO if BGs below 100.       #Type 2 Diabetes Mellitus on insulin pump  - Follows with: Dr. Kelley Rodriguez  - - home regimen: Jardiance, Trulicity  Insulin pump type: Tandem Mobi  Insulin type: Novolog  Last site change:Pumpt replaced after MRI on 1/20/25. DEXCOM placed after the MRI  Supplies available: for one week  Settings:  Basal:  00:00 0.75  20:00 0.5  ICR 1:10  ISF 1:20  TDD 17 target 110   - eGFR: 68 mL/min/1.73m2 (01-16-25)  - Weight (kg): 82.8 (01-15-25)  - glucose at goal, no evidence of DKA on labs  A1C 7.0 %      INPATIENT PLAN:  - The patient wishes to use their insulin pump inpatient, understands how to use it, and has adequate supplies available  - c/w insulin pump at usual home settings  - The patient should not be long without basal insulin. If for any reason the pump becomes dysfunctional or falls off, they should receive 17 units Lantus   - Will be NPO after MN for OR tomorrow. please remind pt to switch to exercise mode while NPO if BGs< 100 to prevent hypoglycemia   - The patient needs to fill out the self-assessment form and the patient and primary provider both need to sign the insulin pump attestation form.   - For each meal, the bedside nurse should document the carbohydrate intake and the insulin bolus the patient gives themselves in the EMR flowsheet.   - FSBG before meals and bedtime, but do not order an insulin correction scale as all insulin should be administered by the patient through the pump.   - Use hypoglycemia protocol if needed.  - Please change ABX solution to NS instead of D5 if feasible            DISCHARGE PLANNING:  - Discharge recs pending clinical course, continue previous regimen on discharge  - will need Endocrinology follow up with his provider Dr. Rodriguez, has an appointment scheduled for April 2025.     #Hypertension  - Goal BP <130/80  - on lisinopril 2.5 mg  - Management as per primary team  - check urine albumin level as outpatient    #Hyperlipidemia  - LDL goal <70  - on atorvastatin 40  - check lipid panel as outpatient on a yearly basis    Contact via Microsoft Teams during business hours  To reach covering provider access AMION via sunrise tools  For Urgent matters/after-hours/weekends/holidays please page endocrine fellow on call   For nonurgent matters please email [email or web address]    Please note that this patient may be followed by different provider tomorrow.  Notify endocrine 24 hours prior to discharge for final recommendations

## 2025-01-23 NOTE — PROGRESS NOTE ADULT - PROBLEM SELECTOR PLAN 4
Patient with DM2 with nephropathy, neuropathy, retinopathy here. No concerns for DKA or HHS. Hba1c 7.0. Fair glycemic control.     -C/w insulin pump here while measuring fingersticks   -C/w Eylea injection to R eye q8 wks   -Hold home dulaglutide   -Hold home empagliflozin   -Appreciate endocriniology consult, insulin pump and CGM must be taken off prior nuclear medicine scan and put back on after; they have extra C7 Normotensive.     -C/w carvedilol 6.25 mg po q12   -C/w lisinopril 2.5 mg po qd

## 2025-01-23 NOTE — PROGRESS NOTE ADULT - PROBLEM SELECTOR PLAN 1
Patient on arrival meeting SIRS criteria with fever, tachycardia, and leukocytosis. Afebrile. Not meeting SIRS criteria. Downtrending leukocytosis. Resolution of foot pain. Exam remarkable for reducing erythema on dorsum without further TTP and increasing palpation of dorsalis pedis pulse. Open wound in interweb of 1st and 2nd toe with purulence discharge. MRI foot suggestive of inflammatory changes of partially amputated R 1st digit and developing in 2nd digit with possible phelgmon in arch. Clinically improving.     -C/w cefepime 2 g IV q8 (started 1/22) and daptomycine 8 mg/kg IV q8 (started 1/22) for minimum 6 weeks for long term abx for ostemyelitis; dc on PICC   -Per podiatry and patient, planning for R 1st toe amputation but not 2nd toe   -D/c piperacillin/tazobactam 3.375 mg IV q8 (started 1/16-1/21) per transplant ID and d/c ampicillin/sulbactam 3 gm IV q6 (started 1/20)   -MRI R foot showing edema of partial 1st digit amputation with reactive vs infectious changes in 2nd foot with potential phelgmon developing in subcuanteous tissue near arch   -NGTD blood cx, urine cx, abscess cx  -Abscess cx growing some staph aureus   -1/22 podiatry bedside bone biopsy, pending results   -Appreciate ID transplant consult for osteomyelitis treatment  -Per vascular, can proceed with any planned podiatry interventions Patient with ostemyelitis with R foot wound probing to bone, SIRS on admission, and multiple imaging that initially equivocal, however most likely infectious. Medically optimized for surgical intervention and patient agreeable.     -Planning 1/24 R foot I&D vs 1st toe ray resection with podiatry Dr. Bryant  -C/w cefepime 2 g IV q8 (started 1/22) and daptomycine 8 mg/kg IV q8 (started 1/22) for minimum 6 weeks for long term abx for ostemyelitis; dc on PICC   -Per podiatry and patient, planning for R 1st toe amputation but not 2nd toe   -MRI R foot showing edema of partial 1st digit amputation with reactive vs infectious changes in 2nd foot with potential phelgmon developing in subcuanteous tissue near arch   -NGTD blood cx, urine cx, abscess cx  -Abscess cx growing some staph aureus   -1/22 podiatry bedside bone biopsy, pending results   -Appreciate ID transplant consult for osteomyelitis treatment  -Per vascular, can proceed with any planned podiatry interventions

## 2025-01-23 NOTE — PROGRESS NOTE ADULT - SUBJECTIVE AND OBJECTIVE BOX
Patient is a 64y old  Male who presents with a chief complaint of scar tinfection (23 Jan 2025 08:06)       INTERVAL HPI/OVERNIGHT EVENTS:  Patient seen and evaluated at bedside.  Pt is resting comfortable in NAD. Denies N/V/F/C.      Allergies    clonidine (Other)  seasonal allergy (Other)    Intolerances        Vital Signs Last 24 Hrs  T(C): 36.5 (23 Jan 2025 06:19), Max: 36.8 (22 Jan 2025 13:56)  T(F): 97.7 (23 Jan 2025 06:19), Max: 98.2 (22 Jan 2025 13:56)  HR: 74 (23 Jan 2025 06:19) (74 - 88)  BP: 148/74 (23 Jan 2025 06:19) (128/75 - 162/78)  BP(mean): --  RR: 18 (23 Jan 2025 06:19) (18 - 18)  SpO2: 94% (23 Jan 2025 06:19) (94% - 97%)    Parameters below as of 23 Jan 2025 06:19  Patient On (Oxygen Delivery Method): room air        LABS:                        15.8   10.62 )-----------( 317      ( 22 Jan 2025 07:05 )             49.9     01-22    141  |  105  |  19  ----------------------------<  143[H]  5.0   |  23  |  1.24    Ca    10.2      22 Jan 2025 07:05        Urinalysis Basic - ( 22 Jan 2025 07:05 )    Color: x / Appearance: x / SG: x / pH: x  Gluc: 143 mg/dL / Ketone: x  / Bili: x / Urobili: x   Blood: x / Protein: x / Nitrite: x   Leuk Esterase: x / RBC: x / WBC x   Sq Epi: x / Non Sq Epi: x / Bacteria: x      CAPILLARY BLOOD GLUCOSE      POCT Blood Glucose.: 162 mg/dL (23 Jan 2025 08:25)  POCT Blood Glucose.: 178 mg/dL (22 Jan 2025 21:32)  POCT Blood Glucose.: 190 mg/dL (22 Jan 2025 17:33)  POCT Blood Glucose.: 141 mg/dL (22 Jan 2025 12:51)      Lower Extremity Physical Exam:    Vascular: DP/PT 0/4, B/L, CFT <3 seconds B/L, Temperature gradient warm to warm R, .   Neuro: Epicritic sensation diminished to the level of digits B/L.  Musculoskeletal/Ortho: s/p R hallux partial amputation, healed  Skin: R foot interspace 1 wound to bone, R foot submet wound to dermis, erythema extends for wound to anterior lower shin improving, L foot 3rd digit plantar sulcus wound to subQ, no acute signs of infection  RADIOLOGY & ADDITIONAL TESTS:

## 2025-01-23 NOTE — PROGRESS NOTE ADULT - SUBJECTIVE AND OBJECTIVE BOX
[facility]   [phone]  RENAL FOLLOW UP NOTE  --------------------------------------------------------------------------------  Chief Complaint:    24 hour events/subjective:    seen eariler  states has no fever or chills     PAST HISTORY  --------------------------------------------------------------------------------  No significant changes to PMH, PSH, FHx, SHx, unless otherwise noted    ALLERGIES & MEDICATIONS  --------------------------------------------------------------------------------  Allergies    clonidine (Other)  seasonal allergy (Other)    Intolerances      Standing Inpatient Medications  aspirin enteric coated 81 milliGRAM(s) Oral daily  atorvastatin 40 milliGRAM(s) Oral at bedtime  carvedilol 6.25 milliGRAM(s) Oral every 12 hours  cefepime   IVPB 2000 milliGRAM(s) IV Intermittent every 8 hours  DAPTOmycin IVPB 650 milliGRAM(s) IV Intermittent every 24 hours  dextrose 5%. 1000 milliLiter(s) IV Continuous <Continuous>  dextrose 5%. 1000 milliLiter(s) IV Continuous <Continuous>  dextrose 50% Injectable 25 Gram(s) IV Push once  dextrose 50% Injectable 12.5 Gram(s) IV Push once  dextrose 50% Injectable 25 Gram(s) IV Push once  dextrose Oral Gel 15 Gram(s) Oral once  diphtheria/tetanus/pertussis (acellular) Vaccine (Adacel) 0.5 milliLiter(s) IntraMuscular once  glucagon  Injectable 1 milliGRAM(s) IntraMuscular once  insulin aspart (NovoLOG) Pump 1 Each SubCutaneous Continuous Pump  lidocaine   4% Patch 1 Patch Transdermal every 24 hours  lidocaine   4% Patch 1 Patch Transdermal once  lisinopril 2.5 milliGRAM(s) Oral daily  mupirocin 2% Ointment 1 Application(s) Topical every 12 hours  mycophenolic acid  milliGRAM(s) Oral daily  predniSONE   Tablet 5 milliGRAM(s) Oral daily  sodium chloride 0.9%. 1000 milliLiter(s) IV Continuous <Continuous>  tacrolimus 1 milliGRAM(s) Oral <User Schedule>  tacrolimus 0.5 milliGRAM(s) Oral <User Schedule>  tamsulosin 0.4 milliGRAM(s) Oral at bedtime    PRN Inpatient Medications  acetaminophen     Tablet .. 650 milliGRAM(s) Oral every 6 hours PRN  aluminum hydroxide/magnesium hydroxide/simethicone Suspension 30 milliLiter(s) Oral every 4 hours PRN  melatonin 3 milliGRAM(s) Oral at bedtime PRN      REVIEW OF SYSTEMS  --------------------------------------------------------------------------------    Gen: denies  fevers/chills, or HA or dizziness  CVS: denies chest pain/palpitations  Resp: denies SOB/Cough  GI: Denies N/V/Abd pain  : Denies dysuria/oliguria/hematuria      VITALS/PHYSICAL EXAM  --------------------------------------------------------------------------------  T(C): 36.3 (01-23-25 @ 12:33), Max: 36.5 (01-23-25 @ 06:19)  HR: 73 (01-23-25 @ 12:33) (73 - 74)  BP: 143/74 (01-23-25 @ 12:33) (143/74 - 148/74)  RR: 18 (01-23-25 @ 12:33) (18 - 18)  SpO2: 95% (01-23-25 @ 12:33) (94% - 95%)  Wt(kg): --        01-22-25 @ 07:01  -  01-23-25 @ 07:00  --------------------------------------------------------  IN: 200 mL / OUT: 0 mL / NET: 200 mL    01-23-25 @ 07:01  -  01-23-25 @ 21:25  --------------------------------------------------------  IN: 720 mL / OUT: 0 mL / NET: 720 mL      Physical Exam:  	    	Gen: Non toxic comfortable appearing   	Pulm: decrease bs  no rales or ronchi or wheezing  	CV: No JVD. RRR, S1S2; no rub  	Abd: +BS, soft, nontender/nondistended  	: No suprapubic tenderness renal graft site non tender   	UE: Warm, no cyanosis  no clubbing,  no edema  	LE: Warm, erythema  foot with dressing     LABS/STUDIES  --------------------------------------------------------------------------------              15.8   10.62 >-----------<  317      [01-22-25 @ 07:05]              49.9     141  |  105  |  19  ----------------------------<  143      [01-22-25 @ 07:05]  5.0   |  23  |  1.24        Ca     10.2     [01-22-25 @ 07:05]            Creatinine Trend:  SCr 1.24 [01-22 @ 07:05]  SCr 1.20 [01-21 @ 07:14]  SCr 1.33 [01-20 @ 07:04]  SCr 1.19 [01-19 @ 06:56]  SCr 1.14 [01-18 @ 09:19]    Tacrolimus (), Serum: 8.0 ng/mL (01-23 @ 07:00)  Tacrolimus (), Serum: 10.0 ng/mL (01-22 @ 07:05)  Tacrolimus (), Serum: 9.4 ng/mL (01-21 @ 20:50)  Tacrolimus (), Serum: 13.0 ng/mL (01-21 @ 07:15)            HbA1c 7.6      [02-19-20 @ 08:43]

## 2025-01-23 NOTE — PROGRESS NOTE ADULT - ASSESSMENT
64 year old Male with PMHx HTN T2DM on CGM and insulin pump, ESRD s/p renal transplant in 2020 on tacrolimus and mycophenolate, partial R first toe amputation presenting from podiatry office with a foot infection.        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

## 2025-01-23 NOTE — PROGRESS NOTE ADULT - ASSESSMENT
64M with HTN T2DM on CGM and insulin pump, ESRD s/p renal transplant in 2020 on tacrolimus and mycophenolate, partial R first toe amputation presenting from podiatry office with a foot infection. Patient states that a couple days ago he was walking barefoot outside and had a pine needle stuck on the anterior plantar surface of his foot near the base of his R first toe. He had progressive reddening and pain of the R foot and went to see a podiatrist. Podiatrist had concerns that he did not have any pedal pulses and also concern for potential bone involvement and prescribed a single dose of Augmentin and sent the patient to the ED for further evaluation. At the ED the patient was febrile to 101.8F, tachycardic to 91 w/ stable BP and O2 Sat. WBC count was elevated to 15.9 w/ neutrophil predominance. XR did not reveal any signs of OM and the patient was seen by podiatry who had low suspicion for osteomyelitis but recommended a Vascular surgery consult for concerns of PAD.    Seen at bedside with wife present, patient & wife feels like they need more work-up to confirm acute on chronic osteomyelitis of the foot. Reports he has had chronic osteomyelitis from 12 years ago, requesting bone biopsy, MRI (chart review indicates unable to perform due to insulin pump), and revascularization by vascular before suggested amputation by podiatry. Transplant ID consulted for recommendations.     #Renal transplant recipient  #Leucocytosis  #Concern for R foot osteomyelitis  -Febrile on admission, afebrile today  -WBC 12 today  -Abscess Culture Results:  Few Staphylococcus aureus  -Sedimentation Rate, Erythrocyte: 110 mm/hr  -C-Reactive Protein: 229 mg/L  -MRSA/MSSA PCR: Staph aureus PCR Result: Detected  -FluA/FluB/RSV/COVID PCR: not detected  -BLOOD Culture Results: No growth at 4 days  -Urine Culture Results: No growth      Recommendations:  --Would benefit from a TDAP booster - please administer  --MRI Right Foot:  Subcortical marrow changes of the 1st proximal phalanx stump and hallux sesamoids may be reactive or reflect osteomyelitis with adjacent soft tissue changes suggesting early phlegmon formation. Faint marrow changes of the 2nd metatarsophalangeal joint may be reactive versus early infection.  --Based on NM scan & MRI findings, would treat empirically for acute osteomyelitis  --S/P Zosyn 3.375g IV Q8H   --Continue Daptomycin IV 8 mg/kg + Cefepime 2 gm IV Q8HR tentatively for 6 weeks  --Will require a PICC line OPAT   --Continue to follow CBC with diff  --Continue to follow temperature curve  --Follow up on preliminary wound cultures -  Few Staphylococcus aureus  --Now planned for Right foot Incision and Drainage vs Right foot partial 1st ray resection tomorrow at 2pm with Dr Bryant, please send tissue and margins for cultures

## 2025-01-23 NOTE — PROGRESS NOTE ADULT - SUBJECTIVE AND OBJECTIVE BOX
Follow Up:      Interval History:    REVIEW OF SYSTEMS  [  ] ROS unobtainable because:    [  ] All other systems negative except as noted below    Constitutional:  [ ] fever [ ] chills  [ ] weight loss  [ ] weakness  Skin:  [ ] rash [ ] phlebitis	  Eyes: [ ] icterus [ ] pain  [ ] discharge	  ENMT: [ ] sore throat  [ ] thrush [ ] ulcers [ ] exudates  Respiratory: [ ] dyspnea [ ] hemoptysis [ ] cough [ ] sputum	  Cardiovascular:  [ ] chest pain [ ] palpitations [ ] edema	  Gastrointestinal:  [ ] nausea [ ] vomiting [ ] diarrhea [ ] constipation [ ] pain	  Genitourinary:  [ ] dysuria [ ] frequency [ ] hematuria [ ] discharge [ ] flank pain  [ ] incontinence  Musculoskeletal:  [ ] myalgias [ ] arthralgias [ ] arthritis  [ ] back pain  Neurological:  [ ] headache [ ] seizures  [ ] confusion/altered mental status    Allergies  clonidine (Other)  seasonal allergy (Other)        ANTIMICROBIALS:  cefepime   IVPB 2000 every 8 hours  DAPTOmycin IVPB 650 every 24 hours      OTHER MEDS:  MEDICATIONS  (STANDING):  acetaminophen     Tablet .. 650 every 6 hours PRN  aluminum hydroxide/magnesium hydroxide/simethicone Suspension 30 every 4 hours PRN  aspirin enteric coated 81 daily  atorvastatin 40 at bedtime  carvedilol 6.25 every 12 hours  dextrose 50% Injectable 25 once  dextrose 50% Injectable 12.5 once  dextrose 50% Injectable 25 once  dextrose Oral Gel 15 once  diphtheria/tetanus/pertussis (acellular) Vaccine (Adacel) 0.5 once  enoxaparin Injectable 40 every 24 hours  glucagon  Injectable 1 once  insulin aspart (NovoLOG) Pump 1 Continuous Pump  lisinopril 2.5 daily  melatonin 3 at bedtime PRN  mycophenolic acid  daily  predniSONE   Tablet 5 daily  tacrolimus 1 <User Schedule>  tacrolimus 0.5 <User Schedule>  tamsulosin 0.4 at bedtime      Vital Signs Last 24 Hrs  T(C): 36.3 (23 Jan 2025 12:33), Max: 36.8 (22 Jan 2025 13:56)  T(F): 97.4 (23 Jan 2025 12:33), Max: 98.2 (22 Jan 2025 13:56)  HR: 73 (23 Jan 2025 12:33) (73 - 88)  BP: 143/74 (23 Jan 2025 12:33) (128/75 - 162/78)  BP(mean): --  RR: 18 (23 Jan 2025 12:33) (18 - 18)  SpO2: 95% (23 Jan 2025 12:33) (94% - 97%)    Parameters below as of 23 Jan 2025 12:33  Patient On (Oxygen Delivery Method): room air        PHYSICAL EXAMINATION:  General: Alert and Awake, NAD  HEENT: PERRL, EOMI  Neck: Supple  Cardiac: RRR, No M/R/G  Resp: CTAB, No Wh/Rh/Ra  Abdomen: NBS, NT/ND, No HSM, No rigidity or guarding  MSK: No LE edema. No Calf tenderness  : No rivera  Skin: No rashes or lesions. Skin is warm and dry to the touch.   Neuro: Alert and Awake. CN 2-12 Grossly intact. Moves all four extremities spontaneously.  Psych: Calm, Pleasant, Cooperative                          15.8   10.62 )-----------( 317      ( 22 Jan 2025 07:05 )             49.9       01-22    141  |  105  |  19  ----------------------------<  143[H]  5.0   |  23  |  1.24    Ca    10.2      22 Jan 2025 07:05        Urinalysis Basic - ( 22 Jan 2025 07:05 )    Color: x / Appearance: x / SG: x / pH: x  Gluc: 143 mg/dL / Ketone: x  / Bili: x / Urobili: x   Blood: x / Protein: x / Nitrite: x   Leuk Esterase: x / RBC: x / WBC x   Sq Epi: x / Non Sq Epi: x / Bacteria: x        MICROBIOLOGY:  v  Tissue  01-22-25 --  --    No polymorphonuclear cells seen per low power field  No organisms seen per oil power field      .Abscess  01-20-25   Few Staphylococcus aureus  --  Staphylococcus aureus      .Abscess  01-16-25   No growth at 5 days  --    No polymorphonuclear cells seen per low power field  No organisms seen per oil power field      .Blood BLOOD  01-15-25   No growth at 5 days  --  --      Clean Catch Clean Catch (Midstream)  01-15-25   No growth  --  --      .Blood BLOOD  01-15-25   No growth at 5 days  --  --                RADIOLOGY:    <The imaging below has been reviewed and visualized by me independently. Findings as detailed in report below> Follow Up: Foot osteo    Interval History:  Afebrile, NAEON  Planned for OR tomorrow    REVIEW OF SYSTEMS  [  ] ROS unobtainable because:    [ x ] All other systems negative except as noted below    Constitutional:  [ ] fever [ ] chills  [ ] weight loss  [ ] weakness  Skin:  [ ] rash [ ] phlebitis	  Eyes: [ ] icterus [ ] pain  [ ] discharge	  ENMT: [ ] sore throat  [ ] thrush [ ] ulcers [ ] exudates  Respiratory: [ ] dyspnea [ ] hemoptysis [ ] cough [ ] sputum	  Cardiovascular:  [ ] chest pain [ ] palpitations [ ] edema	  Gastrointestinal:  [ ] nausea [ ] vomiting [ ] diarrhea [ ] constipation [ ] pain	  Genitourinary:  [ ] dysuria [ ] frequency [ ] hematuria [ ] discharge [ ] flank pain  [ ] incontinence  Musculoskeletal:  [ ] myalgias [ ] arthralgias [ ] arthritis  [ ] back pain  Neurological:  [ ] headache [ ] seizures  [ ] confusion/altered mental status    Allergies  clonidine (Other)  seasonal allergy (Other)        ANTIMICROBIALS:  cefepime   IVPB 2000 every 8 hours  DAPTOmycin IVPB 650 every 24 hours      OTHER MEDS:  MEDICATIONS  (STANDING):  acetaminophen     Tablet .. 650 every 6 hours PRN  aluminum hydroxide/magnesium hydroxide/simethicone Suspension 30 every 4 hours PRN  aspirin enteric coated 81 daily  atorvastatin 40 at bedtime  carvedilol 6.25 every 12 hours  dextrose 50% Injectable 25 once  dextrose 50% Injectable 12.5 once  dextrose 50% Injectable 25 once  dextrose Oral Gel 15 once  diphtheria/tetanus/pertussis (acellular) Vaccine (Adacel) 0.5 once  enoxaparin Injectable 40 every 24 hours  glucagon  Injectable 1 once  insulin aspart (NovoLOG) Pump 1 Continuous Pump  lisinopril 2.5 daily  melatonin 3 at bedtime PRN  mycophenolic acid  daily  predniSONE   Tablet 5 daily  tacrolimus 1 <User Schedule>  tacrolimus 0.5 <User Schedule>  tamsulosin 0.4 at bedtime      Vital Signs Last 24 Hrs  T(C): 36.3 (23 Jan 2025 12:33), Max: 36.8 (22 Jan 2025 13:56)  T(F): 97.4 (23 Jan 2025 12:33), Max: 98.2 (22 Jan 2025 13:56)  HR: 73 (23 Jan 2025 12:33) (73 - 88)  BP: 143/74 (23 Jan 2025 12:33) (128/75 - 162/78)  BP(mean): --  RR: 18 (23 Jan 2025 12:33) (18 - 18)  SpO2: 95% (23 Jan 2025 12:33) (94% - 97%)    Parameters below as of 23 Jan 2025 12:33  Patient On (Oxygen Delivery Method): room air        PHYSICAL EXAMINATION:  General: Alert and Awake, NAD  HEENT: PERRL, EOMI  Neck: Supple  Cardiac: RRR, No M/R/G  Resp: CTAB, No Wh/Rh/Ra  Abdomen: NBS, NT/ND, No rigidity or guarding  MSK: +RLE edema. R foot wound with packing and dressing, purulence with palpation, boggy with central clearing to planter and b/w great and 2nd toe  : No Hairston  Skin: No rashes or lesions. Skin is warm and dry to the touch.   Neuro: Alert and Awake. CN 2-12 Grossly intact. Moves all four extremities spontaneously.  Psych: Calm, Pleasant, Cooperative                          15.8   10.62 )-----------( 317      ( 22 Jan 2025 07:05 )             49.9       01-22    141  |  105  |  19  ----------------------------<  143[H]  5.0   |  23  |  1.24    Ca    10.2      22 Jan 2025 07:05        Urinalysis Basic - ( 22 Jan 2025 07:05 )    Color: x / Appearance: x / SG: x / pH: x  Gluc: 143 mg/dL / Ketone: x  / Bili: x / Urobili: x   Blood: x / Protein: x / Nitrite: x   Leuk Esterase: x / RBC: x / WBC x   Sq Epi: x / Non Sq Epi: x / Bacteria: x        MICROBIOLOGY:  v  Tissue  01-22-25 --  --    No polymorphonuclear cells seen per low power field  No organisms seen per oil power field      .Abscess  01-20-25   Few Staphylococcus aureus  --  Staphylococcus aureus      .Abscess  01-16-25   No growth at 5 days  --    No polymorphonuclear cells seen per low power field  No organisms seen per oil power field      .Blood BLOOD  01-15-25   No growth at 5 days  --  --      Clean Catch Clean Catch (Midstream)  01-15-25   No growth  --  --      .Blood BLOOD  01-15-25   No growth at 5 days  --  --                RADIOLOGY:    <The imaging below has been reviewed and visualized by me independently. Findings as detailed in report below>        < from: NM SPECT/CT Inflammatory Loc, Single Area Single Day (01.17.25 @ 13:31) >  ACC: [id number] EXAM:  NM MULTI DAY PROCEDURE   ORDERED BY:  FREDDY JENNIFER     ACC: [id number] EXAM:  NM INFLAMM LOC SPECT CT SA SD   [facility]  [facility]     ACC: [id number] EXAM:  NM INFLAMM LOC WBC WB SD   ORDERED BY:  FREDDY   JENNIFER     PROCEDURE DATE:  01/17/2025          INTERPRETATION:  RADIOPHARMACEUTICAL: 10.6 millicuries Tc-99m labeled   autologous leukocytes, IV.    CLINICAL INFORMATION: 64 year old male with right foot wound; referred to   evaluate for right foot osteomyelitis.    TECHNIQUE: Radiolabeled autologous leukocytes were injected on 1/16/2025.   Approximately 24 hours later images were obtained.    A SPECT-CT of the bilateral ankles and feet was performed. A computer   workstation was used to obtain composite images for anatomic correlation   by precisely overlying the SPECT and CT images to generate a fused   SPECT/CT image. The CT protocol was optimized for SPECT attenuation   correction and to provide anatomic detail for localization of SPECT   abnormalities. The CT protocol was not designed to produce and cannot   replace state-of- the-art diagnostic CT images with specific imaging   protocols for different body parts and indications.    COMPARISON: None available    CORRELATION: X-ray right ankle andfoot 1/15/2025    FINDINGS:    Intense uptake is noted in the surrounding soft tissues and bony   structures at the level of the proximal phalanx of the right great toe,   likely extending into the first metatarsophalangeal joint and laterally   intothe second metatarsophalangeal joint region.    Status post amputation of the right hallux to the level of the proximal   phalangeal head.    KEY REFERENCE IMAGES HAVE BEEN SAVED.    IMPRESSION:    Positive Tc-99m labeled leukocyte scan.    Intense uptake is noted in the surrounding soft tissues and bony   structures at the level of the proximal phalanx of the right great toe,   likely extending into the first metatarsophalangeal joint and laterally   into the second metatarsophalangeal joint region.        --- End of Report ---      < end of copied text >

## 2025-01-23 NOTE — PROGRESS NOTE ADULT - ASSESSMENT
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

## 2025-01-23 NOTE — PROGRESS NOTE ADULT - SUBJECTIVE AND OBJECTIVE BOX
Seen earlier today     Chief Complaint: Diabetes Mellitus follow up    INTERVAL HX: Tolerating POs, he doesn't eat full meal because he doesn't have many choices for foods due to diet restriction. Insulin pump in his left upper arm ( pump changed this am ) and Dexcom  G7 above pump in left upper arm. BGs reviewed with patient, 140-190s, on CGM only 20 % TIR. Advised to change ICR slightly 1:8-9 from 1:10, but he did not change any pump setting while in hospital, wanted to give himself little bit more manually as needed instead. He will be NPO after MN for OR tomorrow. Advised to switch to exercise mode while NPO if BGs below 100.     Review of Systems:  General: As above  GI: No nausea, vomiting  Endocrine: no  S&Sx of hypoglycemia    Allergies    clonidine (Other)  seasonal allergy (Other)    Intolerances      MEDICATIONS  (STANDING):  aspirin enteric coated 81 milliGRAM(s) Oral daily  atorvastatin 40 milliGRAM(s) Oral at bedtime  carvedilol 6.25 milliGRAM(s) Oral every 12 hours  cefepime   IVPB 2000 milliGRAM(s) IV Intermittent every 8 hours  DAPTOmycin IVPB 650 milliGRAM(s) IV Intermittent every 24 hours  dextrose 5%. 1000 milliLiter(s) (50 mL/Hr) IV Continuous <Continuous>  dextrose 5%. 1000 milliLiter(s) (100 mL/Hr) IV Continuous <Continuous>  dextrose 50% Injectable 25 Gram(s) IV Push once  dextrose 50% Injectable 12.5 Gram(s) IV Push once  dextrose 50% Injectable 25 Gram(s) IV Push once  dextrose Oral Gel 15 Gram(s) Oral once  diphtheria/tetanus/pertussis (acellular) Vaccine (Adacel) 0.5 milliLiter(s) IntraMuscular once  enoxaparin Injectable 40 milliGRAM(s) SubCutaneous every 24 hours  glucagon  Injectable 1 milliGRAM(s) IntraMuscular once  insulin aspart (NovoLOG) Pump 1 Each SubCutaneous Continuous Pump  lidocaine   4% Patch 1 Patch Transdermal every 24 hours  lidocaine   4% Patch 1 Patch Transdermal once  lisinopril 2.5 milliGRAM(s) Oral daily  mupirocin 2% Ointment 1 Application(s) Topical every 12 hours  mycophenolic acid  milliGRAM(s) Oral daily  predniSONE   Tablet 5 milliGRAM(s) Oral daily  sodium chloride 0.9%. 1000 milliLiter(s) (100 mL/Hr) IV Continuous <Continuous>  tacrolimus 1 milliGRAM(s) Oral <User Schedule>  tacrolimus 0.5 milliGRAM(s) Oral <User Schedule>  tamsulosin 0.4 milliGRAM(s) Oral at bedtime      atorvastatin   40 milliGRAM(s) Oral (01-22-25 @ 21:09)    predniSONE   Tablet   5 milliGRAM(s) Oral (01-23-25 @ 06:29)        PHYSICAL EXAM:  VITALS: T(C): 36.3 (01-23-25 @ 12:33)  T(F): 97.4 (01-23-25 @ 12:33), Max: 98.2 (01-22-25 @ 20:07)  HR: 73 (01-23-25 @ 12:33) (73 - 88)  BP: 143/74 (01-23-25 @ 12:33) (128/75 - 148/74)  RR:  (18 - 18)  SpO2:  (94% - 97%)  Wt(kg): --  GENERAL:  Male sitting in chair, in NAD  Respiratory: Respirations unlabored   Extremities: Warm.   NEURO: Alert , appropriate     LABS:  POCT Blood Glucose.: 290 mg/dL (01-23-25 @ 17:16)  POCT Blood Glucose.: 140 mg/dL (01-23-25 @ 12:40)  POCT Blood Glucose.: 162 mg/dL (01-23-25 @ 08:25)  POCT Blood Glucose.: 178 mg/dL (01-22-25 @ 21:32)  POCT Blood Glucose.: 190 mg/dL (01-22-25 @ 17:33)  POCT Blood Glucose.: 141 mg/dL (01-22-25 @ 12:51)  POCT Blood Glucose.: 188 mg/dL (01-22-25 @ 08:37)  POCT Blood Glucose.: 196 mg/dL (01-21-25 @ 21:26)  POCT Blood Glucose.: 159 mg/dL (01-21-25 @ 17:29)  POCT Blood Glucose.: 185 mg/dL (01-21-25 @ 13:00)  POCT Blood Glucose.: 139 mg/dL (01-21-25 @ 08:39)  POCT Blood Glucose.: 167 mg/dL (01-20-25 @ 21:58)                          15.8   10.62 )-----------( 317      ( 22 Jan 2025 07:05 )             49.9     01-22    141  |  105  |  19  ----------------------------<  143[H]  5.0   |  23  |  1.24    Ca    10.2      22 Jan 2025 07:05          Thyroid Function Tests:      A1C with Estimated Average Glucose Result: 7.0 % (01-16-25 @ 17:51)    Estimated Average Glucose: 154 mg/dL (01-16-25 @ 17:51)        Diet, NPO after Midnight:      NPO Start Date: 23-Jan-2025,   NPO Start Time: 23:59  Except Medications  With Ice Chips/Sips of Water (01-23-25 @ 17:44) [Active]  Diet, Consistent Carbohydrate/No Snacks:   DASH/TLC Sodium & Cholesterol Restricted (DASH)  No Concentrated Potassium (01-15-25 @ 21:49) [Active]

## 2025-01-23 NOTE — PROGRESS NOTE ADULT - PROBLEM SELECTOR PLAN 3
Normotensive.     -C/w carvedilol 6.25 mg po q12   -C/w lisinopril 2.5 mg po qd Resolving R foot erythema and tenderness. Ischemia related pain in R foot in setting of known stenosis, less likely inflammatory related pain, however contributory. Soft dorsalis pedis pulses elicited. Arterial doppler showing adequate blood flow in LE. Superficial 2 wounds, no defects. No S&S of acute ischemic limb. Clinically improving.     -Ankle Brachial Index limited due to noncompressible vasculature; vascular asking for repeat to be performed on R 2nd toe due to amputation of 1st R toe   -Podiatry recommending amputation of R toe(s)  -Arterial doppler to assess pulses showing stenosis RLE > LLE   -Nuclear medicine scan suggestive of increased uptake in 1st and 2nd digit of R foot potentially by osteyelitis   -Naproxen 250 mg po qd PRN   -Confirm with outpatient vascular surgeron need for invasive angiogram for RLE to assess flow   -Appreciate vascular surgery recommendations for doing a CT angiogram of RLE prior any podiatry interventions; vascular surgery will coordinate with transplant nephrology for CT angiogram

## 2025-01-23 NOTE — PROGRESS NOTE ADULT - PROBLEM SELECTOR PLAN 2
Resolving R foot erythema and tenderness. Ischemia related pain in R foot in setting of known stenosis, less likely inflammatory related pain, however contributory. Soft dorsalis pedis pulses elicited. Arterial doppler showing adequate blood flow in LE. Superficial 2 wounds, no defects. No S&S of acute ischemic limb. Clinically improving.     -Ankle Brachial Index limited due to noncompressible vasculature; vascular asking for repeat to be performed on R 2nd toe due to amputation of 1st R toe   -Podiatry recommending amputation of R toe(s)  -Arterial doppler to assess pulses showing stenosis RLE > LLE   -Nuclear medicine scan suggestive of increased uptake in 1st and 2nd digit of R foot potentially by osteyelitis   -Naproxen 250 mg po qd PRN   -Confirm with outpatient vascular surgeron need for invasive angiogram for RLE to assess flow   -Appreciate vascular surgery recommendations for doing a CT angiogram of RLE prior any podiatry interventions; vascular surgery will coordinate with transplant nephrology for CT angiogram Patient on arrival meeting SIRS criteria with fever, tachycardia, and leukocytosis. Afebrile. Not meeting SIRS criteria. Downtrending leukocytosis. Resolution of foot pain. Exam remarkable for reducing erythema on dorsum without further TTP and increasing palpation of dorsalis pedis pulse. Open wound in interweb of 1st and 2nd toe with purulence discharge. MRI foot suggestive of inflammatory changes of partially amputated R 1st digit and developing in 2nd digit with possible phelgmon in arch. Clinically improving.

## 2025-01-23 NOTE — PROGRESS NOTE ADULT - ASSESSMENT
64M presented with R foot interspace 1 wound to bone   - Pt seen and evaluated.  - Afebrile, WBC 10.62  - R foot interspace 1 wound to bone, R foot submet wound to dermis, erythema extends for wound to anterior lower shin improving, L foot 3rd digit plantar sulcus wound to subQ, no acute signs of infection.   - Right foot X-ray: no OM, no gas  - Right foot MRI: Poss MPJ1, MPJ 2, proximal phalanx 1, sesamoid OM vs reactive  - Bone Scan: increase uptake of proximal phalanx 1 and metatarsal phalangeal joint 1 and metatarsal phalangeal joint 2   - R foot wound culture: MSSA   - Vasc recs, appreciated  - ID recs, appreciated  - Pod plan: Right foot Incision and Drainage vs Right foot partial 1st ray resection tomorrow at 2pm with Dr Bryant  - [facility] Coags Type and screen in AM  - NPO after midnight   - Documented medical clearance, appreicated   - Discussed with Attending

## 2025-01-23 NOTE — PROGRESS NOTE ADULT - PROBLEM SELECTOR PLAN 5
Patient with history of DM2 nephropathy leading to ESRD and renal transplant and immunosuppressive medications. Stable BUN/Cr. Supratherapeutic tacrolimus level. Clinically monitor.     -C/w predinsone 5 mg po qd   -C/w tacrolimus 1 mg po qam and 0.5 mg po qhs   -C/w mycophenolate mofetil 360 mg po BID Patient with DM2 with nephropathy, neuropathy, retinopathy here. No concerns for DKA or HHS. Hba1c 7.0. Fair glycemic control.     -C/w insulin pump here while measuring fingersticks   -C/w Eylea injection to R eye q8 wks   -Hold home dulaglutide   -Hold home empagliflozin   -Appreciate endocriniology consult, insulin pump and CGM must be taken off prior nuclear medicine scan and put back on after; they have extra C7

## 2025-01-23 NOTE — PROGRESS NOTE ADULT - ATTENDING COMMENTS
64M pmh HTN T2DM on CGM and insulin pump, ESRD s/p renal transplant in 2020 on tacrolimus and mycophenolate, partial R first toe amputation present s/p Rt foot injury admitted for sepsis 2/2 R foot wound/cellulitis/OM. MRI right foot concerning for 1st prox phalanx stump and hallux sesmoids along with 2nd metarsophalangeal joint. Plan for Right foot Incision and Drainage vs Right foot partial 1st ray resection 1/24.     #Right foot OM  - transitioned from zosyn to cefepime and daptomycin 1/22. Wound culture +Staph aurus. Will likely need PICC line  -NM scan positive. MRI concerning for OM. Clinically, podiatry able to probe to bone  -Patient is medically optimized. RCRI score 2  -vascular surgery recs- d/w Dr. Muniz- no need for angiogram prior to resection    #Renal transplant  -cr 1.1 and stable  -AM tacro level  -Tacro adjustments per transplant renal.   c/w Myfortic    #DM on insulin pump  -endo consulted- insulin pump  -monitor FS, goal 120-180.     55 minutes spent  Jenae Pineda MD  Division of Hospital Medicine  Available on [employer] Teams.

## 2025-01-24 ENCOUNTER — RESULT REVIEW (OUTPATIENT)
Age: 65
End: 2025-01-24

## 2025-01-24 LAB
ALBUMIN SERPL ELPH-MCNC: 3.1 G/DL — LOW (ref 3.3–5)
ALP SERPL-CCNC: 104 U/L — SIGNIFICANT CHANGE UP (ref 40–120)
ALT FLD-CCNC: 33 U/L — SIGNIFICANT CHANGE UP (ref 10–45)
ANION GAP SERPL CALC-SCNC: 12 MMOL/L — SIGNIFICANT CHANGE UP (ref 5–17)
APTT BLD: 36.6 SEC — HIGH (ref 24.5–35.6)
AST SERPL-CCNC: 29 U/L — SIGNIFICANT CHANGE UP (ref 10–40)
BILIRUB SERPL-MCNC: 0.4 MG/DL — SIGNIFICANT CHANGE UP (ref 0.2–1.2)
BLD GP AB SCN SERPL QL: NEGATIVE — SIGNIFICANT CHANGE UP
BUN SERPL-MCNC: 17 MG/DL — SIGNIFICANT CHANGE UP (ref 7–23)
CALCIUM SERPL-MCNC: 9.6 MG/DL — SIGNIFICANT CHANGE UP (ref 8.4–10.5)
CHLORIDE SERPL-SCNC: 109 MMOL/L — HIGH (ref 96–108)
CO2 SERPL-SCNC: 19 MMOL/L — LOW (ref 22–31)
CREAT SERPL-MCNC: 0.96 MG/DL — SIGNIFICANT CHANGE UP (ref 0.5–1.3)
EGFR: 89 ML/MIN/1.73M2 — SIGNIFICANT CHANGE UP
GLUCOSE BLDC GLUCOMTR-MCNC: 122 MG/DL — HIGH (ref 70–99)
GLUCOSE BLDC GLUCOMTR-MCNC: 129 MG/DL — HIGH (ref 70–99)
GLUCOSE BLDC GLUCOMTR-MCNC: 157 MG/DL — HIGH (ref 70–99)
GLUCOSE BLDC GLUCOMTR-MCNC: 164 MG/DL — HIGH (ref 70–99)
GLUCOSE BLDC GLUCOMTR-MCNC: 196 MG/DL — HIGH (ref 70–99)
GLUCOSE BLDC GLUCOMTR-MCNC: 268 MG/DL — HIGH (ref 70–99)
GLUCOSE SERPL-MCNC: 143 MG/DL — HIGH (ref 70–99)
HCT VFR BLD CALC: 47.1 % — SIGNIFICANT CHANGE UP (ref 39–50)
HGB BLD-MCNC: 14.7 G/DL — SIGNIFICANT CHANGE UP (ref 13–17)
INR BLD: 1.09 RATIO — SIGNIFICANT CHANGE UP (ref 0.85–1.16)
MAGNESIUM SERPL-MCNC: 2 MG/DL — SIGNIFICANT CHANGE UP (ref 1.6–2.6)
MCHC RBC-ENTMCNC: 29.8 PG — SIGNIFICANT CHANGE UP (ref 27–34)
MCHC RBC-ENTMCNC: 31.2 G/DL — LOW (ref 32–36)
MCV RBC AUTO: 95.3 FL — SIGNIFICANT CHANGE UP (ref 80–100)
NRBC # BLD: 0 /100 WBCS — SIGNIFICANT CHANGE UP (ref 0–0)
NRBC BLD-RTO: 0 /100 WBCS — SIGNIFICANT CHANGE UP (ref 0–0)
PHOSPHATE SERPL-MCNC: 3 MG/DL — SIGNIFICANT CHANGE UP (ref 2.5–4.5)
PLATELET # BLD AUTO: 314 K/UL — SIGNIFICANT CHANGE UP (ref 150–400)
POTASSIUM SERPL-MCNC: 4.2 MMOL/L — SIGNIFICANT CHANGE UP (ref 3.5–5.3)
POTASSIUM SERPL-SCNC: 4.2 MMOL/L — SIGNIFICANT CHANGE UP (ref 3.5–5.3)
PROT SERPL-MCNC: 7.1 G/DL — SIGNIFICANT CHANGE UP (ref 6–8.3)
PROTHROM AB SERPL-ACNC: 12.5 SEC — SIGNIFICANT CHANGE UP (ref 9.9–13.4)
RBC # BLD: 4.94 M/UL — SIGNIFICANT CHANGE UP (ref 4.2–5.8)
RBC # FLD: 13.2 % — SIGNIFICANT CHANGE UP (ref 10.3–14.5)
RH IG SCN BLD-IMP: POSITIVE — SIGNIFICANT CHANGE UP
SODIUM SERPL-SCNC: 140 MMOL/L — SIGNIFICANT CHANGE UP (ref 135–145)
TACROLIMUS SERPL-MCNC: 7.9 NG/ML — SIGNIFICANT CHANGE UP
WBC # BLD: 9.6 K/UL — SIGNIFICANT CHANGE UP (ref 3.8–10.5)
WBC # FLD AUTO: 9.6 K/UL — SIGNIFICANT CHANGE UP (ref 3.8–10.5)

## 2025-01-24 PROCEDURE — 88311 DECALCIFY TISSUE: CPT | Mod: 26

## 2025-01-24 PROCEDURE — 88307 TISSUE EXAM BY PATHOLOGIST: CPT | Mod: 26

## 2025-01-24 PROCEDURE — G0545: CPT

## 2025-01-24 PROCEDURE — 99232 SBSQ HOSP IP/OBS MODERATE 35: CPT

## 2025-01-24 PROCEDURE — 73630 X-RAY EXAM OF FOOT: CPT | Mod: 26,RT

## 2025-01-24 PROCEDURE — 88305 TISSUE EXAM BY PATHOLOGIST: CPT | Mod: 26

## 2025-01-24 PROCEDURE — 88304 TISSUE EXAM BY PATHOLOGIST: CPT | Mod: 26

## 2025-01-24 PROCEDURE — 99233 SBSQ HOSP IP/OBS HIGH 50: CPT | Mod: GC

## 2025-01-24 DEVICE — SURGICEL FIBRILLAR 2 X 4": Type: IMPLANTABLE DEVICE | Site: RIGHT | Status: FUNCTIONAL

## 2025-01-24 RX ORDER — DM/PSEUDOEPHED/ACETAMINOPHEN 10-30-250
12.5 CAPSULE ORAL ONCE
Refills: 0 | Status: DISCONTINUED | OUTPATIENT
Start: 2025-01-24 | End: 2025-01-28

## 2025-01-24 RX ORDER — DM/PSEUDOEPHED/ACETAMINOPHEN 10-30-250
25 CAPSULE ORAL ONCE
Refills: 0 | Status: DISCONTINUED | OUTPATIENT
Start: 2025-01-24 | End: 2025-01-28

## 2025-01-24 RX ORDER — ASPIRIN 81 MG/1
81 TABLET, COATED ORAL DAILY
Refills: 0 | Status: DISCONTINUED | OUTPATIENT
Start: 2025-01-24 | End: 2025-01-28

## 2025-01-24 RX ORDER — CARVEDILOL 6.25 MG
6.25 TABLET ORAL EVERY 12 HOURS
Refills: 0 | Status: DISCONTINUED | OUTPATIENT
Start: 2025-01-24 | End: 2025-01-28

## 2025-01-24 RX ORDER — MAGNESIUM, ALUMINUM HYDROXIDE 200-225/5
30 SUSPENSION, ORAL (FINAL DOSE FORM) ORAL EVERY 4 HOURS
Refills: 0 | Status: DISCONTINUED | OUTPATIENT
Start: 2025-01-24 | End: 2025-01-25

## 2025-01-24 RX ORDER — LIDOCAINE HYDROCHLORIDE 30 MG/G
1 CREAM TOPICAL EVERY 24 HOURS
Refills: 0 | Status: DISCONTINUED | OUTPATIENT
Start: 2025-01-24 | End: 2025-01-28

## 2025-01-24 RX ORDER — ATORVASTATIN CALCIUM 80 MG/1
40 TABLET, FILM COATED ORAL AT BEDTIME
Refills: 0 | Status: DISCONTINUED | OUTPATIENT
Start: 2025-01-24 | End: 2025-01-28

## 2025-01-24 RX ORDER — TACROLIMUS 1 MG/1
0.5 CAPSULE, GELATIN COATED ORAL
Refills: 0 | Status: DISCONTINUED | OUTPATIENT
Start: 2025-01-24 | End: 2025-01-28

## 2025-01-24 RX ORDER — GLUCAGON 3 MG/1
1 POWDER NASAL ONCE
Refills: 0 | Status: DISCONTINUED | OUTPATIENT
Start: 2025-01-24 | End: 2025-01-28

## 2025-01-24 RX ORDER — INSULIN ASPART 100 [IU]/ML
1 INJECTION, SOLUTION INTRAVENOUS; SUBCUTANEOUS
Refills: 0 | Status: DISCONTINUED | OUTPATIENT
Start: 2025-01-24 | End: 2025-01-28

## 2025-01-24 RX ORDER — ACETAMINOPHEN, DIPHENHYDRAMINE HCL, PHENYLEPHRINE HCL 325; 25; 5 MG/1; MG/1; MG/1
3 TABLET ORAL AT BEDTIME
Refills: 0 | Status: DISCONTINUED | OUTPATIENT
Start: 2025-01-24 | End: 2025-01-26

## 2025-01-24 RX ORDER — HYDROMORPHONE HYDROCHLORIDE 4 MG/ML
0.5 INJECTION, SOLUTION INTRAMUSCULAR; INTRAVENOUS; SUBCUTANEOUS
Refills: 0 | Status: DISCONTINUED | OUTPATIENT
Start: 2025-01-24 | End: 2025-01-24

## 2025-01-24 RX ORDER — OXYCODONE HYDROCHLORIDE 30 MG/1
5 TABLET ORAL ONCE
Refills: 0 | Status: DISCONTINUED | OUTPATIENT
Start: 2025-01-24 | End: 2025-01-24

## 2025-01-24 RX ORDER — OXYCODONE HYDROCHLORIDE AND ACETAMINOPHEN 5; 325 MG/1; MG/1
1 TABLET ORAL EVERY 4 HOURS
Refills: 0 | Status: DISCONTINUED | OUTPATIENT
Start: 2025-01-24 | End: 2025-01-25

## 2025-01-24 RX ORDER — SODIUM CHLORIDE 9 G/ML
1000 INJECTION, SOLUTION INTRAVENOUS
Refills: 0 | Status: DISCONTINUED | OUTPATIENT
Start: 2025-01-24 | End: 2025-01-28

## 2025-01-24 RX ORDER — ACETAMINOPHEN 160 MG/5ML
650 SUSPENSION ORAL EVERY 6 HOURS
Refills: 0 | Status: DISCONTINUED | OUTPATIENT
Start: 2025-01-24 | End: 2025-01-28

## 2025-01-24 RX ORDER — TAMSULOSIN HYDROCHLORIDE 0.4 MG/1
0.4 CAPSULE ORAL AT BEDTIME
Refills: 0 | Status: DISCONTINUED | OUTPATIENT
Start: 2025-01-24 | End: 2025-01-28

## 2025-01-24 RX ORDER — ACETAMINOPHEN 160 MG/5ML
650 SUSPENSION ORAL EVERY 6 HOURS
Refills: 0 | Status: DISCONTINUED | OUTPATIENT
Start: 2025-01-24 | End: 2025-01-25

## 2025-01-24 RX ORDER — PREDNISONE 5 MG/1
5 TABLET ORAL DAILY
Refills: 0 | Status: DISCONTINUED | OUTPATIENT
Start: 2025-01-24 | End: 2025-01-28

## 2025-01-24 RX ORDER — HYDROMORPHONE HYDROCHLORIDE 4 MG/ML
0.5 INJECTION, SOLUTION INTRAMUSCULAR; INTRAVENOUS; SUBCUTANEOUS EVERY 4 HOURS
Refills: 0 | Status: DISCONTINUED | OUTPATIENT
Start: 2025-01-24 | End: 2025-01-25

## 2025-01-24 RX ORDER — MYCOPHENOLIC ACID 180 MG/1
360 TABLET, DELAYED RELEASE ORAL DAILY
Refills: 0 | Status: DISCONTINUED | OUTPATIENT
Start: 2025-01-25 | End: 2025-01-28

## 2025-01-24 RX ORDER — CLOSTRIDIUM TETANI TOXOID ANTIGEN (FORMALDEHYDE INACTIVATED), CORYNEBACTERIUM DIPHTHERIAE TOXOID ANTIGEN (FORMALDEHYDE INACTIVATED), BORDETELLA PERTUSSIS TOXOID ANTIGEN (GLUTARALDEHYDE INACTIVATED), BORDETELLA PERTUSSIS FILAMENTOUS HEMAGGLUTININ ANTIGEN (FORMALDEHYDE INACTIVATED), BORDETELLA PERTUSSIS PERTACTIN ANTIGEN, AND BORDETELLA PERTUSSIS FIMBRIAE 2/3 ANTIGEN 5; 2; 2.5; 5; 3; 5 [LF]/.5ML; [LF]/.5ML; UG/.5ML; UG/.5ML; UG/.5ML; UG/.5ML
0.5 INJECTION, SUSPENSION INTRAMUSCULAR ONCE
Refills: 0 | Status: DISCONTINUED | OUTPATIENT
Start: 2025-01-24 | End: 2025-01-28

## 2025-01-24 RX ORDER — CEFEPIME HCL 1 G
2000 IV SOLUTION, PIGGYBACK, BOTTLE (EA) INTRAVENOUS EVERY 8 HOURS
Refills: 0 | Status: DISCONTINUED | OUTPATIENT
Start: 2025-01-24 | End: 2025-01-26

## 2025-01-24 RX ORDER — TACROLIMUS 1 MG/1
1 CAPSULE, GELATIN COATED ORAL
Refills: 0 | Status: DISCONTINUED | OUTPATIENT
Start: 2025-01-25 | End: 2025-01-28

## 2025-01-24 RX ORDER — MUPIROCIN 2 G/100G
1 CREAM TOPICAL EVERY 12 HOURS
Refills: 0 | Status: DISCONTINUED | OUTPATIENT
Start: 2025-01-24 | End: 2025-01-28

## 2025-01-24 RX ORDER — ONDANSETRON 4 MG/1
4 TABLET, ORALLY DISINTEGRATING ORAL ONCE
Refills: 0 | Status: DISCONTINUED | OUTPATIENT
Start: 2025-01-24 | End: 2025-01-24

## 2025-01-24 RX ORDER — DAPTOMYCIN 350 MG/7ML
800 INJECTION, POWDER, LYOPHILIZED, FOR SOLUTION INTRAVENOUS EVERY 24 HOURS
Refills: 0 | Status: DISCONTINUED | OUTPATIENT
Start: 2025-01-24 | End: 2025-01-26

## 2025-01-24 RX ORDER — DM/PSEUDOEPHED/ACETAMINOPHEN 10-30-250
15 CAPSULE ORAL ONCE
Refills: 0 | Status: DISCONTINUED | OUTPATIENT
Start: 2025-01-24 | End: 2025-01-28

## 2025-01-24 RX ADMIN — Medication 6.25 MILLIGRAM(S): at 06:02

## 2025-01-24 RX ADMIN — ATORVASTATIN CALCIUM 40 MILLIGRAM(S): 80 TABLET, FILM COATED ORAL at 21:21

## 2025-01-24 RX ADMIN — TACROLIMUS 0.5 MILLIGRAM(S): 1 CAPSULE, GELATIN COATED ORAL at 21:21

## 2025-01-24 RX ADMIN — Medication 2.5 MILLIGRAM(S): at 06:02

## 2025-01-24 RX ADMIN — Medication 6.25 MILLIGRAM(S): at 18:32

## 2025-01-24 RX ADMIN — PREDNISONE 5 MILLIGRAM(S): 5 TABLET ORAL at 06:02

## 2025-01-24 RX ADMIN — DAPTOMYCIN 126 MILLIGRAM(S): 350 INJECTION, POWDER, LYOPHILIZED, FOR SOLUTION INTRAVENOUS at 08:35

## 2025-01-24 RX ADMIN — Medication 100 MILLIGRAM(S): at 21:21

## 2025-01-24 RX ADMIN — TACROLIMUS 1 MILLIGRAM(S): 1 CAPSULE, GELATIN COATED ORAL at 08:34

## 2025-01-24 RX ADMIN — TAMSULOSIN HYDROCHLORIDE 0.4 MILLIGRAM(S): 0.4 CAPSULE ORAL at 21:21

## 2025-01-24 RX ADMIN — Medication 100 MILLIGRAM(S): at 06:02

## 2025-01-24 RX ADMIN — MYCOPHENOLIC ACID 360 MILLIGRAM(S): 180 TABLET, DELAYED RELEASE ORAL at 11:27

## 2025-01-24 RX ADMIN — ASPIRIN 81 MILLIGRAM(S): 81 TABLET, COATED ORAL at 11:26

## 2025-01-24 NOTE — PRE-OP CHECKLIST - SELECT TESTS ORDERED
Foot and ankle/CBC/CMP/Type and Cross/EKG/Results in MD note/POCT Blood Glucose
122/POCT Blood Glucose

## 2025-01-24 NOTE — PROGRESS NOTE ADULT - SUBJECTIVE AND OBJECTIVE BOX
INPATIENT MEDICINE PROGRESS NOTE:   Authored by Kerri Villegas MD     HISTORY OF PRESENT ILLNESS:  64F with history of DM2 on insulin pump with neuropathy and partial R 1st digit amputation, ESRD s/p renal transplant on tacrolimus and mycophenolate mofetil, asthma, HDL presents to [facility]-ED sent by podiatry due to R foot redness and tenderness with blister.     NAEON. HR 70s. SBPs 120s-140s. Enoxaparin stopped overnight. POCT 162/140/290/155, this .     Seen and examined at bedside with wife Rebeca, patient reports doing well and got some interrupted sleep since OR planned for today. Otherwise hopeful. Just had a question about exactly what would happen in the OR and explained that ultimate intervention likely would depend on what is seen intraoperatively and that podiatry team would return to explain. Denies pain.     PHYSICAL EXAM:  Vital Signs Last 24 Hrs  T(C): 36.2 (24 Jan 2025 05:49), Max: 36.3 (23 Jan 2025 12:33)  T(F): 97.1 (24 Jan 2025 05:49), Max: 97.4 (23 Jan 2025 12:33)  HR: 71 (24 Jan 2025 05:49) (71 - 74)  BP: 132/70 (24 Jan 2025 05:49) (124/74 - 143/74)  BP(mean): --  RR: 18 (24 Jan 2025 05:49) (18 - 18)  SpO2: 99% (24 Jan 2025 05:49) (95% - 99%)    Parameters below as of 24 Jan 2025 05:49  Patient On (Oxygen Delivery Method): room air        General: NAD, calm, comfortable, cooperative, obese habitus, tattoos, supine on hospital bed  Neuro: awake, alert, oriented to person/place/time, 5/5  strength, 5/5 hip flexion/extension b/l, spontaneity, reduced sensation b/l feet   HEENT: NC/AT, PERRLA b/l, EOMI, moist mucous membranes, no supraclavicular/submandibular lymphadenopathy  Chest: nonTTP   Heart: S1/S2, RRR   Lungs: CTA b/l, nonlabored breathing   Abd: soft, nonTTP, nondistended, reducible umbilical bulge   Ext: residual hyperpigmented erythema  near R 1st digitand 2nd digit, tightly packed 1st and 2nd toe, no TTP, no pretibial/pedal edema, superficial abrasion on plantar midfoot of R foot, warm to touch b/l, no pain on plantarflexion/dorsiflexion,non tense, non pallor   Back: nonTTP   Pulses: soft dorsalis pedis pulses b/l   Psych: full range affect, mutual topic,linear and goal directed   Lines/tubes/drains: none       I&O's Summary    23 Jan 2025 07:01  -  24 Jan 2025 07:00  --------------------------------------------------------  IN: 820 mL / OUT: 0 mL / NET: 820 mL        LABS:                        14.7   9.60  )-----------( 314      ( 24 Jan 2025 04:43 )             47.1     01-24    140  |  109[H]  |  17  ----------------------------<  143[H]  4.2   |  19[L]  |  0.96    Ca    9.6      24 Jan 2025 04:44  Phos  3.0     01-24  Mg     2.0     01-24    TPro  7.1  /  Alb  3.1[L]  /  TBili  0.4  /  DBili  x   /  AST  29  /  ALT  33  /  AlkPhos  104  01-24    CAPILLARY BLOOD GLUCOSE      POCT Blood Glucose.: 164 mg/dL (24 Jan 2025 06:44)  POCT Blood Glucose.: 155 mg/dL (23 Jan 2025 21:51)  POCT Blood Glucose.: 290 mg/dL (23 Jan 2025 17:16)  POCT Blood Glucose.: 140 mg/dL (23 Jan 2025 12:40)  POCT Blood Glucose.: 162 mg/dL (23 Jan 2025 08:25)    PT/INR - ( 24 Jan 2025 04:43 )   PT: 12.5 sec;   INR: 1.09 ratio         PTT - ( 24 Jan 2025 04:43 )  PTT:36.6 sec      Urinalysis Basic - ( 24 Jan 2025 04:44 )    Color: x / Appearance: x / SG: x / pH: x  Gluc: 143 mg/dL / Ketone: x  / Bili: x / Urobili: x   Blood: x / Protein: x / Nitrite: x   Leuk Esterase: x / RBC: x / WBC x   Sq Epi: x / Non Sq Epi: x / Bacteria: x        Culture - Tissue with Gram Stain (collected 22 Jan 2025 17:07)  Source: Tissue  Gram Stain (23 Jan 2025 01:41):    No polymorphonuclear cells seen per low power field    No organisms seen per oil power field  Preliminary Report (23 Jan 2025 22:52):    No growth to date        MEDICATIONS  (STANDING):  aspirin enteric coated 81 milliGRAM(s) Oral daily  atorvastatin 40 milliGRAM(s) Oral at bedtime  carvedilol 6.25 milliGRAM(s) Oral every 12 hours  cefepime   IVPB 2000 milliGRAM(s) IV Intermittent every 8 hours  DAPTOmycin IVPB 650 milliGRAM(s) IV Intermittent every 24 hours  dextrose 5%. 1000 milliLiter(s) (50 mL/Hr) IV Continuous <Continuous>  dextrose 5%. 1000 milliLiter(s) (100 mL/Hr) IV Continuous <Continuous>  dextrose 50% Injectable 25 Gram(s) IV Push once  dextrose 50% Injectable 12.5 Gram(s) IV Push once  dextrose 50% Injectable 25 Gram(s) IV Push once  dextrose Oral Gel 15 Gram(s) Oral once  diphtheria/tetanus/pertussis (acellular) Vaccine (Adacel) 0.5 milliLiter(s) IntraMuscular once  glucagon  Injectable 1 milliGRAM(s) IntraMuscular once  insulin aspart (NovoLOG) Pump 1 Each SubCutaneous Continuous Pump  lidocaine   4% Patch 1 Patch Transdermal every 24 hours  lidocaine   4% Patch 1 Patch Transdermal once  lisinopril 2.5 milliGRAM(s) Oral daily  mupirocin 2% Ointment 1 Application(s) Topical every 12 hours  mycophenolic acid  milliGRAM(s) Oral daily  predniSONE   Tablet 5 milliGRAM(s) Oral daily  sodium chloride 0.9%. 1000 milliLiter(s) (100 mL/Hr) IV Continuous <Continuous>  tacrolimus 1 milliGRAM(s) Oral <User Schedule>  tacrolimus 0.5 milliGRAM(s) Oral <User Schedule>  tamsulosin 0.4 milliGRAM(s) Oral at bedtime    MEDICATIONS  (PRN):  acetaminophen     Tablet .. 650 milliGRAM(s) Oral every 6 hours PRN Temp greater or equal to 38C (100.4F), Mild Pain (1 - 3)  aluminum hydroxide/magnesium hydroxide/simethicone Suspension 30 milliLiter(s) Oral every 4 hours PRN Dyspepsia  melatonin 3 milliGRAM(s) Oral at bedtime PRN Insomnia

## 2025-01-24 NOTE — PROGRESS NOTE ADULT - PROBLEM SELECTOR PLAN 2
Patient on arrival meeting SIRS criteria with fever, tachycardia, and leukocytosis. Afebrile. Not meeting SIRS criteria. Downtrending leukocytosis. Resolution of foot pain. Exam remarkable for reducing erythema on dorsum without further TTP and increasing palpation of dorsalis pedis pulse. Open wound in interweb of 1st and 2nd toe with purulence discharge. MRI foot suggestive of inflammatory changes of partially amputated R 1st digit and developing in 2nd digit with possible phelgmon in arch. Clinically improving.

## 2025-01-24 NOTE — BRIEF OPERATIVE NOTE - SPECIMENS
Pathology: 1. Right 1st toe 2. Clean bone margins of the 1st metatarsal. 3. Bone biopsy of the 2nd proximal phalanx. Microbiology: 1. Right foot deep tissue wound culture. 2. Clean bone margins of the 1st metatarsal. 3. Bone biopsy of the 2nd proximal phalanx.

## 2025-01-24 NOTE — PROGRESS NOTE ADULT - ATTENDING COMMENTS
Discussed planned procedure. Risks and benefits addressed. Please continue to medically optimize for OR

## 2025-01-24 NOTE — PROGRESS NOTE ADULT - PROBLEM SELECTOR PLAN 7
Code: Full  Diet: NPO for OR, consistent carbohydrate and low potassium DASH   DVT ppx: holding pending OR   Dispo: pending clinical course, likely home

## 2025-01-24 NOTE — PROGRESS NOTE ADULT - PROBLEM SELECTOR PLAN 1
Patient with ostemyelitis with R foot wound probing to bone, SIRS on admission, and multiple imaging that initially equivocal, however most likely infectious. Medically optimized for surgical intervention and patient agreeable.     -Tentative 1/24 R foot I&D vs 1st toe ray resection with podiatry Dr. Bryant  -C/w cefepime 2 g IV q8 (started 1/22) and daptomycine 8 mg/kg IV q8 (started 1/22) for minimum 6 weeks for long term abx for ostemyelitis; dc on PICC   -Per podiatry and patient, planning for R 1st toe amputation but not 2nd toe   -MRI R foot showing edema of partial 1st digit amputation with reactive vs infectious changes in 2nd foot with potential phelgmon developing in subcuanteous tissue near arch   -NGTD blood cx, urine cx, abscess cx  -Abscess cx growing some staph aureus   -1/22 podiatry bedside bone biopsy, pending results   -Appreciate ID transplant consult for osteomyelitis treatment  -Per vascular, can proceed with any planned podiatry interventions

## 2025-01-24 NOTE — PROGRESS NOTE ADULT - ASSESSMENT
64M with history of HTN, DM2 with neuropathy and partial R 1st digit amputation, ESRD s/p renal transplant on tacrolimus and mycophenolate, and HDL here for complicated R foot cellulitis with osteomyelitis; currently planning for podiatric OR today.

## 2025-01-24 NOTE — BRIEF OPERATIVE NOTE - COMMENTS
Right foot incision and drainage to bone with removal of the partial 1st ray and 2nd proximal phalanx bone biopsy, partially closed   High concern for bone infection, bone a proximal resection was soft and of poor quality  Low concern for viability, adequate intraop bleeding   Partially closed with 3.0 Vicryl and 3.0 Nylon  Pod plan: Patient is stable for discharge from a podiatry standpoint pending clean bone margins showing no growth for 48 hours and final ID recs.

## 2025-01-24 NOTE — PROGRESS NOTE ADULT - SUBJECTIVE AND OBJECTIVE BOX
INFECTIOUS DISEASES FOLLOW UP-- Rere Ma  [phone]    This is a follow up note for this  64yMale with  Cellulitis of right lower extremity        ROS:  CONSTITUTIONAL:  No fever, good appetite  CARDIOVASCULAR:  No chest pain or palpitations  RESPIRATORY:  No dyspnea  GASTROINTESTINAL:  No nausea, vomiting, diarrhea, or abdominal pain  GENITOURINARY:  No dysuria  NEUROLOGIC:  No headache,     Allergies    clonidine (Other)  seasonal allergy (Other)    Intolerances        ANTIBIOTICS/RELEVANT:  antimicrobials  cefepime   IVPB 2000 milliGRAM(s) IV Intermittent every 8 hours    immunologic:  diphtheria/tetanus/pertussis (acellular) Vaccine (Adacel) 0.5 milliLiter(s) IntraMuscular once  mycophenolic acid  milliGRAM(s) Oral daily  tacrolimus 0.5 milliGRAM(s) Oral <User Schedule>    OTHER:  acetaminophen     Tablet .. 650 milliGRAM(s) Oral every 6 hours PRN  acetaminophen     Tablet .. 650 milliGRAM(s) Oral every 6 hours PRN  aluminum hydroxide/magnesium hydroxide/simethicone Suspension 30 milliLiter(s) Oral every 4 hours PRN  aspirin enteric coated 81 milliGRAM(s) Oral daily  atorvastatin 40 milliGRAM(s) Oral at bedtime  carvedilol 6.25 milliGRAM(s) Oral every 12 hours  dextrose 5%. 1000 milliLiter(s) IV Continuous <Continuous>  dextrose 5%. 1000 milliLiter(s) IV Continuous <Continuous>  dextrose 50% Injectable 25 Gram(s) IV Push once  dextrose 50% Injectable 12.5 Gram(s) IV Push once  dextrose 50% Injectable 25 Gram(s) IV Push once  dextrose Oral Gel 15 Gram(s) Oral once  glucagon  Injectable 1 milliGRAM(s) IntraMuscular once  HYDROmorphone  Injectable 0.5 milliGRAM(s) IV Push every 4 hours PRN  insulin aspart (NovoLOG) Pump 1 Each SubCutaneous Continuous Pump  lidocaine   4% Patch 1 Patch Transdermal every 24 hours  lisinopril 2.5 milliGRAM(s) Oral daily  melatonin 3 milliGRAM(s) Oral at bedtime PRN  mupirocin 2% Ointment 1 Application(s) Topical every 12 hours  oxycodone    5 mG/acetaminophen 325 mG 1 Tablet(s) Oral every 4 hours PRN  predniSONE   Tablet 5 milliGRAM(s) Oral daily  sodium chloride 0.9%. 1000 milliLiter(s) IV Continuous <Continuous>  tamsulosin 0.4 milliGRAM(s) Oral at bedtime      Objective:  Vital Signs Last 24 Hrs  T(C): 35.7 (24 Jan 2025 18:22), Max: 36.4 (24 Jan 2025 14:21)  T(F): 96.3 (24 Jan 2025 18:22), Max: 97.5 (24 Jan 2025 14:21)  HR: 75 (24 Jan 2025 18:22) (68 - 77)  BP: 137/70 (24 Jan 2025 18:22) (124/70 - 183/79)  BP(mean): 102 (24 Jan 2025 17:30) (92 - 113)  RR: 18 (24 Jan 2025 18:22) (16 - 18)  SpO2: 96% (24 Jan 2025 18:22) (96% - 100%)    Parameters below as of 24 Jan 2025 18:22  Patient On (Oxygen Delivery Method): room air        PHYSICAL EXAM:  Constitutional:no acute distress  Eyes:DIONTE, EOMI  Ear/Nose/Throat: no oral lesions, 	  Respiratory: clear BL  Cardiovascular: S1S2  Gastrointestinal:soft, (+) BS, no tenderness  Extremities:no e/e/c  No Lymphadenopathy  IV sites not inflammed.    LABS:                        14.7   9.60  )-----------( 314      ( 24 Jan 2025 04:43 )             47.1     01-24    140  |  109[H]  |  17  ----------------------------<  143[H]  4.2   |  19[L]  |  0.96    Ca    9.6      24 Jan 2025 04:44  Phos  3.0     01-24  Mg     2.0     01-24    TPro  7.1  /  Alb  3.1[L]  /  TBili  0.4  /  DBili  x   /  AST  29  /  ALT  33  /  AlkPhos  104  01-24    PT/INR - ( 24 Jan 2025 04:43 )   PT: 12.5 sec;   INR: 1.09 ratio         PTT - ( 24 Jan 2025 04:43 )  PTT:36.6 sec  Urinalysis Basic - ( 24 Jan 2025 04:44 )    Color: x / Appearance: x / SG: x / pH: x  Gluc: 143 mg/dL / Ketone: x  / Bili: x / Urobili: x   Blood: x / Protein: x / Nitrite: x   Leuk Esterase: x / RBC: x / WBC x   Sq Epi: x / Non Sq Epi: x / Bacteria: x        MICROBIOLOGY:            RECENT CULTURES:  01-22 @ 17:07  Tissue  --  --  --    No growth to date  --  01-20 @ 13:03  .Abscess  Staphylococcus aureus  Staphylococcus aureus  NATHAN    Few Staphylococcus aureus  --      RADIOLOGY & ADDITIONAL STUDIES:    < from: Xray Foot AP + Lateral + Oblique, Right (01.22.25 @ 22:28) >  IMPRESSION:  1.  No osseous destruction is appreciated.  2.  Soft tissue swelling around the 1st metatarsophalangeal joint without   appreciated gas.    < end of copied text >

## 2025-01-24 NOTE — PROGRESS NOTE ADULT - ASSESSMENT
64M with HTN T2DM on CGM and insulin pump, ESRD s/p renal transplant in 2020 on tacrolimus and mycophenolate, partial R first toe amputation presenting from podiatry office with a foot infection. Patient states that a couple days ago he was walking barefoot outside and had a pine needle stuck on the anterior plantar surface of his foot near the base of his R first toe. He had progressive reddening and pain of the R foot and went to see a podiatrist. Podiatrist had concerns that he did not have any pedal pulses and also concern for potential bone involvement and prescribed a single dose of Augmentin and sent the patient to the ED for further evaluation. At the ED the patient was febrile to 101.8F, tachycardic to 91 w/ stable BP and O2 Sat. WBC count was elevated to 15.9 w/ neutrophil predominance. XR did not reveal any signs of OM and the patient was seen by podiatry who had low suspicion for osteomyelitis but recommended a Vascular surgery consult for concerns of PAD.    Seen at bedside with wife present, patient & wife feels like they need more work-up to confirm acute on chronic osteomyelitis of the foot. Reports he has had chronic osteomyelitis from 12 years ago, requesting bone biopsy, MRI (chart review indicates unable to perform due to insulin pump), and revascularization by vascular before suggested amputation by podiatry. Transplant ID consulted for recommendations.     #Renal transplant recipient  #Leucocytosis  #Concern for R foot osteomyelitis  -Febrile on admission, afebrile today  -WBC 12 today  -Abscess Culture Results:  Few Staphylococcus aureus  -Sedimentation Rate, Erythrocyte: 110 mm/hr  -C-Reactive Protein: 229 mg/L  -MRSA/MSSA PCR: Staph aureus PCR Result: Detected  -FluA/FluB/RSV/COVID PCR: not detected  -BLOOD Culture Results: No growth at 4 days  -Urine Culture Results: No growth      Recommendations:  --Would benefit from a TDAP booster - please administer  --MRI Right Foot:  Subcortical marrow changes of the 1st proximal phalanx stump and hallux sesamoids may be reactive or reflect osteomyelitis with adjacent soft tissue changes suggesting early phlegmon formation. Faint marrow changes of the 2nd metatarsophalangeal joint may be reactive versus early infection.  --Based on NM scan & MRI findings, would treat empirically for acute osteomyelitis  --S/P Zosyn 3.375g IV Q8H   --Continue Daptomycin IV 8 mg/kg + Cefepime 2 gm IV Q8HR tentatively for 6 weeks  --Will require a PICC line OPAT   --Continue to follow CBC with diff  --Continue to follow temperature curve  --Follow up on preliminary wound cultures -  Few Staphylococcus aureus    --now s/p surgical debridement and amputation of first and partial second toe  --follow up pathology and bone cultures and tailor abx accordingly if pure MSSA on culture will be able to simplify antimicrobial regimen  --op note suggests that margins may not be clean    Marciano Ma MD  Can be called via Teams  After 5pm/weekends [phone]

## 2025-01-24 NOTE — PROGRESS NOTE ADULT - ASSESSMENT
64 year old Male with PMHx HTN T2DM on CGM and insulin pump, ESRD s/p renal transplant in 2020 on tacrolimus and mycophenolate, partial R first toe amputation presenting from podiatry office with a foot infection.        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

## 2025-01-24 NOTE — PROGRESS NOTE ADULT - ATTENDING COMMENTS
64M pmh HTN T2DM on CGM and insulin pump, ESRD s/p renal transplant in 2020 on tacrolimus and mycophenolate, partial R first toe amputation present s/p Rt foot injury admitted for sepsis 2/2 R foot wound/cellulitis/OM. MRI right foot concerning for 1st prox phalanx stump and hallux sesmoids along with 2nd metarsophalangeal joint. Plan for Right foot Incision and Drainage vs Right foot partial 1st ray resection 1/24.     #Right foot OM  - transitioned from zosyn to cefepime and daptomycin 1/22. Wound culture +Staph aurus. Will likely need PICC line  -NM scan positive. MRI concerning for OM. Clinically, podiatry able to probe to bone. F/u bone biopsy  -Patient is medically optimized. RCRI score 2  -vascular surgery recs- d/w Dr. Muniz- no need for angiogram prior to resection  -f/u path post resection    #Renal transplant  -cr 1.1 and stable  -AM tacro level  -Tacro adjustments per transplant renal.   c/w Myfortic    #DM on insulin pump  -endo consulted- insulin pump  -monitor FS, goal 120-180.     52 minutes spent. Updated wife  Jenae Pineda MD  Division of Hospital Medicine  Available on [employer] Teams.

## 2025-01-24 NOTE — PROGRESS NOTE ADULT - PROBLEM SELECTOR PLAN 5
Patient with DM2 with nephropathy, neuropathy, retinopathy here. No concerns for DKA or HHS. Hba1c 7.0. Fair glycemic control.     -C/w insulin pump here while measuring fingersticks   -C/w Eylea injection to R eye q8 wks   -Hold home dulaglutide   -Hold home empagliflozin   -Appreciate endocriniology consult for insulin pump management

## 2025-01-24 NOTE — PROGRESS NOTE ADULT - SUBJECTIVE AND OBJECTIVE BOX
[facility]   [phone]  RENAL FOLLOW UP NOTE  --------------------------------------------------------------------------------  Chief Complaint:    24 hour events/subjective:    seen earlier  prior to OR   denies sob     PAST HISTORY  --------------------------------------------------------------------------------  No significant changes to PMH, PSH, FHx, SHx, unless otherwise noted    ALLERGIES & MEDICATIONS  --------------------------------------------------------------------------------  Allergies    clonidine (Other)  seasonal allergy (Other)    Intolerances      Standing Inpatient Medications  aspirin enteric coated 81 milliGRAM(s) Oral daily  atorvastatin 40 milliGRAM(s) Oral at bedtime  carvedilol 6.25 milliGRAM(s) Oral every 12 hours  cefepime   IVPB 2000 milliGRAM(s) IV Intermittent every 8 hours  DAPTOmycin IVPB 800 milliGRAM(s) IV Intermittent every 24 hours  dextrose 5%. 1000 milliLiter(s) IV Continuous <Continuous>  dextrose 5%. 1000 milliLiter(s) IV Continuous <Continuous>  dextrose 50% Injectable 25 Gram(s) IV Push once  dextrose 50% Injectable 12.5 Gram(s) IV Push once  dextrose 50% Injectable 25 Gram(s) IV Push once  dextrose Oral Gel 15 Gram(s) Oral once  diphtheria/tetanus/pertussis (acellular) Vaccine (Adacel) 0.5 milliLiter(s) IntraMuscular once  glucagon  Injectable 1 milliGRAM(s) IntraMuscular once  insulin aspart (NovoLOG) Pump 1 Each SubCutaneous Continuous Pump  lidocaine   4% Patch 1 Patch Transdermal every 24 hours  lisinopril 2.5 milliGRAM(s) Oral daily  mupirocin 2% Ointment 1 Application(s) Topical every 12 hours  mycophenolic acid  milliGRAM(s) Oral daily  predniSONE   Tablet 5 milliGRAM(s) Oral daily  sodium chloride 0.9%. 1000 milliLiter(s) IV Continuous <Continuous>  tacrolimus 0.5 milliGRAM(s) Oral <User Schedule>  tamsulosin 0.4 milliGRAM(s) Oral at bedtime    PRN Inpatient Medications  acetaminophen     Tablet .. 650 milliGRAM(s) Oral every 6 hours PRN  acetaminophen     Tablet .. 650 milliGRAM(s) Oral every 6 hours PRN  aluminum hydroxide/magnesium hydroxide/simethicone Suspension 30 milliLiter(s) Oral every 4 hours PRN  HYDROmorphone  Injectable 0.5 milliGRAM(s) IV Push every 4 hours PRN  melatonin 3 milliGRAM(s) Oral at bedtime PRN  oxycodone    5 mG/acetaminophen 325 mG 1 Tablet(s) Oral every 4 hours PRN      REVIEW OF SYSTEMS  --------------------------------------------------------------------------------    Gen: denies  fevers/chills, or HA or dizziness   CVS: denies chest pain/palpitations  Resp: denies SOB/Cough  GI: Denies N/V/Abd pain  : Denies dysuria/oliguria/hematuria    VITALS/PHYSICAL EXAM  --------------------------------------------------------------------------------  T(C): 35.7 (01-24-25 @ 18:22), Max: 36.4 (01-24-25 @ 14:21)  HR: 72 (01-24-25 @ 21:00) (68 - 77)  BP: 113/62 (01-24-25 @ 21:00) (113/62 - 183/79)  RR: 18 (01-24-25 @ 18:22) (16 - 18)  SpO2: 96% (01-24-25 @ 18:22) (96% - 100%)  Wt(kg): --  Height (cm): 174 (01-24-25 @ 14:21)  Weight (kg): 82.8 (01-24-25 @ 14:21)  BMI (kg/m2): 27.3 (01-24-25 @ 14:21)  BSA (m2): 1.98 (01-24-25 @ 14:21)      01-23-25 @ 07:01  -  01-24-25 @ 07:00  --------------------------------------------------------  IN: 820 mL / OUT: 0 mL / NET: 820 mL    01-24-25 @ 07:01  -  01-24-25 @ 22:27  --------------------------------------------------------  IN: 0 mL / OUT: 260 mL / NET: -260 mL      Physical Exam:  	    	Gen: Non toxic comfortable appearing   	Pulm: decrease bs  no rales or ronchi or wheezing  	CV: No JVD. RRR, S1S2; no rub  	Abd: +BS, soft, nontender/nondistended  	: No suprapubic tenderness renal graft site non tender   	UE: Warm, no cyanosis  no clubbing,  no edema  	LE: Warm, erythema  foot with dressing       LABS/STUDIES  --------------------------------------------------------------------------------              14.7   9.60  >-----------<  314      [01-24-25 @ 04:43]              47.1     140  |  109  |  17  ----------------------------<  143      [01-24-25 @ 04:44]  4.2   |  19  |  0.96        Ca     9.6     [01-24-25 @ 04:44]      Mg     2.0     [01-24-25 @ 04:44]      Phos  3.0     [01-24-25 @ 04:44]    TPro  7.1  /  Alb  3.1  /  TBili  0.4  /  DBili  x   /  AST  29  /  ALT  33  /  AlkPhos  104  [01-24-25 @ 04:44]    PT/INR: PT 12.5 , INR 1.09       [01-24-25 @ 04:43]  PTT: 36.6       [01-24-25 @ 04:43]      Creatinine Trend:  SCr 0.96 [01-24 @ 04:44]  SCr 1.24 [01-22 @ 07:05]  SCr 1.20 [01-21 @ 07:14]  SCr 1.33 [01-20 @ 07:04]  SCr 1.19 [01-19 @ 06:56]    Tacrolimus (), Serum: 7.9 ng/mL (01-24 @ 08:55)  Tacrolimus (), Serum: 8.0 ng/mL (01-23 @ 07:00)  Tacrolimus (), Serum: 10.0 ng/mL (01-22 @ 07:05)  Tacrolimus (), Serum: 9.4 ng/mL (01-21 @ 20:50)

## 2025-01-24 NOTE — BRIEF OPERATIVE NOTE - OPERATION/FINDINGS
Right foot incision and drainage to bone with removal of the partial 1st ray and 2nd proximal phalanx bone biopsy, partially closed   High concern for bone infection, bone a proximal resection was soft and of poor quality  Low concern for viability, adequate intraop bleeding   Partially closed with 3.0 Vicryl and 3.0 Nylon

## 2025-01-24 NOTE — PROGRESS NOTE ADULT - SUBJECTIVE AND OBJECTIVE BOX
Podiatry Pager #: [phone]    Patient is a 64y old  Male who presents with a chief complaint of scar tinfection (23 Jan 2025 21:25)      INTERVAL HPI/OVERNIGHT EVENTS:   Pt is scheduled for right foot partial first ray resection with Dr. Hylton at 2pm. Patient is aware of procedure and is NPO since midnight.    MEDICATIONS  (STANDING):  aspirin enteric coated 81 milliGRAM(s) Oral daily  atorvastatin 40 milliGRAM(s) Oral at bedtime  carvedilol 6.25 milliGRAM(s) Oral every 12 hours  cefepime   IVPB 2000 milliGRAM(s) IV Intermittent every 8 hours  DAPTOmycin IVPB 650 milliGRAM(s) IV Intermittent every 24 hours  dextrose 5%. 1000 milliLiter(s) (50 mL/Hr) IV Continuous <Continuous>  dextrose 5%. 1000 milliLiter(s) (100 mL/Hr) IV Continuous <Continuous>  dextrose 50% Injectable 25 Gram(s) IV Push once  dextrose 50% Injectable 12.5 Gram(s) IV Push once  dextrose 50% Injectable 25 Gram(s) IV Push once  dextrose Oral Gel 15 Gram(s) Oral once  diphtheria/tetanus/pertussis (acellular) Vaccine (Adacel) 0.5 milliLiter(s) IntraMuscular once  glucagon  Injectable 1 milliGRAM(s) IntraMuscular once  insulin aspart (NovoLOG) Pump 1 Each SubCutaneous Continuous Pump  lidocaine   4% Patch 1 Patch Transdermal every 24 hours  lidocaine   4% Patch 1 Patch Transdermal once  lisinopril 2.5 milliGRAM(s) Oral daily  mupirocin 2% Ointment 1 Application(s) Topical every 12 hours  mycophenolic acid  milliGRAM(s) Oral daily  predniSONE   Tablet 5 milliGRAM(s) Oral daily  sodium chloride 0.9%. 1000 milliLiter(s) (100 mL/Hr) IV Continuous <Continuous>  tacrolimus 1 milliGRAM(s) Oral <User Schedule>  tacrolimus 0.5 milliGRAM(s) Oral <User Schedule>  tamsulosin 0.4 milliGRAM(s) Oral at bedtime    MEDICATIONS  (PRN):  acetaminophen     Tablet .. 650 milliGRAM(s) Oral every 6 hours PRN Temp greater or equal to 38C (100.4F), Mild Pain (1 - 3)  aluminum hydroxide/magnesium hydroxide/simethicone Suspension 30 milliLiter(s) Oral every 4 hours PRN Dyspepsia  melatonin 3 milliGRAM(s) Oral at bedtime PRN Insomnia      Allergies    clonidine (Other)  seasonal allergy (Other)    Intolerances        Vital Signs Last 24 Hrs  T(C): 36.2 (24 Jan 2025 05:49), Max: 36.3 (23 Jan 2025 12:33)  T(F): 97.1 (24 Jan 2025 05:49), Max: 97.4 (23 Jan 2025 12:33)  HR: 71 (24 Jan 2025 05:49) (71 - 74)  BP: 132/70 (24 Jan 2025 05:49) (124/74 - 143/74)  BP(mean): --  RR: 18 (24 Jan 2025 05:49) (18 - 18)  SpO2: 99% (24 Jan 2025 05:49) (95% - 99%)    Parameters below as of 24 Jan 2025 05:49  Patient On (Oxygen Delivery Method): room air        LABS:                        14.7   9.60  )-----------( 314      ( 24 Jan 2025 04:43 )             47.1     01-24    140  |  109[H]  |  17  ----------------------------<  143[H]  4.2   |  19[L]  |  0.96    Ca    9.6      24 Jan 2025 04:44  Phos  3.0     01-24  Mg     2.0     01-24    TPro  7.1  /  Alb  3.1[L]  /  TBili  0.4  /  DBili  x   /  AST  29  /  ALT  33  /  AlkPhos  104  01-24    PT/INR - ( 24 Jan 2025 04:43 )   PT: 12.5 sec;   INR: 1.09 ratio         PTT - ( 24 Jan 2025 04:43 )  PTT:36.6 sec  Urinalysis Basic - ( 24 Jan 2025 04:44 )    Color: x / Appearance: x / SG: x / pH: x  Gluc: 143 mg/dL / Ketone: x  / Bili: x / Urobili: x   Blood: x / Protein: x / Nitrite: x   Leuk Esterase: x / RBC: x / WBC x   Sq Epi: x / Non Sq Epi: x / Bacteria: x      CAPILLARY BLOOD GLUCOSE      POCT Blood Glucose.: 155 mg/dL (23 Jan 2025 21:51)  POCT Blood Glucose.: 290 mg/dL (23 Jan 2025 17:16)  POCT Blood Glucose.: 140 mg/dL (23 Jan 2025 12:40)  POCT Blood Glucose.: 162 mg/dL (23 Jan 2025 08:25)      RADIOLOGY & ADDITIONAL TESTS:    Plan:   To OR today at 2pm with Dr. Hylton for right foot partial first ray resection.   CXR on sunrise.  EKG on sunrise.  Medical/Cardiac clearance since 1/21 and documented in chart.  Consent signed and in chart.  Procedure was explained to patient in detail. All alternatives, risks and complications were discussed. All questions answered.

## 2025-01-25 LAB
ANION GAP SERPL CALC-SCNC: 11 MMOL/L — SIGNIFICANT CHANGE UP (ref 5–17)
BUN SERPL-MCNC: 16 MG/DL — SIGNIFICANT CHANGE UP (ref 7–23)
CALCIUM SERPL-MCNC: 9.7 MG/DL — SIGNIFICANT CHANGE UP (ref 8.4–10.5)
CHLORIDE SERPL-SCNC: 109 MMOL/L — HIGH (ref 96–108)
CK SERPL-CCNC: 39 U/L — SIGNIFICANT CHANGE UP (ref 30–200)
CO2 SERPL-SCNC: 20 MMOL/L — LOW (ref 22–31)
CREAT SERPL-MCNC: 1.01 MG/DL — SIGNIFICANT CHANGE UP (ref 0.5–1.3)
CULTURE RESULTS: ABNORMAL
EGFR: 84 ML/MIN/1.73M2 — SIGNIFICANT CHANGE UP
GLUCOSE BLDC GLUCOMTR-MCNC: 140 MG/DL — HIGH (ref 70–99)
GLUCOSE BLDC GLUCOMTR-MCNC: 158 MG/DL — HIGH (ref 70–99)
GLUCOSE BLDC GLUCOMTR-MCNC: 160 MG/DL — HIGH (ref 70–99)
GLUCOSE SERPL-MCNC: 139 MG/DL — HIGH (ref 70–99)
HCT VFR BLD CALC: 48.3 % — SIGNIFICANT CHANGE UP (ref 39–50)
HGB BLD-MCNC: 15.1 G/DL — SIGNIFICANT CHANGE UP (ref 13–17)
MAGNESIUM SERPL-MCNC: 1.9 MG/DL — SIGNIFICANT CHANGE UP (ref 1.6–2.6)
MCHC RBC-ENTMCNC: 30.3 PG — SIGNIFICANT CHANGE UP (ref 27–34)
MCHC RBC-ENTMCNC: 31.3 G/DL — LOW (ref 32–36)
MCV RBC AUTO: 96.8 FL — SIGNIFICANT CHANGE UP (ref 80–100)
NRBC # BLD: 0 /100 WBCS — SIGNIFICANT CHANGE UP (ref 0–0)
NRBC BLD-RTO: 0 /100 WBCS — SIGNIFICANT CHANGE UP (ref 0–0)
ORGANISM # SPEC MICROSCOPIC CNT: ABNORMAL
ORGANISM # SPEC MICROSCOPIC CNT: ABNORMAL
PHOSPHATE SERPL-MCNC: 2.8 MG/DL — SIGNIFICANT CHANGE UP (ref 2.5–4.5)
PLATELET # BLD AUTO: 329 K/UL — SIGNIFICANT CHANGE UP (ref 150–400)
POTASSIUM SERPL-MCNC: 4.5 MMOL/L — SIGNIFICANT CHANGE UP (ref 3.5–5.3)
POTASSIUM SERPL-SCNC: 4.5 MMOL/L — SIGNIFICANT CHANGE UP (ref 3.5–5.3)
RBC # BLD: 4.99 M/UL — SIGNIFICANT CHANGE UP (ref 4.2–5.8)
RBC # FLD: 13.6 % — SIGNIFICANT CHANGE UP (ref 10.3–14.5)
SODIUM SERPL-SCNC: 140 MMOL/L — SIGNIFICANT CHANGE UP (ref 135–145)
SPECIMEN SOURCE: SIGNIFICANT CHANGE UP
TACROLIMUS SERPL-MCNC: 9.4 NG/ML — SIGNIFICANT CHANGE UP
WBC # BLD: 9.73 K/UL — SIGNIFICANT CHANGE UP (ref 3.8–10.5)
WBC # FLD AUTO: 9.73 K/UL — SIGNIFICANT CHANGE UP (ref 3.8–10.5)

## 2025-01-25 PROCEDURE — 99232 SBSQ HOSP IP/OBS MODERATE 35: CPT | Mod: GC

## 2025-01-25 RX ORDER — NAPROXEN 500 MG
250 TABLET ORAL DAILY
Refills: 0 | Status: DISCONTINUED | OUTPATIENT
Start: 2025-01-25 | End: 2025-01-28

## 2025-01-25 RX ADMIN — ACETAMINOPHEN, DIPHENHYDRAMINE HCL, PHENYLEPHRINE HCL 3 MILLIGRAM(S): 325; 25; 5 TABLET ORAL at 22:03

## 2025-01-25 RX ADMIN — PREDNISONE 5 MILLIGRAM(S): 5 TABLET ORAL at 06:32

## 2025-01-25 RX ADMIN — TACROLIMUS 0.5 MILLIGRAM(S): 1 CAPSULE, GELATIN COATED ORAL at 22:04

## 2025-01-25 RX ADMIN — TAMSULOSIN HYDROCHLORIDE 0.4 MILLIGRAM(S): 0.4 CAPSULE ORAL at 22:04

## 2025-01-25 RX ADMIN — MYCOPHENOLIC ACID 360 MILLIGRAM(S): 180 TABLET, DELAYED RELEASE ORAL at 11:35

## 2025-01-25 RX ADMIN — MUPIROCIN 1 APPLICATION(S): 2 CREAM TOPICAL at 18:00

## 2025-01-25 RX ADMIN — MUPIROCIN 1 APPLICATION(S): 2 CREAM TOPICAL at 06:18

## 2025-01-25 RX ADMIN — Medication 6.25 MILLIGRAM(S): at 06:32

## 2025-01-25 RX ADMIN — Medication 6.25 MILLIGRAM(S): at 17:59

## 2025-01-25 RX ADMIN — DAPTOMYCIN 132 MILLIGRAM(S): 350 INJECTION, POWDER, LYOPHILIZED, FOR SOLUTION INTRAVENOUS at 06:18

## 2025-01-25 RX ADMIN — ATORVASTATIN CALCIUM 40 MILLIGRAM(S): 80 TABLET, FILM COATED ORAL at 22:04

## 2025-01-25 RX ADMIN — ASPIRIN 81 MILLIGRAM(S): 81 TABLET, COATED ORAL at 11:35

## 2025-01-25 RX ADMIN — Medication 2.5 MILLIGRAM(S): at 06:18

## 2025-01-25 RX ADMIN — Medication 100 MILLIGRAM(S): at 22:04

## 2025-01-25 RX ADMIN — Medication 100 MILLIGRAM(S): at 14:00

## 2025-01-25 RX ADMIN — Medication 100 MILLIGRAM(S): at 06:18

## 2025-01-25 RX ADMIN — TACROLIMUS 1 MILLIGRAM(S): 1 CAPSULE, GELATIN COATED ORAL at 08:14

## 2025-01-25 NOTE — PROGRESS NOTE ADULT - ASSESSMENT
64M s/p R foot partial first ray resection, partially open (DOS: 1/24)  - Pt seen and evaluated.  - Afebrile, No Leukocytosis   - 1/24 s/p R foot partial first ray resection, partially open plantar sanguinous drainage, flaps warm, no purulence no malodor erythema resolving, sutures intact. L foot 3rd digit plantar sulcus wound to subQ, no acute signs of infection.   - OR data pending   - Low concern residual infection or viabilty   - Ok to apply wound VAC today 1/25  - Stable for d/c from pod sunday 1/26 pending 48h no gorwth on clean margins   - Follow up and wound care listed in d/c note provider   - Discussed with Attending

## 2025-01-25 NOTE — PROGRESS NOTE ADULT - ATTENDING COMMENTS
64M pmh HTN T2DM on CGM and insulin pump, ESRD s/p renal transplant in 2020 on tacrolimus and mycophenolate, partial R first toe amputation present s/p Rt foot injury admitted for sepsis 2/2 R foot wound/cellulitis/OM. MRI right foot concerning for 1st prox phalanx stump and hallux sesmoids along with 2nd metarsophalangeal joint. Underwent parital 1st ray resection and 2nd prox phalanx bone biopsy.     #Right foot OM  - cw cefepime and daptomycin 1/22-. Wound culture +Staph aurus. Will likely need PICC line  -f/u 2nd prox phalax bone biopsy 1/24  -vascular surgery recs- d/w Dr. Muniz- no need for angiogram prior to resection  -f/u path post resection and ID recs for abx course  -Will need PT eval    #Renal transplant  -cr 1.1 and stable  -AM tacro level  -Tacro adjustments per transplant renal.   c/w Myfortic    #DM on insulin pump  -endo consulted- insulin pump  -monitor FS, goal 120-180.     40 minutes spent.   Jenae Pineda MD  Division of Hospital Medicine  Available on [employer] Teams.

## 2025-01-25 NOTE — PROGRESS NOTE ADULT - PROBLEM SELECTOR PLAN 2
#RESOLVE D  Patient on arrival meeting SIRS criteria with fever, tachycardia, and leukocytosis. Afebrile. Not meeting SIRS criteria. Downtrending leukocytosis. Resolution of foot pain. Exam remarkable for reducing erythema on dorsum without further TTP and increasing palpation of dorsalis pedis pulse. Open wound in interweb of 1st and 2nd toe with purulence discharge. MRI foot suggestive of inflammatory changes of partially amputated R 1st digit and developing in 2nd digit with possible phelgmon in arch. Clinically improving.

## 2025-01-25 NOTE — PROGRESS NOTE ADULT - SUBJECTIVE AND OBJECTIVE BOX
Patient is a 64y old  Male who presents with a chief complaint of scar tinfection (25 Jan 2025 09:51)       INTERVAL HPI/OVERNIGHT EVENTS:  Patient seen and evaluated at bedside.  Pt is resting comfortable in NAD. Denies N/V/F/C.      Allergies    clonidine (Other)  seasonal allergy (Other)    Intolerances        Vital Signs Last 24 Hrs  T(C): 36.2 (25 Jan 2025 04:16), Max: 36.5 (24 Jan 2025 19:30)  T(F): 97.2 (25 Jan 2025 04:16), Max: 97.7 (24 Jan 2025 19:30)  HR: 73 (25 Jan 2025 06:00) (66 - 87)  BP: 111/56 (25 Jan 2025 06:00) (105/57 - 183/79)  BP(mean): 102 (24 Jan 2025 17:30) (92 - 113)  RR: 18 (25 Jan 2025 04:16) (16 - 18)  SpO2: 99% (25 Jan 2025 04:16) (93% - 100%)    Parameters below as of 25 Jan 2025 04:16  Patient On (Oxygen Delivery Method): room air        LABS:                        15.1   9.73  )-----------( 329      ( 25 Jan 2025 06:52 )             48.3     01-25    140  |  109[H]  |  16  ----------------------------<  139[H]  4.5   |  20[L]  |  1.01    Ca    9.7      25 Jan 2025 06:52  Phos  2.8     01-25  Mg     1.9     01-25    TPro  7.1  /  Alb  3.1[L]  /  TBili  0.4  /  DBili  x   /  AST  29  /  ALT  33  /  AlkPhos  104  01-24    PT/INR - ( 24 Jan 2025 04:43 )   PT: 12.5 sec;   INR: 1.09 ratio         PTT - ( 24 Jan 2025 04:43 )  PTT:36.6 sec  Urinalysis Basic - ( 25 Jan 2025 06:52 )    Color: x / Appearance: x / SG: x / pH: x  Gluc: 139 mg/dL / Ketone: x  / Bili: x / Urobili: x   Blood: x / Protein: x / Nitrite: x   Leuk Esterase: x / RBC: x / WBC x   Sq Epi: x / Non Sq Epi: x / Bacteria: x      CAPILLARY BLOOD GLUCOSE      POCT Blood Glucose.: 268 mg/dL (24 Jan 2025 21:44)  POCT Blood Glucose.: 196 mg/dL (24 Jan 2025 18:50)  POCT Blood Glucose.: 129 mg/dL (24 Jan 2025 16:23)  POCT Blood Glucose.: 122 mg/dL (24 Jan 2025 14:25)  POCT Blood Glucose.: 157 mg/dL (24 Jan 2025 12:51)      Lower Extremity Physical Exam:  Vascular: DP/PT 0/4, B/L, CFT <3 seconds B/L, Temperature gradient warm to warm R, .   Neuro: Epicritic sensation diminished to the level of digits B/L.  Musculoskeletal/Ortho: s/p R hallux partial amputation, healed  Skin: 1/24 s/p R foot partial first ray resection, partially open plantar sanguinous drainage, flaps warm, no purulence no malodor erythema resolving, sutures intact. L foot 3rd digit plantar sulcus wound to subQ, no acute signs of infection.     RADIOLOGY & ADDITIONAL TESTS:

## 2025-01-25 NOTE — PROGRESS NOTE ADULT - PROBLEM SELECTOR PLAN 7
Code: Full  Diet:  consistent carbohydrate and low potassium DASH   DVT ppx: holding pending OR   Dispo: pending clinical course, likely home

## 2025-01-25 NOTE — PROGRESS NOTE ADULT - SUBJECTIVE AND OBJECTIVE BOX
INPATIENT MEDICINE PROGRESS NOTE:   Authored by Kerri Villegas MD     HISTORY OF PRESENT ILLNESS:  64F with history of DM2 on insulin pump with neuropathy and partial R 1st digit amputation, ESRD s/p renal transplant on tacrolimus and mycophenolate mofetil, asthma, HDL presents to [facility]-ED sent by podiatry due to R foot redness and tenderness with blister.     NAEON. Afebrile. HR 60s-80s. SBPs 100s-150s. RA. Had foot surgery yesterday. POCT 164/157/129/268.     Seen and examined at bedside, patient reports feeling well and was told that the big toe was mushy in the OR. Doesn't feel in increased pain, however however some irritaiton in the R heel from the wrapping. Is glad that this was treated before it got worse. Eating, voiding. Was able to get OOB with assistance to use bathroom walking on heel.     PHYSICAL EXAM:  Vital Signs Last 24 Hrs  T(C): 36.2 (25 Jan 2025 04:16), Max: 36.5 (24 Jan 2025 19:30)  T(F): 97.2 (25 Jan 2025 04:16), Max: 97.7 (24 Jan 2025 19:30)  HR: 73 (25 Jan 2025 06:00) (66 - 87)  BP: 111/56 (25 Jan 2025 06:00) (105/57 - 183/79)  BP(mean): 102 (24 Jan 2025 17:30) (92 - 113)  RR: 18 (25 Jan 2025 04:16) (16 - 18)  SpO2: 99% (25 Jan 2025 04:16) (93% - 100%)    Parameters below as of 25 Jan 2025 04:16  Patient On (Oxygen Delivery Method): room air      General: NAD, calm, comfortable, cooperative, obese habitus, tattoos, supine on hospital bed  Neuro: awake, alert, oriented to person/place/time, 5/5  strength, 5/5 hip flexion/extension b/l, spontaneity, reduced sensation b/l feet   HEENT: NC/AT, PERRLA b/l, EOMI, moist mucous membranes, no supraclavicular/submandibular lymphadenopathy  Chest: nonTTP   Heart: S1/S2, RRR   Lungs: CTA b/l, nonlabored breathing   Abd: soft, nonTTP, nondistended, reducible umbilical bulge   Ext: wrapped up R foot, no further presence of R 1st digit, 2nd digit remains present   Back: nonTTP   Pulses: soft dorsalis pedis pulses b/l   Psych: full range affect, mutual topic,linear and goal directed   Lines/tubes/drains: none       I&O's Summary    24 Jan 2025 07:01  -  25 Jan 2025 07:00  --------------------------------------------------------  IN: 150 mL / OUT: 260 mL / NET: -110 mL        LABS:                        15.1   9.73  )-----------( 329      ( 25 Jan 2025 06:52 )             48.3     01-25    140  |  109[H]  |  16  ----------------------------<  139[H]  4.5   |  20[L]  |  1.01    Ca    9.7      25 Jan 2025 06:52  Phos  2.8     01-25  Mg     1.9     01-25    TPro  7.1  /  Alb  3.1[L]  /  TBili  0.4  /  DBili  x   /  AST  29  /  ALT  33  /  AlkPhos  104  01-24    CAPILLARY BLOOD GLUCOSE      POCT Blood Glucose.: 268 mg/dL (24 Jan 2025 21:44)  POCT Blood Glucose.: 196 mg/dL (24 Jan 2025 18:50)  POCT Blood Glucose.: 129 mg/dL (24 Jan 2025 16:23)  POCT Blood Glucose.: 122 mg/dL (24 Jan 2025 14:25)  POCT Blood Glucose.: 157 mg/dL (24 Jan 2025 12:51)    PT/INR - ( 24 Jan 2025 04:43 )   PT: 12.5 sec;   INR: 1.09 ratio         PTT - ( 24 Jan 2025 04:43 )  PTT:36.6 sec      Urinalysis Basic - ( 25 Jan 2025 06:52 )    Color: x / Appearance: x / SG: x / pH: x  Gluc: 139 mg/dL / Ketone: x  / Bili: x / Urobili: x   Blood: x / Protein: x / Nitrite: x   Leuk Esterase: x / RBC: x / WBC x   Sq Epi: x / Non Sq Epi: x / Bacteria: x        Culture - Wound Aerobic/Anaerobic (collected 24 Jan 2025 17:51)  Source: .Surgical Swab  Gram Stain (25 Jan 2025 03:14):    No polymorphonuclear cells seen per low power field    No organisms seen per oil power field    Culture - Tissue with Gram Stain (collected 24 Jan 2025 17:51)  Source: Tissue  Gram Stain (25 Jan 2025 03:13):    No polymorphonuclear cells seen per low power field    No organisms seen per oil power field    Culture - Tissue with Gram Stain (collected 22 Jan 2025 17:07)  Source: Tissue  Gram Stain (23 Jan 2025 01:41):    No polymorphonuclear cells seen per low power field    No organisms seen per oil power field  Preliminary Report (23 Jan 2025 22:52):    No growth to date        MEDICATIONS  (STANDING):  aspirin enteric coated 81 milliGRAM(s) Oral daily  atorvastatin 40 milliGRAM(s) Oral at bedtime  carvedilol 6.25 milliGRAM(s) Oral every 12 hours  cefepime   IVPB 2000 milliGRAM(s) IV Intermittent every 8 hours  DAPTOmycin IVPB 800 milliGRAM(s) IV Intermittent every 24 hours  dextrose 5%. 1000 milliLiter(s) (100 mL/Hr) IV Continuous <Continuous>  dextrose 5%. 1000 milliLiter(s) (50 mL/Hr) IV Continuous <Continuous>  dextrose 50% Injectable 25 Gram(s) IV Push once  dextrose 50% Injectable 12.5 Gram(s) IV Push once  dextrose 50% Injectable 25 Gram(s) IV Push once  dextrose Oral Gel 15 Gram(s) Oral once  diphtheria/tetanus/pertussis (acellular) Vaccine (Adacel) 0.5 milliLiter(s) IntraMuscular once  glucagon  Injectable 1 milliGRAM(s) IntraMuscular once  insulin aspart (NovoLOG) Pump 1 Each SubCutaneous Continuous Pump  lidocaine   4% Patch 1 Patch Transdermal every 24 hours  lisinopril 2.5 milliGRAM(s) Oral daily  mupirocin 2% Ointment 1 Application(s) Topical every 12 hours  mycophenolic acid  milliGRAM(s) Oral daily  predniSONE   Tablet 5 milliGRAM(s) Oral daily  sodium chloride 0.9%. 1000 milliLiter(s) (100 mL/Hr) IV Continuous <Continuous>  tacrolimus 0.5 milliGRAM(s) Oral <User Schedule>  tacrolimus 1 milliGRAM(s) Oral <User Schedule>  tamsulosin 0.4 milliGRAM(s) Oral at bedtime    MEDICATIONS  (PRN):  acetaminophen     Tablet .. 650 milliGRAM(s) Oral every 6 hours PRN Mild Pain (1 - 3)  acetaminophen     Tablet .. 650 milliGRAM(s) Oral every 6 hours PRN Temp greater or equal to 38C (100.4F), Mild Pain (1 - 3)  aluminum hydroxide/magnesium hydroxide/simethicone Suspension 30 milliLiter(s) Oral every 4 hours PRN Dyspepsia  HYDROmorphone  Injectable 0.5 milliGRAM(s) IV Push every 4 hours PRN Severe Pain (7 - 10)  melatonin 3 milliGRAM(s) Oral at bedtime PRN Insomnia  oxycodone    5 mG/acetaminophen 325 mG 1 Tablet(s) Oral every 4 hours PRN Moderate Pain (4 - 6)

## 2025-01-25 NOTE — PROGRESS NOTE ADULT - PROBLEM SELECTOR PLAN 1
Patient with ostemyelitis with R foot wound probing to bone, SIRS on admission, and multiple imaging that initially equivocal, however most likely infectious. Medically optimized for surgical intervention and patient agreeable.     -1/24 R foot 1st toe I&D and partial 1st toe ray resection with podiatry Dr. Bryant with partial open wound; f/u wound care   -Pending PT   -Pending bone biopsy (1st toe and 2nd toe)   -C/w cefepime 2 g IV q8 (started 1/22) and daptomycine 8 mg/kg IV q8 (started 1/22) for minimum 6 weeks for long term abx for ostemyelitis; dc on PICC   -Per podiatry and patient, planning for R 1st toe amputation but not 2nd toe   -MRI R foot showing edema of partial 1st digit amputation with reactive vs infectious changes in 2nd foot with potential phelgmon developing in subcuanteous tissue near arch   -NGTD blood cx, urine cx, abscess cx  -Abscess cx growing some staph aureus   -Pain control with naproxen 250 mg po qd PRN   -1/22 podiatry bedside bone biopsy, pending results   -Appreciate ID transplant consult for osteomyelitis treatment  -Per vascular, can proceed with any planned podiatry interventions Patient with ostemyelitis with R foot wound probing to bone, SIRS on admission, and multiple imaging that initially equivocal, however most likely infectious. Medically optimized for surgical intervention and patient agreeable.     -1/24 R foot 1st toe I&D and partial 1st toe ray resection with podiatry Dr. Bryant with partial open wound; f/u wound care   -Pending PT   -Pending bone biopsy (1st toe and 2nd toe) and wound cx   -C/w cefepime 2 g IV q8 (started 1/22) and daptomycine 8 mg/kg IV q8 (started 1/22) for minimum 6 weeks for long term abx for ostemyelitis; dc on PICC   -Per podiatry and patient, planning for R 1st toe amputation but not 2nd toe   -MRI R foot showing edema of partial 1st digit amputation with reactive vs infectious changes in 2nd foot with potential phelgmon developing in subcuanteous tissue near arch   -NGTD blood cx, urine cx, abscess cx  -Abscess cx growing some staph aureus   -Pain control with naproxen 250 mg po qd PRN   -1/22 podiatry bedside bone biopsy, pending results   -Appreciate ID transplant consult for osteomyelitis treatment  -Per vascular, can proceed with any planned podiatry interventions

## 2025-01-25 NOTE — PROGRESS NOTE ADULT - ASSESSMENT
64M with history of HTN, DM2 with neuropathy and partial R 1st digit amputation, ESRD s/p renal transplant on tacrolimus and mycophenolate, and HDL here for complicated R foot cellulitis with osteomyelitis; s/p R 1st toe partial ray resection with I&D,

## 2025-01-26 LAB
ANION GAP SERPL CALC-SCNC: 14 MMOL/L — SIGNIFICANT CHANGE UP (ref 5–17)
BUN SERPL-MCNC: 17 MG/DL — SIGNIFICANT CHANGE UP (ref 7–23)
CALCIUM SERPL-MCNC: 9.7 MG/DL — SIGNIFICANT CHANGE UP (ref 8.4–10.5)
CHLORIDE SERPL-SCNC: 105 MMOL/L — SIGNIFICANT CHANGE UP (ref 96–108)
CO2 SERPL-SCNC: 19 MMOL/L — LOW (ref 22–31)
CREAT SERPL-MCNC: 1.03 MG/DL — SIGNIFICANT CHANGE UP (ref 0.5–1.3)
EGFR: 82 ML/MIN/1.73M2 — SIGNIFICANT CHANGE UP
GLUCOSE BLDC GLUCOMTR-MCNC: 130 MG/DL — HIGH (ref 70–99)
GLUCOSE BLDC GLUCOMTR-MCNC: 140 MG/DL — HIGH (ref 70–99)
GLUCOSE BLDC GLUCOMTR-MCNC: 151 MG/DL — HIGH (ref 70–99)
GLUCOSE BLDC GLUCOMTR-MCNC: 184 MG/DL — HIGH (ref 70–99)
GLUCOSE SERPL-MCNC: 144 MG/DL — HIGH (ref 70–99)
HCT VFR BLD CALC: 44.6 % — SIGNIFICANT CHANGE UP (ref 39–50)
HGB BLD-MCNC: 14.3 G/DL — SIGNIFICANT CHANGE UP (ref 13–17)
MCHC RBC-ENTMCNC: 30.7 PG — SIGNIFICANT CHANGE UP (ref 27–34)
MCHC RBC-ENTMCNC: 32.1 G/DL — SIGNIFICANT CHANGE UP (ref 32–36)
MCV RBC AUTO: 95.7 FL — SIGNIFICANT CHANGE UP (ref 80–100)
NRBC # BLD: 0 /100 WBCS — SIGNIFICANT CHANGE UP (ref 0–0)
NRBC BLD-RTO: 0 /100 WBCS — SIGNIFICANT CHANGE UP (ref 0–0)
PLATELET # BLD AUTO: 323 K/UL — SIGNIFICANT CHANGE UP (ref 150–400)
POTASSIUM SERPL-MCNC: 4.3 MMOL/L — SIGNIFICANT CHANGE UP (ref 3.5–5.3)
POTASSIUM SERPL-SCNC: 4.3 MMOL/L — SIGNIFICANT CHANGE UP (ref 3.5–5.3)
RBC # BLD: 4.66 M/UL — SIGNIFICANT CHANGE UP (ref 4.2–5.8)
RBC # FLD: 13.2 % — SIGNIFICANT CHANGE UP (ref 10.3–14.5)
SODIUM SERPL-SCNC: 138 MMOL/L — SIGNIFICANT CHANGE UP (ref 135–145)
TACROLIMUS SERPL-MCNC: 10.6 NG/ML — SIGNIFICANT CHANGE UP
WBC # BLD: 7.89 K/UL — SIGNIFICANT CHANGE UP (ref 3.8–10.5)
WBC # FLD AUTO: 7.89 K/UL — SIGNIFICANT CHANGE UP (ref 3.8–10.5)

## 2025-01-26 PROCEDURE — 99233 SBSQ HOSP IP/OBS HIGH 50: CPT | Mod: GC

## 2025-01-26 RX ORDER — CEFAZOLIN SODIUM IN 0.9 % NACL 2 G/10 ML
2000 SYRINGE (ML) INTRAVENOUS EVERY 8 HOURS
Refills: 0 | Status: DISCONTINUED | OUTPATIENT
Start: 2025-01-26 | End: 2025-01-27

## 2025-01-26 RX ORDER — ENOXAPARIN SODIUM 100 MG/ML
40 INJECTION SUBCUTANEOUS EVERY 24 HOURS
Refills: 0 | Status: DISCONTINUED | OUTPATIENT
Start: 2025-01-26 | End: 2025-01-28

## 2025-01-26 RX ORDER — MAGNESIUM, ALUMINUM HYDROXIDE 200-225/5
30 SUSPENSION, ORAL (FINAL DOSE FORM) ORAL EVERY 4 HOURS
Refills: 0 | Status: DISCONTINUED | OUTPATIENT
Start: 2025-01-26 | End: 2025-01-28

## 2025-01-26 RX ADMIN — MUPIROCIN 1 APPLICATION(S): 2 CREAM TOPICAL at 06:18

## 2025-01-26 RX ADMIN — ASPIRIN 81 MILLIGRAM(S): 81 TABLET, COATED ORAL at 11:46

## 2025-01-26 RX ADMIN — ATORVASTATIN CALCIUM 40 MILLIGRAM(S): 80 TABLET, FILM COATED ORAL at 21:44

## 2025-01-26 RX ADMIN — Medication 100 MILLIGRAM(S): at 15:10

## 2025-01-26 RX ADMIN — PREDNISONE 5 MILLIGRAM(S): 5 TABLET ORAL at 06:17

## 2025-01-26 RX ADMIN — Medication 100 MILLIGRAM(S): at 13:53

## 2025-01-26 RX ADMIN — Medication 2.5 MILLIGRAM(S): at 06:17

## 2025-01-26 RX ADMIN — TACROLIMUS 0.5 MILLIGRAM(S): 1 CAPSULE, GELATIN COATED ORAL at 21:44

## 2025-01-26 RX ADMIN — ENOXAPARIN SODIUM 40 MILLIGRAM(S): 100 INJECTION SUBCUTANEOUS at 12:07

## 2025-01-26 RX ADMIN — MYCOPHENOLIC ACID 360 MILLIGRAM(S): 180 TABLET, DELAYED RELEASE ORAL at 11:47

## 2025-01-26 RX ADMIN — Medication 6.25 MILLIGRAM(S): at 17:37

## 2025-01-26 RX ADMIN — Medication 6.25 MILLIGRAM(S): at 06:18

## 2025-01-26 RX ADMIN — TAMSULOSIN HYDROCHLORIDE 0.4 MILLIGRAM(S): 0.4 CAPSULE ORAL at 21:42

## 2025-01-26 RX ADMIN — Medication 30 MILLILITER(S): at 18:41

## 2025-01-26 RX ADMIN — Medication 100 MILLIGRAM(S): at 21:42

## 2025-01-26 RX ADMIN — TACROLIMUS 1 MILLIGRAM(S): 1 CAPSULE, GELATIN COATED ORAL at 08:16

## 2025-01-26 RX ADMIN — DAPTOMYCIN 132 MILLIGRAM(S): 350 INJECTION, POWDER, LYOPHILIZED, FOR SOLUTION INTRAVENOUS at 06:18

## 2025-01-26 RX ADMIN — MUPIROCIN 1 APPLICATION(S): 2 CREAM TOPICAL at 17:37

## 2025-01-26 RX ADMIN — Medication 100 MILLIGRAM(S): at 06:19

## 2025-01-26 NOTE — PROGRESS NOTE ADULT - PROBLEM SELECTOR PLAN 7
Code: Full  Diet:  consistent carbohydrate and low potassium DASH   DVT ppx: holding pending OR   Dispo: pending clinical course, likely home Code: Full  Diet:  consistent carbohydrate and low potassium DASH   DVT ppx: Lovenox  Dispo: pending clinical course, likely home, need PICC / Wound VAC set up

## 2025-01-26 NOTE — PROGRESS NOTE ADULT - SUBJECTIVE AND OBJECTIVE BOX
Patient is a 64y old  Male who presents with a chief complaint of footinfection (2025 12:22)      Hope Mackey MD  Internal Medicine PGY2    ======Overnight/Subjective======  Overnight    Subjective    Brief daily plan    ======Medications======  MEDICATIONS  (STANDING):  aspirin enteric coated 81 milliGRAM(s) Oral daily  atorvastatin 40 milliGRAM(s) Oral at bedtime  carvedilol 6.25 milliGRAM(s) Oral every 12 hours  cefepime   IVPB 2000 milliGRAM(s) IV Intermittent every 8 hours  DAPTOmycin IVPB 800 milliGRAM(s) IV Intermittent every 24 hours  dextrose 5%. 1000 milliLiter(s) (100 mL/Hr) IV Continuous <Continuous>  dextrose 5%. 1000 milliLiter(s) (50 mL/Hr) IV Continuous <Continuous>  dextrose 50% Injectable 12.5 Gram(s) IV Push once  dextrose 50% Injectable 25 Gram(s) IV Push once  dextrose 50% Injectable 25 Gram(s) IV Push once  dextrose Oral Gel 15 Gram(s) Oral once  diphtheria/tetanus/pertussis (acellular) Vaccine (Adacel) 0.5 milliLiter(s) IntraMuscular once  glucagon  Injectable 1 milliGRAM(s) IntraMuscular once  insulin aspart (NovoLOG) Pump 1 Each SubCutaneous Continuous Pump  lidocaine   4% Patch 1 Patch Transdermal every 24 hours  lisinopril 2.5 milliGRAM(s) Oral daily  mupirocin 2% Ointment 1 Application(s) Topical every 12 hours  mycophenolic acid  milliGRAM(s) Oral daily  predniSONE   Tablet 5 milliGRAM(s) Oral daily  sodium chloride 0.9%. 1000 milliLiter(s) (100 mL/Hr) IV Continuous <Continuous>  tacrolimus 1 milliGRAM(s) Oral <User Schedule>  tacrolimus 0.5 milliGRAM(s) Oral <User Schedule>  tamsulosin 0.4 milliGRAM(s) Oral at bedtime    MEDICATIONS  (PRN):  acetaminophen     Tablet .. 650 milliGRAM(s) Oral every 6 hours PRN Mild Pain (1 - 3)  melatonin 3 milliGRAM(s) Oral at bedtime PRN Insomnia  naproxen 250 milliGRAM(s) Oral daily PRN Moderate Pain (4 - 6)      ======Vital Signs======  T(C): 36.6 (25 @ 05:55), Max: 36.6 (25 @ 21:22)  T(F): 97.9 (25 @ 05:55), Max: 97.9 (25 @ 05:55)  HR: 73 (25 @ 05:55) (68 - 73)  BP: 125/76 (25 @ 05:55) (123/77 - 137/82)  BP(mean): --  RR: 18 (25 @ 05:55) (18 - 18)  SpO2: 94% (25 @ 05:55) (94% - 95%)    ======Physical exams======  GENERAL: NAD  Cardiovascular: RRR, S1 S2, no m/r/g, no JVD  LUNGS: Unlabored respiration, CTABL  ABDOMEN: Soft, NTND  EXTREMITIES: Warm extremities, no edema, peripheral pulses 2+ bilaterally  NEURO: AAOx3, PERRLA    ======Labs======                15.1  9.73 >----------< 329  (MCV: 96.8)                48.3   140 | 109[H] | 16  -----------------------< 139[H]  4.5 | 20[L] | 1.01    TPro: 7.1 / Alb: 3.1[L] / TBili: 0.4 / DBili: -- / AlkPhos: 104 / ALT: 33 / AST: 29 (25 @ 04:44)  Ca: 9.7 / Phos: 2.8 / M.9 (25 @ 06:52)    Gas: 7.36 / 46 / 22[L] / 26 / 34.3[L]% / 0.0 (01-15-25 @ 17:23)      ======Microbiology======  Urinalysis Basic - ( 2025 06:52 )    Color: x / Appearance: x / SG: x / pH: x  Gluc: 139 mg/dL / Ketone: x  / Bili: x / Urobili: x   Blood: x / Protein: x / Nitrite: x   Leuk Esterase: x / RBC: x / WBC x   Sq Epi: x / Non Sq Epi: x / Bacteria: x        Culture - Tissue with Gram Stain (collected 2025 17:51)  Source: Tissue  Gram Stain (2025 03:13):    No polymorphonuclear cells seen per low power field    No organisms seen per oil power field  Preliminary Report (2025 21:12):    No growth    Culture - Wound Aerobic/Anaerobic (collected 2025 17:51)  Source: .Surgical Swab  Gram Stain (2025 03:14):    No polymorphonuclear cells seen per low power field    No organisms seen per oil power field  Preliminary Report (2025 21:02):    No growth    Culture - Wound Aerobic/Anaerobic (collected 2025 17:51)  Source: .Surgical Swab  Preliminary Report (2025 22:36):    No growth        ======I&O's======  I&O's Summary    2025 07:01  -  2025 07:00  --------------------------------------------------------  IN: 480 mL / OUT: 800 mL / NET: -320 mL         Patient is a 64y old  Male who presents with a chief complaint of footinfection (2025 12:22)      Hope Mackey MD  Internal Medicine PGY2    ======Overnight/Subjective======  Overnight  - No acute event overnight    Subjective  - Feeling well, no acute complaints, pain well controlled     ======Medications======  MEDICATIONS  (STANDING):  aspirin enteric coated 81 milliGRAM(s) Oral daily  atorvastatin 40 milliGRAM(s) Oral at bedtime  carvedilol 6.25 milliGRAM(s) Oral every 12 hours  cefepime   IVPB 2000 milliGRAM(s) IV Intermittent every 8 hours  DAPTOmycin IVPB 800 milliGRAM(s) IV Intermittent every 24 hours  dextrose 5%. 1000 milliLiter(s) (100 mL/Hr) IV Continuous <Continuous>  dextrose 5%. 1000 milliLiter(s) (50 mL/Hr) IV Continuous <Continuous>  dextrose 50% Injectable 12.5 Gram(s) IV Push once  dextrose 50% Injectable 25 Gram(s) IV Push once  dextrose 50% Injectable 25 Gram(s) IV Push once  dextrose Oral Gel 15 Gram(s) Oral once  diphtheria/tetanus/pertussis (acellular) Vaccine (Adacel) 0.5 milliLiter(s) IntraMuscular once  glucagon  Injectable 1 milliGRAM(s) IntraMuscular once  insulin aspart (NovoLOG) Pump 1 Each SubCutaneous Continuous Pump  lidocaine   4% Patch 1 Patch Transdermal every 24 hours  lisinopril 2.5 milliGRAM(s) Oral daily  mupirocin 2% Ointment 1 Application(s) Topical every 12 hours  mycophenolic acid  milliGRAM(s) Oral daily  predniSONE   Tablet 5 milliGRAM(s) Oral daily  sodium chloride 0.9%. 1000 milliLiter(s) (100 mL/Hr) IV Continuous <Continuous>  tacrolimus 1 milliGRAM(s) Oral <User Schedule>  tacrolimus 0.5 milliGRAM(s) Oral <User Schedule>  tamsulosin 0.4 milliGRAM(s) Oral at bedtime    MEDICATIONS  (PRN):  acetaminophen     Tablet .. 650 milliGRAM(s) Oral every 6 hours PRN Mild Pain (1 - 3)  melatonin 3 milliGRAM(s) Oral at bedtime PRN Insomnia  naproxen 250 milliGRAM(s) Oral daily PRN Moderate Pain (4 - 6)      ======Vital Signs======  T(C): 36.6 (25 @ 05:55), Max: 36.6 (25 @ 21:22)  T(F): 97.9 (25 @ 05:55), Max: 97.9 (25 @ 05:55)  HR: 73 (25 @ 05:55) (68 - 73)  BP: 125/76 (25 @ 05:55) (123/77 - 137/82)  BP(mean): --  RR: 18 (25 @ 05:55) (18 - 18)  SpO2: 94% (25 @ 05:55) (94% - 95%)    ======Physical exams======  GENERAL: NAD  Cardiovascular: RRR, S1 S2, no m/r/g, no JVD  LUNGS: Unlabored respiration, CTABL  ABDOMEN: Soft, NTND  EXTREMITIES: Warm extremities, no edema, foot wound c/d/i, dressing and wound vac in place  NEURO: AAOx3    ======Labs======                15.1  9.73 >----------< 329  (MCV: 96.8)                48.3   140 | 109[H] | 16  -----------------------< 139[H]  4.5 | 20[L] | 1.01    TPro: 7.1 / Alb: 3.1[L] / TBili: 0.4 / DBili: -- / AlkPhos: 104 / ALT: 33 / AST: 29 (25 @ 04:44)  Ca: 9.7 / Phos: 2.8 / M.9 (25 @ 06:52)    Gas: 7.36 / 46 / 22[L] / 26 / 34.3[L]% / 0.0 (01-15-25 @ 17:23)      ======Microbiology======  Urinalysis Basic - ( 2025 06:52 )    Color: x / Appearance: x / SG: x / pH: x  Gluc: 139 mg/dL / Ketone: x  / Bili: x / Urobili: x   Blood: x / Protein: x / Nitrite: x   Leuk Esterase: x / RBC: x / WBC x   Sq Epi: x / Non Sq Epi: x / Bacteria: x        Culture - Tissue with Gram Stain (collected 2025 17:51)  Source: Tissue  Gram Stain (2025 03:13):    No polymorphonuclear cells seen per low power field    No organisms seen per oil power field  Preliminary Report (2025 21:12):    No growth    Culture - Wound Aerobic/Anaerobic (collected 2025 17:51)  Source: .Surgical Swab  Gram Stain (2025 03:14):    No polymorphonuclear cells seen per low power field    No organisms seen per oil power field  Preliminary Report (2025 21:02):    No growth    Culture - Wound Aerobic/Anaerobic (collected 2025 17:51)  Source: .Surgical Swab  Preliminary Report (2025 22:36):    No growth        ======I&O's======  I&O's Summary    2025 07:01  -  2025 07:00  --------------------------------------------------------  IN: 480 mL / OUT: 800 mL / NET: -320 mL

## 2025-01-26 NOTE — PROGRESS NOTE ADULT - ATTENDING COMMENTS
64M pmh HTN T2DM on CGM and insulin pump, ESRD s/p renal transplant in 2020 on tacrolimus and mycophenolate, partial R first toe amputation present s/p Rt foot injury admitted for sepsis 2/2 R foot wound/cellulitis/OM. MRI right foot concerning for 1st prox phalanx stump and hallux sesmoids along with 2nd metarsophalangeal joint. Underwent parital 1st ray resection and 2nd prox phalanx bone biopsy. Wound Vac placed 1/25    #Right foot OM  #Hx esrd s/p Renal Transplant  #DM on insulin pump    -f/u transplant ID on final IV abx regimen for dispo. Will need PICC.  -cefepime and dapto 1/22- 1/26. Cefazolin 1/26-   -wound cx Staph aureus   -f/u surgical path/culture 1/24  -f/u 2nd prox phalax bone biopsy 1/24  -vascular surgery recs- d/w Dr. Muniz- no need for angiogram prior to resection  -transplant renal recs- tacro adjustments   -f/w CM for wound vac and IV abx infusion set up    52 minutes spent. d/w   Jenae Pineda MD  Division of Hospital Medicine  Available on [employer] Teams.

## 2025-01-26 NOTE — PROGRESS NOTE ADULT - PROBLEM SELECTOR PLAN 1
Patient with ostemyelitis with R foot wound probing to bone, SIRS on admission, and multiple imaging that initially equivocal, however most likely infectious. Medically optimized for surgical intervention and patient agreeable.     -1/24 R foot 1st toe I&D and partial 1st toe ray resection with podiatry Dr. Bryant with partial open wound; f/u wound care   -Pending PT   -Pending bone biopsy (1st toe and 2nd toe) and wound cx   -C/w cefepime 2 g IV q8 (started 1/22) and daptomycine 8 mg/kg IV q8 (started 1/22) for minimum 6 weeks for long term abx for ostemyelitis; dc on PICC   -Per podiatry and patient, planning for R 1st toe amputation but not 2nd toe   -MRI R foot showing edema of partial 1st digit amputation with reactive vs infectious changes in 2nd foot with potential phelgmon developing in subcuanteous tissue near arch   -NGTD blood cx, urine cx, abscess cx  -Abscess cx growing some staph aureus   -Pain control with naproxen 250 mg po qd PRN   -1/22 podiatry bedside bone biopsy, pending results   -Appreciate ID transplant consult for osteomyelitis treatment  -Per vascular, can proceed with any planned podiatry interventions Patient with ostemyelitis with R foot wound probing to bone, SIRS on admission, and multiple imaging that initially equivocal, however most likely infectious. Medically optimized for surgical intervention and patient agreeable.     -Abscess cx with Staph Aureus, OR culture (1/24) NGTD, Bcx NGD  -MRI R foot showing edema of partial 1st digit amputation with reactive vs infectious changes in 2nd foot with potential phelgmon developing in subcuanteous tissue near arch  -s/p 1/24 R foot 1st toe I&D and partial 1st toe ray resection with podiatry Dr. Bryant with partial open wound  -C/w cefepime 2 g IV q8 (started 1/22) and daptomycine 8 mg/kg IV q8 (started 1/22)  - f/u ID to confirm plan for IV abx x 6 weeks, will need PICC

## 2025-01-27 LAB
ANION GAP SERPL CALC-SCNC: 11 MMOL/L — SIGNIFICANT CHANGE UP (ref 5–17)
BUN SERPL-MCNC: 23 MG/DL — SIGNIFICANT CHANGE UP (ref 7–23)
CALCIUM SERPL-MCNC: 9.9 MG/DL — SIGNIFICANT CHANGE UP (ref 8.4–10.5)
CHLORIDE SERPL-SCNC: 102 MMOL/L — SIGNIFICANT CHANGE UP (ref 96–108)
CO2 SERPL-SCNC: 22 MMOL/L — SIGNIFICANT CHANGE UP (ref 22–31)
CREAT SERPL-MCNC: 1.18 MG/DL — SIGNIFICANT CHANGE UP (ref 0.5–1.3)
CULTURE RESULTS: SIGNIFICANT CHANGE UP
EGFR: 70 ML/MIN/1.73M2 — SIGNIFICANT CHANGE UP
GLUCOSE BLDC GLUCOMTR-MCNC: 133 MG/DL — HIGH (ref 70–99)
GLUCOSE BLDC GLUCOMTR-MCNC: 168 MG/DL — HIGH (ref 70–99)
GLUCOSE BLDC GLUCOMTR-MCNC: 181 MG/DL — HIGH (ref 70–99)
GLUCOSE BLDC GLUCOMTR-MCNC: 191 MG/DL — HIGH (ref 70–99)
GLUCOSE SERPL-MCNC: 168 MG/DL — HIGH (ref 70–99)
POTASSIUM SERPL-MCNC: 4.3 MMOL/L — SIGNIFICANT CHANGE UP (ref 3.5–5.3)
POTASSIUM SERPL-SCNC: 4.3 MMOL/L — SIGNIFICANT CHANGE UP (ref 3.5–5.3)
SODIUM SERPL-SCNC: 135 MMOL/L — SIGNIFICANT CHANGE UP (ref 135–145)
SPECIMEN SOURCE: SIGNIFICANT CHANGE UP

## 2025-01-27 PROCEDURE — 99233 SBSQ HOSP IP/OBS HIGH 50: CPT

## 2025-01-27 PROCEDURE — 99233 SBSQ HOSP IP/OBS HIGH 50: CPT | Mod: GC

## 2025-01-27 PROCEDURE — 99232 SBSQ HOSP IP/OBS MODERATE 35: CPT

## 2025-01-27 PROCEDURE — G0545: CPT

## 2025-01-27 RX ORDER — DAPTOMYCIN 350 MG/7ML
500 INJECTION, POWDER, LYOPHILIZED, FOR SOLUTION INTRAVENOUS EVERY 24 HOURS
Refills: 0 | Status: DISCONTINUED | OUTPATIENT
Start: 2025-01-27 | End: 2025-01-27

## 2025-01-27 RX ORDER — DAPTOMYCIN 350 MG/7ML
650 INJECTION, POWDER, LYOPHILIZED, FOR SOLUTION INTRAVENOUS EVERY 24 HOURS
Refills: 0 | Status: DISCONTINUED | OUTPATIENT
Start: 2025-01-28 | End: 2025-01-28

## 2025-01-27 RX ADMIN — ASPIRIN 81 MILLIGRAM(S): 81 TABLET, COATED ORAL at 11:12

## 2025-01-27 RX ADMIN — PREDNISONE 5 MILLIGRAM(S): 5 TABLET ORAL at 05:49

## 2025-01-27 RX ADMIN — DAPTOMYCIN 120 MILLIGRAM(S): 350 INJECTION, POWDER, LYOPHILIZED, FOR SOLUTION INTRAVENOUS at 17:31

## 2025-01-27 RX ADMIN — ENOXAPARIN SODIUM 40 MILLIGRAM(S): 100 INJECTION SUBCUTANEOUS at 11:12

## 2025-01-27 RX ADMIN — Medication 6.25 MILLIGRAM(S): at 17:34

## 2025-01-27 RX ADMIN — Medication 100 MILLIGRAM(S): at 14:28

## 2025-01-27 RX ADMIN — Medication 6.25 MILLIGRAM(S): at 05:49

## 2025-01-27 RX ADMIN — ATORVASTATIN CALCIUM 40 MILLIGRAM(S): 80 TABLET, FILM COATED ORAL at 21:56

## 2025-01-27 RX ADMIN — MYCOPHENOLIC ACID 360 MILLIGRAM(S): 180 TABLET, DELAYED RELEASE ORAL at 11:12

## 2025-01-27 RX ADMIN — MUPIROCIN 1 APPLICATION(S): 2 CREAM TOPICAL at 17:35

## 2025-01-27 RX ADMIN — TACROLIMUS 0.5 MILLIGRAM(S): 1 CAPSULE, GELATIN COATED ORAL at 20:38

## 2025-01-27 RX ADMIN — TACROLIMUS 1 MILLIGRAM(S): 1 CAPSULE, GELATIN COATED ORAL at 08:33

## 2025-01-27 RX ADMIN — Medication 100 MILLIGRAM(S): at 04:52

## 2025-01-27 RX ADMIN — Medication 2.5 MILLIGRAM(S): at 05:49

## 2025-01-27 RX ADMIN — Medication 30 MILLILITER(S): at 15:46

## 2025-01-27 RX ADMIN — TAMSULOSIN HYDROCHLORIDE 0.4 MILLIGRAM(S): 0.4 CAPSULE ORAL at 21:56

## 2025-01-27 RX ADMIN — MUPIROCIN 1 APPLICATION(S): 2 CREAM TOPICAL at 05:50

## 2025-01-27 NOTE — PROGRESS NOTE ADULT - PROBLEM SELECTOR PLAN 6
Patient with history of DM2 nephropathy leading to ESRD and renal transplant and immunosuppressive medications. Stable BUN/Cr. Supratherapeutic tacrolimus level. Clinically monitor.     -C/w predinsone 5 mg po qd   -C/w tacrolimus 1 mg po qam and 0.5 mg po qhs   -C/w mycophenolate mofetil 360 mg po BID #RESOLVED  Patient on arrival meeting SIRS criteria with fever, tachycardia, and leukocytosis. Afebrile. Not meeting SIRS criteria. Downtrending leukocytosis. Resolution of foot pain. Exam remarkable for reducing erythema on dorsum without further TTP and increasing palpation of dorsalis pedis pulse. Open wound in interweb of 1st and 2nd toe with purulence discharge. MRI foot suggestive of inflammatory changes of partially amputated R 1st digit and developing in 2nd digit with possible phelgmon in arch. Clinically improving.

## 2025-01-27 NOTE — PROGRESS NOTE ADULT - SUBJECTIVE AND OBJECTIVE BOX
Patient is a 64y old  Male who presents with a chief complaint of scar tinfection (26 Jan 2025 07:37)      ======Overnight/Subjective======  Overnight    Subjective    Brief daily plan    ======Medications======  MEDICATIONS  (STANDING):  aspirin enteric coated 81 milliGRAM(s) Oral daily  atorvastatin 40 milliGRAM(s) Oral at bedtime  carvedilol 6.25 milliGRAM(s) Oral every 12 hours  ceFAZolin   IVPB 2000 milliGRAM(s) IV Intermittent every 8 hours  dextrose 5%. 1000 milliLiter(s) (100 mL/Hr) IV Continuous <Continuous>  dextrose 5%. 1000 milliLiter(s) (50 mL/Hr) IV Continuous <Continuous>  dextrose 50% Injectable 25 Gram(s) IV Push once  dextrose 50% Injectable 12.5 Gram(s) IV Push once  dextrose 50% Injectable 25 Gram(s) IV Push once  dextrose Oral Gel 15 Gram(s) Oral once  diphtheria/tetanus/pertussis (acellular) Vaccine (Adacel) 0.5 milliLiter(s) IntraMuscular once  enoxaparin Injectable 40 milliGRAM(s) SubCutaneous every 24 hours  glucagon  Injectable 1 milliGRAM(s) IntraMuscular once  insulin aspart (NovoLOG) Pump 1 Each SubCutaneous Continuous Pump  lidocaine   4% Patch 1 Patch Transdermal every 24 hours  lisinopril 2.5 milliGRAM(s) Oral daily  mupirocin 2% Ointment 1 Application(s) Topical every 12 hours  mycophenolic acid  milliGRAM(s) Oral daily  predniSONE   Tablet 5 milliGRAM(s) Oral daily  sodium chloride 0.9%. 1000 milliLiter(s) (100 mL/Hr) IV Continuous <Continuous>  tacrolimus 1 milliGRAM(s) Oral <User Schedule>  tacrolimus 0.5 milliGRAM(s) Oral <User Schedule>  tamsulosin 0.4 milliGRAM(s) Oral at bedtime    MEDICATIONS  (PRN):  acetaminophen     Tablet .. 650 milliGRAM(s) Oral every 6 hours PRN Mild Pain (1 - 3)  aluminum hydroxide/magnesium hydroxide/simethicone Suspension 30 milliLiter(s) Oral every 4 hours PRN Dyspepsia  naproxen 250 milliGRAM(s) Oral daily PRN Moderate Pain (4 - 6)      ======Vital Signs======  T(C): 36.2 (01-27-25 @ 05:51), Max: 37.1 (01-26-25 @ 21:12)  T(F): 97.2 (01-27-25 @ 05:51), Max: 98.7 (01-26-25 @ 21:12)  HR: 79 (01-27-25 @ 05:51) (76 - 83)  BP: 151/75 (01-27-25 @ 05:51) (125/69 - 151/75)  BP(mean): --  RR: 18 (01-27-25 @ 05:51) (16 - 18)  SpO2: 94% (01-27-25 @ 05:51) (94% - 97%)    ======Physical exams======  GENERAL: NAD  Cardiovascular: RRR, S1 S2, no m/r/g, no JVD  LUNGS: Unlabored respiration, CTABL  ABDOMEN: Soft, NTND  EXTREMITIES: Warm extremities, no edema, peripheral pulses 2+ bilaterally  NEURO: AAOx3, PERRLA    ======Labs======                14.3  7.89 >----------< 323  (MCV: 95.7)                44.6   138 | 105 | 17  -----------------------< 144[H]  4.3 | 19[L] | 1.03    TPro: 7.1 / Alb: 3.1[L] / TBili: 0.4 / DBili: -- / AlkPhos: 104 / ALT: 33 / AST: 29 (01-24-25 @ 04:44)  Ca: 9.7 / Phos: -- / Mg: -- (01-26-25 @ 07:25)    Gas: 7.36 / 46 / 22[L] / 26 / 34.3[L]% / 0.0 (01-15-25 @ 17:23)      ======Microbiology======  Urinalysis Basic - ( 26 Jan 2025 07:25 )    Color: x / Appearance: x / SG: x / pH: x  Gluc: 144 mg/dL / Ketone: x  / Bili: x / Urobili: x   Blood: x / Protein: x / Nitrite: x   Leuk Esterase: x / RBC: x / WBC x   Sq Epi: x / Non Sq Epi: x / Bacteria: x        Culture - Tissue with Gram Stain (collected 24 Jan 2025 17:51)  Source: Tissue  Gram Stain (25 Jan 2025 03:13):    No polymorphonuclear cells seen per low power field    No organisms seen per oil power field  Preliminary Report (25 Jan 2025 21:12):    No growth    Culture - Wound Aerobic/Anaerobic (collected 24 Jan 2025 17:51)  Source: .Surgical Swab  Gram Stain (25 Jan 2025 03:14):    No polymorphonuclear cells seen per low power field    No organisms seen per oil power field  Preliminary Report (25 Jan 2025 21:02):    No growth    Culture - Wound Aerobic/Anaerobic (collected 24 Jan 2025 17:51)  Source: .Surgical Swab  Preliminary Report (26 Jan 2025 20:59):    Growth in fluid media only Staphylococcus aureus        ======I&O's======  I&O's Summary    26 Jan 2025 07:01  -  27 Jan 2025 07:00  --------------------------------------------------------  IN: 0 mL / OUT: 300 mL / NET: -300 mL         Patient is a 64y old  Male who presents with a chief complaint of scar tinfection (26 Jan 2025 07:37)      ======Overnight/Subjective======  Overnight, the patient was nauseated by the cefazolin. While it was better this morning, he was still nauseated afterwards.    Subjective: patient denies chest pain, sob, vomiting, dysuria or hematochezia.    Brief daily plan: ascertain DC recs from podiatry and IV abx vs. oral    ======Medications======  MEDICATIONS  (STANDING):  aspirin enteric coated 81 milliGRAM(s) Oral daily  atorvastatin 40 milliGRAM(s) Oral at bedtime  carvedilol 6.25 milliGRAM(s) Oral every 12 hours  ceFAZolin   IVPB 2000 milliGRAM(s) IV Intermittent every 8 hours  dextrose 5%. 1000 milliLiter(s) (100 mL/Hr) IV Continuous <Continuous>  dextrose 5%. 1000 milliLiter(s) (50 mL/Hr) IV Continuous <Continuous>  dextrose 50% Injectable 25 Gram(s) IV Push once  dextrose 50% Injectable 12.5 Gram(s) IV Push once  dextrose 50% Injectable 25 Gram(s) IV Push once  dextrose Oral Gel 15 Gram(s) Oral once  diphtheria/tetanus/pertussis (acellular) Vaccine (Adacel) 0.5 milliLiter(s) IntraMuscular once  enoxaparin Injectable 40 milliGRAM(s) SubCutaneous every 24 hours  glucagon  Injectable 1 milliGRAM(s) IntraMuscular once  insulin aspart (NovoLOG) Pump 1 Each SubCutaneous Continuous Pump  lidocaine   4% Patch 1 Patch Transdermal every 24 hours  lisinopril 2.5 milliGRAM(s) Oral daily  mupirocin 2% Ointment 1 Application(s) Topical every 12 hours  mycophenolic acid  milliGRAM(s) Oral daily  predniSONE   Tablet 5 milliGRAM(s) Oral daily  sodium chloride 0.9%. 1000 milliLiter(s) (100 mL/Hr) IV Continuous <Continuous>  tacrolimus 1 milliGRAM(s) Oral <User Schedule>  tacrolimus 0.5 milliGRAM(s) Oral <User Schedule>  tamsulosin 0.4 milliGRAM(s) Oral at bedtime    MEDICATIONS  (PRN):  acetaminophen     Tablet .. 650 milliGRAM(s) Oral every 6 hours PRN Mild Pain (1 - 3)  aluminum hydroxide/magnesium hydroxide/simethicone Suspension 30 milliLiter(s) Oral every 4 hours PRN Dyspepsia  naproxen 250 milliGRAM(s) Oral daily PRN Moderate Pain (4 - 6)      ======Vital Signs======  T(C): 36.2 (01-27-25 @ 05:51), Max: 37.1 (01-26-25 @ 21:12)  T(F): 97.2 (01-27-25 @ 05:51), Max: 98.7 (01-26-25 @ 21:12)  HR: 79 (01-27-25 @ 05:51) (76 - 83)  BP: 151/75 (01-27-25 @ 05:51) (125/69 - 151/75)  BP(mean): --  RR: 18 (01-27-25 @ 05:51) (16 - 18)  SpO2: 94% (01-27-25 @ 05:51) (94% - 97%)    ======Physical exams======  GENERAL: NAD, cheerful man sitting up in bed.  Cardiovascular: RRR, S1 S2, no m/r/g  LUNGS: Unlabored respiration, CTABL  ABDOMEN: Soft, NTND  EXTREMITIES: Warm extremities, no edema, covered right foot with no wound vac  NEURO: AAOx3, EOMI    ======Labs======                14.3  7.89 >----------< 323  (MCV: 95.7)                44.6   138 | 105 | 17  -----------------------< 144[H]  4.3 | 19[L] | 1.03    TPro: 7.1 / Alb: 3.1[L] / TBili: 0.4 / DBili: -- / AlkPhos: 104 / ALT: 33 / AST: 29 (01-24-25 @ 04:44)  Ca: 9.7 / Phos: -- / Mg: -- (01-26-25 @ 07:25)    Gas: 7.36 / 46 / 22[L] / 26 / 34.3[L]% / 0.0 (01-15-25 @ 17:23)      ======Microbiology======  Urinalysis Basic - ( 26 Jan 2025 07:25 )    Color: x / Appearance: x / SG: x / pH: x  Gluc: 144 mg/dL / Ketone: x  / Bili: x / Urobili: x   Blood: x / Protein: x / Nitrite: x   Leuk Esterase: x / RBC: x / WBC x   Sq Epi: x / Non Sq Epi: x / Bacteria: x        Culture - Tissue with Gram Stain (collected 24 Jan 2025 17:51)  Source: Tissue  Gram Stain (25 Jan 2025 03:13):    No polymorphonuclear cells seen per low power field    No organisms seen per oil power field  Preliminary Report (25 Jan 2025 21:12):    No growth    Culture - Wound Aerobic/Anaerobic (collected 24 Jan 2025 17:51)  Source: .Surgical Swab  Gram Stain (25 Jan 2025 03:14):    No polymorphonuclear cells seen per low power field    No organisms seen per oil power field  Preliminary Report (25 Jan 2025 21:02):    No growth    Culture - Wound Aerobic/Anaerobic (collected 24 Jan 2025 17:51)  Source: .Surgical Swab  Preliminary Report (26 Jan 2025 20:59):    Growth in fluid media only Staphylococcus aureus        ======I&O's======  I&O's Summary    26 Jan 2025 07:01  -  27 Jan 2025 07:00  --------------------------------------------------------  IN: 0 mL / OUT: 300 mL / NET: -300 mL

## 2025-01-27 NOTE — PROGRESS NOTE ADULT - SUBJECTIVE AND OBJECTIVE BOX
Patient is a 64y old  Male who presents with a chief complaint of scar tinfection (27 Jan 2025 07:17)       INTERVAL HPI/OVERNIGHT EVENTS:  Patient seen and evaluated at bedside.  Pt is resting comfortable in NAD. Denies N/V/F/C.      Allergies    clonidine (Other)  seasonal allergy (Other)    Intolerances        Vital Signs Last 24 Hrs  T(C): 36.2 (27 Jan 2025 05:51), Max: 37.1 (26 Jan 2025 21:12)  T(F): 97.2 (27 Jan 2025 05:51), Max: 98.7 (26 Jan 2025 21:12)  HR: 79 (27 Jan 2025 05:51) (76 - 83)  BP: 151/75 (27 Jan 2025 05:51) (125/69 - 151/75)  BP(mean): --  RR: 18 (27 Jan 2025 05:51) (16 - 18)  SpO2: 94% (27 Jan 2025 05:51) (94% - 97%)    Parameters below as of 27 Jan 2025 05:51  Patient On (Oxygen Delivery Method): room air        LABS:                        14.3   7.89  )-----------( 323      ( 26 Jan 2025 07:21 )             44.6     01-26    138  |  105  |  17  ----------------------------<  144[H]  4.3   |  19[L]  |  1.03    Ca    9.7      26 Jan 2025 07:25        Urinalysis Basic - ( 26 Jan 2025 07:25 )    Color: x / Appearance: x / SG: x / pH: x  Gluc: 144 mg/dL / Ketone: x  / Bili: x / Urobili: x   Blood: x / Protein: x / Nitrite: x   Leuk Esterase: x / RBC: x / WBC x   Sq Epi: x / Non Sq Epi: x / Bacteria: x      CAPILLARY BLOOD GLUCOSE      POCT Blood Glucose.: 181 mg/dL (27 Jan 2025 08:38)  POCT Blood Glucose.: 140 mg/dL (26 Jan 2025 21:48)  POCT Blood Glucose.: 130 mg/dL (26 Jan 2025 17:17)  POCT Blood Glucose.: 151 mg/dL (26 Jan 2025 13:56)      Lower Extremity Physical Exam:  Vascular: DP/PT 0/4, B/L, CFT <3 seconds B/L, Temperature gradient warm to warm R, .   Neuro: Epicritic sensation diminished to the level of digits B/L.  Musculoskeletal/Ortho: s/p R hallux partial amputation, healed  Skin: 1/24 s/p R foot partial first ray resection, partially open plantar sanguinous drainage, flaps warm, no purulence no malodor erythema resolving, sutures intact. L foot 3rd digit plantar sulcus wound to subQ, no acute signs of infection.       RADIOLOGY & ADDITIONAL TESTS:

## 2025-01-27 NOTE — PROGRESS NOTE ADULT - SUBJECTIVE AND OBJECTIVE BOX
Seen earlier today     Chief Complaint: Diabetes Mellitus follow up    INTERVAL HX: " Feel okay". Tolerating POs, but not eating much due to food choices are limited with food       Review of Systems:  General: As above  GI: No nausea, vomiting  Endocrine: no  S&Sx of hypoglycemia    Allergies    clonidine (Other)  seasonal allergy (Other)    Intolerances      MEDICATIONS  (STANDING):  aspirin enteric coated 81 milliGRAM(s) Oral daily  atorvastatin 40 milliGRAM(s) Oral at bedtime  carvedilol 6.25 milliGRAM(s) Oral every 12 hours  DAPTOmycin IVPB 500 milliGRAM(s) IV Intermittent every 24 hours  dextrose 5%. 1000 milliLiter(s) (100 mL/Hr) IV Continuous <Continuous>  dextrose 5%. 1000 milliLiter(s) (50 mL/Hr) IV Continuous <Continuous>  dextrose 50% Injectable 25 Gram(s) IV Push once  dextrose 50% Injectable 12.5 Gram(s) IV Push once  dextrose 50% Injectable 25 Gram(s) IV Push once  dextrose Oral Gel 15 Gram(s) Oral once  diphtheria/tetanus/pertussis (acellular) Vaccine (Adacel) 0.5 milliLiter(s) IntraMuscular once  enoxaparin Injectable 40 milliGRAM(s) SubCutaneous every 24 hours  glucagon  Injectable 1 milliGRAM(s) IntraMuscular once  insulin aspart (NovoLOG) Pump 1 Each SubCutaneous Continuous Pump  lidocaine   4% Patch 1 Patch Transdermal every 24 hours  lisinopril 2.5 milliGRAM(s) Oral daily  mupirocin 2% Ointment 1 Application(s) Topical every 12 hours  mycophenolic acid  milliGRAM(s) Oral daily  predniSONE   Tablet 5 milliGRAM(s) Oral daily  sodium chloride 0.9%. 1000 milliLiter(s) (100 mL/Hr) IV Continuous <Continuous>  tacrolimus 1 milliGRAM(s) Oral <User Schedule>  tacrolimus 0.5 milliGRAM(s) Oral <User Schedule>  tamsulosin 0.4 milliGRAM(s) Oral at bedtime        atorvastatin   40 milliGRAM(s) Oral (01-26-25 @ 21:44)    predniSONE   Tablet   5 milliGRAM(s) Oral (01-27-25 @ 05:49)        PHYSICAL EXAM:  VITALS: T(C): 36.2 (01-27-25 @ 05:51)  T(F): 97.2 (01-27-25 @ 05:51), Max: 98.7 (01-26-25 @ 21:12)  HR: 79 (01-27-25 @ 05:51) (76 - 79)  BP: 151/75 (01-27-25 @ 05:51) (125/69 - 151/75)  RR:  (18 - 18)  SpO2:  (94% - 94%)  Wt(kg): --  GENERAL: in NAD  Respiratory: Respirations unlabored   Extremities: Warm, no edema  NEURO: Alert , appropriate     LABS:  POCT Blood Glucose.: 133 mg/dL (01-27-25 @ 17:19)  POCT Blood Glucose.: 168 mg/dL (01-27-25 @ 12:31)  POCT Blood Glucose.: 181 mg/dL (01-27-25 @ 08:38)  POCT Blood Glucose.: 140 mg/dL (01-26-25 @ 21:48)  POCT Blood Glucose.: 130 mg/dL (01-26-25 @ 17:17)  POCT Blood Glucose.: 151 mg/dL (01-26-25 @ 13:56)  POCT Blood Glucose.: 184 mg/dL (01-26-25 @ 08:38)  POCT Blood Glucose.: 160 mg/dL (01-25-25 @ 21:50)  POCT Blood Glucose.: 140 mg/dL (01-25-25 @ 17:42)  POCT Blood Glucose.: 158 mg/dL (01-25-25 @ 12:51)  POCT Blood Glucose.: 268 mg/dL (01-24-25 @ 21:44)  POCT Blood Glucose.: 196 mg/dL (01-24-25 @ 18:50)                          14.3   7.89  )-----------( 323      ( 26 Jan 2025 07:21 )             44.6     01-26    138  |  105  |  17  ----------------------------<  144[H]  4.3   |  19[L]  |  1.03    Ca    9.7      26 Jan 2025 07:25          Thyroid Function Tests:      A1C with Estimated Average Glucose Result: 7.0 % (01-16-25 @ 17:51)    Estimated Average Glucose: 154 mg/dL (01-16-25 @ 17:51)        Diet, Consistent Carbohydrate/No Snacks:   DASH/TLC Sodium & Cholesterol Restricted (DASH)  No Concentrated Potassium (01-24-25 @ 16:54) [Active]              Seen earlier today     Chief Complaint: Diabetes Mellitus follow up    INTERVAL HX: " Feel okay and some loose stool after antibiotic changed". Tolerating POs, but not eating much due to food choices are limited with diet restrictions. Wife is bringing home food at times. He counts carbohydrates and watches diet> Last 24 hour Bgs in 100s mostly at or close to goal. Changed pump site yesterday ( pump site Lt upper arm and CGM above pump site)      Review of Systems:  General: As above  GI: No nausea, vomiting  Endocrine: no  S&Sx of hypoglycemia    Allergies    clonidine (Other)  seasonal allergy (Other)    Intolerances      MEDICATIONS  (STANDING):  aspirin enteric coated 81 milliGRAM(s) Oral daily  atorvastatin 40 milliGRAM(s) Oral at bedtime  carvedilol 6.25 milliGRAM(s) Oral every 12 hours  DAPTOmycin IVPB 500 milliGRAM(s) IV Intermittent every 24 hours  dextrose 5%. 1000 milliLiter(s) (100 mL/Hr) IV Continuous <Continuous>  dextrose 5%. 1000 milliLiter(s) (50 mL/Hr) IV Continuous <Continuous>  dextrose 50% Injectable 25 Gram(s) IV Push once  dextrose 50% Injectable 12.5 Gram(s) IV Push once  dextrose 50% Injectable 25 Gram(s) IV Push once  dextrose Oral Gel 15 Gram(s) Oral once  diphtheria/tetanus/pertussis (acellular) Vaccine (Adacel) 0.5 milliLiter(s) IntraMuscular once  enoxaparin Injectable 40 milliGRAM(s) SubCutaneous every 24 hours  glucagon  Injectable 1 milliGRAM(s) IntraMuscular once  insulin aspart (NovoLOG) Pump 1 Each SubCutaneous Continuous Pump  lidocaine   4% Patch 1 Patch Transdermal every 24 hours  lisinopril 2.5 milliGRAM(s) Oral daily  mupirocin 2% Ointment 1 Application(s) Topical every 12 hours  mycophenolic acid  milliGRAM(s) Oral daily  predniSONE   Tablet 5 milliGRAM(s) Oral daily  sodium chloride 0.9%. 1000 milliLiter(s) (100 mL/Hr) IV Continuous <Continuous>  tacrolimus 1 milliGRAM(s) Oral <User Schedule>  tacrolimus 0.5 milliGRAM(s) Oral <User Schedule>  tamsulosin 0.4 milliGRAM(s) Oral at bedtime        atorvastatin   40 milliGRAM(s) Oral (01-26-25 @ 21:44)    predniSONE   Tablet   5 milliGRAM(s) Oral (01-27-25 @ 05:49)        PHYSICAL EXAM:  VITALS: T(C): 36.2 (01-27-25 @ 05:51)  T(F): 97.2 (01-27-25 @ 05:51), Max: 98.7 (01-26-25 @ 21:12)  HR: 79 (01-27-25 @ 05:51) (76 - 79)  BP: 151/75 (01-27-25 @ 05:51) (125/69 - 151/75)  RR:  (18 - 18)  SpO2:  (94% - 94%)  Wt(kg): --  GENERAL: Male laying in bed,  in NAD  Respiratory: Respirations unlabored   Extremities: Warm, right foot dressing intact with wound VAC in place   NEURO: Alert , appropriate     LABS:  POCT Blood Glucose.: 133 mg/dL (01-27-25 @ 17:19)  POCT Blood Glucose.: 168 mg/dL (01-27-25 @ 12:31)  POCT Blood Glucose.: 181 mg/dL (01-27-25 @ 08:38)  POCT Blood Glucose.: 140 mg/dL (01-26-25 @ 21:48)  POCT Blood Glucose.: 130 mg/dL (01-26-25 @ 17:17)  POCT Blood Glucose.: 151 mg/dL (01-26-25 @ 13:56)  POCT Blood Glucose.: 184 mg/dL (01-26-25 @ 08:38)  POCT Blood Glucose.: 160 mg/dL (01-25-25 @ 21:50)  POCT Blood Glucose.: 140 mg/dL (01-25-25 @ 17:42)  POCT Blood Glucose.: 158 mg/dL (01-25-25 @ 12:51)  POCT Blood Glucose.: 268 mg/dL (01-24-25 @ 21:44)  POCT Blood Glucose.: 196 mg/dL (01-24-25 @ 18:50)                          14.3   7.89  )-----------( 323      ( 26 Jan 2025 07:21 )             44.6     01-26    138  |  105  |  17  ----------------------------<  144[H]  4.3   |  19[L]  |  1.03    Ca    9.7      26 Jan 2025 07:25          Thyroid Function Tests:      A1C with Estimated Average Glucose Result: 7.0 % (01-16-25 @ 17:51)    Estimated Average Glucose: 154 mg/dL (01-16-25 @ 17:51)        Diet, Consistent Carbohydrate/No Snacks:   DASH/TLC Sodium & Cholesterol Restricted (DASH)  No Concentrated Potassium (01-24-25 @ 16:54) [Active]

## 2025-01-27 NOTE — PROGRESS NOTE ADULT - PROBLEM SELECTOR PLAN 7
Code: Full  Diet:  consistent carbohydrate and low potassium DASH   DVT ppx: Lovenox  Dispo: pending clinical course, likely home, need PICC / Wound VAC set up

## 2025-01-27 NOTE — PROGRESS NOTE ADULT - ASSESSMENT
64 year old Male with PMHx HTN T2DM on CGM and insulin pump, ESRD s/p renal transplant in 2020 on tacrolimus and mycophenolate, partial R first toe amputation presenting from podiatry office with a foot infection.        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

## 2025-01-27 NOTE — PROGRESS NOTE ADULT - PROBLEM SELECTOR PLAN 1
Patient with ostemyelitis with R foot wound probing to bone, SIRS on admission, and multiple imaging that initially equivocal, however most likely infectious. Medically optimized for surgical intervention and patient agreeable.     -Abscess cx with Staph Aureus, OR culture (1/24) NGTD, Bcx NGD  -MRI R foot showing edema of partial 1st digit amputation with reactive vs infectious changes in 2nd foot with potential phelgmon developing in subcuanteous tissue near arch  -s/p 1/24 R foot 1st toe I&D and partial 1st toe ray resection with podiatry Dr. Bryant with partial open wound  -C/w cefepime 2 g IV q8 (started 1/22) and daptomycine 8 mg/kg IV q8 (started 1/22)  - f/u ID to confirm plan for IV abx x 6 weeks, will need PICC Patient with OM of R foot wound probing to bone, SIRS on admission; currently s/p R foot 1st toe I&D and partial 1st toe ray resection with podiatry Dr. Bryant with partial open wound (1/24)  Abscess cx with Staph Aureus, OR culture (1/24) NGTD, Bcx NGD    PLAN:  -f/u ID to confirm plan for IV abx vs oral abx; needs to be coordinated between podiatry and ID  - DC plan wound vac & boot etc. as per podiatry

## 2025-01-27 NOTE — PROGRESS NOTE ADULT - ASSESSMENT
64M s/p R foot partial first ray resection, partially open (DOS: 1/24)  - Pt seen and evaluated.  - Afebrile, No Leukocytosis   - 1/24 s/p R foot partial first ray resection, partially open plantar sanguinous drainage, flaps warm, no purulence no malodor erythema resolving, sutures intact. L foot 3rd digit plantar sulcus wound to subQ, no acute signs of infection.   - OR data: no growth (prelim)  - Low concern residual infection or viabilty   - Ok to apply wound VAC today 1/27  - Stable for d/c from podiatry standpoint pending final ID recs  - Follow up and wound care listed in d/c note provider   - Discussed with Attending    64M s/p R foot partial first ray resection, partially open (DOS: 1/24)  - Pt seen and evaluated.  - Afebrile, No Leukocytosis   - 1/24 s/p R foot partial first ray resection, partially open plantar sanguinous drainage, flaps warm, no purulence no malodor erythema resolving, sutures intact. L foot 3rd digit plantar sulcus wound to subQ, no acute signs of infection.   - OR data: no growth (prelim)  - Low concern residual infection or viabilty   - Ok to apply wound VAC today 1/27  - Stable for d/c from podiatry standpoint pending PICC  - Follow up and wound care listed in d/c note provider   - Discussed with Attending    64M s/p R foot partial first ray resection, partially open (DOS: 1/24)  - Pt seen and evaluated.  - Afebrile, No Leukocytosis   - 1/24 s/p R foot partial first ray resection, partially open plantar sanguinous drainage, flaps warm, no purulence no malodor erythema resolving, sutures intact. L foot 3rd digit plantar sulcus wound to subQ, no acute signs of infection.   - OR data: no growth (prelim)  - Low concern residual infection or viability   - Hold VAC application today 1/27  - Stable for d/c from podiatry standpoint pending PICC  - Follow up and wound care listed in d/c note provider   - Discussed with Attending    64M s/p R foot partial first ray resection, partially open (DOS: 1/24)  - Pt seen and evaluated.  - Afebrile, No Leukocytosis   - 1/24 s/p R foot partial first ray resection, partially open plantar sanguinous drainage, flaps warm, no purulence no malodor erythema resolving, sutures intact. L foot 3rd digit plantar sulcus wound to subQ, no acute signs of infection.   - OR data: no growth (prelim)  - OR culture: Staph aureus (prelim)  - High concern for residual bone infection   - Low concern for viability   - Hold VAC application today 1/27  - Stable for d/c from podiatry standpoint pending PICC  - Follow up and wound care listed in d/c note provider   - Discussed with Attending

## 2025-01-27 NOTE — PROGRESS NOTE ADULT - ATTENDING COMMENTS
Patient seen and evaluated with wife at bedside. Personally saw wound, c/d/i, being changed by PT. Patient without pain. No current complaints.     #OSteomyelitis s/p resection with s. aureus.   -await transplant ID ot see if can transition to oral agent vs. PICC line with long term IV given residual infection.   -per podiatry note 1/27 noted to hold off wound vac. TEam to confirm if patient to be discharged with or without wound vac.   -continue cefazolin for now.   -discussed with vinny PRESSLEY with picc line.    #DM type 2, insulin dependent.   -continue with insulin pump.     #renal transplant recipient.   -monitor and titrate tacro levels per transplant nephrology.     d/c planning expected next 24-48 hours pending the above

## 2025-01-27 NOTE — PROGRESS NOTE ADULT - PROBLEM SELECTOR PLAN 5
Patient with DM2 with nephropathy, neuropathy, retinopathy here. No concerns for DKA or HHS. Hba1c 7.0. Fair glycemic control.     -C/w insulin pump here while measuring fingersticks   -C/w Eylea injection to R eye q8 wks   -Hold home dulaglutide   -Hold home empagliflozin   -Appreciate endocriniology consult for insulin pump management Patient with history of DM2 nephropathy leading to ESRD and renal transplant and immunosuppressive medications. Stable BUN/Cr. Supratherapeutic tacrolimus level. Clinically monitor.     PLAN:  -C/w predinsone 5 mg po qd   -C/w tacrolimus 1 mg po qam and 0.5 mg po qhs   -C/w mycophenolate mofetil 360 mg po BID

## 2025-01-27 NOTE — PROGRESS NOTE ADULT - ASSESSMENT
64M with history of HTN, DM2 with neuropathy and partial R 1st digit amputation, ESRD s/p renal transplant on tacrolimus and mycophenolate, and HDL here for complicated R foot cellulitis with osteomyelitis; s/p R 1st toe partial ray resection with I&D,  64M with history of HTN, DM2 with neuropathy and partial R 1st digit amputation, ESRD s/p renal transplant on tacrolimus and mycophenolate, and HDL here for complicated R foot cellulitis with osteomyelitis; s/p R 1st toe partial ray resection with I&D feeling well.

## 2025-01-27 NOTE — PROGRESS NOTE ADULT - ASSESSMENT
64M with HTN T2DM on CGM and insulin pump, ESRD s/p renal transplant in 2020 on tacrolimus and mycophenolate, partial R first toe amputation presenting from podiatry office with a foot infection. Patient states that a couple days ago he was walking barefoot outside and had a pine needle stuck on the anterior plantar surface of his foot near the base of his R first toe. He had progressive reddening and pain of the R foot and went to see a podiatrist. Podiatrist had concerns that he did not have any pedal pulses and also concern for potential bone involvement and prescribed a single dose of Augmentin and sent the patient to the ED for further evaluation. At the ED the patient was febrile to 101.8F, tachycardic to 91 w/ stable BP and O2 Sat. WBC count was elevated to 15.9 w/ neutrophil predominance. XR did not reveal any signs of OM and the patient was seen by podiatry who had low suspicion for osteomyelitis but recommended a Vascular surgery consult for concerns of PAD.    Seen at bedside with wife present, patient & wife feels like they need more work-up to confirm acute on chronic osteomyelitis of the foot. Reports he has had chronic osteomyelitis from 12 years ago, requesting bone biopsy, MRI (chart review indicates unable to perform due to insulin pump), and revascularization by vascular before suggested amputation by podiatry. Transplant ID consulted for recommendations.     #Renal transplant recipient  #Leucocytosis  #Concern for R foot osteomyelitis  -Febrile on admission, afebrile today  -WBC 12 today  -Abscess Culture Results:  Few Staphylococcus aureus  -Sedimentation Rate, Erythrocyte: 110 mm/hr  -C-Reactive Protein: 229 mg/L  -MRSA/MSSA PCR: Staph aureus PCR Result: Detected  -FluA/FluB/RSV/COVID PCR: not detected  -BLOOD Culture Results: No growth at 4 days  -Urine Culture Results: No growth      Recommendations:  --Would benefit from a TDAP booster - please administer  --MRI Right Foot:  Subcortical marrow changes of the 1st proximal phalanx stump and hallux sesamoids may be reactive or reflect osteomyelitis with adjacent soft tissue changes suggesting early phlegmon formation. Faint marrow changes of the 2nd metatarsophalangeal joint may be reactive versus early infection.  --Based on NM scan & MRI findings, would treat empirically for acute osteomyelitis  --S/P Zosyn 3.375g IV Q8H   --S/P Daptomycin IV 8 mg/kg + Cefepime 2 gm IV Q8HR   --Switched (1/26/25) to Cefazolin 2 gm IV Q8HR tentatively for 6 weeks, will speak to Podiatry to confirm operative findings   --Will require a PICC line OPAT   --Continue to follow CBC with diff  --Continue to follow temperature curve  --Follow up on preliminary wound cultures -  Few Staphylococcus aureus    --now s/p surgical debridement and amputation of first and partial second toe  --follow up pathology and bone cultures and tailor abx accordingly if pure MSSA on culture will be able to simplify antimicrobial regimen  --op note suggests that margins may not be clean   64M with HTN T2DM on CGM and insulin pump, ESRD s/p renal transplant in 2020 on tacrolimus and mycophenolate, partial R first toe amputation presenting from podiatry office with a foot infection. Patient states that a couple days ago he was walking barefoot outside and had a pine needle stuck on the anterior plantar surface of his foot near the base of his R first toe. He had progressive reddening and pain of the R foot and went to see a podiatrist. Podiatrist had concerns that he did not have any pedal pulses and also concern for potential bone involvement and prescribed a single dose of Augmentin and sent the patient to the ED for further evaluation. At the ED the patient was febrile to 101.8F, tachycardic to 91 w/ stable BP and O2 Sat. WBC count was elevated to 15.9 w/ neutrophil predominance. XR did not reveal any signs of OM and the patient was seen by podiatry who had low suspicion for osteomyelitis but recommended a Vascular surgery consult for concerns of PAD.    Seen at bedside with wife present, patient & wife feels like they need more work-up to confirm acute on chronic osteomyelitis of the foot. Reports he has had chronic osteomyelitis from 12 years ago, requesting bone biopsy, MRI (chart review indicates unable to perform due to insulin pump), and revascularization by vascular before suggested amputation by podiatry. Transplant ID consulted for recommendations.     #Renal transplant recipient  #Leucocytosis  #Concern for R foot osteomyelitis  -Febrile on admission, afebrile today  -WBC 12 today  -Abscess Culture Results:  Few Staphylococcus aureus  -Sedimentation Rate, Erythrocyte: 110 mm/hr  -C-Reactive Protein: 229 mg/L  -MRSA/MSSA PCR: Staph aureus PCR Result: Detected  -FluA/FluB/RSV/COVID PCR: not detected  -BLOOD Culture Results: No growth at 4 days  -Urine Culture Results: No growth      Recommendations:  --Would benefit from a TDAP booster - please administer  --MRI Right Foot:  Subcortical marrow changes of the 1st proximal phalanx stump and hallux sesamoids may be reactive or reflect osteomyelitis with adjacent soft tissue changes suggesting early phlegmon formation. Faint marrow changes of the 2nd metatarsophalangeal joint may be reactive versus early infection.  --now s/p surgical debridement and amputation of first and partial second toe  --High operative concern for residual OM  --Clean bone cultures with MRSA  --Will contact the lab to retest isolates given discrepancy of superficial cultures with MSSA and operative cultures with MRSA  --Pending repeat testing will switch patient back from Cefazolin to Daptomycin 650 mg IV Q24H  --Plan regardless of isolate susceptibility for 6 weeks of antibiotics from amputation; would proceed with PICC placement.

## 2025-01-27 NOTE — PROGRESS NOTE ADULT - PROBLEM SELECTOR PLAN 3
Resolving R foot erythema and tenderness. Ischemia related pain in R foot in setting of known stenosis, less likely inflammatory related pain, however contributory. Soft dorsalis pedis pulses elicited. Arterial doppler showing adequate blood flow in LE. Superficial 2 wounds, no defects. No S&S of acute ischemic limb. Clinically improving.     -Ankle Brachial Index limited due to noncompressible vasculature; vascular asking for repeat to be performed on R 2nd toe due to amputation of 1st R toe   -Podiatry recommending amputation of R toe(s)  -Arterial doppler to assess pulses showing stenosis RLE > LLE   -Nuclear medicine scan suggestive of increased uptake in 1st and 2nd digit of R foot potentially by osteyelitis   -Naproxen 250 mg po qd PRN   -Confirm with outpatient vascular surgeron need for invasive angiogram for RLE to assess flow   -Appreciate vascular surgery recommendations for doing a CT angiogram of RLE prior any podiatry interventions; vascular surgery will coordinate with transplant nephrology for CT angiogram Normotensive.     PLAN:  -C/w carvedilol 6.25 mg po q12   -C/w lisinopril 2.5 mg po qd

## 2025-01-27 NOTE — PROGRESS NOTE ADULT - SUBJECTIVE AND OBJECTIVE BOX
Follow Up:      Interval History:    REVIEW OF SYSTEMS  [  ] ROS unobtainable because:    [  ] All other systems negative except as noted below    Constitutional:  [ ] fever [ ] chills  [ ] weight loss  [ ] weakness  Skin:  [ ] rash [ ] phlebitis	  Eyes: [ ] icterus [ ] pain  [ ] discharge	  ENMT: [ ] sore throat  [ ] thrush [ ] ulcers [ ] exudates  Respiratory: [ ] dyspnea [ ] hemoptysis [ ] cough [ ] sputum	  Cardiovascular:  [ ] chest pain [ ] palpitations [ ] edema	  Gastrointestinal:  [ ] nausea [ ] vomiting [ ] diarrhea [ ] constipation [ ] pain	  Genitourinary:  [ ] dysuria [ ] frequency [ ] hematuria [ ] discharge [ ] flank pain  [ ] incontinence  Musculoskeletal:  [ ] myalgias [ ] arthralgias [ ] arthritis  [ ] back pain  Neurological:  [ ] headache [ ] seizures  [ ] confusion/altered mental status    Allergies  clonidine (Other)  seasonal allergy (Other)        ANTIMICROBIALS:  ceFAZolin   IVPB 2000 every 8 hours      OTHER MEDS:  MEDICATIONS  (STANDING):  acetaminophen     Tablet .. 650 every 6 hours PRN  aluminum hydroxide/magnesium hydroxide/simethicone Suspension 30 every 4 hours PRN  aspirin enteric coated 81 daily  atorvastatin 40 at bedtime  carvedilol 6.25 every 12 hours  dextrose 50% Injectable 25 once  dextrose 50% Injectable 12.5 once  dextrose 50% Injectable 25 once  dextrose Oral Gel 15 once  diphtheria/tetanus/pertussis (acellular) Vaccine (Adacel) 0.5 once  enoxaparin Injectable 40 every 24 hours  glucagon  Injectable 1 once  insulin aspart (NovoLOG) Pump 1 Continuous Pump  lisinopril 2.5 daily  mycophenolic acid  daily  naproxen 250 daily PRN  predniSONE   Tablet 5 daily  tacrolimus 0.5 <User Schedule>  tacrolimus 1 <User Schedule>  tamsulosin 0.4 at bedtime      Vital Signs Last 24 Hrs  T(C): 36.2 (27 Jan 2025 05:51), Max: 37.1 (26 Jan 2025 21:12)  T(F): 97.2 (27 Jan 2025 05:51), Max: 98.7 (26 Jan 2025 21:12)  HR: 79 (27 Jan 2025 05:51) (76 - 83)  BP: 151/75 (27 Jan 2025 05:51) (125/69 - 151/75)  BP(mean): --  RR: 18 (27 Jan 2025 05:51) (16 - 18)  SpO2: 94% (27 Jan 2025 05:51) (94% - 97%)    Parameters below as of 27 Jan 2025 05:51  Patient On (Oxygen Delivery Method): room air        PHYSICAL EXAMINATION:  General: Alert and Awake, NAD  HEENT: PERRL, EOMI  Neck: Supple  Cardiac: RRR, No M/R/G  Resp: CTAB, No Wh/Rh/Ra  Abdomen: NBS, NT/ND, No HSM, No rigidity or guarding  MSK: No LE edema. No Calf tenderness  : No rivera  Skin: No rashes or lesions. Skin is warm and dry to the touch.   Neuro: Alert and Awake. CN 2-12 Grossly intact. Moves all four extremities spontaneously.  Psych: Calm, Pleasant, Cooperative                          14.3   7.89  )-----------( 323      ( 26 Jan 2025 07:21 )             44.6       01-26    138  |  105  |  17  ----------------------------<  144[H]  4.3   |  19[L]  |  1.03    Ca    9.7      26 Jan 2025 07:25        Urinalysis Basic - ( 26 Jan 2025 07:25 )    Color: x / Appearance: x / SG: x / pH: x  Gluc: 144 mg/dL / Ketone: x  / Bili: x / Urobili: x   Blood: x / Protein: x / Nitrite: x   Leuk Esterase: x / RBC: x / WBC x   Sq Epi: x / Non Sq Epi: x / Bacteria: x        MICROBIOLOGY:  v  .Surgical Swab  01-24-25   Growth in fluid media only Staphylococcus aureus  --    No polymorphonuclear cells seen per low power field  No organisms seen per oil power field      Tissue  01-22-25   No growth to date  --    No polymorphonuclear cells seen per low power field  No organisms seen per oil power field      .Abscess  01-20-25   Few Staphylococcus aureus  --  Staphylococcus aureus      .Abscess  01-16-25   No growth at 5 days  --    No polymorphonuclear cells seen per low power field  No organisms seen per oil power field      .Blood BLOOD  01-15-25   No growth at 5 days  --  --      Clean Catch Clean Catch (Midstream)  01-15-25   No growth  --  --      .Blood BLOOD  01-15-25   No growth at 5 days  --  --                RADIOLOGY:    <The imaging below has been reviewed and visualized by me independently. Findings as detailed in report below> Follow Up: Foot OM     Interval History:  Afebrile, no leukocytosis  OR cultures growing Staph Aureus   Denies acute complaints    REVIEW OF SYSTEMS  CONSTITUTIONAL:  No fever, good appetite  CARDIOVASCULAR:  No chest pain or palpitations  RESPIRATORY:  No dyspnea  GASTROINTESTINAL:  No nausea, vomiting, diarrhea, or abdominal pain  GENITOURINARY:  No dysuria  NEUROLOGIC:  No headache, No dizziness        Allergies  clonidine (Other)  seasonal allergy (Other)        ANTIMICROBIALS:  ceFAZolin   IVPB 2000 every 8 hours      OTHER MEDS:  MEDICATIONS  (STANDING):  acetaminophen     Tablet .. 650 every 6 hours PRN  aluminum hydroxide/magnesium hydroxide/simethicone Suspension 30 every 4 hours PRN  aspirin enteric coated 81 daily  atorvastatin 40 at bedtime  carvedilol 6.25 every 12 hours  dextrose 50% Injectable 25 once  dextrose 50% Injectable 12.5 once  dextrose 50% Injectable 25 once  dextrose Oral Gel 15 once  diphtheria/tetanus/pertussis (acellular) Vaccine (Adacel) 0.5 once  enoxaparin Injectable 40 every 24 hours  glucagon  Injectable 1 once  insulin aspart (NovoLOG) Pump 1 Continuous Pump  lisinopril 2.5 daily  mycophenolic acid  daily  naproxen 250 daily PRN  predniSONE   Tablet 5 daily  tacrolimus 0.5 <User Schedule>  tacrolimus 1 <User Schedule>  tamsulosin 0.4 at bedtime      Vital Signs Last 24 Hrs  T(C): 36.2 (27 Jan 2025 05:51), Max: 37.1 (26 Jan 2025 21:12)  T(F): 97.2 (27 Jan 2025 05:51), Max: 98.7 (26 Jan 2025 21:12)  HR: 79 (27 Jan 2025 05:51) (76 - 83)  BP: 151/75 (27 Jan 2025 05:51) (125/69 - 151/75)  BP(mean): --  RR: 18 (27 Jan 2025 05:51) (16 - 18)  SpO2: 94% (27 Jan 2025 05:51) (94% - 97%)    Parameters below as of 27 Jan 2025 05:51  Patient On (Oxygen Delivery Method): room air        PHYSICAL EXAMINATION:  General: Alert and Awake, NAD  HEENT: PERRL, EOMI  Neck: Supple  Cardiac: RRR, No M/R/G  Resp: CTAB, No Wh/Rh/Ra  Abdomen: NBS, NT/ND, No rigidity or guarding  MSK: RLE with dressing & wound vac.   : No Ha[place]on  Skin: No rashes or lesions. Skin is warm and dry to the touch.   Neuro: Alert and Awake. CN 2-12 Grossly intact. Moves all four extremities spontaneously.  Psych: Calm, Pleasant, Cooperative                          14.3   7.89  )-----------( 323      ( 26 Jan 2025 07:21 )             44.6       01-26    138  |  105  |  17  ----------------------------<  144[H]  4.3   |  19[L]  |  1.03    Ca    9.7      26 Jan 2025 07:25        Urinalysis Basic - ( 26 Jan 2025 07:25 )    Color: x / Appearance: x / SG: x / pH: x  Gluc: 144 mg/dL / Ketone: x  / Bili: x / Urobili: x   Blood: x / Protein: x / Nitrite: x   Leuk Esterase: x / RBC: x / WBC x   Sq Epi: x / Non Sq Epi: x / Bacteria: x        MICROBIOLOGY:  v  .Surgical Swab  01-24-25   Growth in fluid media only Staphylococcus aureus  --    No polymorphonuclear cells seen per low power field  No organisms seen per oil power field      Tissue  01-22-25   No growth to date  --    No polymorphonuclear cells seen per low power field  No organisms seen per oil power field      .Abscess  01-20-25   Few Staphylococcus aureus  --  Staphylococcus aureus      .Abscess  01-16-25   No growth at 5 days  --    No polymorphonuclear cells seen per low power field  No organisms seen per oil power field      .Blood BLOOD  01-15-25   No growth at 5 days  --  --      Clean Catch Clean Catch (Midstream)  01-15-25   No growth  --  --      .Blood BLOOD  01-15-25   No growth at 5 days  --  --                RADIOLOGY:    <The imaging below has been reviewed and visualized by me independently. Findings as detailed in report below>          < from: Xray Foot AP + Lateral + Oblique, Right (01.24.25 @ 17:03) >  ACC: [id number] EXAM:  XR FOOT COMP MIN 3 VIEWS RT   ORDERED BY:  MEME FORTE     PROCEDURE DATE:  01/24/2025          INTERPRETATION:  Indication: Status post first partial ray resection of   the right foot    TECHNIQUE: 3 views of the right foot    COMPARISON: 1/22/2025    IMPRESSION: The patient is status post transmetatarsal amputation of the   right first ray. The surgical margin is sharp. No tracking subcutaneous   gas is seen. There is a chronic appearing fracture deformity of the right  fifth metatarsal bone. This image is available for your surgical review.    --- End of Report ---      < end of copied text >   Follow Up: Foot OM     Interval History:  Afebrile, no leukocytosis  OR cultures growing Staph Aureus   Denies acute complaints    REVIEW OF SYSTEMS  CONSTITUTIONAL:  No fever, good appetite  CARDIOVASCULAR:  No chest pain or palpitations  RESPIRATORY:  No dyspnea  GASTROINTESTINAL:  No nausea, vomiting, diarrhea, or abdominal pain  GENITOURINARY:  No dysuria  NEUROLOGIC:  No headache, No dizziness        Allergies  clonidine (Other)  seasonal allergy (Other)        ANTIMICROBIALS:  ceFAZolin   IVPB 2000 every 8 hours      OTHER MEDS:  MEDICATIONS  (STANDING):  acetaminophen     Tablet .. 650 every 6 hours PRN  aluminum hydroxide/magnesium hydroxide/simethicone Suspension 30 every 4 hours PRN  aspirin enteric coated 81 daily  atorvastatin 40 at bedtime  carvedilol 6.25 every 12 hours  dextrose 50% Injectable 25 once  dextrose 50% Injectable 12.5 once  dextrose 50% Injectable 25 once  dextrose Oral Gel 15 once  diphtheria/tetanus/pertussis (acellular) Vaccine (Adacel) 0.5 once  enoxaparin Injectable 40 every 24 hours  glucagon  Injectable 1 once  insulin aspart (NovoLOG) Pump 1 Continuous Pump  lisinopril 2.5 daily  mycophenolic acid  daily  naproxen 250 daily PRN  predniSONE   Tablet 5 daily  tacrolimus 0.5 <User Schedule>  tacrolimus 1 <User Schedule>  tamsulosin 0.4 at bedtime      Vital Signs Last 24 Hrs  T(C): 36.2 (27 Jan 2025 05:51), Max: 37.1 (26 Jan 2025 21:12)  T(F): 97.2 (27 Jan 2025 05:51), Max: 98.7 (26 Jan 2025 21:12)  HR: 79 (27 Jan 2025 05:51) (76 - 83)  BP: 151/75 (27 Jan 2025 05:51) (125/69 - 151/75)  BP(mean): --  RR: 18 (27 Jan 2025 05:51) (16 - 18)  SpO2: 94% (27 Jan 2025 05:51) (94% - 97%)    Parameters below as of 27 Jan 2025 05:51  Patient On (Oxygen Delivery Method): room air    PHYSICAL EXAMINATION:  General: Alert and Awake, NAD  HEENT: Normocephalic / Atraumatic  Resp: No accessory muscles of respiration utilized  Abdomen: Not distended.  MSK: +dressing with wound vac on the right foot  : No rivera  Skin: No rashes or lesions.    Neuro: Alert and Awake. CN 2-12 Grossly intact. Moves all four extremities spontaneously.  Psych: Calm, Pleasant, Cooperative                          14.3   7.89  )-----------( 323      ( 26 Jan 2025 07:21 )             44.6       01-26    138  |  105  |  17  ----------------------------<  144[H]  4.3   |  19[L]  |  1.03    Ca    9.7      26 Jan 2025 07:25        Urinalysis Basic - ( 26 Jan 2025 07:25 )    Color: x / Appearance: x / SG: x / pH: x  Gluc: 144 mg/dL / Ketone: x  / Bili: x / Urobili: x   Blood: x / Protein: x / Nitrite: x   Leuk Esterase: x / RBC: x / WBC x   Sq Epi: x / Non Sq Epi: x / Bacteria: x        MICROBIOLOGY:  v  .Surgical Swab  01-24-25   Growth in fluid media only Staphylococcus aureus  --    No polymorphonuclear cells seen per low power field  No organisms seen per oil power field      Tissue  01-22-25   No growth to date  --    No polymorphonuclear cells seen per low power field  No organisms seen per oil power field      .Abscess  01-20-25   Few Staphylococcus aureus  --  Staphylococcus aureus      .Abscess  01-16-25   No growth at 5 days  --    No polymorphonuclear cells seen per low power field  No organisms seen per oil power field      .Blood BLOOD  01-15-25   No growth at 5 days  --  --      Clean Catch Clean Catch (Midstream)  01-15-25   No growth  --  --      .Blood BLOOD  01-15-25   No growth at 5 days  --  --                RADIOLOGY:    <The imaging below has been reviewed and visualized by me independently. Findings as detailed in report below>          < from: Xray Foot AP + Lateral + Oblique, Right (01.24.25 @ 17:03) >  ACC: [id number] EXAM:  XR FOOT COMP MIN 3 VIEWS RT   ORDERED BY:  MEME FORTE     PROCEDURE DATE:  01/24/2025          INTERPRETATION:  Indication: Status post first partial ray resection of   the right foot    TECHNIQUE: 3 views of the right foot    COMPARISON: 1/22/2025    IMPRESSION: The patient is status post transmetatarsal amputation of the   right first ray. The surgical margin is sharp. No tracking subcutaneous   gas is seen. There is a chronic appearing fracture deformity of the right  fifth metatarsal bone. This image is available for your surgical review.    --- End of Report ---      < end of copied text >

## 2025-01-27 NOTE — PROGRESS NOTE ADULT - ASSESSMENT
The patient is a 64y Male with PMH of T2DM, ESRD with renal transplant who presented with lower extremity infection.  Endocrinology consulted for T2DM on insulin pump  Tolerating POs, but not eating much due to food choices are limited with diet restrictions. Wife is bringing home food at times. He counts carbohydrates and watches diet> Last 24 hour Bgs in 100s mostly at or close to goal.   Changed pump site yesterday ( pump site Lt upper arm and CGM above pump site) Due for pump site change on 1/29.         #Type 2 Diabetes Mellitus on insulin pump  - Follows with: Dr. Kelley Rodriguez  - - home regimen: Jardiance, Trulicity  Insulin pump type: Tandem Mobi  Insulin type: Novolog  Last site change:Pumpt replaced after MRI on 1/20/25. DEXCOM placed after the MRI  Supplies available: for one week  Settings:  Basal:  00:00 0.75  20:00 0.5  ICR 1:10  ISF 1:20  TDD 17 target 110   - eGFR: 68 mL/min/1.73m2 (01-16-25)  - Weight (kg): 82.8 (01-15-25)  - glucose at goal, no evidence of DKA on labs  A1C 7.0 %      INPATIENT PLAN:  - The patient wishes to use their insulin pump inpatient, understands how to use it, and has adequate supplies available  - c/w insulin pump at usual home settings  - The patient should not be long without basal insulin. If for any reason the pump becomes dysfunctional or falls off, they should receive 17 units Lantus   - The patient needs to fill out the self-assessment form and the patient and primary provider both need to sign the insulin pump attestation form.   - For each meal, the bedside nurse should document the carbohydrate intake and the insulin bolus the patient gives themselves in the EMR flowsheet.   - FSBG before meals and bedtime, but do not order an insulin correction scale as all insulin should be administered by the patient through the pump.   - Use hypoglycemia protocol if needed.      DISCHARGE PLANNING:  - Discharge recs pending clinical course, continue previous regimen on discharge  - will need Endocrinology follow up with his provider Dr. Rodriguez, has an appointment scheduled for April 2025.     #Hypertension  - Goal BP <130/80  - on lisinopril 2.5 mg  - Management as per primary team  - check urine albumin level as outpatient    #Hyperlipidemia  - LDL goal <70  - on atorvastatin 40  - check lipid panel as outpatient on a yearly basis    Contact via [employer] Teams during business hours  To reach covering provider access [employer]ION via sun[employer] tools  For Urgent matters/after-hours/weekends/holidays please page endocrine fellow on call   For nonurgent matters please email [email or web address]    Please note that this patient may be followed by different provider tomorrow.  Notify endocrine 24 hours prior to discharge for final recommendations

## 2025-01-27 NOTE — PROGRESS NOTE ADULT - PROBLEM SELECTOR PLAN 2
#RESOLVE D  Patient on arrival meeting SIRS criteria with fever, tachycardia, and leukocytosis. Afebrile. Not meeting SIRS criteria. Downtrending leukocytosis. Resolution of foot pain. Exam remarkable for reducing erythema on dorsum without further TTP and increasing palpation of dorsalis pedis pulse. Open wound in interweb of 1st and 2nd toe with purulence discharge. MRI foot suggestive of inflammatory changes of partially amputated R 1st digit and developing in 2nd digit with possible phelgmon in arch. Clinically improving. Resolving R foot erythema and tenderness. Ischemia related pain in R foot in setting of known stenosis, less likely inflammatory related pain, however contributory. Soft dorsalis pedis pulses elicited. Arterial doppler showing adequate blood flow in LE. Superficial 2 wounds, no defects. No S&S of acute ischemic limb. Clinically improving.     PLAN:  -Confirm with outpatient vascular surgeon need for invasive angiogram for RLE to assess flow   -Appreciate vascular surgery recommendations for doing a CT angiogram of RLE prior any podiatry interventions; vascular surgery will coordinate with transplant nephrology for CT angiogram

## 2025-01-27 NOTE — PROGRESS NOTE ADULT - PROBLEM SELECTOR PLAN 4
Normotensive.     -C/w carvedilol 6.25 mg po q12   -C/w lisinopril 2.5 mg po qd Patient with DM2 with nephropathy, neuropathy, retinopathy here. No concerns for DKA or HHS. Hba1c 7.0. Fair glycemic control.     PLAN:  -C/w insulin pump here while measuring fingersticks   -C/w Eylea injection to R eye q8 wks   -Hold home dulaglutide   -Hold home empagliflozin   -Appreciate endocrinology consult for insulin pump management

## 2025-01-27 NOTE — ADVANCED PRACTICE NURSE CONSULT - REASON FOR CONSULT
Vascular Access Team    Evaluation for: Bedside SL PICC placement  Requested by name: Leesa Kruse  Date/Time: 1/27 @ 16:09    Indication: ESRD with renal transplant has OM; needs 6 weeks of cefazolin  Allergy to CHG or Heparin or Lidocaine: no    Platelets(>20): 323  INR(<3): 1.09  eGFR(>40): 82  Blood cultures sent: 1/15  Blood culture negative in 48hrs: yes  Anticoagulants: lovenox 40mg  Arms DVT: no  Mastectomy: no  Fistula: Left arm  PPM/Defib: no  IR or Nephrology or ID clearance needed: yes, Transplant Nephrologist clearance note    Consent obtained: pending    Pending: consent and Transplant nephrology clearance note    Plan: Bedside picc order evaluated. Please obtain PICC placement consent and then call [phone] for the VAT RN to place the bedside picc.

## 2025-01-27 NOTE — PROGRESS NOTE ADULT - SUBJECTIVE AND OBJECTIVE BOX
[facility]   [phone]  RENAL FOLLOW UP NOTE  --------------------------------------------------------------------------------  Chief Complaint:    24 hour events/subjective:    patient seen and examined.   denies sob    PAST HISTORY  --------------------------------------------------------------------------------  No significant changes to PMH, PSH, FHx, SHx, unless otherwise noted    ALLERGIES & MEDICATIONS  --------------------------------------------------------------------------------  Allergies    clonidine (Other)  seasonal allergy (Other)    Intolerances      Standing Inpatient Medications  aspirin enteric coated 81 milliGRAM(s) Oral daily  atorvastatin 40 milliGRAM(s) Oral at bedtime  carvedilol 6.25 milliGRAM(s) Oral every 12 hours  ceFAZolin   IVPB 2000 milliGRAM(s) IV Intermittent every 8 hours  dextrose 5%. 1000 milliLiter(s) IV Continuous <Continuous>  dextrose 5%. 1000 milliLiter(s) IV Continuous <Continuous>  dextrose 50% Injectable 25 Gram(s) IV Push once  dextrose 50% Injectable 12.5 Gram(s) IV Push once  dextrose 50% Injectable 25 Gram(s) IV Push once  dextrose Oral Gel 15 Gram(s) Oral once  diphtheria/tetanus/pertussis (acellular) Vaccine (Adacel) 0.5 milliLiter(s) IntraMuscular once  enoxaparin Injectable 40 milliGRAM(s) SubCutaneous every 24 hours  glucagon  Injectable 1 milliGRAM(s) IntraMuscular once  insulin aspart (NovoLOG) Pump 1 Each SubCutaneous Continuous Pump  lidocaine   4% Patch 1 Patch Transdermal every 24 hours  lisinopril 2.5 milliGRAM(s) Oral daily  mupirocin 2% Ointment 1 Application(s) Topical every 12 hours  mycophenolic acid  milliGRAM(s) Oral daily  predniSONE   Tablet 5 milliGRAM(s) Oral daily  sodium chloride 0.9%. 1000 milliLiter(s) IV Continuous <Continuous>  tacrolimus 1 milliGRAM(s) Oral <User Schedule>  tacrolimus 0.5 milliGRAM(s) Oral <User Schedule>  tamsulosin 0.4 milliGRAM(s) Oral at bedtime    PRN Inpatient Medications  acetaminophen     Tablet .. 650 milliGRAM(s) Oral every 6 hours PRN  aluminum hydroxide/magnesium hydroxide/simethicone Suspension 30 milliLiter(s) Oral every 4 hours PRN  naproxen 250 milliGRAM(s) Oral daily PRN      REVIEW OF SYSTEMS  --------------------------------------------------------------------------------    Gen: denies fevers/chills,  CVS: denies chest pain/palpitations  Resp: denies SOB/Cough  GI: Denies N/V/Abd pain  : Denies dysuria    VITALS/PHYSICAL EXAM  --------------------------------------------------------------------------------  T(C): 36.2 (01-27-25 @ 05:51), Max: 37.1 (01-26-25 @ 21:12)  HR: 79 (01-27-25 @ 05:51) (76 - 79)  BP: 151/75 (01-27-25 @ 05:51) (125/69 - 151/75)  RR: 18 (01-27-25 @ 05:51) (18 - 18)  SpO2: 94% (01-27-25 @ 05:51) (94% - 94%)  Wt(kg): --        01-26-25 @ 07:01  -  01-27-25 @ 07:00  --------------------------------------------------------  IN: 0 mL / OUT: 300 mL / NET: -300 mL      Physical Exam:  	  	Gen: Non toxic comfortable appearing   	Pulm: decrease bs  no rales or ronchi or wheezing  	CV: No JVD. RRR, S1S2; no rub  	Abd: +BS, soft, nontender/nondistended  	: No suprapubic tenderness renal graft site non tender   	UE: Warm, no cyanosis  no clubbing,  no edema  	LE: Warm, erythema  foot with dressing +wound vac    LABS/STUDIES  --------------------------------------------------------------------------------              14.3   7.89  >-----------<  323      [01-26-25 @ 07:21]              44.6     138  |  105  |  17  ----------------------------<  144      [01-26-25 @ 07:25]  4.3   |  19  |  1.03        Ca     9.7     [01-26-25 @ 07:25]            Creatinine Trend:  SCr 1.03 [01-26 @ 07:25]  SCr 1.01 [01-25 @ 06:52]  SCr 0.96 [01-24 @ 04:44]  SCr 1.24 [01-22 @ 07:05]  SCr 1.20 [01-21 @ 07:14]    Tacrolimus (), Serum: 10.6 ng/mL (01-26 @ 07:25)  Tacrolimus (), Serum: 9.4 ng/mL (01-25 @ 06:52)  Tacrolimus (), Serum: 7.9 ng/mL (01-24 @ 08:55)  Tacrolimus (), Serum: 8.0 ng/mL (01-23 @ 07:00)            HbA1c 7.6      [02-19-20 @ 08:43]

## 2025-01-28 ENCOUNTER — TRANSCRIPTION ENCOUNTER (OUTPATIENT)
Age: 65
End: 2025-01-28

## 2025-01-28 VITALS
OXYGEN SATURATION: 98 % | HEART RATE: 98 BPM | SYSTOLIC BLOOD PRESSURE: 136 MMHG | DIASTOLIC BLOOD PRESSURE: 60 MMHG | RESPIRATION RATE: 18 BRPM | TEMPERATURE: 98 F

## 2025-01-28 LAB
ALBUMIN SERPL ELPH-MCNC: 3.4 G/DL — SIGNIFICANT CHANGE UP (ref 3.3–5)
ALP SERPL-CCNC: 102 U/L — SIGNIFICANT CHANGE UP (ref 40–120)
ALT FLD-CCNC: 25 U/L — SIGNIFICANT CHANGE UP (ref 10–45)
ANION GAP SERPL CALC-SCNC: 14 MMOL/L — SIGNIFICANT CHANGE UP (ref 5–17)
AST SERPL-CCNC: 23 U/L — SIGNIFICANT CHANGE UP (ref 10–40)
BASOPHILS # BLD AUTO: 0.06 K/UL — SIGNIFICANT CHANGE UP (ref 0–0.2)
BASOPHILS NFR BLD AUTO: 0.7 % — SIGNIFICANT CHANGE UP (ref 0–2)
BILIRUB SERPL-MCNC: 0.4 MG/DL — SIGNIFICANT CHANGE UP (ref 0.2–1.2)
BUN SERPL-MCNC: 20 MG/DL — SIGNIFICANT CHANGE UP (ref 7–23)
CALCIUM SERPL-MCNC: 9.8 MG/DL — SIGNIFICANT CHANGE UP (ref 8.4–10.5)
CHLORIDE SERPL-SCNC: 104 MMOL/L — SIGNIFICANT CHANGE UP (ref 96–108)
CO2 SERPL-SCNC: 21 MMOL/L — LOW (ref 22–31)
CREAT SERPL-MCNC: 1.09 MG/DL — SIGNIFICANT CHANGE UP (ref 0.5–1.3)
EGFR: 77 ML/MIN/1.73M2 — SIGNIFICANT CHANGE UP
EOSINOPHIL # BLD AUTO: 0.14 K/UL — SIGNIFICANT CHANGE UP (ref 0–0.5)
EOSINOPHIL NFR BLD AUTO: 1.7 % — SIGNIFICANT CHANGE UP (ref 0–6)
GLUCOSE BLDC GLUCOMTR-MCNC: 130 MG/DL — HIGH (ref 70–99)
GLUCOSE BLDC GLUCOMTR-MCNC: 137 MG/DL — HIGH (ref 70–99)
GLUCOSE BLDC GLUCOMTR-MCNC: 214 MG/DL — HIGH (ref 70–99)
GLUCOSE SERPL-MCNC: 139 MG/DL — HIGH (ref 70–99)
HCT VFR BLD CALC: 45.7 % — SIGNIFICANT CHANGE UP (ref 39–50)
HGB BLD-MCNC: 14.5 G/DL — SIGNIFICANT CHANGE UP (ref 13–17)
IMM GRANULOCYTES NFR BLD AUTO: 0.6 % — SIGNIFICANT CHANGE UP (ref 0–0.9)
LYMPHOCYTES # BLD AUTO: 2.11 K/UL — SIGNIFICANT CHANGE UP (ref 1–3.3)
LYMPHOCYTES # BLD AUTO: 25.7 % — SIGNIFICANT CHANGE UP (ref 13–44)
MAGNESIUM SERPL-MCNC: 2.1 MG/DL — SIGNIFICANT CHANGE UP (ref 1.6–2.6)
MCHC RBC-ENTMCNC: 30.1 PG — SIGNIFICANT CHANGE UP (ref 27–34)
MCHC RBC-ENTMCNC: 31.7 G/DL — LOW (ref 32–36)
MCV RBC AUTO: 95 FL — SIGNIFICANT CHANGE UP (ref 80–100)
MONOCYTES # BLD AUTO: 0.55 K/UL — SIGNIFICANT CHANGE UP (ref 0–0.9)
MONOCYTES NFR BLD AUTO: 6.7 % — SIGNIFICANT CHANGE UP (ref 2–14)
NEUTROPHILS # BLD AUTO: 5.31 K/UL — SIGNIFICANT CHANGE UP (ref 1.8–7.4)
NEUTROPHILS NFR BLD AUTO: 64.6 % — SIGNIFICANT CHANGE UP (ref 43–77)
NRBC # BLD: 0 /100 WBCS — SIGNIFICANT CHANGE UP (ref 0–0)
NRBC BLD-RTO: 0 /100 WBCS — SIGNIFICANT CHANGE UP (ref 0–0)
PHOSPHATE SERPL-MCNC: 2.5 MG/DL — SIGNIFICANT CHANGE UP (ref 2.5–4.5)
PLATELET # BLD AUTO: 342 K/UL — SIGNIFICANT CHANGE UP (ref 150–400)
POTASSIUM SERPL-MCNC: 4.1 MMOL/L — SIGNIFICANT CHANGE UP (ref 3.5–5.3)
POTASSIUM SERPL-SCNC: 4.1 MMOL/L — SIGNIFICANT CHANGE UP (ref 3.5–5.3)
PROT SERPL-MCNC: 7.3 G/DL — SIGNIFICANT CHANGE UP (ref 6–8.3)
RBC # BLD: 4.81 M/UL — SIGNIFICANT CHANGE UP (ref 4.2–5.8)
RBC # FLD: 13.2 % — SIGNIFICANT CHANGE UP (ref 10.3–14.5)
SODIUM SERPL-SCNC: 139 MMOL/L — SIGNIFICANT CHANGE UP (ref 135–145)
TACROLIMUS SERPL-MCNC: 13.5 NG/ML — SIGNIFICANT CHANGE UP
TACROLIMUS SERPL-MCNC: 9.9 NG/ML — SIGNIFICANT CHANGE UP
WBC # BLD: 8.22 K/UL — SIGNIFICANT CHANGE UP (ref 3.8–10.5)
WBC # FLD AUTO: 8.22 K/UL — SIGNIFICANT CHANGE UP (ref 3.8–10.5)

## 2025-01-28 PROCEDURE — 99232 SBSQ HOSP IP/OBS MODERATE 35: CPT

## 2025-01-28 PROCEDURE — 81001 URINALYSIS AUTO W/SCOPE: CPT

## 2025-01-28 PROCEDURE — 86850 RBC ANTIBODY SCREEN: CPT

## 2025-01-28 PROCEDURE — 73610 X-RAY EXAM OF ANKLE: CPT

## 2025-01-28 PROCEDURE — 80048 BASIC METABOLIC PNL TOTAL CA: CPT

## 2025-01-28 PROCEDURE — 87205 SMEAR GRAM STAIN: CPT

## 2025-01-28 PROCEDURE — 78802 RP LOCLZJ TUM WHBDY 1 D IMG: CPT | Mod: MC

## 2025-01-28 PROCEDURE — 87186 SC STD MICRODIL/AGAR DIL: CPT

## 2025-01-28 PROCEDURE — 86901 BLOOD TYPING SEROLOGIC RH(D): CPT

## 2025-01-28 PROCEDURE — A9585: CPT

## 2025-01-28 PROCEDURE — 82803 BLOOD GASES ANY COMBINATION: CPT

## 2025-01-28 PROCEDURE — 71045 X-RAY EXAM CHEST 1 VIEW: CPT

## 2025-01-28 PROCEDURE — 82550 ASSAY OF CK (CPK): CPT

## 2025-01-28 PROCEDURE — 93922 UPR/L XTREMITY ART 2 LEVELS: CPT

## 2025-01-28 PROCEDURE — 97161 PT EVAL LOW COMPLEX 20 MIN: CPT

## 2025-01-28 PROCEDURE — 84295 ASSAY OF SERUM SODIUM: CPT

## 2025-01-28 PROCEDURE — 93923 UPR/LXTR ART STDY 3+ LVLS: CPT

## 2025-01-28 PROCEDURE — 97116 GAIT TRAINING THERAPY: CPT

## 2025-01-28 PROCEDURE — C1751: CPT

## 2025-01-28 PROCEDURE — 86803 HEPATITIS C AB TEST: CPT

## 2025-01-28 PROCEDURE — 85652 RBC SED RATE AUTOMATED: CPT

## 2025-01-28 PROCEDURE — 85025 COMPLETE CBC W/AUTO DIFF WBC: CPT

## 2025-01-28 PROCEDURE — 85027 COMPLETE CBC AUTOMATED: CPT

## 2025-01-28 PROCEDURE — 88304 TISSUE EXAM BY PATHOLOGIST: CPT

## 2025-01-28 PROCEDURE — 97164 PT RE-EVAL EST PLAN CARE: CPT

## 2025-01-28 PROCEDURE — 96374 THER/PROPH/DIAG INJ IV PUSH: CPT

## 2025-01-28 PROCEDURE — 83605 ASSAY OF LACTIC ACID: CPT

## 2025-01-28 PROCEDURE — 83036 HEMOGLOBIN GLYCOSYLATED A1C: CPT

## 2025-01-28 PROCEDURE — 73720 MRI LWR EXTREMITY W/O&W/DYE: CPT | Mod: MC

## 2025-01-28 PROCEDURE — 87040 BLOOD CULTURE FOR BACTERIA: CPT

## 2025-01-28 PROCEDURE — 87637 SARSCOV2&INF A&B&RSV AMP PRB: CPT

## 2025-01-28 PROCEDURE — 85018 HEMOGLOBIN: CPT

## 2025-01-28 PROCEDURE — 87086 URINE CULTURE/COLONY COUNT: CPT

## 2025-01-28 PROCEDURE — 82947 ASSAY GLUCOSE BLOOD QUANT: CPT

## 2025-01-28 PROCEDURE — 87640 STAPH A DNA AMP PROBE: CPT

## 2025-01-28 PROCEDURE — 36569 INSJ PICC 5 YR+ W/O IMAGING: CPT

## 2025-01-28 PROCEDURE — 96375 TX/PRO/DX INJ NEW DRUG ADDON: CPT

## 2025-01-28 PROCEDURE — G0545: CPT

## 2025-01-28 PROCEDURE — 85014 HEMATOCRIT: CPT

## 2025-01-28 PROCEDURE — 88305 TISSUE EXAM BY PATHOLOGIST: CPT

## 2025-01-28 PROCEDURE — 85730 THROMBOPLASTIN TIME PARTIAL: CPT

## 2025-01-28 PROCEDURE — 86900 BLOOD TYPING SEROLOGIC ABO: CPT

## 2025-01-28 PROCEDURE — 84100 ASSAY OF PHOSPHORUS: CPT

## 2025-01-28 PROCEDURE — 82435 ASSAY OF BLOOD CHLORIDE: CPT

## 2025-01-28 PROCEDURE — 87641 MR-STAPH DNA AMP PROBE: CPT

## 2025-01-28 PROCEDURE — 87077 CULTURE AEROBIC IDENTIFY: CPT

## 2025-01-28 PROCEDURE — 88307 TISSUE EXAM BY PATHOLOGIST: CPT

## 2025-01-28 PROCEDURE — 93925 LOWER EXTREMITY STUDY: CPT

## 2025-01-28 PROCEDURE — 83735 ASSAY OF MAGNESIUM: CPT

## 2025-01-28 PROCEDURE — 97605 NEG PRS WND THER DME<=50SQCM: CPT

## 2025-01-28 PROCEDURE — 71045 X-RAY EXAM CHEST 1 VIEW: CPT | Mod: 26

## 2025-01-28 PROCEDURE — 82330 ASSAY OF CALCIUM: CPT

## 2025-01-28 PROCEDURE — C1889: CPT

## 2025-01-28 PROCEDURE — 87070 CULTURE OTHR SPECIMN AEROBIC: CPT

## 2025-01-28 PROCEDURE — 73630 X-RAY EXAM OF FOOT: CPT

## 2025-01-28 PROCEDURE — 36415 COLL VENOUS BLD VENIPUNCTURE: CPT

## 2025-01-28 PROCEDURE — 85610 PROTHROMBIN TIME: CPT

## 2025-01-28 PROCEDURE — 87075 CULTR BACTERIA EXCEPT BLOOD: CPT

## 2025-01-28 PROCEDURE — 88311 DECALCIFY TISSUE: CPT

## 2025-01-28 PROCEDURE — 84132 ASSAY OF SERUM POTASSIUM: CPT

## 2025-01-28 PROCEDURE — A9569: CPT

## 2025-01-28 PROCEDURE — 86140 C-REACTIVE PROTEIN: CPT

## 2025-01-28 PROCEDURE — 78830 RP LOCLZJ TUM SPECT W/CT 1: CPT

## 2025-01-28 PROCEDURE — 99239 HOSP IP/OBS DSCHRG MGMT >30: CPT | Mod: GC

## 2025-01-28 PROCEDURE — 82962 GLUCOSE BLOOD TEST: CPT

## 2025-01-28 PROCEDURE — 80197 ASSAY OF TACROLIMUS: CPT

## 2025-01-28 PROCEDURE — 99285 EMERGENCY DEPT VISIT HI MDM: CPT

## 2025-01-28 PROCEDURE — 80053 COMPREHEN METABOLIC PANEL: CPT

## 2025-01-28 RX ORDER — TACROLIMUS 1 MG/1
1 CAPSULE, GELATIN COATED ORAL
Qty: 30 | Refills: 0
Start: 2025-01-28 | End: 2025-02-26

## 2025-01-28 RX ORDER — DAPTOMYCIN 350 MG/7ML
65 INJECTION, POWDER, LYOPHILIZED, FOR SOLUTION INTRAVENOUS
Qty: 26 | Refills: 0
Start: 2025-01-28 | End: 2025-03-07

## 2025-01-28 RX ORDER — INSULIN ASPART 100 [IU]/ML
0 INJECTION, SOLUTION INTRAVENOUS; SUBCUTANEOUS
Qty: 0 | Refills: 0 | DISCHARGE
Start: 2025-01-28

## 2025-01-28 RX ORDER — TACROLIMUS 1 MG/1
1 CAPSULE, GELATIN COATED ORAL
Qty: 14 | Refills: 0
Start: 2025-01-28 | End: 2025-02-10

## 2025-01-28 RX ORDER — ACETAMINOPHEN 160 MG/5ML
2 SUSPENSION ORAL
Qty: 0 | Refills: 0 | DISCHARGE
Start: 2025-01-28

## 2025-01-28 RX ORDER — ANTISEPTIC SURGICAL SCRUB 0.04 MG/ML
1 SOLUTION TOPICAL
Refills: 0 | Status: DISCONTINUED | OUTPATIENT
Start: 2025-01-28 | End: 2025-01-28

## 2025-01-28 RX ORDER — BACTERIOSTATIC SODIUM CHLORIDE 0.9 %
10 VIAL (ML) INJECTION
Refills: 0 | Status: DISCONTINUED | OUTPATIENT
Start: 2025-01-28 | End: 2025-01-28

## 2025-01-28 RX ADMIN — ASPIRIN 81 MILLIGRAM(S): 81 TABLET, COATED ORAL at 11:29

## 2025-01-28 RX ADMIN — PREDNISONE 5 MILLIGRAM(S): 5 TABLET ORAL at 05:23

## 2025-01-28 RX ADMIN — ENOXAPARIN SODIUM 40 MILLIGRAM(S): 100 INJECTION SUBCUTANEOUS at 11:29

## 2025-01-28 RX ADMIN — MYCOPHENOLIC ACID 360 MILLIGRAM(S): 180 TABLET, DELAYED RELEASE ORAL at 11:29

## 2025-01-28 RX ADMIN — DAPTOMYCIN 126 MILLIGRAM(S): 350 INJECTION, POWDER, LYOPHILIZED, FOR SOLUTION INTRAVENOUS at 16:13

## 2025-01-28 RX ADMIN — Medication 6.25 MILLIGRAM(S): at 17:15

## 2025-01-28 RX ADMIN — Medication 6.25 MILLIGRAM(S): at 05:23

## 2025-01-28 RX ADMIN — TACROLIMUS 1 MILLIGRAM(S): 1 CAPSULE, GELATIN COATED ORAL at 08:48

## 2025-01-28 RX ADMIN — Medication 2.5 MILLIGRAM(S): at 05:23

## 2025-01-28 NOTE — PROGRESS NOTE ADULT - PROBLEM SELECTOR PLAN 2
Resolving R foot erythema and tenderness. Ischemia related pain in R foot in setting of known stenosis, less likely inflammatory related pain, however contributory. Soft dorsalis pedis pulses elicited. Arterial doppler showing adequate blood flow in LE. Superficial 2 wounds, no defects. No S&S of acute ischemic limb. Clinically improving.     PLAN:  -Confirm with outpatient vascular surgeon need for invasive angiogram for RLE to assess flow   -Appreciate vascular surgery recommendations for doing a CT angiogram of RLE prior any podiatry interventions; vascular surgery will coordinate with transplant nephrology for CT angiogram

## 2025-01-28 NOTE — PROGRESS NOTE ADULT - NS ATTEND OPT1 GEN_ALL_CORE
I attest my time as attending is greater than 50% of the total combined time spent on qualifying patient care activities by the PA/NP and attending.
I independently performed the documented:
I attest my time as attending is greater than 50% of the total combined time spent on qualifying patient care activities by the PA/NP and attending.
I independently performed the documented:

## 2025-01-28 NOTE — PROGRESS NOTE ADULT - PROBLEM SELECTOR PLAN 5
Patient with history of DM2 nephropathy leading to ESRD and renal transplant and immunosuppressive medications. Stable BUN/Cr. Supratherapeutic tacrolimus level. Clinically monitor.     PLAN:  -C/w predinsone 5 mg po qd   -C/w tacrolimus 1 mg po qam and 0.5 mg po qhs   -C/w mycophenolate mofetil 360 mg po BID

## 2025-01-28 NOTE — PROGRESS NOTE ADULT - ASSESSMENT
64M s/p R foot partial first ray resection, partially open (DOS: 1/24)  - Pt seen and evaluated.  - Afebrile, No Leukocytosis   - 1/24 s/p R foot partial first ray resection, partially open plantar sanguinous drainage, flap appears ischemic and macerated, no purulence no malodor, sutures intact. L foot 3rd digit plantar sulcus wound to subQ, no acute signs of infection.   - OR data: no growth (prelim)  - OR culture: Staph aureus (prelim)  - High concern for residual bone infection   - Low concern for viability   - Inpt VAC removed, Outpt VAC delivered to be reapplied on d/c by home care team. Leave dry sterile dressing clean dry and intact until discharge.   - Stable for d/c from podiatry standpoint pending PICC  - Follow up and wound care listed in d/c note provider   - Seen with Attending  64M s/p R foot partial first ray resection, partially open (DOS: 1/24)  - Pt seen and evaluated.  - Afebrile, No Leukocytosis   - 1/24 s/p R foot partial first ray resection, partially open plantar sanguinous drainage, flap appears ischemic and macerated, no purulence no malodor, sutures intact. L foot 3rd digit plantar sulcus wound to subQ, no acute signs of infection.   - OR data: no growth (prelim)  - OR culture: Staph aureus (prelim)  - High concern for residual bone infection   - Moderate concern for viability, to f/u with vasc as outpatient as well  - Inpt VAC removed, Outpt VAC delivered to be reapplied on d/c by home care team. Leave dry sterile dressing clean dry and intact until discharge.   - Stable for d/c from podiatry standpoint pending PICC  - Follow up and wound care listed in d/c note provider   - Seen with Attending

## 2025-01-28 NOTE — CHART NOTE - NSCHARTNOTESELECT_GEN_ALL_CORE
Endocrinology/Event Note
IR Chart Note
Pre-Procedure H&P and Clearance/Event Note
high tacro level/Event Note
scripts/Event Note
Endocrine follow up/Event Note

## 2025-01-28 NOTE — PROGRESS NOTE ADULT - ATTENDING COMMENTS
Seen at bedside. Discussed condition with patient and wife.  Mild edge necrosis to medial flap.  Will followup with vascular as outpatient as instructed, may need further intervention.  Pending PICC placement.  Home vac delivered. Stable for outpatient followup.

## 2025-01-28 NOTE — PROGRESS NOTE ADULT - ATTENDING COMMENTS
Patient seen and evaluated with wife. No complaints.   HAs portable wound vac at bedside.   PICC placement placed in afternoon (saw in AM and then with Dr. Parr)  Appreciate CM, daptomycin approved  Noted discrepancy between MRSA and MSSA, dapto for now and if repeat isolates reveal MSSA can be changed as outpatient. WEekly labs including CK ordered  Patient cleared by ID, podiatry.  stable for discharge. Has outpatient follow up with Dr. Puente, Dr. Parr, Podiatry  d/c planning 40 minutes.

## 2025-01-28 NOTE — PROGRESS NOTE ADULT - ASSESSMENT
64M with history of HTN, DM2 with neuropathy and partial R 1st digit amputation, ESRD s/p renal transplant on tacrolimus and mycophenolate, and HDL here for complicated R foot cellulitis with osteomyelitis; s/p R 1st toe partial ray resection with I&D feeling well but MRSA positive OR cultures; here for antibiotic finalization.

## 2025-01-28 NOTE — PROGRESS NOTE ADULT - PROVIDER SPECIALTY LIST ADULT
Nephrology
Nephrology
Podiatry
Transplant ID
Transplant ID
Vascular Surgery
Internal Medicine
Nephrology
Podiatry
Transplant ID
Vascular Surgery
Endocrinology
Nephrology
Podiatry
Transplant ID
Transplant ID
Endocrinology
Nephrology
Podiatry
Transplant ID
Internal Medicine
Endocrinology
Internal Medicine

## 2025-01-28 NOTE — ADVANCED PRACTICE NURSE CONSULT - ASSESSMENT
Pt seen on [facility] with insulin pump due to change on 1/23. Pt has supplies at bedside. Pump site and Dexcom G7 site is left upper arm.
64 year old male s/p right great  toe infection . Pt has history of [id number], who has insulin pump for glucose management. Endocrinologist will follow for diabetes management.        Basal Rate                                 ICR                                                  ISF  12:00am 7:00am                  12:00am-12:00                                12:00am-12:00am           0.75                                  1:10                                                1:20  7:00am-12:00am           0.75  12:00pm-4:00pm            0.75  4:00pm-8:00pm           0.75  8:00pm-12;00am            0.5    TDD: 17  Target Glucose: 110  Duration: 5
Picc Insertion Note  Patient or patient representative educated about central line associated blood stream infection prevention practices.  Catheter type: 4F,  SL Picc  : Bard  Power injectable: Yes  Lot# [id number]    Informed consent obtained by covering floor team.  Procedure assisted by: ALEXIA Henriquez RN  Time out was preformed, confirming the patient's first and last name, date of birth, MR#, procedure, and correct site prior to start of procedure.    Patient was placed with HOB 30 degrees. Patient placement site was prepped with chlorhexidine solution, then draped using maximum sterile barrier protection. The area was injected with 2 ml of 1% lidocaine. Using the Bard Site Rite 8, the catheter was placed using the Modified Seldinger Technique. Strict adherence to outline aseptic technique including handwashing, glove and gown, utilizing mask and cap, plus draping the patient with a sterile drape was observed, patient wore mask. Upon completion of line placement, the insertion site was covered with a sterile occlusive CHG dressing. Pt tolerated procedure well.    VS: WDL         All materials used for catheter insertion, including the intact guide wires, were accounted for at the end of the procedure.  Number of attempts: 1  Complications/Comments: None    Emergency Placement:  No  Site: New   Anatomical Site of insertion:  Right Basilic  Catheter size/length:  4F,  41cm  US guided Bard single lumen power picc placed    Post procedure verification with chest Xray as per orders.  
Pt seen on 5 [facility] with insulin pump due to change on 1/29. Pt has insulin tubing to change at bedside. both insulin pump and Dexcom G7 on left upper arm.

## 2025-01-28 NOTE — PROGRESS NOTE ADULT - SUBJECTIVE AND OBJECTIVE BOX
Follow Up:      Interval History:    REVIEW OF SYSTEMS  [  ] ROS unobtainable because:    [  ] All other systems negative except as noted below    Constitutional:  [ ] fever [ ] chills  [ ] weight loss  [ ] weakness  Skin:  [ ] rash [ ] phlebitis	  Eyes: [ ] icterus [ ] pain  [ ] discharge	  ENMT: [ ] sore throat  [ ] thrush [ ] ulcers [ ] exudates  Respiratory: [ ] dyspnea [ ] hemoptysis [ ] cough [ ] sputum	  Cardiovascular:  [ ] chest pain [ ] palpitations [ ] edema	  Gastrointestinal:  [ ] nausea [ ] vomiting [ ] diarrhea [ ] constipation [ ] pain	  Genitourinary:  [ ] dysuria [ ] frequency [ ] hematuria [ ] discharge [ ] flank pain  [ ] incontinence  Musculoskeletal:  [ ] myalgias [ ] arthralgias [ ] arthritis  [ ] back pain  Neurological:  [ ] headache [ ] seizures  [ ] confusion/altered mental status    Allergies  clonidine (Other)  seasonal allergy (Other)        ANTIMICROBIALS:  DAPTOmycin IVPB 650 every 24 hours      OTHER MEDS:  MEDICATIONS  (STANDING):  acetaminophen     Tablet .. 650 every 6 hours PRN  aluminum hydroxide/magnesium hydroxide/simethicone Suspension 30 every 4 hours PRN  aspirin enteric coated 81 daily  atorvastatin 40 at bedtime  carvedilol 6.25 every 12 hours  dextrose 50% Injectable 25 once  dextrose 50% Injectable 12.5 once  dextrose 50% Injectable 25 once  dextrose Oral Gel 15 once  diphtheria/tetanus/pertussis (acellular) Vaccine (Adacel) 0.5 once  enoxaparin Injectable 40 every 24 hours  glucagon  Injectable 1 once  insulin aspart (NovoLOG) Pump 1 Continuous Pump  lisinopril 2.5 daily  mycophenolic acid  daily  naproxen 250 daily PRN  predniSONE   Tablet 5 daily  tacrolimus 0.5 <User Schedule>  tacrolimus 1 <User Schedule>  tamsulosin 0.4 at bedtime      Vital Signs Last 24 Hrs  T(C): 36.6 (28 Jan 2025 04:16), Max: 36.7 (27 Jan 2025 19:29)  T(F): 97.9 (28 Jan 2025 04:16), Max: 98 (27 Jan 2025 19:29)  HR: 72 (28 Jan 2025 05:30) (72 - 80)  BP: 131/69 (28 Jan 2025 05:30) (112/69 - 131/69)  BP(mean): --  RR: 18 (28 Jan 2025 04:16) (18 - 18)  SpO2: 95% (28 Jan 2025 04:16) (95% - 98%)    Parameters below as of 28 Jan 2025 04:16  Patient On (Oxygen Delivery Method): room air        PHYSICAL EXAMINATION:  General: Alert and Awake, NAD  HEENT: PERRL, EOMI  Neck: Supple  Cardiac: RRR, No M/R/G  Resp: CTAB, No Wh/Rh/Ra  Abdomen: NBS, NT/ND, No HSM, No rigidity or guarding  MSK: No LE edema. No Calf tenderness  : No rivera  Skin: No rashes or lesions. Skin is warm and dry to the touch.   Neuro: Alert and Awake. CN 2-12 Grossly intact. Moves all four extremities spontaneously.  Psych: Calm, Pleasant, Cooperative                          14.5   8.22  )-----------( 342      ( 28 Jan 2025 07:13 )             45.7       01-28    139  |  104  |  20  ----------------------------<  139[H]  4.1   |  21[L]  |  1.09    Ca    9.8      28 Jan 2025 07:08  Phos  2.5     01-28  Mg     2.1     01-28    TPro  7.3  /  Alb  3.4  /  TBili  0.4  /  DBili  x   /  AST  23  /  ALT  25  /  AlkPhos  102  01-28      Urinalysis Basic - ( 28 Jan 2025 07:08 )    Color: x / Appearance: x / SG: x / pH: x  Gluc: 139 mg/dL / Ketone: x  / Bili: x / Urobili: x   Blood: x / Protein: x / Nitrite: x   Leuk Esterase: x / RBC: x / WBC x   Sq Epi: x / Non Sq Epi: x / Bacteria: x        MICROBIOLOGY:  v  .Surgical Swab  01-24-25   Growth in fluid media only Methicillin Resistant Staphylococcus aureus  --  Methicillin resistant Staphylococcus aureus      Tissue  01-22-25   No growth at 5 days  --    No polymorphonuclear cells seen per low power field  No organisms seen per oil power field      .Abscess  01-20-25   Few Staphylococcus aureus  --  Staphylococcus aureus      .Abscess  01-16-25   No growth at 5 days  --    No polymorphonuclear cells seen per low power field  No organisms seen per oil power field      .Blood BLOOD  01-15-25   No growth at 5 days  --  --      Clean Catch Clean Catch (Midstream)  01-15-25   No growth  --  --      .Blood BLOOD  01-15-25   No growth at 5 days  --  --                RADIOLOGY:    <The imaging below has been reviewed and visualized by me independently. Findings as detailed in report below> Follow Up:  Foot OM    Interval History: afebrile. no acute events.     REVIEW OF SYSTEMS  [  ] ROS unobtainable because:    [ x ] All other systems negative except as noted below    Constitutional:  [ ] fever [ ] chills  [ ] weight loss  [ ] weakness  Skin:  [ ] rash [ ] phlebitis	  Eyes: [ ] icterus [ ] pain  [ ] discharge	  ENMT: [ ] sore throat  [ ] thrush [ ] ulcers [ ] exudates  Respiratory: [ ] dyspnea [ ] hemoptysis [ ] cough [ ] sputum	  Cardiovascular:  [ ] chest pain [ ] palpitations [ ] edema	  Gastrointestinal:  [ ] nausea [ ] vomiting [ ] diarrhea [ ] constipation [ ] pain	  Genitourinary:  [ ] dysuria [ ] frequency [ ] hematuria [ ] discharge [ ] flank pain  [ ] incontinence  Musculoskeletal:  [ ] myalgias [ ] arthralgias [ ] arthritis  [ ] back pain  Neurological:  [ ] headache [ ] seizures  [ ] confusion/altered mental status    Allergies  clonidine (Other)  seasonal allergy (Other)        ANTIMICROBIALS:  DAPTOmycin IVPB 650 every 24 hours      OTHER MEDS:  MEDICATIONS  (STANDING):  acetaminophen     Tablet .. 650 every 6 hours PRN  aluminum hydroxide/magnesium hydroxide/simethicone Suspension 30 every 4 hours PRN  aspirin enteric coated 81 daily  atorvastatin 40 at bedtime  carvedilol 6.25 every 12 hours  dextrose 50% Injectable 25 once  dextrose 50% Injectable 12.5 once  dextrose 50% Injectable 25 once  dextrose Oral Gel 15 once  diphtheria/tetanus/pertussis (acellular) Vaccine (Adacel) 0.5 once  enoxaparin Injectable 40 every 24 hours  glucagon  Injectable 1 once  insulin aspart (NovoLOG) Pump 1 Continuous Pump  lisinopril 2.5 daily  mycophenolic acid  daily  naproxen 250 daily PRN  predniSONE   Tablet 5 daily  tacrolimus 0.5 <User Schedule>  tacrolimus 1 <User Schedule>  tamsulosin 0.4 at bedtime      Vital Signs Last 24 Hrs  T(C): 36.6 (28 Jan 2025 04:16), Max: 36.7 (27 Jan 2025 19:29)  T(F): 97.9 (28 Jan 2025 04:16), Max: 98 (27 Jan 2025 19:29)  HR: 72 (28 Jan 2025 05:30) (72 - 80)  BP: 131/69 (28 Jan 2025 05:30) (112/69 - 131/69)  BP(mean): --  RR: 18 (28 Jan 2025 04:16) (18 - 18)  SpO2: 95% (28 Jan 2025 04:16) (95% - 98%)    Parameters below as of 28 Jan 2025 04:16  Patient On (Oxygen Delivery Method): room air        PHYSICAL EXAMINATION:  General: Alert and Awake, NAD  Cardiac: RRR, No M/R/G  Resp: CTAB, No Wh/Rh/Ra  Abdomen: NBS, NT/ND, No HSM, No rigidity or guarding  MSK: +Dressing with wound vac overlying the RLE foot.   : No rivera  Skin: No rashes or lesions. Skin is warm and dry to the touch.   Neuro: Alert and Awake. CN 2-12 Grossly intact. Moves all four extremities spontaneously.  Psych: Calm, Pleasant, Cooperative                          14.5   8.22  )-----------( 342      ( 28 Jan 2025 07:13 )             45.7       01-28    139  |  104  |  20  ----------------------------<  139[H]  4.1   |  21[L]  |  1.09    Ca    9.8      28 Jan 2025 07:08  Phos  2.5     01-28  Mg     2.1     01-28    TPro  7.3  /  Alb  3.4  /  TBili  0.4  /  DBili  x   /  AST  23  /  ALT  25  /  AlkPhos  102  01-28      Urinalysis Basic - ( 28 Jan 2025 07:08 )    Color: x / Appearance: x / SG: x / pH: x  Gluc: 139 mg/dL / Ketone: x  / Bili: x / Urobili: x   Blood: x / Protein: x / Nitrite: x   Leuk Esterase: x / RBC: x / WBC x   Sq Epi: x / Non Sq Epi: x / Bacteria: x        MICROBIOLOGY:  v  .Surgical Swab  01-24-25   Growth in fluid media only Methicillin Resistant Staphylococcus aureus  --  Methicillin resistant Staphylococcus aureus      Tissue  01-22-25   No growth at 5 days  --    No polymorphonuclear cells seen per low power field  No organisms seen per oil power field      .Abscess  01-20-25   Few Staphylococcus aureus  --  Staphylococcus aureus      .Abscess  01-16-25   No growth at 5 days  --    No polymorphonuclear cells seen per low power field  No organisms seen per oil power field      .Blood BLOOD  01-15-25   No growth at 5 days  --  --      Clean Catch Clean Catch (Midstream)  01-15-25   No growth  --  --      .Blood BLOOD  01-15-25   No growth at 5 days  --  --                RADIOLOGY:    <The imaging below has been reviewed and visualized by me independently. Findings as detailed in report below>    < from: Xray Chest 1 View- PORTABLE-Urgent (Xray Chest 1 View- PORTABLE-Urgent .) (01.28.25 @ 15:23) >  IMPRESSION:  Right upper extremity PICC with tip terminating in the SVC.  Clear lungs.    < end of copied text >

## 2025-01-28 NOTE — PROGRESS NOTE ADULT - NS ATTEND AMEND GEN_ALL_CORE FT
Seen,    formulated plan with and  agree with above as scribed by NP Collin [Jenna]     denies fever or chills  lung no rales  heart RRR  abd graft site non tender  ext right foot dressing    renal transplant hx  htn     tacro dose decreased to 1 mg and 0.5 mg pm awaiting repeat accurate trough level   cont lisinopril 2.5 mg daily   cont abx   plan per podiatry
Seen, examined with, formulated plan with and  agree with above as scribed by NP Collin [Jenna]     lungs no rales   heart RRR  abd + bs soft non tender Graft site non tender  ext right foot dressing  no edema     renal transplant   htn     cont tracro 1 am and 0.5 mg pm   myfortic 360 mg qd  prednisone 5 mg daily   cont acr- I   on dapto to have cpk level done weekly
Patient seen and examined  Case discussed with NP Prerna    Patient with right foot between first and second toes deep collection that was opened and drained on my exam also with bogginess undersuface of area under first toe  Would ask Podiatry and vascular surgery to cocordinate care plans  In the interim would treat for osteomyelitis with empiric therapy Daptomycin and Cefepime as noted above  Will need PICC line placement  homecare infusion antibiotics until final plans with Podiatry and Vascular surgery are determined    Discussed with patient     Marciano Ma MD  Can be called via Teams  After 5pm/weekends [phone]
Patient seen and examined     Case discussed with LYNETTE Graff    After she saw the patient- plans changed and patient is now agreeing to first toe debridement, amputation, and second toe sampling and possibly a transmetatarsal amputation  He understands the need for prolonged IV antibiotics for his osteomyelitis    Please send bone specimens for pathology but also for gram stain and cultures    Marciano Ma MD  Can be called via Teams  After 5pm/weekends [phone]
Patient seen and examined  Case discussed with LYNETTE Graff    Patient is planning on surgery with debridement and first toe resection and even possible TMA if needed  Please send both pathology and cultures from clean and dirty margins for bacterial stains and cultures    Marciano Ma MD  Can be called via Teams  After 5pm/weekends [phone]
64M with HTN T2DM on CGM and insulin pump, ESRD s/p renal transplant in 2020 on tacrolimus and mycophenolate, partial R first toe amputation presenting from podiatry office with a foot infection.     Abscess Culture Results:  MSSA    MRI Right Foot:  Subcortical marrow changes of the 1st proximal phalanx stump and hallux sesamoids may be reactive or reflect osteomyelitis with adjacent soft tissue changes suggesting early phlegmon formation. Faint marrow changes of the 2nd metatarsophalangeal joint may be reactive versus early infection.    s/p surgical debridement and amputation of first and partial second toe  High operative concern for residual OM  Clean bone cultures with MRSA    Contacted the lab to retest isolates given discrepancy of superficial cultures with MSSA and operative cultures with MRSA.   Retesting of MRSA isolate pending  s/p PICC placement.   Patient and wife expressing wish for discharge today.   Tentatively will discharge with Daptomycin 650 mg IV Q24H through 3/7/25 (6 weeks from amputation).     Transplant ID will not follow patient regularly going forward. Please feel free to reach out with any further questions or concerns.    Hugo Parr M.D.  [facility] Division of Infectious Disease  8AM-5PM Monday - Friday: Available on [employer] Teams  After Hours and Holidays (or if no response on [employer] Teams): Please contact the Infectious Diseases Office at [phone]     The above assessment and plan were discussed with Dr Noguera (medicine attending)
64M with HTN T2DM on CGM and insulin pump, ESRD s/p renal transplant in 2020 on tacrolimus and mycophenolate, partial R first toe amputation presenting from podiatry office with a foot infection.     Abscess Culture Results:  MSSA    MRI Right Foot:  Subcortical marrow changes of the 1st proximal phalanx stump and hallux sesamoids may be reactive or reflect osteomyelitis with adjacent soft tissue changes suggesting early phlegmon formation. Faint marrow changes of the 2nd metatarsophalangeal joint may be reactive versus early infection.    s/p surgical debridement and amputation of first and partial second toe  High operative concern for residual OM  Clean bone cultures with MRSA    Will contact the lab to retest isolates given discrepancy of superficial cultures with MSSA and operative cultures with MRSA    Pending repeat testing will switch patient back from Cefazolin to Daptomycin 650 mg IV Q24H    Plan regardless of isolate susceptibility for 6 weeks of antibiotics from amputation; would proceed with PICC placement.     I will continue to follow. Please feel free to contact me with any further questions.    Hugo Parr M.D.  [facility] Division of Infectious Disease  8AM-5PM Monday - Friday: Available on [employer] Teams  After Hours and Holidays (or if no response on [employer] Teams): Please contact the Infectious Diseases Office at [phone]    The above assessment and plan were discussed with medicine resident

## 2025-01-28 NOTE — PROGRESS NOTE ADULT - SUBJECTIVE AND OBJECTIVE BOX
[facility]   [phone]  RENAL FOLLOW UP NOTE  --------------------------------------------------------------------------------  Chief Complaint:    24 hour events/subjective:    patient seen and examined.   denies sob    PAST HISTORY  --------------------------------------------------------------------------------  No significant changes to PMH, PSH, FHx, SHx, unless otherwise noted    ALLERGIES & MEDICATIONS  --------------------------------------------------------------------------------  Allergies    clonidine (Other)  seasonal allergy (Other)    Intolerances      Standing Inpatient Medications  aspirin enteric coated 81 milliGRAM(s) Oral daily  atorvastatin 40 milliGRAM(s) Oral at bedtime  carvedilol 6.25 milliGRAM(s) Oral every 12 hours  DAPTOmycin IVPB 650 milliGRAM(s) IV Intermittent every 24 hours  dextrose 5%. 1000 milliLiter(s) IV Continuous <Continuous>  dextrose 5%. 1000 milliLiter(s) IV Continuous <Continuous>  dextrose 50% Injectable 25 Gram(s) IV Push once  dextrose 50% Injectable 12.5 Gram(s) IV Push once  dextrose 50% Injectable 25 Gram(s) IV Push once  dextrose Oral Gel 15 Gram(s) Oral once  diphtheria/tetanus/pertussis (acellular) Vaccine (Adacel) 0.5 milliLiter(s) IntraMuscular once  enoxaparin Injectable 40 milliGRAM(s) SubCutaneous every 24 hours  glucagon  Injectable 1 milliGRAM(s) IntraMuscular once  insulin aspart (NovoLOG) Pump 1 Each SubCutaneous Continuous Pump  lidocaine   4% Patch 1 Patch Transdermal every 24 hours  lisinopril 2.5 milliGRAM(s) Oral daily  mupirocin 2% Ointment 1 Application(s) Topical every 12 hours  mycophenolic acid  milliGRAM(s) Oral daily  predniSONE   Tablet 5 milliGRAM(s) Oral daily  sodium chloride 0.9%. 1000 milliLiter(s) IV Continuous <Continuous>  tacrolimus 0.5 milliGRAM(s) Oral <User Schedule>  tacrolimus 1 milliGRAM(s) Oral <User Schedule>  tamsulosin 0.4 milliGRAM(s) Oral at bedtime    PRN Inpatient Medications  acetaminophen     Tablet .. 650 milliGRAM(s) Oral every 6 hours PRN  aluminum hydroxide/magnesium hydroxide/simethicone Suspension 30 milliLiter(s) Oral every 4 hours PRN  naproxen 250 milliGRAM(s) Oral daily PRN      REVIEW OF SYSTEMS  --------------------------------------------------------------------------------    Gen: denies fevers/chills,  CVS: denies chest pain/palpitations  Resp: denies SOB/Cough  GI: Denies N/V/Abd pain  : Denies dysuria/oliguria/hematuria    VITALS/PHYSICAL EXAM  --------------------------------------------------------------------------------  T(C): 36.6 (01-28-25 @ 04:16), Max: 36.7 (01-27-25 @ 19:29)  HR: 72 (01-28-25 @ 05:30) (72 - 80)  BP: 131/69 (01-28-25 @ 05:30) (112/69 - 131/69)  RR: 18 (01-28-25 @ 04:16) (18 - 18)  SpO2: 95% (01-28-25 @ 04:16) (95% - 98%)  Wt(kg): --        01-27-25 @ 07:01  -  01-28-25 @ 07:00  --------------------------------------------------------  IN: 300 mL / OUT: 0 mL / NET: 300 mL      Physical Exam:  	  	Gen: Non toxic comfortable appearing   	Pulm: decrease bs  no rales or ronchi or wheezing  	CV: No JVD. RRR, S1S2; no rub  	Abd: +BS, soft, nontender/nondistended  	: No suprapubic tenderness renal graft site non tender   	UE: Warm, no cyanosis  no clubbing,  no edema  	LE: Warm, erythema  foot with dressing +wound vac    LABS/STUDIES  --------------------------------------------------------------------------------              14.5   8.22  >-----------<  342      [01-28-25 @ 07:13]              45.7     139  |  104  |  20  ----------------------------<  139      [01-28-25 @ 07:08]  4.1   |  21  |  1.09        Ca     9.8     [01-28-25 @ 07:08]      Mg     2.1     [01-28-25 @ 07:08]      Phos  2.5     [01-28-25 @ 07:08]    TPro  7.3  /  Alb  3.4  /  TBili  0.4  /  DBili  x   /  AST  23  /  ALT  25  /  AlkPhos  102  [01-28-25 @ 07:08]          Creatinine Trend:  SCr 1.09 [01-28 @ 07:08]  SCr 1.18 [01-27 @ 22:28]  SCr 1.03 [01-26 @ 07:25]  SCr 1.01 [01-25 @ 06:52]  SCr 0.96 [01-24 @ 04:44]    Tacrolimus (), Serum: 9.9 ng/mL (01-28 @ 07:13)  Tacrolimus (), Serum: 13.5 ng/mL (01-27 @ 22:27)  Tacrolimus (), Serum: 10.6 ng/mL (01-26 @ 07:25)  Tacrolimus (), Serum: 9.4 ng/mL (01-25 @ 06:52)              HbA1c 7.6      [02-19-20 @ 08:43]

## 2025-01-28 NOTE — DISCHARGE NOTE NURSING/CASE MANAGEMENT/SOCIAL WORK - FINANCIAL ASSISTANCE
[facility] provides services at a reduced cost to those who are determined to be eligible through [facility]’s financial assistance program. Information regarding [facility]’s financial assistance program can be found by going to [email or web address] or by calling [phone].

## 2025-01-28 NOTE — PROGRESS NOTE ADULT - SUBJECTIVE AND OBJECTIVE BOX
Patient is a 64y old  Male who presents with a chief complaint of Osteomyelitis (28 Jan 2025 06:41)       INTERVAL HPI/OVERNIGHT EVENTS:  Patient seen and evaluated at bedside.  Pt is resting comfortable in NAD. Denies N/V/F/C.      Allergies    clonidine (Other)  seasonal allergy (Other)    Intolerances        Vital Signs Last 24 Hrs  T(C): 36.6 (28 Jan 2025 04:16), Max: 36.7 (27 Jan 2025 19:29)  T(F): 97.9 (28 Jan 2025 04:16), Max: 98 (27 Jan 2025 19:29)  HR: 72 (28 Jan 2025 05:30) (72 - 80)  BP: 131/69 (28 Jan 2025 05:30) (112/69 - 131/69)  BP(mean): --  RR: 18 (28 Jan 2025 04:16) (18 - 18)  SpO2: 95% (28 Jan 2025 04:16) (95% - 98%)    Parameters below as of 28 Jan 2025 04:16  Patient On (Oxygen Delivery Method): room air        LABS:                        14.5   8.22  )-----------( 342      ( 28 Jan 2025 07:13 )             45.7     01-28    139  |  104  |  20  ----------------------------<  139[H]  4.1   |  21[L]  |  1.09    Ca    9.8      28 Jan 2025 07:08  Phos  2.5     01-28  Mg     2.1     01-28    TPro  7.3  /  Alb  3.4  /  TBili  0.4  /  DBili  x   /  AST  23  /  ALT  25  /  AlkPhos  102  01-28      Urinalysis Basic - ( 28 Jan 2025 07:08 )    Color: x / Appearance: x / SG: x / pH: x  Gluc: 139 mg/dL / Ketone: x  / Bili: x / Urobili: x   Blood: x / Protein: x / Nitrite: x   Leuk Esterase: x / RBC: x / WBC x   Sq Epi: x / Non Sq Epi: x / Bacteria: x      CAPILLARY BLOOD GLUCOSE  130 (28 Jan 2025 08:40)  191 (27 Jan 2025 22:08)  168 (27 Jan 2025 12:31)      POCT Blood Glucose.: 130 mg/dL (28 Jan 2025 08:40)  POCT Blood Glucose.: 191 mg/dL (27 Jan 2025 21:55)  POCT Blood Glucose.: 133 mg/dL (27 Jan 2025 17:19)  POCT Blood Glucose.: 168 mg/dL (27 Jan 2025 12:31)      Lower Extremity Physical Exam:  Vascular: DP/PT 0/4, B/L, CFT <3 seconds B/L, Temperature gradient warm to warm R, .   Neuro: Epicritic sensation diminished to the level of digits B/L.  Musculoskeletal/Ortho: s/p R hallux partial amputation  Skin: 1/24 s/p R foot partial first ray resection, partially open plantar sanguinous drainage, flap appears ischemic and macerated, no purulence no malodor, sutures intact. L foot 3rd digit plantar sulcus wound to subQ, no acute signs of infection    RADIOLOGY & ADDITIONAL TESTS:

## 2025-01-28 NOTE — PROGRESS NOTE ADULT - ASSESSMENT
64M with HTN T2DM on CGM and insulin pump, ESRD s/p renal transplant in 2020 on tacrolimus and mycophenolate, partial R first toe amputation presenting from podiatry office with a foot infection. Patient states that a couple days ago he was walking barefoot outside and had a pine needle stuck on the anterior plantar surface of his foot near the base of his R first toe. He had progressive reddening and pain of the R foot and went to see a podiatrist. Podiatrist had concerns that he did not have any pedal pulses and also concern for potential bone involvement and prescribed a single dose of Augmentin and sent the patient to the ED for further evaluation. At the ED the patient was febrile to 101.8F, tachycardic to 91 w/ stable BP and O2 Sat. WBC count was elevated to 15.9 w/ neutrophil predominance. XR did not reveal any signs of OM and the patient was seen by podiatry who had low suspicion for osteomyelitis but recommended a Vascular surgery consult for concerns of PAD.    Seen at bedside with wife present, patient & wife feels like they need more work-up to confirm acute on chronic osteomyelitis of the foot. Reports he has had chronic osteomyelitis from 12 years ago, requesting bone biopsy, MRI (chart review indicates unable to perform due to insulin pump), and revascularization by vascular before suggested amputation by podiatry. Transplant ID consulted for recommendations.     #Renal transplant recipient  #Leucocytosis  #Concern for R foot osteomyelitis  -Febrile on admission, afebrile today  -WBC 12 today  -Abscess Culture Results:  Few Staphylococcus aureus  -Sedimentation Rate, Erythrocyte: 110 mm/hr  -C-Reactive Protein: 229 mg/L  -MRSA/MSSA PCR: Staph aureus PCR Result: Detected  -FluA/FluB/RSV/COVID PCR: not detected  -BLOOD Culture Results: No growth at 4 days  -Urine Culture Results: No growth      Recommendations:  --MRI Right Foot:  Subcortical marrow changes of the 1st proximal phalanx stump and hallux sesamoids may be reactive or reflect osteomyelitis with adjacent soft tissue changes suggesting early phlegmon formation. Faint marrow changes of the 2nd metatarsophalangeal joint may be reactive versus early infection.  --now s/p surgical debridement and amputation of first and partial second toe  --High operative concern for residual OM  --Clean bone cultures with MRSA  --Contacted the lab to retest isolates given discrepancy of superficial cultures with MSSA and operative cultures with MRSA. Retesting of MRSA isolate pending  --s/p PICC placement. Patient and wife expressing wish for discharge today. Tentatively will discharge with Daptomycin 650 mg IV Q24H through 3/7/25 (6 weeks from amputation).   --Recommend CBC with Diff, CMP, CPK qWeekly while on antimicrobials. Please have results faxed to [phone] with attn to Dr Parr  --Patient should follow up with Dr Parr in the Infectious Diseases Office at [address] [place] [address] in 3-4 weeks time.  Office contact number is [phone]

## 2025-01-28 NOTE — CHART NOTE - NSCHARTNOTEFT_GEN_A_CORE
The patient is a 64y Male with PMH of T2DM, ESRD with renal transplant who presented with lower extremity infection.  Endocrinology consulted for T2DM on insulin pump  Attempted to see patient X2, patient was off floor for tests  Chart reviewed. BGs at goal without hypoglycemia       POCT Blood Glucose:  116 mg/dL (01-17-25 @ 16:05)  115 mg/dL (01-17-25 @ 10:30)  149 mg/dL (01-16-25 @ 21:47)  114 mg/dL (01-16-25 @ 17:54)      eMAR:atorvastatin   40 milliGRAM(s) Oral (01-16-25 @ 21:14)    predniSONE   Tablet   5 milliGRAM(s) Oral (01-17-25 @ 05:12)      Diet, Consistent Carbohydrate/No Snacks:   DASH/TLC {Sodium & Cholesterol Restricted} (DASH)  No Concentrated Potassium (01-15-25 @ 21:49) [Active]        #Type 2 Diabetes Mellitus on insulin pump  - Follows with: Dr. Kelley Rodriguez  - - home regimen: Jardiance, Trulicity  Insulin pump type: Tandem Mobi  Insulin type: Novolog  Last site change: due for change today. DEXCOM placed yesterday. patient will replace Dexcom G7 after NM study   Supplies available: for one week  Settings:  Basal:  00:00 0.75  20:00 0.5  ICR 1:10  ISF 1:20  TDD 17 target 110   - eGFR: 68 mL/min/1.73m2 (01-16-25)  - Weight (kg): 82.8 (01-15-25)  - glucose at goal, no evidence of DKA on labs      INPATIENT PLAN:  - The patient wishes to use their insulin pump inpatient, understands how to use it, and has adequate supplies available  - c/w insulin pump at usual home settings  - The patient should not be long without basal insulin. If for any reason the pump becomes dysfunctional or falls off, they should receive 17 units Lantus   - The patient needs to fill out the self-assessment form and the patient and primary provider both need to sign the insulin pump attestation form.   - For each meal, the bedside nurse should document the carbohydrate intake and the insulin bolus the patient gives themselves in the EMR flowsheet.   - FSBG before meals and bedtime, but do not order an insulin correction scale as all insulin should be administered by the patient through the pump.   - Use hypoglycemia protocol if needed.  - Please change zosyn to NS instead of D5 if feasible   - Please note, insulin pump and continuous glucose monitor should be temporarily removed immediately prior to NM imaging radiation exposure and replaced as soon as possible after the procedure is done.   - If pt needs MRI, insulin pump and Dexcom G7 can be removed immediately prior to MRI and replaced right after MRI. please inform endocrine team.      Contact via Microsoft Teams during business hours  To reach covering provider access AMION via sunrise tools  For Urgent matters/after-hours/weekends/holidays please page endocrine fellow on call   For nonurgent matters please email [email or web address]    Please note that this patient may be followed by different provider tomorrow.  Notify endocrine 24 hours prior to discharge for final recommendations
I have reviewed the medical notes.  There have been no interval changes to H&P for the past 24 hrs.
MANOLOJITENDRA  Gender: Male  64y  [facility] 5MON 518 W1    Patient seen briefly this morning at bedside prior to OR.    POCT Blood Glucose:  164 mg/dL (01-24-25 @ 06:44)  155 mg/dL (01-23-25 @ 21:51)  290 mg/dL (01-23-25 @ 17:16)  140 mg/dL (01-23-25 @ 12:40)      eMAR:atorvastatin   40 milliGRAM(s) Oral (01-23-25 @ 21:07)    predniSONE   Tablet   5 milliGRAM(s) Oral (01-24-25 @ 06:02)    Endocrinology following patient for T2DM management. In the last 24hrs BG levels have been 140-290mg/dL. Patient continues on Tandem Mobi insulin pump at home settings with Dexcom G7 CGM. Patient NPO since midnight for Podiatry procedure today. FBG stable this am to 164mg/dL.. No hypoglycemia. Assisted patient at bedside to place insulin pump in exercise mode for OR today. Patient can turn off exercise mode post-operative and when eating again. Patient should have fingerstick BG checked prior to leaving for procedure, during procedure, and when he returns to the unit. BG goal 100-180mg/dL. Endocrine will closely monitor BG levels.
Patient will require rolling walker at home due to their dx of right lower limb cellulitis to help complete their MRADLs.
Pt no longer being followed by outpatient transplant nephrologist and is currently in the care of general nephrology.  Per note, ok to place PICC bedside.    Kofi Hinds PA-C  IR call back ext. [phone]  Also available on Teams    - Non-emergent consults: Place IR consult order in [employer]  - Emergent issues (pager): [facility] [phone]; [facility] [phone]; [id number]  - Scheduling questions: [facility] [phone]; [facility] [phone]  - Clinic/outpatient booking: [facility] [phone]; [facility] [phone]
tacro 14. to decrease tacro to 1 mg am and 0.5 mg pm

## 2025-01-28 NOTE — PROGRESS NOTE ADULT - PROBLEM SELECTOR PLAN 4
Patient with DM2 with nephropathy, neuropathy, retinopathy here. No concerns for DKA or HHS. Hba1c 7.0. Fair glycemic control.     PLAN:  -C/w insulin pump here while measuring fingersticks   -C/w Eylea injection to R eye q8 wks   -Hold home dulaglutide   -Hold home empagliflozin   -Appreciate endocrinology consult for insulin pump management

## 2025-01-28 NOTE — ADVANCED PRACTICE NURSE CONSULT - RECOMMEDATIONS
Post procedure verbal instructions given to the patient or patient representative. Patient or patient representative  instructed regarding signs and symptoms of infection. Patient instructed to keep dressing dry and clean. Care for catheter as per hospital protocols  
Pt Aox4 able to mange with insulin pump independently.  Will continue with insulin pump. Endocrine team to follow. 
Pt able to mange insulin pump independently. Nursing staff will document in appropriate flow sheet.
Pt able to mange Insulin pump independently. Nursing staff will document in appropriate flow sheet.

## 2025-01-28 NOTE — PROGRESS NOTE ADULT - ASSESSMENT
64 year old Male with PMHx HTN T2DM on CGM and insulin pump, ESRD s/p renal transplant in 2020 on tacrolimus and mycophenolate, partial R first toe amputation presenting from podiatry office with a foot infection.        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

## 2025-01-28 NOTE — DISCHARGE NOTE NURSING/CASE MANAGEMENT/SOCIAL WORK - PATIENT PORTAL LINK FT
You can access the FollowMyHealth Patient Portal offered by [facility] by registering at the following website: [email or web address]. By joining [employer]’s FollowMyHealth portal, you will also be able to view your health information using other applications (apps) compatible with our system.

## 2025-01-28 NOTE — DISCHARGE NOTE NURSING/CASE MANAGEMENT/SOCIAL WORK - NSDCFUADDAPPT_GEN_ALL_CORE_FT
APPTS ARE READY TO BE MADE: [X] YES    Best Family or Patient Contact (if needed):    Additional Information about above appointments (if needed):    1: Please make an appointment with your PCP to discuss your most recent hospitalization.  2: Please make an appointment with your nephrologist for management of the kidney transplant.   3: Please make an appointment with your endocrinologist to follow-up on the diabetes.   4: Please consider making an appointment with the vascular surgery to determine any further management for the peripheral vascular disease.   5: Please f/u with the infectious disease doctors for f/u of osteomyelitis management.   6. Please f/u with podiatry in 1 week  Other comments or requests:     Podiatry Discharge Instructions:  Follow up: Please follow up with Dr. Hylton within 1 week of discharge from the hospital, please call [phone] for appointment and discuss that you recently were seen in the hospital.  Wound Care: Please apply VAC with black granufaom to right foot wound on 125 mmHG on continuous pressure three times a week  Weight bearing: Please weight bear as tolerated to right heel in a surgical shoe.  Antibiotics: Please continue as instructed.

## 2025-01-28 NOTE — PROGRESS NOTE ADULT - PROBLEM SELECTOR PLAN 6
#RESOLVED  Patient on arrival meeting SIRS criteria with fever, tachycardia, and leukocytosis. Afebrile. Not meeting SIRS criteria. Downtrending leukocytosis. Resolution of foot pain. Exam remarkable for reducing erythema on dorsum without further TTP and increasing palpation of dorsalis pedis pulse. Open wound in interweb of 1st and 2nd toe with purulence discharge. MRI foot suggestive of inflammatory changes of partially amputated R 1st digit and developing in 2nd digit with possible phelgmon in arch. Clinically improving.

## 2025-01-28 NOTE — PROGRESS NOTE ADULT - SUBJECTIVE AND OBJECTIVE BOX
Patient is a 64y old  Male who presents with a chief complaint of scar tinfection (27 Jan 2025 18:09)      ======Overnight/Subjective======  Overnight, there were no events. Patient is tolerating daptomycin well. ROS negative.    Brief daily plan: Finalize abx given MRSA positivity and DC    ======Medications======  MEDICATIONS  (STANDING):  aspirin enteric coated 81 milliGRAM(s) Oral daily  atorvastatin 40 milliGRAM(s) Oral at bedtime  carvedilol 6.25 milliGRAM(s) Oral every 12 hours  DAPTOmycin IVPB 650 milliGRAM(s) IV Intermittent every 24 hours  dextrose 5%. 1000 milliLiter(s) (100 mL/Hr) IV Continuous <Continuous>  dextrose 5%. 1000 milliLiter(s) (50 mL/Hr) IV Continuous <Continuous>  dextrose 50% Injectable 25 Gram(s) IV Push once  dextrose 50% Injectable 12.5 Gram(s) IV Push once  dextrose 50% Injectable 25 Gram(s) IV Push once  dextrose Oral Gel 15 Gram(s) Oral once  diphtheria/tetanus/pertussis (acellular) Vaccine (Adacel) 0.5 milliLiter(s) IntraMuscular once  enoxaparin Injectable 40 milliGRAM(s) SubCutaneous every 24 hours  glucagon  Injectable 1 milliGRAM(s) IntraMuscular once  insulin aspart (NovoLOG) Pump 1 Each SubCutaneous Continuous Pump  lidocaine   4% Patch 1 Patch Transdermal every 24 hours  lisinopril 2.5 milliGRAM(s) Oral daily  mupirocin 2% Ointment 1 Application(s) Topical every 12 hours  mycophenolic acid  milliGRAM(s) Oral daily  predniSONE   Tablet 5 milliGRAM(s) Oral daily  sodium chloride 0.9%. 1000 milliLiter(s) (100 mL/Hr) IV Continuous <Continuous>  tacrolimus 1 milliGRAM(s) Oral <User Schedule>  tacrolimus 0.5 milliGRAM(s) Oral <User Schedule>  tamsulosin 0.4 milliGRAM(s) Oral at bedtime    MEDICATIONS  (PRN):  acetaminophen     Tablet .. 650 milliGRAM(s) Oral every 6 hours PRN Mild Pain (1 - 3)  aluminum hydroxide/magnesium hydroxide/simethicone Suspension 30 milliLiter(s) Oral every 4 hours PRN Dyspepsia  naproxen 250 milliGRAM(s) Oral daily PRN Moderate Pain (4 - 6)      ======Vital Signs======  T(C): 36.6 (01-28-25 @ 04:16), Max: 36.7 (01-27-25 @ 19:29)  T(F): 97.9 (01-28-25 @ 04:16), Max: 98 (01-27-25 @ 19:29)  HR: 72 (01-28-25 @ 05:30) (72 - 80)  BP: 131/69 (01-28-25 @ 05:30) (112/69 - 131/69)  BP(mean): --  RR: 18 (01-28-25 @ 04:16) (18 - 18)  SpO2: 95% (01-28-25 @ 04:16) (95% - 98%)    ======Physical exams======  GENERAL: Patient wants to leave and is very stressed by his continued hospital stay  Cardiovascular: RRR, S1 S2, no m/r/g, no JVD  LUNGS: Unlabored respiration, CTABL  ABDOMEN: Soft, NTND  EXTREMITIES: Warm extremities, no edema, foot wound c/d/i, dressing and wound vac in place  NEURO: AAOx3    ======Labs======                14.3  7.89 >----------< 323  (MCV: 95.7)                44.6   135 | 102 | 23  -----------------------< 168[H]  4.3 | 22 | 1.18    TPro: 7.1 / Alb: 3.1[L] / TBili: 0.4 / DBili: -- / AlkPhos: 104 / ALT: 33 / AST: 29 (01-24-25 @ 04:44)  Ca: 9.9 / Phos: -- / Mg: -- (01-27-25 @ 22:28)    Gas: 7.36 / 46 / 22[L] / 26 / 34.3[L]% / 0.0 (01-15-25 @ 17:23)      ======Microbiology======  Urinalysis Basic - ( 27 Jan 2025 22:28 )    Color: x / Appearance: x / SG: x / pH: x  Gluc: 168 mg/dL / Ketone: x  / Bili: x / Urobili: x   Blood: x / Protein: x / Nitrite: x   Leuk Esterase: x / RBC: x / WBC x   Sq Epi: x / Non Sq Epi: x / Bacteria: x          ======I&O's======  I&O's Summary    26 Jan 2025 07:01  -  27 Jan 2025 07:00  --------------------------------------------------------  IN: 0 mL / OUT: 300 mL / NET: -300 mL    27 Jan 2025 07:01  -  28 Jan 2025 06:42  --------------------------------------------------------  IN: 300 mL / OUT: 0 mL / NET: 300 mL

## 2025-01-28 NOTE — PROGRESS NOTE ADULT - PROBLEM SELECTOR PLAN 1
Patient with OM of R foot wound probing to bone, SIRS on admission; currently s/p R foot 1st toe I&D and partial 1st toe ray resection with podiatry Dr. Bryant with partial open wound (1/24)  Abscess cx with Staph Aureus, OR culture (1/24) NGTD, Bcx NGD    PLAN:  - PICC line for IV abx  - MRSA positive OR culture, transplant ID to f/u with final recs today  - DC plan wound vac & boot etc. as per podiatry; wound vac supplies here in case Patient with OM of R foot wound probing to bone, SIRS on admission; currently s/p R foot 1st toe I&D and partial 1st toe ray resection with podiatry Dr. Bryant with partial open wound (1/24)  Abscess cx with Staph Aureus, OR culture (1/24) NGTD, Bcx NGTD    PLAN:  - PICC line placed for IV abx  - MRSA positive OR culture, transplant ID with 6 weeks daptomycin  - DC plan wound vac & boot etc. as per podiatry; wound vac supplies here

## 2025-01-29 ENCOUNTER — NON-APPOINTMENT (OUTPATIENT)
Age: 65
End: 2025-01-29

## 2025-02-03 ENCOUNTER — APPOINTMENT (OUTPATIENT)
Dept: NEPHROLOGY | Facility: CLINIC | Age: 65
End: 2025-02-03

## 2025-02-04 ENCOUNTER — NON-APPOINTMENT (OUTPATIENT)
Age: 65
End: 2025-02-04

## 2025-02-06 ENCOUNTER — APPOINTMENT (OUTPATIENT)
Dept: VASCULAR SURGERY | Facility: CLINIC | Age: 65
End: 2025-02-06
Payer: COMMERCIAL

## 2025-02-06 ENCOUNTER — APPOINTMENT (OUTPATIENT)
Dept: CARDIOLOGY | Facility: CLINIC | Age: 65
End: 2025-02-06

## 2025-02-06 PROCEDURE — 99204 OFFICE O/P NEW MOD 45 MIN: CPT

## 2025-02-11 ENCOUNTER — APPOINTMENT (OUTPATIENT)
Dept: VASCULAR SURGERY | Facility: CLINIC | Age: 65
End: 2025-02-11
Payer: COMMERCIAL

## 2025-02-11 PROCEDURE — 99214 OFFICE O/P EST MOD 30 MIN: CPT | Mod: 25

## 2025-02-11 PROCEDURE — G0506: CPT

## 2025-02-18 ENCOUNTER — NON-APPOINTMENT (OUTPATIENT)
Age: 65
End: 2025-02-18

## 2025-02-18 ENCOUNTER — APPOINTMENT (OUTPATIENT)
Dept: ENDOVASCULAR SURGERY | Facility: CLINIC | Age: 65
End: 2025-02-18
Payer: COMMERCIAL

## 2025-02-18 ENCOUNTER — RESULT REVIEW (OUTPATIENT)
Age: 65
End: 2025-02-18

## 2025-02-18 VITALS
DIASTOLIC BLOOD PRESSURE: 83 MMHG | HEART RATE: 75 BPM | OXYGEN SATURATION: 98 % | BODY MASS INDEX: 27.58 KG/M2 | RESPIRATION RATE: 18 BRPM | WEIGHT: 182 LBS | SYSTOLIC BLOOD PRESSURE: 136 MMHG | TEMPERATURE: 98.1 F | HEIGHT: 68 IN

## 2025-02-18 DIAGNOSIS — I73.9 PERIPHERAL VASCULAR DISEASE, UNSPECIFIED: ICD-10-CM

## 2025-02-18 PROCEDURE — 75625 CONTRAST EXAM ABDOMINL AORTA: CPT

## 2025-02-18 PROCEDURE — 37229Z: CUSTOM | Mod: RT

## 2025-02-25 RX ORDER — DULAGLUTIDE 4.5 MG/.5ML
4.5 INJECTION, SOLUTION SUBCUTANEOUS
Qty: 3 | Refills: 3 | Status: ACTIVE | COMMUNITY
Start: 2025-02-24 | End: 1900-01-01

## 2025-02-27 ENCOUNTER — OUTPATIENT (OUTPATIENT)
Dept: OUTPATIENT SERVICES | Facility: HOSPITAL | Age: 65
LOS: 1 days | End: 2025-02-27
Payer: COMMERCIAL

## 2025-02-27 ENCOUNTER — APPOINTMENT (OUTPATIENT)
Dept: WOUND CARE | Facility: HOSPITAL | Age: 65
End: 2025-02-27
Payer: COMMERCIAL

## 2025-02-27 ENCOUNTER — APPOINTMENT (OUTPATIENT)
Dept: VASCULAR SURGERY | Facility: CLINIC | Age: 65
End: 2025-02-27
Payer: COMMERCIAL

## 2025-02-27 VITALS
DIASTOLIC BLOOD PRESSURE: 55 MMHG | HEIGHT: 68 IN | SYSTOLIC BLOOD PRESSURE: 127 MMHG | WEIGHT: 182 LBS | BODY MASS INDEX: 27.58 KG/M2 | HEART RATE: 73 BPM

## 2025-02-27 VITALS — DIASTOLIC BLOOD PRESSURE: 79 MMHG | SYSTOLIC BLOOD PRESSURE: 173 MMHG | TEMPERATURE: 98.6 F | HEART RATE: 80 BPM

## 2025-02-27 DIAGNOSIS — Z89.411 ACQUIRED ABSENCE OF RIGHT GREAT TOE: ICD-10-CM

## 2025-02-27 DIAGNOSIS — Z94.0 KIDNEY TRANSPLANT STATUS: ICD-10-CM

## 2025-02-27 DIAGNOSIS — E11.21 TYPE 2 DIABETES MELLITUS WITH DIABETIC NEPHROPATHY: ICD-10-CM

## 2025-02-27 PROCEDURE — 11043 DBRDMT MUSC&/FSCA 1ST 20/<: CPT

## 2025-02-27 PROCEDURE — 99204 OFFICE O/P NEW MOD 45 MIN: CPT | Mod: 25

## 2025-02-27 PROCEDURE — G0463: CPT | Mod: 79

## 2025-02-27 PROCEDURE — 99214 OFFICE O/P EST MOD 30 MIN: CPT

## 2025-02-27 RX ORDER — COLLAGENASE SANTYL 250 [ARB'U]/G
250 OINTMENT TOPICAL DAILY
Qty: 1 | Refills: 3 | Status: ACTIVE | COMMUNITY
Start: 2025-02-27 | End: 1900-01-01

## 2025-03-04 ENCOUNTER — NON-APPOINTMENT (OUTPATIENT)
Age: 65
End: 2025-03-04

## 2025-03-11 ENCOUNTER — APPOINTMENT (OUTPATIENT)
Dept: INFECTIOUS DISEASE | Facility: CLINIC | Age: 65
End: 2025-03-11
Payer: COMMERCIAL

## 2025-03-11 VITALS
HEIGHT: 68 IN | HEART RATE: 81 BPM | OXYGEN SATURATION: 97 % | BODY MASS INDEX: 27.58 KG/M2 | TEMPERATURE: 98 F | WEIGHT: 182 LBS

## 2025-03-11 DIAGNOSIS — Z23 ENCOUNTER FOR IMMUNIZATION: ICD-10-CM

## 2025-03-11 DIAGNOSIS — Z94.0 KIDNEY TRANSPLANT STATUS: ICD-10-CM

## 2025-03-11 PROCEDURE — G0009: CPT

## 2025-03-11 PROCEDURE — 90677 PCV20 VACCINE IM: CPT

## 2025-03-11 PROCEDURE — 99214 OFFICE O/P EST MOD 30 MIN: CPT | Mod: 25

## 2025-03-12 LAB
ALBUMIN SERPL ELPH-MCNC: 4.2 G/DL
ALP BLD-CCNC: 93 U/L
ALT SERPL-CCNC: 22 U/L
ANION GAP SERPL CALC-SCNC: 13 MMOL/L
AST SERPL-CCNC: 20 U/L
BILIRUB SERPL-MCNC: 0.5 MG/DL
BUN SERPL-MCNC: 23 MG/DL
CALCIUM SERPL-MCNC: 10.1 MG/DL
CHLORIDE SERPL-SCNC: 107 MMOL/L
CK SERPL-CCNC: 86 U/L
CO2 SERPL-SCNC: 20 MMOL/L
CREAT SERPL-MCNC: 1.04 MG/DL
CRP SERPL-MCNC: <3 MG/L
EGFRCR SERPLBLD CKD-EPI 2021: 81 ML/MIN/1.73M2
ERYTHROCYTE [SEDIMENTATION RATE] IN BLOOD BY WESTERGREN METHOD: 34 MM/HR
GLUCOSE SERPL-MCNC: 120 MG/DL
HCT VFR BLD CALC: 49.6 %
HGB BLD-MCNC: 15.5 G/DL
MCHC RBC-ENTMCNC: 30.3 PG
MCHC RBC-ENTMCNC: 31.3 G/DL
MCV RBC AUTO: 97.1 FL
PLATELET # BLD AUTO: 211 K/UL
POTASSIUM SERPL-SCNC: 4.7 MMOL/L
PROT SERPL-MCNC: 7.1 G/DL
RBC # BLD: 5.11 M/UL
RBC # FLD: 13.2 %
SODIUM SERPL-SCNC: 141 MMOL/L
WBC # FLD AUTO: 6.93 K/UL

## 2025-03-13 ENCOUNTER — OUTPATIENT (OUTPATIENT)
Dept: OUTPATIENT SERVICES | Facility: HOSPITAL | Age: 65
LOS: 1 days | End: 2025-03-13
Payer: COMMERCIAL

## 2025-03-13 ENCOUNTER — APPOINTMENT (OUTPATIENT)
Dept: WOUND CARE | Facility: HOSPITAL | Age: 65
End: 2025-03-13
Payer: COMMERCIAL

## 2025-03-13 VITALS — HEART RATE: 75 BPM | TEMPERATURE: 98.4 F | OXYGEN SATURATION: 97 %

## 2025-03-13 DIAGNOSIS — R60.0 LOCALIZED EDEMA: ICD-10-CM

## 2025-03-13 DIAGNOSIS — Z98.84 BARIATRIC SURGERY STATUS: Chronic | ICD-10-CM

## 2025-03-13 DIAGNOSIS — Z98.890 OTHER SPECIFIED POSTPROCEDURAL STATES: Chronic | ICD-10-CM

## 2025-03-13 DIAGNOSIS — Z98.49 CATARACT EXTRACTION STATUS, UNSPECIFIED EYE: Chronic | ICD-10-CM

## 2025-03-13 DIAGNOSIS — H35.62 RETINAL HEMORRHAGE, LEFT EYE: Chronic | ICD-10-CM

## 2025-03-13 DIAGNOSIS — E11.21 TYPE 2 DIABETES MELLITUS WITH DIABETIC NEPHROPATHY: ICD-10-CM

## 2025-03-13 DIAGNOSIS — Z89.411 ACQUIRED ABSENCE OF RIGHT GREAT TOE: ICD-10-CM

## 2025-03-13 PROCEDURE — 11043 DBRDMT MUSC&/FSCA 1ST 20/<: CPT

## 2025-04-03 ENCOUNTER — APPOINTMENT (OUTPATIENT)
Dept: WOUND CARE | Facility: HOSPITAL | Age: 65
End: 2025-04-03
Payer: COMMERCIAL

## 2025-04-03 ENCOUNTER — OUTPATIENT (OUTPATIENT)
Dept: OUTPATIENT SERVICES | Facility: HOSPITAL | Age: 65
LOS: 1 days | End: 2025-04-03
Payer: COMMERCIAL

## 2025-04-03 VITALS — SYSTOLIC BLOOD PRESSURE: 151 MMHG | HEART RATE: 80 BPM | DIASTOLIC BLOOD PRESSURE: 67 MMHG | TEMPERATURE: 98.2 F

## 2025-04-03 DIAGNOSIS — E11.21 TYPE 2 DIABETES MELLITUS WITH DIABETIC NEPHROPATHY: ICD-10-CM

## 2025-04-03 DIAGNOSIS — Z98.84 BARIATRIC SURGERY STATUS: Chronic | ICD-10-CM

## 2025-04-03 DIAGNOSIS — Z89.411 ACQUIRED ABSENCE OF RIGHT GREAT TOE: ICD-10-CM

## 2025-04-03 DIAGNOSIS — R60.0 LOCALIZED EDEMA: ICD-10-CM

## 2025-04-03 DIAGNOSIS — M86.9 OSTEOMYELITIS, UNSPECIFIED: ICD-10-CM

## 2025-04-03 DIAGNOSIS — Z98.890 OTHER SPECIFIED POSTPROCEDURAL STATES: Chronic | ICD-10-CM

## 2025-04-03 DIAGNOSIS — L97.514 NON-PRESSURE CHRONIC ULCER OF OTHER PART OF RIGHT FOOT WITH NECROSIS OF BONE: ICD-10-CM

## 2025-04-03 DIAGNOSIS — Z98.49 CATARACT EXTRACTION STATUS, UNSPECIFIED EYE: Chronic | ICD-10-CM

## 2025-04-03 PROCEDURE — 11042 DBRDMT SUBQ TIS 1ST 20SQCM/<: CPT

## 2025-04-07 ENCOUNTER — RX RENEWAL (OUTPATIENT)
Age: 65
End: 2025-04-07

## 2025-04-15 ENCOUNTER — APPOINTMENT (OUTPATIENT)
Dept: INFECTIOUS DISEASE | Facility: CLINIC | Age: 65
End: 2025-04-15
Payer: COMMERCIAL

## 2025-04-15 VITALS
HEART RATE: 80 BPM | RESPIRATION RATE: 18 BRPM | DIASTOLIC BLOOD PRESSURE: 80 MMHG | BODY MASS INDEX: 28.04 KG/M2 | SYSTOLIC BLOOD PRESSURE: 137 MMHG | TEMPERATURE: 98.5 F | HEIGHT: 68 IN | WEIGHT: 185 LBS | OXYGEN SATURATION: 97 %

## 2025-04-15 PROCEDURE — 99214 OFFICE O/P EST MOD 30 MIN: CPT

## 2025-04-15 RX ORDER — CEFPODOXIME PROXETIL 200 MG/1
200 TABLET, FILM COATED ORAL
Qty: 28 | Refills: 2 | Status: ACTIVE | COMMUNITY
Start: 2025-04-15 | End: 1900-01-01

## 2025-04-15 RX ORDER — LINEZOLID 600 MG/1
600 TABLET, FILM COATED ORAL
Qty: 28 | Refills: 0 | Status: ACTIVE | COMMUNITY
Start: 2025-04-15 | End: 1900-01-01

## 2025-04-16 ENCOUNTER — NON-APPOINTMENT (OUTPATIENT)
Age: 65
End: 2025-04-16

## 2025-04-24 ENCOUNTER — APPOINTMENT (OUTPATIENT)
Dept: VASCULAR SURGERY | Facility: CLINIC | Age: 65
End: 2025-04-24
Payer: COMMERCIAL

## 2025-04-24 ENCOUNTER — APPOINTMENT (OUTPATIENT)
Dept: WOUND CARE | Facility: HOSPITAL | Age: 65
End: 2025-04-24
Payer: COMMERCIAL

## 2025-04-24 ENCOUNTER — OUTPATIENT (OUTPATIENT)
Dept: OUTPATIENT SERVICES | Facility: HOSPITAL | Age: 65
LOS: 1 days | End: 2025-04-24
Payer: COMMERCIAL

## 2025-04-24 ENCOUNTER — APPOINTMENT (OUTPATIENT)
Dept: ENDOCRINOLOGY | Facility: CLINIC | Age: 65
End: 2025-04-24

## 2025-04-24 DIAGNOSIS — Z98.890 OTHER SPECIFIED POSTPROCEDURAL STATES: Chronic | ICD-10-CM

## 2025-04-24 DIAGNOSIS — E11.21 TYPE 2 DIABETES MELLITUS WITH DIABETIC NEPHROPATHY: ICD-10-CM

## 2025-04-24 DIAGNOSIS — H35.62 RETINAL HEMORRHAGE, LEFT EYE: Chronic | ICD-10-CM

## 2025-04-24 DIAGNOSIS — L97.514 NON-PRESSURE CHRONIC ULCER OF OTHER PART OF RIGHT FOOT WITH NECROSIS OF BONE: ICD-10-CM

## 2025-04-24 DIAGNOSIS — Z98.49 CATARACT EXTRACTION STATUS, UNSPECIFIED EYE: Chronic | ICD-10-CM

## 2025-04-24 DIAGNOSIS — E11.9 TYPE 2 DIABETES MELLITUS W/OUT COMPLICATIONS: ICD-10-CM

## 2025-04-24 DIAGNOSIS — Z89.411 ACQUIRED ABSENCE OF RIGHT GREAT TOE: ICD-10-CM

## 2025-04-24 DIAGNOSIS — Z98.84 BARIATRIC SURGERY STATUS: Chronic | ICD-10-CM

## 2025-04-24 PROCEDURE — 11042 DBRDMT SUBQ TIS 1ST 20SQCM/<: CPT

## 2025-04-24 PROCEDURE — 99214 OFFICE O/P EST MOD 30 MIN: CPT

## 2025-05-06 DIAGNOSIS — L97.514 NON-PRESSURE CHRONIC ULCER OF OTHER PART OF RIGHT FOOT WITH NECROSIS OF BONE: ICD-10-CM

## 2025-05-08 ENCOUNTER — APPOINTMENT (OUTPATIENT)
Dept: WOUND CARE | Facility: HOSPITAL | Age: 65
End: 2025-05-08
Payer: COMMERCIAL

## 2025-05-08 ENCOUNTER — OUTPATIENT (OUTPATIENT)
Dept: OUTPATIENT SERVICES | Facility: HOSPITAL | Age: 65
LOS: 1 days | End: 2025-05-08
Payer: COMMERCIAL

## 2025-05-08 DIAGNOSIS — Z98.890 OTHER SPECIFIED POSTPROCEDURAL STATES: Chronic | ICD-10-CM

## 2025-05-08 DIAGNOSIS — Z98.49 CATARACT EXTRACTION STATUS, UNSPECIFIED EYE: Chronic | ICD-10-CM

## 2025-05-08 DIAGNOSIS — E11.21 TYPE 2 DIABETES MELLITUS WITH DIABETIC NEPHROPATHY: ICD-10-CM

## 2025-05-08 DIAGNOSIS — Z98.84 BARIATRIC SURGERY STATUS: Chronic | ICD-10-CM

## 2025-05-08 DIAGNOSIS — H35.62 RETINAL HEMORRHAGE, LEFT EYE: Chronic | ICD-10-CM

## 2025-05-08 PROBLEM — L97.512 CHRONIC FOOT ULCER WITH FAT LAYER EXPOSED, RIGHT: Status: ACTIVE | Noted: 2025-05-08

## 2025-05-08 PROCEDURE — 11042 DBRDMT SUBQ TIS 1ST 20SQCM/<: CPT

## 2025-05-13 DIAGNOSIS — L97.512 NON-PRESSURE CHRONIC ULCER OF OTHER PART OF RIGHT FOOT WITH FAT LAYER EXPOSED: ICD-10-CM

## 2025-05-15 ENCOUNTER — APPOINTMENT (OUTPATIENT)
Dept: WOUND CARE | Facility: HOSPITAL | Age: 65
End: 2025-05-15

## 2025-05-15 ENCOUNTER — OUTPATIENT (OUTPATIENT)
Dept: OUTPATIENT SERVICES | Facility: HOSPITAL | Age: 65
LOS: 1 days | End: 2025-05-15
Payer: COMMERCIAL

## 2025-05-15 DIAGNOSIS — Z98.890 OTHER SPECIFIED POSTPROCEDURAL STATES: Chronic | ICD-10-CM

## 2025-05-15 DIAGNOSIS — Z98.49 CATARACT EXTRACTION STATUS, UNSPECIFIED EYE: Chronic | ICD-10-CM

## 2025-05-15 DIAGNOSIS — Z98.84 BARIATRIC SURGERY STATUS: Chronic | ICD-10-CM

## 2025-05-15 DIAGNOSIS — E11.9 TYPE 2 DIABETES MELLITUS W/OUT COMPLICATIONS: ICD-10-CM

## 2025-05-15 DIAGNOSIS — H35.62 RETINAL HEMORRHAGE, LEFT EYE: Chronic | ICD-10-CM

## 2025-05-15 PROCEDURE — 15276 SKIN SUB GRAFT F/N/HF/G ADDL: CPT

## 2025-05-15 PROCEDURE — 15275 SKIN SUB GRAFT FACE/NK/HF/G: CPT

## 2025-05-21 DIAGNOSIS — L97.512 NON-PRESSURE CHRONIC ULCER OF OTHER PART OF RIGHT FOOT WITH FAT LAYER EXPOSED: ICD-10-CM

## 2025-05-22 ENCOUNTER — OUTPATIENT (OUTPATIENT)
Dept: OUTPATIENT SERVICES | Facility: HOSPITAL | Age: 65
LOS: 1 days | End: 2025-05-22
Payer: COMMERCIAL

## 2025-05-22 ENCOUNTER — APPOINTMENT (OUTPATIENT)
Dept: WOUND CARE | Facility: HOSPITAL | Age: 65
End: 2025-05-22

## 2025-05-22 DIAGNOSIS — H35.62 RETINAL HEMORRHAGE, LEFT EYE: Chronic | ICD-10-CM

## 2025-05-22 DIAGNOSIS — Z98.84 BARIATRIC SURGERY STATUS: Chronic | ICD-10-CM

## 2025-05-22 DIAGNOSIS — Z98.890 OTHER SPECIFIED POSTPROCEDURAL STATES: Chronic | ICD-10-CM

## 2025-05-22 PROCEDURE — 99213 OFFICE O/P EST LOW 20 MIN: CPT

## 2025-05-22 PROCEDURE — G0463: CPT

## 2025-05-29 ENCOUNTER — APPOINTMENT (OUTPATIENT)
Dept: WOUND CARE | Facility: HOSPITAL | Age: 65
End: 2025-05-29
Payer: COMMERCIAL

## 2025-05-29 ENCOUNTER — APPOINTMENT (OUTPATIENT)
Dept: ENDOCRINOLOGY | Facility: CLINIC | Age: 65
End: 2025-05-29
Payer: COMMERCIAL

## 2025-05-29 ENCOUNTER — OUTPATIENT (OUTPATIENT)
Dept: OUTPATIENT SERVICES | Facility: HOSPITAL | Age: 65
LOS: 1 days | End: 2025-05-29
Payer: COMMERCIAL

## 2025-05-29 VITALS
BODY MASS INDEX: 28.04 KG/M2 | DIASTOLIC BLOOD PRESSURE: 60 MMHG | WEIGHT: 185 LBS | SYSTOLIC BLOOD PRESSURE: 100 MMHG | HEART RATE: 76 BPM | HEIGHT: 68 IN | OXYGEN SATURATION: 96 %

## 2025-05-29 VITALS
TEMPERATURE: 98 F | HEART RATE: 75 BPM | OXYGEN SATURATION: 98 % | RESPIRATION RATE: 18 BRPM | SYSTOLIC BLOOD PRESSURE: 158 MMHG | DIASTOLIC BLOOD PRESSURE: 77 MMHG

## 2025-05-29 DIAGNOSIS — L97.514 NON-PRESSURE CHRONIC ULCER OF OTHER PART OF RIGHT FOOT WITH NECROSIS OF BONE: ICD-10-CM

## 2025-05-29 DIAGNOSIS — Z98.84 BARIATRIC SURGERY STATUS: Chronic | ICD-10-CM

## 2025-05-29 DIAGNOSIS — E78.5 HYPERLIPIDEMIA, UNSPECIFIED: ICD-10-CM

## 2025-05-29 DIAGNOSIS — Z98.890 OTHER SPECIFIED POSTPROCEDURAL STATES: Chronic | ICD-10-CM

## 2025-05-29 DIAGNOSIS — I10 ESSENTIAL (PRIMARY) HYPERTENSION: ICD-10-CM

## 2025-05-29 DIAGNOSIS — Z89.411 ACQUIRED ABSENCE OF RIGHT GREAT TOE: ICD-10-CM

## 2025-05-29 DIAGNOSIS — Z98.49 CATARACT EXTRACTION STATUS, UNSPECIFIED EYE: Chronic | ICD-10-CM

## 2025-05-29 DIAGNOSIS — Z96.41 PRESENCE OF INSULIN PUMP (EXTERNAL) (INTERNAL): ICD-10-CM

## 2025-05-29 DIAGNOSIS — H35.62 RETINAL HEMORRHAGE, LEFT EYE: Chronic | ICD-10-CM

## 2025-05-29 DIAGNOSIS — E11.9 TYPE 2 DIABETES MELLITUS W/OUT COMPLICATIONS: ICD-10-CM

## 2025-05-29 LAB — HBA1C MFR BLD HPLC: 6.9

## 2025-05-29 PROCEDURE — 99214 OFFICE O/P EST MOD 30 MIN: CPT

## 2025-05-29 PROCEDURE — G2211 COMPLEX E/M VISIT ADD ON: CPT | Mod: NC

## 2025-05-29 PROCEDURE — 83036 HEMOGLOBIN GLYCOSYLATED A1C: CPT | Mod: QW

## 2025-05-29 PROCEDURE — 11042 DBRDMT SUBQ TIS 1ST 20SQCM/<: CPT

## 2025-06-03 DIAGNOSIS — E11.21 TYPE 2 DIABETES MELLITUS WITH DIABETIC NEPHROPATHY: ICD-10-CM

## 2025-06-05 ENCOUNTER — APPOINTMENT (OUTPATIENT)
Dept: WOUND CARE | Facility: HOSPITAL | Age: 65
End: 2025-06-05
Payer: COMMERCIAL

## 2025-06-05 ENCOUNTER — OUTPATIENT (OUTPATIENT)
Dept: OUTPATIENT SERVICES | Facility: HOSPITAL | Age: 65
LOS: 1 days | End: 2025-06-05
Payer: COMMERCIAL

## 2025-06-05 DIAGNOSIS — Z98.49 CATARACT EXTRACTION STATUS, UNSPECIFIED EYE: Chronic | ICD-10-CM

## 2025-06-05 DIAGNOSIS — Z98.84 BARIATRIC SURGERY STATUS: Chronic | ICD-10-CM

## 2025-06-05 DIAGNOSIS — Z98.890 OTHER SPECIFIED POSTPROCEDURAL STATES: Chronic | ICD-10-CM

## 2025-06-05 DIAGNOSIS — H35.62 RETINAL HEMORRHAGE, LEFT EYE: Chronic | ICD-10-CM

## 2025-06-05 PROCEDURE — 15275 SKIN SUB GRAFT FACE/NK/HF/G: CPT

## 2025-06-12 ENCOUNTER — OUTPATIENT (OUTPATIENT)
Dept: OUTPATIENT SERVICES | Facility: HOSPITAL | Age: 65
LOS: 1 days | End: 2025-06-12
Payer: COMMERCIAL

## 2025-06-12 ENCOUNTER — APPOINTMENT (OUTPATIENT)
Dept: WOUND CARE | Facility: HOSPITAL | Age: 65
End: 2025-06-12
Payer: COMMERCIAL

## 2025-06-12 DIAGNOSIS — Z98.890 OTHER SPECIFIED POSTPROCEDURAL STATES: Chronic | ICD-10-CM

## 2025-06-12 DIAGNOSIS — Z98.49 CATARACT EXTRACTION STATUS, UNSPECIFIED EYE: Chronic | ICD-10-CM

## 2025-06-12 PROCEDURE — 99213 OFFICE O/P EST LOW 20 MIN: CPT

## 2025-06-12 PROCEDURE — G0463: CPT

## 2025-06-12 RX ORDER — MUPIROCIN 20 MG/G
2 OINTMENT TOPICAL
Qty: 1 | Refills: 3 | Status: ACTIVE | COMMUNITY
Start: 2025-06-12 | End: 1900-01-01

## 2025-06-17 DIAGNOSIS — L97.512 NON-PRESSURE CHRONIC ULCER OF OTHER PART OF RIGHT FOOT WITH FAT LAYER EXPOSED: ICD-10-CM

## 2025-06-19 ENCOUNTER — APPOINTMENT (OUTPATIENT)
Dept: WOUND CARE | Facility: HOSPITAL | Age: 65
End: 2025-06-19
Payer: COMMERCIAL

## 2025-06-19 ENCOUNTER — OUTPATIENT (OUTPATIENT)
Dept: OUTPATIENT SERVICES | Facility: HOSPITAL | Age: 65
LOS: 1 days | End: 2025-06-19
Payer: COMMERCIAL

## 2025-06-19 DIAGNOSIS — Z98.890 OTHER SPECIFIED POSTPROCEDURAL STATES: Chronic | ICD-10-CM

## 2025-06-19 DIAGNOSIS — Z98.49 CATARACT EXTRACTION STATUS, UNSPECIFIED EYE: Chronic | ICD-10-CM

## 2025-06-19 DIAGNOSIS — Z98.84 BARIATRIC SURGERY STATUS: Chronic | ICD-10-CM

## 2025-06-19 PROBLEM — S91.302A OPEN WOUND OF LEFT FOOT, INITIAL ENCOUNTER: Status: ACTIVE | Noted: 2025-06-12

## 2025-06-19 PROCEDURE — G0463: CPT

## 2025-06-19 PROCEDURE — 99213 OFFICE O/P EST LOW 20 MIN: CPT

## 2025-06-24 DIAGNOSIS — E11.21 TYPE 2 DIABETES MELLITUS WITH DIABETIC NEPHROPATHY: ICD-10-CM

## 2025-06-24 DIAGNOSIS — L97.514 NON-PRESSURE CHRONIC ULCER OF OTHER PART OF RIGHT FOOT WITH NECROSIS OF BONE: ICD-10-CM
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
## 2025-06-24 DIAGNOSIS — S91.302A UNSPECIFIED OPEN WOUND, LEFT FOOT, INITIAL ENCOUNTER: ICD-10-CM

## 2025-06-24 DIAGNOSIS — L97.512 NON-PRESSURE CHRONIC ULCER OF OTHER PART OF RIGHT FOOT WITH FAT LAYER EXPOSED: ICD-10-CM

## 2025-06-24 DIAGNOSIS — M86.271 SUBACUTE OSTEOMYELITIS, RIGHT ANKLE AND FOOT: ICD-10-CM

## 2025-06-24 DIAGNOSIS — E11.59 TYPE 2 DIABETES MELLITUS WITH OTHER CIRCULATORY COMPLICATIONS: ICD-10-CM

## 2025-06-24 DIAGNOSIS — Z89.411 ACQUIRED ABSENCE OF RIGHT GREAT TOE: ICD-10-CM

## 2025-06-24 DIAGNOSIS — I73.9 PERIPHERAL VASCULAR DISEASE, UNSPECIFIED: ICD-10-CM

## 2025-06-24 DIAGNOSIS — Z94.0 KIDNEY TRANSPLANT STATUS: ICD-10-CM

## 2025-06-25 DIAGNOSIS — E11.21 TYPE 2 DIABETES MELLITUS WITH DIABETIC NEPHROPATHY: ICD-10-CM

## 2025-06-25 DIAGNOSIS — S91.302A UNSPECIFIED OPEN WOUND, LEFT FOOT, INITIAL ENCOUNTER: ICD-10-CM

## 2025-06-25 DIAGNOSIS — L97.512 NON-PRESSURE CHRONIC ULCER OF OTHER PART OF RIGHT FOOT WITH FAT LAYER EXPOSED: ICD-10-CM

## 2025-06-25 DIAGNOSIS — I73.9 PERIPHERAL VASCULAR DISEASE, UNSPECIFIED: ICD-10-CM
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
## 2025-07-10 ENCOUNTER — APPOINTMENT (OUTPATIENT)
Dept: WOUND CARE | Facility: HOSPITAL | Age: 65
End: 2025-07-10
Payer: COMMERCIAL

## 2025-07-10 ENCOUNTER — OUTPATIENT (OUTPATIENT)
Dept: OUTPATIENT SERVICES | Facility: HOSPITAL | Age: 65
LOS: 1 days | End: 2025-07-10
Payer: COMMERCIAL

## 2025-07-10 VITALS
RESPIRATION RATE: 18 BRPM | SYSTOLIC BLOOD PRESSURE: 149 MMHG | TEMPERATURE: 98.2 F | OXYGEN SATURATION: 95 % | HEART RATE: 75 BPM | DIASTOLIC BLOOD PRESSURE: 90 MMHG

## 2025-07-10 DIAGNOSIS — Z98.84 BARIATRIC SURGERY STATUS: Chronic | ICD-10-CM

## 2025-07-10 DIAGNOSIS — H35.62 RETINAL HEMORRHAGE, LEFT EYE: Chronic | ICD-10-CM

## 2025-07-10 DIAGNOSIS — Z98.890 OTHER SPECIFIED POSTPROCEDURAL STATES: Chronic | ICD-10-CM

## 2025-07-10 DIAGNOSIS — Z98.49 CATARACT EXTRACTION STATUS, UNSPECIFIED EYE: Chronic | ICD-10-CM

## 2025-07-10 PROCEDURE — 11042 DBRDMT SUBQ TIS 1ST 20SQCM/<: CPT

## 2025-07-14 DIAGNOSIS — L97.512 NON-PRESSURE CHRONIC ULCER OF OTHER PART OF RIGHT FOOT WITH FAT LAYER EXPOSED: ICD-10-CM

## 2025-07-22 ENCOUNTER — APPOINTMENT (OUTPATIENT)
Dept: ENDOCRINOLOGY | Facility: CLINIC | Age: 65
End: 2025-07-22

## 2025-07-24 ENCOUNTER — APPOINTMENT (OUTPATIENT)
Dept: WOUND CARE | Facility: HOSPITAL | Age: 65
End: 2025-07-24
Payer: COMMERCIAL

## 2025-07-24 ENCOUNTER — APPOINTMENT (OUTPATIENT)
Dept: ENDOCRINOLOGY | Facility: CLINIC | Age: 65
End: 2025-07-24

## 2025-07-24 ENCOUNTER — OUTPATIENT (OUTPATIENT)
Dept: OUTPATIENT SERVICES | Facility: HOSPITAL | Age: 65
LOS: 1 days | End: 2025-07-24
Payer: COMMERCIAL

## 2025-07-24 DIAGNOSIS — Z98.84 BARIATRIC SURGERY STATUS: Chronic | ICD-10-CM

## 2025-07-24 DIAGNOSIS — H35.62 RETINAL HEMORRHAGE, LEFT EYE: Chronic | ICD-10-CM

## 2025-07-24 DIAGNOSIS — Z98.890 OTHER SPECIFIED POSTPROCEDURAL STATES: Chronic | ICD-10-CM

## 2025-07-24 DIAGNOSIS — L97.514 NON-PRESSURE CHRONIC ULCER OF OTHER PART OF RIGHT FOOT WITH NECROSIS OF BONE: ICD-10-CM

## 2025-07-24 DIAGNOSIS — R60.0 LOCALIZED EDEMA: ICD-10-CM

## 2025-07-24 DIAGNOSIS — Z98.49 CATARACT EXTRACTION STATUS, UNSPECIFIED EYE: Chronic | ICD-10-CM

## 2025-07-24 DIAGNOSIS — E11.21 TYPE 2 DIABETES MELLITUS WITH DIABETIC NEPHROPATHY: ICD-10-CM

## 2025-07-24 DIAGNOSIS — L97.512 NON-PRESSURE CHRONIC ULCER OF OTHER PART OF RIGHT FOOT WITH FAT LAYER EXPOSED: ICD-10-CM

## 2025-07-24 PROCEDURE — 99213 OFFICE O/P EST LOW 20 MIN: CPT

## 2025-07-24 PROCEDURE — G0463: CPT

## 2025-07-28 ENCOUNTER — APPOINTMENT (OUTPATIENT)
Dept: CARDIOLOGY | Facility: CLINIC | Age: 65
End: 2025-07-28
Payer: COMMERCIAL

## 2025-07-28 ENCOUNTER — NON-APPOINTMENT (OUTPATIENT)
Age: 65
End: 2025-07-28

## 2025-07-28 ENCOUNTER — APPOINTMENT (OUTPATIENT)
Dept: PULMONOLOGY | Facility: CLINIC | Age: 65
End: 2025-07-28
Payer: COMMERCIAL

## 2025-07-28 VITALS
RESPIRATION RATE: 19 BRPM | OXYGEN SATURATION: 97 % | DIASTOLIC BLOOD PRESSURE: 60 MMHG | SYSTOLIC BLOOD PRESSURE: 172 MMHG | SYSTOLIC BLOOD PRESSURE: 108 MMHG | DIASTOLIC BLOOD PRESSURE: 83 MMHG | WEIGHT: 194 LBS | HEIGHT: 68 IN | BODY MASS INDEX: 29.4 KG/M2 | HEART RATE: 77 BPM

## 2025-07-28 DIAGNOSIS — E78.5 HYPERLIPIDEMIA, UNSPECIFIED: ICD-10-CM

## 2025-07-28 DIAGNOSIS — E11.9 TYPE 2 DIABETES MELLITUS W/OUT COMPLICATIONS: ICD-10-CM

## 2025-07-28 DIAGNOSIS — M54.50 LOW BACK PAIN, UNSPECIFIED: ICD-10-CM

## 2025-07-28 DIAGNOSIS — I10 ESSENTIAL (PRIMARY) HYPERTENSION: ICD-10-CM

## 2025-07-28 DIAGNOSIS — I70.90 UNSPECIFIED ATHEROSCLEROSIS: ICD-10-CM

## 2025-07-28 DIAGNOSIS — M51.369: ICD-10-CM

## 2025-07-28 PROCEDURE — G2211 COMPLEX E/M VISIT ADD ON: CPT | Mod: NC

## 2025-07-28 PROCEDURE — 93000 ELECTROCARDIOGRAM COMPLETE: CPT

## 2025-07-28 PROCEDURE — 99214 OFFICE O/P EST MOD 30 MIN: CPT

## 2025-07-28 PROCEDURE — 99213 OFFICE O/P EST LOW 20 MIN: CPT

## 2025-08-04 ENCOUNTER — APPOINTMENT (OUTPATIENT)
Dept: ENDOCRINOLOGY | Facility: CLINIC | Age: 65
End: 2025-08-04

## 2025-08-06 DIAGNOSIS — E11.21 TYPE 2 DIABETES MELLITUS WITH DIABETIC NEPHROPATHY: ICD-10-CM

## 2025-08-06 DIAGNOSIS — L97.512 NON-PRESSURE CHRONIC ULCER OF OTHER PART OF RIGHT FOOT WITH FAT LAYER EXPOSED: ICD-10-CM

## 2025-08-06 DIAGNOSIS — R60.0 LOCALIZED EDEMA: ICD-10-CM

## 2025-08-20 ENCOUNTER — APPOINTMENT (OUTPATIENT)
Dept: ENDOCRINOLOGY | Facility: CLINIC | Age: 65
End: 2025-08-20
Payer: COMMERCIAL

## 2025-08-20 DIAGNOSIS — E11.9 TYPE 2 DIABETES MELLITUS W/OUT COMPLICATIONS: ICD-10-CM

## 2025-08-20 LAB — HBA1C MFR BLD HPLC: 6.9

## 2025-08-20 PROCEDURE — 95251 CONT GLUC MNTR ANALYSIS I&R: CPT

## 2025-08-20 PROCEDURE — 83036 HEMOGLOBIN GLYCOSYLATED A1C: CPT | Mod: QW

## 2025-08-20 PROCEDURE — G0108 DIAB MANAGE TRN  PER INDIV: CPT

## 2025-08-27 ENCOUNTER — RX RENEWAL (OUTPATIENT)
Age: 65
End: 2025-08-27

## 2025-09-08 ENCOUNTER — RX RENEWAL (OUTPATIENT)
Age: 65
End: 2025-09-08

## (undated) DEVICE — SOL IRR POUR H2O 250ML

## (undated) DEVICE — DRSG ACE BANDAGE 6"

## (undated) DEVICE — BLADE SCALPEL SAFETYLOCK #15

## (undated) DEVICE — DRSG COMBINE 5 X 9"

## (undated) DEVICE — FOLEY TRAY 16FR 5CC LTX UMETER CLOSED

## (undated) DEVICE — PACK EXTREMITY

## (undated) DEVICE — DRSG XEROFORM 1 X 8"

## (undated) DEVICE — SUT POLYSORB 2-0 30" GS-21 UNDYED

## (undated) DEVICE — DRAPE 3/4 SHEET W REINFORCEMENT 56X77"

## (undated) DEVICE — STAPLER SKIN VISI-STAT 35 WIDE

## (undated) DEVICE — SAW BLADE MICROAIRE SAGITTAL 9.4MMX25.4MMX0.6MM

## (undated) DEVICE — PACKING GAUZE PLAIN 0.5"

## (undated) DEVICE — PACKING GAUZE IODOFORM 2"

## (undated) DEVICE — DRSG STOCKINETTE IMPERVIOUS MED 8 X 38"

## (undated) DEVICE — DRAPE MAGNETIC INSTRUMENT MEDIUM

## (undated) DEVICE — VENODYNE/SCD SLEEVE CALF MEDIUM

## (undated) DEVICE — SOL IRR POUR NS 0.9% 500ML

## (undated) DEVICE — WARMING BLANKET UPPER ADULT

## (undated) DEVICE — DRAPE 1/2 SHEET 40X57"

## (undated) DEVICE — STRYKER PULSE LAVAGE WITH HIGH FLOW TIP

## (undated) DEVICE — GLV 8 PROTEXIS (WHITE)

## (undated) DEVICE — BUR STRYKER OVAL SOLID CARBIDE MED 4MM

## (undated) DEVICE — DRSG STOCKINETTE IMPERVIOUS XL 12 X 48"

## (undated) DEVICE — POSITIONER FOAM EGG CRATE ULNAR 2PCS (PINK)

## (undated) DEVICE — MARKING PEN W RULER

## (undated) DEVICE — DRAPE INSTRUMENT POUCH 6.75" X 11"

## (undated) DEVICE — PACKING GAUZE PLAIN 2"

## (undated) DEVICE — DRAPE LIGHT HANDLE COVER (BLUE)

## (undated) DEVICE — SYR LUER LOK 10CC

## (undated) DEVICE — SPECIMEN CONTAINER 100ML

## (undated) DEVICE — SAW BLADE MICROAIRE SAGITTAL 5.8X25.4X0.6 MM

## (undated) DEVICE — NDL HYPO REGULAR BEVEL 25G X 1.5" (BLUE)

## (undated) DEVICE — DRSG STERISTRIPS 0.5 X 4"

## (undated) DEVICE — DRSG CURITY GAUZE SPONGE 4 X 4" 12-PLY

## (undated) DEVICE — MEDICATION LABELS W MARKER

## (undated) DEVICE — DRSG KLING 4"

## (undated) DEVICE — LAP PAD 18 X 18"

## (undated) DEVICE — SUT PLAIN GUT 4-0 18" P-12

## (undated) DEVICE — DRAPE TOWEL BLUE 17" X 24"

## (undated) DEVICE — PREP BETADINE KIT

## (undated) DEVICE — DRAPE ISOLATION BAG 20X20"

## (undated) DEVICE — DRSG ADAPTIC CURITY OIL EMULSION 3 X 8"